# Patient Record
Sex: FEMALE | Race: BLACK OR AFRICAN AMERICAN | NOT HISPANIC OR LATINO
[De-identification: names, ages, dates, MRNs, and addresses within clinical notes are randomized per-mention and may not be internally consistent; named-entity substitution may affect disease eponyms.]

---

## 2017-01-03 ENCOUNTER — APPOINTMENT (OUTPATIENT)
Dept: NEUROLOGY | Facility: CLINIC | Age: 57
End: 2017-01-03

## 2017-01-03 VITALS
DIASTOLIC BLOOD PRESSURE: 88 MMHG | BODY MASS INDEX: 46.01 KG/M2 | HEIGHT: 62 IN | SYSTOLIC BLOOD PRESSURE: 151 MMHG | HEART RATE: 72 BPM | WEIGHT: 250 LBS

## 2017-01-04 ENCOUNTER — RESULT REVIEW (OUTPATIENT)
Age: 57
End: 2017-01-04

## 2017-01-04 ENCOUNTER — FORM ENCOUNTER (OUTPATIENT)
Age: 57
End: 2017-01-04

## 2017-01-05 ENCOUNTER — APPOINTMENT (OUTPATIENT)
Dept: HEMATOLOGY ONCOLOGY | Facility: CLINIC | Age: 57
End: 2017-01-05

## 2017-01-05 ENCOUNTER — LABORATORY RESULT (OUTPATIENT)
Age: 57
End: 2017-01-05

## 2017-01-05 ENCOUNTER — APPOINTMENT (OUTPATIENT)
Dept: ULTRASOUND IMAGING | Facility: IMAGING CENTER | Age: 57
End: 2017-01-05

## 2017-01-05 ENCOUNTER — OUTPATIENT (OUTPATIENT)
Dept: OUTPATIENT SERVICES | Facility: HOSPITAL | Age: 57
LOS: 1 days | End: 2017-01-05
Payer: MEDICARE

## 2017-01-05 DIAGNOSIS — E04.1 NONTOXIC SINGLE THYROID NODULE: Chronic | ICD-10-CM

## 2017-01-05 DIAGNOSIS — C80.1 MALIGNANT (PRIMARY) NEOPLASM, UNSPECIFIED: Chronic | ICD-10-CM

## 2017-01-05 DIAGNOSIS — E04.1 NONTOXIC SINGLE THYROID NODULE: ICD-10-CM

## 2017-01-05 DIAGNOSIS — Z33.2 ENCOUNTER FOR ELECTIVE TERMINATION OF PREGNANCY: Chronic | ICD-10-CM

## 2017-01-05 PROBLEM — Z00.00 ENCOUNTER FOR PREVENTIVE HEALTH EXAMINATION: Noted: 2017-01-05

## 2017-01-05 PROCEDURE — 76536 US EXAM OF HEAD AND NECK: CPT | Mod: 26

## 2017-01-05 PROCEDURE — 76536 US EXAM OF HEAD AND NECK: CPT

## 2017-01-13 ENCOUNTER — RESULT REVIEW (OUTPATIENT)
Age: 57
End: 2017-01-13

## 2017-02-16 ENCOUNTER — APPOINTMENT (OUTPATIENT)
Dept: GASTROENTEROLOGY | Facility: CLINIC | Age: 57
End: 2017-02-16

## 2017-02-16 VITALS
DIASTOLIC BLOOD PRESSURE: 70 MMHG | TEMPERATURE: 98.2 F | SYSTOLIC BLOOD PRESSURE: 150 MMHG | HEART RATE: 86 BPM | HEIGHT: 61 IN | OXYGEN SATURATION: 96 % | WEIGHT: 250 LBS | BODY MASS INDEX: 47.2 KG/M2 | RESPIRATION RATE: 16 BRPM

## 2017-02-16 DIAGNOSIS — R13.10 DYSPHAGIA, UNSPECIFIED: ICD-10-CM

## 2017-02-16 DIAGNOSIS — Z87.09 PERSONAL HISTORY OF OTHER DISEASES OF THE RESPIRATORY SYSTEM: ICD-10-CM

## 2017-02-16 DIAGNOSIS — F15.90 OTHER STIMULANT USE, UNSPECIFIED, UNCOMPLICATED: ICD-10-CM

## 2017-02-16 DIAGNOSIS — R10.11 RIGHT UPPER QUADRANT PAIN: ICD-10-CM

## 2017-02-16 DIAGNOSIS — K20.9 ESOPHAGITIS, UNSPECIFIED: ICD-10-CM

## 2017-02-16 DIAGNOSIS — K92.1 MELENA: ICD-10-CM

## 2017-02-16 DIAGNOSIS — M19.90 UNSPECIFIED OSTEOARTHRITIS, UNSPECIFIED SITE: ICD-10-CM

## 2017-02-16 DIAGNOSIS — I89.0 LYMPHEDEMA, NOT ELSEWHERE CLASSIFIED: ICD-10-CM

## 2017-02-16 DIAGNOSIS — Z87.891 PERSONAL HISTORY OF NICOTINE DEPENDENCE: ICD-10-CM

## 2017-03-03 ENCOUNTER — RX RENEWAL (OUTPATIENT)
Age: 57
End: 2017-03-03

## 2017-03-06 ENCOUNTER — MEDICATION RENEWAL (OUTPATIENT)
Age: 57
End: 2017-03-06

## 2017-03-07 ENCOUNTER — RESULT REVIEW (OUTPATIENT)
Age: 57
End: 2017-03-07

## 2017-03-08 ENCOUNTER — APPOINTMENT (OUTPATIENT)
Dept: GASTROENTEROLOGY | Facility: HOSPITAL | Age: 57
End: 2017-03-08

## 2017-03-08 ENCOUNTER — OUTPATIENT (OUTPATIENT)
Dept: OUTPATIENT SERVICES | Facility: HOSPITAL | Age: 57
LOS: 1 days | Discharge: HOME | End: 2017-03-08
Payer: MEDICARE

## 2017-03-08 DIAGNOSIS — Z33.2 ENCOUNTER FOR ELECTIVE TERMINATION OF PREGNANCY: Chronic | ICD-10-CM

## 2017-03-08 DIAGNOSIS — R63.4 ABNORMAL WEIGHT LOSS: ICD-10-CM

## 2017-03-08 DIAGNOSIS — C80.1 MALIGNANT (PRIMARY) NEOPLASM, UNSPECIFIED: Chronic | ICD-10-CM

## 2017-03-08 DIAGNOSIS — E04.1 NONTOXIC SINGLE THYROID NODULE: Chronic | ICD-10-CM

## 2017-03-08 PROCEDURE — 88312 SPECIAL STAINS GROUP 1: CPT | Mod: 26

## 2017-03-08 PROCEDURE — 45378 DIAGNOSTIC COLONOSCOPY: CPT | Mod: GC

## 2017-03-08 PROCEDURE — 88305 TISSUE EXAM BY PATHOLOGIST: CPT | Mod: 26

## 2017-03-08 PROCEDURE — 43239 EGD BIOPSY SINGLE/MULTIPLE: CPT | Mod: GC

## 2017-03-10 LAB — SURGICAL PATHOLOGY STUDY: SIGNIFICANT CHANGE UP

## 2017-03-13 ENCOUNTER — APPOINTMENT (OUTPATIENT)
Dept: INTERNAL MEDICINE | Facility: CLINIC | Age: 57
End: 2017-03-13

## 2017-03-13 VITALS
DIASTOLIC BLOOD PRESSURE: 80 MMHG | HEIGHT: 62 IN | TEMPERATURE: 98.6 F | BODY MASS INDEX: 45.27 KG/M2 | HEART RATE: 64 BPM | SYSTOLIC BLOOD PRESSURE: 130 MMHG | WEIGHT: 246 LBS

## 2017-03-15 LAB
ALBUMIN SERPL ELPH-MCNC: 4.1 G/DL
ALP BLD-CCNC: 94 U/L
ALT SERPL-CCNC: 14 U/L
ANION GAP SERPL CALC-SCNC: 13 MMOL/L
AST SERPL-CCNC: 17 U/L
BILIRUB SERPL-MCNC: 0.4 MG/DL
BUN SERPL-MCNC: 11 MG/DL
CALCIUM SERPL-MCNC: 9.6 MG/DL
CHLORIDE SERPL-SCNC: 95 MMOL/L
CO2 SERPL-SCNC: 28 MMOL/L
CREAT SERPL-MCNC: 1 MG/DL
CREAT SPEC-SCNC: 87 MG/DL
GLUCOSE SERPL-MCNC: 114 MG/DL
HBA1C MFR BLD HPLC: 6.7 %
HCV AB SER QL: NONREACTIVE
HCV S/CO RATIO: 0.12 S/CO
MICROALBUMIN 24H UR DL<=1MG/L-MCNC: 3.7 MG/DL
MICROALBUMIN/CREAT 24H UR-RTO: 43 UG/MG
POTASSIUM SERPL-SCNC: 3.9 MMOL/L
PROT SERPL-MCNC: 8 G/DL
SODIUM SERPL-SCNC: 136 MMOL/L

## 2017-03-20 PROBLEM — K20.9 ESOPHAGITIS: Status: ACTIVE | Noted: 2017-02-16

## 2017-03-20 PROBLEM — K92.1 HEMATOCHEZIA: Status: ACTIVE | Noted: 2017-02-16

## 2017-03-20 PROBLEM — F15.90 CAFFEINE USE: Status: ACTIVE | Noted: 2017-02-16

## 2017-03-20 PROBLEM — I89.0 LYMPHEDEMA OF RIGHT ARM: Status: RESOLVED | Noted: 2017-02-16 | Resolved: 2017-03-20

## 2017-03-20 PROBLEM — Z87.09 HISTORY OF POSTNASAL DRIP: Status: ACTIVE | Noted: 2017-02-16

## 2017-03-20 PROBLEM — Z87.891 FORMER SMOKER: Status: ACTIVE | Noted: 2017-02-16

## 2017-03-20 PROBLEM — R13.10 DYSPHAGIA: Status: ACTIVE | Noted: 2017-02-16

## 2017-04-17 ENCOUNTER — APPOINTMENT (OUTPATIENT)
Dept: INTERNAL MEDICINE | Facility: CLINIC | Age: 57
End: 2017-04-17

## 2017-05-01 ENCOUNTER — APPOINTMENT (OUTPATIENT)
Dept: INTERNAL MEDICINE | Facility: CLINIC | Age: 57
End: 2017-05-01

## 2017-05-01 ENCOUNTER — NON-APPOINTMENT (OUTPATIENT)
Age: 57
End: 2017-05-01

## 2017-05-01 VITALS
BODY MASS INDEX: 45.64 KG/M2 | TEMPERATURE: 98.5 F | DIASTOLIC BLOOD PRESSURE: 78 MMHG | HEIGHT: 62 IN | HEART RATE: 66 BPM | OXYGEN SATURATION: 95 % | WEIGHT: 248 LBS | SYSTOLIC BLOOD PRESSURE: 136 MMHG

## 2017-05-01 RX ORDER — HYDROCHLOROTHIAZIDE 25 MG/1
25 TABLET ORAL
Refills: 0 | Status: DISCONTINUED | COMMUNITY
End: 2017-05-01

## 2017-05-03 LAB
ALBUMIN SERPL ELPH-MCNC: 4.1 G/DL
ALP BLD-CCNC: 106 U/L
ALT SERPL-CCNC: 13 U/L
ANION GAP SERPL CALC-SCNC: 16 MMOL/L
APTT BLD: 36.1 SEC
AST SERPL-CCNC: 12 U/L
BASOPHILS # BLD AUTO: 0.01 K/UL
BASOPHILS NFR BLD AUTO: 0.2 %
BILIRUB SERPL-MCNC: 0.3 MG/DL
BUN SERPL-MCNC: 11 MG/DL
CALCIUM SERPL-MCNC: 9.4 MG/DL
CHLORIDE SERPL-SCNC: 100 MMOL/L
CO2 SERPL-SCNC: 28 MMOL/L
CREAT SERPL-MCNC: 0.96 MG/DL
EOSINOPHIL # BLD AUTO: 0.08 K/UL
EOSINOPHIL NFR BLD AUTO: 1.9 %
GLUCOSE SERPL-MCNC: 113 MG/DL
HCT VFR BLD CALC: 35.1 %
HGB BLD-MCNC: 11.2 G/DL
IMM GRANULOCYTES NFR BLD AUTO: 0.2 %
INR PPP: 0.98 RATIO
LYMPHOCYTES # BLD AUTO: 1.35 K/UL
LYMPHOCYTES NFR BLD AUTO: 32.5 %
MAN DIFF?: NORMAL
MCHC RBC-ENTMCNC: 27.1 PG
MCHC RBC-ENTMCNC: 31.9 GM/DL
MCV RBC AUTO: 84.8 FL
MONOCYTES # BLD AUTO: 0.25 K/UL
MONOCYTES NFR BLD AUTO: 6 %
NEUTROPHILS # BLD AUTO: 2.46 K/UL
NEUTROPHILS NFR BLD AUTO: 59.2 %
PLATELET # BLD AUTO: 251 K/UL
POTASSIUM SERPL-SCNC: 3.9 MMOL/L
PROT SERPL-MCNC: 8.2 G/DL
PT BLD: 11.1 SEC
RBC # BLD: 4.14 M/UL
RBC # FLD: 17.3 %
SODIUM SERPL-SCNC: 144 MMOL/L
WBC # FLD AUTO: 4.16 K/UL

## 2017-05-09 ENCOUNTER — FORM ENCOUNTER (OUTPATIENT)
Age: 57
End: 2017-05-09

## 2017-05-10 ENCOUNTER — OUTPATIENT (OUTPATIENT)
Dept: OUTPATIENT SERVICES | Facility: HOSPITAL | Age: 57
LOS: 1 days | End: 2017-05-10
Payer: MEDICARE

## 2017-05-10 ENCOUNTER — APPOINTMENT (OUTPATIENT)
Dept: RADIOLOGY | Facility: IMAGING CENTER | Age: 57
End: 2017-05-10

## 2017-05-10 DIAGNOSIS — Z00.8 ENCOUNTER FOR OTHER GENERAL EXAMINATION: ICD-10-CM

## 2017-05-10 DIAGNOSIS — Z33.2 ENCOUNTER FOR ELECTIVE TERMINATION OF PREGNANCY: Chronic | ICD-10-CM

## 2017-05-10 DIAGNOSIS — E04.1 NONTOXIC SINGLE THYROID NODULE: Chronic | ICD-10-CM

## 2017-05-10 DIAGNOSIS — C80.1 MALIGNANT (PRIMARY) NEOPLASM, UNSPECIFIED: Chronic | ICD-10-CM

## 2017-05-10 PROCEDURE — 71046 X-RAY EXAM CHEST 2 VIEWS: CPT

## 2017-05-12 ENCOUNTER — APPOINTMENT (OUTPATIENT)
Dept: INTERNAL MEDICINE | Facility: CLINIC | Age: 57
End: 2017-05-12

## 2017-05-12 VITALS
HEIGHT: 62 IN | BODY MASS INDEX: 45.27 KG/M2 | TEMPERATURE: 98.2 F | DIASTOLIC BLOOD PRESSURE: 66 MMHG | SYSTOLIC BLOOD PRESSURE: 134 MMHG | OXYGEN SATURATION: 98 % | HEART RATE: 76 BPM | WEIGHT: 246 LBS

## 2017-05-24 ENCOUNTER — FORM ENCOUNTER (OUTPATIENT)
Age: 57
End: 2017-05-24

## 2017-05-25 ENCOUNTER — APPOINTMENT (OUTPATIENT)
Dept: ULTRASOUND IMAGING | Facility: IMAGING CENTER | Age: 57
End: 2017-05-25

## 2017-05-25 ENCOUNTER — OUTPATIENT (OUTPATIENT)
Dept: OUTPATIENT SERVICES | Facility: HOSPITAL | Age: 57
LOS: 1 days | End: 2017-05-25
Payer: MEDICARE

## 2017-05-25 ENCOUNTER — APPOINTMENT (OUTPATIENT)
Dept: MAMMOGRAPHY | Facility: IMAGING CENTER | Age: 57
End: 2017-05-25

## 2017-05-25 DIAGNOSIS — C80.1 MALIGNANT (PRIMARY) NEOPLASM, UNSPECIFIED: Chronic | ICD-10-CM

## 2017-05-25 DIAGNOSIS — Z01.818 ENCOUNTER FOR OTHER PREPROCEDURAL EXAMINATION: ICD-10-CM

## 2017-05-25 DIAGNOSIS — Z33.2 ENCOUNTER FOR ELECTIVE TERMINATION OF PREGNANCY: Chronic | ICD-10-CM

## 2017-05-25 DIAGNOSIS — E04.1 NONTOXIC SINGLE THYROID NODULE: Chronic | ICD-10-CM

## 2017-05-25 PROCEDURE — 76641 ULTRASOUND BREAST COMPLETE: CPT

## 2017-05-25 PROCEDURE — 77067 SCR MAMMO BI INCL CAD: CPT

## 2017-05-25 PROCEDURE — 77063 BREAST TOMOSYNTHESIS BI: CPT

## 2017-06-07 ENCOUNTER — APPOINTMENT (OUTPATIENT)
Dept: PHYSICAL MEDICINE AND REHAB | Facility: CLINIC | Age: 57
End: 2017-06-07

## 2017-06-09 ENCOUNTER — APPOINTMENT (OUTPATIENT)
Dept: CARDIOLOGY | Facility: CLINIC | Age: 57
End: 2017-06-09

## 2017-06-09 ENCOUNTER — NON-APPOINTMENT (OUTPATIENT)
Age: 57
End: 2017-06-09

## 2017-06-09 VITALS
SYSTOLIC BLOOD PRESSURE: 139 MMHG | BODY MASS INDEX: 45.27 KG/M2 | DIASTOLIC BLOOD PRESSURE: 77 MMHG | WEIGHT: 246 LBS | OXYGEN SATURATION: 97 % | HEIGHT: 62 IN | HEART RATE: 75 BPM

## 2017-06-09 RX ORDER — LOSARTAN POTASSIUM 25 MG/1
25 TABLET, FILM COATED ORAL
Refills: 0 | Status: DISCONTINUED | COMMUNITY
End: 2017-06-09

## 2017-06-19 ENCOUNTER — APPOINTMENT (OUTPATIENT)
Dept: INTERNAL MEDICINE | Facility: CLINIC | Age: 57
End: 2017-06-19

## 2017-06-19 VITALS
BODY MASS INDEX: 45.45 KG/M2 | TEMPERATURE: 98.3 F | HEIGHT: 62 IN | HEART RATE: 68 BPM | DIASTOLIC BLOOD PRESSURE: 80 MMHG | SYSTOLIC BLOOD PRESSURE: 130 MMHG | OXYGEN SATURATION: 97 % | WEIGHT: 247 LBS

## 2017-06-19 DIAGNOSIS — M25.421 PAIN IN RIGHT ELBOW: ICD-10-CM

## 2017-06-19 DIAGNOSIS — M25.521 PAIN IN RIGHT ELBOW: ICD-10-CM

## 2017-06-19 DIAGNOSIS — I10 ESSENTIAL (PRIMARY) HYPERTENSION: ICD-10-CM

## 2017-06-19 DIAGNOSIS — M79.7 FIBROMYALGIA: ICD-10-CM

## 2017-06-21 ENCOUNTER — APPOINTMENT (OUTPATIENT)
Dept: CV DIAGNOSITCS | Facility: HOSPITAL | Age: 57
End: 2017-06-21

## 2017-06-27 PROBLEM — I10 HYPERTENSION: Noted: 2017-02-16

## 2017-06-27 PROBLEM — M79.7 FIBROMYALGIA: Noted: 2017-02-16

## 2017-06-28 ENCOUNTER — FORM ENCOUNTER (OUTPATIENT)
Age: 57
End: 2017-06-28

## 2017-06-29 ENCOUNTER — OUTPATIENT (OUTPATIENT)
Dept: OUTPATIENT SERVICES | Facility: HOSPITAL | Age: 57
LOS: 1 days | End: 2017-06-29
Payer: MEDICARE

## 2017-06-29 ENCOUNTER — APPOINTMENT (OUTPATIENT)
Dept: RADIOLOGY | Facility: IMAGING CENTER | Age: 57
End: 2017-06-29

## 2017-06-29 ENCOUNTER — APPOINTMENT (OUTPATIENT)
Dept: ULTRASOUND IMAGING | Facility: IMAGING CENTER | Age: 57
End: 2017-06-29

## 2017-06-29 DIAGNOSIS — E04.1 NONTOXIC SINGLE THYROID NODULE: Chronic | ICD-10-CM

## 2017-06-29 DIAGNOSIS — C80.1 MALIGNANT (PRIMARY) NEOPLASM, UNSPECIFIED: Chronic | ICD-10-CM

## 2017-06-29 DIAGNOSIS — M25.421 EFFUSION, RIGHT ELBOW: ICD-10-CM

## 2017-06-29 DIAGNOSIS — Z33.2 ENCOUNTER FOR ELECTIVE TERMINATION OF PREGNANCY: Chronic | ICD-10-CM

## 2017-06-29 DIAGNOSIS — R11.2 NAUSEA WITH VOMITING, UNSPECIFIED: ICD-10-CM

## 2017-06-29 DIAGNOSIS — Z00.8 ENCOUNTER FOR OTHER GENERAL EXAMINATION: ICD-10-CM

## 2017-06-29 PROCEDURE — 76700 US EXAM ABDOM COMPLETE: CPT

## 2017-06-29 PROCEDURE — 73080 X-RAY EXAM OF ELBOW: CPT

## 2017-06-30 ENCOUNTER — APPOINTMENT (OUTPATIENT)
Dept: CV DIAGNOSITCS | Facility: HOSPITAL | Age: 57
End: 2017-06-30

## 2017-06-30 ENCOUNTER — OUTPATIENT (OUTPATIENT)
Dept: OUTPATIENT SERVICES | Facility: HOSPITAL | Age: 57
LOS: 1 days | End: 2017-06-30

## 2017-06-30 DIAGNOSIS — E04.1 NONTOXIC SINGLE THYROID NODULE: Chronic | ICD-10-CM

## 2017-06-30 DIAGNOSIS — I89.0 LYMPHEDEMA, NOT ELSEWHERE CLASSIFIED: ICD-10-CM

## 2017-06-30 DIAGNOSIS — C80.1 MALIGNANT (PRIMARY) NEOPLASM, UNSPECIFIED: Chronic | ICD-10-CM

## 2017-06-30 DIAGNOSIS — Z33.2 ENCOUNTER FOR ELECTIVE TERMINATION OF PREGNANCY: Chronic | ICD-10-CM

## 2017-07-09 ENCOUNTER — RX RENEWAL (OUTPATIENT)
Age: 57
End: 2017-07-09

## 2017-07-09 ENCOUNTER — RESULT REVIEW (OUTPATIENT)
Age: 57
End: 2017-07-09

## 2017-07-28 ENCOUNTER — RX RENEWAL (OUTPATIENT)
Age: 57
End: 2017-07-28

## 2017-08-23 ENCOUNTER — APPOINTMENT (OUTPATIENT)
Dept: PHYSICAL MEDICINE AND REHAB | Facility: CLINIC | Age: 57
End: 2017-08-23
Payer: MEDICARE

## 2017-08-23 DIAGNOSIS — M77.11 LATERAL EPICONDYLITIS, RIGHT ELBOW: ICD-10-CM

## 2017-08-23 PROCEDURE — 99213 OFFICE O/P EST LOW 20 MIN: CPT

## 2017-08-30 ENCOUNTER — RX RENEWAL (OUTPATIENT)
Age: 57
End: 2017-08-30

## 2017-09-06 ENCOUNTER — APPOINTMENT (OUTPATIENT)
Dept: INTERNAL MEDICINE | Facility: CLINIC | Age: 57
End: 2017-09-06
Payer: MEDICARE

## 2017-09-06 VITALS
OXYGEN SATURATION: 97 % | RESPIRATION RATE: 16 BRPM | HEIGHT: 62 IN | WEIGHT: 248 LBS | TEMPERATURE: 98.3 F | BODY MASS INDEX: 45.64 KG/M2 | SYSTOLIC BLOOD PRESSURE: 132 MMHG | HEART RATE: 64 BPM | DIASTOLIC BLOOD PRESSURE: 80 MMHG

## 2017-09-06 PROCEDURE — 36415 COLL VENOUS BLD VENIPUNCTURE: CPT

## 2017-09-06 PROCEDURE — 99214 OFFICE O/P EST MOD 30 MIN: CPT | Mod: 25

## 2017-09-06 RX ORDER — AMOXICILLIN AND CLAVULANATE POTASSIUM 875; 125 MG/1; MG/1
875-125 TABLET, COATED ORAL TWICE DAILY
Qty: 14 | Refills: 0 | Status: DISCONTINUED | COMMUNITY
Start: 2017-05-01 | End: 2017-09-06

## 2017-09-12 LAB
ANION GAP SERPL CALC-SCNC: 20 MMOL/L
BUN SERPL-MCNC: 10 MG/DL
CALCIUM SERPL-MCNC: 9.8 MG/DL
CHLORIDE SERPL-SCNC: 97 MMOL/L
CO2 SERPL-SCNC: 23 MMOL/L
CREAT SERPL-MCNC: 0.95 MG/DL
CREAT SPEC-SCNC: 76 MG/DL
GLUCOSE SERPL-MCNC: 128 MG/DL
HBA1C MFR BLD HPLC: 6.7 %
LDH SERPL-CCNC: 192 U/L
LDH1 CFR SERPL ELPH: 15 %
LDH1/LDH2 SERPL-CRTO: 0.56
LDH2 CFR SERPL ELPH: 27 %
LDH3 CFR SERPL ELPH: 24 %
LDH4 CFR SERPL ELPH: 15 %
LDH5 CFR SERPL ELPH: 19 %
MICROALBUMIN 24H UR DL<=1MG/L-MCNC: 10.6 MG/DL
MICROALBUMIN/CREAT 24H UR-RTO: 139 MG/G
POTASSIUM SERPL-SCNC: 4.1 MMOL/L
SODIUM SERPL-SCNC: 140 MMOL/L
TSH SERPL-ACNC: 2.45 UIU/ML

## 2017-09-28 ENCOUNTER — APPOINTMENT (OUTPATIENT)
Dept: DERMATOLOGY | Facility: CLINIC | Age: 57
End: 2017-09-28
Payer: MEDICARE

## 2017-09-28 VITALS
BODY MASS INDEX: 45.31 KG/M2 | DIASTOLIC BLOOD PRESSURE: 65 MMHG | WEIGHT: 240 LBS | HEIGHT: 61 IN | SYSTOLIC BLOOD PRESSURE: 130 MMHG

## 2017-09-28 DIAGNOSIS — Z86.010 PERSONAL HISTORY OF COLONIC POLYPS: ICD-10-CM

## 2017-09-28 DIAGNOSIS — Z86.79 PERSONAL HISTORY OF OTHER DISEASES OF THE CIRCULATORY SYSTEM: ICD-10-CM

## 2017-09-28 DIAGNOSIS — Z87.09 PERSONAL HISTORY OF OTHER DISEASES OF THE RESPIRATORY SYSTEM: ICD-10-CM

## 2017-09-28 DIAGNOSIS — Z91.89 OTHER SPECIFIED PERSONAL RISK FACTORS, NOT ELSEWHERE CLASSIFIED: ICD-10-CM

## 2017-09-28 DIAGNOSIS — F41.8 OTHER SPECIFIED ANXIETY DISORDERS: ICD-10-CM

## 2017-09-28 DIAGNOSIS — Z87.19 PERSONAL HISTORY OF OTHER DISEASES OF THE DIGESTIVE SYSTEM: ICD-10-CM

## 2017-09-28 PROCEDURE — 99203 OFFICE O/P NEW LOW 30 MIN: CPT

## 2017-10-03 ENCOUNTER — APPOINTMENT (OUTPATIENT)
Dept: SURGERY | Facility: CLINIC | Age: 57
End: 2017-10-03
Payer: MEDICARE

## 2017-10-03 VITALS
HEIGHT: 61 IN | WEIGHT: 248 LBS | DIASTOLIC BLOOD PRESSURE: 81 MMHG | HEART RATE: 76 BPM | TEMPERATURE: 98.5 F | SYSTOLIC BLOOD PRESSURE: 137 MMHG | BODY MASS INDEX: 46.82 KG/M2

## 2017-10-03 PROCEDURE — 99202 OFFICE O/P NEW SF 15 MIN: CPT

## 2017-10-05 ENCOUNTER — OUTPATIENT (OUTPATIENT)
Dept: OUTPATIENT SERVICES | Facility: HOSPITAL | Age: 57
LOS: 1 days | End: 2017-10-05
Payer: MEDICARE

## 2017-10-05 ENCOUNTER — APPOINTMENT (OUTPATIENT)
Dept: CT IMAGING | Facility: IMAGING CENTER | Age: 57
End: 2017-10-05
Payer: MEDICARE

## 2017-10-05 DIAGNOSIS — Z33.2 ENCOUNTER FOR ELECTIVE TERMINATION OF PREGNANCY: Chronic | ICD-10-CM

## 2017-10-05 DIAGNOSIS — E04.1 NONTOXIC SINGLE THYROID NODULE: Chronic | ICD-10-CM

## 2017-10-05 DIAGNOSIS — Z00.8 ENCOUNTER FOR OTHER GENERAL EXAMINATION: ICD-10-CM

## 2017-10-05 DIAGNOSIS — C80.1 MALIGNANT (PRIMARY) NEOPLASM, UNSPECIFIED: Chronic | ICD-10-CM

## 2017-10-05 PROCEDURE — 74177 CT ABD & PELVIS W/CONTRAST: CPT | Mod: 26

## 2017-10-05 PROCEDURE — 74177 CT ABD & PELVIS W/CONTRAST: CPT

## 2017-10-17 ENCOUNTER — APPOINTMENT (OUTPATIENT)
Dept: SURGERY | Facility: CLINIC | Age: 57
End: 2017-10-17
Payer: MEDICARE

## 2017-10-17 VITALS
HEIGHT: 61 IN | SYSTOLIC BLOOD PRESSURE: 121 MMHG | HEART RATE: 81 BPM | BODY MASS INDEX: 46.82 KG/M2 | DIASTOLIC BLOOD PRESSURE: 78 MMHG | WEIGHT: 248 LBS | TEMPERATURE: 98.3 F

## 2017-10-17 PROCEDURE — 99214 OFFICE O/P EST MOD 30 MIN: CPT

## 2017-10-25 ENCOUNTER — CLINICAL ADVICE (OUTPATIENT)
Age: 57
End: 2017-10-25

## 2017-10-26 ENCOUNTER — RX RENEWAL (OUTPATIENT)
Age: 57
End: 2017-10-26

## 2017-11-03 ENCOUNTER — OUTPATIENT (OUTPATIENT)
Dept: OUTPATIENT SERVICES | Facility: HOSPITAL | Age: 57
LOS: 1 days | Discharge: HOME | End: 2017-11-03
Payer: MEDICARE

## 2017-11-03 ENCOUNTER — APPOINTMENT (OUTPATIENT)
Dept: GASTROENTEROLOGY | Facility: HOSPITAL | Age: 57
End: 2017-11-03

## 2017-11-03 DIAGNOSIS — E04.1 NONTOXIC SINGLE THYROID NODULE: Chronic | ICD-10-CM

## 2017-11-03 DIAGNOSIS — C80.1 MALIGNANT (PRIMARY) NEOPLASM, UNSPECIFIED: Chronic | ICD-10-CM

## 2017-11-03 DIAGNOSIS — Z33.2 ENCOUNTER FOR ELECTIVE TERMINATION OF PREGNANCY: Chronic | ICD-10-CM

## 2017-11-03 DIAGNOSIS — K21.9 GASTRO-ESOPHAGEAL REFLUX DISEASE WITHOUT ESOPHAGITIS: ICD-10-CM

## 2017-11-03 PROCEDURE — 91037 ESOPH IMPED FUNCTION TEST: CPT | Mod: 26,GC

## 2017-11-03 PROCEDURE — 91010 ESOPHAGUS MOTILITY STUDY: CPT | Mod: 26,GC

## 2017-11-15 ENCOUNTER — RESULT REVIEW (OUTPATIENT)
Age: 57
End: 2017-11-15

## 2017-11-15 ENCOUNTER — APPOINTMENT (OUTPATIENT)
Dept: GASTROENTEROLOGY | Facility: HOSPITAL | Age: 57
End: 2017-11-15

## 2017-11-15 ENCOUNTER — OUTPATIENT (OUTPATIENT)
Dept: OUTPATIENT SERVICES | Facility: HOSPITAL | Age: 57
LOS: 1 days | Discharge: HOME | End: 2017-11-15
Payer: MEDICARE

## 2017-11-15 DIAGNOSIS — E04.1 NONTOXIC SINGLE THYROID NODULE: Chronic | ICD-10-CM

## 2017-11-15 DIAGNOSIS — C80.1 MALIGNANT (PRIMARY) NEOPLASM, UNSPECIFIED: Chronic | ICD-10-CM

## 2017-11-15 DIAGNOSIS — Z33.2 ENCOUNTER FOR ELECTIVE TERMINATION OF PREGNANCY: Chronic | ICD-10-CM

## 2017-11-15 DIAGNOSIS — K21.9 GASTRO-ESOPHAGEAL REFLUX DISEASE WITHOUT ESOPHAGITIS: ICD-10-CM

## 2017-11-15 LAB — GLUCOSE BLDC GLUCOMTR-MCNC: 104 MG/DL — HIGH (ref 70–99)

## 2017-11-15 PROCEDURE — 88305 TISSUE EXAM BY PATHOLOGIST: CPT | Mod: 26

## 2017-11-15 PROCEDURE — 43239 EGD BIOPSY SINGLE/MULTIPLE: CPT

## 2017-11-15 PROCEDURE — 88312 SPECIAL STAINS GROUP 1: CPT | Mod: 26

## 2017-11-17 LAB — SURGICAL PATHOLOGY STUDY: SIGNIFICANT CHANGE UP

## 2017-12-13 ENCOUNTER — APPOINTMENT (OUTPATIENT)
Dept: PHYSICAL MEDICINE AND REHAB | Facility: CLINIC | Age: 57
End: 2017-12-13
Payer: MEDICARE

## 2017-12-13 PROCEDURE — 99212 OFFICE O/P EST SF 10 MIN: CPT

## 2017-12-15 ENCOUNTER — APPOINTMENT (OUTPATIENT)
Dept: CARDIOLOGY | Facility: CLINIC | Age: 57
End: 2017-12-15
Payer: MEDICARE

## 2017-12-15 ENCOUNTER — NON-APPOINTMENT (OUTPATIENT)
Age: 57
End: 2017-12-15

## 2017-12-15 VITALS
WEIGHT: 248 LBS | HEART RATE: 79 BPM | RESPIRATION RATE: 16 BRPM | DIASTOLIC BLOOD PRESSURE: 77 MMHG | HEIGHT: 61 IN | BODY MASS INDEX: 46.82 KG/M2 | OXYGEN SATURATION: 98 % | SYSTOLIC BLOOD PRESSURE: 140 MMHG

## 2017-12-15 PROCEDURE — 93000 ELECTROCARDIOGRAM COMPLETE: CPT

## 2017-12-15 PROCEDURE — 99213 OFFICE O/P EST LOW 20 MIN: CPT

## 2017-12-19 ENCOUNTER — APPOINTMENT (OUTPATIENT)
Dept: INTERNAL MEDICINE | Facility: CLINIC | Age: 57
End: 2017-12-19

## 2018-01-10 ENCOUNTER — APPOINTMENT (OUTPATIENT)
Dept: INTERNAL MEDICINE | Facility: CLINIC | Age: 58
End: 2018-01-10
Payer: MEDICARE

## 2018-01-10 VITALS
BODY MASS INDEX: 45.12 KG/M2 | OXYGEN SATURATION: 96 % | HEART RATE: 87 BPM | SYSTOLIC BLOOD PRESSURE: 140 MMHG | WEIGHT: 239 LBS | HEIGHT: 61 IN | TEMPERATURE: 98.5 F | DIASTOLIC BLOOD PRESSURE: 86 MMHG

## 2018-01-10 DIAGNOSIS — M26.609 UNSPECIFIED TEMPOROMANDIBULAR JOINT DISORDER: ICD-10-CM

## 2018-01-10 DIAGNOSIS — Z86.79 PERSONAL HISTORY OF OTHER DISEASES OF THE CIRCULATORY SYSTEM: ICD-10-CM

## 2018-01-10 DIAGNOSIS — Z87.898 PERSONAL HISTORY OF OTHER SPECIFIED CONDITIONS: ICD-10-CM

## 2018-01-10 DIAGNOSIS — R09.81 NASAL CONGESTION: ICD-10-CM

## 2018-01-10 DIAGNOSIS — E55.9 VITAMIN D DEFICIENCY, UNSPECIFIED: ICD-10-CM

## 2018-01-10 PROCEDURE — 99215 OFFICE O/P EST HI 40 MIN: CPT | Mod: 25

## 2018-01-10 PROCEDURE — 36415 COLL VENOUS BLD VENIPUNCTURE: CPT

## 2018-01-10 PROCEDURE — 87804 INFLUENZA ASSAY W/OPTIC: CPT | Mod: QW

## 2018-01-11 LAB
25(OH)D3 SERPL-MCNC: 40.7 NG/ML
ANION GAP SERPL CALC-SCNC: 15 MMOL/L
BASOPHILS # BLD AUTO: 0.01 K/UL
BASOPHILS NFR BLD AUTO: 0.2 %
BUN SERPL-MCNC: 11 MG/DL
CALCIUM SERPL-MCNC: 9.4 MG/DL
CHLORIDE SERPL-SCNC: 96 MMOL/L
CO2 SERPL-SCNC: 28 MMOL/L
CREAT SERPL-MCNC: 0.92 MG/DL
CREAT SPEC-SCNC: 88 MG/DL
EOSINOPHIL # BLD AUTO: 0.09 K/UL
EOSINOPHIL NFR BLD AUTO: 1.7 %
GLUCOSE SERPL-MCNC: 90 MG/DL
HBA1C MFR BLD HPLC: 6.6 %
HCT VFR BLD CALC: 34.5 %
HGB BLD-MCNC: 11 G/DL
IMM GRANULOCYTES NFR BLD AUTO: 0 %
LYMPHOCYTES # BLD AUTO: 1.54 K/UL
LYMPHOCYTES NFR BLD AUTO: 29.7 %
MAN DIFF?: NORMAL
MCHC RBC-ENTMCNC: 27.6 PG
MCHC RBC-ENTMCNC: 31.9 GM/DL
MCV RBC AUTO: 86.5 FL
MICROALBUMIN 24H UR DL<=1MG/L-MCNC: 10.2 MG/DL
MICROALBUMIN/CREAT 24H UR-RTO: 116 MG/G
MONOCYTES # BLD AUTO: 0.31 K/UL
MONOCYTES NFR BLD AUTO: 6 %
NEUTROPHILS # BLD AUTO: 3.23 K/UL
NEUTROPHILS NFR BLD AUTO: 62.4 %
PLATELET # BLD AUTO: 291 K/UL
POTASSIUM SERPL-SCNC: 4.2 MMOL/L
RBC # BLD: 3.99 M/UL
RBC # FLD: 16.6 %
SODIUM SERPL-SCNC: 139 MMOL/L
WBC # FLD AUTO: 5.18 K/UL

## 2018-01-12 LAB
FLUAV SPEC QL CULT: NEGATIVE
FLUBV AG SPEC QL IA: NEGATIVE

## 2018-01-25 ENCOUNTER — APPOINTMENT (OUTPATIENT)
Dept: INTERNAL MEDICINE | Facility: CLINIC | Age: 58
End: 2018-01-25
Payer: MEDICARE

## 2018-01-25 VITALS
SYSTOLIC BLOOD PRESSURE: 134 MMHG | HEIGHT: 61 IN | HEART RATE: 85 BPM | DIASTOLIC BLOOD PRESSURE: 68 MMHG | OXYGEN SATURATION: 97 % | BODY MASS INDEX: 45.12 KG/M2 | WEIGHT: 239 LBS

## 2018-01-25 PROCEDURE — G0008: CPT

## 2018-01-25 PROCEDURE — 99213 OFFICE O/P EST LOW 20 MIN: CPT | Mod: 25

## 2018-01-25 PROCEDURE — 90688 IIV4 VACCINE SPLT 0.5 ML IM: CPT

## 2018-02-05 ENCOUNTER — OUTPATIENT (OUTPATIENT)
Dept: OUTPATIENT SERVICES | Facility: HOSPITAL | Age: 58
LOS: 1 days | Discharge: HOME | End: 2018-02-05

## 2018-02-05 DIAGNOSIS — Z33.2 ENCOUNTER FOR ELECTIVE TERMINATION OF PREGNANCY: Chronic | ICD-10-CM

## 2018-02-05 DIAGNOSIS — C80.1 MALIGNANT (PRIMARY) NEOPLASM, UNSPECIFIED: Chronic | ICD-10-CM

## 2018-02-05 DIAGNOSIS — C50.919 MALIGNANT NEOPLASM OF UNSPECIFIED SITE OF UNSPECIFIED FEMALE BREAST: ICD-10-CM

## 2018-02-05 DIAGNOSIS — E04.1 NONTOXIC SINGLE THYROID NODULE: Chronic | ICD-10-CM

## 2018-02-06 ENCOUNTER — APPOINTMENT (OUTPATIENT)
Dept: SURGERY | Facility: CLINIC | Age: 58
End: 2018-02-06
Payer: MEDICARE

## 2018-02-06 VITALS
WEIGHT: 239 LBS | TEMPERATURE: 98.3 F | HEIGHT: 61 IN | BODY MASS INDEX: 45.12 KG/M2 | SYSTOLIC BLOOD PRESSURE: 158 MMHG | DIASTOLIC BLOOD PRESSURE: 94 MMHG | HEART RATE: 80 BPM

## 2018-02-06 PROCEDURE — 99214 OFFICE O/P EST MOD 30 MIN: CPT

## 2018-02-08 ENCOUNTER — MESSAGE (OUTPATIENT)
Age: 58
End: 2018-02-08

## 2018-02-09 ENCOUNTER — APPOINTMENT (OUTPATIENT)
Dept: HEMATOLOGY ONCOLOGY | Facility: CLINIC | Age: 58
End: 2018-02-09
Payer: MEDICARE

## 2018-02-09 ENCOUNTER — RX RENEWAL (OUTPATIENT)
Age: 58
End: 2018-02-09

## 2018-02-09 ENCOUNTER — RESULT REVIEW (OUTPATIENT)
Age: 58
End: 2018-02-09

## 2018-02-09 VITALS
BODY MASS INDEX: 45.16 KG/M2 | DIASTOLIC BLOOD PRESSURE: 81 MMHG | WEIGHT: 239 LBS | HEART RATE: 73 BPM | OXYGEN SATURATION: 96 % | TEMPERATURE: 98.3 F | RESPIRATION RATE: 16 BRPM | SYSTOLIC BLOOD PRESSURE: 131 MMHG

## 2018-02-09 LAB
APTT BLD: 38 SEC
HCT VFR BLD CALC: 33.8 % — LOW (ref 34.5–45)
HGB BLD-MCNC: 11.9 G/DL — SIGNIFICANT CHANGE UP (ref 11.5–15.5)
INR PPP: 1.09 RATIO
MCHC RBC-ENTMCNC: 29.5 PG — SIGNIFICANT CHANGE UP (ref 27–34)
MCHC RBC-ENTMCNC: 35.2 G/DL — SIGNIFICANT CHANGE UP (ref 32–36)
MCV RBC AUTO: 83.7 FL — SIGNIFICANT CHANGE UP (ref 80–100)
PLATELET # BLD AUTO: 229 K/UL — SIGNIFICANT CHANGE UP (ref 150–400)
PT BLD: 12.3 SEC
RBC # BLD: 4.03 M/UL — SIGNIFICANT CHANGE UP (ref 3.8–5.2)
RBC # FLD: 15.4 % — HIGH (ref 10.3–14.5)
WBC # BLD: 5.3 K/UL — SIGNIFICANT CHANGE UP (ref 3.8–10.5)
WBC # FLD AUTO: 5.3 K/UL — SIGNIFICANT CHANGE UP (ref 3.8–10.5)

## 2018-02-09 PROCEDURE — 99215 OFFICE O/P EST HI 40 MIN: CPT

## 2018-02-11 LAB
25(OH)D3 SERPL-MCNC: 35.8 NG/ML
ALBUMIN SERPL ELPH-MCNC: 4 G/DL
ALP BLD-CCNC: 111 U/L
ALT SERPL-CCNC: 15 U/L
ANION GAP SERPL CALC-SCNC: 15 MMOL/L
AST SERPL-CCNC: 20 U/L
BILIRUB SERPL-MCNC: 0.3 MG/DL
BUN SERPL-MCNC: 12 MG/DL
CALCIUM SERPL-MCNC: 9.6 MG/DL
CANCER AG27-29 SERPL-ACNC: 16.3 U/ML
CEA SERPL-MCNC: 2.3 NG/ML
CHLORIDE SERPL-SCNC: 97 MMOL/L
CO2 SERPL-SCNC: 29 MMOL/L
CREAT SERPL-MCNC: 0.92 MG/DL
GLUCOSE SERPL-MCNC: 108 MG/DL
POTASSIUM SERPL-SCNC: 4.4 MMOL/L
PROT SERPL-MCNC: 8.7 G/DL
SODIUM SERPL-SCNC: 141 MMOL/L

## 2018-02-12 ENCOUNTER — APPOINTMENT (OUTPATIENT)
Dept: PLASTIC SURGERY | Facility: CLINIC | Age: 58
End: 2018-02-12
Payer: MEDICARE

## 2018-02-12 PROCEDURE — 99024 POSTOP FOLLOW-UP VISIT: CPT

## 2018-02-15 ENCOUNTER — APPOINTMENT (OUTPATIENT)
Dept: GASTROENTEROLOGY | Facility: CLINIC | Age: 58
End: 2018-02-15
Payer: MEDICARE

## 2018-02-15 VITALS
WEIGHT: 244 LBS | RESPIRATION RATE: 14 BRPM | HEART RATE: 68 BPM | TEMPERATURE: 98.2 F | DIASTOLIC BLOOD PRESSURE: 70 MMHG | SYSTOLIC BLOOD PRESSURE: 138 MMHG | OXYGEN SATURATION: 96 % | BODY MASS INDEX: 46.1 KG/M2

## 2018-02-15 DIAGNOSIS — R13.10 DYSPHAGIA, UNSPECIFIED: ICD-10-CM

## 2018-02-15 DIAGNOSIS — K57.30 DIVERTICULOSIS OF LARGE INTESTINE W/OUT PERFORATION OR ABSCESS W/OUT BLEEDING: ICD-10-CM

## 2018-02-15 PROCEDURE — 99214 OFFICE O/P EST MOD 30 MIN: CPT

## 2018-02-19 ENCOUNTER — FORM ENCOUNTER (OUTPATIENT)
Age: 58
End: 2018-02-19

## 2018-02-20 ENCOUNTER — APPOINTMENT (OUTPATIENT)
Dept: NUCLEAR MEDICINE | Facility: IMAGING CENTER | Age: 58
End: 2018-02-20
Payer: MEDICARE

## 2018-02-20 ENCOUNTER — OUTPATIENT (OUTPATIENT)
Dept: OUTPATIENT SERVICES | Facility: HOSPITAL | Age: 58
LOS: 1 days | End: 2018-02-20
Payer: MEDICARE

## 2018-02-20 ENCOUNTER — APPOINTMENT (OUTPATIENT)
Dept: CT IMAGING | Facility: IMAGING CENTER | Age: 58
End: 2018-02-20
Payer: MEDICARE

## 2018-02-20 DIAGNOSIS — E04.1 NONTOXIC SINGLE THYROID NODULE: Chronic | ICD-10-CM

## 2018-02-20 DIAGNOSIS — C50.919 MALIGNANT NEOPLASM OF UNSPECIFIED SITE OF UNSPECIFIED FEMALE BREAST: ICD-10-CM

## 2018-02-20 DIAGNOSIS — C80.1 MALIGNANT (PRIMARY) NEOPLASM, UNSPECIFIED: Chronic | ICD-10-CM

## 2018-02-20 DIAGNOSIS — Z33.2 ENCOUNTER FOR ELECTIVE TERMINATION OF PREGNANCY: Chronic | ICD-10-CM

## 2018-02-20 PROBLEM — K57.30 DIVERTICULOSIS OF LARGE INTESTINE WITHOUT HEMORRHAGE: Status: ACTIVE | Noted: 2017-03-20

## 2018-02-20 PROCEDURE — 74177 CT ABD & PELVIS W/CONTRAST: CPT | Mod: 26

## 2018-02-20 PROCEDURE — 71260 CT THORAX DX C+: CPT

## 2018-02-20 PROCEDURE — 71260 CT THORAX DX C+: CPT | Mod: 26

## 2018-02-20 PROCEDURE — 78306 BONE IMAGING WHOLE BODY: CPT | Mod: 26

## 2018-02-20 PROCEDURE — 74177 CT ABD & PELVIS W/CONTRAST: CPT

## 2018-02-20 PROCEDURE — 78306 BONE IMAGING WHOLE BODY: CPT

## 2018-02-20 PROCEDURE — A9561: CPT

## 2018-03-05 ENCOUNTER — RX RENEWAL (OUTPATIENT)
Age: 58
End: 2018-03-05

## 2018-03-23 ENCOUNTER — RX RENEWAL (OUTPATIENT)
Age: 58
End: 2018-03-23

## 2018-03-26 ENCOUNTER — APPOINTMENT (OUTPATIENT)
Dept: PLASTIC SURGERY | Facility: CLINIC | Age: 58
End: 2018-03-26

## 2018-04-27 ENCOUNTER — APPOINTMENT (OUTPATIENT)
Dept: INTERNAL MEDICINE | Facility: CLINIC | Age: 58
End: 2018-04-27
Payer: MEDICARE

## 2018-04-27 VITALS
HEART RATE: 70 BPM | RESPIRATION RATE: 12 BRPM | SYSTOLIC BLOOD PRESSURE: 138 MMHG | WEIGHT: 238 LBS | BODY MASS INDEX: 44.97 KG/M2 | DIASTOLIC BLOOD PRESSURE: 80 MMHG

## 2018-04-27 DIAGNOSIS — E55.9 VITAMIN D DEFICIENCY, UNSPECIFIED: ICD-10-CM

## 2018-04-27 DIAGNOSIS — R63.0 ANOREXIA: ICD-10-CM

## 2018-04-27 DIAGNOSIS — K04.7 PERIAPICAL ABSCESS W/OUT SINUS: ICD-10-CM

## 2018-04-27 DIAGNOSIS — B35.3 TINEA PEDIS: ICD-10-CM

## 2018-04-27 PROCEDURE — G0439: CPT

## 2018-05-09 ENCOUNTER — LABORATORY RESULT (OUTPATIENT)
Age: 58
End: 2018-05-09

## 2018-05-15 ENCOUNTER — FORM ENCOUNTER (OUTPATIENT)
Age: 58
End: 2018-05-15

## 2018-05-15 ENCOUNTER — APPOINTMENT (OUTPATIENT)
Dept: SURGERY | Facility: CLINIC | Age: 58
End: 2018-05-15
Payer: MEDICARE

## 2018-05-15 VITALS — BODY MASS INDEX: 44.93 KG/M2 | WEIGHT: 238 LBS | TEMPERATURE: 98.2 F | HEIGHT: 61 IN

## 2018-05-15 PROCEDURE — 99214 OFFICE O/P EST MOD 30 MIN: CPT

## 2018-05-16 ENCOUNTER — OUTPATIENT (OUTPATIENT)
Dept: OUTPATIENT SERVICES | Facility: HOSPITAL | Age: 58
LOS: 1 days | End: 2018-05-16
Payer: MEDICARE

## 2018-05-16 ENCOUNTER — APPOINTMENT (OUTPATIENT)
Dept: MAMMOGRAPHY | Facility: IMAGING CENTER | Age: 58
End: 2018-05-16
Payer: MEDICARE

## 2018-05-16 ENCOUNTER — APPOINTMENT (OUTPATIENT)
Dept: ULTRASOUND IMAGING | Facility: IMAGING CENTER | Age: 58
End: 2018-05-16
Payer: MEDICARE

## 2018-05-16 DIAGNOSIS — C50.919 MALIGNANT NEOPLASM OF UNSPECIFIED SITE OF UNSPECIFIED FEMALE BREAST: ICD-10-CM

## 2018-05-16 DIAGNOSIS — E04.1 NONTOXIC SINGLE THYROID NODULE: Chronic | ICD-10-CM

## 2018-05-16 DIAGNOSIS — C80.1 MALIGNANT (PRIMARY) NEOPLASM, UNSPECIFIED: Chronic | ICD-10-CM

## 2018-05-16 DIAGNOSIS — Z33.2 ENCOUNTER FOR ELECTIVE TERMINATION OF PREGNANCY: Chronic | ICD-10-CM

## 2018-05-16 PROCEDURE — 76641 ULTRASOUND BREAST COMPLETE: CPT

## 2018-05-16 PROCEDURE — G0279: CPT

## 2018-05-16 PROCEDURE — G0279: CPT | Mod: 26

## 2018-05-16 PROCEDURE — 77065 DX MAMMO INCL CAD UNI: CPT

## 2018-05-16 PROCEDURE — 77065 DX MAMMO INCL CAD UNI: CPT | Mod: 26,LT

## 2018-05-16 PROCEDURE — 76641 ULTRASOUND BREAST COMPLETE: CPT | Mod: 26,LT

## 2018-05-18 ENCOUNTER — RX RENEWAL (OUTPATIENT)
Age: 58
End: 2018-05-18

## 2018-06-05 ENCOUNTER — OTHER (OUTPATIENT)
Age: 58
End: 2018-06-05

## 2018-06-05 ENCOUNTER — APPOINTMENT (OUTPATIENT)
Dept: NEUROLOGY | Facility: CLINIC | Age: 58
End: 2018-06-05
Payer: MEDICARE

## 2018-06-05 VITALS
DIASTOLIC BLOOD PRESSURE: 69 MMHG | WEIGHT: 238 LBS | HEIGHT: 61 IN | SYSTOLIC BLOOD PRESSURE: 132 MMHG | HEART RATE: 68 BPM | BODY MASS INDEX: 44.93 KG/M2

## 2018-06-05 DIAGNOSIS — H57.10 OCULAR PAIN, UNSPECIFIED EYE: ICD-10-CM

## 2018-06-05 DIAGNOSIS — R51 HEADACHE: ICD-10-CM

## 2018-06-05 PROCEDURE — 99214 OFFICE O/P EST MOD 30 MIN: CPT

## 2018-06-05 RX ORDER — AMOXICILLIN AND CLAVULANATE POTASSIUM 875; 125 MG/1; MG/1
875-125 TABLET, COATED ORAL TWICE DAILY
Qty: 14 | Refills: 0 | Status: DISCONTINUED | COMMUNITY
Start: 2018-04-27 | End: 2018-06-05

## 2018-06-06 ENCOUNTER — APPOINTMENT (OUTPATIENT)
Dept: INTERNAL MEDICINE | Facility: CLINIC | Age: 58
End: 2018-06-06
Payer: MEDICARE

## 2018-06-06 ENCOUNTER — NON-APPOINTMENT (OUTPATIENT)
Age: 58
End: 2018-06-06

## 2018-06-06 VITALS
BODY MASS INDEX: 44.93 KG/M2 | HEART RATE: 68 BPM | WEIGHT: 238 LBS | SYSTOLIC BLOOD PRESSURE: 120 MMHG | DIASTOLIC BLOOD PRESSURE: 70 MMHG | TEMPERATURE: 98.6 F | HEIGHT: 61 IN | OXYGEN SATURATION: 98 %

## 2018-06-06 DIAGNOSIS — K46.9 UNSPECIFIED ABDOMINAL HERNIA W/OUT OBSTRUCTION OR GANGRENE: ICD-10-CM

## 2018-06-06 PROCEDURE — 93000 ELECTROCARDIOGRAM COMPLETE: CPT

## 2018-06-06 PROCEDURE — 36415 COLL VENOUS BLD VENIPUNCTURE: CPT

## 2018-06-06 PROCEDURE — 99214 OFFICE O/P EST MOD 30 MIN: CPT | Mod: 25

## 2018-06-07 PROBLEM — K46.9 HERNIA, ABDOMINAL: Status: ACTIVE | Noted: 2017-09-06

## 2018-06-07 LAB
ABO + RH PNL BLD: NORMAL
ALBUMIN SERPL ELPH-MCNC: 4.2 G/DL
ALP BLD-CCNC: 108 U/L
ALT SERPL-CCNC: 12 U/L
ANION GAP SERPL CALC-SCNC: 14 MMOL/L
AST SERPL-CCNC: 18 U/L
BASOPHILS # BLD AUTO: 0.01 K/UL
BASOPHILS NFR BLD AUTO: 0.2 %
BILIRUB SERPL-MCNC: 0.3 MG/DL
BUN SERPL-MCNC: 13 MG/DL
CALCIUM SERPL-MCNC: 9.7 MG/DL
CHLORIDE SERPL-SCNC: 100 MMOL/L
CO2 SERPL-SCNC: 26 MMOL/L
CREAT SERPL-MCNC: 0.97 MG/DL
EOSINOPHIL # BLD AUTO: 0.05 K/UL
EOSINOPHIL NFR BLD AUTO: 0.9 %
GLUCOSE SERPL-MCNC: 125 MG/DL
HCT VFR BLD CALC: 34.4 %
HGB BLD-MCNC: 11 G/DL
IMM GRANULOCYTES NFR BLD AUTO: 0.2 %
LYMPHOCYTES # BLD AUTO: 1.46 K/UL
LYMPHOCYTES NFR BLD AUTO: 27.5 %
MAN DIFF?: NORMAL
MCHC RBC-ENTMCNC: 27.6 PG
MCHC RBC-ENTMCNC: 32 GM/DL
MCV RBC AUTO: 86.2 FL
MONOCYTES # BLD AUTO: 0.28 K/UL
MONOCYTES NFR BLD AUTO: 5.3 %
NEUTROPHILS # BLD AUTO: 3.5 K/UL
NEUTROPHILS NFR BLD AUTO: 65.9 %
PLATELET # BLD AUTO: 295 K/UL
POTASSIUM SERPL-SCNC: 4.4 MMOL/L
PROT SERPL-MCNC: 8.2 G/DL
RBC # BLD: 3.99 M/UL
RBC # FLD: 16.4 %
SODIUM SERPL-SCNC: 140 MMOL/L
WBC # FLD AUTO: 5.31 K/UL

## 2018-06-07 NOTE — PHYSICAL EXAM
[No Acute Distress] : no acute distress [Well Nourished] : well nourished [No Respiratory Distress] : no respiratory distress  [Clear to Auscultation] : lungs were clear to auscultation bilaterally [No Accessory Muscle Use] : no accessory muscle use [Normal Rate] : normal rate  [Regular Rhythm] : with a regular rhythm [Normal S1, S2] : normal S1 and S2 [No Murmur] : no murmur heard [No Edema] : there was no peripheral edema

## 2018-06-07 NOTE — ASSESSMENT
[High Risk Surgery - Intraperitoneal, Intrathoracic or Supringuinal Vascular Procedures] : High Risk Surgery - Intraperitoneal, Intrathoracic or Supringuinal Vascular Procedures - Yes (1) [Ischemic Heart Disease] : Ischemic Heart Disease - No (0) [Congestive Heart Failure] : Congestive Heart Failure - No (0) [Prior Cerebrovascular Accident or TIA] : Prior Cerebrovascular Accident or TIA - No (0) [Creatinine >= 2mg/dL (1 Point)] : Creatinine >= 2mg/dL - No (0) [Insulin-dependent Diabetic (1 Point)] : Insulin-dependent Diabetic - No (0) [1] : 1 , RCRI Class: II, Risk of Post-Op Cardiac Complications: 0.9%, Procedure Risk: Low-Risk [Patient Optimized for Surgery] : Patient optimized for surgery [No Further Testing Recommended] : no further testing recommended [As per surgery] : as per surgery

## 2018-06-07 NOTE — HISTORY OF PRESENT ILLNESS
[Diabetes] : diabetes  [Sleep Apnea] : sleep apnea [( Patient denies any chest pain, claudication, dyspnea on exertion, orthopnea, palpitations or syncope )] : Patient denies any chest pain, claudication, dyspnea on exertion, orthopnea, palpitations or syncope. [Coronary Artery Disease] : no coronary artery disease [COPD] : no COPD [Previous Adverse Anesthesia Reaction] : no previous adverse anesthesia reaction [FreeTextEntry1] : Hernia repair/reconstructive surgery. [FreeTextEntry2] : 06/29/2018 [FreeTextEntry3] : Dr. Miranda/Dr. Tipton [FreeTextEntry4] : Here for preop clearance.\par \par Will be having hernia repair by surgery (Dr. Miranda) and possible reconstructive surgery by Plastics to resect residual skin/tissue in R underarm (Dr. Tipton).\par \par Will be having dental surgery in the office tomorrow - s/p recent treatment with abx - plan for extraction of 3 teeth (6/18).\par \par BP at goal today.\par Saw Neuro yesterday and prescribed wellbutryn but hasn't started it yet.

## 2018-06-12 ENCOUNTER — RX RENEWAL (OUTPATIENT)
Age: 58
End: 2018-06-12

## 2018-06-13 ENCOUNTER — APPOINTMENT (OUTPATIENT)
Dept: PHYSICAL MEDICINE AND REHAB | Facility: CLINIC | Age: 58
End: 2018-06-13
Payer: MEDICARE

## 2018-06-13 PROCEDURE — 99213 OFFICE O/P EST LOW 20 MIN: CPT

## 2018-06-15 LAB — ERYTHROCYTE [SEDIMENTATION RATE] IN BLOOD BY WESTERGREN METHOD: 71 MM/HR

## 2018-06-18 ENCOUNTER — OUTPATIENT (OUTPATIENT)
Dept: OUTPATIENT SERVICES | Facility: HOSPITAL | Age: 58
LOS: 1 days | End: 2018-06-18
Payer: MEDICARE

## 2018-06-18 VITALS
WEIGHT: 237 LBS | HEART RATE: 70 BPM | SYSTOLIC BLOOD PRESSURE: 138 MMHG | RESPIRATION RATE: 16 BRPM | HEIGHT: 61 IN | TEMPERATURE: 98 F | DIASTOLIC BLOOD PRESSURE: 90 MMHG | OXYGEN SATURATION: 99 %

## 2018-06-18 DIAGNOSIS — Z01.818 ENCOUNTER FOR OTHER PREPROCEDURAL EXAMINATION: ICD-10-CM

## 2018-06-18 DIAGNOSIS — E11.9 TYPE 2 DIABETES MELLITUS WITHOUT COMPLICATIONS: ICD-10-CM

## 2018-06-18 DIAGNOSIS — M31.6 OTHER GIANT CELL ARTERITIS: ICD-10-CM

## 2018-06-18 DIAGNOSIS — T78.40XA ALLERGY, UNSPECIFIED, INITIAL ENCOUNTER: ICD-10-CM

## 2018-06-18 DIAGNOSIS — R52 PAIN, UNSPECIFIED: ICD-10-CM

## 2018-06-18 DIAGNOSIS — F41.9 ANXIETY DISORDER, UNSPECIFIED: ICD-10-CM

## 2018-06-18 DIAGNOSIS — K43.0 INCISIONAL HERNIA WITH OBSTRUCTION, WITHOUT GANGRENE: ICD-10-CM

## 2018-06-18 DIAGNOSIS — I10 ESSENTIAL (PRIMARY) HYPERTENSION: ICD-10-CM

## 2018-06-18 DIAGNOSIS — C80.1 MALIGNANT (PRIMARY) NEOPLASM, UNSPECIFIED: Chronic | ICD-10-CM

## 2018-06-18 DIAGNOSIS — Z33.2 ENCOUNTER FOR ELECTIVE TERMINATION OF PREGNANCY: Chronic | ICD-10-CM

## 2018-06-18 DIAGNOSIS — E04.1 NONTOXIC SINGLE THYROID NODULE: Chronic | ICD-10-CM

## 2018-06-18 DIAGNOSIS — G47.33 OBSTRUCTIVE SLEEP APNEA (ADULT) (PEDIATRIC): ICD-10-CM

## 2018-06-18 LAB
ALBUMIN SERPL ELPH-MCNC: 3.3 G/DL — SIGNIFICANT CHANGE UP (ref 3.3–5)
ALP SERPL-CCNC: 143 U/L — HIGH (ref 40–120)
ALT FLD-CCNC: 19 U/L — SIGNIFICANT CHANGE UP (ref 12–78)
ANION GAP SERPL CALC-SCNC: 7 MMOL/L — SIGNIFICANT CHANGE UP (ref 5–17)
AST SERPL-CCNC: 17 U/L — SIGNIFICANT CHANGE UP (ref 15–37)
BILIRUB SERPL-MCNC: 0.3 MG/DL — SIGNIFICANT CHANGE UP (ref 0.2–1.2)
BUN SERPL-MCNC: 11 MG/DL — SIGNIFICANT CHANGE UP (ref 7–23)
CALCIUM SERPL-MCNC: 8.9 MG/DL — SIGNIFICANT CHANGE UP (ref 8.5–10.1)
CHLORIDE SERPL-SCNC: 104 MMOL/L — SIGNIFICANT CHANGE UP (ref 96–108)
CO2 SERPL-SCNC: 29 MMOL/L — SIGNIFICANT CHANGE UP (ref 22–31)
CREAT SERPL-MCNC: 0.9 MG/DL — SIGNIFICANT CHANGE UP (ref 0.5–1.3)
GLUCOSE SERPL-MCNC: 131 MG/DL — HIGH (ref 70–99)
HBA1C BLD-MCNC: 6.8 % — HIGH (ref 4–5.6)
HCT VFR BLD CALC: 32.6 % — LOW (ref 34.5–45)
HGB BLD-MCNC: 10.7 G/DL — LOW (ref 11.5–15.5)
MCHC RBC-ENTMCNC: 28.2 PG — SIGNIFICANT CHANGE UP (ref 27–34)
MCHC RBC-ENTMCNC: 32.8 GM/DL — SIGNIFICANT CHANGE UP (ref 32–36)
MCV RBC AUTO: 86 FL — SIGNIFICANT CHANGE UP (ref 80–100)
NRBC # BLD: 0 /100 WBCS — SIGNIFICANT CHANGE UP (ref 0–0)
PLATELET # BLD AUTO: 243 K/UL — SIGNIFICANT CHANGE UP (ref 150–400)
POTASSIUM SERPL-MCNC: 3.8 MMOL/L — SIGNIFICANT CHANGE UP (ref 3.5–5.3)
POTASSIUM SERPL-SCNC: 3.8 MMOL/L — SIGNIFICANT CHANGE UP (ref 3.5–5.3)
PROT SERPL-MCNC: 8.5 G/DL — HIGH (ref 6–8.3)
RBC # BLD: 3.79 M/UL — LOW (ref 3.8–5.2)
RBC # FLD: 15.9 % — HIGH (ref 10.3–14.5)
SODIUM SERPL-SCNC: 140 MMOL/L — SIGNIFICANT CHANGE UP (ref 135–145)
WBC # BLD: 4.17 K/UL — SIGNIFICANT CHANGE UP (ref 3.8–10.5)
WBC # FLD AUTO: 4.17 K/UL — SIGNIFICANT CHANGE UP (ref 3.8–10.5)

## 2018-06-18 PROCEDURE — 93010 ELECTROCARDIOGRAM REPORT: CPT | Mod: NC

## 2018-06-18 PROCEDURE — 86900 BLOOD TYPING SEROLOGIC ABO: CPT

## 2018-06-18 PROCEDURE — G0463: CPT

## 2018-06-18 PROCEDURE — 86870 RBC ANTIBODY IDENTIFICATION: CPT

## 2018-06-18 PROCEDURE — 80053 COMPREHEN METABOLIC PANEL: CPT

## 2018-06-18 PROCEDURE — 83036 HEMOGLOBIN GLYCOSYLATED A1C: CPT

## 2018-06-18 PROCEDURE — 93005 ELECTROCARDIOGRAM TRACING: CPT

## 2018-06-18 PROCEDURE — 86905 BLOOD TYPING RBC ANTIGENS: CPT

## 2018-06-18 PROCEDURE — 86901 BLOOD TYPING SEROLOGIC RH(D): CPT

## 2018-06-18 PROCEDURE — 86077 PHYS BLOOD BANK SERV XMATCH: CPT

## 2018-06-18 PROCEDURE — 85027 COMPLETE CBC AUTOMATED: CPT

## 2018-06-18 PROCEDURE — 86880 COOMBS TEST DIRECT: CPT

## 2018-06-18 PROCEDURE — 86850 RBC ANTIBODY SCREEN: CPT

## 2018-06-18 RX ORDER — METFORMIN HYDROCHLORIDE 850 MG/1
1 TABLET ORAL
Qty: 0 | Refills: 0 | COMMUNITY

## 2018-06-18 NOTE — H&P PST ADULT - NEGATIVE ENMT SYMPTOMS
no vertigo/no sinus symptoms/no nasal discharge/no recurrent cold sores/no abnormal taste sensation/no gum bleeding/no throat pain/no nasal congestion/no dysphagia/no nasal obstruction/no nose bleeds/no dry mouth/no hearing difficulty/no ear pain

## 2018-06-18 NOTE — H&P PST ADULT - NEGATIVE GASTROINTESTINAL SYMPTOMS
no diarrhea/no nausea/no change in bowel habits/no flatulence/no melena/no jaundice/no constipation/no vomiting/no hematochezia/no steatorrhea/no hiccoughs

## 2018-06-18 NOTE — H&P PST ADULT - NEGATIVE BREAST SYMPTOMS
no nipple discharge L/no nipple discharge R/no breast tenderness L/no breast lump L/no breast tenderness R/no breast lump R

## 2018-06-18 NOTE — H&P PST ADULT - NEGATIVE GENERAL SYMPTOMS
no chills/no malaise/no anorexia/no polyphagia/no polyuria/no polydipsia/no fatigue/no fever/no sweating

## 2018-06-18 NOTE — H&P PST ADULT - PMH
Abdominal hernia in 2012    Acid Reflux    Alcohol abuse--quit 8 years ago--goes to AA  ADDENDUM:  at PAST appointment  patient states she quit alcohol approximately 2003  Anxiety    Arthritis    Asthma  denies recent exacerbation, denies intubation or hospitalizations.  b/l  Carpal tunnel syndrome    Breast cancer  2012  right breast -- had mastectomy and then post op chemo and RT, followed with Herceptin for 1 year  2012 last chemo   2013 may last Herceptin  2013 last RT  Depression    Diabetes mellitus diagnosed in 2007    Diverticulosis    Drug abuse--quit 8 years ago--goes to AA  ADDENDUM: at PAST appointment, patient states she quit alcohol in approximately 2003  ETOH abuse  at PAST appointment 9-11-14, patient states she quit alcohol in approximately 2003  Fibromyalgia    h/o   Fatty liver    h/o b/l fungal foot infection    herniated lumbar disc    hiatal hernia    History  Uterine Fibroid    Hypercholesterolemia    Hypertension    Insomnia    Lymphedema, limb  right side s/p right mastectomy  Miscarriage  x 2  Morbid obesity    Neuropathy  b/l feet  personal h/o CHF (congestive heart failure)--last hospitalized in 2005    sickle cell anemia trait    Sleep apnea diagnosed 2007---supposed to use device but doesn't    Substance abuse  quit in 2003 -- cocaine and marijuana  Thyroid nodule  had negative biopsy

## 2018-06-18 NOTE — H&P PST ADULT - RS GEN PE MLT RESP DETAILS PC
airway patent/no subcutaneous emphysema/no chest wall tenderness/no wheezes/no rales/breath sounds equal/respirations non-labored/no rhonchi/good air movement/clear to auscultation bilaterally

## 2018-06-18 NOTE — H&P PST ADULT - NEGATIVE OPHTHALMOLOGIC SYMPTOMS
no diplopia/no photophobia/no blurred vision L/wears glasses for reading/no lacrimation R/no discharge L/no irritation L/no blurred vision R/no discharge R/no pain L/no loss of vision R/no scleral injection R/no lacrimation L/no irritation R/no pain R/no loss of vision L/no scleral injection L

## 2018-06-18 NOTE — H&P PST ADULT - GASTROINTESTINAL COMMENTS
DX: preop dx of incisional hernia with obstruction, without gangrene DX:  incisional hernia with obstruction, without gangrene

## 2018-06-18 NOTE — H&P PST ADULT - LYMPHATIC
anterior cervical L/posterior cervical R/anterior cervical R/supraclavicular R/posterior cervical L/supraclavicular L

## 2018-06-18 NOTE — H&P PST ADULT - NEGATIVE CARDIOVASCULAR SYMPTOMS
no palpitations/no chest pain/no peripheral edema/no claudication/no orthopnea/no paroxysmal nocturnal dyspnea

## 2018-06-18 NOTE — H&P PST ADULT - NOTES
quit smoking 20 years ago; quit drinking alcohol in approximately 2003; quit using cocaine and marijuana; caffeine --  3 cups coffee/day

## 2018-06-18 NOTE — H&P PST ADULT - PROBLEM SELECTOR PLAN 6
Continue Methadone as ordered (states was reduced from 20 mg to 10 mg for sx, has appt on 6/27/18 for f/u, form provided for recommendations for sx).

## 2018-06-18 NOTE — H&P PST ADULT - ASSESSMENT
this is a 57 y/o female who presents with  incisional hernia with obstruction, without gangrene and to be evaluated for a scheduled laparoscopic incisional hernia repair w/mesh- poss open on 6/29/18.

## 2018-06-18 NOTE — H&P PST ADULT - PSH
2002   h/o hysterectomy due to Fibroid--post op vaginal bleeding requiring a transfusion    2008  repair of ventral hernia with mesh    Cancer  2012  insertion of Mediport -- to be excised on 9-22-14  Radical mastectomy of right breast  3/30/12  Termination of pregnancy (fetus)  x 3  Thyroid nodule  negative biopsy

## 2018-06-18 NOTE — H&P PST ADULT - HISTORY OF PRESENT ILLNESS
59 y/o female with PMH of DMT2, HTN, GERD, fibromyalgia, Breast ca, anxiety and chronic pain. Presents to PST with a preop dx of incisional hernia with obstruction, without gangrene and to be evaluated for a scheduled laparoscopic incisional hernia repair w/mesh- poss open on 6/29/18.   Pt  has been presenting abdominal pain for > 6 months, last 8/2017 went to see Dr. Miranda who evaluated her with a CT scan and a incisional hernia noted. States is a different hernia (she has a hx of a hernia repair also w/ mesh). Pt recommended sx at this time for which is now scheduled.

## 2018-06-18 NOTE — H&P PST ADULT - NEGATIVE NEUROLOGICAL SYMPTOMS
no syncope/no loss of consciousness/no hemiparesis/no vertigo/no loss of sensation/no headache/no weakness/no generalized seizures/no cva/no tremors/no confusion/no focal seizures

## 2018-06-18 NOTE — H&P PST ADULT - NEGATIVE GENERAL GENITOURINARY SYMPTOMS
no nocturia/no bladder infections/no dysuria/no flank pain L/no urine discoloration/no hematuria/no renal colic/no flank pain R/no incontinence/normal urinary frequency/no urinary hesitancy

## 2018-06-18 NOTE — H&P PST ADULT - NEGATIVE PSYCHIATRIC SYMPTOMS
no visual hallucinations/no memory loss/no mood swings/no agitation/no hyperactivity/no auditory hallucinations/no depression/no insomnia/no paranoia/no suicidal ideation

## 2018-06-18 NOTE — H&P PST ADULT - OTHER CARE PROVIDERS
cardiologist, Dr. Torres [phone], Neuro Dr Casillas [phone], Vascular Dr Mejia [phone], Pain mgt [phone]

## 2018-06-18 NOTE — H&P PST ADULT - NEGATIVE SKIN SYMPTOMS
no brittle nails/no dryness/fungal nail beds/no itching/no change in size/color of mole/no tumor/no pitted nails/no hair loss/no rash

## 2018-06-18 NOTE — H&P PST ADULT - PROBLEM SELECTOR PLAN 4
continue HCTZ as ordered (will hold on 6/29), continue losartan and take in am dos with a sip of water.  Pt has cardiologist clearance on 6/19/18, form provided.

## 2018-06-18 NOTE — H&P PST ADULT - PROBLEM SELECTOR PLAN 1
Pt evaluated for a scheduled laparoscopic incisional hernia repair w/mesh- poss open on 6/29/18.   Preop instructions provided, NPO status, Hibiclens provided.  MC done and in chart from [employer].

## 2018-06-18 NOTE — H&P PST ADULT - FAMILY HISTORY
Family history of leukemia, father  age 56 from leukemia     Family history of rheumatoid arthritis, sister age 54 from RA

## 2018-06-18 NOTE — H&P PST ADULT - PRO PAIN LIFE ADAPT
inability to enjoy life/decreased activity level/inability or reluctance to perform ADLs/inability to sleep/inability to work

## 2018-06-18 NOTE — H&P PST ADULT - PROBLEM SELECTOR PLAN 5
Continue Metformin as ordered and aware of last dose to be taken on 6/28/18 am only. hold pm dose on 6/28 and do not take any on 6/29/18.

## 2018-06-19 ENCOUNTER — APPOINTMENT (OUTPATIENT)
Dept: VASCULAR SURGERY | Facility: CLINIC | Age: 58
End: 2018-06-19
Payer: MEDICARE

## 2018-06-19 ENCOUNTER — APPOINTMENT (OUTPATIENT)
Dept: CARDIOLOGY | Facility: CLINIC | Age: 58
End: 2018-06-19
Payer: MEDICARE

## 2018-06-19 VITALS
TEMPERATURE: 98 F | DIASTOLIC BLOOD PRESSURE: 82 MMHG | WEIGHT: 237 LBS | SYSTOLIC BLOOD PRESSURE: 127 MMHG | HEIGHT: 61 IN | BODY MASS INDEX: 44.75 KG/M2 | HEART RATE: 62 BPM

## 2018-06-19 VITALS
DIASTOLIC BLOOD PRESSURE: 81 MMHG | SYSTOLIC BLOOD PRESSURE: 129 MMHG | OXYGEN SATURATION: 99 % | WEIGHT: 237 LBS | HEART RATE: 64 BPM | HEIGHT: 61 IN | BODY MASS INDEX: 44.75 KG/M2

## 2018-06-19 PROCEDURE — 99202 OFFICE O/P NEW SF 15 MIN: CPT

## 2018-06-19 PROCEDURE — 93882 EXTRACRANIAL UNI/LTD STUDY: CPT

## 2018-06-19 PROCEDURE — 99214 OFFICE O/P EST MOD 30 MIN: CPT

## 2018-06-19 PROCEDURE — 93000 ELECTROCARDIOGRAM COMPLETE: CPT

## 2018-06-20 ENCOUNTER — FORM ENCOUNTER (OUTPATIENT)
Age: 58
End: 2018-06-20

## 2018-06-21 ENCOUNTER — APPOINTMENT (OUTPATIENT)
Dept: MRI IMAGING | Facility: IMAGING CENTER | Age: 58
End: 2018-06-21
Payer: MEDICARE

## 2018-06-21 ENCOUNTER — OUTPATIENT (OUTPATIENT)
Dept: OUTPATIENT SERVICES | Facility: HOSPITAL | Age: 58
LOS: 1 days | End: 2018-06-21
Payer: MEDICARE

## 2018-06-21 DIAGNOSIS — C80.1 MALIGNANT (PRIMARY) NEOPLASM, UNSPECIFIED: Chronic | ICD-10-CM

## 2018-06-21 DIAGNOSIS — H57.10 OCULAR PAIN, UNSPECIFIED EYE: ICD-10-CM

## 2018-06-21 DIAGNOSIS — R41.89 OTHER SYMPTOMS AND SIGNS INVOLVING COGNITIVE FUNCTIONS AND AWARENESS: ICD-10-CM

## 2018-06-21 DIAGNOSIS — E04.1 NONTOXIC SINGLE THYROID NODULE: Chronic | ICD-10-CM

## 2018-06-21 DIAGNOSIS — R51 HEADACHE: ICD-10-CM

## 2018-06-21 DIAGNOSIS — Z33.2 ENCOUNTER FOR ELECTIVE TERMINATION OF PREGNANCY: Chronic | ICD-10-CM

## 2018-06-21 PROCEDURE — 70551 MRI BRAIN STEM W/O DYE: CPT | Mod: 26

## 2018-06-21 PROCEDURE — 70549 MR ANGIOGRAPH NECK W/O&W/DYE: CPT | Mod: 26

## 2018-06-21 PROCEDURE — 70553 MRI BRAIN STEM W/O & W/DYE: CPT

## 2018-06-21 PROCEDURE — 70545 MR ANGIOGRAPHY HEAD W/DYE: CPT | Mod: 26,59

## 2018-06-21 PROCEDURE — 70546 MR ANGIOGRAPH HEAD W/O&W/DYE: CPT

## 2018-06-21 PROCEDURE — 70551 MRI BRAIN STEM W/O DYE: CPT | Mod: XU

## 2018-06-21 PROCEDURE — 70549 MR ANGIOGRAPH NECK W/O&W/DYE: CPT

## 2018-06-21 PROCEDURE — A9585: CPT

## 2018-06-26 ENCOUNTER — OUTPATIENT (OUTPATIENT)
Dept: OUTPATIENT SERVICES | Facility: HOSPITAL | Age: 58
LOS: 1 days | End: 2018-06-26

## 2018-06-26 ENCOUNTER — APPOINTMENT (OUTPATIENT)
Dept: CV DIAGNOSTICS | Facility: HOSPITAL | Age: 58
End: 2018-06-26
Payer: MEDICARE

## 2018-06-26 DIAGNOSIS — Z33.2 ENCOUNTER FOR ELECTIVE TERMINATION OF PREGNANCY: Chronic | ICD-10-CM

## 2018-06-26 DIAGNOSIS — Z01.818 ENCOUNTER FOR OTHER PREPROCEDURAL EXAMINATION: ICD-10-CM

## 2018-06-26 DIAGNOSIS — C80.1 MALIGNANT (PRIMARY) NEOPLASM, UNSPECIFIED: Chronic | ICD-10-CM

## 2018-06-26 DIAGNOSIS — E04.1 NONTOXIC SINGLE THYROID NODULE: Chronic | ICD-10-CM

## 2018-06-26 PROCEDURE — 78452 HT MUSCLE IMAGE SPECT MULT: CPT | Mod: 26

## 2018-06-26 PROCEDURE — 93018 CV STRESS TEST I&R ONLY: CPT | Mod: GC

## 2018-06-26 PROCEDURE — 93016 CV STRESS TEST SUPVJ ONLY: CPT | Mod: GC

## 2018-06-28 ENCOUNTER — TRANSCRIPTION ENCOUNTER (OUTPATIENT)
Age: 58
End: 2018-06-28

## 2018-06-28 RX ORDER — CEFAZOLIN SODIUM 1 G
2000 VIAL (EA) INJECTION ONCE
Qty: 0 | Refills: 0 | Status: COMPLETED | OUTPATIENT
Start: 2018-06-29 | End: 2018-06-29

## 2018-06-28 RX ORDER — SODIUM CHLORIDE 9 MG/ML
3 INJECTION INTRAMUSCULAR; INTRAVENOUS; SUBCUTANEOUS ONCE
Qty: 0 | Refills: 0 | Status: COMPLETED | OUTPATIENT
Start: 2018-06-29 | End: 2018-06-29

## 2018-06-28 RX ORDER — SODIUM CHLORIDE 9 MG/ML
1000 INJECTION, SOLUTION INTRAVENOUS
Qty: 0 | Refills: 0 | Status: DISCONTINUED | OUTPATIENT
Start: 2018-06-29 | End: 2018-06-29

## 2018-06-29 ENCOUNTER — OUTPATIENT (OUTPATIENT)
Dept: OUTPATIENT SERVICES | Facility: HOSPITAL | Age: 58
LOS: 1 days | End: 2018-06-29
Payer: MEDICARE

## 2018-06-29 ENCOUNTER — APPOINTMENT (OUTPATIENT)
Dept: SURGERY | Facility: HOSPITAL | Age: 58
End: 2018-06-29

## 2018-06-29 VITALS
OXYGEN SATURATION: 97 % | SYSTOLIC BLOOD PRESSURE: 143 MMHG | TEMPERATURE: 99 F | RESPIRATION RATE: 13 BRPM | HEART RATE: 82 BPM | DIASTOLIC BLOOD PRESSURE: 60 MMHG

## 2018-06-29 VITALS
OXYGEN SATURATION: 95 % | TEMPERATURE: 99 F | SYSTOLIC BLOOD PRESSURE: 133 MMHG | HEART RATE: 67 BPM | HEIGHT: 61 IN | DIASTOLIC BLOOD PRESSURE: 63 MMHG | WEIGHT: 237 LBS | RESPIRATION RATE: 14 BRPM

## 2018-06-29 DIAGNOSIS — Z01.818 ENCOUNTER FOR OTHER PREPROCEDURAL EXAMINATION: ICD-10-CM

## 2018-06-29 DIAGNOSIS — Z33.2 ENCOUNTER FOR ELECTIVE TERMINATION OF PREGNANCY: Chronic | ICD-10-CM

## 2018-06-29 DIAGNOSIS — C80.1 MALIGNANT (PRIMARY) NEOPLASM, UNSPECIFIED: Chronic | ICD-10-CM

## 2018-06-29 DIAGNOSIS — E04.1 NONTOXIC SINGLE THYROID NODULE: Chronic | ICD-10-CM

## 2018-06-29 DIAGNOSIS — K43.0 INCISIONAL HERNIA WITH OBSTRUCTION, WITHOUT GANGRENE: ICD-10-CM

## 2018-06-29 PROCEDURE — 49656: CPT

## 2018-06-29 PROCEDURE — 86901 BLOOD TYPING SEROLOGIC RH(D): CPT

## 2018-06-29 PROCEDURE — 49657: CPT

## 2018-06-29 PROCEDURE — 86850 RBC ANTIBODY SCREEN: CPT

## 2018-06-29 PROCEDURE — 86900 BLOOD TYPING SEROLOGIC ABO: CPT

## 2018-06-29 PROCEDURE — 49657: CPT | Mod: AS

## 2018-06-29 PROCEDURE — C1781: CPT

## 2018-06-29 PROCEDURE — 82962 GLUCOSE BLOOD TEST: CPT

## 2018-06-29 RX ORDER — HYDROMORPHONE HYDROCHLORIDE 2 MG/ML
0.5 INJECTION INTRAMUSCULAR; INTRAVENOUS; SUBCUTANEOUS
Qty: 0 | Refills: 0 | Status: DISCONTINUED | OUTPATIENT
Start: 2018-06-29 | End: 2018-06-29

## 2018-06-29 RX ORDER — ONDANSETRON 8 MG/1
4 TABLET, FILM COATED ORAL ONCE
Qty: 0 | Refills: 0 | Status: DISCONTINUED | OUTPATIENT
Start: 2018-06-29 | End: 2018-06-29

## 2018-06-29 RX ORDER — METHADONE HYDROCHLORIDE 40 MG/1
10 TABLET ORAL ONCE
Qty: 0 | Refills: 0 | Status: DISCONTINUED | OUTPATIENT
Start: 2018-06-29 | End: 2018-06-29

## 2018-06-29 RX ORDER — TRAMADOL HYDROCHLORIDE 50 MG/1
1 TABLET ORAL
Qty: 25 | Refills: 0
Start: 2018-06-29

## 2018-06-29 RX ORDER — OXYCODONE HYDROCHLORIDE 5 MG/1
5 TABLET ORAL EVERY 4 HOURS
Qty: 0 | Refills: 0 | Status: DISCONTINUED | OUTPATIENT
Start: 2018-06-29 | End: 2018-06-29

## 2018-06-29 RX ORDER — ACETAMINOPHEN 500 MG
2 TABLET ORAL
Qty: 0 | Refills: 0 | COMMUNITY

## 2018-06-29 RX ORDER — SODIUM CHLORIDE 9 MG/ML
1000 INJECTION, SOLUTION INTRAVENOUS
Qty: 0 | Refills: 0 | Status: DISCONTINUED | OUTPATIENT
Start: 2018-06-29 | End: 2018-06-29

## 2018-06-29 RX ADMIN — SODIUM CHLORIDE 100 MILLILITER(S): 9 INJECTION, SOLUTION INTRAVENOUS at 11:03

## 2018-06-29 RX ADMIN — SODIUM CHLORIDE 3 MILLILITER(S): 9 INJECTION INTRAMUSCULAR; INTRAVENOUS; SUBCUTANEOUS at 07:30

## 2018-06-29 RX ADMIN — SODIUM CHLORIDE 30 MILLILITER(S): 9 INJECTION, SOLUTION INTRAVENOUS at 07:32

## 2018-06-29 RX ADMIN — METHADONE HYDROCHLORIDE 10 MILLIGRAM(S): 40 TABLET ORAL at 14:25

## 2018-06-29 NOTE — ASU DISCHARGE PLAN (ADULT/PEDIATRIC). - MEDICATION SUMMARY - MEDICATIONS TO TAKE
I will START or STAY ON the medications listed below when I get home from the hospital:    Ultram 50 mg oral tablet  -- 1 tab(s) by mouth every 4 hours, As Needed -for severe pain MDD:6   -- Caution federal law prohibits the transfer of this drug to any person other  than the person for whom it was prescribed.  May cause drowsiness.  Alcohol may intensify this effect.  Use care when operating dangerous machinery.  Obtain medical advice before taking any non-prescription drugs as some may affect the action of this medication.    -- Indication: For pain medication    Aleve 220 mg oral capsule  -- 1 cap(s) by mouth every 12 hours   -- Check with your doctor before becoming pregnant.  Medication should be taken with plenty of water.  Take with food or milk.    -- Indication: For pain medication-take round the clock for first 3 days then as needed    Tylenol 500 mg oral tablet  -- 2 tab(s) by mouth every 6 hours  -- Indication: For pain medication-take round the clock for the first 3 days then as needed    methadone 10 mg oral tablet  -- 1 tab(s) by mouth once a day  -- Indication: For home medication    losartan 50 mg oral tablet  -- 1 tab(s) by mouth once a day  -- Indication: For home medication    gabapentin 300 mg oral capsule  -- 1 cap(s) by mouth 2 times a day  -- Indication: For home medication    metFORMIN 500 mg oral tablet  -- 2 tab(s) by mouth 2 times a day  -- Indication: For home medication    hydroCHLOROthiazide 25 mg oral tablet  -- 1 tab(s) by mouth once a day  -- Indication: For home medication    Fish Oil 1000 mg oral capsule  -- 1 cap(s) by mouth once a day  -- Indication: For home medication    omeprazole 40 mg oral delayed release capsule  -- 1 cap(s) by mouth once a day  -- Indication: For home medication    Wellbutrin 75 mg oral tablet  -- 1 tab(s) by mouth every other day   -- Indication: For home medication    Multiple Vitamins oral capsule  -- 1 cap(s) by mouth once a day  -- Indication: For home medication

## 2018-06-29 NOTE — PROGRESS NOTE ADULT - SUBJECTIVE AND OBJECTIVE BOX
Transversus Abdominal Plane Block Note:  (done earlier today in OR at end of surgery, prior to extubation):    Consent obtained preop, site marked.      Risks, benefits and alternatives discussed. Risks included but were not limited to infection, local anesthetic toxicity, permanent or temporary nerve damage or injury, failed or partial block,  bleeding, vascular injury, hematoma.    Patient verbally stated they understood and agreed to proceed with the block.      Procedure:    Time out performed, sterile abdominal prep with chlorhexidine and drape,  ultrasound-guided, 20G 4" echogenic needle advanced towards  right midaxillary line, 1-2 cm superior to ileac crest; good visualization of needle and muscles at all times;   20 cc of 0.375% Ropivacaine plus epi 1:200,000 injected easily, no heme after aspirating every 5cc, no intraneural injection,  good visualization of local anesthetic spread.  Procedure repeated on left side. Procedure well tolerated without complications.

## 2018-06-29 NOTE — ASU DISCHARGE PLAN (ADULT/PEDIATRIC). - SPECIAL INSTRUCTIONS
Abdominal binder-continue to wear until seen in office  No reaching, pulling, pushing, lifting .10 lbs  regular walking  deep breathing Abdominal binder-continue to wear until seen in office  No reaching, pulling, pushing, lifting .10 lbs  regular walking  deep breathing  Take tylenol and aleve around the clock for first 3 days, then take as needed  For severe and breakthrough pain take tramadol/ultram.

## 2018-06-29 NOTE — BRIEF OPERATIVE NOTE - PROCEDURE
<<-----Click on this checkbox to enter Procedure Laparoscopic incisional hernia repair with graft or prosthesis  06/29/2018  with mesh  Active  GSIEGEL

## 2018-06-29 NOTE — ASU DISCHARGE PLAN (ADULT/PEDIATRIC). - NOTIFY
Unable to Urinate/Pain not relieved by Medications/Bleeding that does not stop/Swelling that continues/Numbness, color, or temperature change to extremity/Fever greater than 101/Inability to Tolerate Liquids or Foods/Persistent Nausea and Vomiting

## 2018-06-29 NOTE — ASU DISCHARGE PLAN (ADULT/PEDIATRIC). - MEDICATION SUMMARY - MEDICATIONS TO STOP TAKING
I will STOP taking the medications listed below when I get home from the hospital:    Tylenol Arthritis Extended Release 650 mg oral tablet, extended release  -- 2 tab(s) by mouth every 8 hours, As Needed

## 2018-06-29 NOTE — ASU PATIENT PROFILE, ADULT - VISION (WITH CORRECTIVE LENSES IF THE PATIENT USUALLY WEARS THEM):
Progress Note - General Surgery   Raymundo Aries Lines 42 y o  male MRN: [id number]  Unit/Bed#: [facility] 805-01 Encounter: [id number]    Assessment:  42 y o  male w/acute cholecystitis s/p laparoscopic converted to open subtotal fenestrating cholecystectomy 11/8, Keyonna Butts with biliary drainage    Plan:  - advance diet  - prn pain control  - cipro/flagyl 5d total  - ALESSIA to suction, monitor output  - PT/OT  - SQH/SCDs    Subjective/Objective   Subjective: doing well, pain improved and tolerating clears with no nausea no vomiting  Denies flatus or bm    Objective:    Blood pressure 142/80, pulse 84, temperature 99 1 °F (37 3 °C), temperature source Oral, resp  rate 18, height 6' 7" (2 007 m), weight 116 kg (255 lb 11 7 oz), SpO2 94 %  ,Body mass index is 28 81 kg/m²  I/O last 24 hours: In: 3918 8 [P O :1320;  I V :2198 8; IV Piggyback:400]  Out: 7730 [Urine:7125; Drains:605]    Invasive Devices     Peripheral Intravenous Line            Peripheral IV 11/07/17 Right Forearm 2 days          Drain            Closed/Suction Drain RUQ Bulb 19 Fr  1 day                Physical Exam:   NAD  RRR  Norm resp effort  Abd soft, appropriately tender, ND, dressing cdi with bulb suction biliary output    Lab, Imaging and other studies:  Lab Results   Component Value Date    WBC 10 23 (H) 11/10/2017    HGB 11 3 (L) 11/10/2017    HCT 33 2 (L) 11/10/2017    MCV 87 11/10/2017     11/10/2017      Lab Results   Component Value Date    GLUCOSE 134 11/09/2017    CALCIUM 8 6 11/09/2017     11/09/2017    K 3 8 11/09/2017    CO2 26 11/09/2017     11/09/2017    BUN 11 11/09/2017    CREATININE 0 83 11/09/2017       VTE Pharmacologic Prophylaxis: Heparin  VTE Mechanical Prophylaxis: sequential compression device    Current Facility-Administered Medications:     acetaminophen (TYLENOL) tablet 650 mg, 650 mg, Oral, Q6H PRN, Alexis Monreal MD    ciprofloxacin (CIPRO) IVPB (premix) 400 mg, 400 mg, Intravenous, Q12H, Ryley Worthy, Last Rate: 200 mL/hr at 11/09/17 2005, 400 mg at 11/09/17 2005    HYDROmorphone (DILAUDID) 1 mg/mL injection 0 5 mg, 0 5 mg, Intravenous, Q3H PRN, Billy Bourne MD, 0 5 mg at 11/08/17 0735    lactated ringers infusion, 75 mL/hr, Intravenous, Continuous, Graylon Epp, Last Rate: 75 mL/hr at 11/09/17 2011, 75 mL/hr at 11/09/17 2011    lidocaine (LIDODERM) 5 % patch 1 patch, 1 patch, Transdermal, Daily, Graylon Epp, 1 patch at 11/09/17 0905    methocarbamol (ROBAXIN) tablet 500 mg, 500 mg, Oral, Q6H [facility], Graylon Epp, 500 mg at 11/10/17 0112    metroNIDAZOLE (FLAGYL) IVPB (premix) 500 mg, 500 mg, Intravenous, Q8H, Graylon Epp, Last Rate: 200 mL/hr at 11/10/17 0113, 500 mg at 11/10/17 0113    ondansetron (ZOFRAN) injection 4 mg, 4 mg, Intravenous, Q6H PRN, Billy Bourne MD    oxyCODONE (ROXICODONE) immediate release tablet 10 mg, 10 mg, Oral, Q4H PRN, Billy Bourne MD    oxyCODONE (ROXICODONE) IR tablet 5 mg, 5 mg, Oral, Q4H PRN, Billy Bourne MD, 5 mg at 11/09/17 2005 Normal vision: sees adequately in most situations; can see medication labels, newsprint

## 2018-06-29 NOTE — ASU DISCHARGE PLAN (ADULT/PEDIATRIC). - INSTRUCTIONS
7-10 days, call for appointment Increase fluid intake while taking pain medication Next week, call for appointment

## 2018-07-10 ENCOUNTER — APPOINTMENT (OUTPATIENT)
Dept: SURGERY | Facility: CLINIC | Age: 58
End: 2018-07-10
Payer: MEDICARE

## 2018-07-10 VITALS
HEIGHT: 61 IN | BODY MASS INDEX: 44.75 KG/M2 | SYSTOLIC BLOOD PRESSURE: 124 MMHG | OXYGEN SATURATION: 98 % | DIASTOLIC BLOOD PRESSURE: 60 MMHG | WEIGHT: 237 LBS | HEART RATE: 66 BPM

## 2018-07-10 PROCEDURE — 99024 POSTOP FOLLOW-UP VISIT: CPT

## 2018-07-18 ENCOUNTER — INPATIENT (INPATIENT)
Facility: HOSPITAL | Age: 58
LOS: 2 days | Discharge: HOME | End: 2018-07-21
Attending: HOSPITALIST | Admitting: HOSPITALIST
Payer: MEDICARE

## 2018-07-18 VITALS
DIASTOLIC BLOOD PRESSURE: 63 MMHG | SYSTOLIC BLOOD PRESSURE: 116 MMHG | OXYGEN SATURATION: 99 % | TEMPERATURE: 98 F | HEART RATE: 93 BPM | RESPIRATION RATE: 18 BRPM

## 2018-07-18 DIAGNOSIS — E04.1 NONTOXIC SINGLE THYROID NODULE: Chronic | ICD-10-CM

## 2018-07-18 DIAGNOSIS — Z33.2 ENCOUNTER FOR ELECTIVE TERMINATION OF PREGNANCY: Chronic | ICD-10-CM

## 2018-07-18 DIAGNOSIS — R07.9 CHEST PAIN, UNSPECIFIED: ICD-10-CM

## 2018-07-18 DIAGNOSIS — C80.1 MALIGNANT (PRIMARY) NEOPLASM, UNSPECIFIED: Chronic | ICD-10-CM

## 2018-07-18 LAB
ALBUMIN SERPL ELPH-MCNC: 3.9 G/DL — SIGNIFICANT CHANGE UP (ref 3.3–5)
ALP SERPL-CCNC: 106 U/L — SIGNIFICANT CHANGE UP (ref 40–120)
ALT FLD-CCNC: 11 U/L — SIGNIFICANT CHANGE UP (ref 4–33)
APTT BLD: 29.2 SEC — SIGNIFICANT CHANGE UP (ref 27.5–37.4)
AST SERPL-CCNC: 21 U/L — SIGNIFICANT CHANGE UP (ref 4–32)
BASE EXCESS BLDV CALC-SCNC: 2.9 MMOL/L — SIGNIFICANT CHANGE UP
BASE EXCESS BLDV CALC-SCNC: 7.1 MMOL/L — SIGNIFICANT CHANGE UP
BASOPHILS # BLD AUTO: 0.02 K/UL — SIGNIFICANT CHANGE UP (ref 0–0.2)
BASOPHILS NFR BLD AUTO: 0.2 % — SIGNIFICANT CHANGE UP (ref 0–2)
BILIRUB SERPL-MCNC: 0.7 MG/DL — SIGNIFICANT CHANGE UP (ref 0.2–1.2)
BLD GP AB SCN SERPL QL: POSITIVE — SIGNIFICANT CHANGE UP
BLOOD GAS VENOUS - CREATININE: 0.99 MG/DL — SIGNIFICANT CHANGE UP (ref 0.5–1.3)
BLOOD GAS VENOUS - CREATININE: SIGNIFICANT CHANGE UP MG/DL (ref 0.5–1.3)
BUN SERPL-MCNC: 12 MG/DL — SIGNIFICANT CHANGE UP (ref 7–23)
CALCIUM SERPL-MCNC: 9.2 MG/DL — SIGNIFICANT CHANGE UP (ref 8.4–10.5)
CHLORIDE BLDV-SCNC: 103 MMOL/L — SIGNIFICANT CHANGE UP (ref 96–108)
CHLORIDE BLDV-SCNC: 99 MMOL/L — SIGNIFICANT CHANGE UP (ref 96–108)
CHLORIDE SERPL-SCNC: 93 MMOL/L — LOW (ref 98–107)
CK MB BLD-MCNC: 2.53 NG/ML — SIGNIFICANT CHANGE UP (ref 1–4.7)
CK SERPL-CCNC: 146 U/L — SIGNIFICANT CHANGE UP (ref 25–170)
CO2 SERPL-SCNC: 27 MMOL/L — SIGNIFICANT CHANGE UP (ref 22–31)
CREAT SERPL-MCNC: 1.01 MG/DL — SIGNIFICANT CHANGE UP (ref 0.5–1.3)
DAT POLY-SP REAG RBC QL: NEGATIVE — SIGNIFICANT CHANGE UP
EOSINOPHIL # BLD AUTO: 0.01 K/UL — SIGNIFICANT CHANGE UP (ref 0–0.5)
EOSINOPHIL NFR BLD AUTO: 0.1 % — SIGNIFICANT CHANGE UP (ref 0–6)
GAS PNL BLDV: 133 MMOL/L — LOW (ref 136–146)
GAS PNL BLDV: 134 MMOL/L — LOW (ref 136–146)
GLUCOSE BLDV-MCNC: 169 — HIGH (ref 70–99)
GLUCOSE BLDV-MCNC: 173 — HIGH (ref 70–99)
GLUCOSE SERPL-MCNC: 179 MG/DL — HIGH (ref 70–99)
HCO3 BLDV-SCNC: 27 MMOL/L — SIGNIFICANT CHANGE UP (ref 20–27)
HCO3 BLDV-SCNC: 30 MMOL/L — HIGH (ref 20–27)
HCT VFR BLD CALC: 34.5 % — SIGNIFICANT CHANGE UP (ref 34.5–45)
HCT VFR BLDV CALC: 33.6 % — LOW (ref 34.5–45)
HCT VFR BLDV CALC: 35.5 % — SIGNIFICANT CHANGE UP (ref 34.5–45)
HGB BLD-MCNC: 11.4 G/DL — LOW (ref 11.5–15.5)
HGB BLDV-MCNC: 10.9 G/DL — LOW (ref 11.5–15.5)
HGB BLDV-MCNC: 11.5 G/DL — SIGNIFICANT CHANGE UP (ref 11.5–15.5)
IMM GRANULOCYTES # BLD AUTO: 0.05 # — SIGNIFICANT CHANGE UP
IMM GRANULOCYTES NFR BLD AUTO: 0.5 % — SIGNIFICANT CHANGE UP (ref 0–1.5)
INR BLD: 1.36 — HIGH (ref 0.88–1.17)
LACTATE BLDV-MCNC: 1.6 MMOL/L — SIGNIFICANT CHANGE UP (ref 0.5–2)
LACTATE BLDV-MCNC: 1.9 MMOL/L — SIGNIFICANT CHANGE UP (ref 0.5–2)
LYMPHOCYTES # BLD AUTO: 1.36 K/UL — SIGNIFICANT CHANGE UP (ref 1–3.3)
LYMPHOCYTES # BLD AUTO: 13.5 % — SIGNIFICANT CHANGE UP (ref 13–44)
MCHC RBC-ENTMCNC: 27.3 PG — SIGNIFICANT CHANGE UP (ref 27–34)
MCHC RBC-ENTMCNC: 33 % — SIGNIFICANT CHANGE UP (ref 32–36)
MCV RBC AUTO: 82.7 FL — SIGNIFICANT CHANGE UP (ref 80–100)
MONOCYTES # BLD AUTO: 0.59 K/UL — SIGNIFICANT CHANGE UP (ref 0–0.9)
MONOCYTES NFR BLD AUTO: 5.9 % — SIGNIFICANT CHANGE UP (ref 2–14)
NEUTROPHILS # BLD AUTO: 8.03 K/UL — HIGH (ref 1.8–7.4)
NEUTROPHILS NFR BLD AUTO: 79.8 % — HIGH (ref 43–77)
NRBC # FLD: 0 — SIGNIFICANT CHANGE UP
NT-PROBNP SERPL-SCNC: SIGNIFICANT CHANGE UP PG/ML
PCO2 BLDV: 33 MMHG — LOW (ref 41–51)
PCO2 BLDV: 49 MMHG — SIGNIFICANT CHANGE UP (ref 41–51)
PH BLDV: 7.43 PH — SIGNIFICANT CHANGE UP (ref 7.32–7.43)
PH BLDV: 7.51 PH — HIGH (ref 7.32–7.43)
PLATELET # BLD AUTO: 309 K/UL — SIGNIFICANT CHANGE UP (ref 150–400)
PMV BLD: 10.1 FL — SIGNIFICANT CHANGE UP (ref 7–13)
PO2 BLDV: 33 MMHG — LOW (ref 35–40)
PO2 BLDV: 44 MMHG — HIGH (ref 35–40)
POTASSIUM BLDV-SCNC: 3.1 MMOL/L — LOW (ref 3.4–4.5)
POTASSIUM BLDV-SCNC: 4.5 MMOL/L — SIGNIFICANT CHANGE UP (ref 3.4–4.5)
POTASSIUM SERPL-MCNC: 3.7 MMOL/L — SIGNIFICANT CHANGE UP (ref 3.5–5.3)
POTASSIUM SERPL-SCNC: 3.7 MMOL/L — SIGNIFICANT CHANGE UP (ref 3.5–5.3)
PROT SERPL-MCNC: 8.8 G/DL — HIGH (ref 6–8.3)
PROTHROM AB SERPL-ACNC: 15.7 SEC — HIGH (ref 9.8–13.1)
RBC # BLD: 4.17 M/UL — SIGNIFICANT CHANGE UP (ref 3.8–5.2)
RBC # FLD: 15.3 % — HIGH (ref 10.3–14.5)
RH IG SCN BLD-IMP: NEGATIVE — SIGNIFICANT CHANGE UP
SAO2 % BLDV: 54.5 % — LOW (ref 60–85)
SAO2 % BLDV: 82.2 % — SIGNIFICANT CHANGE UP (ref 60–85)
SODIUM SERPL-SCNC: 136 MMOL/L — SIGNIFICANT CHANGE UP (ref 135–145)
TROPONIN T, HIGH SENSITIVITY: 84 NG/L — CRITICAL HIGH (ref ?–14)
TROPONIN T, HIGH SENSITIVITY: 98 NG/L — CRITICAL HIGH (ref ?–14)
WBC # BLD: 10.06 K/UL — SIGNIFICANT CHANGE UP (ref 3.8–10.5)
WBC # FLD AUTO: 10.06 K/UL — SIGNIFICANT CHANGE UP (ref 3.8–10.5)

## 2018-07-18 PROCEDURE — 70450 CT HEAD/BRAIN W/O DYE: CPT | Mod: 26

## 2018-07-18 PROCEDURE — 99223 1ST HOSP IP/OBS HIGH 75: CPT

## 2018-07-18 PROCEDURE — 74174 CTA ABD&PLVS W/CONTRAST: CPT | Mod: 26

## 2018-07-18 PROCEDURE — 71275 CT ANGIOGRAPHY CHEST: CPT | Mod: 26

## 2018-07-18 RX ORDER — INSULIN LISPRO 100/ML
VIAL (ML) SUBCUTANEOUS AT BEDTIME
Qty: 0 | Refills: 0 | Status: DISCONTINUED | OUTPATIENT
Start: 2018-07-18 | End: 2018-07-21

## 2018-07-18 RX ORDER — HYDROCHLOROTHIAZIDE 25 MG
25 TABLET ORAL DAILY
Qty: 0 | Refills: 0 | Status: DISCONTINUED | OUTPATIENT
Start: 2018-07-18 | End: 2018-07-20

## 2018-07-18 RX ORDER — LOSARTAN POTASSIUM 100 MG/1
50 TABLET, FILM COATED ORAL DAILY
Qty: 0 | Refills: 0 | Status: DISCONTINUED | OUTPATIENT
Start: 2018-07-18 | End: 2018-07-20

## 2018-07-18 RX ORDER — METHADONE HYDROCHLORIDE 40 MG/1
10 TABLET ORAL DAILY
Qty: 0 | Refills: 0 | Status: DISCONTINUED | OUTPATIENT
Start: 2018-07-18 | End: 2018-07-21

## 2018-07-18 RX ORDER — INSULIN LISPRO 100/ML
VIAL (ML) SUBCUTANEOUS
Qty: 0 | Refills: 0 | Status: DISCONTINUED | OUTPATIENT
Start: 2018-07-18 | End: 2018-07-21

## 2018-07-18 RX ORDER — ASPIRIN/CALCIUM CARB/MAGNESIUM 324 MG
324 TABLET ORAL ONCE
Qty: 0 | Refills: 0 | Status: COMPLETED | OUTPATIENT
Start: 2018-07-18 | End: 2018-07-18

## 2018-07-18 RX ORDER — GLUCAGON INJECTION, SOLUTION 0.5 MG/.1ML
1 INJECTION, SOLUTION SUBCUTANEOUS ONCE
Qty: 0 | Refills: 0 | Status: DISCONTINUED | OUTPATIENT
Start: 2018-07-18 | End: 2018-07-21

## 2018-07-18 RX ORDER — SODIUM CHLORIDE 9 MG/ML
3 INJECTION INTRAMUSCULAR; INTRAVENOUS; SUBCUTANEOUS EVERY 8 HOURS
Qty: 0 | Refills: 0 | Status: DISCONTINUED | OUTPATIENT
Start: 2018-07-18 | End: 2018-07-21

## 2018-07-18 RX ORDER — DEXTROSE 50 % IN WATER 50 %
25 SYRINGE (ML) INTRAVENOUS ONCE
Qty: 0 | Refills: 0 | Status: DISCONTINUED | OUTPATIENT
Start: 2018-07-18 | End: 2018-07-21

## 2018-07-18 RX ORDER — ACETAMINOPHEN 500 MG
650 TABLET ORAL EVERY 6 HOURS
Qty: 0 | Refills: 0 | Status: DISCONTINUED | OUTPATIENT
Start: 2018-07-18 | End: 2018-07-19

## 2018-07-18 RX ORDER — HEPARIN SODIUM 5000 [USP'U]/ML
5000 INJECTION INTRAVENOUS; SUBCUTANEOUS EVERY 8 HOURS
Qty: 0 | Refills: 0 | Status: DISCONTINUED | OUTPATIENT
Start: 2018-07-18 | End: 2018-07-21

## 2018-07-18 RX ORDER — PANTOPRAZOLE SODIUM 20 MG/1
40 TABLET, DELAYED RELEASE ORAL
Qty: 0 | Refills: 0 | Status: DISCONTINUED | OUTPATIENT
Start: 2018-07-18 | End: 2018-07-21

## 2018-07-18 RX ORDER — DEXTROSE 50 % IN WATER 50 %
15 SYRINGE (ML) INTRAVENOUS ONCE
Qty: 0 | Refills: 0 | Status: DISCONTINUED | OUTPATIENT
Start: 2018-07-18 | End: 2018-07-21

## 2018-07-18 RX ORDER — DEXTROSE 50 % IN WATER 50 %
12.5 SYRINGE (ML) INTRAVENOUS ONCE
Qty: 0 | Refills: 0 | Status: DISCONTINUED | OUTPATIENT
Start: 2018-07-18 | End: 2018-07-21

## 2018-07-18 RX ORDER — BUPROPION HYDROCHLORIDE 150 MG/1
150 TABLET, EXTENDED RELEASE ORAL DAILY
Qty: 0 | Refills: 0 | Status: DISCONTINUED | OUTPATIENT
Start: 2018-07-18 | End: 2018-07-21

## 2018-07-18 RX ORDER — GABAPENTIN 400 MG/1
300 CAPSULE ORAL
Qty: 0 | Refills: 0 | Status: DISCONTINUED | OUTPATIENT
Start: 2018-07-18 | End: 2018-07-21

## 2018-07-18 RX ORDER — SODIUM CHLORIDE 9 MG/ML
500 INJECTION INTRAMUSCULAR; INTRAVENOUS; SUBCUTANEOUS ONCE
Qty: 0 | Refills: 0 | Status: COMPLETED | OUTPATIENT
Start: 2018-07-18 | End: 2018-07-18

## 2018-07-18 RX ORDER — OMEGA-3 ACID ETHYL ESTERS 1 G
1 CAPSULE ORAL DAILY
Qty: 0 | Refills: 0 | Status: DISCONTINUED | OUTPATIENT
Start: 2018-07-18 | End: 2018-07-21

## 2018-07-18 RX ORDER — SODIUM CHLORIDE 9 MG/ML
1000 INJECTION, SOLUTION INTRAVENOUS
Qty: 0 | Refills: 0 | Status: DISCONTINUED | OUTPATIENT
Start: 2018-07-18 | End: 2018-07-21

## 2018-07-18 RX ADMIN — Medication 324 MILLIGRAM(S): at 21:56

## 2018-07-18 RX ADMIN — SODIUM CHLORIDE 500 MILLILITER(S): 9 INJECTION INTRAMUSCULAR; INTRAVENOUS; SUBCUTANEOUS at 21:56

## 2018-07-18 NOTE — ED ADULT NURSE NOTE - PMH
Abdominal hernia in 2012    Acid Reflux    Alcohol abuse--quit 8 years ago--goes to AA  ADDENDUM:  at PAST appointment  patient states she quit alcohol approximately 2003  Anxiety    Arthritis    Asthma  deneis recent exacerbation, deneis intubation or hospitalizations.  b/l  Carpal tunnel syndrome    Breast cancer  2012  right breast -- had mastectomy and then post op chemo and RT, followed with Herceptin for 1 year  2012 last chemo   2013 may last Herceptin  2013 last RT  Depression    Diabetes mellitus diagnosed in 2007    Diverticulosis    Drug abuse--quit 8 years ago--goes to AA  ADDENDUM: at PAST appointment, patient states she quit alcohol in approximately 2003  ETOH abuse  at PAST appointment 9-11-14, patient states she quit alcohol in approximately 2003  Fibromyalgia    h/o   Fatty liver    h/o b/l fungal foot infection    herniated lumbar disc    hiatal hernia    History  Uterine Fibroid    Hypercholesterolemia    Hypertension    Insomnia    Lymphedema, limb  right side s/p right mastectomy  Miscarriage  x 2  Morbid obesity    Neuropathy  b/l feet  personal h/o CHF (congestive heart failure)--last hospitalized in 2005    sickel cell anemia trait    Sleep apnea diagnosed 2007---supposed to use device but doesn't    Substance abuse  quit in 2003 -- cocaine and marijuana  Thyroid nodule  had negative biopsy

## 2018-07-18 NOTE — ED ADULT NURSE NOTE - OBJECTIVE STATEMENT
Covering RN: pt AO x3, ambulatory with cane, c/o chest pain and back pain with SOB. Also c/o uncontrolled urination and losing appetite for few days (loose 5 lbs). As per pt, she did hernia repair surgery 2 weeks ago in [facility]. Surgery area checked by her MD and no infection signs noted at that time. Surgery site was wrapped by now. VSS at [facility] Covering RN: pt AO x3, ambulatory with cane, c/o chest pain and back pain with SOB. Also c/o uncontrolled urination and losing appetite for few days (loose 5 lbs). As per pt, she did hernia repair surgery 2 weeks ago in [facility]. Surgery area checked by her MD a week ago and no infection signs noted at that time. Surgery site was wrapped by now. VSS on arrival. PMH of mastectomy s/p breast CA on her right side. Right arm restricted. Pending evaluation by provider. Awaiting further study. Covering RN: pt AO x3, ambulatory with cane, c/o chest pain and back pain with SOB. Also c/o uncontrolled urination and losing appetite for few days (loose 5 lbs). On arrival, pt is sweating and having unusual weakness on her right arm and b/l lower extremity. As per pt, she did hernia repair surgery 2 weeks ago in [facility]. Surgery area checked by her MD a week ago and no infection signs noted at that time. Surgery site was wrapped by now. VSS on arrival. PMH of mastectomy s/p breast CA on her right side. Right arm restricted. Pending evaluation by provider. Awaiting further study.

## 2018-07-18 NOTE — ED PROVIDER NOTE - PHYSICAL EXAMINATION
pt appears diaphoretic and uncomfortable  RUE weakness 4.5/5, b/l lower extremity weakness 4/5, sensation intact and equal b/l

## 2018-07-18 NOTE — ED PROVIDER NOTE - ATTENDING CONTRIBUTION TO CARE
Patrizia: 59 yo female with a h/o abdominal hernia repair 6/29 DM, HTN, GERD, breast cancer, fibromyalgia, arthritis c/o 3 days pf progressively worsening chest pain, radiating to her neck and back with associated SOB and diaphoresis. Pt also c/o b/l LE weakness and urinary incontinence. No lower back pain. No paresthesias. No Le edema or pain. Exam: GENERAL: diaphoretic, ill appearing, HEENT: MMM, PERRLA, no scleral icterus, CARDIO: +S1/S2, no murmurs, rubs or gallops, LUNGS: CTA B/L, no wheezing, rales or rhonchi, ABD: soft, nontender, BSx4 quadrants, no guarding or rigidity. + laparoscopic incisions, clean, dry and intact, healing well, old midline surgical incision healed. EXT: No LE edema or calf TTP, 2+ distal pulses x 4 extremities. 4/5 strength in b/l LE, 4+/5 strength in RUE NEURO: AxOx3, CNII-XII intact, SKIN: no rashes or lesions, well perfused A/P- 59 yo female with chest pain radiating to her back and diaphoresis. will obtain cbc, cmp, troponin, CTA chest and abdomen, CT head and if negative neuro consult for weakness.

## 2018-07-18 NOTE — CONSULT NOTE ADULT - SUBJECTIVE AND OBJECTIVE BOX
Patient seen and evaluated @ 6:45 PM  Chief Complaint: Chest pain    HPI:  58F with HTN, HLD, DM, breast cancer now SHER, obesity, s/p laparoscopic incisional hernia repair  without complications presents with chest pain x 3 days. Patient reports initially experienced headache and loss of appetite followed by 3 days of progressive chest pain and weakness. Patient reports pleuritic nature to chest pain, unable to appreciate any exertional component. Associated with diaphoresis. No palpitations, nausea, vomiting. Patient feeling very weak. Unable to get out of bed. Loss of appetite. Also with urinary incontinence.    Pre-surgical testing with TTE  minimal MR, normal LV function. Stress test  normal.    EKG unchanged from prior. Trop 98, , CK-MB 2.5.    PMH:   Insomnia  Thyroid nodule  Breast cancer  Miscarriage  Substance abuse  Fibromyalgia  Neuropathy  Lymphedema, limb  Hypertension  Morbid obesity  ETOH abuse  sickel cell anemia trait  Kyphosis  Anxiety  Depression  herniated lumbar disc  b/l knee Arthropathy--pt states she will need knee replacements in the future  h/o b/l shoulder Arthropathy---has received injections  Morbid obesity  2012,  the second right breast mass malignant  2011,  benign right breast mass  mass  Hypercholesterolemia  Hypertension  Arthritis  Diverticulosis  h/o   Fatty liver  Asthma  personal h/o CHF (congestive heart failure)--last hospitalized in   Drug abuse--quit 8 years ago--goes to   Alcohol abuse--quit 8 years ago--goes to   h/o b/l fungal foot infection  hiatal hernia  Acid Reflux  Sleep apnea diagnosed ---supposed to use device but doesn't  History  Uterine Fibroid  Diabetes mellitus diagnosed in   Abdominal hernia in   b/l  Carpal tunnel syndrome  Fibromyalgia    PSH:   Thyroid nodule  Termination of pregnancy (fetus)  Cancer  Radical mastectomy of right breast    repair of ventral hernia with mesh     h/o hysterectomy due to Fibroid--post op vaginal bleeding requiring a transfusion  Fibroid    Medications:   HCTZ  Losartan  Gabapentin  Metformin    Allergies:  environment--ragweed, dust, cats--sneezing and watery eyes, nasal congestion (Other)  ramipril (Angioedema)    FAMILY HISTORY:  Family history of rheumatoid arthritis (Sibling): sister age 54 from RA  Family history of leukemia (Father): father  age 56 from leukemia    Social History:  [place]    Review of Systems:  Constitutional: [ ] Fever [ ] Chills [ ] Fatigue [ ] Weight change   HEENT: [ ] Blurred vision [ ] Eye Pain [ ] Headache [ ] Runny nose [ ] Sore Throat   Respiratory: [ ] Cough [ ] Wheezing [ ] Shortness of breath  Cardiovascular: [ ] Chest Pain [ ] Palpitations [ ] JOINER [ ] PND [ ] Orthopnea  Gastrointestinal: [ ] Abdominal Pain [ ] Diarrhea [ ] Constipation [ ] Hemorrhoids [ ] Nausea [ ] Vomiting  Genitourinary: [ ] Nocturia [ ] Dysuria [ ] Incontinence  Extremities: [ ] Swelling [ ] Joint Pain  Neurologic: [ ] Focal deficit [ ] Paresthesias [ ] Syncope  Lymphatic: [ ] Swelling [ ] Lymphadenopathy   Skin: [ ] Rash [ ] Ecchymoses [ ] Wounds [ ] Lesions  Psychiatry: [ ] Depression [ ] Suicidal/Homicidal Ideation [ ] Anxiety [ ] Sleep Disturbances  [ ] 10 point review of systems is otherwise negative except as mentioned above            [ ]Unable to obtain    Physical Exam:  T(C): 36.7 (18 @ 16:02), Max: 36.7 (18 @ 16:02)  HR: 89 (18 @ 18:53) (89 - 93)  BP: 105/55 (18 @ 18:53) (105/55 - 116/63)  RR: 18 (18 @ 18:53) (18 - 18)  SpO2: 99% (18 @ 18:53) (99% - 100%)  Wt(kg): --    Daily     Daily     Appearance: NAD  Eyes: PERRL, EOMI  HENT: Normal oral mus[place]a, NC/AT  Cardiovascular: normal S1 and S2, RRR, no m/r/g, no edema, normal JVP  Respiratory: Clear to auscultation bilaterally  Gastrointestinal: Soft, non-tender, non-distended, BS+  Musculoskeletal: No clubbing, no joint deformity   Neurologic: Non-focal  Lymphatic: No lymphadenopathy  Psychiatry: AAOx3, mood & affect appropriate  Skin: No rashes, no ecchymoses, no cyanosis    Cardiovascular Diagnostic Testing:  ECG: NSR, no acute ischemic changes, TWI in III stable from prior    Echo:  TTE 2017  CONCLUSIONS:  1. Mitral annular calcification, otherwise normal mitral  valve. Minimal mitral regurgitation.  2. Normal left ventricular internal dimensions and wall  thicknesses.  3. Normal left ventricular systolic function. No segmental  wall motion abnormalities.  4. Normal right ventricular size and function.  5. Estimated right ventricular systolic pressure equals 40  mm Hg, assuming right atrial pressure equals 10 mm Hg,  consistent with mild pulmonary hypertension.    Stress Testin/26  IMPRESSIONS:Normal Study  * The left ventricle was normal in size.  * Tracer uptake was homogeneous throughout the left  ventricle.  * Normal study; no evidence for myocardial infarction or  ischemia.  * Gated wallmotion analysis was performed, and shows  normal wall motion.    Cath: none    Interpretation of Telemetry: sinus    Imaging:   CT C/A/P  IMPRESSION:   No aortic dissection.    Postoperative changes of the anterior abdominal wall with a 3.7 cm fluid   collection at the level of the umbilicus, likely a postoperative seroma.    Labs:                        11.4   10.06 )-----------( 309      ( 2018 16:35 )             34.5         136  |  93<L>  |  12  ----------------------------<  179<H>  3.7   |  27  |  1.01    Ca    9.2      2018 17:06    TPro  8.8<H>  /  Alb  3.9  /  TBili  0.7  /  DBili  x   /  AST  21  /  ALT  11  /  AlkPhos  106  -18    PT/INR - ( 2018 17:06 )   PT: 15.7 SEC;   INR: 1.36          PTT - ( 2018 17:06 )  PTT:29.2 SEC  CARDIAC MARKERS ( 2018 17:06 )  x     / x     / 146 u/L / 2.53 ng/mL / x          Serum Pro-Brain Natriuretic Peptide: [id number] pg/mL ( @ 17:06)

## 2018-07-18 NOTE — ED ADULT TRIAGE NOTE - CHIEF COMPLAINT QUOTE
Since Saturday night, patient has had c/o sweating, decreased appetite, pressing in her chest and back when she breathes, urinating on herself (unable to get to bathroom in time) and feels cold. Pt states that when it first started it was a headache and all other symptoms came on after.

## 2018-07-18 NOTE — ED PROVIDER NOTE - MEDICAL DECISION MAKING DETAILS
58yF s/p abdominal hernia repair on 6/28, DM, HTN, HLD, fibromyalgia, breast ca presenting with cp w/ radiation through to neck, diaphoresis, weakness x 3 days. EKG within normal. Concern for AAA, CTa chest/abd/pelvis, cbc/cmp/vbg, high sensitivity trop, coags, type and screen. CT head give pt with new RUE weakness and b/l lower extremity weakness.

## 2018-07-18 NOTE — CONSULT NOTE ADULT - ASSESSMENT
58F with HTN, HLD, DM, breast cancer now SHER, obesity, s/p laparoscopic incisional hernia repair 6/29 without complications presents with pleuritic chest pain in setting of chills, diaphoresis, loss of appetite and weakness.  Atypical chest pain for ACS, additionally given 3 days of chest pain would anticipate CK and CK MB elevation. hsTrop may be elevated in setting of other pathologies.  Concern for aortic dissection, CT ruled out. Concern for pulmonary embolus however none on CT read. Possible infection given recent surgery and seroma on CT scan?    #Pleuritic chest pain  -- CT reassuring for no PE  -- f/u D dimer  -- repeat troponin, if uptrending would consider empiric treatment with asa/statin/heparin gtt/atorvastatin however would defer at this time  -- TTE  -- repeat CK and CK-MB    #HTN  -- continue home antihypertensives    #HLD 58F with HTN, HLD, DM, breast cancer now SHER, obesity, s/p laparoscopic incisional hernia repair 6/29 without complications presents with pleuritic chest pain in setting of chills, diaphoresis, loss of appetite and weakness.  Atypical chest pain for ACS, additionally given 3 days of chest pain would anticipate CK and CK MB elevation. hsTrop may be elevated in setting of other pathologies.  Concern for aortic dissection, CT ruled out. Concern for pulmonary embolus however none on CT read. Possible infection given recent surgery and seroma on CT scan?    #Pleuritic chest pain  -- CT reassuring for no PE  -- f/u D dimer  -- repeat troponin, if uptrending would consider empiric treatment with asa/statin/heparin gtt/atorvastatin however would defer at this time  -- TTE  -- repeat CK and CK-MB    #HTN  -- continue home antihypertensives    #HLD  -- not currently on statin    Bernard Kirk MD  [id number]

## 2018-07-18 NOTE — ED PROVIDER NOTE - PROGRESS NOTE DETAILS
TESSA Acosta: Pt consented to CTa prior to lab results, labs from 6/29 show normal kidney function TESSA Acosta: Called cardiology for consult as pts troponin 98, will see pt in the ED Patrizia: Pt troponin elevated. cards consulted. will send second set. will not anticoagulate until CTA resulted. Patrizia: Discussed with tele doc prior to admission, recommending just ASA for anticoagulation.

## 2018-07-18 NOTE — ED PROVIDER NOTE - OBJECTIVE STATEMENT
58yF w/ pmhx s/p abdominal hernia repair 6/18 DM, HTN, GERD, breast cancer, fibromyalgia, arthritis presenting with chest pain x 3 days. Chest pain is substernal with radiation to the neck, associated with shortness of breath. Pt has been diaphoretic for 3 days despite "blasting AC" at home. Associated pt has b/l lower extremity weakness and is having urinary incontinence (feels she needs to go but can't get to the bathroom in time). Pt states these symptoms all began with a headache and decreased appetite the day prior to chest pain onset. Denies fever/chills, nausea, vomiting, diarrhea, blurry vision, difficulty speaking or swallowing, saddle anesthesia or any other concerns.  Pts cardiologist is Jeanne Torres, had a normal stress for pre-op clearance. 58yF w/ pmhx s/p abdominal hernia repair 6/29 DM, HTN, GERD, breast cancer, fibromyalgia, arthritis presenting with chest pain x 3 days. Chest pain is substernal with radiation to the neck, associated with shortness of breath. Pt has been diaphoretic for 3 days despite "blasting AC" at home. Associated pt has b/l lower extremity weakness and is having urinary incontinence (feels she needs to go but can't get to the bathroom in time). Pt states these symptoms all began with a headache and decreased appetite the day prior to chest pain onset. Denies fever/chills, nausea, vomiting, diarrhea, blurry vision, difficulty speaking or swallowing, saddle anesthesia or any other concerns.  Pts cardiologist is Jeanne Torres, had a normal stress for pre-op clearance.

## 2018-07-19 ENCOUNTER — MESSAGE (OUTPATIENT)
Age: 58
End: 2018-07-19

## 2018-07-19 ENCOUNTER — APPOINTMENT (OUTPATIENT)
Dept: INTERNAL MEDICINE | Facility: CLINIC | Age: 58
End: 2018-07-19

## 2018-07-19 DIAGNOSIS — Z29.9 ENCOUNTER FOR PROPHYLACTIC MEASURES, UNSPECIFIED: ICD-10-CM

## 2018-07-19 DIAGNOSIS — I10 ESSENTIAL (PRIMARY) HYPERTENSION: ICD-10-CM

## 2018-07-19 DIAGNOSIS — F32.9 MAJOR DEPRESSIVE DISORDER, SINGLE EPISODE, UNSPECIFIED: ICD-10-CM

## 2018-07-19 DIAGNOSIS — E11.9 TYPE 2 DIABETES MELLITUS WITHOUT COMPLICATIONS: ICD-10-CM

## 2018-07-19 DIAGNOSIS — J45.909 UNSPECIFIED ASTHMA, UNCOMPLICATED: ICD-10-CM

## 2018-07-19 DIAGNOSIS — G62.9 POLYNEUROPATHY, UNSPECIFIED: ICD-10-CM

## 2018-07-19 DIAGNOSIS — E78.00 PURE HYPERCHOLESTEROLEMIA, UNSPECIFIED: ICD-10-CM

## 2018-07-19 DIAGNOSIS — M51.26 OTHER INTERVERTEBRAL DISC DISPLACEMENT, LUMBAR REGION: ICD-10-CM

## 2018-07-19 DIAGNOSIS — R07.9 CHEST PAIN, UNSPECIFIED: ICD-10-CM

## 2018-07-19 LAB
BUN SERPL-MCNC: 15 MG/DL — SIGNIFICANT CHANGE UP (ref 7–23)
CALCIUM SERPL-MCNC: 8.7 MG/DL — SIGNIFICANT CHANGE UP (ref 8.4–10.5)
CHLORIDE SERPL-SCNC: 96 MMOL/L — LOW (ref 98–107)
CHOLEST SERPL-MCNC: 147 MG/DL — SIGNIFICANT CHANGE UP (ref 120–199)
CO2 SERPL-SCNC: 24 MMOL/L — SIGNIFICANT CHANGE UP (ref 22–31)
CREAT SERPL-MCNC: 1.14 MG/DL — SIGNIFICANT CHANGE UP (ref 0.5–1.3)
GLUCOSE BLDC GLUCOMTR-MCNC: 121 MG/DL — HIGH (ref 70–99)
GLUCOSE BLDC GLUCOMTR-MCNC: 129 MG/DL — HIGH (ref 70–99)
GLUCOSE BLDC GLUCOMTR-MCNC: 142 MG/DL — HIGH (ref 70–99)
GLUCOSE BLDC GLUCOMTR-MCNC: 167 MG/DL — HIGH (ref 70–99)
GLUCOSE SERPL-MCNC: 127 MG/DL — HIGH (ref 70–99)
HDLC SERPL-MCNC: 40 MG/DL — LOW (ref 45–65)
LIPID PNL WITH DIRECT LDL SERPL: 87 MG/DL — SIGNIFICANT CHANGE UP
MAGNESIUM SERPL-MCNC: 1.8 MG/DL — SIGNIFICANT CHANGE UP (ref 1.6–2.6)
PHOSPHATE SERPL-MCNC: 2.8 MG/DL — SIGNIFICANT CHANGE UP (ref 2.5–4.5)
POTASSIUM SERPL-MCNC: 3.7 MMOL/L — SIGNIFICANT CHANGE UP (ref 3.5–5.3)
POTASSIUM SERPL-SCNC: 3.7 MMOL/L — SIGNIFICANT CHANGE UP (ref 3.5–5.3)
SODIUM SERPL-SCNC: 137 MMOL/L — SIGNIFICANT CHANGE UP (ref 135–145)
TRIGL SERPL-MCNC: 121 MG/DL — SIGNIFICANT CHANGE UP (ref 10–149)
TSH SERPL-MCNC: 3.6 UIU/ML — SIGNIFICANT CHANGE UP (ref 0.27–4.2)

## 2018-07-19 RX ORDER — TIOTROPIUM BROMIDE 18 UG/1
1 CAPSULE ORAL; RESPIRATORY (INHALATION) DAILY
Qty: 0 | Refills: 0 | Status: DISCONTINUED | OUTPATIENT
Start: 2018-07-19 | End: 2018-07-21

## 2018-07-19 RX ORDER — ACETAMINOPHEN 500 MG
650 TABLET ORAL EVERY 6 HOURS
Qty: 0 | Refills: 0 | Status: DISCONTINUED | OUTPATIENT
Start: 2018-07-19 | End: 2018-07-21

## 2018-07-19 RX ORDER — ALBUTEROL 90 UG/1
1 AEROSOL, METERED ORAL EVERY 4 HOURS
Qty: 0 | Refills: 0 | Status: DISCONTINUED | OUTPATIENT
Start: 2018-07-19 | End: 2018-07-21

## 2018-07-19 RX ORDER — ATORVASTATIN CALCIUM 80 MG/1
10 TABLET, FILM COATED ORAL AT BEDTIME
Qty: 0 | Refills: 0 | Status: DISCONTINUED | OUTPATIENT
Start: 2018-07-19 | End: 2018-07-21

## 2018-07-19 RX ORDER — IPRATROPIUM/ALBUTEROL SULFATE 18-103MCG
3 AEROSOL WITH ADAPTER (GRAM) INHALATION EVERY 6 HOURS
Qty: 0 | Refills: 0 | Status: DISCONTINUED | OUTPATIENT
Start: 2018-07-19 | End: 2018-07-21

## 2018-07-19 RX ADMIN — Medication 650 MILLIGRAM(S): at 17:02

## 2018-07-19 RX ADMIN — Medication 1 TABLET(S): at 13:08

## 2018-07-19 RX ADMIN — Medication 1 GRAM(S): at 13:08

## 2018-07-19 RX ADMIN — Medication 3 MILLILITER(S): at 21:55

## 2018-07-19 RX ADMIN — METHADONE HYDROCHLORIDE 10 MILLIGRAM(S): 40 TABLET ORAL at 13:08

## 2018-07-19 RX ADMIN — HEPARIN SODIUM 5000 UNIT(S): 5000 INJECTION INTRAVENOUS; SUBCUTANEOUS at 06:19

## 2018-07-19 RX ADMIN — Medication 650 MILLIGRAM(S): at 02:29

## 2018-07-19 RX ADMIN — LOSARTAN POTASSIUM 50 MILLIGRAM(S): 100 TABLET, FILM COATED ORAL at 06:19

## 2018-07-19 RX ADMIN — GABAPENTIN 300 MILLIGRAM(S): 400 CAPSULE ORAL at 06:19

## 2018-07-19 RX ADMIN — Medication 25 MILLIGRAM(S): at 06:19

## 2018-07-19 RX ADMIN — Medication 650 MILLIGRAM(S): at 18:00

## 2018-07-19 RX ADMIN — SODIUM CHLORIDE 3 MILLILITER(S): 9 INJECTION INTRAMUSCULAR; INTRAVENOUS; SUBCUTANEOUS at 21:21

## 2018-07-19 RX ADMIN — HEPARIN SODIUM 5000 UNIT(S): 5000 INJECTION INTRAVENOUS; SUBCUTANEOUS at 21:18

## 2018-07-19 RX ADMIN — BUPROPION HYDROCHLORIDE 150 MILLIGRAM(S): 150 TABLET, EXTENDED RELEASE ORAL at 13:08

## 2018-07-19 RX ADMIN — GABAPENTIN 300 MILLIGRAM(S): 400 CAPSULE ORAL at 17:02

## 2018-07-19 RX ADMIN — HEPARIN SODIUM 5000 UNIT(S): 5000 INJECTION INTRAVENOUS; SUBCUTANEOUS at 13:08

## 2018-07-19 RX ADMIN — Medication 3 MILLILITER(S): at 15:17

## 2018-07-19 RX ADMIN — SODIUM CHLORIDE 3 MILLILITER(S): 9 INJECTION INTRAMUSCULAR; INTRAVENOUS; SUBCUTANEOUS at 06:00

## 2018-07-19 RX ADMIN — Medication 3 MILLILITER(S): at 09:01

## 2018-07-19 RX ADMIN — SODIUM CHLORIDE 3 MILLILITER(S): 9 INJECTION INTRAMUSCULAR; INTRAVENOUS; SUBCUTANEOUS at 13:09

## 2018-07-19 RX ADMIN — PANTOPRAZOLE SODIUM 40 MILLIGRAM(S): 20 TABLET, DELAYED RELEASE ORAL at 06:19

## 2018-07-19 NOTE — H&P ADULT - GASTROINTESTINAL DETAILS
soft/no guarding/nontender/bowel sounds normal/no bruit/no rebound tenderness/no rigidity/no masses palpable

## 2018-07-19 NOTE — H&P ADULT - HISTORY OF PRESENT ILLNESS
58F ambulates with a cane, has a history of HTN, Dyslipidemia, DM2, breast cancer s/p R mastectomy with no evidence of disease, obesity, s/p laparoscopic incisional hernia repair 6/29 without complications presents with chest pain x 3 days. Patient reports initially experienced headache and loss of appetite followed by 3 days of progressive chest pain and weakness. Patient reports pleuritic nature to chest pain, unable to appreciate any exertional component. Associated with diaphoresis. No palpitations, nausea, vomiting. Patient feeling very weak. Unable to get out of bed. Loss of appetite. Also with urinary incontinence. in the Ed, the pt was seen by the cardio fellow for elevated Trop = 98 and ProBNP= 12,583.  Their consult and recommendations are in the chart. Currently the pt is chest pain free at this time. 58 W ambulates with a cane, has a history of HTN, Dyslipidemia, DM2, breast cancer s/p R mastectomy with no evidence of disease, obesity, s/p laparoscopic incisional hernia repair 6/29 without complications presents with chest pain x 3 days. Patient reports initially experienced headache and loss of appetite followed by 3 days of progressive chest pain and weakness. Patient reports pleuritic nature to chest pain, unable to appreciate any exertional component. Associated with diaphoresis. No palpitations, nausea, vomiting. Patient feeling very weak. Unable to get out of bed. She states she is normally very active. Loss of appetite. Also with urinary incontinence. In the ED, the pt was seen by the cardio fellow for elevated Trop = 98 and ProBNP= 12,583.  Their consult and recommendations are in the chart. Currently the pt is chest pain free at this time.

## 2018-07-19 NOTE — H&P ADULT - NSHPLABSRESULTS_GEN_ALL_CORE
CTA Chest, Abd & Pelvis= No aortic dissection. Postoperative changes of the anterior abdominal wall with a 3.7 cm fluid collection at the level of the umbilicus, likely a postoperative seroma.  CT HEAD-Ventricles and sulci remain unchanged in size, re demonstration of nonspecific white matter decreased attenuation, better seen on MRI of 6/21/2018 likely microvascular disease. There is no new hemorrhage or midline shift. If symptoms persist consider follow-up CT or MRI if no contraindications.  EKG NSR @ 90b/ mi , QT/QTC= 360/ 440  H &H = 11.4/ 34.5  PT/ INR= 15.7/ 1.36  Trop= 98--> 84  CK= 146. MB= 2.53.  ProBNP= 12,583 CTA Chest, Abd & Pelvis= No aortic dissection. Postoperative changes of the anterior abdominal wall with a 3.7 cm fluid collection at the level of the umbilicus, likely a postoperative seroma.  CT HEAD-Ventricles and sulci remain unchanged in size, re demonstration of nonspecific white matter decreased attenuation, better seen on MRI of 6/21/2018 likely microvascular disease. There is no new hemorrhage or midline shift. If symptoms persist consider follow-up CT or MRI if no contraindications.    EKG NSR @ 90b/ mi , QT/QTC= 360/ 440    H &H = 11.4/ 34.5    PT/ INR= 15.7/ 1.36    Trop= 98--> 84    CK= 146. MB= 2.53.    ProBNP= 12,583

## 2018-07-19 NOTE — H&P ADULT - NEGATIVE NEUROLOGICAL SYMPTOMS
no syncope/no tremors/no loss of consciousness/no hemiparesis/no paresthesias/no generalized seizures/no focal seizures

## 2018-07-19 NOTE — H&P ADULT - RS GEN PE MLT RESP DETAILS PC
airway patent/clear to auscultation bilaterally/no chest wall tenderness/good air movement/no intercostal retractions/breath sounds equal/respirations non-labored

## 2018-07-19 NOTE — H&P ADULT - ASSESSMENT
58F HTN, Dyslipidemia, DM2, breast cancer s/p R mastectomy admitted for chest pain. 58F HTN, Dyslipidemia, DM2, breast cancer s/p R mastectomy admitted for chest pain. VSS. On exam, she is comfortable, lying flat in bed. hsTPN 98->84. ECG NSR with no acute ischemic changes; TWI in III stable from prior ECG.

## 2018-07-19 NOTE — H&P ADULT - PROBLEM SELECTOR PLAN 4
F/U FLP.  Consider Statin if needed. Patient would benefit from moderate intensity statin given age >40 and h/o T2DM. Will start atorvastatin 10mg hs.

## 2018-07-19 NOTE — H&P ADULT - PROBLEM SELECTOR PLAN 1
CM  F/U EKG, TTE,  Give O2, ASA, ARB, Patient reports several days of substernal chest pain and has multiple cardiovascular risk factors. Also presented with elevated hsT which has trended downward. Cardiology following, recommendations reviewed. Normal NST noted from last month.  -TTE  -c/w O2, ASA, ARB  -f/u further cardiology recommendations

## 2018-07-19 NOTE — H&P ADULT - NEUROLOGICAL DETAILS
responds to verbal commands/alert and oriented x 3/normal strength/no spontaneous movement/sensation intact

## 2018-07-19 NOTE — H&P ADULT - FAMILY HISTORY
Family history of leukemia, father  age 56 from leukemia     Sibling  Still living? Unknown  Family history of rheumatoid arthritis, Age at diagnosis: Age Unknown

## 2018-07-20 LAB
APPEARANCE UR: CLEAR — SIGNIFICANT CHANGE UP
BACTERIA # UR AUTO: SIGNIFICANT CHANGE UP
BASOPHILS # BLD AUTO: 0.02 K/UL — SIGNIFICANT CHANGE UP (ref 0–0.2)
BASOPHILS NFR BLD AUTO: 0.5 % — SIGNIFICANT CHANGE UP (ref 0–2)
BILIRUB UR-MCNC: NEGATIVE — SIGNIFICANT CHANGE UP
BLOOD UR QL VISUAL: NEGATIVE — SIGNIFICANT CHANGE UP
BUN SERPL-MCNC: 22 MG/DL — SIGNIFICANT CHANGE UP (ref 7–23)
CALCIUM SERPL-MCNC: 8.9 MG/DL — SIGNIFICANT CHANGE UP (ref 8.4–10.5)
CHLORIDE SERPL-SCNC: 93 MMOL/L — LOW (ref 98–107)
CO2 SERPL-SCNC: 26 MMOL/L — SIGNIFICANT CHANGE UP (ref 22–31)
COLOR SPEC: YELLOW — SIGNIFICANT CHANGE UP
CREAT SERPL-MCNC: 1.18 MG/DL — SIGNIFICANT CHANGE UP (ref 0.5–1.3)
EOSINOPHIL # BLD AUTO: 0.2 K/UL — SIGNIFICANT CHANGE UP (ref 0–0.5)
EOSINOPHIL NFR BLD AUTO: 4.9 % — SIGNIFICANT CHANGE UP (ref 0–6)
GLUCOSE BLDC GLUCOMTR-MCNC: 131 MG/DL — HIGH (ref 70–99)
GLUCOSE BLDC GLUCOMTR-MCNC: 132 MG/DL — HIGH (ref 70–99)
GLUCOSE BLDC GLUCOMTR-MCNC: 133 MG/DL — HIGH (ref 70–99)
GLUCOSE BLDC GLUCOMTR-MCNC: 155 MG/DL — HIGH (ref 70–99)
GLUCOSE SERPL-MCNC: 126 MG/DL — HIGH (ref 70–99)
GLUCOSE UR-MCNC: NEGATIVE — SIGNIFICANT CHANGE UP
HCT VFR BLD CALC: 33.1 % — LOW (ref 34.5–45)
HGB BLD-MCNC: 11 G/DL — LOW (ref 11.5–15.5)
IMM GRANULOCYTES # BLD AUTO: 0.01 # — SIGNIFICANT CHANGE UP
IMM GRANULOCYTES NFR BLD AUTO: 0.2 % — SIGNIFICANT CHANGE UP (ref 0–1.5)
KETONES UR-MCNC: NEGATIVE — SIGNIFICANT CHANGE UP
LEUKOCYTE ESTERASE UR-ACNC: NEGATIVE — SIGNIFICANT CHANGE UP
LYMPHOCYTES # BLD AUTO: 1.35 K/UL — SIGNIFICANT CHANGE UP (ref 1–3.3)
LYMPHOCYTES # BLD AUTO: 32.9 % — SIGNIFICANT CHANGE UP (ref 13–44)
MAGNESIUM SERPL-MCNC: 2.1 MG/DL — SIGNIFICANT CHANGE UP (ref 1.6–2.6)
MCHC RBC-ENTMCNC: 28.4 PG — SIGNIFICANT CHANGE UP (ref 27–34)
MCHC RBC-ENTMCNC: 33.2 % — SIGNIFICANT CHANGE UP (ref 32–36)
MCV RBC AUTO: 85.3 FL — SIGNIFICANT CHANGE UP (ref 80–100)
MONOCYTES # BLD AUTO: 0.31 K/UL — SIGNIFICANT CHANGE UP (ref 0–0.9)
MONOCYTES NFR BLD AUTO: 7.6 % — SIGNIFICANT CHANGE UP (ref 2–14)
MUCOUS THREADS # UR AUTO: SIGNIFICANT CHANGE UP
NEUTROPHILS # BLD AUTO: 2.21 K/UL — SIGNIFICANT CHANGE UP (ref 1.8–7.4)
NEUTROPHILS NFR BLD AUTO: 53.9 % — SIGNIFICANT CHANGE UP (ref 43–77)
NITRITE UR-MCNC: NEGATIVE — SIGNIFICANT CHANGE UP
NON-SQ EPI CELLS # UR AUTO: <1 — SIGNIFICANT CHANGE UP
NRBC # FLD: 0 — SIGNIFICANT CHANGE UP
PH UR: 5.5 — SIGNIFICANT CHANGE UP (ref 4.6–8)
PLATELET # BLD AUTO: 287 K/UL — SIGNIFICANT CHANGE UP (ref 150–400)
PMV BLD: 10.2 FL — SIGNIFICANT CHANGE UP (ref 7–13)
POTASSIUM SERPL-MCNC: 3.2 MMOL/L — LOW (ref 3.5–5.3)
POTASSIUM SERPL-SCNC: 3.2 MMOL/L — LOW (ref 3.5–5.3)
PROT UR-MCNC: 20 MG/DL — SIGNIFICANT CHANGE UP
RBC # BLD: 3.88 M/UL — SIGNIFICANT CHANGE UP (ref 3.8–5.2)
RBC # FLD: 15.7 % — HIGH (ref 10.3–14.5)
RBC CASTS # UR COMP ASSIST: SIGNIFICANT CHANGE UP (ref 0–?)
SODIUM SERPL-SCNC: 134 MMOL/L — LOW (ref 135–145)
SP GR SPEC: 1.02 — SIGNIFICANT CHANGE UP (ref 1–1.04)
SQUAMOUS # UR AUTO: SIGNIFICANT CHANGE UP
UROBILINOGEN FLD QL: 1 MG/DL — SIGNIFICANT CHANGE UP
WBC # BLD: 4.1 K/UL — SIGNIFICANT CHANGE UP (ref 3.8–10.5)
WBC # FLD AUTO: 4.1 K/UL — SIGNIFICANT CHANGE UP (ref 3.8–10.5)
WBC UR QL: SIGNIFICANT CHANGE UP (ref 0–?)

## 2018-07-20 PROCEDURE — 99233 SBSQ HOSP IP/OBS HIGH 50: CPT

## 2018-07-20 PROCEDURE — 93306 TTE W/DOPPLER COMPLETE: CPT | Mod: 26

## 2018-07-20 PROCEDURE — 99232 SBSQ HOSP IP/OBS MODERATE 35: CPT | Mod: GC

## 2018-07-20 RX ORDER — POTASSIUM CHLORIDE 20 MEQ
40 PACKET (EA) ORAL EVERY 4 HOURS
Qty: 0 | Refills: 0 | Status: COMPLETED | OUTPATIENT
Start: 2018-07-20 | End: 2018-07-20

## 2018-07-20 RX ADMIN — Medication 1 GRAM(S): at 12:57

## 2018-07-20 RX ADMIN — GABAPENTIN 300 MILLIGRAM(S): 400 CAPSULE ORAL at 17:00

## 2018-07-20 RX ADMIN — SODIUM CHLORIDE 3 MILLILITER(S): 9 INJECTION INTRAMUSCULAR; INTRAVENOUS; SUBCUTANEOUS at 21:49

## 2018-07-20 RX ADMIN — BUPROPION HYDROCHLORIDE 150 MILLIGRAM(S): 150 TABLET, EXTENDED RELEASE ORAL at 12:57

## 2018-07-20 RX ADMIN — PANTOPRAZOLE SODIUM 40 MILLIGRAM(S): 20 TABLET, DELAYED RELEASE ORAL at 05:14

## 2018-07-20 RX ADMIN — Medication 3 MILLILITER(S): at 03:52

## 2018-07-20 RX ADMIN — GABAPENTIN 300 MILLIGRAM(S): 400 CAPSULE ORAL at 05:14

## 2018-07-20 RX ADMIN — LOSARTAN POTASSIUM 50 MILLIGRAM(S): 100 TABLET, FILM COATED ORAL at 05:29

## 2018-07-20 RX ADMIN — METHADONE HYDROCHLORIDE 10 MILLIGRAM(S): 40 TABLET ORAL at 12:57

## 2018-07-20 RX ADMIN — HEPARIN SODIUM 5000 UNIT(S): 5000 INJECTION INTRAVENOUS; SUBCUTANEOUS at 05:14

## 2018-07-20 RX ADMIN — Medication 3 MILLILITER(S): at 22:22

## 2018-07-20 RX ADMIN — Medication 1 TABLET(S): at 12:57

## 2018-07-20 RX ADMIN — SODIUM CHLORIDE 3 MILLILITER(S): 9 INJECTION INTRAMUSCULAR; INTRAVENOUS; SUBCUTANEOUS at 05:14

## 2018-07-20 RX ADMIN — Medication 40 MILLIEQUIVALENT(S): at 12:58

## 2018-07-20 RX ADMIN — Medication 3 MILLILITER(S): at 09:22

## 2018-07-20 RX ADMIN — Medication 3 MILLILITER(S): at 15:04

## 2018-07-20 RX ADMIN — SODIUM CHLORIDE 3 MILLILITER(S): 9 INJECTION INTRAMUSCULAR; INTRAVENOUS; SUBCUTANEOUS at 12:58

## 2018-07-20 RX ADMIN — Medication 40 MILLIEQUIVALENT(S): at 10:12

## 2018-07-20 RX ADMIN — HEPARIN SODIUM 5000 UNIT(S): 5000 INJECTION INTRAVENOUS; SUBCUTANEOUS at 22:09

## 2018-07-20 NOTE — PHYSICAL THERAPY INITIAL EVALUATION ADULT - PERTINENT HX OF CURRENT PROBLEM, REHAB EVAL
58 y.o. Female HTN, Dyslipidemia, DM2, breast cancer s/p R mastectomy admitted for chest pain. Found to have elevated trops. ECG NSR with no acute ischemic changes

## 2018-07-20 NOTE — PROGRESS NOTE ADULT - ASSESSMENT
58F HTN, Dyslipidemia, DM2, breast cancer s/p R mastectomy admitted for chest pain. Found to have elevated trops. ECG NSR with no acute ischemic changes; TWI in III stable from prior ECG.

## 2018-07-20 NOTE — PROGRESS NOTE ADULT - ASSESSMENT
58F with HTN, HLD, DM, breast cancer now SHER, obesity, s/p laparoscopic incisional hernia repair 6/29 without complications presents with pleuritic chest pain in setting of chills, diaphoresis, loss of appetite and weakness.    -- doubt this is cardiac, seems musculoskeletal,. Patient with recently normal stress test  -- CT angio negative for dissection, no PE seen however not timed for PE. Would consider D-dimer to definitely rule out PE  -- f/u TTE  -- ?related to abdominal fluid collection seen on CT, ?have surgery see patient 58F with HTN, HLD, DM, breast cancer now SHER, obesity, s/p laparoscopic incisional hernia repair 6/29 without complications presents with pleuritic chest pain in setting of chills, diaphoresis, loss of appetite and weakness.    -- doubt this is cardiac, seems musculoskeletal,. Patient with recently normal stress test. ECG not concerning for pericarditis  -- CT angio negative for dissection, no PE seen however not timed for PE. Would consider D-dimer to definitely rule out PE  -- f/u TTE  -- ?related to abdominal fluid collection seen on CT, ?have surgery see patient 58F with HTN, HLD, DM, breast cancer now SHER, obesity, s/p laparoscopic incisional hernia repair 6/29 without complications presents with pleuritic chest pain in setting of chills, diaphoresis, loss of appetite and weakness.    -- doubt this is cardiac, seems musculoskeletal,. Patient with recently normal stress test. ECG not concerning for pericarditis  -- CT angio negative for dissection, no PE seen however not timed for PE. Would consider D-dimer to definitely rule out PE  -- f/u TTE  -- ?related to abdominal fluid collection seen on CT, ?have surgery see patient  -- hold HCTZ for now, continue losartan if BP improves

## 2018-07-20 NOTE — PHYSICAL THERAPY INITIAL EVALUATION ADULT - RANGE OF MOTION EXAMINATION, REHAB EVAL
bilateral upper extremity ROM was WFL (within functional limits)/bilateral lower extremity ROM was WFL (within functional limits)/bilateral sh flexion AROM to at least 90 deg, pt reports h/o arthritis multiple joints, + lymphedema Right UE

## 2018-07-20 NOTE — PHYSICAL THERAPY INITIAL EVALUATION ADULT - GENERAL OBSERVATIONS, REHAB EVAL
Checked with CLINTON Adam prior to visit, pt okay to be seen. Pt sitting on side of bed ,+ tele monitor, + h/o lymphedema Right UE

## 2018-07-21 ENCOUNTER — TRANSCRIPTION ENCOUNTER (OUTPATIENT)
Age: 58
End: 2018-07-21

## 2018-07-21 VITALS — WEIGHT: 232.81 LBS

## 2018-07-21 DIAGNOSIS — T88.8XXA OTHER SPECIFIED COMPLICATIONS OF SURGICAL AND MEDICAL CARE, NOT ELSEWHERE CLASSIFIED, INITIAL ENCOUNTER: ICD-10-CM

## 2018-07-21 LAB
BACTERIA UR CULT: SIGNIFICANT CHANGE UP
BASOPHILS # BLD AUTO: 0.01 K/UL — SIGNIFICANT CHANGE UP (ref 0–0.2)
BASOPHILS NFR BLD AUTO: 0.3 % — SIGNIFICANT CHANGE UP (ref 0–2)
BUN SERPL-MCNC: 19 MG/DL — SIGNIFICANT CHANGE UP (ref 7–23)
CALCIUM SERPL-MCNC: 9.1 MG/DL — SIGNIFICANT CHANGE UP (ref 8.4–10.5)
CHLORIDE SERPL-SCNC: 95 MMOL/L — LOW (ref 98–107)
CO2 SERPL-SCNC: 25 MMOL/L — SIGNIFICANT CHANGE UP (ref 22–31)
CREAT SERPL-MCNC: 1.06 MG/DL — SIGNIFICANT CHANGE UP (ref 0.5–1.3)
EOSINOPHIL # BLD AUTO: 0.3 K/UL — SIGNIFICANT CHANGE UP (ref 0–0.5)
EOSINOPHIL NFR BLD AUTO: 9.3 % — HIGH (ref 0–6)
GLUCOSE BLDC GLUCOMTR-MCNC: 112 MG/DL — HIGH (ref 70–99)
GLUCOSE BLDC GLUCOMTR-MCNC: 139 MG/DL — HIGH (ref 70–99)
GLUCOSE SERPL-MCNC: 157 MG/DL — HIGH (ref 70–99)
HCT VFR BLD CALC: 30.6 % — LOW (ref 34.5–45)
HGB BLD-MCNC: 10.1 G/DL — LOW (ref 11.5–15.5)
IMM GRANULOCYTES # BLD AUTO: 0.01 # — SIGNIFICANT CHANGE UP
IMM GRANULOCYTES NFR BLD AUTO: 0.3 % — SIGNIFICANT CHANGE UP (ref 0–1.5)
LYMPHOCYTES # BLD AUTO: 0.96 K/UL — LOW (ref 1–3.3)
LYMPHOCYTES # BLD AUTO: 29.7 % — SIGNIFICANT CHANGE UP (ref 13–44)
MAGNESIUM SERPL-MCNC: 2 MG/DL — SIGNIFICANT CHANGE UP (ref 1.6–2.6)
MCHC RBC-ENTMCNC: 28.2 PG — SIGNIFICANT CHANGE UP (ref 27–34)
MCHC RBC-ENTMCNC: 33 % — SIGNIFICANT CHANGE UP (ref 32–36)
MCV RBC AUTO: 85.5 FL — SIGNIFICANT CHANGE UP (ref 80–100)
MONOCYTES # BLD AUTO: 0.22 K/UL — SIGNIFICANT CHANGE UP (ref 0–0.9)
MONOCYTES NFR BLD AUTO: 6.8 % — SIGNIFICANT CHANGE UP (ref 2–14)
NEUTROPHILS # BLD AUTO: 1.73 K/UL — LOW (ref 1.8–7.4)
NEUTROPHILS NFR BLD AUTO: 53.6 % — SIGNIFICANT CHANGE UP (ref 43–77)
NRBC # FLD: 0 — SIGNIFICANT CHANGE UP
PLATELET # BLD AUTO: 318 K/UL — SIGNIFICANT CHANGE UP (ref 150–400)
PMV BLD: 10.5 FL — SIGNIFICANT CHANGE UP (ref 7–13)
POTASSIUM SERPL-MCNC: 3.8 MMOL/L — SIGNIFICANT CHANGE UP (ref 3.5–5.3)
POTASSIUM SERPL-SCNC: 3.8 MMOL/L — SIGNIFICANT CHANGE UP (ref 3.5–5.3)
RBC # BLD: 3.58 M/UL — LOW (ref 3.8–5.2)
RBC # FLD: 15.7 % — HIGH (ref 10.3–14.5)
SODIUM SERPL-SCNC: 134 MMOL/L — LOW (ref 135–145)
SPECIMEN SOURCE: SIGNIFICANT CHANGE UP
WBC # BLD: 3.23 K/UL — LOW (ref 3.8–10.5)
WBC # FLD AUTO: 3.23 K/UL — LOW (ref 3.8–10.5)

## 2018-07-21 PROCEDURE — 99239 HOSP IP/OBS DSCHRG MGMT >30: CPT

## 2018-07-21 RX ORDER — TIOTROPIUM BROMIDE 18 UG/1
1 CAPSULE ORAL; RESPIRATORY (INHALATION)
Qty: 30 | Refills: 0
Start: 2018-07-21 | End: 2018-08-19

## 2018-07-21 RX ORDER — ATORVASTATIN CALCIUM 80 MG/1
1 TABLET, FILM COATED ORAL
Qty: 30 | Refills: 0
Start: 2018-07-21 | End: 2018-08-19

## 2018-07-21 RX ORDER — ACETAMINOPHEN 500 MG
2 TABLET ORAL
Qty: 0 | Refills: 0 | COMMUNITY

## 2018-07-21 RX ORDER — ALBUTEROL 90 UG/1
1 AEROSOL, METERED ORAL
Qty: 1 | Refills: 0
Start: 2018-07-21 | End: 2018-08-19

## 2018-07-21 RX ORDER — LOSARTAN POTASSIUM 100 MG/1
1 TABLET, FILM COATED ORAL
Qty: 0 | Refills: 0 | COMMUNITY

## 2018-07-21 RX ADMIN — METHADONE HYDROCHLORIDE 10 MILLIGRAM(S): 40 TABLET ORAL at 13:37

## 2018-07-21 RX ADMIN — Medication 650 MILLIGRAM(S): at 05:20

## 2018-07-21 RX ADMIN — Medication 1 TABLET(S): at 13:37

## 2018-07-21 RX ADMIN — SODIUM CHLORIDE 3 MILLILITER(S): 9 INJECTION INTRAMUSCULAR; INTRAVENOUS; SUBCUTANEOUS at 13:25

## 2018-07-21 RX ADMIN — BUPROPION HYDROCHLORIDE 150 MILLIGRAM(S): 150 TABLET, EXTENDED RELEASE ORAL at 13:37

## 2018-07-21 RX ADMIN — Medication 1 GRAM(S): at 13:37

## 2018-07-21 RX ADMIN — Medication 650 MILLIGRAM(S): at 04:23

## 2018-07-21 RX ADMIN — Medication 3 MILLILITER(S): at 03:47

## 2018-07-21 RX ADMIN — PANTOPRAZOLE SODIUM 40 MILLIGRAM(S): 20 TABLET, DELAYED RELEASE ORAL at 05:30

## 2018-07-21 RX ADMIN — SODIUM CHLORIDE 3 MILLILITER(S): 9 INJECTION INTRAMUSCULAR; INTRAVENOUS; SUBCUTANEOUS at 05:29

## 2018-07-21 RX ADMIN — GABAPENTIN 300 MILLIGRAM(S): 400 CAPSULE ORAL at 05:29

## 2018-07-21 NOTE — DISCHARGE NOTE ADULT - CARE PLAN
Principal Discharge DX:	Chest pain  Goal:	Prevent future episodes  Assessment and plan of treatment:	Chest pain likely secondary to musculoskeletal vs. viral syndrome. CTA negative for aortic dissection or PE, but did reveal postoperative changes of the anterior abdominal wall with a 3.7cm fluid collection at the level of the umbilicus, likely secondary to recent hernia repair. Follow up with your surgeon within 1 week.  Secondary Diagnosis:	Asthma  Assessment and plan of treatment:	Currently stable. Continue Spiriva once daily and Proventil inhaler as needed. Follow up with your primary care physician and pulmonologist routinely.  Secondary Diagnosis:	Diabetes mellitus, type 2  Assessment and plan of treatment:	Continue Metformin. Low salt, fat and carbohydrate diet, minimize glucose intake.  Exercise daily for at least 30 minutes and weight loss.  Follow up with primary care physician and endocrinologist for routine Hemoglobin A1C checks and management.  Follow up with your ophthalmologist for routine yearly vision exams.  Secondary Diagnosis:	Neuropathy  Assessment and plan of treatment:	Continue Neurontin. Follow up with your primary care physician routinely.  Secondary Diagnosis:	Orthostatic hypotension  Assessment and plan of treatment:	BP medications on hold. Please follow up with your cardiologist, Dr. Torres, within 1 week. Monitor BPs.  Secondary Diagnosis:	Hypercholesterolemia  Assessment and plan of treatment:	You were started on Atorvastatin 10mg daily at bedtime. Follow up with your primary care physician routinely to monitor your lipid panel. Principal Discharge DX:	Chest pain  Goal:	Prevent future episodes  Assessment and plan of treatment:	Chest pain likely secondary to musculoskeletal vs. viral syndrome. CTA negative for aortic dissection or PE, but did reveal postoperative changes of the anterior abdominal wall with a 3.7cm fluid collection at the level of the umbilicus, likely secondary to recent hernia repair. Follow up with your surgeon within 1 week. Also followup with your cardiologist: Dr. Torres  Secondary Diagnosis:	Asthma  Assessment and plan of treatment:	Currently stable. Continue Spiriva once daily and Proventil inhaler as needed. Follow up with your primary care physician and pulmonologist routinely.  Secondary Diagnosis:	Diabetes mellitus, type 2  Assessment and plan of treatment:	Continue Metformin. Low salt, fat and carbohydrate diet, minimize glucose intake.  Exercise daily for at least 30 minutes and weight loss.  Follow up with primary care physician and endocrinologist for routine Hemoglobin A1C checks and management.  Follow up with your ophthalmologist for routine yearly vision exams.  Secondary Diagnosis:	Neuropathy  Assessment and plan of treatment:	Continue Neurontin. Follow up with your primary care physician routinely.  Secondary Diagnosis:	Orthostatic hypotension  Assessment and plan of treatment:	BP medications on hold. Please follow up with your cardiologist, Dr. Torres, within 1 week. Monitor BPs.  Secondary Diagnosis:	Hypercholesterolemia  Assessment and plan of treatment:	You were started on Atorvastatin 10mg daily at bedtime. Follow up with your primary care physician routinely to monitor your lipid panel.

## 2018-07-21 NOTE — PROGRESS NOTE ADULT - PROBLEM SELECTOR PLAN 4
Appears euthymic.  Continue Wellbutrin.
Patient would benefit from moderate intensity statin given age >38 and h/o T2DM. Will start atorvastatin 10mg hs.

## 2018-07-21 NOTE — PROGRESS NOTE ADULT - PROBLEM SELECTOR PLAN 9
Continue pain control.
VTE with Heparin Sub cut.  Fall, Aspiration, safety and seizure precautions.  OF note: TSH WNL

## 2018-07-21 NOTE — PROGRESS NOTE ADULT - ATTENDING COMMENTS
Echo reviewed by myself, no significant abnormalities, no further cardiac workup  Agree with holding HCTZ for now, advised patient to monitor BPs at home and to follow up with her Cardiologist, Dr. Torres
Dispo: pending TTE, PT eval, orthostatics
Dispo: PT recs: outpt PT. Stable for DC: 35 min.

## 2018-07-21 NOTE — DISCHARGE NOTE ADULT - CARE PROVIDER_API CALL
Dr. Leal,   Your Primary care physician  Phone: (   )    -  Fax: (   )    -    Dr. Christianson,   Pain managment  Phone: (   )    -  Fax: (   )    -    Your General Surgeon,   Phone: (   )    -  Fax: (   )    - Dr. Leal,   Your Primary care physician  Phone: (   )    -  Fax: (   )    -    Dr. Christianson,   Pain managment  Phone: (   )    -  Fax: (   )    -    Your General Surgeon,   Phone: (   )    -  Fax: (   )    -    Danna Torres), Cardiovascular Disease  [facility]  [address] [address] [place] [address]  Phone: [phone]  Fax: [phone]

## 2018-07-21 NOTE — DISCHARGE NOTE ADULT - MEDICATION SUMMARY - MEDICATIONS TO STOP TAKING
I will STOP taking the medications listed below when I get home from the hospital:    hydroCHLOROthiazide 25 mg oral tablet  -- 1 tab(s) by mouth once a day    losartan 50 mg oral tablet  -- 1 tab(s) by mouth once a day    Ultram 50 mg oral tablet  -- 1 tab(s) by mouth every 4 hours, As Needed -for severe pain MDD:6   -- Caution federal law prohibits the transfer of this drug to any person other  than the person for whom it was prescribed.  May cause drowsiness.  Alcohol may intensify this effect.  Use care when operating dangerous machinery.  Obtain medical advice before taking any non-prescription drugs as some may affect the action of this medication.

## 2018-07-21 NOTE — DISCHARGE NOTE ADULT - HOSPITAL COURSE
57y/o F with PMHx of HTN, Dyslipidemia, DM2, breast cancer s/p R mastectomy presented with chest pain x 3 days, admitted to telemetry for rule out ACS. EKG: NSR @ 90bpm. hsTrop: 98--> 84. UJ=491. CKMB= 2.53. ProBNP= 12,583. CTA chest/abd/pelvis was performed which did not reveal evidence of aortic dissection or PE but did reveal postoperative changes of the anterior abdominal wall with a 3.7cm fluid collection at the level of the umbilicus, likely a postoperative seroma. Fluid collection seen on CT likely secondary to recent hernia repair and patient is to follow up with her surgeon as outpatient. Patient noted to have orthostatic hypotension and BP meds were held. Echocardiogram revealed EF 70%, normal LV systolic function, no segmental wall motion abnormalities, moderate diastolic dysfunction (Stage II), and mild pulmonary HTN. Cardiology was following patient and chest pain atypical and not likely due to ACS. Chest pain likely secondary to musculoskeletal vs. viral syndrome. Physical therapy recommended outpatient PT.  Case discussed with Dr. Magaña and patient is stable and medically cleared for discharge home.

## 2018-07-21 NOTE — DISCHARGE NOTE ADULT - CARE PROVIDERS DIRECT ADDRESSES
,DirectAddress_Unknown,DirectAddress_Unknown,DirectAddress_Unknown ,DirectAddress_Unknown,DirectAddress_Unknown,DirectAddress_Unknown,[email or web address]

## 2018-07-21 NOTE — PROGRESS NOTE ADULT - PROBLEM SELECTOR PLAN 7
A1C: 6.8  FS QID, ISS.  DM Diet.
Low salt diet.  Continue BP meds.  Check orthostatics given report of lightheadedness, PT hildaal

## 2018-07-21 NOTE — DISCHARGE NOTE ADULT - ADDITIONAL INSTRUCTIONS
Follow up with your surgeon within 1 week as fluid collection in abdomen is likely secondary to recent hernia repair.  Monitor BP at home and follow up with your Cardiologist, Dr. Torres, within 1 week.  Follow up with your primary care physician routinely.  Outpatient physical therapy.

## 2018-07-21 NOTE — PROGRESS NOTE ADULT - PROBLEM SELECTOR PLAN 2
recent hernia surgery, no abdominal pain, CT A/P findings consistent with postsurgical changes  No evidence for infection  Outpatient followup with surgeon recommended
No wheezing on exam. Currently stable.  Duo NEBS PRN

## 2018-07-21 NOTE — DISCHARGE NOTE ADULT - OTHER SIGNIFICANT FINDINGS
CT HEAD-Ventricles and sulci remain unchanged in size, redemonstration of nonspecific white matter decreased attenuation, better seen on MRI of 6/21/2018 likely microvascular disease. There is no new hemorrhage or midline shift. If symptoms persist consider follow-up CT or MRI if no contraindications.

## 2018-07-21 NOTE — DISCHARGE NOTE ADULT - PATIENT PORTAL LINK FT
You can access the [facility] Patient Portal, offered by [facility], by registering with the following website: [email or web address]

## 2018-07-21 NOTE — PROGRESS NOTE ADULT - PROBLEM SELECTOR PLAN 1
improved  suspect MSK etiology vs related to prior radiation treatment for breast? vs viral URI  Recent NST 6/2018 normal  Had elevated hsT which has trended downward.   -TTE with stage 2 diastolic dysfunction  - c/w ASA, ARB  Outpt cardio followup
suspect MSK etiology vs related to prior radiation treatment for breast?  CP reproducible. Recent NST 6/2018 normal  Had elevated hsT which has trended downward. Cardiology following and recommendations reviewed.  -TTE pending  - c/w ASA, ARB

## 2018-07-21 NOTE — DISCHARGE NOTE ADULT - PROVIDER TOKENS
FREE:[LAST:[Dr. Leal],PHONE:[(   )    -],FAX:[(   )    -],ADDRESS:[Your Primary care physician]],FREE:[LAST:[Dr. Christianson],PHONE:[(   )    -],FAX:[(   )    -],ADDRESS:[Pain managment]],FREE:[LAST:[Your General Surgeon],PHONE:[(   )    -],FAX:[(   )    -]] FREE:[LAST:[Dr. Leal],PHONE:[(   )    -],FAX:[(   )    -],ADDRESS:[Your Primary care physician]],FREE:[LAST:[Dr. Christianson],PHONE:[(   )    -],FAX:[(   )    -],ADDRESS:[Pain managment]],FREE:[LAST:[Your General Surgeon],PHONE:[(   )    -],FAX:[(   )    -]],TOKEN:'[id number]'

## 2018-07-21 NOTE — DISCHARGE NOTE ADULT - PLAN OF CARE
Prevent future episodes Chest pain likely secondary to musculoskeletal vs. viral syndrome. CTA negative for aortic dissection or PE, but did reveal postoperative changes of the anterior abdominal wall with a 3.7cm fluid collection at the level of the umbilicus, likely secondary to recent hernia repair. Follow up with your surgeon within 1 week. Currently stable. Continue Spiriva once daily and Proventil inhaler as needed. Follow up with your primary care physician and pulmonologist routinely. Continue Metformin. Low salt, fat and carbohydrate diet, minimize glucose intake.  Exercise daily for at least 30 minutes and weight loss.  Follow up with primary care physician and endocrinologist for routine Hemoglobin A1C checks and management.  Follow up with your ophthalmologist for routine yearly vision exams. Continue Neurontin. Follow up with your primary care physician routinely. BP medications on hold. Please follow up with your cardiologist, Dr. Torres, within 1 week. Monitor BPs. You were started on Atorvastatin 10mg daily at bedtime. Follow up with your primary care physician routinely to monitor your lipid panel. Chest pain likely secondary to musculoskeletal vs. viral syndrome. CTA negative for aortic dissection or PE, but did reveal postoperative changes of the anterior abdominal wall with a 3.7cm fluid collection at the level of the umbilicus, likely secondary to recent hernia repair. Follow up with your surgeon within 1 week. Also followup with your cardiologist: Dr. Torres

## 2018-07-21 NOTE — PROGRESS NOTE ADULT - SUBJECTIVE AND OBJECTIVE BOX
Overnight Events:   Complaining of R sided pleuritic chest pain, worse when twisting torso.     Medications:  acetaminophen   Tablet. 650 milliGRAM(s) Oral every 6 hours PRN  ALBUTerol    90 MICROgram(s) HFA Inhaler 1 Puff(s) Inhalation every 4 hours  ALBUTerol/ipratropium for Nebulization 3 milliLiter(s) Nebulizer every 6 hours  atorvastatin 10 milliGRAM(s) Oral at bedtime  buPROPion XL . 150 milliGRAM(s) Oral daily  dextrose 40% Gel 15 Gram(s) Oral once PRN  dextrose 5%. 1000 milliLiter(s) IV Continuous <Continuous>  dextrose 50% Injectable 12.5 Gram(s) IV Push once  dextrose 50% Injectable 25 Gram(s) IV Push once  dextrose 50% Injectable 25 Gram(s) IV Push once  gabapentin 300 milliGRAM(s) Oral two times a day  glucagon  Injectable 1 milliGRAM(s) IntraMuscular once PRN  heparin  Injectable 5000 Unit(s) SubCutaneous every 8 hours  hydrochlorothiazide 25 milliGRAM(s) Oral daily  insulin lispro (HumaLOG) corrective regimen sliding scale   SubCutaneous three times a day before meals  insulin lispro (HumaLOG) corrective regimen sliding scale   SubCutaneous at bedtime  losartan 50 milliGRAM(s) Oral daily  methadone    Tablet 10 milliGRAM(s) Oral daily  multivitamin 1 Tablet(s) Oral daily  naproxen 250 milliGRAM(s) Oral two times a day PRN  omega-3-Acid Ethyl Esters 1 Gram(s) Oral daily  pantoprazole    Tablet 40 milliGRAM(s) Oral before breakfast  potassium chloride    Tablet ER 40 milliEquivalent(s) Oral every 4 hours  sodium chloride 0.9% lock flush 3 milliLiter(s) IV Push every 8 hours  tiotropium 18 MICROgram(s) Capsule 1 Capsule(s) Inhalation daily      PAST MEDICAL & SURGICAL HISTORY:  Insomnia  Thyroid nodule: had negative biopsy  Breast cancer: 2012  right breast -- had mastectomy and then post op chemo and RT, followed with Herceptin for 1 year  2012 last chemo   2013 may last Herceptin  2013 last RT  Miscarriage: x 2  Substance abuse: quit in 2003 -- cocaine and marijuana  Neuropathy: b/l feet  Lymphedema, limb: right side s/p right mastectomy  ETOH abuse: at PAST appointment 9-11-14, patient states she quit alcohol in approximately 2003  sickel cell anemia trait  Anxiety  Depression  herniated lumbar disc  Morbid obesity  Hypercholesterolemia  Hypertension  Arthritis  Diverticulosis  h/o   Fatty liver  Asthma: deneis recent exacerbation, deneis intubation or hospitalizations.  personal h/o CHF (congestive heart failure)--last hospitalized in 2005  Drug abuse--quit 8 years ago--goes to AA: ADDENDUM: at PAST appointment, patient states she quit alcohol in approximately 2003  Alcohol abuse--quit 8 years ago--goes to AA: ADDENDUM:  at PAST appointment  patient states she quit alcohol approximately 2003  h/o b/l fungal foot infection  hiatal hernia  Acid Reflux  Sleep apnea diagnosed 2007---supposed to use device but doesn't  History  Uterine Fibroid  Diabetes mellitus diagnosed in 2007  Abdominal hernia in 2012  b/l  Carpal tunnel syndrome  Fibromyalgia  Thyroid nodule: negative biopsy  Termination of pregnancy (fetus): x 3  Cancer: 2012  insertion of mediport -- to be excised on 9-22-14  Radical mastectomy of right breast: 3/30/12  2008  repair of ventral hernia with mesh  2002   h/o hysterectomy due to Fibroid--post op vaginal bleeding requiring a transfusion      Vitals:  T(F): 98.4 (07-20), Max: 98.4 (07-20)  HR: 90 (07-20) (79 - 90)  BP: 91/51 (07-20) (88/51 - 106/62)  RR: 18 (07-20)  SpO2: 100% (07-20)  I&O's Summary    19 Jul 2018 07:01  -  20 Jul 2018 07:00  --------------------------------------------------------  IN: 100 mL / OUT: 0 mL / NET: 100 mL        Physical Exam:  Appearance: No acute distress;   Eyes: PERRL, EOMI, pink conjunctiva  HENT: Normal oral muscosa  Cardiovascular: RRR, S1, S2, no JVD. R chest wall tender to palpation  Respiratory: Clear to auscultation bilaterally  Gastrointestinal: soft, non-tender  Musculoskeletal: No clubbing; no joint deformity   Neurologic: Non-focal  Psychiatry: AAOx3, mood & affect appropriate  Skin: No rashes, ecchymoses, or cyanosis                          11.0   4.10  )-----------( 287      ( 20 Jul 2018 03:25 )             33.1     07-20    134<L>  |  93<L>  |  22  ----------------------------<  126<H>  3.2<L>   |  26  |  1.18    Ca    8.9      20 Jul 2018 03:25  Phos  2.8     07-19  Mg     2.1     07-20    TPro  8.8<H>  /  Alb  3.9  /  TBili  0.7  /  DBili  x   /  AST  21  /  ALT  11  /  AlkPhos  106  07-18    PT/INR - ( 18 Jul 2018 17:06 )   PT: 15.7 SEC;   INR: 1.36          PTT - ( 18 Jul 2018 17:06 )  PTT:29.2 SEC  CARDIAC MARKERS ( 18 Jul 2018 17:06 )  x     / x     / 146 u/L / 2.53 ng/mL / x          Serum Pro-Brain Natriuretic Peptide: 31681 pg/mL (07-18 @ 17:06)
Patient is a 58y old  Female who presents with a chief complaint of Chest pain x 3 days (2018 10:40)      SUBJECTIVE / OVERNIGHT EVENTS: No acute events overnight. Dizziness improved. Has a congested cough but feels better overall. No chest pain, SOB, N/V/D.    MEDICATIONS  (STANDING):  ALBUTerol    90 MICROgram(s) HFA Inhaler 1 Puff(s) Inhalation every 4 hours  ALBUTerol/ipratropium for Nebulization 3 milliLiter(s) Nebulizer every 6 hours  atorvastatin 10 milliGRAM(s) Oral at bedtime  buPROPion XL . 150 milliGRAM(s) Oral daily  dextrose 5%. 1000 milliLiter(s) (50 mL/Hr) IV Continuous <Continuous>  dextrose 50% Injectable 12.5 Gram(s) IV Push once  dextrose 50% Injectable 25 Gram(s) IV Push once  dextrose 50% Injectable 25 Gram(s) IV Push once  gabapentin 300 milliGRAM(s) Oral two times a day  heparin  Injectable 5000 Unit(s) SubCutaneous every 8 hours  insulin lispro (HumaLOG) corrective regimen sliding scale   SubCutaneous three times a day before meals  insulin lispro (HumaLOG) corrective regimen sliding scale   SubCutaneous at bedtime  methadone    Tablet 10 milliGRAM(s) Oral daily  multivitamin 1 Tablet(s) Oral daily  omega-3-Acid Ethyl Esters 1 Gram(s) Oral daily  pantoprazole    Tablet 40 milliGRAM(s) Oral before breakfast  sodium chloride 0.9% lock flush 3 milliLiter(s) IV Push every 8 hours  tiotropium 18 MICROgram(s) Capsule 1 Capsule(s) Inhalation daily    MEDICATIONS  (PRN):  acetaminophen   Tablet. 650 milliGRAM(s) Oral every 6 hours PRN mild and moderate pain  dextrose 40% Gel 15 Gram(s) Oral once PRN Blood Glucose LESS THAN 70 milliGRAM(s)/deciliter  glucagon  Injectable 1 milliGRAM(s) IntraMuscular once PRN Glucose LESS THAN 70 milligrams/deciliter  naproxen 250 milliGRAM(s) Oral two times a day PRN moderate pain      T(C): 36.7 (18 @ 05:22), Max: 36.7 (18 @ 05:22)  HR: 84 (18 @ 05:22) (80 - 89)  BP: 105/55 (18 @ 05:22) (105/55 - 108/56)  RR: 18 (18 @ 05:22) (17 - 18)  SpO2: 100% (18 @ 05:22) (98% - 100%)  CAPILLARY BLOOD GLUCOSE      POCT Blood Glucose.: 112 mg/dL (2018 13:20)  POCT Blood Glucose.: 139 mg/dL (2018 08:53)  POCT Blood Glucose.: 155 mg/dL (2018 21:42)  POCT Blood Glucose.: 132 mg/dL (2018 17:49)    I&O's Summary    2018 07:01  -  2018 07:00  --------------------------------------------------------  IN: 450 mL / OUT: 0 mL / NET: 450 mL      PHYSICAL EXAM:  GENERAL: no apparent distress, on room air, seated up eating breakfast  EYES: sclera clear b/l  CHEST/LUNG: Clear to auscultation bilaterally; No wheezing or crackles, reproducible substernal chest pain  BREAST: s/p right mastectomy with hyperpigmentation over right chest wall and axillary region,  HEART: Regular rate and rhythm; No murmurs, rubs, or gallops  ABDOMEN: Soft, Nontender, Nondistended; Bowel sounds present  EXTREMITIES:  right arm lymphedema  NEUROLOGY: awake, alert, responds to Qs appropriately, no gross deficits    LABS:                        10.1   3.23  )-----------( 318      ( 2018 03:50 )             30.6         134<L>  |  95<L>  |  19  ----------------------------<  157<H>  3.8   |  25  |  1.06    Ca    9.1      2018 03:50  Mg     2.0                 Urinalysis Basic - ( 2018 01:32 )    Color: YELLOW / Appearance: CLEAR / S.018 / pH: 5.5  Gluc: NEGATIVE / Ketone: NEGATIVE  / Bili: NEGATIVE / Urobili: 1 mg/dL   Blood: NEGATIVE / Protein: 20 mg/dL / Nitrite: NEGATIVE   Leuk Esterase: NEGATIVE / RBC: 0-2 / WBC 2-5   Sq Epi: OCC / Non Sq Epi: x / Bacteria: FEW        RADIOLOGY & ADDITIONAL TESTS:
Patient is a 58y old  Female who presents with a chief complaint of Chest pain x 3 days (2018 00:16)      SUBJECTIVE / OVERNIGHT EVENTS: No acute events overnight. Reports substernal, chronic, chest pain improved and rated 3-4/10 worse with lying down, improved with sitting up described as soreness that you feel when you have a cold. Last chemo/radiation for breast was in , currently in remission. Also reports lightheadedness this AM. No abdominal pain, N/V/D. Requesting to have Thyroid function tested as patient's mother with Grave's disease.    MEDICATIONS  (STANDING):  ALBUTerol    90 MICROgram(s) HFA Inhaler 1 Puff(s) Inhalation every 4 hours  ALBUTerol/ipratropium for Nebulization 3 milliLiter(s) Nebulizer every 6 hours  atorvastatin 10 milliGRAM(s) Oral at bedtime  buPROPion XL . 150 milliGRAM(s) Oral daily  dextrose 5%. 1000 milliLiter(s) (50 mL/Hr) IV Continuous <Continuous>  dextrose 50% Injectable 12.5 Gram(s) IV Push once  dextrose 50% Injectable 25 Gram(s) IV Push once  dextrose 50% Injectable 25 Gram(s) IV Push once  gabapentin 300 milliGRAM(s) Oral two times a day  heparin  Injectable 5000 Unit(s) SubCutaneous every 8 hours  hydrochlorothiazide 25 milliGRAM(s) Oral daily  insulin lispro (HumaLOG) corrective regimen sliding scale   SubCutaneous three times a day before meals  insulin lispro (HumaLOG) corrective regimen sliding scale   SubCutaneous at bedtime  losartan 50 milliGRAM(s) Oral daily  methadone    Tablet 10 milliGRAM(s) Oral daily  multivitamin 1 Tablet(s) Oral daily  omega-3-Acid Ethyl Esters 1 Gram(s) Oral daily  pantoprazole    Tablet 40 milliGRAM(s) Oral before breakfast  potassium chloride    Tablet ER 40 milliEquivalent(s) Oral every 4 hours  sodium chloride 0.9% lock flush 3 milliLiter(s) IV Push every 8 hours  tiotropium 18 MICROgram(s) Capsule 1 Capsule(s) Inhalation daily    MEDICATIONS  (PRN):  acetaminophen   Tablet. 650 milliGRAM(s) Oral every 6 hours PRN mild and moderate pain  dextrose 40% Gel 15 Gram(s) Oral once PRN Blood Glucose LESS THAN 70 milliGRAM(s)/deciliter  glucagon  Injectable 1 milliGRAM(s) IntraMuscular once PRN Glucose LESS THAN 70 milligrams/deciliter  naproxen 250 milliGRAM(s) Oral two times a day PRN moderate pain      T(C): 36.9 (18 @ 05:06), Max: 36.9 (18 @ 05:06)  HR: 90 (18 @ 10:11) (79 - 90)  BP: 91/51 (18 @ 10:11) (88/51 - 106/62)  RR: 18 (18 @ 05:06) (18 - 18)  SpO2: 100% (18 @ 05:06) (100% - 100%)  CAPILLARY BLOOD GLUCOSE      POCT Blood Glucose.: 133 mg/dL (2018 08:38)  POCT Blood Glucose.: 167 mg/dL (2018 21:30)  POCT Blood Glucose.: 129 mg/dL (2018 17:15)  POCT Blood Glucose.: 121 mg/dL (2018 13:07)    I&O's Summary    2018 07:01  -  2018 07:00  --------------------------------------------------------  IN: 100 mL / OUT: 0 mL / NET: 100 mL        PHYSICAL EXAM:  GENERAL: no apparent distress, on room air, seated up eating breakfast  HEAD:  Atraumatic, Normocephalic  EYES: sclera clear b/l  NECK: thyroid nonenlarged, no palpable nodules  CHEST/LUNG: Clear to auscultation bilaterally; No wheezing or crackles, reproducible substernal chest pain  BREAST: s/p right mastectomy with hyperpigmentation over right chest wall and axillary region,  HEART: Regular rate and rhythm; No murmurs, rubs, or gallops  ABDOMEN: Soft, Nontender, Nondistended; Bowel sounds present  EXTREMITIES:  right arm lymphedema  NEUROLOGY: awake, alert, responds to Qs appropriately, no gross deficits  LABS:                        11.0   4.10  )-----------( 287      ( 2018 03:25 )             33.1     07-20    134<L>  |  93<L>  |  22  ----------------------------<  126<H>  3.2<L>   |  26  |  1.18    Ca    8.9      2018 03:25  Phos  2.8     -  Mg     2.1     -20    TPro  8.8<H>  /  Alb  3.9  /  TBili  0.7  /  DBili  x   /  AST  21  /  ALT  11  /  AlkPhos  106  07-18    PT/INR - ( 2018 17:06 )   PT: 15.7 SEC;   INR: 1.36          PTT - ( 2018 17:06 )  PTT:29.2 SEC  CARDIAC MARKERS ( 2018 17:06 )  x     / x     / 146 u/L / 2.53 ng/mL / x          Urinalysis Basic - ( 2018 01:32 )    Color: YELLOW / Appearance: CLEAR / S.018 / pH: 5.5  Gluc: NEGATIVE / Ketone: NEGATIVE  / Bili: NEGATIVE / Urobili: 1 mg/dL   Blood: NEGATIVE / Protein: 20 mg/dL / Nitrite: NEGATIVE   Leuk Esterase: NEGATIVE / RBC: 0-2 / WBC 2-5   Sq Epi: OCC / Non Sq Epi: x / Bacteria: FEW        RADIOLOGY & ADDITIONAL TESTS:

## 2018-07-21 NOTE — DISCHARGE NOTE ADULT - MEDICATION SUMMARY - MEDICATIONS TO TAKE
I will START or STAY ON the medications listed below when I get home from the hospital:    Aleve 220 mg oral capsule  -- 1 cap(s) by mouth every 12 hours   -- Check with your doctor before becoming pregnant.  Medication should be taken with plenty of water.  Take with food or milk.    -- Indication: For Pain    Tylenol 500 mg oral tablet  -- 2 tab(s) by mouth every 6 hours  -- Indication: For Pain    methadone 10 mg oral tablet  -- 1 tab(s) by mouth once a day  -- Indication: For Pain    gabapentin 300 mg oral capsule  -- 1 cap(s) by mouth 2 times a day  -- Indication: For Neuropathy    metFORMIN 500 mg oral tablet  -- 2 tab(s) by mouth 2 times a day  -- Indication: For Diabetes mellitus, type 2    atorvastatin 10 mg oral tablet  -- 1 tab(s) by mouth once a day (at bedtime)  -- Indication: For Hypercholesterolemia    tiotropium 18 mcg inhalation capsule  -- 1 cap(s) inhaled once a day  -- Indication: For Asthma    albuterol 90 mcg/inh inhalation aerosol  -- 1 puff(s) inhaled every 4 hours as needed for shortness of breath/wheezing  -- Indication: For Asthma    Fish Oil 1000 mg oral capsule  -- 1 cap(s) by mouth once a day  -- Indication: For Supplement    omeprazole 40 mg oral delayed release capsule  -- 1 cap(s) by mouth once a day  -- Indication: For GERD    Wellbutrin 75 mg oral tablet  -- 1 tab(s) by mouth every other day   -- Indication: For Depression, unspecified depression type    Multiple Vitamins oral capsule  -- 1 cap(s) by mouth once a day  -- Indication: For Supplement I will START or STAY ON the medications listed below when I get home from the hospital:    Outpatient Physical Therapy  -- 2-3 times per week x 4 weeks  -- Indication: For physical therapy    Aleve 220 mg oral capsule  -- 1 cap(s) by mouth every 12 hours   -- Check with your doctor before becoming pregnant.  Medication should be taken with plenty of water.  Take with food or milk.    -- Indication: For Pain    Tylenol 500 mg oral tablet  -- 2 tab(s) by mouth every 6 hours  -- Indication: For Pain    methadone 10 mg oral tablet  -- 1 tab(s) by mouth once a day  -- Indication: For Pain    gabapentin 300 mg oral capsule  -- 1 cap(s) by mouth 2 times a day  -- Indication: For Neuropathy    metFORMIN 500 mg oral tablet  -- 2 tab(s) by mouth 2 times a day  -- Indication: For Diabetes mellitus, type 2    atorvastatin 10 mg oral tablet  -- 1 tab(s) by mouth once a day (at bedtime)  -- Indication: For Hypercholesterolemia    tiotropium 18 mcg inhalation capsule  -- 1 cap(s) inhaled once a day  -- Indication: For Asthma    albuterol 90 mcg/inh inhalation aerosol  -- 1 puff(s) inhaled every 4 hours as needed for shortness of breath/wheezing  -- Indication: For Asthma    Fish Oil 1000 mg oral capsule  -- 1 cap(s) by mouth once a day  -- Indication: For Supplement    omeprazole 40 mg oral delayed release capsule  -- 1 cap(s) by mouth once a day  -- Indication: For GERD    Wellbutrin 75 mg oral tablet  -- 1 tab(s) by mouth every other day   -- Indication: For Depression, unspecified depression type    Multiple Vitamins oral capsule  -- 1 cap(s) by mouth once a day  -- Indication: For Supplement

## 2018-07-23 PROBLEM — F41.8 ANXIETY WITH DEPRESSION: Status: RESOLVED | Noted: 2017-09-28 | Resolved: 2018-07-23

## 2018-07-24 ENCOUNTER — MESSAGE (OUTPATIENT)
Age: 58
End: 2018-07-24

## 2018-07-26 ENCOUNTER — APPOINTMENT (OUTPATIENT)
Dept: INTERNAL MEDICINE | Facility: CLINIC | Age: 58
End: 2018-07-26
Payer: MEDICARE

## 2018-07-26 VITALS
WEIGHT: 229 LBS | OXYGEN SATURATION: 98 % | BODY MASS INDEX: 43.23 KG/M2 | TEMPERATURE: 98.2 F | HEART RATE: 72 BPM | SYSTOLIC BLOOD PRESSURE: 134 MMHG | DIASTOLIC BLOOD PRESSURE: 60 MMHG | HEIGHT: 61 IN

## 2018-07-26 PROCEDURE — 99214 OFFICE O/P EST MOD 30 MIN: CPT

## 2018-07-26 NOTE — HISTORY OF PRESENT ILLNESS
[de-identified] : sp discharge from [facility] a few days ago admitted with chest pain  Dissection ruled out discharged with dx of strained chest wall muscles At present feels well no chest pain  given lipitor on discharge but pt states she is unable to tolerate    Has mild intermittent asthma.Med  med rec done no need for Aleve,tiotropium  continue hctz and losartan (told to dc on discharge)

## 2018-07-26 NOTE — PHYSICAL EXAM
[No Acute Distress] : no acute distress [No JVD] : no jugular venous distention [Thyroid Normal, No Nodules] : the thyroid was normal and there were no nodules present [No Respiratory Distress] : no respiratory distress  [Clear to Auscultation] : lungs were clear to auscultation bilaterally [Normal Rate] : normal rate  [Regular Rhythm] : with a regular rhythm [Normal S1, S2] : normal S1 and S2 [No Joint Swelling] : no joint swelling

## 2018-08-07 ENCOUNTER — APPOINTMENT (OUTPATIENT)
Dept: SURGERY | Facility: CLINIC | Age: 58
End: 2018-08-07
Payer: MEDICARE

## 2018-08-07 VITALS
WEIGHT: 229 LBS | TEMPERATURE: 98 F | SYSTOLIC BLOOD PRESSURE: 126 MMHG | HEART RATE: 76 BPM | BODY MASS INDEX: 43.23 KG/M2 | DIASTOLIC BLOOD PRESSURE: 74 MMHG | HEIGHT: 61 IN

## 2018-08-07 PROCEDURE — 99024 POSTOP FOLLOW-UP VISIT: CPT

## 2018-08-14 ENCOUNTER — RX RENEWAL (OUTPATIENT)
Age: 58
End: 2018-08-14

## 2018-08-20 NOTE — ASU PREOP CHECKLIST - LAST TOOK
solids Banner Transposition Flap Text: The defect edges were debeveled with a #15 scalpel blade.  Given the location of the defect and the proximity to free margins a Banner transposition flap was deemed most appropriate.  Using a sterile surgical marker, an appropriate flap drawn around the defect. The area thus outlined was incised deep to adipose tissue with a #15 scalpel blade.  The skin margins were undermined to an appropriate distance in all directions utilizing iris scissors.

## 2018-08-21 ENCOUNTER — APPOINTMENT (OUTPATIENT)
Dept: PLASTIC SURGERY | Facility: CLINIC | Age: 58
End: 2018-08-21

## 2018-08-26 ENCOUNTER — RX RENEWAL (OUTPATIENT)
Age: 58
End: 2018-08-26

## 2018-09-05 ENCOUNTER — APPOINTMENT (OUTPATIENT)
Dept: PHYSICAL MEDICINE AND REHAB | Facility: CLINIC | Age: 58
End: 2018-09-05
Payer: MEDICARE

## 2018-09-05 PROCEDURE — 99213 OFFICE O/P EST LOW 20 MIN: CPT

## 2018-09-18 ENCOUNTER — APPOINTMENT (OUTPATIENT)
Dept: PLASTIC SURGERY | Facility: CLINIC | Age: 58
End: 2018-09-18
Payer: MEDICARE

## 2018-09-18 VITALS
BODY MASS INDEX: 43.23 KG/M2 | SYSTOLIC BLOOD PRESSURE: 130 MMHG | DIASTOLIC BLOOD PRESSURE: 70 MMHG | HEIGHT: 61 IN | RESPIRATION RATE: 16 BRPM | WEIGHT: 229 LBS | HEART RATE: 70 BPM | OXYGEN SATURATION: 98 %

## 2018-09-18 PROCEDURE — 99213 OFFICE O/P EST LOW 20 MIN: CPT

## 2018-09-25 ENCOUNTER — APPOINTMENT (OUTPATIENT)
Dept: ORTHOPEDIC SURGERY | Facility: CLINIC | Age: 58
End: 2018-09-25
Payer: MEDICARE

## 2018-09-25 VITALS — SYSTOLIC BLOOD PRESSURE: 128 MMHG | DIASTOLIC BLOOD PRESSURE: 68 MMHG | HEART RATE: 69 BPM

## 2018-09-25 VITALS — WEIGHT: 230 LBS | HEIGHT: 61 IN | BODY MASS INDEX: 43.43 KG/M2

## 2018-09-25 DIAGNOSIS — M25.9 JOINT DISORDER, UNSPECIFIED: ICD-10-CM

## 2018-09-25 DIAGNOSIS — Z91.5 PERSONAL HISTORY OF SELF-HARM: ICD-10-CM

## 2018-09-25 DIAGNOSIS — Z85.9 PERSONAL HISTORY OF MALIGNANT NEOPLASM, UNSPECIFIED: ICD-10-CM

## 2018-09-25 DIAGNOSIS — Z86.59 PERSONAL HISTORY OF OTHER MENTAL AND BEHAVIORAL DISORDERS: ICD-10-CM

## 2018-09-25 DIAGNOSIS — Z87.19 PERSONAL HISTORY OF OTHER DISEASES OF THE DIGESTIVE SYSTEM: ICD-10-CM

## 2018-09-25 DIAGNOSIS — Z87.01 PERSONAL HISTORY OF PNEUMONIA (RECURRENT): ICD-10-CM

## 2018-09-25 PROCEDURE — 20600 DRAIN/INJ JOINT/BURSA W/O US: CPT | Mod: F5

## 2018-09-25 PROCEDURE — 99203 OFFICE O/P NEW LOW 30 MIN: CPT | Mod: 25

## 2018-09-25 PROCEDURE — 73110 X-RAY EXAM OF WRIST: CPT | Mod: 50

## 2018-10-09 ENCOUNTER — APPOINTMENT (OUTPATIENT)
Dept: NEUROLOGY | Facility: CLINIC | Age: 58
End: 2018-10-09
Payer: MEDICARE

## 2018-10-09 VITALS — HEART RATE: 59 BPM | DIASTOLIC BLOOD PRESSURE: 63 MMHG | SYSTOLIC BLOOD PRESSURE: 135 MMHG

## 2018-10-09 PROCEDURE — 99214 OFFICE O/P EST MOD 30 MIN: CPT

## 2018-10-12 ENCOUNTER — APPOINTMENT (OUTPATIENT)
Dept: INTERNAL MEDICINE | Facility: CLINIC | Age: 58
End: 2018-10-12
Payer: MEDICARE

## 2018-10-12 VITALS
DIASTOLIC BLOOD PRESSURE: 70 MMHG | SYSTOLIC BLOOD PRESSURE: 124 MMHG | WEIGHT: 230 LBS | HEART RATE: 78 BPM | HEIGHT: 61 IN | BODY MASS INDEX: 43.43 KG/M2 | OXYGEN SATURATION: 98 %

## 2018-10-12 DIAGNOSIS — R61 GENERALIZED HYPERHIDROSIS: ICD-10-CM

## 2018-10-12 PROCEDURE — 99214 OFFICE O/P EST MOD 30 MIN: CPT | Mod: 25

## 2018-10-12 PROCEDURE — 90686 IIV4 VACC NO PRSV 0.5 ML IM: CPT

## 2018-10-12 PROCEDURE — G0008: CPT

## 2018-10-12 RX ORDER — BUPROPION HYDROCHLORIDE 100 MG/1
100 TABLET, FILM COATED ORAL
Refills: 0 | Status: DISCONTINUED | COMMUNITY
End: 2018-10-12
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
## 2018-10-17 NOTE — ASSESSMENT
[FreeTextEntry1] : 59 y/o F with h/o methadone use, breast cancer s/p mastectomy, chronic lymphedema of R arm, obesity, HTN, depression here for follow up.\par \par Multiple stressors\par - Continue bupropion\par - Referral to behavioral health for therapy\par \par HTN - BP at goal\par - Continue losartan, hctz\par \par DM\par - Continue metformin\par \par Diabetic neuropathy\par - gabapentin\par \par H/o drug abuse\par - Continue methadone program\par \par Facial pain - appears improved with buproopion\par - Neuro f/u\par - Monitor\par \par Multiple musculoskeletal complaints\par - F/U with Ortho Hand and PM&R\par \par HCM:\par - Flu vaccine today\par \par RTC in 3 months or prn.

## 2018-10-17 NOTE — PHYSICAL EXAM
[No Acute Distress] : no acute distress [No Respiratory Distress] : no respiratory distress  [Clear to Auscultation] : lungs were clear to auscultation bilaterally [No Accessory Muscle Use] : no accessory muscle use [Normal Rate] : normal rate  [Regular Rhythm] : with a regular rhythm [Normal S1, S2] : normal S1 and S2 [No Murmur] : no murmur heard [Soft] : abdomen soft [Non Tender] : non-tender [No HSM] : no HSM [de-identified] : 1+ edema bilaterally

## 2018-10-29 ENCOUNTER — APPOINTMENT (OUTPATIENT)
Dept: PULMONOLOGY | Facility: CLINIC | Age: 58
End: 2018-10-29
Payer: MEDICARE

## 2018-10-29 VITALS
TEMPERATURE: 97.2 F | DIASTOLIC BLOOD PRESSURE: 80 MMHG | HEART RATE: 64 BPM | SYSTOLIC BLOOD PRESSURE: 133 MMHG | RESPIRATION RATE: 16 BRPM | HEIGHT: 61 IN | WEIGHT: 231 LBS | BODY MASS INDEX: 43.61 KG/M2 | OXYGEN SATURATION: 98 %

## 2018-10-29 PROCEDURE — 99205 OFFICE O/P NEW HI 60 MIN: CPT | Mod: GC

## 2018-11-02 ENCOUNTER — MEDICATION RENEWAL (OUTPATIENT)
Age: 58
End: 2018-11-02

## 2018-11-21 ENCOUNTER — RX RENEWAL (OUTPATIENT)
Age: 58
End: 2018-11-21

## 2018-12-05 ENCOUNTER — APPOINTMENT (OUTPATIENT)
Dept: PHYSICAL MEDICINE AND REHAB | Facility: CLINIC | Age: 58
End: 2018-12-05
Payer: MEDICARE

## 2018-12-05 PROCEDURE — 99213 OFFICE O/P EST LOW 20 MIN: CPT

## 2018-12-10 ENCOUNTER — FORM ENCOUNTER (OUTPATIENT)
Age: 58
End: 2018-12-10

## 2018-12-11 ENCOUNTER — APPOINTMENT (OUTPATIENT)
Dept: MRI IMAGING | Facility: CLINIC | Age: 58
End: 2018-12-11
Payer: MEDICARE

## 2018-12-11 ENCOUNTER — OUTPATIENT (OUTPATIENT)
Dept: OUTPATIENT SERVICES | Facility: HOSPITAL | Age: 58
LOS: 1 days | End: 2018-12-11
Payer: MEDICARE

## 2018-12-11 DIAGNOSIS — C80.1 MALIGNANT (PRIMARY) NEOPLASM, UNSPECIFIED: Chronic | ICD-10-CM

## 2018-12-11 DIAGNOSIS — E04.1 NONTOXIC SINGLE THYROID NODULE: Chronic | ICD-10-CM

## 2018-12-11 DIAGNOSIS — Z33.2 ENCOUNTER FOR ELECTIVE TERMINATION OF PREGNANCY: Chronic | ICD-10-CM

## 2018-12-11 DIAGNOSIS — M25.561 PAIN IN RIGHT KNEE: ICD-10-CM

## 2018-12-11 PROCEDURE — 73721 MRI JNT OF LWR EXTRE W/O DYE: CPT

## 2018-12-11 PROCEDURE — 73721 MRI JNT OF LWR EXTRE W/O DYE: CPT | Mod: 26,RT

## 2018-12-17 ENCOUNTER — RX RENEWAL (OUTPATIENT)
Age: 58
End: 2018-12-17

## 2018-12-17 ENCOUNTER — MEDICATION RENEWAL (OUTPATIENT)
Age: 58
End: 2018-12-17

## 2018-12-18 ENCOUNTER — RX CHANGE (OUTPATIENT)
Age: 58
End: 2018-12-18

## 2018-12-18 ENCOUNTER — RX RENEWAL (OUTPATIENT)
Age: 58
End: 2018-12-18

## 2018-12-28 ENCOUNTER — RX RENEWAL (OUTPATIENT)
Age: 58
End: 2018-12-28

## 2019-01-07 ENCOUNTER — APPOINTMENT (OUTPATIENT)
Dept: ORTHOPEDIC SURGERY | Facility: CLINIC | Age: 59
End: 2019-01-07
Payer: MEDICARE

## 2019-01-09 ENCOUNTER — APPOINTMENT (OUTPATIENT)
Dept: ORTHOPEDIC SURGERY | Facility: CLINIC | Age: 59
End: 2019-01-09
Payer: MEDICARE

## 2019-01-09 DIAGNOSIS — M65.352 TRIGGER FINGER, LEFT LITTLE FINGER: ICD-10-CM

## 2019-01-09 DIAGNOSIS — M65.322 TRIGGER FINGER, LEFT INDEX FINGER: ICD-10-CM

## 2019-01-09 PROCEDURE — 20550 NJX 1 TENDON SHEATH/LIGAMENT: CPT | Mod: F3,59

## 2019-01-09 PROCEDURE — 99214 OFFICE O/P EST MOD 30 MIN: CPT | Mod: 25

## 2019-01-09 PROCEDURE — 20550 NJX 1 TENDON SHEATH/LIGAMENT: CPT | Mod: F1

## 2019-01-09 NOTE — PROCEDURE
[] : The injection was done in 2 phases with the first phase being subcutaneous injection of lidocaine 1.5 cc subcutaneously as anesthetic. When adequate level of anesthesia was achieved, the Celestone injection was undertaken. [2nd] : 2nd finger [4th] : 4th finger [5th] : 5th finger

## 2019-01-10 ENCOUNTER — APPOINTMENT (OUTPATIENT)
Dept: INTERNAL MEDICINE | Facility: CLINIC | Age: 59
End: 2019-01-10
Payer: MEDICARE

## 2019-01-10 VITALS
WEIGHT: 228 LBS | DIASTOLIC BLOOD PRESSURE: 80 MMHG | SYSTOLIC BLOOD PRESSURE: 150 MMHG | HEIGHT: 61 IN | OXYGEN SATURATION: 98 % | TEMPERATURE: 98.6 F | HEART RATE: 79 BPM | BODY MASS INDEX: 43.05 KG/M2

## 2019-01-10 DIAGNOSIS — G47.37 CENTRAL SLEEP APNEA IN CONDITIONS CLASSIFIED ELSEWHERE: ICD-10-CM

## 2019-01-10 PROCEDURE — 36415 COLL VENOUS BLD VENIPUNCTURE: CPT

## 2019-01-10 PROCEDURE — 99214 OFFICE O/P EST MOD 30 MIN: CPT | Mod: 25

## 2019-01-11 LAB
ALBUMIN SERPL ELPH-MCNC: 4.2 G/DL
ALP BLD-CCNC: 99 U/L
ALT SERPL-CCNC: 11 U/L
ANION GAP SERPL CALC-SCNC: 18 MMOL/L
AST SERPL-CCNC: 15 U/L
BASOPHILS # BLD AUTO: 0 K/UL
BASOPHILS NFR BLD AUTO: 0 %
BILIRUB SERPL-MCNC: 0.3 MG/DL
BUN SERPL-MCNC: 18 MG/DL
CALCIUM SERPL-MCNC: 10.1 MG/DL
CHLORIDE SERPL-SCNC: 99 MMOL/L
CHOLEST SERPL-MCNC: 173 MG/DL
CHOLEST/HDLC SERPL: 3.1 RATIO
CO2 SERPL-SCNC: 23 MMOL/L
CREAT SERPL-MCNC: 0.87 MG/DL
CREAT SPEC-SCNC: 105 MG/DL
EOSINOPHIL # BLD AUTO: 0 K/UL
EOSINOPHIL NFR BLD AUTO: 0 %
GLUCOSE SERPL-MCNC: 172 MG/DL
HBA1C MFR BLD HPLC: 6.5 %
HCT VFR BLD CALC: 36.5 %
HDLC SERPL-MCNC: 56 MG/DL
HGB BLD-MCNC: 11.7 G/DL
IMM GRANULOCYTES NFR BLD AUTO: 0.3 %
LDLC SERPL CALC-MCNC: 94 MG/DL
LYMPHOCYTES # BLD AUTO: 0.97 K/UL
LYMPHOCYTES NFR BLD AUTO: 13.1 %
MAN DIFF?: NORMAL
MCHC RBC-ENTMCNC: 28.1 PG
MCHC RBC-ENTMCNC: 32.1 GM/DL
MCV RBC AUTO: 87.7 FL
MICROALBUMIN 24H UR DL<=1MG/L-MCNC: 12.6 MG/DL
MICROALBUMIN/CREAT 24H UR-RTO: 120 MG/G
MONOCYTES # BLD AUTO: 0.29 K/UL
MONOCYTES NFR BLD AUTO: 3.9 %
NEUTROPHILS # BLD AUTO: 6.13 K/UL
NEUTROPHILS NFR BLD AUTO: 82.7 %
PLATELET # BLD AUTO: 295 K/UL
POTASSIUM SERPL-SCNC: 4.8 MMOL/L
PROT SERPL-MCNC: 8.7 G/DL
RBC # BLD: 4.16 M/UL
RBC # FLD: 16.7 %
SODIUM SERPL-SCNC: 140 MMOL/L
TRIGL SERPL-MCNC: 116 MG/DL
WBC # FLD AUTO: 7.41 K/UL

## 2019-01-12 NOTE — PHYSICAL EXAM
[de-identified] : Right wrist:\par Basal joint: \par slight swelling.\par Dorsal volar laxity 2+. Crepitus 2+. No pain\par Right-hand\par No A1 pulley tenderness and no triggering in any finger.\par No pertinent MP, PIP, or DIP joint contributory findings, except some Heberden's nodes; none are clinically painful.\par Left thumb basal joint\par No crepitus\par 2+ joint line tenderness\par No pain with basal joint manipulation.\par Index, little finger A1 pulley tenderness with triggering.\par Mild tenderness with subtle triggering ring finger.\par No triggering thumb, long fingers.\par Surgical scar overlying long finger A1 pulley.\par No pertinent MP, PIP, or DIP joint contributory findings, except some Heberden's nodes; none are clinically painful.\par \par Neurologic: Median, ulnar, and radial motor and sensory are intact. Phalen's test with median nerve compression: negative. \par Skin: No cyanosis, clubbing, edema or rashes.\par Vascular: Radial pulses intact.\par Lymphatic: No streaking or epitrochlear adenopathy.\par The patient is awake, alert, and oriented. Affect appropriate. Cooperative.

## 2019-01-12 NOTE — HISTORY OF PRESENT ILLNESS
[FreeTextEntry1] : 57y/o RHD female presents with triggering of Left 2nd, Left 4th, and Left 5th fingers. Patient reports she did therapy for these fingers, but the therapy did not help. Patient previously had one trigger finger cortisone injection in the Left 5th finger last year by pain management Dr. Lord, which helped for several weeks, only. Little has been triggering since recurrence approx 1 yr ago.\par She denies any prior cortisone injections in index or ring fingers. Patient reports taking tylenol for the pain in the fingers, but it does not help much. Patient states she is interested in surgery for the trigger fingers. \par Patient also reports numbness and tingling in B/L hands; Left >Right for about 20 years.\par \par \par Hx: Diabetes type 2\par B/L basal joint OA. \par Patient received Right basal joint cortisone injection on 09/25/18. Pt still doing well in regards to basal jt arthritis pain.\par Lymphedema in Right hand - Patient states she will follow up with Dr. Azul.\par Breast CA, treated with chemo 6yrs ago. \par Neuropathy after chemo. \par Fibromyalgia\par Follows up with Pain Management. \par Disabled since 2002, b/o CTS, fibromyalgia, "herniated discs in my back"\par S/P Left 3rd finger trigger finger release by Dr. Fair. \par Referred by physiatrist Tisha Obando. \par Allergy: ramipril

## 2019-01-12 NOTE — DISCUSSION/SUMMARY
[FreeTextEntry1] : The patient has trigger finger left index, ring, little fingers, treated with two-phase cortisone injection. Patient has never had cortisone injection for index or ring. Because of duration of symptoms, prognosis, limited.\par Patient has had prior left long trigger finger release in the left long finger is currently doing well.\par There are no trigger fingers of the right hand.\par Right basal joint is not painful today.\par The patient has lymphedema on right, and is wearing her compression sleeve.\par Prognosis on the left is limited as noted above.\par The following post-injection instructions were given to the patient: The patient should be cautious in activities for two weeks and then increase to full activities.  Thereafter, the patient should return if symptoms continue, or if they tal and recur subsequently. If symptoms do not recur, the patient need not return and can be seen on an as needed basis. The patient has expressed understanding and acceptance of analysis, treatment, and recommendations.  All questions answered.\par

## 2019-01-15 ENCOUNTER — RX RENEWAL (OUTPATIENT)
Age: 59
End: 2019-01-15

## 2019-01-17 ENCOUNTER — APPOINTMENT (OUTPATIENT)
Dept: ORTHOPEDIC SURGERY | Facility: CLINIC | Age: 59
End: 2019-01-17
Payer: MEDICARE

## 2019-01-17 VITALS
HEIGHT: 61 IN | DIASTOLIC BLOOD PRESSURE: 74 MMHG | WEIGHT: 228 LBS | SYSTOLIC BLOOD PRESSURE: 127 MMHG | HEART RATE: 68 BPM | BODY MASS INDEX: 43.05 KG/M2

## 2019-01-17 PROCEDURE — 73562 X-RAY EXAM OF KNEE 3: CPT | Mod: 50

## 2019-01-17 PROCEDURE — 72170 X-RAY EXAM OF PELVIS: CPT

## 2019-01-17 PROCEDURE — 99214 OFFICE O/P EST MOD 30 MIN: CPT

## 2019-01-21 PROBLEM — G47.37 CENTRAL SLEEP APNEA DUE TO MEDICAL CONDITION: Status: ACTIVE | Noted: 2018-10-29

## 2019-01-21 NOTE — ASSESSMENT
[FreeTextEntry1] : 59 y/o F with h/o chronic pain, breast cancer, HTN, depression, DM, MADHURI here for follow up.\par \par Joint pain/Knee pain\par - Ortho f/u\par \par DM - well controlled\par - Continue metformin 1g BID\par \par Depression\par - Continue bupropion.\par \par HTN - BP at goal\par - Continue losartan 100, hctz 25\par \par HCM:\par - Bone denstity referral\par - Flu vaccine 10/2018\par - Mammo 5/2018\par - Td - approx 2009 --> due at next visit\par \par RTC in 3-6 months or prn.

## 2019-01-21 NOTE — PHYSICAL EXAM
[No Acute Distress] : no acute distress [No Respiratory Distress] : no respiratory distress  [Clear to Auscultation] : lungs were clear to auscultation bilaterally [No Accessory Muscle Use] : no accessory muscle use [Normal Rate] : normal rate  [Regular Rhythm] : with a regular rhythm [Normal S1, S2] : normal S1 and S2 [No Murmur] : no murmur heard [No Edema] : there was no peripheral edema

## 2019-01-21 NOTE — HISTORY OF PRESENT ILLNESS
[FreeTextEntry1] : Follow up [de-identified] : Last seen 10/2018.\par \par Went on a cruise in nov 2018.\par \par Did PT for shoulders.\par Went on vacation and injured her R knee; couldn't put pressure on her knee. Pending ortho appt.\par Pain management recommended MRI; saw PM&R and advised to get knee replacement.\par Will be seeing Ortho Knee (Dr. Armstrong) next week. Doing PT for knee currently.\par \par Saw Hand Surgery yesterday -- had cortisone injections for trigger finger on L hand. Saw hand PT.\par \par Lost 3 lbs since last visit. Has been trying to eat healthier. Wants to lose another 10-15 pounds.\par \par Will be going on retreat again next week.\par \par Saw Pulm/Sleep medicine on 10/29/18 and was recommended to have sleep study in the hospital\par \par DM - saw Ophtho recently; needs to see Ophtho for f/u. A1c contorlled.

## 2019-01-24 ENCOUNTER — RESULT REVIEW (OUTPATIENT)
Age: 59
End: 2019-01-24

## 2019-02-02 NOTE — HISTORY OF PRESENT ILLNESS
[Worsening] : worsening [___ yrs] : [unfilled] year(s) ago [1] : a current pain level of 1/10 [6] : an average pain level of 6/10 [0] : a minimum pain level of 0/10 [9] : a maximum pain level of 9/10 [Walking] : walking [Sitting] : sitting [Standing] : standing [Daily] : ~He/She~ states the symptoms seem to be occuring daily [de-identified] : 58 year old female referred to this office by Dr. Tisha Estes for evaluation of knee pain. Patient had a MRI of her right knee done in December and comes in follow up.\par Patient states that 30 years ago she began to develop bilateral knee pain, right >left. She denies any trauma to her knees. They have been worsening progressively for about the last 8 years. However during that time the patient suffered from Breast cancer followed by right mastectomy and resultant lymphedema. \par She currently ambulates with a cane due to increased pain. She states that she experiencing intermittent locking of her right knee. She has increased pain when climbing stairs, walking for more than 1/2 block and changing positions. She has to sleep with her right knee in extension to decrease the knee pain. \par She is currently actively participating in physical therapy. She takes methadone daily for pain, gabapentin for Diabetic neuropathy and meloxicam for inflammation. \par Patient is diabetic with a HgB A1C of 6.5.\par She has been treated conservatively for her bilateral knee pain over the last couple years, with cortisone and viscosupplementation injections, the last injection in October 2018, with only mild relief. \par She presents for evaluation of bilateral knees, and discussion for further treatment.

## 2019-02-02 NOTE — PHYSICAL EXAM
[Antalgic] : antalgic [Cane] : ambulates with cane [LE] : Sensory: Intact in bilateral lower extremities [Knee] : patellar 2+ and symmetric bilaterally [Ankle] : ankle 2+ and symmetric bilaterally [DP] : dorsalis pedis 2+ and symmetric bilaterally [PT] : posterior tibial 2+ and symmetric bilaterally [de-identified] : On general examination the patient is adequately groomed and nourished. The patient is obese. The vital parameters are as recorded. \par There is no lymphedema or diffuse swelling, no varicose veins, no skin warmth/erythema/scars/swelling, no ulcers and no palpable lymph nodes or masses in both lower extremities. Bilateral pedal pulses are well palpable.\par Upper Extremity:\par Both right and left upper extremities are unremarkable in terms of skin rash, lesions, pigmentation, redness, tenderness, swelling, joint instability, or crepitus. There is lymphedema of the RUE. The overall range of motion, sensation, motor tone and strength testing are normal.\par \par Bilateral Knee Exam: \par The gait is bilateral stiff knee antalgic.\par Knee alignment:            Right 8 degrees varus with no flexion deformity. \par Left 8 degrees varus with no flexion deformity.\par Both knees demonstrate no scars and the skin has no warmth, erythema, swelling or tenderness. \par Both knees have a range of motion of\par Extension:                    Right 0 degrees     Left 0 degrees\par Flexion:                                   Right 110 degrees      Left 110 degrees\par There is bilateral knee medial joint line tenderness. There is bilateral knee mild effusion. \par Shree's test is positive. Joanna test is positive.\par Lachman's test, Anterior/Posterior Drawer test and Pivot Shift Tests are negative. \par There is bilateral knee grade 1 MCL mediolateral laxity and no anteroposterior instability. \par Patella compression test is positive and patellofemoral tracking is normal with no lateral subluxation, apprehension or instability. \par Right knee quadriceps and hamstrings power is 4+.\par Left knee quadriceps and hamstrings power is 4+.\par \par Hip Exam:\par The gait and station is normal\par The patient has equal leg lengths and no pelvic tilt. Magnus/Laurie test is 7 inches on the right and 7 inches on the left. Active SLR is 60 degrees on the right and 60 degrees on the left. Both hips demonstrate no scars and the skin has no signs of inflammation or tenderness. \par Both Hips have a normal range of motion of flexion to 100 degrees, abduction 40 degrees, adduction 20 degrees, external rotation 40 degrees, internal rotation 20 degrees with symmetrical motion in flexion and extension. There is no flexion contracture, deformity or instability. Labral impingement tests are negative.\par Both hips flexor, abductor and extensor power is normal.\par \par Neurology:\par The patient is alert and oriented in person, place and time. The mood is calm and affect is normal.\par Testing for coordination including Rhomberg's Test and Finger-Nose Test, sensation, motor tone and power and deep tendon reflexes in both lower extremities is normal.\par  [de-identified] : The following radiographs were ordered and read by me during this patients visit. I reviewed each radiograph in detail with the patient and discussed the findings as highlighted below. \par AP, lateral and skyline views of the bilateral knees confirm advanced degenerative joint disease with varus deformity, with medial joint space narrowing and osteophyte formation medial and lateral joint lines. \par AP view of the pelvis are within normal limits\par

## 2019-02-02 NOTE — CONSULT LETTER
[Consult Letter:] : I had the pleasure of evaluating your patient, [unfilled]. [( Thank you for referring [unfilled] for consultation for _____ )] : Thank you for referring [unfilled] for consultation for [unfilled] [Please see my note below.] : Please see my note below. [Consult Closing:] : Thank you very much for allowing me to participate in the care of this patient.  If you have any questions, please do not hesitate to contact me. [Sincerely,] : Sincerely, [DrRoyer  ___] : Dr. MOORE [Dear  ___] : Dear  [unfilled], [FreeTextEntry2] : VETO RAMIREZ MD\par JOSEFINA YUN\par \par \par  [FreeTextEntry3] : Estevan Boyle MD\par \par ______________________________________________\par [facility]: Hip/Knee Arthroplasty\par [address], [address], [place] [address]\par (t) [phone]\par (f) [phone]\par

## 2019-02-02 NOTE — REASON FOR VISIT
[Initial Visit] : an initial visit for [Knee Pain] : knee pain [FreeTextEntry2] : bilateral knee pain

## 2019-02-02 NOTE — DISCUSSION/SUMMARY
[de-identified] : bilateral knee advanced degenerative joint disease history of fibromyalgia, breast ca with lymphedema right UE, and obesity. \par The natural history and treatment of degenerative arthritis was discussed with the patient at length today. The spectrum of treatment including nonoperative modalities to surgical intervention was elucidated. Noninvasive and nonoperative treatment modalities include weight reduction, activity modification with low impact exercise,  as needed use of acetaminophen or anti-inflammatory medications if tolerated, glucosamine/chondroitin supplements, and physical therapy. Further treatments can include corticosteroid injection and the use of viscosupplementation with hyaluronic acid injections. Definitive surgical treatment can certainly include total joint arthroplasty also. The risks and benefits of each treatment options was discussed and all questions were answered.\par At this time we have discussed that she is not a safe surgical candidate due to her increased BMI. I have recommended a weight loss program, with a goal of BMI less than 35. There is a weight goal of 185. The patient was recommended continued conservative management in the form of a home exercise program, activity modifications, stationary bicycling, swimming and weight loss program. A trial of Glucosamine and Chondroiten Sulphate was recommended.\par She was recommended to continue with physical therapy. She has been advised to hold from any other injections to the knee due to consideration of surgery in the near future pending weight loss. \par She has been recommended three to six month follow up. \par \par

## 2019-02-06 ENCOUNTER — RX RENEWAL (OUTPATIENT)
Age: 59
End: 2019-02-06

## 2019-02-06 RX ORDER — BUPROPION HYDROCHLORIDE 150 MG/1
150 TABLET, FILM COATED, EXTENDED RELEASE ORAL DAILY
Qty: 90 | Refills: 3 | Status: DISCONTINUED | COMMUNITY
Start: 2018-06-05 | End: 2019-02-06

## 2019-02-13 ENCOUNTER — OUTPATIENT (OUTPATIENT)
Dept: OUTPATIENT SERVICES | Facility: HOSPITAL | Age: 59
LOS: 1 days | Discharge: HOME | End: 2019-02-13

## 2019-02-13 ENCOUNTER — RESULT REVIEW (OUTPATIENT)
Age: 59
End: 2019-02-13

## 2019-02-13 ENCOUNTER — APPOINTMENT (OUTPATIENT)
Dept: HEMATOLOGY ONCOLOGY | Facility: CLINIC | Age: 59
End: 2019-02-13
Payer: MEDICARE

## 2019-02-13 VITALS
HEART RATE: 78 BPM | RESPIRATION RATE: 16 BRPM | SYSTOLIC BLOOD PRESSURE: 146 MMHG | DIASTOLIC BLOOD PRESSURE: 65 MMHG | BODY MASS INDEX: 44.36 KG/M2 | WEIGHT: 234.79 LBS | OXYGEN SATURATION: 97 % | TEMPERATURE: 99.1 F

## 2019-02-13 DIAGNOSIS — Z33.2 ENCOUNTER FOR ELECTIVE TERMINATION OF PREGNANCY: Chronic | ICD-10-CM

## 2019-02-13 DIAGNOSIS — C50.919 MALIGNANT NEOPLASM OF UNSPECIFIED SITE OF UNSPECIFIED FEMALE BREAST: ICD-10-CM

## 2019-02-13 DIAGNOSIS — C80.1 MALIGNANT (PRIMARY) NEOPLASM, UNSPECIFIED: Chronic | ICD-10-CM

## 2019-02-13 DIAGNOSIS — Z90.11 ACQUIRED ABSENCE OF RIGHT BREAST AND NIPPLE: ICD-10-CM

## 2019-02-13 DIAGNOSIS — E04.1 NONTOXIC SINGLE THYROID NODULE: Chronic | ICD-10-CM

## 2019-02-13 LAB
HCT VFR BLD CALC: 34.4 % — LOW (ref 34.5–45)
HGB BLD-MCNC: 11.5 G/DL — SIGNIFICANT CHANGE UP (ref 11.5–15.5)
MCHC RBC-ENTMCNC: 28.2 PG — SIGNIFICANT CHANGE UP (ref 27–34)
MCHC RBC-ENTMCNC: 33.3 G/DL — SIGNIFICANT CHANGE UP (ref 32–36)
MCV RBC AUTO: 84.7 FL — SIGNIFICANT CHANGE UP (ref 80–100)
PLATELET # BLD AUTO: 232 K/UL — SIGNIFICANT CHANGE UP (ref 150–400)
RBC # BLD: 4.06 M/UL — SIGNIFICANT CHANGE UP (ref 3.8–5.2)
RBC # FLD: 14.9 % — HIGH (ref 10.3–14.5)
WBC # BLD: 4.1 K/UL — SIGNIFICANT CHANGE UP (ref 3.8–10.5)
WBC # FLD AUTO: 4.1 K/UL — SIGNIFICANT CHANGE UP (ref 3.8–10.5)

## 2019-02-13 PROCEDURE — 99214 OFFICE O/P EST MOD 30 MIN: CPT

## 2019-02-19 LAB
ALBUMIN SERPL ELPH-MCNC: 4.1 G/DL
ALP BLD-CCNC: 96 U/L
ALT SERPL-CCNC: 11 U/L
ANION GAP SERPL CALC-SCNC: 13 MMOL/L
AST SERPL-CCNC: 15 U/L
BILIRUB SERPL-MCNC: 0.4 MG/DL
BUN SERPL-MCNC: 15 MG/DL
CALCIUM SERPL-MCNC: 9.2 MG/DL
CANCER AG27-29 SERPL-ACNC: 12.9 U/ML
CEA SERPL-MCNC: 2.8 NG/ML
CHLORIDE SERPL-SCNC: 98 MMOL/L
CO2 SERPL-SCNC: 29 MMOL/L
CREAT SERPL-MCNC: 0.99 MG/DL
GLUCOSE SERPL-MCNC: 138 MG/DL
POTASSIUM SERPL-SCNC: 4.1 MMOL/L
PROT SERPL-MCNC: 7.7 G/DL
SODIUM SERPL-SCNC: 140 MMOL/L

## 2019-02-25 ENCOUNTER — TRANSCRIPTION ENCOUNTER (OUTPATIENT)
Age: 59
End: 2019-02-25

## 2019-02-26 ENCOUNTER — EMERGENCY (EMERGENCY)
Facility: HOSPITAL | Age: 59
LOS: 0 days | Discharge: HOME | End: 2019-02-26
Payer: MEDICARE

## 2019-02-26 ENCOUNTER — CLINICAL ADVICE (OUTPATIENT)
Age: 59
End: 2019-02-26

## 2019-02-26 VITALS
RESPIRATION RATE: 17 BRPM | HEART RATE: 70 BPM | OXYGEN SATURATION: 99 % | TEMPERATURE: 99 F | SYSTOLIC BLOOD PRESSURE: 121 MMHG | DIASTOLIC BLOOD PRESSURE: 70 MMHG

## 2019-02-26 VITALS
OXYGEN SATURATION: 99 % | TEMPERATURE: 99 F | RESPIRATION RATE: 18 BRPM | HEIGHT: 61 IN | DIASTOLIC BLOOD PRESSURE: 75 MMHG | HEART RATE: 75 BPM | SYSTOLIC BLOOD PRESSURE: 124 MMHG | WEIGHT: 229.94 LBS

## 2019-02-26 DIAGNOSIS — C80.1 MALIGNANT (PRIMARY) NEOPLASM, UNSPECIFIED: Chronic | ICD-10-CM

## 2019-02-26 DIAGNOSIS — Z33.2 ENCOUNTER FOR ELECTIVE TERMINATION OF PREGNANCY: Chronic | ICD-10-CM

## 2019-02-26 DIAGNOSIS — E04.1 NONTOXIC SINGLE THYROID NODULE: Chronic | ICD-10-CM

## 2019-02-26 PROCEDURE — 70450 CT HEAD/BRAIN W/O DYE: CPT | Mod: 26

## 2019-02-26 PROCEDURE — 99284 EMERGENCY DEPT VISIT MOD MDM: CPT

## 2019-02-26 RX ORDER — ACETAMINOPHEN 500 MG
650 TABLET ORAL ONCE
Qty: 0 | Refills: 0 | Status: COMPLETED | OUTPATIENT
Start: 2019-02-26 | End: 2019-02-26

## 2019-02-26 RX ADMIN — Medication 650 MILLIGRAM(S): at 16:48

## 2019-02-26 NOTE — ED PROVIDER NOTE - OBJECTIVE STATEMENT
58F here following a fall. She had a fall 6 days ago, evaluated at urgent care, had xrays done for left 4th finger pain. Returns today with headache, slight nausea and noted dizziness Friday and Saturday - now resolved. No neck pain> no CP or SOB. + head injury, head went through the wall, no LOC. NO vision changes, vomiting, numbness, weakness.

## 2019-02-26 NOTE — ED ADULT TRIAGE NOTE - CHIEF COMPLAINT QUOTE
c/o b/l knee pain b/l hand pain and pain to top of head s/p fall last week hit head on wall denies loc intermittent dizziness nausea last night denies vomiting denies nausea at present denies anticoagulation use

## 2019-02-26 NOTE — ED ADULT NURSE NOTE - OBJECTIVE STATEMENT
Pt is a 58YOF who is here for pain and dizziness following a fall. Pt states she was seen by her MD following the fall, x-rays were taken and told they were negative. Pt states she has some dizziness with nausea, last night, but not currrently, pt not in any distress.

## 2019-02-26 NOTE — ED PROVIDER NOTE - CLINICAL SUMMARY MEDICAL DECISION MAKING FREE TEXT BOX
patient presents with headache and nausea after head injury, symptoms are mild but persistent, likely post concussive, will check head CT given patient concern, recommend outpt f/u, tylenol PRN pain

## 2019-02-26 NOTE — ED ADULT NURSE NOTE - NSIMPLEMENTINTERV_GEN_ALL_ED
Implemented All Universal Safety Interventions:  [place] to call system. Call bell, personal items and telephone within reach. Instruct patient to call for assistance. Room bathroom lighting operational. Non-slip footwear when patient is off stretcher. Physically safe environment: no spills, clutter or unnecessary equipment. Stretcher in lowest position, wheels locked, appropriate side rails in place.

## 2019-02-28 DIAGNOSIS — S06.0X0A CONCUSSION WITHOUT LOSS OF CONSCIOUSNESS, INITIAL ENCOUNTER: ICD-10-CM

## 2019-02-28 DIAGNOSIS — R51 HEADACHE: ICD-10-CM

## 2019-02-28 DIAGNOSIS — Z79.899 OTHER LONG TERM (CURRENT) DRUG THERAPY: ICD-10-CM
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
## 2019-02-28 DIAGNOSIS — R42 DIZZINESS AND GIDDINESS: ICD-10-CM

## 2019-03-11 ENCOUNTER — RX RENEWAL (OUTPATIENT)
Age: 59
End: 2019-03-11

## 2019-03-12 ENCOUNTER — LABORATORY RESULT (OUTPATIENT)
Age: 59
End: 2019-03-12

## 2019-03-13 ENCOUNTER — APPOINTMENT (OUTPATIENT)
Dept: PHYSICAL MEDICINE AND REHAB | Facility: CLINIC | Age: 59
End: 2019-03-13
Payer: MEDICARE

## 2019-03-13 ENCOUNTER — APPOINTMENT (OUTPATIENT)
Dept: DERMATOLOGY | Facility: CLINIC | Age: 59
End: 2019-03-13
Payer: MEDICARE

## 2019-03-13 VITALS
WEIGHT: 234 LBS | HEIGHT: 61 IN | SYSTOLIC BLOOD PRESSURE: 132 MMHG | BODY MASS INDEX: 44.18 KG/M2 | DIASTOLIC BLOOD PRESSURE: 64 MMHG

## 2019-03-13 DIAGNOSIS — M75.42 IMPINGEMENT SYNDROME OF LEFT SHOULDER: ICD-10-CM

## 2019-03-13 DIAGNOSIS — M75.41 IMPINGEMENT SYNDROME OF RIGHT SHOULDER: ICD-10-CM

## 2019-03-13 DIAGNOSIS — D48.5 NEOPLASM OF UNCERTAIN BEHAVIOR OF SKIN: ICD-10-CM

## 2019-03-13 PROCEDURE — 11104 PUNCH BX SKIN SINGLE LESION: CPT

## 2019-03-13 PROCEDURE — 99212 OFFICE O/P EST SF 10 MIN: CPT

## 2019-03-13 PROCEDURE — 99213 OFFICE O/P EST LOW 20 MIN: CPT | Mod: 25

## 2019-03-13 NOTE — HISTORY OF PRESENT ILLNESS
[place] [FreeTextEntry1] : Rash on Breast [de-identified] : 58F with a hx of R breast CA s/p chemoRT (2012) here for rash on the breast x 5-6 months. Not growing, bleeding, or changing. Has neuropathy in the area so has not felt itch or pain. No palpable lumps in the area.

## 2019-03-13 NOTE — PHYSICAL EXAM
[Alert] : alert [Oriented x 3] : ~L oriented x 3 [Well Nourished] : well nourished [Conjunctiva Non-injected] : conjunctiva non-injected [No Visual Lymphadenopathy] : no visual  lymphadenopathy [No Clubbing] : no clubbing [No Edema] : no edema [No Bromhidrosis] : no bromhidrosis [No Chromhidrosis] : no chromhidrosis [FreeTextEntry3] : R chest with diffuse hyperpigmented mottled patch with focal area of violaceous thin scaly plaque

## 2019-03-13 NOTE — CONSULT LETTER
[Dear  ___] : Dear  [unfilled], [Consult Letter:] : I had the pleasure of evaluating your patient, [unfilled]. [Please see my note below.] : Please see my note below. [Consult Closing:] : Thank you very much for allowing me to participate in the care of this patient.  If you have any questions, please do not hesitate to contact me. [Sincerely,] : Sincerely, [FreeTextEntry3] : Haile Sánchez MD\par [facility]

## 2019-03-14 PROBLEM — M75.41 ROTATOR CUFF IMPINGEMENT SYNDROME OF RIGHT SHOULDER: Status: ACTIVE | Noted: 2018-09-05

## 2019-03-14 PROBLEM — M75.42 ROTATOR CUFF IMPINGEMENT SYNDROME OF LEFT SHOULDER: Status: ACTIVE | Noted: 2018-11-21

## 2019-03-14 NOTE — ASSESSMENT
[FreeTextEntry1] : Compression sleeve 30-40 mm HG for daytime/night garment \par bra prosthesis prescribed \par continue PT\par Meloxicam PRN pain \par Follow up in 6 months

## 2019-03-14 NOTE — HISTORY OF PRESENT ILLNESS
[FreeTextEntry1] : 58 y.o female presents for follow up for lymphedema. She has a history of advanced breast cancer status post right mastectomy and axillary lymph node dissection on 3/30/2012 followed by chemotherapy and radiation. PET/ CT performed on 8/6/14 show postsurgical changes in the right chest wall.\par Finished LD therapy and currently wears a night garment and gauntlet.\par  \par \par Interval Has been going to PT for bilateral shoulder pain with significant improvement.Also  improvement in knee pain with PT. \par Needs bra prosthesis.

## 2019-03-14 NOTE — PHYSICAL EXAM
[Normal] : Oriented to person, place, and time, insight and judgement were intact and the affect was normal [de-identified] : 2 cm difference between the left and right upper extremities [de-identified] : + knee pain with ROM.

## 2019-03-20 ENCOUNTER — RESULT REVIEW (OUTPATIENT)
Age: 59
End: 2019-03-20

## 2019-03-21 ENCOUNTER — RX RENEWAL (OUTPATIENT)
Age: 59
End: 2019-03-21

## 2019-03-26 ENCOUNTER — APPOINTMENT (OUTPATIENT)
Dept: DERMATOLOGY | Facility: CLINIC | Age: 59
End: 2019-03-26
Payer: MEDICARE

## 2019-03-26 DIAGNOSIS — Z48.02 ENCOUNTER FOR REMOVAL OF SUTURES: ICD-10-CM

## 2019-03-26 DIAGNOSIS — L30.9 DERMATITIS, UNSPECIFIED: ICD-10-CM

## 2019-03-26 PROCEDURE — 99213 OFFICE O/P EST LOW 20 MIN: CPT

## 2019-04-03 ENCOUNTER — TRANSCRIPTION ENCOUNTER (OUTPATIENT)
Age: 59
End: 2019-04-03

## 2019-04-05 ENCOUNTER — TRANSCRIPTION ENCOUNTER (OUTPATIENT)
Age: 59
End: 2019-04-05

## 2019-04-09 ENCOUNTER — APPOINTMENT (OUTPATIENT)
Dept: NEUROLOGY | Facility: CLINIC | Age: 59
End: 2019-04-09
Payer: MEDICARE

## 2019-04-09 VITALS
HEART RATE: 74 BPM | HEIGHT: 61 IN | WEIGHT: 230 LBS | DIASTOLIC BLOOD PRESSURE: 67 MMHG | BODY MASS INDEX: 43.43 KG/M2 | SYSTOLIC BLOOD PRESSURE: 162 MMHG

## 2019-04-09 PROCEDURE — 99214 OFFICE O/P EST MOD 30 MIN: CPT

## 2019-04-09 NOTE — HISTORY OF PRESENT ILLNESS
[FreeTextEntry1] : HPI-Interval Hx [id number]:\par Wellbutrin reduced to 100mg for tremor in her hand, remitted. \par Had a fall with head trauma in Feb 2019, CT head negative ([facility]).\par She is very busy, and has a hard time managing her busy schedule. \par She has a very positive outlook overall. \par \par HPI-Interval Hx [id number]:\par MRI, MRA were negative, ESR 71, but vascular sx evaluation with Echo was negative for TA. \par Still a lot of stress in her life.\par Feels better with Wellbutrin, her face pain and HA have stopped.\par She sleeps very well, and appetite is good.\par She keeps very busy, now preparing for her daughter's wedding.\par \par HPI-Interval Hx [id number]:\par Pt here for follow-up.\par Over the last year, she has been pretty much stable.\par recent Bone Scan and CT have shown no secondary lesions from breast Ca.\par \par She gets seldom sharp pain behind L eye for a few seconds, with slight HA, lasting 5-10 min.\par She also noticed her face sweats a lot, at times her arms too. This apparently is dating 10-20years.\par In all, she feels very irritable and she still c/o STM and attention issues.\par \par PMH:\par 55yo RH AAW, with hx of fibromyalgia, bipolar depression, breast CA s/p resection and chemo in 2012, currently followed up and disease free, colon polyps, arthritis.\par She reports that she sometimes looses attentions and would forget things, e.g. that she is cooking something and would leave things on the stove. She has issues remembering names of people, but always had.\par \par She does not report HA.\par \par Mood: she often gets sad, cries a lot, but has had bipolar depression for a long time, used to be on Lexapro, VPA and Neurontin, stopped a few years ago.\par She used to be in a program at [facility], terminated when she had started using a plant product ([place]?). She has a hx of SI/SA in 2004, now denies any SI. \par \par She has a hx of EtOH abuse, sober and in AA for many years.\par \par She has a complex living situation, having to take care of her mother, daughter, grandchildren.\par \par She does not drive, she never did, has fear of it.\par \par Sleep: falls asleep readily, but has to urinate very frequently. Sleeps 10h daily.\par \par Disabled, used to do clerical work in several different settings.\par \par ADl and IADL intact.\par \par Of note, she has a small nodule in L thyroid, under surveillance.

## 2019-04-09 NOTE — REVIEW OF SYSTEMS
[As Noted in HPI] : as noted in HPI [Anxiety] : anxiety [Depression] : depression [Negative] : Heme/Lymph [FreeTextEntry3] : recent gradual blurring of vision, possible cataract.

## 2019-04-09 NOTE — ASSESSMENT
[FreeTextEntry1] : 57yo RH AAW, with subjective memory issues. Mental and neuro exam again unremarkable, save for mild signs of peripheral neuropathy in LE and antalgic limitation of RLE>LLE. \par \par Impression:\par Her deficits seem related to the intensity of her life and health problems.\par Better on Wellbutrin, but had to reduce to 100mg due to tremor, now remitted.\par \par Plan: \par We can consider to rechallenge again with higher dose of Wellbutrin or change to Effexor, but pt prefers to try psychotx first. She will inquire with Juliana and other doctors in the area.  \par \par RTC in the fall. \par

## 2019-04-09 NOTE — PHYSICAL EXAM
[General Appearance - Alert] : alert [General Appearance - Well Nourished] : well nourished [General Appearance - Well Developed] : well developed [General Appearance - In No Acute Distress] : in no acute distress [Oriented To Time, Place, And Person] : oriented to person, place, and time [Impaired Insight] : insight and judgment were intact [Affect] : the affect was normal [Over the Past 2 Weeks, Have You Felt Little Interest or Pleasure Doing Things?] : 2.) Over the past 2 weeks, have you felt little interest or pleasure doing things? Yes [Person] : oriented to person [Place] : oriented to place [Time] : oriented to time [Short Term Intact] : short term memory intact [Remote Intact] : remote memory intact [Registration Intact] : recent registration memory intact [Span Intact] : the attention span was normal [Concentration Intact] : normal concentrating ability [Visual Intact] : visual attention was ~T not ~L decreased [Naming Objects] : no difficulty naming common objects [Repeating Phrases] : no difficulty repeating a phrase [Writing A Sentence] : no difficulty writing a sentence [Fluency] : fluency intact [Comprehension] : comprehension intact [Reading] : reading intact [Current Events] : adequate knowledge of current events [Past History] : adequate knowledge of personal past history [Total Score ___ / 30] : the patient achieved a score of [unfilled] /30 [Vocabulary] : adequate range of vocabulary [Date / Time ___ / 5] : date / time [unfilled] / 5 [Place ___ / 5] : place [unfilled] / 5 [Registration ___ / 3] : registration [unfilled] / 3 [Serial Sevens ___/5] : serial sevens [unfilled] / 5 [Naming 2 Objects ___ / 2] : naming two objects [unfilled] / 2 [Repeating a Sentence ___ / 1] : repeating a sentence [unfilled] / 1 [Writing a Sentence ___ / 1] : write sentence [unfilled] / 1 [3-stage Verbal Command ___ / 3] : three-stage verbal command [unfilled] / 3 [Written Command ___ / 1] : written command [unfilled] / 1 [Copy a Design ___ / 1] : copy a design [unfilled] / 1 [Cranial Nerves Optic (II)] : visual acuity intact bilaterally,  visual fields full to confrontation, pupils equal round and reactive to light [Recall ___ / 3] : recall [unfilled] / 3 [Cranial Nerves Oculomotor (III)] : extraocular motion intact [Cranial Nerves Facial (VII)] : face symmetrical [Cranial Nerves Trigeminal (V)] : facial sensation intact symmetrically [Cranial Nerves Hypoglossal (XII)] : there was no tongue deviation with protrusion [Cranial Nerves Glossopharyngeal (IX)] : tongue and palate midline [Cranial Nerves Accessory (XI - Cranial And Spinal)] : head turning and shoulder shrug symmetric [Cranial Nerves Vestibulocochlear (VIII)] : hearing was intact bilaterally [Motor Strength] : muscle strength was normal in all four extremities [Involuntary Movements] : no involuntary movements were seen [No Muscle Atrophy] : normal bulk in all four extremities [Motor Handedness Right-Handed] : the patient is right hand dominant [Proprioception] : proprioception was intact [Sensation Tactile Decrease] : light touch was intact [Sensation Pain / Temperature Decrease] : pain and temperature was intact [Balance] : balance was intact [2+] : Brachioradialis left 2+ [1+] : Patella left 1+ [0] : Ankle jerk left 0 [Sclera] : the sclera and conjunctiva were normal [PERRL With Normal Accommodation] : pupils were equal in size, round, reactive to light, with normal accommodation [No APD] : no afferent pupillary defect [Extraocular Movements] : extraocular movements were intact [No ANDERSON] : no internuclear ophthalmoplegia [Full Visual Field] : full visual field [Outer Ear] : the ears and nose were normal in appearance [Oropharynx] : the oropharynx was normal [Neck Appearance] : the appearance of the neck was normal [Neck Cervical Mass (___cm)] : no neck mass was observed [Jugular Venous Distention Increased] : there was no jugular-venous distention [Thyroid Nodule] : there were no palpable thyroid nodules [Thyroid Diffuse Enlargement] : the thyroid was not enlarged [Auscultation Breath Sounds / Voice Sounds] : lungs were clear to auscultation bilaterally [Heart Sounds] : normal S1 and S2 [Heart Rate And Rhythm] : heart rate was normal and rhythm regular [Heart Sounds Gallop] : no gallops [Heart Sounds Pericardial Friction Rub] : no pericardial rub [Murmurs] : no murmurs [Arterial Pulses Carotid] : carotid pulses were normal with no bruits [Full Pulse] : the pedal pulses are present [Edema] : there was no peripheral edema [Bowel Sounds] : normal bowel sounds [Abdomen Soft] : soft [Abdomen Tenderness] : non-tender [Abdomen Mass (___ Cm)] : no abdominal mass palpated [No CVA Tenderness] : no ~M costovertebral angle tenderness [No Spinal Tenderness] : no spinal tenderness [Abnormal Walk] : normal gait [Nail Clubbing] : no clubbing  or cyanosis of the fingernails [Musculoskeletal - Swelling] : no joint swelling seen [Motor Tone] : muscle strength and tone were normal [Skin Color & Pigmentation] : normal skin color and pigmentation [Skin Turgor] : normal skin turgor [] : no rash [Over the Past 2 Weeks, Have You Felt Down, Depressed, or Hopeless?] : 1.) Over the past 2 weeks, have you felt down, depressed, or hopeless? No [Motor Strength Upper Extremities Bilaterally] : strength was normal in both upper extremities [Motor Strength Lower Extremities Bilaterally] : strength was normal in both lower extremities [Romberg's Sign] : Romberg's sign was negtive [Allodynia] : no ~T allodynia present [Dysesthesia] : no dysesthesia [Hyperesthesia] : no hyperesthesia [Limited Balance] : balance was intact [Past-pointing] : there was no past-pointing [Tremor] : no tremor present [Dysdiadochokinesia Bilaterally] : not present [Coordination - Dysmetria Impaired Finger-to-Nose Bilateral] : not present [Coordination - Dysmetria Impaired Heel-to-Shin Bilateral] : not present [Plantar Reflex Right Only] : normal on the right [Plantar Reflex Left Only] : normal on the left [FreeTextEntry4] : Alt pattern: intact\par Clock: 3/3\par Luria: Intact.\par Trail A: 45"; B: 100"\par Fluency: intact. [FreeTextEntry5] : small pupils, surgical, reactive; no pain on OO compression.  [FreeTextEntry6] : strength  limited by bilateral knee pain [FreeTextEntry7] : decreased vibration sens distal LE. [FreeTextEntry8] : balance limited by bilateral knee pain [FreeTextEntry1] : L chest scar from old port.

## 2019-04-11 ENCOUNTER — APPOINTMENT (OUTPATIENT)
Dept: ENDOCRINOLOGY | Facility: CLINIC | Age: 59
End: 2019-04-11
Payer: MEDICARE

## 2019-04-11 VITALS
DIASTOLIC BLOOD PRESSURE: 78 MMHG | BODY MASS INDEX: 44.18 KG/M2 | WEIGHT: 234 LBS | HEART RATE: 75 BPM | RESPIRATION RATE: 16 BRPM | TEMPERATURE: 98.5 F | SYSTOLIC BLOOD PRESSURE: 127 MMHG | HEIGHT: 61 IN | OXYGEN SATURATION: 95 %

## 2019-04-11 LAB — GLUCOSE BLDC GLUCOMTR-MCNC: 141

## 2019-04-11 PROCEDURE — 83036 HEMOGLOBIN GLYCOSYLATED A1C: CPT | Mod: QW

## 2019-04-11 PROCEDURE — 99204 OFFICE O/P NEW MOD 45 MIN: CPT | Mod: 25

## 2019-04-11 PROCEDURE — 82962 GLUCOSE BLOOD TEST: CPT

## 2019-04-11 NOTE — PHYSICAL EXAM
[Alert] : alert [Normal Sclera/Conjunctiva] : normal sclera/conjunctiva [No Acute Distress] : no acute distress [Normal Hearing] : hearing was normal [PERRL] : pupils equal, round and reactive to light [Normal Outer Ear/Nose] : the ears and nose were normal in appearance [No Neck Mass] : no neck mass was observed [Thyroid Not Enlarged] : the thyroid was not enlarged [No Respiratory Distress] : no respiratory distress [Normal PMI] : the apical impulse was normal [Normal Rate and Effort] : normal respiratory rhythm and effort [Normal Rate] : heart rate was normal  [Carotids Normal] : carotid pulses were normal with no bruits [No Stigmata of Cushings Syndrome] : no stigmata of cushings syndrome [Femoral Arteries Normal] : femoral pulses were normal without bruits [Normal Gait] : normal gait [No Rash] : no rash [No Clubbing, Cyanosis] : no clubbing  or cyanosis of the fingernails [No Skin Lesions] : no skin lesions [No Sensory Deficits] : the sensory exam was normal to light touch and pinprick [No Motor Deficits] : the motor exam was normal [Normal Sensation on Monofilament Testing] : normal sensation on monofilament testing of lower extremities

## 2019-04-12 NOTE — ASSESSMENT
[FreeTextEntry1] : Patient's diabetes is well controlled\par Taking the medications regularly\par Advised to follow a low CHO, low fat diet\par Advised to exercise regularly\par Patient was reminded of the importance of keeping the HbA1c <7% to prevent or delay the diabetic complications\par The hypertension is well controlled\par Will continue the same medications\par Advise to take the medications regularly\par \par \par \par

## 2019-04-12 NOTE — HISTORY OF PRESENT ILLNESS
[FreeTextEntry1] : The patient comes to the office with a history of type 2 diabetes for the past 15 years. Recently her blood sugars at home are well controlled they are usually around 120 mg/dl. They are good all day long. The HbA1c on 1/11/19 was 6.5%. She has been following the diet properly and exercising. She has lost weight about 20 lbs. No weight change.  Denies low blood glucose during the night. The blood glucose improves when she follows the diet and exercises. She is taking the medications regularly. Patient denies polydipsia, polyuria, chest pain, SOB or leg edema. She denies history of retinopathy, kidney problems. She has history of numbness, tingling sensation on her extremities. She has hyperlipidemia, has history of hypertension, denies history of CHD, denies history of CVA.  She has seen the Ophthalmologist recently, she has seen the Podiatrist recently. She has seen the Cardiologist recently. She has history of a thyroid nodule.\par

## 2019-04-14 LAB — HBA1C MFR BLD HPLC: 6.8

## 2019-04-17 ENCOUNTER — APPOINTMENT (OUTPATIENT)
Dept: ENDOCRINOLOGY | Facility: CLINIC | Age: 59
End: 2019-04-17

## 2019-04-18 ENCOUNTER — APPOINTMENT (OUTPATIENT)
Dept: ORTHOPEDIC SURGERY | Facility: CLINIC | Age: 59
End: 2019-04-18

## 2019-04-23 PROBLEM — Z90.11 ABSENCE OF BREAST, RIGHT: Status: ACTIVE | Noted: 2018-02-12

## 2019-04-23 NOTE — REVIEW OF SYSTEMS
[Joint Pain] : joint pain [Joint Stiffness] : joint stiffness [Negative] : Allergic/Immunologic [Abdominal Pain] : no abdominal pain [Vomiting] : no vomiting [Constipation] : no constipation [Diarrhea] : no diarrhea [Muscle Pain] : no muscle pain [Skin Rash] : no skin rash [Muscle Weakness] : no muscle weakness [Skin Wound] : no skin wound [de-identified] : chest wall  3-4 mm encircled  dry white skin at  mastectomy site

## 2019-04-23 NOTE — HISTORY OF PRESENT ILLNESS
[Disease: _____________________] : Disease: [unfilled] [T: ___] : T[unfilled] [N: ___] : N[unfilled] [AJCC Stage: ____] : AJCC Stage: [unfilled] [de-identified] : She has a history of advanced breast cancer status post right mastectomy and axillary lymph node dissection on 3/30/2012 followed by chemotherapy and radiation.  She had been maintaining her port every 6 weeks until March 2014.  When she tried to have her port flush at [facility]; they were not able to flush her port.  This port has been in for approximately 2 years and she may be able to discontinue the port.  She had axillary lymph node noted in prior imaging which was felt to be benign.  She had a biopsy that was done in May of 2014 that was benign. Pathology showed reactive lymph node which was benign.  She continues to have tenderness at the biopsy site.  PET/ CT performed on 8/6/14 show postsurgical changes in the right chest wall.  Resolution of previously seen mild hypermetabolism in left axillary lymph node.  Given her port not functioning and her PET scan negative, we ordered for port removal. [de-identified] : ChemoRT completed 2012 [de-identified] : invasive poorly differentiated ductal carcinoma ER 0, [place] 0 [id number] 3+ [de-identified] : 2/13/19\par Pt reports dry patch to right chest wall which she has noted has increased in size over the past week \par She reports a hx of post mastectomy/RT breast pain which is intermittent and has not worsened. \par She denies any other chest wall/breast/axillary changes. \par She denies any  exudate to this area\par Denies fevers, chills, \par Denies SOB, JOINER \par She remains on Methadone 10 MG BID/Gabapentin 600 mg for fibromyalgias\par No worsening back pain\par She has worsening left knee osteoarthritis.  She has sought multiple opinions for management. \par She denies any falls or trauma.

## 2019-04-23 NOTE — PHYSICAL EXAM
[Restricted in physically strenuous activity but ambulatory and able to carry out work of a light or sedentary nature] : Status 1- Restricted in physically strenuous activity but ambulatory and able to carry out work of a light or sedentary nature, e.g., light house work, office work [Normal] : affect appropriate [Mucositis] : no mucositis [Ulcers] : no ulcers [Thrush] : no thrush [de-identified] : left lower molar with large hole in tooth with swelling of gums [Vesicles] : no vesicles [de-identified] : RUE lymphedema -no  sleeve  [de-identified] : right mastectomy with radiation change and right skin flap; no abnl masses on the left breast - small white 3-4 mm drt patch - no drainage or exudate- no axillary adenopathy  [de-identified] : ambulates with cane

## 2019-04-23 NOTE — ASSESSMENT
[FreeTextEntry1] : She is a 56 y/o F with history of Stage III right breast cancer s/p mastectomy followed by chemotherapy and RT for ER negative/ Zlo1qip positive disease 2012. She had CT in 2017 which showed sclerosis of the T9 and L4 bone and enlarged mesenteric lymph node.   She presents today for concern for dry skin to the right chest wall that may have increased in size.  She will be referred to dermatology for further evaluation/role for bx.  Encouraged to return to office for  breast/cw/axillary changes.  Breast Mammography due  May 2019.

## 2019-04-23 NOTE — REASON FOR VISIT
[Follow-Up Visit] : a follow-up [FreeTextEntry2] : follow up for breast cancer - Right chest wall dry skin

## 2019-04-24 ENCOUNTER — APPOINTMENT (OUTPATIENT)
Dept: PHYSICAL MEDICINE AND REHAB | Facility: CLINIC | Age: 59
End: 2019-04-24
Payer: MEDICARE

## 2019-04-24 ENCOUNTER — RX RENEWAL (OUTPATIENT)
Age: 59
End: 2019-04-24

## 2019-04-24 PROCEDURE — 99213 OFFICE O/P EST LOW 20 MIN: CPT

## 2019-04-25 NOTE — ASSESSMENT
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 continue PT and focus on gait and balance training \par Meloxicam PRN pain \par Follow up in 2 months

## 2019-04-25 NOTE — HISTORY OF PRESENT ILLNESS
[FreeTextEntry1] : 58 y.o female presents for follow up for lymphedema. She has a history of advanced breast cancer status post right mastectomy and axillary lymph node dissection on 3/30/2012 followed by chemotherapy and radiation. PET/ CT performed on 8/6/14 show postsurgical changes in the right chest wall.\par Finished LD therapy and currently wears a night garment and gauntlet.\par  \par \par Interval Has been going to PT for bilateral shoulder pain with significant improvement.Also  improvement in knee pain with PT.  has had multiple falls in the last few months. does not note any new numbness/weakness but has LE numbness since chemo

## 2019-04-25 NOTE — PHYSICAL EXAM
[Normal] : Normal bowel sounds, soft, non-tender, no hepato-splenomegaly and no abdominal mass palpated [de-identified] : MS 5/5 b/l LE, decreased sensation, vibration sense. + Romberg [de-identified] : + knee pain with ROM. [de-identified] : 2 cm difference between the left and right upper extremities

## 2019-05-01 ENCOUNTER — APPOINTMENT (OUTPATIENT)
Dept: ENDOCRINOLOGY | Facility: CLINIC | Age: 59
End: 2019-05-01
Payer: MEDICARE

## 2019-05-01 PROCEDURE — G0108 DIAB MANAGE TRN  PER INDIV: CPT

## 2019-05-02 LAB
ALBUMIN SERPL ELPH-MCNC: 4.1 G/DL
ALP BLD-CCNC: 102 U/L
ALT SERPL-CCNC: 15 U/L
ANION GAP SERPL CALC-SCNC: 14 MMOL/L
AST SERPL-CCNC: 15 U/L
BILIRUB DIRECT SERPL-MCNC: 0.1 MG/DL
BILIRUB INDIRECT SERPL-MCNC: 0.2 MG/DL
BILIRUB SERPL-MCNC: 0.2 MG/DL
BUN SERPL-MCNC: 20 MG/DL
C PEPTIDE SERPL-MCNC: 3.8 NG/ML
CALCIUM SERPL-MCNC: 10 MG/DL
CHLORIDE SERPL-SCNC: 101 MMOL/L
CHOLEST SERPL-MCNC: 158 MG/DL
CHOLEST/HDLC SERPL: 3.1 RATIO
CK SERPL-CCNC: 157 U/L
CO2 SERPL-SCNC: 28 MMOL/L
CREAT SERPL-MCNC: 1.1 MG/DL
CREAT SPEC-SCNC: 128 MG/DL
ESTIMATED AVERAGE GLUCOSE: 140 MG/DL
FRUCTOSAMINE SERPL-MCNC: 247 UMOL/L
GLUCOSE BS SERPL-MCNC: 100 MG/DL
GLUCOSE SERPL-MCNC: 109 MG/DL
HBA1C MFR BLD HPLC: 6.5 %
HDLC SERPL-MCNC: 51 MG/DL
LDLC SERPL CALC-MCNC: 91 MG/DL
MICROALBUMIN 24H UR DL<=1MG/L-MCNC: 1.5 MG/DL
MICROALBUMIN/CREAT 24H UR-RTO: 12 MG/G
POTASSIUM SERPL-SCNC: 4.2 MMOL/L
PROT SERPL-MCNC: 7.9 G/DL
SODIUM SERPL-SCNC: 143 MMOL/L
T4 FREE SERPL-MCNC: 1.1 NG/DL
TRIGL SERPL-MCNC: 82 MG/DL
TSH SERPL-ACNC: 3.16 UIU/ML

## 2019-05-16 ENCOUNTER — APPOINTMENT (OUTPATIENT)
Dept: INTERNAL MEDICINE | Facility: CLINIC | Age: 59
End: 2019-05-16
Payer: MEDICARE

## 2019-05-16 VITALS
HEART RATE: 78 BPM | DIASTOLIC BLOOD PRESSURE: 80 MMHG | SYSTOLIC BLOOD PRESSURE: 160 MMHG | BODY MASS INDEX: 44.18 KG/M2 | TEMPERATURE: 97.8 F | OXYGEN SATURATION: 96 % | HEIGHT: 61 IN | WEIGHT: 234 LBS

## 2019-05-16 DIAGNOSIS — Z87.09 PERSONAL HISTORY OF OTHER DISEASES OF THE RESPIRATORY SYSTEM: ICD-10-CM

## 2019-05-16 DIAGNOSIS — K76.0 FATTY (CHANGE OF) LIVER, NOT ELSEWHERE CLASSIFIED: ICD-10-CM

## 2019-05-16 PROCEDURE — G0438: CPT

## 2019-05-16 PROCEDURE — 99214 OFFICE O/P EST MOD 30 MIN: CPT | Mod: 25

## 2019-05-16 PROCEDURE — 90471 IMMUNIZATION ADMIN: CPT | Mod: GY

## 2019-05-16 PROCEDURE — 90715 TDAP VACCINE 7 YRS/> IM: CPT | Mod: GY

## 2019-05-16 NOTE — ASSESSMENT
[FreeTextEntry1] : 60 y/o F with multiple chronic medical problems including chronic pain on methadone, HTN, DM, breast cancer s/p mastectomy/xrt/chemo, arthritis here for CPE.\par \par HTN - not at goal\par - Would continue losartan 100, hctz 25\par - Will add diltiazem 60 BID (could not tolerate amlodipine previously)\par - If not controlled with 3 agents, will consider spironolactone.\par \par Breast cancer\par - Pending mammo\par \par DM - A1c at goal\par - Ophtho f/u\par - Podiatry f/u\par - Endo f/u\par - Continue metformin\par \par Depression - on wellbutryn\par - F/U with therapy\par \par Multiple falls - musculoskeletal injury; consistent with mechanical falls\par - Continue PT, pain management\par \par HCM:\par - Bone density ordered\par - Repeat mammo\par - HIV/Hep C with next blood draw\par - Colopnoscopy 2012\par - Pneumovax 2014\par - Tdap today\par - Awaiting shingrix\par \par RTC in 2-3 months for BP follow up or prn.

## 2019-05-16 NOTE — PHYSICAL EXAM
[No Acute Distress] : no acute distress [Normal Oropharynx] : the oropharynx was normal [Normal TMs] : both tympanic membranes were normal [Clear to Auscultation] : lungs were clear to auscultation bilaterally [No Respiratory Distress] : no respiratory distress  [Normal Rate] : normal rate  [Regular Rhythm] : with a regular rhythm [Normal S1, S2] : normal S1 and S2 [Soft] : abdomen soft [No Murmur] : no murmur heard [de-identified] : Limited exam due to patient's difficulty changing into gown [Non Tender] : non-tender [de-identified] : poor dentition [de-identified] : lymphedema of R arm [de-identified] : 1+ edema bilaterally; multiple varicose veins [de-identified] : pain in L shoulder [de-identified] : healing hematoma on L lower leg

## 2019-05-16 NOTE — HISTORY OF PRESENT ILLNESS
[FreeTextEntry1] : CPE [de-identified] : \sofia On 4/15 had a fall where she was going down the stairs (after leaving  - was very emotionally upset) and missed a step and fell, injuring her L shoulder, L knee/leg and L knee.\par Went to Dr. Lord (pain management) - sonogram of legs to see if should have surgery for varicose veins.\par Following with Dr. Estes. Doing PT.\par \sofia Also had a fall in 2019 with head trauma. Was trying to go upstairs ([employer] was waiting); went to grab something to eat and missed the step and went down onto knees and hand and hit her head into the wall.\par Saw Neuro on 2019. Pt thinks that memory has been worse since then.\par \par Before these 2 falls, was not feeling chest pain/SOB/palpitations/lightheadedness.\par Thinks that the first time she wasn't paying attention because she was upset. The 2nd time she was distracted and went to say bye to her granddaughter.\par \par Since these falls, has been out dancing. Plans to restart swimming. \par \sofia Feels depressed because can barely get out of her clothes and can't comb her hair. Denies SI.\par Had multiple stressors - was planning a mothers day gal and had to see MIL who is debilitated.\par Doing lots of catering. Feeling overwhelmed constantly. Worse with recent falls.\par Recently found a therapist (Earnestine Webster) in her neighborhood, recommended by friends. Has not yet scheduled an appointment. \par \par Saw Neuro in 2019 - on wellbutryn. \par \par DM - Saw Endo . Advised to continue current regimen \par \par HTN - reports that BPs are high at home. BPs are 130s-160s. Diastolic BPs ok (never >80). On losartan and HCTZ.\par \par A few weeks ago, the pharmacy ran out of the methadone and had some withdrawal symptoms. Pain management doctor sent clonidine to pharmacy. Was so nervous when read about clonidine side effects. Says that during that time the pain became much worse. Currently on 15mg. Hoping to wean to 10mg.

## 2019-05-19 ENCOUNTER — FORM ENCOUNTER (OUTPATIENT)
Age: 59
End: 2019-05-19

## 2019-05-20 ENCOUNTER — APPOINTMENT (OUTPATIENT)
Dept: MAMMOGRAPHY | Facility: IMAGING CENTER | Age: 59
End: 2019-05-20
Payer: MEDICARE

## 2019-05-20 ENCOUNTER — APPOINTMENT (OUTPATIENT)
Dept: ULTRASOUND IMAGING | Facility: IMAGING CENTER | Age: 59
End: 2019-05-20
Payer: MEDICARE

## 2019-05-20 ENCOUNTER — APPOINTMENT (OUTPATIENT)
Dept: RADIOLOGY | Facility: IMAGING CENTER | Age: 59
End: 2019-05-20
Payer: MEDICARE

## 2019-05-20 ENCOUNTER — OUTPATIENT (OUTPATIENT)
Dept: OUTPATIENT SERVICES | Facility: HOSPITAL | Age: 59
LOS: 1 days | End: 2019-05-20
Payer: MEDICARE

## 2019-05-20 DIAGNOSIS — E04.1 NONTOXIC SINGLE THYROID NODULE: Chronic | ICD-10-CM

## 2019-05-20 DIAGNOSIS — C50.919 MALIGNANT NEOPLASM OF UNSPECIFIED SITE OF UNSPECIFIED FEMALE BREAST: ICD-10-CM

## 2019-05-20 DIAGNOSIS — C80.1 MALIGNANT (PRIMARY) NEOPLASM, UNSPECIFIED: Chronic | ICD-10-CM

## 2019-05-20 DIAGNOSIS — Z33.2 ENCOUNTER FOR ELECTIVE TERMINATION OF PREGNANCY: Chronic | ICD-10-CM

## 2019-05-20 PROCEDURE — 77080 DXA BONE DENSITY AXIAL: CPT | Mod: 26

## 2019-05-20 PROCEDURE — 77063 BREAST TOMOSYNTHESIS BI: CPT | Mod: 26,52

## 2019-05-20 PROCEDURE — 77080 DXA BONE DENSITY AXIAL: CPT

## 2019-05-20 PROCEDURE — 76536 US EXAM OF HEAD AND NECK: CPT | Mod: 26

## 2019-05-20 PROCEDURE — 76641 ULTRASOUND BREAST COMPLETE: CPT | Mod: 26,LT

## 2019-05-20 PROCEDURE — 77067 SCR MAMMO BI INCL CAD: CPT | Mod: 26,LT,52

## 2019-05-20 PROCEDURE — 76641 ULTRASOUND BREAST COMPLETE: CPT

## 2019-05-20 PROCEDURE — 77067 SCR MAMMO BI INCL CAD: CPT

## 2019-05-20 PROCEDURE — 76536 US EXAM OF HEAD AND NECK: CPT

## 2019-05-20 PROCEDURE — 77063 BREAST TOMOSYNTHESIS BI: CPT

## 2019-05-22 ENCOUNTER — RESULT REVIEW (OUTPATIENT)
Age: 59
End: 2019-05-22

## 2019-06-07 ENCOUNTER — MEDICATION RENEWAL (OUTPATIENT)
Age: 59
End: 2019-06-07

## 2019-06-20 ENCOUNTER — RX RENEWAL (OUTPATIENT)
Age: 59
End: 2019-06-20

## 2019-06-21 ENCOUNTER — APPOINTMENT (OUTPATIENT)
Dept: ORTHOPEDIC SURGERY | Facility: CLINIC | Age: 59
End: 2019-06-21
Payer: MEDICARE

## 2019-06-21 VITALS
SYSTOLIC BLOOD PRESSURE: 152 MMHG | DIASTOLIC BLOOD PRESSURE: 83 MMHG | BODY MASS INDEX: 43.43 KG/M2 | WEIGHT: 230 LBS | HEART RATE: 80 BPM | HEIGHT: 61 IN

## 2019-06-21 DIAGNOSIS — S46.012A STRAIN OF MUSCLE(S) AND TENDON(S) OF THE ROTATOR CUFF OF LEFT SHOULDER, INITIAL ENCOUNTER: ICD-10-CM

## 2019-06-21 PROCEDURE — 99203 OFFICE O/P NEW LOW 30 MIN: CPT

## 2019-07-08 ENCOUNTER — RX RENEWAL (OUTPATIENT)
Age: 59
End: 2019-07-08

## 2019-07-11 ENCOUNTER — APPOINTMENT (OUTPATIENT)
Dept: PHYSICAL MEDICINE AND REHAB | Facility: CLINIC | Age: 59
End: 2019-07-11

## 2019-07-11 ENCOUNTER — RX RENEWAL (OUTPATIENT)
Age: 59
End: 2019-07-11

## 2019-07-16 ENCOUNTER — APPOINTMENT (OUTPATIENT)
Dept: ENDOCRINOLOGY | Facility: CLINIC | Age: 59
End: 2019-07-16
Payer: MEDICARE

## 2019-07-16 ENCOUNTER — RESULT CHARGE (OUTPATIENT)
Age: 59
End: 2019-07-16

## 2019-07-16 VITALS
DIASTOLIC BLOOD PRESSURE: 73 MMHG | RESPIRATION RATE: 16 BRPM | HEIGHT: 61 IN | SYSTOLIC BLOOD PRESSURE: 124 MMHG | HEART RATE: 72 BPM | TEMPERATURE: 98.6 F | OXYGEN SATURATION: 95 % | WEIGHT: 234 LBS | BODY MASS INDEX: 44.18 KG/M2

## 2019-07-16 DIAGNOSIS — E11.43 TYPE 2 DIABETES MELLITUS WITH DIABETIC AUTONOMIC (POLY)NEUROPATHY: ICD-10-CM

## 2019-07-16 DIAGNOSIS — Z86.39 PERSONAL HISTORY OF OTHER ENDOCRINE, NUTRITIONAL AND METABOLIC DISEASE: ICD-10-CM

## 2019-07-16 LAB — GLUCOSE BLDC GLUCOMTR-MCNC: 143

## 2019-07-16 PROCEDURE — G0108 DIAB MANAGE TRN  PER INDIV: CPT

## 2019-07-16 PROCEDURE — 99214 OFFICE O/P EST MOD 30 MIN: CPT

## 2019-07-16 PROCEDURE — 82962 GLUCOSE BLOOD TEST: CPT

## 2019-07-17 PROBLEM — E11.43 TYPE 2 DIABETES MELLITUS WITH DIABETIC AUTONOMIC (POLY)NEUROPATHY: Status: RESOLVED | Noted: 2017-02-16 | Resolved: 2019-07-17

## 2019-07-17 NOTE — HISTORY OF PRESENT ILLNESS
[FreeTextEntry1] : Patient is doing well, trying to follow the diet and walking. Her weight has increased. The HbA1c was 6.5%, the thyroid tests were normal. She is still having palpitations on and off

## 2019-07-17 NOTE — ASSESSMENT
[FreeTextEntry1] : The diabetes is well controlled\par The patient is clinically euthyroid\par Advised to see Nutritionist\par Advised to increase her daily activities\par Will continue same treatment

## 2019-07-17 NOTE — PHYSICAL EXAM
[Alert] : alert [No Acute Distress] : no acute distress [Normal Sclera/Conjunctiva] : normal sclera/conjunctiva [PERRL] : pupils equal, round and reactive to light [Normal Outer Ear/Nose] : the ears and nose were normal in appearance [Normal Hearing] : hearing was normal [No Neck Mass] : no neck mass was observed [Thyroid Not Enlarged] : the thyroid was not enlarged [No Respiratory Distress] : no respiratory distress [Normal Rate and Effort] : normal respiratory rhythm and effort [Normal PMI] : the apical impulse was normal [Normal Rate] : heart rate was normal  [Carotids Normal] : carotid pulses were normal with no bruits

## 2019-07-23 ENCOUNTER — OUTPATIENT (OUTPATIENT)
Dept: OUTPATIENT SERVICES | Facility: HOSPITAL | Age: 59
LOS: 1 days | End: 2019-07-23
Payer: MEDICARE

## 2019-07-23 VITALS
RESPIRATION RATE: 16 BRPM | HEIGHT: 60 IN | SYSTOLIC BLOOD PRESSURE: 110 MMHG | DIASTOLIC BLOOD PRESSURE: 70 MMHG | OXYGEN SATURATION: 96 % | TEMPERATURE: 98 F | WEIGHT: 235.01 LBS | HEART RATE: 62 BPM

## 2019-07-23 DIAGNOSIS — C50.919 MALIGNANT NEOPLASM OF UNSPECIFIED SITE OF UNSPECIFIED FEMALE BREAST: ICD-10-CM

## 2019-07-23 DIAGNOSIS — Z33.2 ENCOUNTER FOR ELECTIVE TERMINATION OF PREGNANCY: Chronic | ICD-10-CM

## 2019-07-23 DIAGNOSIS — H26.9 UNSPECIFIED CATARACT: Chronic | ICD-10-CM

## 2019-07-23 DIAGNOSIS — M25.512 PAIN IN LEFT SHOULDER: ICD-10-CM

## 2019-07-23 DIAGNOSIS — R52 PAIN, UNSPECIFIED: ICD-10-CM

## 2019-07-23 DIAGNOSIS — E04.1 NONTOXIC SINGLE THYROID NODULE: Chronic | ICD-10-CM

## 2019-07-23 DIAGNOSIS — M67.912 UNSPECIFIED DISORDER OF SYNOVIUM AND TENDON, LEFT SHOULDER: ICD-10-CM

## 2019-07-23 DIAGNOSIS — E11.9 TYPE 2 DIABETES MELLITUS WITHOUT COMPLICATIONS: ICD-10-CM

## 2019-07-23 DIAGNOSIS — C80.1 MALIGNANT (PRIMARY) NEOPLASM, UNSPECIFIED: Chronic | ICD-10-CM

## 2019-07-23 DIAGNOSIS — K21.9 GASTRO-ESOPHAGEAL REFLUX DISEASE WITHOUT ESOPHAGITIS: ICD-10-CM

## 2019-07-23 DIAGNOSIS — F32.9 MAJOR DEPRESSIVE DISORDER, SINGLE EPISODE, UNSPECIFIED: ICD-10-CM

## 2019-07-23 DIAGNOSIS — M67.929 UNSPECIFIED DISORDER OF SYNOVIUM AND TENDON, UNSPECIFIED UPPER ARM: ICD-10-CM

## 2019-07-23 DIAGNOSIS — I10 ESSENTIAL (PRIMARY) HYPERTENSION: ICD-10-CM

## 2019-07-23 LAB
ALBUMIN SERPL ELPH-MCNC: 4 G/DL — SIGNIFICANT CHANGE UP (ref 3.3–5)
ALP SERPL-CCNC: 105 U/L — SIGNIFICANT CHANGE UP (ref 40–120)
ALT FLD-CCNC: 11 U/L — SIGNIFICANT CHANGE UP (ref 4–33)
ANION GAP SERPL CALC-SCNC: 13 MMO/L — SIGNIFICANT CHANGE UP (ref 7–14)
AST SERPL-CCNC: 14 U/L — SIGNIFICANT CHANGE UP (ref 4–32)
BASOPHILS # BLD AUTO: 0.02 K/UL — SIGNIFICANT CHANGE UP (ref 0–0.2)
BASOPHILS NFR BLD AUTO: 0.4 % — SIGNIFICANT CHANGE UP (ref 0–2)
BILIRUB DIRECT SERPL-MCNC: < 0.2 MG/DL — SIGNIFICANT CHANGE UP (ref 0.1–0.2)
BILIRUB SERPL-MCNC: 0.2 MG/DL — SIGNIFICANT CHANGE UP (ref 0.2–1.2)
BUN SERPL-MCNC: 16 MG/DL — SIGNIFICANT CHANGE UP (ref 7–23)
CALCIUM SERPL-MCNC: 9.5 MG/DL — SIGNIFICANT CHANGE UP (ref 8.4–10.5)
CHLORIDE SERPL-SCNC: 100 MMOL/L — SIGNIFICANT CHANGE UP (ref 98–107)
CO2 SERPL-SCNC: 28 MMOL/L — SIGNIFICANT CHANGE UP (ref 22–31)
CREAT SERPL-MCNC: 1.04 MG/DL — SIGNIFICANT CHANGE UP (ref 0.5–1.3)
EOSINOPHIL # BLD AUTO: 0.14 K/UL — SIGNIFICANT CHANGE UP (ref 0–0.5)
EOSINOPHIL NFR BLD AUTO: 2.7 % — SIGNIFICANT CHANGE UP (ref 0–6)
GLUCOSE SERPL-MCNC: 134 MG/DL — HIGH (ref 70–99)
HBA1C BLD-MCNC: 6.5 % — HIGH (ref 4–5.6)
HCT VFR BLD CALC: 33 % — LOW (ref 34.5–45)
HCT VFR BLD CALC: 33 % — LOW (ref 34.5–45)
HGB BLD-MCNC: 10.3 G/DL — LOW (ref 11.5–15.5)
HGB BLD-MCNC: 10.3 G/DL — LOW (ref 11.5–15.5)
IMM GRANULOCYTES NFR BLD AUTO: 0.2 % — SIGNIFICANT CHANGE UP (ref 0–1.5)
LYMPHOCYTES # BLD AUTO: 1.66 K/UL — SIGNIFICANT CHANGE UP (ref 1–3.3)
LYMPHOCYTES # BLD AUTO: 32.2 % — SIGNIFICANT CHANGE UP (ref 13–44)
MCHC RBC-ENTMCNC: 28.1 PG — SIGNIFICANT CHANGE UP (ref 27–34)
MCHC RBC-ENTMCNC: 28.1 PG — SIGNIFICANT CHANGE UP (ref 27–34)
MCHC RBC-ENTMCNC: 31.2 % — LOW (ref 32–36)
MCHC RBC-ENTMCNC: 31.2 % — LOW (ref 32–36)
MCV RBC AUTO: 89.9 FL — SIGNIFICANT CHANGE UP (ref 80–100)
MCV RBC AUTO: 89.9 FL — SIGNIFICANT CHANGE UP (ref 80–100)
MONOCYTES # BLD AUTO: 0.41 K/UL — SIGNIFICANT CHANGE UP (ref 0–0.9)
MONOCYTES NFR BLD AUTO: 7.9 % — SIGNIFICANT CHANGE UP (ref 2–14)
NEUTROPHILS # BLD AUTO: 2.92 K/UL — SIGNIFICANT CHANGE UP (ref 1.8–7.4)
NEUTROPHILS NFR BLD AUTO: 56.6 % — SIGNIFICANT CHANGE UP (ref 43–77)
NRBC # FLD: 0 K/UL — SIGNIFICANT CHANGE UP (ref 0–0)
NRBC # FLD: 0 K/UL — SIGNIFICANT CHANGE UP (ref 0–0)
PLATELET # BLD AUTO: 271 K/UL — SIGNIFICANT CHANGE UP (ref 150–400)
PLATELET # BLD AUTO: 271 K/UL — SIGNIFICANT CHANGE UP (ref 150–400)
PMV BLD: 10.3 FL — SIGNIFICANT CHANGE UP (ref 7–13)
PMV BLD: 10.3 FL — SIGNIFICANT CHANGE UP (ref 7–13)
POTASSIUM SERPL-MCNC: 3.9 MMOL/L — SIGNIFICANT CHANGE UP (ref 3.5–5.3)
POTASSIUM SERPL-SCNC: 3.9 MMOL/L — SIGNIFICANT CHANGE UP (ref 3.5–5.3)
PROT SERPL-MCNC: 8.3 G/DL — SIGNIFICANT CHANGE UP (ref 6–8.3)
RBC # BLD: 3.67 M/UL — LOW (ref 3.8–5.2)
RBC # BLD: 3.67 M/UL — LOW (ref 3.8–5.2)
RBC # FLD: 16 % — HIGH (ref 10.3–14.5)
RBC # FLD: 16 % — HIGH (ref 10.3–14.5)
SODIUM SERPL-SCNC: 141 MMOL/L — SIGNIFICANT CHANGE UP (ref 135–145)
WBC # BLD: 5.16 K/UL — SIGNIFICANT CHANGE UP (ref 3.8–10.5)
WBC # BLD: 5.16 K/UL — SIGNIFICANT CHANGE UP (ref 3.8–10.5)
WBC # FLD AUTO: 5.16 K/UL — SIGNIFICANT CHANGE UP (ref 3.8–10.5)
WBC # FLD AUTO: 5.16 K/UL — SIGNIFICANT CHANGE UP (ref 3.8–10.5)

## 2019-07-23 PROCEDURE — 93010 ELECTROCARDIOGRAM REPORT: CPT

## 2019-07-23 RX ORDER — ACETAMINOPHEN 500 MG
2 TABLET ORAL
Qty: 0 | Refills: 0 | DISCHARGE

## 2019-07-23 RX ORDER — METFORMIN HYDROCHLORIDE 850 MG/1
2 TABLET ORAL
Qty: 0 | Refills: 0 | DISCHARGE

## 2019-07-23 RX ORDER — OMEGA-3 ACID ETHYL ESTERS 1 G
1 CAPSULE ORAL
Qty: 0 | Refills: 0 | DISCHARGE

## 2019-07-23 RX ORDER — METHADONE HYDROCHLORIDE 40 MG/1
1 TABLET ORAL
Qty: 0 | Refills: 0 | DISCHARGE

## 2019-07-23 RX ORDER — FLUTICASONE PROPIONATE 50 MCG
1 SPRAY, SUSPENSION NASAL
Qty: 0 | Refills: 0 | DISCHARGE

## 2019-07-23 RX ORDER — MELOXICAM 15 MG/1
0 TABLET ORAL
Qty: 0 | Refills: 0 | DISCHARGE

## 2019-07-23 RX ORDER — BUPROPION HYDROCHLORIDE 150 MG/1
1 TABLET, EXTENDED RELEASE ORAL
Qty: 0 | Refills: 0 | DISCHARGE

## 2019-07-23 RX ORDER — OMEPRAZOLE 10 MG/1
1 CAPSULE, DELAYED RELEASE ORAL
Qty: 0 | Refills: 0 | DISCHARGE

## 2019-07-23 RX ORDER — GABAPENTIN 400 MG/1
1 CAPSULE ORAL
Qty: 0 | Refills: 0 | DISCHARGE

## 2019-07-23 NOTE — H&P PST ADULT - NSICDXFAMILYHX_GEN_ALL_CORE_FT
FAMILY HISTORY:  Family history of leukemia, father  age 56 from leukemia    Sibling  Still living? Unknown  Family history of rheumatoid arthritis, Age at diagnosis: Age Unknown

## 2019-07-23 NOTE — H&P PST ADULT - BACK EXAM
"Initial BP (!) 89/52 (BP Location: Right arm, Patient Position: Chair, Cuff Size: Child)   Pulse 119   Temp 98.7  F (37.1  C) (Tympanic)   Ht 3' 5\" (1.041 m)   Wt 36 lb 3.2 oz (16.4 kg)   SpO2 100%   BMI 15.14 kg/m   Estimated body mass index is 15.14 kg/m  as calculated from the following:    Height as of this encounter: 3' 5\" (1.041 m).    Weight as of this encounter: 36 lb 3.2 oz (16.4 kg). .    Brielle Harper, RYLEE    " normal shape

## 2019-07-23 NOTE — H&P PST ADULT - NEGATIVE NEUROLOGICAL SYMPTOMS
no weakness/no generalized seizures/no focal seizures/no paresthesias/no syncope/no transient paralysis

## 2019-07-23 NOTE — H&P PST ADULT - NSICDXPROBLEM_GEN_ALL_CORE_FT
PROBLEM DIAGNOSES  Problem: Shoulder pain, left  Assessment and Plan: Left Shoulder Arthroscopy Rotator Cuff repair Possible Superior Capsule Reconstruction     Problem: Hypertension  Assessment and Plan:     Problem: DM (diabetes mellitus)  Assessment and Plan:     Problem: GERD (gastroesophageal reflux disease)  Assessment and Plan: PROBLEM DIAGNOSES  Problem: Shoulder pain, left  Assessment and Plan: Left Shoulder Arthroscopy Rotator Cuff repair Possible Superior Capsule Reconstruction  Pre op instructions including Hibiclens with teach back reviewed with pt ; pt verbalized good understanding of pre op instructions  Pt to Dr Alfaro for pre op medical evaluation    Problem: Hypertension  Assessment and Plan: Pt to take losartan and diltiazem dos    Problem: DM (diabetes mellitus)  Assessment and Plan: BMP, HGBA1C done 7/23/19  Pt  to hold metformin evening prior to surgery and dos  FS dos    Problem: GERD (gastroesophageal reflux disease)  Assessment and Plan: Pt to take Omeprazole dos    Problem: Depression  Assessment and Plan: Pt to take Bupropion dos    Problem: Pain management  Assessment and Plan: Pt to continue Methadone dos    Problem: Breast cancer  Assessment and Plan: No Blood, BP, IV Right Arm

## 2019-07-23 NOTE — H&P PST ADULT - HISTORY OF PRESENT ILLNESS
Pt is a 59 y.o. female ; h/o bilateral shoulder pain tx PT 11/18. pt sustained fall ; injury to left shoulder . pt to MD ; an mri was done " there is swelling ; rotator cuff injury " Pt to surgeon ; pt now presents for Left shoulder arthriscopy Rotator Cuff repair Possible superior capsule Reconstruction Pt is a 59 y.o. female ; h/o bilateral shoulder pain tx PT 11/18. pt sustained fall ; injury to left shoulder . pt to MD ; an mri was done " there is swelling ; rotator cuff injury " Pt to surgeon ; pt now presents for Left shoulder Arthroscopy Rotator Cuff repair Possible Superior Capsule Reconstruction

## 2019-07-23 NOTE — H&P PST ADULT - NSICDXPASTMEDICALHX_GEN_ALL_CORE_FT
PAST MEDICAL HISTORY:  Abdominal hernia in 2012 7/23/19 ; pt stateshernia repair 2008, 2018    Acid Reflux     Alcohol abuse--quit 8 years ago--goes to AA ADDENDUM:  at PAST appointment  patient states she quit alcohol approximately 2003    Anxiety     Arthritis     Asthma deneis recent exacerbation, deneis intubation or hospitalizations.    b/l  Carpal tunnel syndrome tx PT/OT    Breast cancer 2012  right breast -- had mastectomy and then post op chemo and RT, followed with Herceptin for 1 year  2012 last chemo   2013 may last Herceptin  2013 last RT    Depression     Diabetes mellitus diagnosed in 2007 fs 86 am 170 pm    Diverticulosis     Drug abuse--quit 8 years ago--goes to AA ADDENDUM: at PAST appointment, patient states she quit alcohol in approximately 2003    ETOH abuse at PAST appointment 9-11-14, patient states she quit alcohol in approximately 2003    Fibromyalgia     h/o   Fatty liver     h/o b/l fungal foot infection 7/23./19 pt denies    herniated lumbar disc     hiatal hernia last endoscopy 1.5 years ago    History  Uterine Fibroid s/p Hysterectomy 7/02    Hypercholesterolemia 7/23/19 ; pt reports improvement ; pt denies rx    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 PAST MEDICAL HISTORY:  Abdominal hernia in 2012 7/23/19 ; pt states hernia repair 2008, 2018    Acid Reflux     Alcohol abuse--quit 8 years ago--goes to AA ADDENDUM:  at PAST appointment  patient states she quit alcohol approximately 2003    Anxiety     Arthritis     Asthma deneis recent exacerbation, deneis intubation or hospitalizations.    b/l  Carpal tunnel syndrome tx PT/OT    Breast cancer 2012  right breast -- had mastectomy and then post op chemo and RT, followed with Herceptin for 1 year  2012 last chemo   2013 may last Herceptin  2013 last RT    Depression     Diabetes mellitus diagnosed in 2007 fs 86 am 170 pm    Diverticulosis     Drug abuse--quit 8 years ago--goes to AA ADDENDUM: at PAST appointment, patient states she quit alcohol in approximately 2003    ETOH abuse at PAST appointment 9-11-14, patient states she quit alcohol in approximately 2003    Fibromyalgia     h/o   Fatty liver     h/o b/l fungal foot infection 7/23./19 pt denies    herniated lumbar disc     hiatal hernia last endoscopy 1.5 years ago    History  Uterine Fibroid s/p Hysterectomy 7/02    Hypercholesterolemia 7/23/19 ; pt reports improvement ; pt denies rx    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

## 2019-07-23 NOTE — H&P PST ADULT - NSICDXPASTSURGICALHX_GEN_ALL_CORE_FT
PAST SURGICAL HISTORY:  2002   h/o hysterectomy due to Fibroid--post op vaginal bleeding requiring a transfusion     2008  repair of ventral hernia with mesh Revision 2018    Cancer 2012  insertion of mediport -- to be excised on 9-22-14    Cataract Bilateral 2017    Radical mastectomy of right breast 3/30/12    Termination of pregnancy (fetus) x 3    Thyroid nodule negative biopsy

## 2019-07-23 NOTE — H&P PST ADULT - PRO PAIN LIFE ADAPT
inability to work/inability or reluctance to perform ADLs/inability to sleep/inability to enjoy life/decreased activity level

## 2019-07-23 NOTE — H&P PST ADULT - MALLAMPATI CLASS
I   with phonation/Class II - visualization of the soft palate, fauces, and uvula Class II - visualization of the soft palate, fauces, and uvula

## 2019-07-24 ENCOUNTER — APPOINTMENT (OUTPATIENT)
Dept: ORTHOPEDIC SURGERY | Facility: CLINIC | Age: 59
End: 2019-07-24
Payer: MEDICARE

## 2019-07-24 DIAGNOSIS — M18.11 UNILATERAL PRIMARY OSTEOARTHRITIS OF FIRST CARPOMETACARPAL JOINT, RIGHT HAND: ICD-10-CM

## 2019-07-24 DIAGNOSIS — M65.342 TRIGGER FINGER, LEFT RING FINGER: ICD-10-CM

## 2019-07-24 DIAGNOSIS — M18.12 UNILATERAL PRIMARY OSTEOARTHRITIS OF FIRST CARPOMETACARPAL JOINT, LEFT HAND: ICD-10-CM

## 2019-07-24 PROCEDURE — 99214 OFFICE O/P EST MOD 30 MIN: CPT | Mod: 25

## 2019-07-24 PROCEDURE — 20550 NJX 1 TENDON SHEATH/LIGAMENT: CPT | Mod: F3

## 2019-07-29 ENCOUNTER — APPOINTMENT (OUTPATIENT)
Dept: INTERNAL MEDICINE | Facility: CLINIC | Age: 59
End: 2019-07-29
Payer: MEDICARE

## 2019-07-29 VITALS
HEART RATE: 74 BPM | WEIGHT: 229 LBS | TEMPERATURE: 98.4 F | SYSTOLIC BLOOD PRESSURE: 144 MMHG | BODY MASS INDEX: 43.23 KG/M2 | HEIGHT: 61 IN | DIASTOLIC BLOOD PRESSURE: 76 MMHG | OXYGEN SATURATION: 97 %

## 2019-07-29 PROCEDURE — 99214 OFFICE O/P EST MOD 30 MIN: CPT

## 2019-07-29 RX ORDER — MELOXICAM 7.5 MG/1
7.5 TABLET ORAL DAILY
Qty: 60 | Refills: 0 | Status: DISCONTINUED | COMMUNITY
Start: 2018-09-05 | End: 2019-07-29

## 2019-07-29 NOTE — HISTORY OF PRESENT ILLNESS
[No Adverse Anesthesia Reaction] : no adverse anesthesia reaction in self or family member [Diabetes] : diabetes [(Patient denies any chest pain, claudication, dyspnea on exertion, orthopnea, palpitations or syncope)] : Patient denies any chest pain, claudication, dyspnea on exertion, orthopnea, palpitations or syncope [Aortic Stenosis] : no aortic stenosis [Coronary Artery Disease] : no coronary artery disease [Atrial Fibrillation] : no atrial fibrillation [Recent Myocardial Infarction] : no recent myocardial infarction [Asthma] : no asthma [Implantable Device/Pacemaker] : no implantable device/pacemaker [Sleep Apnea] : sleep apnea [COPD] : no COPD [Smoker] : not a smoker [Chronic Anticoagulation] : no chronic anticoagulation [Chronic Kidney Disease] : no chronic kidney disease [FreeTextEntry1] : L rotator cuff tear [FreeTextEntry2] : 08/6/2019 [FreeTextEntry7] : Nuclear stress test 6/26/18: normal\par Echo 6/30/17: Normal [FreeTextEntry3] : Dr. Álvaro Bui [FreeTextEntry4] : \par Saw Endo (Dr. Figueroa); advised to do 24-hour urine testing for persistent sweating.\par \par Seeing nutritionist and says doing good.\par Had labs done through preop testing last week.\par EKG normal.\par \par No chest pain. No SOB.\par Using diclofenac topical on/off for L shoulder pain.\par \par Pt weaning down on methadone from 20mg to 15mg and started yesterday to 10mg; being managed by Pain medication.\par Feeling good overall. Has been doing PT.\par \par Continues to have stress.\par \par MADHURI - never completed CPAP titration; has not started it.

## 2019-07-29 NOTE — PHYSICAL EXAM
[Normal] : normal rate, regular rhythm, normal S1 and S2 and no murmur heard [No Edema] : there was no peripheral edema

## 2019-07-29 NOTE — ASSESSMENT
[Ischemic Heart Disease] : Ischemic Heart Disease - No (0) [High Risk Surgery - Intraperitoneal, Intrathoracic or Supringuinal Vascular Procedures] : High Risk Surgery - Intraperitoneal, Intrathoracic or Supringuinal Vascular Procedures - No (0) [Creatinine >= 2mg/dL (1 Point)] : Creatinine >= 2mg/dL - No (0) [Congestive Heart Failure] : Congestive Heart Failure - No (0) [Prior Cerebrovascular Accident or TIA] : Prior Cerebrovascular Accident or TIA - No (0) [Insulin-dependent Diabetic (1 Point)] : Insulin-dependent Diabetic - No (0) [0] : 0 , RCRI Class: I, Risk of Post-Op Cardiac Complications: 0.4%, Procedure Risk: Low-Risk [Patient Optimized for Surgery] : Patient optimized for surgery [No Further Testing Recommended] : no further testing recommended [As per surgery] : as per surgery [FreeTextEntry4] : 56 y/o F with h/o obesity, HTN, DM (A1c 6.5), breast cancer s/p mastectomy/xrt/chemo, MADHURI (not on CPAP), bipolar disorder here for preop evaluation prior to L rotator cuff surgery.\par Had echo 2017 and nuclear stress test 2018, which were both normal.\par Medically optimized for surgery.

## 2019-07-29 NOTE — REVIEW OF SYSTEMS
[Joint Pain] : joint pain [Muscle Pain] : muscle pain [Back Pain] : back pain [Negative] : Heme/Lymph [FreeTextEntry2] : sweating

## 2019-08-01 LAB
ALBUMIN SERPL ELPH-MCNC: 4.3 G/DL
ALP BLD-CCNC: 102 U/L
ALT SERPL-CCNC: 12 U/L
ANION GAP SERPL CALC-SCNC: 14 MMOL/L
AST SERPL-CCNC: 12 U/L
BILIRUB DIRECT SERPL-MCNC: 0.2 MG/DL
BILIRUB INDIRECT SERPL-MCNC: 0.3 MG/DL
BILIRUB SERPL-MCNC: 0.4 MG/DL
BUN SERPL-MCNC: 15 MG/DL
CALCIUM SERPL-MCNC: 10.2 MG/DL
CHLORIDE SERPL-SCNC: 98 MMOL/L
CHOLEST SERPL-MCNC: 159 MG/DL
CHOLEST/HDLC SERPL: 3.1 RATIO
CK SERPL-CCNC: 148 U/L
CO2 SERPL-SCNC: 27 MMOL/L
CREAT SERPL-MCNC: 1.04 MG/DL
CREAT SPEC-SCNC: 79 MG/DL
ESTIMATED AVERAGE GLUCOSE: 148 MG/DL
FRUCTOSAMINE SERPL-MCNC: 260 UMOL/L
GLUCOSE BS SERPL-MCNC: 128 MG/DL
GLUCOSE SERPL-MCNC: 132 MG/DL
HBA1C MFR BLD HPLC: 6.8 %
HDLC SERPL-MCNC: 51 MG/DL
LDLC SERPL CALC-MCNC: 91 MG/DL
MICROALBUMIN 24H UR DL<=1MG/L-MCNC: 3.5 MG/DL
MICROALBUMIN/CREAT 24H UR-RTO: 44 MG/G
POTASSIUM SERPL-SCNC: 4.1 MMOL/L
PROT SERPL-MCNC: 8.2 G/DL
SODIUM SERPL-SCNC: 139 MMOL/L
T4 FREE SERPL-MCNC: 1.2 NG/DL
TRIGL SERPL-MCNC: 83 MG/DL
TSH SERPL-ACNC: 3 UIU/ML

## 2019-08-19 ENCOUNTER — APPOINTMENT (OUTPATIENT)
Dept: INTERNAL MEDICINE | Facility: CLINIC | Age: 59
End: 2019-08-19
Payer: MEDICARE

## 2019-08-19 VITALS
BODY MASS INDEX: 44.18 KG/M2 | OXYGEN SATURATION: 97 % | HEART RATE: 65 BPM | DIASTOLIC BLOOD PRESSURE: 60 MMHG | SYSTOLIC BLOOD PRESSURE: 140 MMHG | TEMPERATURE: 98.2 F | HEIGHT: 61 IN | WEIGHT: 234 LBS

## 2019-08-19 PROCEDURE — 99214 OFFICE O/P EST MOD 30 MIN: CPT

## 2019-08-19 NOTE — HISTORY OF PRESENT ILLNESS
[Sleep Apnea] : sleep apnea [No Pertinent Cardiac History] : no history of aortic stenosis, atrial fibrillation, coronary artery disease, recent myocardial infarction, or implantable device/pacemaker [Asthma] : no asthma [COPD] : no COPD [Smoker] : not a smoker [No Adverse Anesthesia Reaction] : no adverse anesthesia reaction in self or family member [Chronic Anticoagulation] : no chronic anticoagulation [Diabetes] : diabetes [(Patient denies any chest pain, claudication, dyspnea on exertion, orthopnea, palpitations or syncope)] : Patient denies any chest pain, claudication, dyspnea on exertion, orthopnea, palpitations or syncope [Chronic Kidney Disease] : no chronic kidney disease [FreeTextEntry1] : L rotator cuff tear [FreeTextEntry2] : 09/5/2019 [FreeTextEntry4] : Was unable to have surgery in 8/2019 because anesthesiologist decided that it should be done in the hospital\par \par Still having sweating; still pending 24-hour urine testing. \par Currently in the process of weaning down methadone -- currently at 15mg daily. \par \par Will be going for presurgical testing 08/22.\par BPs at home have been ok -- 120s/60s [FreeTextEntry3] : Dr. Álvaro Bui [FreeTextEntry7] : Echo 6/2017\par Nuclear stress test 6/2018: normal

## 2019-08-22 ENCOUNTER — OUTPATIENT (OUTPATIENT)
Dept: OUTPATIENT SERVICES | Facility: HOSPITAL | Age: 59
LOS: 1 days | End: 2019-08-22

## 2019-08-22 VITALS
DIASTOLIC BLOOD PRESSURE: 72 MMHG | HEART RATE: 66 BPM | HEIGHT: 61 IN | RESPIRATION RATE: 16 BRPM | WEIGHT: 233.03 LBS | TEMPERATURE: 98 F | SYSTOLIC BLOOD PRESSURE: 136 MMHG

## 2019-08-22 DIAGNOSIS — M67.912 UNSPECIFIED DISORDER OF SYNOVIUM AND TENDON, LEFT SHOULDER: ICD-10-CM

## 2019-08-22 DIAGNOSIS — E11.9 TYPE 2 DIABETES MELLITUS WITHOUT COMPLICATIONS: ICD-10-CM

## 2019-08-22 DIAGNOSIS — H26.9 UNSPECIFIED CATARACT: Chronic | ICD-10-CM

## 2019-08-22 DIAGNOSIS — E04.1 NONTOXIC SINGLE THYROID NODULE: Chronic | ICD-10-CM

## 2019-08-22 DIAGNOSIS — M25.512 PAIN IN LEFT SHOULDER: ICD-10-CM

## 2019-08-22 DIAGNOSIS — Z33.2 ENCOUNTER FOR ELECTIVE TERMINATION OF PREGNANCY: Chronic | ICD-10-CM

## 2019-08-22 DIAGNOSIS — C80.1 MALIGNANT (PRIMARY) NEOPLASM, UNSPECIFIED: Chronic | ICD-10-CM

## 2019-08-22 RX ORDER — DICLOFENAC SODIUM 30 MG/G
0 GEL TOPICAL
Qty: 0 | Refills: 0 | DISCHARGE

## 2019-08-22 NOTE — H&P PST ADULT - NSICDXPASTMEDICALHX_GEN_ALL_CORE_FT
PAST MEDICAL HISTORY:  Abdominal hernia in 2012 7/23/19 ; pt states hernia repair 2008, 2018    Acid Reflux     Alcohol abuse--quit 8 years ago--goes to AA ADDENDUM:  at PAST appointment  patient states she quit alcohol approximately 2003    Anxiety     Arthritis     Asthma last rescue inhaler use "maybe 3 years ago when I had the flu or a cold"    b/l  Carpal tunnel syndrome tx PT/OT    Breast cancer 2012  right breast -- had mastectomy and then post op chemo and RT, followed with Herceptin for 1 year  2012 last chemo   2013 may last Herceptin  2013 last RT    Depression     Diabetes mellitus diagnosed in 2007 fs 86 am 170 pm    Diverticulosis     Drug abuse--quit 8 years ago--goes to AA ADDENDUM: at PAST appointment, patient states she quit alcohol in approximately 2003    ETOH abuse at PAST appointment 9-11-14, patient states she quit alcohol in approximately 2003    Fibromyalgia     h/o   Fatty liver     h/o b/l fungal foot infection 7/23./19 pt denies    herniated lumbar disc     hiatal hernia last endoscopy 1.5 years ago    History  Uterine Fibroid s/p Hysterectomy 7/02    Hypercholesterolemia 7/23/19 ; pt reports improvement ; pt denies rx    Hyperhidrosis 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

## 2019-08-22 NOTE — H&P PST ADULT - PRO PAIN LIFE ADAPT
inability or reluctance to perform ADLs/inability to sleep/inability to enjoy life/inability to work/decreased activity level

## 2019-08-22 NOTE — H&P PST ADULT - NSICDXPROBLEM_GEN_ALL_CORE_FT
PROBLEM DIAGNOSES  Problem: Left shoulder pain  Assessment and Plan: Pt. is scheduled for a left shoulder arthroscopy, rotator cuff repair...9/5/19.  Pt. verbalized understanding of instructions as discussed and outlined on the instruction sheets.  Pt. did teach back on Chlorhexidine wash.     Problem: Type II diabetes mellitus  Assessment and Plan: Instructed pt,. to take last dose of Metformin in the morning the day before surgery.

## 2019-08-22 NOTE — H&P PST ADULT - NOTES
quit smoking 20 years ago; quit drinking alcohol in approximately 2003; quit using cocaine and marijuana 2003

## 2019-08-22 NOTE — H&P PST ADULT - LAST CARDIAC ANGIOGRAM/IMAGING
2002, [facility] 164t /st., pt. denies stent placement, "everything was clear when they went in but my heart stopped on the table"

## 2019-08-27 ENCOUNTER — RX RENEWAL (OUTPATIENT)
Age: 59
End: 2019-08-27

## 2019-09-04 ENCOUNTER — TRANSCRIPTION ENCOUNTER (OUTPATIENT)
Age: 59
End: 2019-09-04

## 2019-09-05 ENCOUNTER — APPOINTMENT (OUTPATIENT)
Dept: ORTHOPEDIC SURGERY | Facility: HOSPITAL | Age: 59
End: 2019-09-05

## 2019-09-05 ENCOUNTER — OUTPATIENT (OUTPATIENT)
Dept: OUTPATIENT SERVICES | Facility: HOSPITAL | Age: 59
LOS: 1 days | Discharge: HOME | End: 2019-09-05
Payer: MEDICARE

## 2019-09-05 VITALS — HEART RATE: 80 BPM | OXYGEN SATURATION: 100 % | RESPIRATION RATE: 18 BRPM

## 2019-09-05 VITALS
HEART RATE: 60 BPM | SYSTOLIC BLOOD PRESSURE: 140 MMHG | RESPIRATION RATE: 16 BRPM | WEIGHT: 233.03 LBS | OXYGEN SATURATION: 98 % | TEMPERATURE: 98 F | HEIGHT: 61 IN | DIASTOLIC BLOOD PRESSURE: 72 MMHG

## 2019-09-05 DIAGNOSIS — E04.1 NONTOXIC SINGLE THYROID NODULE: Chronic | ICD-10-CM

## 2019-09-05 DIAGNOSIS — M67.912 UNSPECIFIED DISORDER OF SYNOVIUM AND TENDON, LEFT SHOULDER: ICD-10-CM

## 2019-09-05 DIAGNOSIS — H26.9 UNSPECIFIED CATARACT: Chronic | ICD-10-CM

## 2019-09-05 DIAGNOSIS — Z33.2 ENCOUNTER FOR ELECTIVE TERMINATION OF PREGNANCY: Chronic | ICD-10-CM

## 2019-09-05 DIAGNOSIS — C80.1 MALIGNANT (PRIMARY) NEOPLASM, UNSPECIFIED: Chronic | ICD-10-CM

## 2019-09-05 PROBLEM — D57.3 SICKLE-CELL TRAIT: Chronic | Status: ACTIVE | Noted: 2019-08-22

## 2019-09-05 LAB — GLUCOSE BLDC GLUCOMTR-MCNC: 125 MG/DL — HIGH (ref 70–99)

## 2019-09-05 PROCEDURE — 29827 SHO ARTHRS SRG RT8TR CUF RPR: CPT | Mod: LT

## 2019-09-05 PROCEDURE — 29823 SHO ARTHRS SRG XTNSV DBRDMT: CPT | Mod: LT

## 2019-09-05 RX ORDER — OXYCODONE AND ACETAMINOPHEN 5; 325 MG/1; MG/1
1 TABLET ORAL
Qty: 25 | Refills: 0
Start: 2019-09-05 | End: 2019-09-09

## 2019-09-05 RX ORDER — HYDRALAZINE HCL 50 MG
10 TABLET ORAL ONCE
Refills: 0 | Status: COMPLETED | OUTPATIENT
Start: 2019-09-05 | End: 2019-09-05

## 2019-09-05 RX ORDER — FENTANYL CITRATE 50 UG/ML
50 INJECTION INTRAVENOUS
Refills: 0 | Status: DISCONTINUED | OUTPATIENT
Start: 2019-09-05 | End: 2019-09-05

## 2019-09-05 RX ORDER — FENTANYL CITRATE 50 UG/ML
25 INJECTION INTRAVENOUS
Refills: 0 | Status: DISCONTINUED | OUTPATIENT
Start: 2019-09-05 | End: 2019-09-05

## 2019-09-05 RX ORDER — LABETALOL HCL 100 MG
10 TABLET ORAL ONCE
Refills: 0 | Status: COMPLETED | OUTPATIENT
Start: 2019-09-05 | End: 2019-09-05

## 2019-09-05 RX ORDER — LABETALOL HCL 100 MG
20 TABLET ORAL ONCE
Refills: 0 | Status: COMPLETED | OUTPATIENT
Start: 2019-09-05 | End: 2019-09-05

## 2019-09-05 RX ORDER — SODIUM CHLORIDE 9 MG/ML
1000 INJECTION, SOLUTION INTRAVENOUS
Refills: 0 | Status: DISCONTINUED | OUTPATIENT
Start: 2019-09-05 | End: 2019-09-21

## 2019-09-05 RX ORDER — ONDANSETRON 8 MG/1
8 TABLET, FILM COATED ORAL ONCE
Refills: 0 | Status: DISCONTINUED | OUTPATIENT
Start: 2019-09-05 | End: 2019-09-21

## 2019-09-05 RX ADMIN — Medication 20 MILLIGRAM(S): at 13:03

## 2019-09-05 RX ADMIN — Medication 10 MILLIGRAM(S): at 12:19

## 2019-09-05 RX ADMIN — Medication 10 MILLIGRAM(S): at 11:46

## 2019-09-05 RX ADMIN — FENTANYL CITRATE 25 MICROGRAM(S): 50 INJECTION INTRAVENOUS at 11:56

## 2019-09-05 RX ADMIN — FENTANYL CITRATE 25 MICROGRAM(S): 50 INJECTION INTRAVENOUS at 12:11

## 2019-09-05 NOTE — ASU DISCHARGE PLAN (ADULT/PEDIATRIC) - ASU DC SPECIAL INSTRUCTIONSFT
Follow up in Dr. Tom office in 1 week  Pain medications sent to [facility], take as prescribed  No weight bearing left upper extremity  Keep arm in sling at all times  No range of motion of left shoulder

## 2019-09-05 NOTE — ASU PATIENT PROFILE, ADULT - PMH
Abdominal hernia in 2012 7/23/19 ; pt states hernia repair 2008, 2018  Acid Reflux    Alcohol abuse--quit 8 years ago--goes to AA  ADDENDUM:  at PAST appointment  patient states she quit alcohol approximately 2003  Anxiety    Arthritis    Asthma  last rescue inhaler use "maybe 3 years ago when I had the flu or a cold"  b/l  Carpal tunnel syndrome  tx PT/OT  Breast cancer  2012  right breast -- had mastectomy and then post op chemo and RT, followed with Herceptin for 1 year  2012 last chemo   2013 may last Herceptin  2013 last RT  Depression    Diabetes mellitus diagnosed in 2007  fs 86 am 170 pm  Diverticulosis    Drug abuse--quit 8 years ago--goes to AA  ADDENDUM: at PAST appointment, patient states she quit alcohol in approximately 2003  ETOH abuse  at PAST appointment 9-11-14, patient states she quit alcohol in approximately 2003  Fibromyalgia    h/o   Fatty liver    h/o b/l fungal foot infection  7/23./19 pt denies  herniated lumbar disc    hiatal hernia  last endoscopy 1.5 years ago  History  Uterine Fibroid  s/p Hysterectomy 7/02  Hypercholesterolemia  7/23/19 ; pt reports improvement ; pt denies rx  Hyperhidrosis  w/u in progress  Hypertension    Insomnia    Lymphedema, limb  right side s/p right mastectomy  Miscarriage  x 2  Morbid obesity    Neuropathy  b/l feet  personal h/o CHF (congestive heart failure)--last hospitalized in 2005    sickel cell anemia trait    Sickle cell trait    Sleep apnea diagnosed 2007---supposed to use device but doesn't    Substance abuse  quit in 2003 -- cocaine and marijuana  Thyroid nodule  had negative biopsy

## 2019-09-05 NOTE — ASU PATIENT PROFILE, ADULT - PSH
2002   h/o hysterectomy due to Fibroid--post op vaginal bleeding requiring a transfusion    2008  repair of ventral hernia with mesh  Revision 2018  Cancer  2012  insertion of mediport -- to be excised on 9-22-14  Cataract  Bilateral 2017  Radical mastectomy of right breast  3/30/12  Termination of pregnancy (fetus)  x 3  Thyroid nodule  negative biopsy

## 2019-09-05 NOTE — ASU PREOP CHECKLIST - LAST TOOK
solids
Alert, oriented. RLQ tenderness, no palpable mass. Clear lungs bilaterally, normal cardiac exam.

## 2019-09-05 NOTE — ASU DISCHARGE PLAN (ADULT/PEDIATRIC) - CALL YOUR DOCTOR IF YOU HAVE ANY OF THE FOLLOWING:
Swelling that gets worse/Pain not relieved by Medications/Wound/Surgical Site with redness, or foul smelling discharge or pus/Bleeding that does not stop

## 2019-09-05 NOTE — ASU DISCHARGE PLAN (ADULT/PEDIATRIC) - NURSING INSTRUCTIONS
No heavy lifting or straining. Keep left arm in sling at all times. No weight bearing to left arm . Follow up with MD in 1 week. If pain not being relieved with medication notify MD.

## 2019-09-13 ENCOUNTER — APPOINTMENT (OUTPATIENT)
Dept: ORTHOPEDIC SURGERY | Facility: CLINIC | Age: 59
End: 2019-09-13
Payer: MEDICARE

## 2019-09-13 ENCOUNTER — RX RENEWAL (OUTPATIENT)
Age: 59
End: 2019-09-13

## 2019-09-13 PROCEDURE — 99024 POSTOP FOLLOW-UP VISIT: CPT

## 2019-09-18 ENCOUNTER — APPOINTMENT (OUTPATIENT)
Dept: PHYSICAL MEDICINE AND REHAB | Facility: CLINIC | Age: 59
End: 2019-09-18

## 2019-09-19 ENCOUNTER — RX RENEWAL (OUTPATIENT)
Age: 59
End: 2019-09-19

## 2019-09-19 ENCOUNTER — APPOINTMENT (OUTPATIENT)
Dept: PHYSICAL MEDICINE AND REHAB | Facility: CLINIC | Age: 59
End: 2019-09-19
Payer: MEDICARE

## 2019-09-19 ENCOUNTER — RX CHANGE (OUTPATIENT)
Age: 59
End: 2019-09-19

## 2019-09-19 PROCEDURE — 99213 OFFICE O/P EST LOW 20 MIN: CPT

## 2019-09-19 NOTE — PHYSICAL EXAM
[Normal] : Oriented to person, place, and time, insight and judgement were intact and the affect was normal [de-identified] : 2 cm difference between the left and right upper extremities [de-identified] : + knee pain with ROM. [de-identified] : MS 5/5 b/l LE, decreased sensation, vibration sense. + Romberg

## 2019-09-19 NOTE — ASSESSMENT
[FreeTextEntry1] : gait abnormality/falls-continue PT \par Meloxicam PRN pain for knees, needs b/l TKa \par Start shoulder PT  \par Follow up in 2 months

## 2019-09-19 NOTE — HISTORY OF PRESENT ILLNESS
[FreeTextEntry1] : 59 y.o female presents for follow up for lymphedema. She has a history of advanced breast cancer status post right mastectomy and axillary lymph node dissection on 3/30/2012 followed by chemotherapy and radiation. PET/ CT performed on 8/6/14 show postsurgical changes in the right chest wall.\par Finished LD therapy and currently wears a night garment and gauntlet.\par  \par \par Interval s/p left shoulder arthroscopy and debridement \par elft shoulder pain is better- has not started PT\par Still has b/l knee pain-Takes meloxicam

## 2019-09-27 NOTE — ASSESSMENT
[FreeTextEntry1] : S/P discharge with chest pain Dx chest wall syndrome   Told she has collection at site of hernia repair Told to return to surgeon for follow up   To see dr YUN for follow up concerning statin treatment   Diabetes well controlled
rolling walker

## 2019-10-07 ENCOUNTER — TRANSCRIPTION ENCOUNTER (OUTPATIENT)
Age: 59
End: 2019-10-07

## 2019-10-15 ENCOUNTER — APPOINTMENT (OUTPATIENT)
Dept: ORTHOPEDIC SURGERY | Facility: CLINIC | Age: 59
End: 2019-10-15
Payer: MEDICARE

## 2019-10-15 ENCOUNTER — APPOINTMENT (OUTPATIENT)
Dept: NEUROLOGY | Facility: CLINIC | Age: 59
End: 2019-10-15
Payer: MEDICARE

## 2019-10-15 VITALS
SYSTOLIC BLOOD PRESSURE: 136 MMHG | HEIGHT: 61 IN | WEIGHT: 235 LBS | HEART RATE: 67 BPM | DIASTOLIC BLOOD PRESSURE: 82 MMHG | BODY MASS INDEX: 44.37 KG/M2

## 2019-10-15 DIAGNOSIS — F33.9 MAJOR DEPRESSIVE DISORDER, RECURRENT, UNSPECIFIED: ICD-10-CM

## 2019-10-15 PROCEDURE — 99214 OFFICE O/P EST MOD 30 MIN: CPT

## 2019-10-15 PROCEDURE — 99024 POSTOP FOLLOW-UP VISIT: CPT

## 2019-10-15 NOTE — ASSESSMENT
[FreeTextEntry1] : Assesment: \par 60yo RH AAW, with subjective memory issues. \par Mental and neuro exam continue to be ok.\par She has peripheral neuropathy in LE and antalgic limitation of RLE>LLE due to knees pain. \par Planning knees sx soon.\par \par Diagnostic Impression:\par -anxiety/depression\par -subjective memory impairment\par -neuropathy.\par \par Plan: \par -continue with current meds, same dose of Wellbutrin, might consider a more anti-anxiety medication later on after knees sx\par -will do EGM to objectivate neuropathy\par -will see her back in 8mo or so.\par

## 2019-10-15 NOTE — REVIEW OF SYSTEMS
[Anxiety] : anxiety [As Noted in HPI] : as noted in HPI [Depression] : depression [Negative] : Heme/Lymph [FreeTextEntry3] : recent gradual blurring of vision, possible cataract.

## 2019-10-15 NOTE — PHYSICAL EXAM
[General Appearance - Alert] : alert [General Appearance - In No Acute Distress] : in no acute distress [General Appearance - Well Nourished] : well nourished [General Appearance - Well Developed] : well developed [Oriented To Time, Place, And Person] : oriented to person, place, and time [Impaired Insight] : insight and judgment were intact [Affect] : the affect was normal [Over the Past 2 Weeks, Have You Felt Little Interest or Pleasure Doing Things?] : 2.) Over the past 2 weeks, have you felt little interest or pleasure doing things? Yes [Place] : oriented to place [Person] : oriented to person [Time] : oriented to time [Short Term Intact] : short term memory intact [Registration Intact] : recent registration memory intact [Remote Intact] : remote memory intact [Concentration Intact] : normal concentrating ability [Span Intact] : the attention span was normal [Visual Intact] : visual attention was ~T not ~L decreased [Naming Objects] : no difficulty naming common objects [Writing A Sentence] : no difficulty writing a sentence [Repeating Phrases] : no difficulty repeating a phrase [Fluency] : fluency intact [Comprehension] : comprehension intact [Current Events] : adequate knowledge of current events [Reading] : reading intact [Past History] : adequate knowledge of personal past history [Vocabulary] : adequate range of vocabulary [Total Score ___ / 30] : the patient achieved a score of [unfilled] /30 [Date / Time ___ / 5] : date / time [unfilled] / 5 [Place ___ / 5] : place [unfilled] / 5 [Registration ___ / 3] : registration [unfilled] / 3 [Serial Sevens ___/5] : serial sevens [unfilled] / 5 [Naming 2 Objects ___ / 2] : naming two objects [unfilled] / 2 [Repeating a Sentence ___ / 1] : repeating a sentence [unfilled] / 1 [Writing a Sentence ___ / 1] : write sentence [unfilled] / 1 [3-stage Verbal Command ___ / 3] : three-stage verbal command [unfilled] / 3 [Written Command ___ / 1] : written command [unfilled] / 1 [Copy a Design ___ / 1] : copy a design [unfilled] / 1 [Recall ___ / 3] : recall [unfilled] / 3 [Cranial Nerves Optic (II)] : visual acuity intact bilaterally,  visual fields full to confrontation, pupils equal round and reactive to light [Cranial Nerves Oculomotor (III)] : extraocular motion intact [Cranial Nerves Trigeminal (V)] : facial sensation intact symmetrically [Cranial Nerves Facial (VII)] : face symmetrical [Cranial Nerves Vestibulocochlear (VIII)] : hearing was intact bilaterally [Cranial Nerves Glossopharyngeal (IX)] : tongue and palate midline [Cranial Nerves Accessory (XI - Cranial And Spinal)] : head turning and shoulder shrug symmetric [Cranial Nerves Hypoglossal (XII)] : there was no tongue deviation with protrusion [Involuntary Movements] : no involuntary movements were seen [No Muscle Atrophy] : normal bulk in all four extremities [Motor Strength] : muscle strength was normal in all four extremities [Motor Handedness Right-Handed] : the patient is right hand dominant [Sensation Tactile Decrease] : light touch was intact [Sensation Pain / Temperature Decrease] : pain and temperature was intact [Proprioception] : proprioception was intact [Balance] : balance was intact [2+] : Brachioradialis left 2+ [1+] : Patella left 1+ [0] : Ankle jerk left 0 [Sclera] : the sclera and conjunctiva were normal [No APD] : no afferent pupillary defect [Extraocular Movements] : extraocular movements were intact [PERRL With Normal Accommodation] : pupils were equal in size, round, reactive to light, with normal accommodation [No ANDERSON] : no internuclear ophthalmoplegia [Full Visual Field] : full visual field [Outer Ear] : the ears and nose were normal in appearance [Oropharynx] : the oropharynx was normal [Neck Appearance] : the appearance of the neck was normal [Jugular Venous Distention Increased] : there was no jugular-venous distention [Neck Cervical Mass (___cm)] : no neck mass was observed [Thyroid Diffuse Enlargement] : the thyroid was not enlarged [Thyroid Nodule] : there were no palpable thyroid nodules [Auscultation Breath Sounds / Voice Sounds] : lungs were clear to auscultation bilaterally [Heart Sounds] : normal S1 and S2 [Heart Rate And Rhythm] : heart rate was normal and rhythm regular [Murmurs] : no murmurs [Heart Sounds Gallop] : no gallops [Heart Sounds Pericardial Friction Rub] : no pericardial rub [Arterial Pulses Carotid] : carotid pulses were normal with no bruits [Edema] : there was no peripheral edema [Full Pulse] : the pedal pulses are present [Abdomen Soft] : soft [Bowel Sounds] : normal bowel sounds [Abdomen Tenderness] : non-tender [Abdomen Mass (___ Cm)] : no abdominal mass palpated [No CVA Tenderness] : no ~M costovertebral angle tenderness [No Spinal Tenderness] : no spinal tenderness [Abnormal Walk] : normal gait [Nail Clubbing] : no clubbing  or cyanosis of the fingernails [Musculoskeletal - Swelling] : no joint swelling seen [Motor Tone] : muscle strength and tone were normal [Skin Color & Pigmentation] : normal skin color and pigmentation [Skin Turgor] : normal skin turgor [] : no rash [Over the Past 2 Weeks, Have You Felt Down, Depressed, or Hopeless?] : 1.) Over the past 2 weeks, have you felt down, depressed, or hopeless? No [Motor Strength Upper Extremities Bilaterally] : strength was normal in both upper extremities [Motor Strength Lower Extremities Bilaterally] : strength was normal in both lower extremities [Romberg's Sign] : Romberg's sign was negtive [Allodynia] : no ~T allodynia present [Dysesthesia] : no dysesthesia [Hyperesthesia] : no hyperesthesia [Limited Balance] : balance was intact [Past-pointing] : there was no past-pointing [Tremor] : no tremor present [Coordination - Dysmetria Impaired Finger-to-Nose Bilateral] : not present [Dysdiadochokinesia Bilaterally] : not present [Coordination - Dysmetria Impaired Heel-to-Shin Bilateral] : not present [Plantar Reflex Right Only] : normal on the right [Plantar Reflex Left Only] : normal on the left [FreeTextEntry5] : small pupils, surgical, reactive; no pain on OO compression.  [FreeTextEntry4] : Alt pattern: intact\par Clock: 3/3\par Luria: Intact.\par Trail A: 45"; B: 100"\par Fluency: intact. [FreeTextEntry7] : decreased vibration sens distal LE. [FreeTextEntry6] : strength  limited by bilateral knee pain [FreeTextEntry8] : balance limited by bilateral knee pain [FreeTextEntry1] : L chest scar from old port.

## 2019-10-15 NOTE — HISTORY OF PRESENT ILLNESS
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 \par Had a fall with head trauma in Feb 2019, CT head negative ([facility]par She is very busy, and has a hard time managing her busy schedule. \par She has a very positive outlook overall. \par \par HPI-Interval Hx [id number]:\par MRI, MRA were negative, ESR 71, but vascular sx evaluation with Echo was negative for TA. \par Still a lot of stress in her life.\par Feels better with Wellbutrin, her face pain and HA have stopped.\par She sleeps very well, and appetite is good.\par She keeps very busy, now preparing for her daughter's wedding.\par \par HPI-Interval Hx [id number]:\par Pt here for follow-up.\par Over the last year, she has been pretty much stable.\par recent Bone Scan and CT have shown no secondary lesions from breast Ca.\par \par She gets seldom sharp pain behind L eye for a few seconds, with slight HA, lasting 5-10 min.\par She also noticed her face sweats a lot, at times her arms too. This apparently is dating 10-20years.\par In all, she feels very irritable and she still c/o STM and attention issues.\par \par PMH:\par 57yo RH AAW, with hx of fibromyalgia, bipolar depression, breast CA s/p resection and chemo in 2012, currently followed up and disease free, colon polyps, arthritis.\par She reports that she sometimes looses attentions and would forget things, e.g. that she is cooking something and would leave things on the stove. She has issues remembering names of people, but always had.\par \par She does not report HA.\par \par Mood: she often gets sad, cries a lot, but has had bipolar depression for a long time, used to be on Lexapro, VPA and Neurontin, stopped a few years ago.\par She used to be in a program at [facility], terminated when she had started using a plant product ([place]?). She has a hx of SI/SA in 2004, now denies any SI. \par \par She has a hx of EtOH abuse, sober and in AA for many years.\par \par She has a complex living situation, having to take care of her mother, daughter, grandchildren.\par \par She does not drive, she never did, has fear of it.\par \par Sleep: falls asleep readily, but has to urinate very frequently. Sleeps 10h daily.\par \par Disabled, used to do clerical work in several different settings.\par \par ADl and IADL intact.\par \par Of note, she has a small nodule in L thyroid, under surveillance.

## 2019-10-24 ENCOUNTER — APPOINTMENT (OUTPATIENT)
Dept: ORTHOPEDIC SURGERY | Facility: CLINIC | Age: 59
End: 2019-10-24
Payer: MEDICARE

## 2019-10-24 VITALS — HEART RATE: 74 BPM | DIASTOLIC BLOOD PRESSURE: 84 MMHG | SYSTOLIC BLOOD PRESSURE: 157 MMHG

## 2019-10-24 PROCEDURE — 99213 OFFICE O/P EST LOW 20 MIN: CPT | Mod: 24

## 2019-10-24 NOTE — HISTORY OF PRESENT ILLNESS
[Worsening] : worsening [Walking] : worsened by walking [Acetaminophen] : relieved by acetaminophen [Opioid Analgesics] : relieved by opioid analgesics [NSAIDs] : relieved by nonsteroidal anti-inflammatory drugs [Rest] : relieved by rest [de-identified] : 59 year old female presents with bilateral knee pain for many years. She has a history of OA and has planned to pursue joint replacement surgery in the past but has not been able to commit to surgery for various reasons. She is recently s/p Left shoulder rotator cuff repair 6+ weeks ago. She states the Right knee is a little more painful than the left knee. She experiences a locking/ catching sensation of the right knee and buckling of the left knee from pain. She takes methadone, Mobic and Tylenol arthritis for pain management.   \par \par She has HTN, NIDDM II, denies liver or kidney disease. She denies smoking

## 2019-10-24 NOTE — PHYSICAL EXAM
[Slightly Antalgic] : slightly antalgic [Knee Tenderness On Palpation Right] : tenderness [Cane] : ambulates with cane [Knee Swelling Right] : no swelling [Knee Motion Right Crepitus] : crepitus with ROM [Knee Motion Right] : limited ROM [Knee Posterior Drawer Sign Right] : negative posterior drawer sign [Knee Anterior Drawer Sign Right] : negative anterior drawer sign [Knee Medial Instability Right] : no laxity on valgus stress [Knee Swelling Left] : no swelling [Knee Lateral Instability Right] : no laxity on varus stress [Knee Motion Left Crepitus] : crepitus with ROM [Knee Tenderness On Palpation Left] : tenderness [Knee Motion Left] : limited ROM [Knee Posterior Drawer Sign Left] : negative posterior drawer sign [Knee Anterior Drawer Sign Left] : negative anterior drawer sign [Knee Lateral Instability Left] : no  laxity on varus stress [Knee Tender On Palp With Quadriceps Contracted (Shrug Sign)] : positive patellar grind [Knee Medial Instability Left] : no laxity on valgus stress [de-identified] : X-rays both knees January 2019 were reviewed. Tricompartmental degenerative arthritis changes with more advanced changes affecting the patellofemoral and medial compartments [de-identified] : Equal leg length her skin intact with knees. Mild varus deformities both knees. Tenderness medial joint lines. 5-100° flexion both knees. Crepitus noted. Both knees grossly stable.No calf tenderness.

## 2019-10-24 NOTE — DISCUSSION/SUMMARY
[de-identified] : Patient has advanced degenerative arthritis changes of her knees. Increased BMI. Recommend that she continue to work on weight management with her nutritionist. Recommend consultation with one of our knee replacement specialists for further definitive surgical management.

## 2019-10-25 ENCOUNTER — NON-APPOINTMENT (OUTPATIENT)
Age: 59
End: 2019-10-25

## 2019-10-25 ENCOUNTER — APPOINTMENT (OUTPATIENT)
Dept: CARDIOLOGY | Facility: CLINIC | Age: 59
End: 2019-10-25
Payer: MEDICARE

## 2019-10-25 VITALS
RESPIRATION RATE: 16 BRPM | HEIGHT: 61 IN | OXYGEN SATURATION: 99 % | DIASTOLIC BLOOD PRESSURE: 85 MMHG | WEIGHT: 235 LBS | HEART RATE: 77 BPM | BODY MASS INDEX: 44.37 KG/M2 | SYSTOLIC BLOOD PRESSURE: 149 MMHG

## 2019-10-25 PROCEDURE — 99214 OFFICE O/P EST MOD 30 MIN: CPT

## 2019-10-25 PROCEDURE — 93000 ELECTROCARDIOGRAM COMPLETE: CPT

## 2019-10-25 NOTE — HISTORY OF PRESENT ILLNESS
[FreeTextEntry1] : Here for followup\par No complaints\par No chest pain, dyspnea. Had normal Nuclear Stress Test that was normal last year\par Doing well

## 2019-10-25 NOTE — PHYSICAL EXAM
[General Appearance - Well Developed] : well developed [Well Groomed] : well groomed [Normal Appearance] : normal appearance [No Deformities] : no deformities [General Appearance - Well Nourished] : well nourished [Normal Oral Mucosa] : normal oral mucosa [General Appearance - In No Acute Distress] : no acute distress [No Oral Pallor] : no oral pallor [No Oral Cyanosis] : no oral cyanosis [Normal Jugular Venous A Waves Present] : normal jugular venous A waves present [Normal Jugular Venous V Waves Present] : normal jugular venous V waves present [No Jugular Venous Adame A Waves] : no jugular venous adame A waves [Respiration, Rhythm And Depth] : normal respiratory rhythm and effort [Exaggerated Use Of Accessory Muscles For Inspiration] : no accessory muscle use [Auscultation Breath Sounds / Voice Sounds] : lungs were clear to auscultation bilaterally [Heart Rate And Rhythm] : heart rate and rhythm were normal [Heart Sounds] : normal S1 and S2 [Abdomen Soft] : soft [Murmurs] : no murmurs present [Abdomen Tenderness] : non-tender [Skin Color & Pigmentation] : normal skin color and pigmentation [Abdomen Mass (___ Cm)] : no abdominal mass palpated [] : no rash [No Venous Stasis] : no venous stasis [Skin Lesions] : no skin lesions [No Skin Ulcers] : no skin ulcer [No Xanthoma] : no  xanthoma was observed [Oriented To Time, Place, And Person] : oriented to person, place, and time [Affect] : the affect was normal [Mood] : the mood was normal [No Anxiety] : not feeling anxious

## 2019-10-25 NOTE — DISCUSSION/SUMMARY
[FreeTextEntry1] : 59 year old woman with HTN here for followup\par Doing well. No complaints\par -cont HCTZ, will change losartan-->valsartan\par Continue other meds\par FU in 6 months

## 2019-11-11 ENCOUNTER — APPOINTMENT (OUTPATIENT)
Dept: INTERNAL MEDICINE | Facility: CLINIC | Age: 59
End: 2019-11-11
Payer: MEDICARE

## 2019-11-11 VITALS
HEART RATE: 78 BPM | HEIGHT: 61 IN | OXYGEN SATURATION: 98 % | DIASTOLIC BLOOD PRESSURE: 70 MMHG | BODY MASS INDEX: 44.56 KG/M2 | SYSTOLIC BLOOD PRESSURE: 125 MMHG | WEIGHT: 236 LBS | TEMPERATURE: 98 F

## 2019-11-11 PROCEDURE — G0008: CPT

## 2019-11-11 PROCEDURE — 99214 OFFICE O/P EST MOD 30 MIN: CPT | Mod: 25

## 2019-11-11 PROCEDURE — 90686 IIV4 VACC NO PRSV 0.5 ML IM: CPT

## 2019-11-11 NOTE — ASSESSMENT
[FreeTextEntry1] : 60 y/o F with h/o multiple chronic medical issues including chronic pain on methadone, HTN, breast cancer s/p mastectomy/xrt/chemo with chronic lymphedema, knee arthritis, recent L shoulder surgery here for follow up.\par \par HTN - BP controlled\par - Continue dilt, hctz, losartan\par - Monitor BPs while switching losartan to valsartan\par \par DM - A1c at goal in 7/2019\par - Ophtho utd\par - Continue endo f/u\par - Metformin\par \par L shoulder surgery\par - Continue PT\par - F/U with Ortho\par \par Knee arthritis/Back pain\par - F/U with Pain Management (Dr. Lord)\par \par HCM:\par - Flu vaccine today\par - Needs pnemovax at next visit - pt would like to defer\par \par RTC in 3 months for prn.

## 2019-11-11 NOTE — REVIEW OF SYSTEMS
[Joint Pain] : joint pain [Muscle Pain] : muscle pain [Back Pain] : back pain [Negative] : Gastrointestinal

## 2019-11-11 NOTE — HISTORY OF PRESENT ILLNESS
[FreeTextEntry1] : Follow up [de-identified] : Last seen 8/2019\par \par Knee pain - saw Dr. Bui on 10/24. Advised to see knee replacement specialist. Also advised to lose weight.\par \par Rotator cuff surgery - Had L shoulder surgery on 9/5/19. Currently doing physical therapy. No pain in the shoulder. Had a lot of fluid retention postoperatively and weight was up to 245 lbs but now better (236 lbs). \par \par Back pain - had RFA last week with Pain Management specialist Dr. Lord. Has gotten a lot of relief from this.\par \par Chronic lymphedema - saw PM&R on 9/19. advised to continue PT, start shoulder PT, meloxicam prn.\par \par HTN - saw Dr. Torres on 10/25. Changed losartan to valsartan but hasn't started yet because still had a full bottle. Continue hctz. Advised to f/u in 6 months. Also on diltiazem and hctz.\par \par Obesity - weighs herself daily. Has been watching what she eats.\par \par Has been baking cakes a lot. Doing family stuff. Getting ready for vacation in [place] for Thanksgiving. Has a nutritionist (Tatum) through endocrinologist.\par \par Had labs done in July 2019.\par Lots of stress related to  and to financial troubles.\par \par Sweating - never did 24-hour urine because a lot going on.\par \par Memory impairment - saw Dr. Diaz in 10/2019. Thought it was primarily anxiety/depression with neuropathy. Advised to continue wellbutrin.\par \par Needs to see Dental.

## 2019-11-27 NOTE — H&P PST ADULT - NSWEIGHTCALCTOOLDRUG_GEN_A_CORE
Detail Level: Zone Additional Notes: Secondary to psoriasis. Improving on acitretin 35 mg daily and urea cream. Some cheilitis (recommended vaseline prn and Dr. Dan's 1% hydrocortisone ointment once per day-samples given) but otherwise tolerating well. Will check CMP, CBC, lipids and plan to continue 35 mg daily. RTC 2 months and still continuing to improve, will try to drop down to 25 mg daily  used

## 2019-12-10 ENCOUNTER — MEDICATION RENEWAL (OUTPATIENT)
Age: 59
End: 2019-12-10

## 2019-12-11 ENCOUNTER — APPOINTMENT (OUTPATIENT)
Dept: ORTHOPEDIC SURGERY | Facility: CLINIC | Age: 59
End: 2019-12-11

## 2019-12-12 ENCOUNTER — RX RENEWAL (OUTPATIENT)
Age: 59
End: 2019-12-12

## 2019-12-17 ENCOUNTER — APPOINTMENT (OUTPATIENT)
Dept: NEUROLOGY | Facility: CLINIC | Age: 59
End: 2019-12-17
Payer: MEDICARE

## 2019-12-17 PROCEDURE — 95908 NRV CNDJ TST 3-4 STUDIES: CPT

## 2019-12-17 PROCEDURE — 95886 MUSC TEST DONE W/N TEST COMP: CPT

## 2019-12-20 ENCOUNTER — MEDICATION RENEWAL (OUTPATIENT)
Age: 59
End: 2019-12-20

## 2019-12-20 ENCOUNTER — RX RENEWAL (OUTPATIENT)
Age: 59
End: 2019-12-20

## 2019-12-23 ENCOUNTER — APPOINTMENT (OUTPATIENT)
Dept: ORTHOPEDIC SURGERY | Facility: CLINIC | Age: 59
End: 2019-12-23

## 2020-01-16 ENCOUNTER — APPOINTMENT (OUTPATIENT)
Dept: ORTHOPEDIC SURGERY | Facility: CLINIC | Age: 60
End: 2020-01-16
Payer: MEDICARE

## 2020-01-16 DIAGNOSIS — M67.912 UNSPECIFIED DISORDER OF SYNOVIUM AND TENDON, LEFT SHOULDER: ICD-10-CM

## 2020-01-16 DIAGNOSIS — Z98.890 OTHER SPECIFIED POSTPROCEDURAL STATES: ICD-10-CM

## 2020-01-16 PROCEDURE — 99213 OFFICE O/P EST LOW 20 MIN: CPT

## 2020-01-23 ENCOUNTER — APPOINTMENT (OUTPATIENT)
Dept: ENDOCRINOLOGY | Facility: CLINIC | Age: 60
End: 2020-01-23
Payer: MEDICARE

## 2020-01-23 VITALS — WEIGHT: 237 LBS | BODY MASS INDEX: 44.78 KG/M2

## 2020-01-23 PROCEDURE — G0108 DIAB MANAGE TRN  PER INDIV: CPT

## 2020-02-10 ENCOUNTER — APPOINTMENT (OUTPATIENT)
Dept: INTERNAL MEDICINE | Facility: CLINIC | Age: 60
End: 2020-02-10
Payer: MEDICARE

## 2020-02-10 ENCOUNTER — NON-APPOINTMENT (OUTPATIENT)
Age: 60
End: 2020-02-10

## 2020-02-10 VITALS
BODY MASS INDEX: 44.56 KG/M2 | DIASTOLIC BLOOD PRESSURE: 60 MMHG | TEMPERATURE: 97.8 F | SYSTOLIC BLOOD PRESSURE: 130 MMHG | WEIGHT: 236 LBS | HEART RATE: 80 BPM | HEIGHT: 61 IN | OXYGEN SATURATION: 98 %

## 2020-02-10 PROCEDURE — 93000 ELECTROCARDIOGRAM COMPLETE: CPT

## 2020-02-10 PROCEDURE — 36415 COLL VENOUS BLD VENIPUNCTURE: CPT

## 2020-02-10 PROCEDURE — 99214 OFFICE O/P EST MOD 30 MIN: CPT | Mod: 25

## 2020-02-10 NOTE — ASSESSMENT
[FreeTextEntry1] : 58 y/o F with h/o multiple chronic medical issues including chronic pain on methadone, HTN, breast cancer s/p mastectomy/xrt/chemo with chronic R arm lymphedema, bilateral knee arthritis, recent L rotator cuff surgery (9/2019), bipolar depression here for follow up.\par \par L shoulder pain - has continued despite surgery/PT. Most recent MRI with bilateral bicipital tendon tear as well.\par - Will refer back to Dr. Bui\par - New referral for PT\par - Can continue meloxicam prn\par \par HTN - BP at goal\par - Continue valsartan, diltiazem, hctz\par \par DM\par - Continue metformin\par - Check UA\par - Urine microalb/cr up to date\par \par Depression - has previously tried multiple antidepressants (>10 years ago); unable to titrate up wellbutryn (had side effects)\par - Would refer to psychiatry for med management and therapy\par - Continue wellbutryn for now\par \par Obesity\par - F/U with nutritionist\par - Encouraged to start going to [employer]\par - Check labs\par \par HCM:\par - Flu vaccine [id number] utd\par - Mammo 5/2019\par - Colonoscopy 2017\par \par RTC in 3 months for CPE.

## 2020-02-10 NOTE — HISTORY OF PRESENT ILLNESS
[de-identified] : \sofia Feels very disappointed because had rotator cuff surgery on L side in 2019 and was doing PT but still not able to comb her hair or regain mobility. Has ?completed physical therapy. Never got a call back but pt did say that she thought it was helping. Has a lot of difficulty with abduction because of pain over 25 degrees.\sofia Saw Ortho (Dr. Bui (20) and had an MRI on 20 and has L moderate-large glenohumeral joint effusion with synovitis, subacromial-subdeltoid and subcoracoid bursitis, full-thickness biceps tendon tear, rotator cuff tendinopathy.\par \sofia Thinks she's depressed because of all the pain but still having some "good days"\par Having a lot of financial stress as well. Lots of stress with her  - says that  took time off to "take care" of her and lots of lost income from that.\sofia Goes to AA meetings regularly and one of her friends from these meetings  last week and has been very difficult for her.\sofia Feels like she is "shutting down" and keeps thinking about how fragile life is.\par On wellbutryn since 2018 (started by Neuro) but couldn't increase dose because felt shaky.\sofia Tried lexapro (felt shaky with it, gained 100 lbs), depakote, effexor - last was 14-15 years ago.\par \sofia Started seeing nutritionist - given a calorie chart but hasn't started using it. Wants to start going to [employer] - has the membership \par \sofia Does report some chest pain and SOB with exertion, worse in the past few months.\sofia Had normal stress test 2018. [FreeTextEntry1] : Follow up

## 2020-02-10 NOTE — REVIEW OF SYSTEMS
[Chest Pain] : chest pain [Dyspnea on Exertion] : dyspnea on exertion [Depression] : depression [Negative] : Genitourinary [Suicidal] : not suicidal

## 2020-02-17 LAB
ALBUMIN SERPL ELPH-MCNC: 4.2 G/DL
ALP BLD-CCNC: 106 U/L
ALT SERPL-CCNC: 12 U/L
ANION GAP SERPL CALC-SCNC: 14 MMOL/L
AST SERPL-CCNC: 14 U/L
BASOPHILS # BLD AUTO: 0.04 K/UL
BASOPHILS NFR BLD AUTO: 0.8 %
BILIRUB SERPL-MCNC: 0.2 MG/DL
BUN SERPL-MCNC: 15 MG/DL
CALCIUM SERPL-MCNC: 10.2 MG/DL
CHLORIDE SERPL-SCNC: 100 MMOL/L
CHOLEST SERPL-MCNC: 161 MG/DL
CHOLEST/HDLC SERPL: 3.4 RATIO
CO2 SERPL-SCNC: 26 MMOL/L
CREAT SERPL-MCNC: 1.02 MG/DL
EOSINOPHIL # BLD AUTO: 0.08 K/UL
EOSINOPHIL NFR BLD AUTO: 1.5 %
ESTIMATED AVERAGE GLUCOSE: 143 MG/DL
GLUCOSE SERPL-MCNC: 124 MG/DL
HBA1C MFR BLD HPLC: 6.6 %
HCT VFR BLD CALC: 36.4 %
HDLC SERPL-MCNC: 47 MG/DL
HGB BLD-MCNC: 11.7 G/DL
IMM GRANULOCYTES NFR BLD AUTO: 0.2 %
LDLC SERPL CALC-MCNC: 95 MG/DL
LYMPHOCYTES # BLD AUTO: 1.41 K/UL
LYMPHOCYTES NFR BLD AUTO: 26.9 %
MAN DIFF?: NORMAL
MCHC RBC-ENTMCNC: 27.5 PG
MCHC RBC-ENTMCNC: 32.1 GM/DL
MCV RBC AUTO: 85.4 FL
MONOCYTES # BLD AUTO: 0.4 K/UL
MONOCYTES NFR BLD AUTO: 7.6 %
NEUTROPHILS # BLD AUTO: 3.3 K/UL
NEUTROPHILS NFR BLD AUTO: 63 %
PLATELET # BLD AUTO: 253 K/UL
POTASSIUM SERPL-SCNC: 4.1 MMOL/L
PROT SERPL-MCNC: 7.6 G/DL
RBC # BLD: 4.26 M/UL
RBC # FLD: 16.9 %
SODIUM SERPL-SCNC: 141 MMOL/L
TRIGL SERPL-MCNC: 96 MG/DL
TSH SERPL-ACNC: 1.59 UIU/ML
WBC # FLD AUTO: 5.24 K/UL

## 2020-02-20 ENCOUNTER — INPATIENT (INPATIENT)
Facility: HOSPITAL | Age: 60
LOS: 4 days | Discharge: HOME | End: 2020-02-25
Attending: HOSPITALIST | Admitting: HOSPITALIST
Payer: MEDICARE

## 2020-02-20 VITALS
RESPIRATION RATE: 17 BRPM | DIASTOLIC BLOOD PRESSURE: 85 MMHG | HEART RATE: 104 BPM | SYSTOLIC BLOOD PRESSURE: 185 MMHG | TEMPERATURE: 101 F | OXYGEN SATURATION: 100 %

## 2020-02-20 DIAGNOSIS — C80.1 MALIGNANT (PRIMARY) NEOPLASM, UNSPECIFIED: Chronic | ICD-10-CM

## 2020-02-20 DIAGNOSIS — E04.1 NONTOXIC SINGLE THYROID NODULE: Chronic | ICD-10-CM

## 2020-02-20 DIAGNOSIS — H26.9 UNSPECIFIED CATARACT: Chronic | ICD-10-CM

## 2020-02-20 DIAGNOSIS — Z33.2 ENCOUNTER FOR ELECTIVE TERMINATION OF PREGNANCY: Chronic | ICD-10-CM

## 2020-02-20 RX ORDER — ACETAMINOPHEN 500 MG
975 TABLET ORAL ONCE
Refills: 0 | Status: COMPLETED | OUTPATIENT
Start: 2020-02-20 | End: 2020-02-20

## 2020-02-20 RX ORDER — SODIUM CHLORIDE 9 MG/ML
2000 INJECTION INTRAMUSCULAR; INTRAVENOUS; SUBCUTANEOUS ONCE
Refills: 0 | Status: COMPLETED | OUTPATIENT
Start: 2020-02-20 | End: 2020-02-20

## 2020-02-20 NOTE — ED ADULT TRIAGE NOTE - CHIEF COMPLAINT QUOTE
Pt c/o right arm pain and swelling starting this evening. Pt right arm warm, red and swollen in triage. Pt febrile and tachycardic

## 2020-02-21 LAB
ALBUMIN SERPL ELPH-MCNC: 3.9 G/DL — SIGNIFICANT CHANGE UP (ref 3.3–5)
ALP SERPL-CCNC: 88 U/L — SIGNIFICANT CHANGE UP (ref 40–120)
ALT FLD-CCNC: 13 U/L — SIGNIFICANT CHANGE UP (ref 4–33)
ANION GAP SERPL CALC-SCNC: 15 MMO/L — HIGH (ref 7–14)
APPEARANCE UR: CLEAR — SIGNIFICANT CHANGE UP
APTT BLD: 31.5 SEC — SIGNIFICANT CHANGE UP (ref 27.5–36.3)
AST SERPL-CCNC: 26 U/L — SIGNIFICANT CHANGE UP (ref 4–32)
BASE EXCESS BLDV CALC-SCNC: 4.6 MMOL/L — SIGNIFICANT CHANGE UP
BASOPHILS # BLD AUTO: 0.01 K/UL — SIGNIFICANT CHANGE UP (ref 0–0.2)
BASOPHILS NFR BLD AUTO: 0.1 % — SIGNIFICANT CHANGE UP (ref 0–2)
BILIRUB SERPL-MCNC: 0.3 MG/DL — SIGNIFICANT CHANGE UP (ref 0.2–1.2)
BILIRUB UR-MCNC: NEGATIVE — SIGNIFICANT CHANGE UP
BLOOD GAS VENOUS - CREATININE: 1.11 MG/DL — SIGNIFICANT CHANGE UP (ref 0.5–1.3)
BLOOD GAS VENOUS - FIO2: 21 — SIGNIFICANT CHANGE UP
BLOOD UR QL VISUAL: NEGATIVE — SIGNIFICANT CHANGE UP
BUN SERPL-MCNC: 14 MG/DL — SIGNIFICANT CHANGE UP (ref 7–23)
CALCIUM SERPL-MCNC: 9.4 MG/DL — SIGNIFICANT CHANGE UP (ref 8.4–10.5)
CHLORIDE BLDV-SCNC: 99 MMOL/L — SIGNIFICANT CHANGE UP (ref 96–108)
CHLORIDE SERPL-SCNC: 94 MMOL/L — LOW (ref 98–107)
CO2 SERPL-SCNC: 26 MMOL/L — SIGNIFICANT CHANGE UP (ref 22–31)
COLOR SPEC: SIGNIFICANT CHANGE UP
CREAT SERPL-MCNC: 1.07 MG/DL — SIGNIFICANT CHANGE UP (ref 0.5–1.3)
EOSINOPHIL # BLD AUTO: 0.01 K/UL — SIGNIFICANT CHANGE UP (ref 0–0.5)
EOSINOPHIL NFR BLD AUTO: 0.1 % — SIGNIFICANT CHANGE UP (ref 0–6)
GAS PNL BLDV: 138 MMOL/L — SIGNIFICANT CHANGE UP (ref 136–146)
GLUCOSE BLDV-MCNC: 144 MG/DL — HIGH (ref 70–99)
GLUCOSE SERPL-MCNC: 146 MG/DL — HIGH (ref 70–99)
GLUCOSE UR-MCNC: NEGATIVE — SIGNIFICANT CHANGE UP
HCO3 BLDV-SCNC: 28 MMOL/L — HIGH (ref 20–27)
HCT VFR BLD CALC: 32.7 % — LOW (ref 34.5–45)
HCT VFR BLDV CALC: 28.3 % — LOW (ref 34.5–45)
HGB BLD-MCNC: 10.2 G/DL — LOW (ref 11.5–15.5)
HGB BLDV-MCNC: 9.1 G/DL — LOW (ref 11.5–15.5)
IMM GRANULOCYTES NFR BLD AUTO: 0.4 % — SIGNIFICANT CHANGE UP (ref 0–1.5)
INR BLD: 1.14 — SIGNIFICANT CHANGE UP (ref 0.88–1.17)
KETONES UR-MCNC: NEGATIVE — SIGNIFICANT CHANGE UP
LACTATE BLDV-MCNC: 2 MMOL/L — SIGNIFICANT CHANGE UP (ref 0.5–2)
LACTATE BLDV-MCNC: 2.5 MMOL/L — HIGH (ref 0.5–2)
LEUKOCYTE ESTERASE UR-ACNC: NEGATIVE — SIGNIFICANT CHANGE UP
LYMPHOCYTES # BLD AUTO: 0.6 K/UL — LOW (ref 1–3.3)
LYMPHOCYTES # BLD AUTO: 7.9 % — LOW (ref 13–44)
MCHC RBC-ENTMCNC: 26.8 PG — LOW (ref 27–34)
MCHC RBC-ENTMCNC: 31.2 % — LOW (ref 32–36)
MCV RBC AUTO: 85.8 FL — SIGNIFICANT CHANGE UP (ref 80–100)
METHOD TYPE: SIGNIFICANT CHANGE UP
MONOCYTES # BLD AUTO: 0.26 K/UL — SIGNIFICANT CHANGE UP (ref 0–0.9)
MONOCYTES NFR BLD AUTO: 3.4 % — SIGNIFICANT CHANGE UP (ref 2–14)
NEUTROPHILS # BLD AUTO: 6.7 K/UL — SIGNIFICANT CHANGE UP (ref 1.8–7.4)
NEUTROPHILS NFR BLD AUTO: 88.1 % — HIGH (ref 43–77)
NITRITE UR-MCNC: NEGATIVE — SIGNIFICANT CHANGE UP
NRBC # FLD: 0 K/UL — SIGNIFICANT CHANGE UP (ref 0–0)
ORGANISM # SPEC MICROSCOPIC CNT: SIGNIFICANT CHANGE UP
ORGANISM # SPEC MICROSCOPIC CNT: SIGNIFICANT CHANGE UP
PCO2 BLDV: 51 MMHG — SIGNIFICANT CHANGE UP (ref 41–51)
PH BLDV: 7.38 PH — SIGNIFICANT CHANGE UP (ref 7.32–7.43)
PH UR: 6 — SIGNIFICANT CHANGE UP (ref 5–8)
PLATELET # BLD AUTO: 262 K/UL — SIGNIFICANT CHANGE UP (ref 150–400)
PMV BLD: 9.9 FL — SIGNIFICANT CHANGE UP (ref 7–13)
PO2 BLDV: 25 MMHG — LOW (ref 35–40)
POTASSIUM BLDV-SCNC: 4.1 MMOL/L — SIGNIFICANT CHANGE UP (ref 3.4–4.5)
POTASSIUM SERPL-MCNC: 4.3 MMOL/L — SIGNIFICANT CHANGE UP (ref 3.5–5.3)
POTASSIUM SERPL-SCNC: 4.3 MMOL/L — SIGNIFICANT CHANGE UP (ref 3.5–5.3)
PROT SERPL-MCNC: 8.1 G/DL — SIGNIFICANT CHANGE UP (ref 6–8.3)
PROT UR-MCNC: NEGATIVE — SIGNIFICANT CHANGE UP
PROTHROM AB SERPL-ACNC: 13 SEC — SIGNIFICANT CHANGE UP (ref 9.8–13.1)
RBC # BLD: 3.81 M/UL — SIGNIFICANT CHANGE UP (ref 3.8–5.2)
RBC # FLD: 16.4 % — HIGH (ref 10.3–14.5)
RBC CASTS # UR COMP ASSIST: SIGNIFICANT CHANGE UP (ref 0–?)
SAO2 % BLDV: 37 % — LOW (ref 60–85)
SODIUM SERPL-SCNC: 135 MMOL/L — SIGNIFICANT CHANGE UP (ref 135–145)
SP GR SPEC: 1.02 — SIGNIFICANT CHANGE UP (ref 1–1.04)
SPECIMEN SOURCE: SIGNIFICANT CHANGE UP
UROBILINOGEN FLD QL: NORMAL — SIGNIFICANT CHANGE UP
WBC # BLD: 7.61 K/UL — SIGNIFICANT CHANGE UP (ref 3.8–10.5)
WBC # FLD AUTO: 7.61 K/UL — SIGNIFICANT CHANGE UP (ref 3.8–10.5)
WBC UR QL: SIGNIFICANT CHANGE UP (ref 0–?)

## 2020-02-21 PROCEDURE — 93971 EXTREMITY STUDY: CPT | Mod: 26

## 2020-02-21 PROCEDURE — 71045 X-RAY EXAM CHEST 1 VIEW: CPT | Mod: 26

## 2020-02-21 RX ORDER — DILTIAZEM HCL 120 MG
60 CAPSULE, EXT RELEASE 24 HR ORAL EVERY 12 HOURS
Refills: 0 | Status: DISCONTINUED | OUTPATIENT
Start: 2020-02-21 | End: 2020-02-24

## 2020-02-21 RX ORDER — IBUPROFEN 200 MG
400 TABLET ORAL ONCE
Refills: 0 | Status: COMPLETED | OUTPATIENT
Start: 2020-02-21 | End: 2020-02-21

## 2020-02-21 RX ORDER — VANCOMYCIN HCL 1 G
1000 VIAL (EA) INTRAVENOUS EVERY 12 HOURS
Refills: 0 | Status: DISCONTINUED | OUTPATIENT
Start: 2020-02-21 | End: 2020-02-22

## 2020-02-21 RX ORDER — VANCOMYCIN HCL 1 G
1000 VIAL (EA) INTRAVENOUS ONCE
Refills: 0 | Status: COMPLETED | OUTPATIENT
Start: 2020-02-21 | End: 2020-02-21

## 2020-02-21 RX ORDER — MELOXICAM 15 MG/1
0 TABLET ORAL
Qty: 0 | Refills: 0 | DISCHARGE

## 2020-02-21 RX ORDER — METHADONE HYDROCHLORIDE 40 MG/1
10 TABLET ORAL ONCE
Refills: 0 | Status: DISCONTINUED | OUTPATIENT
Start: 2020-02-21 | End: 2020-02-21

## 2020-02-21 RX ORDER — GABAPENTIN 400 MG/1
1 CAPSULE ORAL
Qty: 0 | Refills: 0 | DISCHARGE

## 2020-02-21 RX ORDER — BUPROPION HYDROCHLORIDE 150 MG/1
100 TABLET, EXTENDED RELEASE ORAL DAILY
Refills: 0 | Status: DISCONTINUED | OUTPATIENT
Start: 2020-02-21 | End: 2020-02-25

## 2020-02-21 RX ORDER — PANTOPRAZOLE SODIUM 20 MG/1
40 TABLET, DELAYED RELEASE ORAL
Refills: 0 | Status: DISCONTINUED | OUTPATIENT
Start: 2020-02-21 | End: 2020-02-25

## 2020-02-21 RX ORDER — METHADONE HYDROCHLORIDE 40 MG/1
5 TABLET ORAL DAILY
Refills: 0 | Status: DISCONTINUED | OUTPATIENT
Start: 2020-02-21 | End: 2020-02-22

## 2020-02-21 RX ORDER — GABAPENTIN 400 MG/1
400 CAPSULE ORAL
Refills: 0 | Status: DISCONTINUED | OUTPATIENT
Start: 2020-02-21 | End: 2020-02-25

## 2020-02-21 RX ORDER — HYDROCHLOROTHIAZIDE 25 MG
25 TABLET ORAL DAILY
Refills: 0 | Status: DISCONTINUED | OUTPATIENT
Start: 2020-02-21 | End: 2020-02-25

## 2020-02-21 RX ORDER — METHADONE HYDROCHLORIDE 40 MG/1
10 TABLET ORAL DAILY
Refills: 0 | Status: DISCONTINUED | OUTPATIENT
Start: 2020-02-21 | End: 2020-02-22

## 2020-02-21 RX ORDER — METFORMIN HYDROCHLORIDE 850 MG/1
1000 TABLET ORAL
Refills: 0 | Status: DISCONTINUED | OUTPATIENT
Start: 2020-02-21 | End: 2020-02-22

## 2020-02-21 RX ORDER — VALSARTAN 80 MG/1
320 TABLET ORAL DAILY
Refills: 0 | Status: DISCONTINUED | OUTPATIENT
Start: 2020-02-21 | End: 2020-02-25

## 2020-02-21 RX ADMIN — METFORMIN HYDROCHLORIDE 1000 MILLIGRAM(S): 850 TABLET ORAL at 09:16

## 2020-02-21 RX ADMIN — METHADONE HYDROCHLORIDE 10 MILLIGRAM(S): 40 TABLET ORAL at 07:39

## 2020-02-21 RX ADMIN — PANTOPRAZOLE SODIUM 40 MILLIGRAM(S): 20 TABLET, DELAYED RELEASE ORAL at 07:39

## 2020-02-21 RX ADMIN — SODIUM CHLORIDE 2000 MILLILITER(S): 9 INJECTION INTRAMUSCULAR; INTRAVENOUS; SUBCUTANEOUS at 00:52

## 2020-02-21 RX ADMIN — Medication 60 MILLIGRAM(S): at 17:58

## 2020-02-21 RX ADMIN — METFORMIN HYDROCHLORIDE 1000 MILLIGRAM(S): 850 TABLET ORAL at 18:50

## 2020-02-21 RX ADMIN — Medication 975 MILLIGRAM(S): at 03:23

## 2020-02-21 RX ADMIN — Medication 975 MILLIGRAM(S): at 00:52

## 2020-02-21 RX ADMIN — Medication 400 MILLIGRAM(S): at 03:32

## 2020-02-21 RX ADMIN — Medication 250 MILLIGRAM(S): at 17:59

## 2020-02-21 RX ADMIN — GABAPENTIN 400 MILLIGRAM(S): 400 CAPSULE ORAL at 21:19

## 2020-02-21 RX ADMIN — Medication 400 MILLIGRAM(S): at 05:03

## 2020-02-21 RX ADMIN — GABAPENTIN 400 MILLIGRAM(S): 400 CAPSULE ORAL at 15:57

## 2020-02-21 RX ADMIN — BUPROPION HYDROCHLORIDE 100 MILLIGRAM(S): 150 TABLET, EXTENDED RELEASE ORAL at 13:16

## 2020-02-21 RX ADMIN — METHADONE HYDROCHLORIDE 5 MILLIGRAM(S): 40 TABLET ORAL at 17:59

## 2020-02-21 RX ADMIN — Medication 100 MILLIGRAM(S): at 09:17

## 2020-02-21 RX ADMIN — Medication 100 MILLIGRAM(S): at 00:52

## 2020-02-21 NOTE — ED PROVIDER NOTE - OBJECTIVE STATEMENT
58 y/o F w/ PMH of breast Ca s/p mastectomy R (chemo, RT, node bx), DM, HTN, HLD, asthma, arthritis, anxiety presenting w/ a complaint of R arm pain. Blue 29. Presents w/ family. Reports earlier this afternoon R arm quickly started to become red, hot, and painful. Reports earlier in the morning she was not experiencing these symptoms. No known trauma to the area. Has lymphedema of that arm due to lymph node bx. Has been having subjective fevers/chills at home but no documented fevers. Did not take tylenol prior to coming to ED. Denies headache, dizziness, blurred vision, chest pain, cough, shortness of breath, abdominal pain, n/v/d/c, urinary symptoms.

## 2020-02-21 NOTE — ED CDU PROVIDER INITIAL DAY NOTE - OBJECTIVE STATEMENT
60 y/o F w/ PMH of breast Ca s/p mastectomy R (chemo, RT, node bx), DM, HTN, HLD, asthma, arthritis, anxiety presenting w/ a complaint of R arm pain. Blue 29. Presents w/ family. Reports earlier this afternoon R arm quickly started to become red, hot, and painful. Reports earlier in the morning she was not experiencing these symptoms. No known trauma to the area. Has lymphedema of that arm due to lymph node bx. Has been having subjective fevers/chills at home but no documented fevers. Did not take tylenol prior to coming to ED. Denies headache, dizziness, blurred vision, chest pain, cough, shortness of breath, abdominal pain, n/v/d/c, urinary symptoms.    CDU TESSA Hollins Note-----  60 yo morbidly obese female, PMH breast cancer (right mastectomy/lymphadenectomy, CTX, RXT in 2012 followed by Herceptin x 1 year (pt was HER2+)), lymphedema RUE since, DM, CHD, HTN, hyperlipidemia, neuropathy, sleep apnea, past hx/o substance (drug/ETOH) abuse (currently on methadone), asthma, anxiety, depression, arthritis, fibromyalgia; pt presented to the ED for a one day hx/o RUE erythema, subjective fevers; pt denies hx/o trauma or injury.  Pt. states current RUE swelling is "slightly" more than usual baseline swelling a/w lymphedema.  In the ED, pt was treated for cellulitis with clindamycin IV; pt was dispo'd to CDU for continued care plan:  IV antibiotics, supportive care, general observation care / monitoring.  On CDU evaluation, plan expanded to include US duplex RUE.  CDU care plan d/w pt who verbalizes she is feeling slightly improved vs initial arrival in the ED; pt verbalizes agreement with CDU care plan.

## 2020-02-21 NOTE — ED PROVIDER NOTE - CLINICAL SUMMARY MEDICAL DECISION MAKING FREE TEXT BOX
60 y/o F w/ PMH of breast Ca s/p mastectomy R (chemo, RT, node bx), DM, HTN, HLD, asthma, arthritis, anxiety w/ RUE cellulitis. Sepsis workup ordered given grossly cellulitic appearance of RUE. Plan for labs, CXR, IVF, Tylenol, abx. Dispo is admission vs. CDU. Will reassess the need for additional interventions as clinically warranted.

## 2020-02-21 NOTE — ED CDU PROVIDER INITIAL DAY NOTE - MEDICAL DECISION MAKING DETAILS
IV antibiotics, supportive care, general observation care / monitoring.  On CDU evaluation, plan expanded to include US duplex RUE.

## 2020-02-21 NOTE — ED PROVIDER NOTE - ATTENDING CONTRIBUTION TO CARE
58 y/o F w/ PMH of breast Ca s/p mastectomy R (chemo, RT, node bx), DM, HTN, HLD, asthma, arthritis, anxiety w/ RUE cellulitis. Sepsis workup ordered given grossly cellulitic appearance of RUE. Plan for labs, CXR, IVF, Tylenol, abx. Dispo is admission vs. CDU. Will reassess the need for additional interventions as clinically warranted.    VIRGINIA Rosales: I have personally performed a face to face bedside history and physical examination of this patient. I have discussed the history, examination, review of systems, assessment and plan of management with the resident. I have reviewed the electronic medical record and amended it to reflect my history, review of systems, physical exam, assessment and plan.

## 2020-02-21 NOTE — ED CDU PROVIDER INITIAL DAY NOTE - ATTENDING CONTRIBUTION TO CARE
Anuj VILLAFUERTE: 60 y/o F with h/o of breast ca s/p R mastectomy/chemo/RT, DM, HTN, hyperlipidemia, asthma, arthritis, anxiety here with swelling and redness to RUE x1 day.  No injury, fever, recent hospitlaization, cp, sob, palpitaitons.  Pt with redness, warmth and induration to RUE no palpable fluctuance, no gross deformity.  Ot is HD stable.  r/o upper ext dvt, abx started for cellulitis, obs in CDU for IV abx and reassessment.

## 2020-02-21 NOTE — ED ADULT NURSE NOTE - NSIMPLEMENTINTERV_GEN_ALL_ED
Implemented All Universal Safety Interventions:  [place] to call system. Call bell, personal items and telephone within reach. Instruct patient to call for assistance. Room bathroom lighting operational. Non-slip footwear when patient is off stretcher. Physically safe environment: no spills, clutter or unnecessary equipment. Stretcher in lowest position, wheels locked, appropriate side rails in place.

## 2020-02-21 NOTE — ED ADULT NURSE NOTE - OBJECTIVE STATEMENT
Pt received in spot 29, A&OX4 c/o arm pain. pt states she was eating dinner tonight, when R arm began to hurt. states she wears a compression sleeve on R arm d/t history of lymphedema. Pt's R arm is swollen, red, warm and painful to touch. Reports subjective fevers. Denies CP, SOB, urinary complaints, injury to affected area, N/V/D, weakness. Pt is able to move extremity w/o complications. Ultrasound IV placed by Dr. Ndiaye. Labs drawn & sent. Will continue to monitor.

## 2020-02-21 NOTE — ED CDU PROVIDER INITIAL DAY NOTE - PROGRESS NOTE DETAILS
Sign out report received from TESSA Hollins, I called Dr. Mathieu Lord [phone]) to verify patients prescription for Methadone, 10mgs twice per day. Pharmacy made aware. Patient returned from vascular lab for study, in NAD, states she is feeling well, R arm cellulitis is within margins already outlined by TESSA Hollins, compartment soft, non-tender, cellulitis clinically improving. Received call from [facility] that pt blood culture results grew gram+ cocci in chains in 1 of 4 bottles, aerobic. Patient returned from vascular lab for study, in NAD, states she is feeling well, R arm cellulitis is within margins already outlined by TESSA Hollins, compartment soft, non-tender, cellulitis clinically improving. Received call from [facility] that pt blood culture results grew gram+ cocci in chains in 1 of 4 bottles, aerobic. Consulted w/ ID, recommending to d/c clinda, repeat bc x 2 sets and give dose of Vanco. Pt in NAD, states she is feeling well, R arm cellulitis is within margins already outlined by TESSA Hollins, compartment soft, non-tender, cellulitis clinically improving. Received call from [facility] that pt blood culture results grew gram+ cocci in chains in 1 of 4 bottles, aerobic. Consulted w/ ID, recommending to d/c clinda, repeat bc x 2 sets and give dose of Vanco.

## 2020-02-21 NOTE — ED CDU PROVIDER INITIAL DAY NOTE - SKIN, MLM
Skin normal color for race, warm, dry and intact. Moderate soft tissue swelling RUE, with macular erythema and increased warmth noted to forearm and humeral area in general.  Cap refill is brisk; +NVI RUE with 2+ pulses.

## 2020-02-21 NOTE — ED PROVIDER NOTE - PHYSICAL EXAMINATION
Gen: NAD, AOx3, able to make needs known, non-toxic  Head: NCAT  HEENT: EOMI, oral mucosa moist, normal conjunctiva  Lung: CTAB, no respiratory distress, no wheezes/rhonchi/rales B/L, speaking in full sentences  CV: RRR, no murmurs  Abd: soft, NTND, no guarding  MSK: no visible deformities. Able to range extremities  Neuro: No focal sensory or motor deficits  Skin: Warm, well perfused, RUE w/ significant erythema and increased warmth.   Psych: normal affect Gen: NAD, AOx3, able to make needs known, non-toxic  Head: NCAT  HEENT: EOMI, oral mucosa moist, normal conjunctiva  Lung: CTAB, no respiratory distress, no wheezes/rhonchi/rales B/L, speaking in full sentences  CV: RRR, no murmurs  Abd: soft, NTND, no guarding  MSK: no visible deformities. Able to range extremities. RUE edematous compared to LUE  Neuro: No focal sensory or motor deficits  Skin: Warm, well perfused, RUE w/ significant erythema and increased warmth.   Psych: normal affect

## 2020-02-21 NOTE — ED PROVIDER NOTE - PROGRESS NOTE DETAILS
Dr. Villa Ndiaye, PGY-2: pt to be evaluated for CDU. Redness not extending beyond marker. Warmth decreased. Pt reports feeling better. Dr. Villa Ndiaye, PGY-2: accepted to CDU. Pt agreeable with plan

## 2020-02-21 NOTE — ED PROVIDER NOTE - SEVERE SEPSIS ALERT DETAILS
Dr. Villa Ndiaye, PGY-2:  I have seen and evaluated this patient and do not believe the patient is septic at this time.  I will continue to evaluate.

## 2020-02-22 DIAGNOSIS — D64.9 ANEMIA, UNSPECIFIED: ICD-10-CM

## 2020-02-22 DIAGNOSIS — F31.9 BIPOLAR DISORDER, UNSPECIFIED: ICD-10-CM

## 2020-02-22 DIAGNOSIS — L03.113 CELLULITIS OF RIGHT UPPER LIMB: ICD-10-CM

## 2020-02-22 DIAGNOSIS — Z02.9 ENCOUNTER FOR ADMINISTRATIVE EXAMINATIONS, UNSPECIFIED: ICD-10-CM

## 2020-02-22 DIAGNOSIS — R78.81 BACTEREMIA: ICD-10-CM

## 2020-02-22 DIAGNOSIS — E11.9 TYPE 2 DIABETES MELLITUS WITHOUT COMPLICATIONS: ICD-10-CM

## 2020-02-22 DIAGNOSIS — G89.29 OTHER CHRONIC PAIN: ICD-10-CM

## 2020-02-22 DIAGNOSIS — I10 ESSENTIAL (PRIMARY) HYPERTENSION: ICD-10-CM

## 2020-02-22 LAB
-  STREPTOCOCCUS SP. (NOT GRP A, B OR S PNEUMONIAE): SIGNIFICANT CHANGE UP
ALBUMIN SERPL ELPH-MCNC: 3.8 G/DL — SIGNIFICANT CHANGE UP (ref 3.3–5)
ALP SERPL-CCNC: 81 U/L — SIGNIFICANT CHANGE UP (ref 40–120)
ALT FLD-CCNC: 11 U/L — SIGNIFICANT CHANGE UP (ref 4–33)
ANION GAP SERPL CALC-SCNC: 13 MMO/L — SIGNIFICANT CHANGE UP (ref 7–14)
ANISOCYTOSIS BLD QL: SLIGHT — SIGNIFICANT CHANGE UP
AST SERPL-CCNC: 13 U/L — SIGNIFICANT CHANGE UP (ref 4–32)
BACTERIA BLD CULT: SIGNIFICANT CHANGE UP
BASE EXCESS BLDV CALC-SCNC: 3.7 MMOL/L — SIGNIFICANT CHANGE UP
BASOPHILS # BLD AUTO: 0.01 K/UL — SIGNIFICANT CHANGE UP (ref 0–0.2)
BASOPHILS NFR BLD AUTO: 0.2 % — SIGNIFICANT CHANGE UP (ref 0–2)
BASOPHILS NFR SPEC: 0 % — SIGNIFICANT CHANGE UP (ref 0–2)
BILIRUB SERPL-MCNC: 0.4 MG/DL — SIGNIFICANT CHANGE UP (ref 0.2–1.2)
BLASTS # FLD: 0 % — SIGNIFICANT CHANGE UP (ref 0–0)
BLOOD GAS VENOUS - CREATININE: 0.92 MG/DL — SIGNIFICANT CHANGE UP (ref 0.5–1.3)
BLOOD GAS VENOUS - FIO2: 21 — SIGNIFICANT CHANGE UP
BUN SERPL-MCNC: 11 MG/DL — SIGNIFICANT CHANGE UP (ref 7–23)
CALCIUM SERPL-MCNC: 9.3 MG/DL — SIGNIFICANT CHANGE UP (ref 8.4–10.5)
CHLORIDE BLDV-SCNC: 101 MMOL/L — SIGNIFICANT CHANGE UP (ref 96–108)
CHLORIDE SERPL-SCNC: 98 MMOL/L — SIGNIFICANT CHANGE UP (ref 98–107)
CO2 SERPL-SCNC: 25 MMOL/L — SIGNIFICANT CHANGE UP (ref 22–31)
CREAT SERPL-MCNC: 0.93 MG/DL — SIGNIFICANT CHANGE UP (ref 0.5–1.3)
EOSINOPHIL # BLD AUTO: 0.08 K/UL — SIGNIFICANT CHANGE UP (ref 0–0.5)
EOSINOPHIL NFR BLD AUTO: 1.3 % — SIGNIFICANT CHANGE UP (ref 0–6)
EOSINOPHIL NFR FLD: 2.6 % — SIGNIFICANT CHANGE UP (ref 0–6)
GAS PNL BLDV: 136 MMOL/L — SIGNIFICANT CHANGE UP (ref 136–146)
GIANT PLATELETS BLD QL SMEAR: PRESENT — SIGNIFICANT CHANGE UP
GLUCOSE BLDV-MCNC: 129 MG/DL — HIGH (ref 70–99)
GLUCOSE SERPL-MCNC: 128 MG/DL — HIGH (ref 70–99)
HCO3 BLDV-SCNC: 27 MMOL/L — SIGNIFICANT CHANGE UP (ref 20–27)
HCT VFR BLD CALC: 30.5 % — LOW (ref 34.5–45)
HCT VFR BLDV CALC: 31.5 % — LOW (ref 34.5–45)
HGB BLD-MCNC: 9.9 G/DL — LOW (ref 11.5–15.5)
HGB BLDV-MCNC: 10.2 G/DL — LOW (ref 11.5–15.5)
IMM GRANULOCYTES NFR BLD AUTO: 0.3 % — SIGNIFICANT CHANGE UP (ref 0–1.5)
LACTATE BLDV-MCNC: 1 MMOL/L — SIGNIFICANT CHANGE UP (ref 0.5–2)
LYMPHOCYTES # BLD AUTO: 1.19 K/UL — SIGNIFICANT CHANGE UP (ref 1–3.3)
LYMPHOCYTES # BLD AUTO: 18.7 % — SIGNIFICANT CHANGE UP (ref 13–44)
LYMPHOCYTES NFR SPEC AUTO: 9.5 % — LOW (ref 13–44)
MCHC RBC-ENTMCNC: 27.3 PG — SIGNIFICANT CHANGE UP (ref 27–34)
MCHC RBC-ENTMCNC: 32.5 % — SIGNIFICANT CHANGE UP (ref 32–36)
MCV RBC AUTO: 84 FL — SIGNIFICANT CHANGE UP (ref 80–100)
METAMYELOCYTES # FLD: 0 % — SIGNIFICANT CHANGE UP (ref 0–1)
MICROCYTES BLD QL: SLIGHT — SIGNIFICANT CHANGE UP
MONOCYTES # BLD AUTO: 0.28 K/UL — SIGNIFICANT CHANGE UP (ref 0–0.9)
MONOCYTES NFR BLD AUTO: 4.4 % — SIGNIFICANT CHANGE UP (ref 2–14)
MONOCYTES NFR BLD: 5.2 % — SIGNIFICANT CHANGE UP (ref 2–9)
MYELOCYTES NFR BLD: 0 % — SIGNIFICANT CHANGE UP (ref 0–0)
NEUTROPHIL AB SER-ACNC: 76.7 % — SIGNIFICANT CHANGE UP (ref 43–77)
NEUTROPHILS # BLD AUTO: 4.8 K/UL — SIGNIFICANT CHANGE UP (ref 1.8–7.4)
NEUTROPHILS NFR BLD AUTO: 75.1 % — SIGNIFICANT CHANGE UP (ref 43–77)
NEUTS BAND # BLD: 3.4 % — SIGNIFICANT CHANGE UP (ref 0–6)
NRBC # FLD: 0 K/UL — SIGNIFICANT CHANGE UP (ref 0–0)
ORGANISM # SPEC MICROSCOPIC CNT: SIGNIFICANT CHANGE UP
OTHER - HEMATOLOGY %: 0 — SIGNIFICANT CHANGE UP
PCO2 BLDV: 51 MMHG — SIGNIFICANT CHANGE UP (ref 41–51)
PH BLDV: 7.37 PH — SIGNIFICANT CHANGE UP (ref 7.32–7.43)
PLATELET # BLD AUTO: 247 K/UL — SIGNIFICANT CHANGE UP (ref 150–400)
PLATELET COUNT - ESTIMATE: NORMAL — SIGNIFICANT CHANGE UP
PMV BLD: 9.8 FL — SIGNIFICANT CHANGE UP (ref 7–13)
PO2 BLDV: 39 MMHG — SIGNIFICANT CHANGE UP (ref 35–40)
POLYCHROMASIA BLD QL SMEAR: SLIGHT — SIGNIFICANT CHANGE UP
POTASSIUM BLDV-SCNC: 3.6 MMOL/L — SIGNIFICANT CHANGE UP (ref 3.4–4.5)
POTASSIUM SERPL-MCNC: 3.7 MMOL/L — SIGNIFICANT CHANGE UP (ref 3.5–5.3)
POTASSIUM SERPL-SCNC: 3.7 MMOL/L — SIGNIFICANT CHANGE UP (ref 3.5–5.3)
PROMYELOCYTES # FLD: 0 % — SIGNIFICANT CHANGE UP (ref 0–0)
PROT SERPL-MCNC: 7.9 G/DL — SIGNIFICANT CHANGE UP (ref 6–8.3)
RBC # BLD: 3.63 M/UL — LOW (ref 3.8–5.2)
RBC # FLD: 16.8 % — HIGH (ref 10.3–14.5)
SAO2 % BLDV: 66.7 % — SIGNIFICANT CHANGE UP (ref 60–85)
SODIUM SERPL-SCNC: 136 MMOL/L — SIGNIFICANT CHANGE UP (ref 135–145)
SPECIMEN SOURCE: SIGNIFICANT CHANGE UP
VARIANT LYMPHS # BLD: 2.6 % — SIGNIFICANT CHANGE UP
WBC # BLD: 6.38 K/UL — SIGNIFICANT CHANGE UP (ref 3.8–10.5)
WBC # FLD AUTO: 6.38 K/UL — SIGNIFICANT CHANGE UP (ref 3.8–10.5)

## 2020-02-22 PROCEDURE — 99223 1ST HOSP IP/OBS HIGH 75: CPT | Mod: GC

## 2020-02-22 PROCEDURE — 99223 1ST HOSP IP/OBS HIGH 75: CPT | Mod: AI,GC

## 2020-02-22 RX ORDER — METHADONE HYDROCHLORIDE 40 MG/1
5 TABLET ORAL
Refills: 0 | Status: DISCONTINUED | OUTPATIENT
Start: 2020-02-22 | End: 2020-02-25

## 2020-02-22 RX ORDER — DEXTROSE 50 % IN WATER 50 %
25 SYRINGE (ML) INTRAVENOUS ONCE
Refills: 0 | Status: DISCONTINUED | OUTPATIENT
Start: 2020-02-22 | End: 2020-02-25

## 2020-02-22 RX ORDER — LIDOCAINE 4 G/100G
1 CREAM TOPICAL DAILY
Refills: 0 | Status: DISCONTINUED | OUTPATIENT
Start: 2020-02-22 | End: 2020-02-25

## 2020-02-22 RX ORDER — INSULIN LISPRO 100/ML
VIAL (ML) SUBCUTANEOUS
Refills: 0 | Status: DISCONTINUED | OUTPATIENT
Start: 2020-02-22 | End: 2020-02-25

## 2020-02-22 RX ORDER — SODIUM CHLORIDE 9 MG/ML
1000 INJECTION, SOLUTION INTRAVENOUS
Refills: 0 | Status: DISCONTINUED | OUTPATIENT
Start: 2020-02-22 | End: 2020-02-25

## 2020-02-22 RX ORDER — CEFTRIAXONE 500 MG/1
2000 INJECTION, POWDER, FOR SOLUTION INTRAMUSCULAR; INTRAVENOUS EVERY 24 HOURS
Refills: 0 | Status: DISCONTINUED | OUTPATIENT
Start: 2020-02-22 | End: 2020-02-25

## 2020-02-22 RX ORDER — DEXTROSE 50 % IN WATER 50 %
15 SYRINGE (ML) INTRAVENOUS ONCE
Refills: 0 | Status: DISCONTINUED | OUTPATIENT
Start: 2020-02-22 | End: 2020-02-25

## 2020-02-22 RX ORDER — DEXTROSE 50 % IN WATER 50 %
12.5 SYRINGE (ML) INTRAVENOUS ONCE
Refills: 0 | Status: DISCONTINUED | OUTPATIENT
Start: 2020-02-22 | End: 2020-02-25

## 2020-02-22 RX ORDER — BUPROPION HYDROCHLORIDE 150 MG/1
1 TABLET, EXTENDED RELEASE ORAL
Qty: 0 | Refills: 0 | DISCHARGE

## 2020-02-22 RX ORDER — GLUCAGON INJECTION, SOLUTION 0.5 MG/.1ML
1 INJECTION, SOLUTION SUBCUTANEOUS ONCE
Refills: 0 | Status: DISCONTINUED | OUTPATIENT
Start: 2020-02-22 | End: 2020-02-25

## 2020-02-22 RX ORDER — HEPARIN SODIUM 5000 [USP'U]/ML
5000 INJECTION INTRAVENOUS; SUBCUTANEOUS EVERY 8 HOURS
Refills: 0 | Status: DISCONTINUED | OUTPATIENT
Start: 2020-02-22 | End: 2020-02-25

## 2020-02-22 RX ORDER — METHADONE HYDROCHLORIDE 40 MG/1
10 TABLET ORAL
Refills: 0 | Status: DISCONTINUED | OUTPATIENT
Start: 2020-02-22 | End: 2020-02-25

## 2020-02-22 RX ADMIN — HEPARIN SODIUM 5000 UNIT(S): 5000 INJECTION INTRAVENOUS; SUBCUTANEOUS at 22:07

## 2020-02-22 RX ADMIN — VALSARTAN 320 MILLIGRAM(S): 80 TABLET ORAL at 06:24

## 2020-02-22 RX ADMIN — Medication 60 MILLIGRAM(S): at 06:24

## 2020-02-22 RX ADMIN — GABAPENTIN 400 MILLIGRAM(S): 400 CAPSULE ORAL at 06:25

## 2020-02-22 RX ADMIN — METHADONE HYDROCHLORIDE 5 MILLIGRAM(S): 40 TABLET ORAL at 18:18

## 2020-02-22 RX ADMIN — PANTOPRAZOLE SODIUM 40 MILLIGRAM(S): 20 TABLET, DELAYED RELEASE ORAL at 06:24

## 2020-02-22 RX ADMIN — Medication 250 MILLIGRAM(S): at 06:25

## 2020-02-22 RX ADMIN — CEFTRIAXONE 100 MILLIGRAM(S): 500 INJECTION, POWDER, FOR SOLUTION INTRAMUSCULAR; INTRAVENOUS at 12:20

## 2020-02-22 RX ADMIN — LIDOCAINE 1 PATCH: 4 CREAM TOPICAL at 18:18

## 2020-02-22 RX ADMIN — BUPROPION HYDROCHLORIDE 100 MILLIGRAM(S): 150 TABLET, EXTENDED RELEASE ORAL at 12:21

## 2020-02-22 RX ADMIN — LIDOCAINE 1 PATCH: 4 CREAM TOPICAL at 18:19

## 2020-02-22 RX ADMIN — GABAPENTIN 400 MILLIGRAM(S): 400 CAPSULE ORAL at 18:18

## 2020-02-22 RX ADMIN — METFORMIN HYDROCHLORIDE 1000 MILLIGRAM(S): 850 TABLET ORAL at 09:47

## 2020-02-22 RX ADMIN — Medication 25 MILLIGRAM(S): at 06:24

## 2020-02-22 RX ADMIN — METHADONE HYDROCHLORIDE 10 MILLIGRAM(S): 40 TABLET ORAL at 12:21

## 2020-02-22 NOTE — H&P ADULT - NSHPSOCIALHISTORY_GEN_ALL_CORE
Non-smoker, quit drinking ETOH 16 years ago, former drug use (cocaine and marijuana) but no current drug use.

## 2020-02-22 NOTE — ED CDU PROVIDER SUBSEQUENT DAY NOTE - SKIN, MLM
Skin normal color for race, warm, dry and intact. Moderate soft tissue swelling RUE, with macular erythema (improving vs this writers' exam 24 hours ago) and increased warmth (improving) noted to forearm and humeral area in general.  Cap refill is brisk; +NVI RUE with 2+ pulses.

## 2020-02-22 NOTE — ED CDU PROVIDER DISPOSITION NOTE - SECONDARY DIAGNOSIS.
Other specified diabetes mellitus with other skin complication, with long-term current use of insulin

## 2020-02-22 NOTE — H&P ADULT - ATTENDING COMMENTS
59F Hx breast cancer s/p mastectomy, chronic pain on methadone, DM2, HTN, morbid obesity, admitted with sepsis due to strep viridans bactermeia which was present on admission in setting of chronic lymphedema of RUE  --appreciate ID recs, IV CTX, repeat blood cx monitor for clearance  --TTE

## 2020-02-22 NOTE — H&P ADULT - ASSESSMENT
59 year old female w/ PMH of breast ca (s/p mastectomy, chemo, RT, node bx complicated by chronic right arm lymphedema, 2003), morbid obesity, type 2 DM, HTN, HLD, asthma, chronic pain on methadone, anxiety/depression presenting with right arm pain and swelling concerning for cellulitis complicated by Strep viridans bacteremia, on CTX 2/22-. 59 year old female w/ PMH of breast ca (s/p mastectomy, chemo, RT, node bx complicated by chronic right arm lymphedema, 2003), morbid obesity, type 2 DM, HTN, HLD, asthma, chronic pain on methadone, anxiety/depression presenting with right arm pain and swelling admitted for sepsis 2/2 to right arm cellulitis, course complicated by Strep viridans bacteremia, on CTX 2/22-.

## 2020-02-22 NOTE — H&P ADULT - PROBLEM SELECTOR PLAN 8
Transitions of Care Status: Dr. Rios   1.  Name of PCP:   2.  PCP Contacted on Admission: [ ] Y    [ ] N    3.  PCP contacted at Discharge: [ ] Y    [ ] N    [ ] N/A  4.  Post-Discharge Appointment Date and Location:  5.  Summary of Handoff given to PCP:

## 2020-02-22 NOTE — H&P ADULT - HISTORY OF PRESENT ILLNESS
Ms. Daley is a 59 year old female w/ PMH of breast ca s/p mastectomy, chemo, RT, node bx complicated by chronic right arm lymphedema, morbid obesity, type 2 DM, HTN, HLD, asthma, chronic pain on methadone, anxiety/depression presenting with right arm pain and warmth which began hours prior to admission. Ms. Daley is a 59 year old female w/ PMH of breast ca (s/p mastectomy, chemo, RT, node bx complicated by chronic right arm lymphedema, 2003), morbid obesity, type 2 DM, HTN, HLD, asthma, chronic pain on methadone, anxiety/depression presenting with right arm pain and warmth which began hours prior to admission. She states she has had swelling in her right arm ever since her breast cancer surgery in 2003, however this is the first time she has noticed redness and warmth. She endorses fever and chills since swelling began. Denies any cuts, scrapes, or wounds on the arm. Denies nausea, vomiting, cough, dysuria, diarrhea, or abdominal pain. Recently went to her podiatrist during which she had an ingrown toenail removed, but states her foot feels fine. States that three years ago she broke her left molar while eating and went to a dentist, but was told she would need an invasive surgery to repair it. States the tooth has not bothered her so she deferred surgery and has not followed up. Denies mouth or tooth pain.     In ED pt febrile to 100.5, HR of 100, /56, RR 20, satting 100% on RA. Blood cultures grew Strep viridans in one bottle. Pt given two doses of clindamycin, one dose of vancomycin, then transitioned to ceftriaxone 2 grams QD.

## 2020-02-22 NOTE — H&P ADULT - PROBLEM SELECTOR PLAN 4
Pt with osteoarthritis of the bilateral knees. S/p right rotator cuff surgery last year. Follows a pain management provider Dr. Mathieu Lord. On methadone 10 mg in the morning, 5 mg at night.  -cw methadone  -cw home gabapentin Pt with osteoarthritis of the bilateral knees. S/p right rotator cuff surgery last year. Follows a pain management provider Dr. Mathieu Lord. On methadone 10 mg in the morning, 5 mg at night. See ISTOP chart note 2/22.   -cw methadone  -cw home gabapentin

## 2020-02-22 NOTE — CONSULT NOTE ADULT - SUBJECTIVE AND OBJECTIVE BOX
Patient is a 59y old  Female who presents with a chief complaint of     HPI: 58 y/o F PMHx of morbid obesity, breast ca s/p Right masterctomy/lymphadectomy/chemo/rad  c/b RUE lymphedema, DMII, substance abuse now on methadone presented with RUE erythema and subjective fevers, chills. Pt states that her RUE is frequently swollen d/t lymphedema however it does not usually get red. On Thursday she notice it was erythematous and also felt subjective fevers and chills prompting her to visit ED. In the ED, pt was febrile to 101.5, WBC of 7.61 with 88% N. UA was unremarkable however 1 of 4 Bcx bottles (aerobic) returned positive for GPC in chains, PCR identification showing Streptococcus not Group A, B, or strep pneumoniae. CXR with left basilar haziness attributed to overlying soft tissue. She was treated with clindamycin then vancomycin.    Pt currently denies any fever, chills, cough, sore throat, rhinorrhea, abdominal pain, diarrhea, dysuria, tooth pain, recent sick contacts, recent history of infections or antibiotics use. She denies any recent dental work, though states she she has had broken tooth for several months, previously evaluated by dentistry and decided not to remove. Denies any prosthesis in her body including ports, catheters, artificial heart valves.    She feels erythema is improving.       prior hospital charts reviewed [x  ]  primary team notes reviewed [x  other consultant notes reviewed [x  ]  PAST MEDICAL & SURGICAL HISTORY:  Sickle cell trait  Hyperhidrosis: w/u in progress  Insomnia  Thyroid nodule: had negative biopsy  Breast cancer:   right breast -- had mastectomy and then post op chemo and RT, followed with Herceptin for 1 year   last chemo   2013 may last Herceptin   last RT  Miscarriage: x 2  Substance abuse: quit in  -- cocaine and marijuana  Neuropathy: b/l feet  Lymphedema, limb: right side s/p right mastectomy  ETOH abuse: at PAST appointment 14, patient states she quit alcohol in approximately   sickel cell anemia trait  Anxiety  Depression  herniated lumbar disc  Morbid obesity  Hypercholesterolemia: 19 ; pt reports improvement ; pt denies rx  Hypertension  Arthritis  Diverticulosis  h/o   Fatty liver  Asthma: last rescue inhaler use &quot;maybe 3 years ago when I had the flu or a cold&quot;  personal h/o CHF (congestive heart failure)--last hospitalized in   Drug abuse--quit 8 years ago--goes to AA: ADDENDUM: at PAST appointment, patient states she quit alcohol in approximately   Alcohol abuse--quit 8 years ago--goes to AA: ADDENDUM:  at PAST appointment  patient states she quit alcohol approximately   h/o b/l fungal foot infection:  pt denies  hiatal hernia: last endoscopy 1.5 years ago  Acid Reflux  Sleep apnea diagnosed ---supposed to use device but doesn't  History  Uterine Fibroid: s/p Hysterectomy   Diabetes mellitus diagnosed in : fs 86 am 170 pm  Abdominal hernia in 2012: 19 ; pt states hernia repair ,   b/l  Carpal tunnel syndrome: tx PT/OT  Fibromyalgia  Cataract: Bilateral 2017  Thyroid nodule: negative biopsy  Termination of pregnancy (fetus): x 3  Cancer:   insertion of mediport -- to be excised on 14  Radical mastectomy of right breast: 3/30/12  2008  repair of ventral hernia with mesh: Revision 2018   h/o hysterectomy due to Fibroid--post op vaginal bleeding requiring a transfusion    Allergies  environment--ragweed, dust, cats--sneezing and watery eyes, nasal congestion (Other)  ramipril (Angioedema)    ANTIMICROBIALS (past 90 days)  MEDICATIONS  (STANDING):  clindamycin IVPB   100 mL/Hr IV Intermittent (20 @ 09:17)   100 mL/Hr IV Intermittent (20 @ 00:52)    vancomycin  IVPB   250 mL/Hr IV Intermittent (20 @ 06:25)    vancomycin  IVPB   250 mL/Hr IV Intermittent (20 @ 17:59)      ANTIMICROBIALS:    vancomycin  IVPB 1000 every 12 hours    OTHER MEDS: MEDICATIONS  (STANDING):  buPROPion  daily  diltiazem    Tablet 60 every 12 hours  gabapentin 400 two times a day  hydrochlorothiazide 25 daily  metFORMIN 1000 two times a day with meals  methadone    Tablet 10 daily  methadone    Tablet 5 daily  pantoprazole    Tablet 40 before breakfast  valsartan 320 daily    SOCIAL HISTORY:  PMHx of recreational drug use, alcohol use    FAMILY HISTORY:  Family history of rheumatoid arthritis (Sibling): sister age 54 from RA  Family history of leukemia: father  age 56 from leukemia    REVIEW OF SYSTEMS  [  ] ROS unobtainable because:    [  x] All other systems negative except as noted below:	    Constitutional:  [ ] fever [ ] chills  [ ] weight loss  [ ] weakness  Skin:  [ ] rash [ ] phlebitis	  Eyes: [ ] icterus [ ] pain  [ ] discharge	  ENMT: [ ] sore throat  [ ] thrush [ ] ulcers [ ] exudates  Respiratory: [ ] dyspnea [ ] hemoptysis [ ] cough [ ] sputum	  Cardiovascular:  [ ] chest pain [ ] palpitations [ ] edema	  Gastrointestinal:  [ ] nausea [ ] vomiting [ ] diarrhea [ ] constipation [ ] pain	  Genitourinary:  [ ] dysuria [ ] frequency [ ] hematuria [ ] discharge [ ] flank pain  [ ] incontinence  Musculoskeletal:  [ ] myalgias [ ] arthralgias [ ] arthritis  [ ] back pain  Neurological:  [ ] headache [ ] seizures  [ ] confusion/altered mental status  Psychiatric:  [ ] anxiety [ ] depression	  Hematology/Lymphatics:  [ ] lymphadenopathy  Endocrine:  [ ] adrenal [ ] thyroid  Allergic/Immunologic:	 [ ] transplant [ ] seasonal    Vital Signs Last 24 Hrs  T(F): 98.5 (20 @ 05:10), Max: 101.5 (20 @ 23:04)  Vital Signs Last 24 Hrs  HR: 85 (20 @ 05:10) (65 - 88)  BP: 132/59 (20 @ 05:10) (109/46 - 159/72)  RR: 16 (20 @ 05:10)  SpO2: 100% (20 @ 05:10) (99% - 100%)  Wt(kg): --    PHYSICAL EXAM:  Constitutional: non-toxic, no distress, morbidly obese  HEAD/EYES: anicteric, no conjunctival injection  ENT:  supple, no thrush, poor denitition, several teeth missing, questionable necrotic appearing tooth  Cardiovascular:   normal S1, S2, no murmur, no edema  Respiratory:  clear BS bilaterally, no wheezes, no rales  GI:  soft, non-tender, normal bowel sounds  :  no pineda, no CVA tenderness  Musculoskeletal: RUE swelling, erythema, warmth  Neurologic: awake and alert, normal strength, no focal findings  Skin:  RUE swelling, erythema, warmth, receding from previously marked lines  Heme/Onc: no lymphadenopathy   Psychiatric:  awake, alert, appropriate mood                            9.9    6.38  )-----------( 247      ( 2020 06:40 )             30.5     02-22    136  |  98  |  11  ----------------------------<  128<H>  3.7   |  25  |  0.93    Ca    9.3      2020 06:40    TPro  7.9  /  Alb  3.8  /  TBili  0.4  /  DBili  x   /  AST  13  /  ALT  11  /  AlkPhos  81  0222    Urinalysis Basic - ( 2020 01:24 )    Color: LIGHT YELLOW / Appearance: CLEAR / S.016 / pH: 6.0  Gluc: NEGATIVE / Ketone: NEGATIVE  / Bili: NEGATIVE / Urobili: NORMAL   Blood: NEGATIVE / Protein: NEGATIVE / Nitrite: NEGATIVE   Leuk Esterase: NEGATIVE / RBC: 0-2 / WBC 0-2   Sq Epi: x / Non Sq Epi: x / Bacteria: x    MICROBIOLOGY:  Culture - Blood (collected 2020 01:13)  Source: Peripheral Site 2  Preliminary Report (2020 01:12):    NO ORGANISMS ISOLATED    NO ORGANISMS ISOLATED AT 24 HOURS    Culture - Blood (collected 2020 01:13)  Source: Peripheral Site 1  Preliminary Report (2020 17:44):    ***Blood Panel PCR results on this specimen are available    approximately 3 hours after the Gram stain result***    Gram stain, PCR, and/or culture results may not always    correspond due to difference in methodologies    ------------------------------------------------------------    This PCR assay was performed using [employer].  The    following targets are tested for:  Enterococcus, vancomycin    resistant enterococci, Listeria monocytogenes,  coagulase    negative staphylococci, S. aureus, methicillin resistant S.    aureus, Streptococcus agalactiae (Group B), S. pneumoniae,    S. pyogenes (Group A), Acinetobacter baumannii, Enterobacter    cloacae, E. coli, Klebsiella oxytoca, K. pneumoniae, Proteus    sp., Serratia marcescens, Haemophilus influenzae, Neisseria    meningitidis, Pseudomonas aeruginosa, Candida albicans, C.    glabrata, C. krusei, C. parapsilosis, C. tropicalis and the    KPC resistance gene.    **NOTE: Due to technical problems, Proteus sp. will NOT be    reported as part of the BCID paneluntil further notice.  Organism: BLOOD CULTURE PCR (2020 17:44)  Organism: BLOOD CULTURE PCR (2020 17:44)      -  Streptococcus sp. (Not Grp A, B or S pneumoniae): + DETECT BECKY      Method Type: PCR                  RADIOLOGY:  < from: Xray Chest 1 View AP/PA (20 @ 00:24) >  IMPRESSION:  Left basilar haziness due to attenuation by abundant overlying soft tissues. Otherwise lungs grossly clear. No pleural effusions or pneumothorax.    Heart size and mediastinal width inaccurately assessed on the projection butappear grossly stable.    Trachea midline.    Unremarkable osseous structures.      < end of copied text > Patient is a 59y old  Female who presents with a chief complaint of     HPI: 58 y/o F PMHx of morbid obesity, breast ca s/p Right masterctomy/lymphadectomy/chemo/rad  c/b RUE lymphedema, DMII, substance abuse now on methadone presented with RUE erythema and subjective fevers, chills. Pt states that her RUE is frequently swollen d/t lymphedema however it does not usually get red. On Thursday she notice it was erythematous and also felt subjective fevers and chills prompting her to visit ED. In the ED, pt was febrile to 101.5, WBC of 7.61 with 88% N. UA was unremarkable however 1 of 4 Bcx bottles (aerobic) returned positive for GPC in chains, PCR identification showing Streptococcus not Group A, B, or strep pneumoniae. CXR with left basilar haziness attributed to overlying soft tissue. She was treated with clindamycin then vancomycin.    Pt currently denies any fever, chills, cough, sore throat, rhinorrhea, abdominal pain, diarrhea, dysuria, tooth pain, recent sick contacts, recent history of infections or antibiotics use. She denies any recent dental work, though states she she has had broken tooth for several months, previously evaluated by dentistry and decided not to remove. Denies any prosthesis in her body including ports, catheters, artificial heart valves.    She feels erythema is improving.       prior hospital charts reviewed [x  ]  primary team notes reviewed [x  other consultant notes reviewed [x  ]    PAST MEDICAL & SURGICAL HISTORY:  Sickle cell trait  Hyperhidrosis: w/u in progress  Insomnia  Thyroid nodule: had negative biopsy  Breast cancer:   right breast -- had mastectomy and then post op chemo and RT, followed with Herceptin for 1 year   last chemo   2013 may last Herceptin   last RT  Miscarriage: x 2  Substance abuse: quit in  -- cocaine and marijuana  Neuropathy: b/l feet  Lymphedema, limb: right side s/p right mastectomy  ETOH abuse: at PAST appointment 14, patient states she quit alcohol in approximately   sickel cell anemia trait  Anxiety  Depression  herniated lumbar disc  Morbid obesity  Hypercholesterolemia: 19 ; pt reports improvement ; pt denies rx  Hypertension  Arthritis  Diverticulosis  h/o   Fatty liver  Asthma: last rescue inhaler use &quot;maybe 3 years ago when I had the flu or a cold&quot;  personal h/o CHF (congestive heart failure)--last hospitalized in   Drug abuse--quit 8 years ago--goes to AA: ADDENDUM: at PAST appointment, patient states she quit alcohol in approximately   Alcohol abuse--quit 8 years ago--goes to AA: ADDENDUM:  at PAST appointment  patient states she quit alcohol approximately   h/o b/l fungal foot infection:  pt denies  hiatal hernia: last endoscopy 1.5 years ago  Acid Reflux  Sleep apnea diagnosed ---supposed to use device but doesn't  History  Uterine Fibroid: s/p Hysterectomy   Diabetes mellitus diagnosed in : fs 86 am 170 pm  Abdominal hernia in 2012: 19 ; pt states hernia repair ,   b/l  Carpal tunnel syndrome: tx PT/OT  Fibromyalgia  Cataract: Bilateral 2017  Thyroid nodule: negative biopsy  Termination of pregnancy (fetus): x 3  Cancer:   insertion of mediport -- to be excised on 14  Radical mastectomy of right breast: 3/30/12  2008  repair of ventral hernia with mesh: Revision 2018   h/o hysterectomy due to Fibroid--post op vaginal bleeding requiring a transfusion    Allergies  environment--ragweed, dust, cats--sneezing and watery eyes, nasal congestion (Other)  ramipril (Angioedema)    ANTIMICROBIALS (past 90 days)  MEDICATIONS  (STANDING):  clindamycin IVPB   100 mL/Hr IV Intermittent (20 @ 09:17)   100 mL/Hr IV Intermittent (20 @ 00:52)    vancomycin  IVPB   250 mL/Hr IV Intermittent (20 @ 06:25)    vancomycin  IVPB   250 mL/Hr IV Intermittent (20 @ 17:59)      ANTIMICROBIALS:    vancomycin  IVPB 1000 every 12 hours    OTHER MEDS: MEDICATIONS  (STANDING):  buPROPion  daily  diltiazem    Tablet 60 every 12 hours  gabapentin 400 two times a day  hydrochlorothiazide 25 daily  metFORMIN 1000 two times a day with meals  methadone    Tablet 10 daily  methadone    Tablet 5 daily  pantoprazole    Tablet 40 before breakfast  valsartan 320 daily    SOCIAL HISTORY:  PMHx of recreational drug use, alcohol use    FAMILY HISTORY:  Family history of rheumatoid arthritis (Sibling): sister age 54 from RA  Family history of leukemia: father  age 56 from leukemia    REVIEW OF SYSTEMS  [  ] ROS unobtainable because:    [  x] All other systems negative except as noted below:	    Constitutional:  [ x] fever [x ] chills  [ ] weight loss  [ ] weakness  Skin:  [ ] rash [ ] phlebitis	  Eyes: [ ] icterus [ ] pain  [ ] discharge	  ENMT: [ ] sore throat  [ ] thrush [ ] ulcers [ ] exudates  Respiratory: [ ] dyspnea [ ] hemoptysis [ ] cough [ ] sputum	  Cardiovascular:  [ ] chest pain [ ] palpitations [ ] edema	  Gastrointestinal:  [ ] nausea [ ] vomiting [ ] diarrhea [ ] constipation [ ] pain	  Genitourinary:  [ ] dysuria [ ] frequency [ ] hematuria [ ] discharge [ ] flank pain  [ ] incontinence  Musculoskeletal:  [ ] myalgias [ ] arthralgias [ ] arthritis  [ ] back pain, rt arm pain   Neurological:  [ ] headache [ ] seizures  [ x] confusion/altered mental status  Psychiatric:  [ ] anxiety [ ] depression	  Endocrine:  [ ] adrenal [ ] thyroid  Allergic/Immunologic:	 [ ] transplant [ ] seasonal      Vital Signs Last 24 Hrs  T(F): 98.5 (20 @ 05:10), Max: 101.5 (20 @ 23:04)  Vital Signs Last 24 Hrs  HR: 85 (20 @ 05:10) (65 - 88)  BP: 132/59 (20 @ 05:10) (109/46 - 159/72)  RR: 16 (20 @ 05:10)  SpO2: 100% (20 @ 05:10) (99% - 100%)  Wt(kg): --      PHYSICAL EXAM:  Constitutional: non-toxic, no distress, morbidly obese  HEAD/EYES: anicteric, no conjunctival injection  ENT:  supple, no thrush, poor dentition, several teeth missing, questionable necrotic appearing tooth  Cardiovascular:   normal S1, S2, no murmur  Respiratory:  + air entry b/l   GI:  soft, non-tender, normal bowel sounds  : no CVA tenderness  Musculoskeletal: RUE swelling, erythema, warmth, induration   Neurologic: awake and alert, no focal findings  Skin:  RUE swelling, erythema, warmth, receding from previously marked lines  Vascular: palpable pulses.   Psychiatric:  awake, alert, appropriate mood                            9.9    6.38  )-----------( 247      ( 2020 06:40 )             30.5     02    136  |  98  |  11  ----------------------------<  128<H>  3.7   |  25  |  0.93    Ca    9.3      2020 06:40    TPro  7.9  /  Alb  3.8  /  TBili  0.4  /  DBili  x   /  AST  13  /  ALT  11  /  AlkPhos  81      Urinalysis Basic - ( 2020 01:24 )    Color: LIGHT YELLOW / Appearance: CLEAR / S.016 / pH: 6.0  Gluc: NEGATIVE / Ketone: NEGATIVE  / Bili: NEGATIVE / Urobili: NORMAL   Blood: NEGATIVE / Protein: NEGATIVE / Nitrite: NEGATIVE   Leuk Esterase: NEGATIVE / RBC: 0-2 / WBC 0-2   Sq Epi: x / Non Sq Epi: x / Bacteria: x    MICROBIOLOGY:  Culture - Blood (collected 2020 01:13)  Source: Peripheral Site 2  Preliminary Report (2020 01:12):    NO ORGANISMS ISOLATED    NO ORGANISMS ISOLATED AT 24 HOURS    Culture - Blood (collected 2020 01:13)  Source: Peripheral Site 1  Preliminary Report (2020 17:44):    ***Blood Panel PCR results on this specimen are available    approximately 3 hours after the Gram stain result***    Gram stain, PCR, and/or culture results may not always    correspond due to difference in methodologies    ------------------------------------------------------------    This PCR assay was performed using [employer].  The    following targets are tested for:  Enterococcus, vancomycin    resistant enterococci, Listeria monocytogenes,  coagulase    negative staphylococci, S. aureus, methicillin resistant S.    aureus, Streptococcus agalactiae (Group B), S. pneumoniae,    S. pyogenes (Group A), Acinetobacter baumannii, Enterobacter    cloacae, E. coli, Klebsiella oxytoca, K. pneumoniae, Proteus    sp., Serratia marcescens, Haemophilus influenzae, Neisseria    meningitidis, Pseudomonas aeruginosa, Candida albicans, C.    glabrata, C. krusei, C. parapsilosis, C. tropicalis and the    KPC resistance gene.    **NOTE: Due to technical problems, Proteus sp. will NOT be    reported as part of the BCID paneluntil further notice.  Organism: BLOOD CULTURE PCR (2020 17:44)  Organism: BLOOD CULTURE PCR (2020 17:44)      -  Streptococcus sp. (Not Grp A, B or S pneumoniae): + DETECT BECKY      Method Type: PCR          RADIOLOGY: Imaging reviewed personally and interpretation as mentioned below.   < from: Xray Chest 1 View AP/PA (20 @ 00:24) >  IMPRESSION:  Left basilar haziness due to attenuation by abundant overlying soft tissues. Otherwise lungs grossly clear. No pleural effusions or pneumothorax.    Heart size and mediastinal width inaccurately assessed on the projection butappear grossly stable.    Trachea midline.    Unremarkable osseous structures.

## 2020-02-22 NOTE — H&P ADULT - PROBLEM SELECTOR PLAN 1
On admission febrile and with right arm cellulitis. BCx 2/21 growing Strep viridans x1 bottle. S/p vanc x1, clindamycin x2 in ED. ID consulted and recommended CTX 2g daily.   -cw CTX 2/22  -follow up repeat BCx in AM  -TTE ordered   -dental consult for broken tooth    -ID recs appreciated On admission meeting sepsis criteria, with signs of right arm cellulitis. BCx 2/21 growing Strep viridans x1 bottle. S/p vanc x1, clindamycin x2 in ED. ID consulted and recommended CTX 2g daily.   -cw CTX 2/22  -follow up repeat BCx in AM  -TTE ordered   -dental consult for broken tooth    -ID recs appreciated

## 2020-02-22 NOTE — ED CDU PROVIDER DISPOSITION NOTE - ATTENDING CONTRIBUTION TO CARE
I performed a history and physical exam of the patient and discussed their management with the resident and /or advanced care provider. I reviewed the resident and /or ACP's note and agree with the documented findings and plan of care. My medical decison making and observations are found above.    I have supervised and agree with the above acp history, physical and plan with the noted differences.    pt brought to CDU for iv abx for arm cellulitis.  although improving, cellulitis is still severe and taking over most of her right arm. pt also with DM and had node dissection after breast ca (24 nodes) on the same arm.  pt merits admission for ongoing abx and care  pt pleasant with redness and warmth on most of right arm, treating with iv abx, pt awake, alert and appropriate

## 2020-02-22 NOTE — H&P ADULT - NSHPPHYSICALEXAM_GEN_ALL_CORE
GENERAL: morbidly obese middle aged woman, lying in bed comfortably  EYES: EOMI, PERRLA, conjunctiva and sclera clear  ENT: Moist mucous membranes  NECK: Supple, No JVD  CHEST/LUNG: Clear to auscultation bilaterally; No rales, rhonchi, wheezing, or rubs. Unlabored respirations  HEART: Regular rate and rhythm; No murmurs, rubs, or gallops  ABDOMEN: obese, Bowel sounds present; Soft, Nontender, Nondistended. No hepatomegaly  EXTREMITIES:  2+ Peripheral Pulses. Feet normal-appearing, no cuts or areas of erythema/induration, no lower extremity edema. Right upper extremity with erythema and warmth confined to demarcated area, no cuts or open lesions. Intact pulses and full strength.    NERVOUS SYSTEM:  Alert & Oriented X3, speech clear. No deficits   MSK: FROM all 4 extremities, full and equal strength in upper and lower extremities GENERAL: morbidly obese middle aged woman, lying in bed comfortably  EYES: EOMI, PERRLA, conjunctiva and sclera clear  ENT: Moist mucous membranes  MOUTH: broken right molar without erythema or induration, no drainage noted   NECK: Supple, No JVD  CHEST/LUNG: Clear to auscultation bilaterally; No rales, rhonchi, wheezing, or rubs. Unlabored respirations  HEART: Regular rate and rhythm; No murmurs, rubs, or gallops  ABDOMEN: obese, Bowel sounds present; Soft, Nontender, Nondistended. No hepatomegaly  EXTREMITIES:  2+ Peripheral Pulses. Feet normal-appearing, no cuts or areas of erythema/induration, no lower extremity edema. Right upper extremity with erythema and warmth confined to demarcated area, no cuts or open lesions. Intact pulses and full strength.    NERVOUS SYSTEM:  Alert & Oriented X3, speech clear. No deficits   MSK: FROM all 4 extremities, full and equal strength in upper and lower extremities Vital Signs Last 24 Hrs  T(C): 36.7 (22 Feb 2020 13:25), Max: 37.2 (21 Feb 2020 21:16)  T(F): 98 (22 Feb 2020 13:25), Max: 98.9 (21 Feb 2020 21:16)  HR: 65 (22 Feb 2020 13:25) (65 - 88)  BP: 130/79 (22 Feb 2020 13:25) (111/85 - 159/72)  BP(mean): --  RR: 16 (22 Feb 2020 13:25) (16 - 18)  SpO2: 100% (22 Feb 2020 13:25) (99% - 100%)    GENERAL: morbidly obese middle aged woman, lying in bed comfortably  EYES: EOMI, PERRLA, conjunctiva and sclera clear  ENT: Moist mucous membranes  MOUTH: broken right molar without erythema or induration, no drainage noted   NECK: Supple, No JVD  CHEST/LUNG: Clear to auscultation bilaterally; No rales, rhonchi, wheezing, or rubs. Unlabored respirations  HEART: Regular rate and rhythm; No murmurs, rubs, or gallops  ABDOMEN: obese, Bowel sounds present; Soft, Nontender, Nondistended. No hepatomegaly  EXTREMITIES:  2+ Peripheral Pulses. Feet normal-appearing, no cuts or areas of erythema/induration, no lower extremity edema. Right upper extremity with erythema and warmth confined to demarcated area, no cuts or open lesions. Intact pulses and full strength.    NERVOUS SYSTEM:  Alert & Oriented X3, speech clear. No deficits   MSK: FROM all 4 extremities, full and equal strength in upper and lower extremities

## 2020-02-22 NOTE — H&P ADULT - NSICDXPASTMEDICALHX_GEN_ALL_CORE_FT
PAST MEDICAL HISTORY:  Abdominal hernia in 2012 7/23/19 ; pt states hernia repair 2008, 2018    Acid Reflux     Alcohol abuse--quit 8 years ago--goes to AA ADDENDUM:  at PAST appointment  patient states she quit alcohol approximately 2003    Anxiety     Arthritis     Asthma last rescue inhaler use "maybe 3 years ago when I had the flu or a cold"    b/l  Carpal tunnel syndrome tx PT/OT    Breast cancer 2012  right breast -- had mastectomy and then post op chemo and RT, followed with Herceptin for 1 year  2012 last chemo   2013 may last Herceptin  2013 last RT    Depression     Diabetes mellitus diagnosed in 2007 fs 86 am 170 pm    Diverticulosis     Drug abuse--quit 8 years ago--goes to AA ADDENDUM: at PAST appointment, patient states she quit alcohol in approximately 2003    ETOH abuse states she quit alcohol in 2003    Fibromyalgia     h/o   Fatty liver     h/o b/l fungal foot infection 7/23./19 pt denies    herniated lumbar disc     hiatal hernia last endoscopy 1.5 years ago    History  Uterine Fibroid s/p Hysterectomy 7/02    Hypercholesterolemia 7/23/19 ; pt reports improvement ; pt denies rx    Hypertension     Insomnia     Lymphedema, limb right side s/p right mastectomy    Miscarriage x 2    Morbid obesity     Neuropathy b/l feet    personal h/o CHF (congestive heart failure)--last hospitalized in 2005     sickel cell anemia trait     Sickle cell trait     Sleep apnea diagnosed 2007---supposed to use device but doesn't     Substance abuse quit in 2003 -- cocaine and marijuana    Thyroid nodule had negative biopsy

## 2020-02-22 NOTE — ED CDU PROVIDER DISPOSITION NOTE - CLINICAL COURSE
pt brought to CDU for iv abx for arm cellulitis.  although improving, cellulitis is still severe and taking over most of her right arm. pt also with DM and had node dissection after breast ca (24 nodes) on the same arm.  pt merits admission for ongoing abx and care  pt pleasant with redness and warmth on most of right arm, treating with iv abx, pt awake, alert and appropriate

## 2020-02-22 NOTE — H&P ADULT - NSHPREVIEWOFSYSTEMS_GEN_ALL_CORE
CONSTITUTIONAL: +fevers/chills  EYES/ENT: No visual changes;  No vertigo or throat pain   NECK: No pain or stiffness  RESPIRATORY: No cough, wheezing, hemoptysis; No shortness of breath  CARDIOVASCULAR: No chest pain or palpitations  GASTROINTESTINAL: No abdominal or epigastric pain. No nausea, vomiting, or hematemesis; No diarrhea or constipation. No melena or hematochezia.  GENITOURINARY: No dysuria, frequency or hematuria  NEUROLOGICAL: No numbness or weakness  SKIN: No itching, rashes

## 2020-02-22 NOTE — ED CDU PROVIDER SUBSEQUENT DAY NOTE - HISTORY
60 yo morbidly obese female, PMH breast cancer (right mastectomy/lymphadenectomy, CTX, RXT in 2012 followed by Herceptin x 1 year (pt was HER2+)), lymphedema RUE since, DM, CHD, HTN, hyperlipidemia, neuropathy, sleep apnea, past hx/o substance (drug/ETOH) abuse (currently on methadone), asthma, anxiety, depression, arthritis, fibromyalgia; pt presented to the ED for a one day hx/o RUE erythema, subjective fevers; pt denies hx/o trauma or injury.  Pt. states current RUE swelling is "slightly" more than usual baseline swelling a/w lymphedema.  In the ED, pt was treated for cellulitis with clindamycin IV; pt was dispo'd to CDU for continued care plan:  IV antibiotics, supportive care, general observation care / monitoring.  On 2/21 CDU day shift, pt with one bottle (AEROBIC BOTTLE) from one of two sets of blood cultures sent, positive for Gram Pos Cocci in Chains after 14 HOURS INCUBATION.  CDU PA/MD repeated blood cultures, reportedly spoke with Infectious Disease, switched antibiotic to vancomycin; ID team to eval pt 2/22 daytime.  RUE VA duplex US was performed: "....RIGHT: Deep venous thrombosis: No.......Superficial venous thrombosis: No".  In the interim, pt verbalizes feeling improved.  Pt still has significant swelling to RUE in general, but states swelling feels improved vs yesterday.  No other c/o; pt denies new/worsening symptoms.

## 2020-02-22 NOTE — H&P ADULT - PROBLEM SELECTOR PLAN 2
Right arm with chronic lymphedema, admitted for redness and warmth with subjective fevers/chills. S/p vanc x1, clindamycin x2 in ED. ID consulted and recommended CTX 2g daily.   -cw CTX 2/22- Right arm with chronic lymphedema, admitted for redness and warmth with subjective fevers/chills. S/p vanc x1, clindamycin x2 in ED. ID consulted and recommended CTX 2g daily.   -cw CTX 2/22-  -Venous Duplex of RUE 2/21 negative for DVT.

## 2020-02-22 NOTE — ED CDU PROVIDER SUBSEQUENT DAY NOTE - PROGRESS NOTE DETAILS
Pt seen by Infectious disease attending- recommend admission. Seen and evaluated pt this morning, redness does not extend past demarcation- reports improvement , still with mild swelling compared to baseline, pulses intact, compartments soft. spoke with Dr. Cowan who accepted patient.

## 2020-02-22 NOTE — H&P ADULT - NSHPLABSRESULTS_GEN_ALL_CORE
LABS:                        9.9    6.38  )-----------( 247      ( 2020 06:40 )             30.5     02-22    136  |  98  |  11  ----------------------------<  128<H>  3.7   |  25  |  0.93    Ca    9.3      2020 06:40    TPro  7.9  /  Alb  3.8  /  TBili  0.4  /  DBili  x   /  AST  13  /  ALT  11  /  AlkPhos  81  02-22    PT/INR - ( 2020 00:20 )   PT: 13.0 SEC;   INR: 1.14          PTT - ( 2020 00:20 )  PTT:31.5 SEC      Urinalysis Basic - ( 2020 01:24 )    Color: LIGHT YELLOW / Appearance: CLEAR / S.016 / pH: 6.0  Gluc: NEGATIVE / Ketone: NEGATIVE  / Bili: NEGATIVE / Urobili: NORMAL   Blood: NEGATIVE / Protein: NEGATIVE / Nitrite: NEGATIVE   Leuk Esterase: NEGATIVE / RBC: 0-2 / WBC 0-2   Sq Epi: x / Non Sq Epi: x / Bacteria: x        Culture - Blood (collected 2020 01:13)  Source: Peripheral Site 2  Preliminary Report (2020 01:12):    NO ORGANISMS ISOLATED    NO ORGANISMS ISOLATED AT 24 HOURS    Culture - Blood (collected 2020 01:13)  Source: Peripheral Site 1  Preliminary Report (2020 17:44):    ***Blood Panel PCR results on this specimen are available    approximately 3 hours after the Gram stain result***    Gram stain, PCR, and/or culture results may not always    correspond due to difference in methodologies    ------------------------------------------------------------    This PCR assay was performed using [employer].  The    following targets are tested for:  Enterococcus, vancomycin    resistant enterococci, Listeria monocytogenes,  coagulase    negative staphylococci, S. aureus, methicillin resistant S.    aureus, Streptococcus agalactiae (Group B), S. pneumoniae,    S. pyogenes (Group A), Acinetobacter baumannii, Enterobacter    cloacae, E. coli, Klebsiella oxytoca, K. pneumoniae, Proteus    sp., Serratia marcescens, Haemophilus influenzae, Neisseria    meningitidis, Pseudomonas aeruginosa, Candida albicans, C.    glabrata, C. krusei, C. parapsilosis, C. tropicalis and the    KPC resistance gene.    **NOTE: Due to technical problems, Proteus sp. will NOT be    reported as part of the BCID paneluntil further notice.  Final Report (2020 11:59):    Single set isolate, possible contaminant.    Contact microbiology if susceptibility testing is clinically    indicated.  Organism: BLOOD CULTURE PCR  Streptococcus Viridans Group (2020 11:59)  Organism: Streptococcus Viridans Group (2020 11:59)  Organism: BLOOD CULTURE PCR  ***Blood Panel PCR results on this specimen are available  approximately 3 hours after the Gram stain result***  Gram stain, PCR, and/or culture results may not always  correspond due to difference in methodologies  ------------------------------------------------------------  This PCR assay was performed using [employer].  The  following targets are tested for:  Enterococcus, vancomycin  resistant enterococci, Listeria monocytogenes,  coagulase  negative staphylococci, S. aureus, methicillin resistant S.  aureus, Streptococcus agalactiae (Group B), S. pneumoniae,  S. pyogenes (Group A), Acinetobacter baumannii, Enterobacter  cloacae, E. coli, Klebsiella oxytoca, K. pneumoniae, Proteus  sp., Serratia marcescens, Haemophilus influenzae, Neisseria  meningitidis, Pseudomonas aeruginosa, Candida albicans, C.  glabrata, C. krusei, C. parapsilosis, C. tropicalis and the  KPC resistance gene.  **NOTE: Due to technical problems, Proteus sp. will NOT be  reported as part of the BCID panel until further notice. (2020 11:59)        RADIOLOGY & ADDITIONAL TESTS:  Results Reviewed:   Imaging Personally Reviewed:  Electrocardiogram Personally Reviewed:    COORDINATION OF CARE:  Care Discussed with Consultants/Other Providers [Y/N]:  Prior or Outpatient Records Reviewed [Y/N]: LABS:                        9.9    6.38  )-----------( 247      ( 2020 06:40 )             30.5     02-22    136  |  98  |  11  ----------------------------<  128<H>  3.7   |  25  |  0.93    Ca    9.3      2020 06:40    TPro  7.9  /  Alb  3.8  /  TBili  0.4  /  DBili  x   /  AST  13  /  ALT  11  /  AlkPhos  81  02-22    PT/INR - ( 2020 00:20 )   PT: 13.0 SEC;   INR: 1.14          PTT - ( 2020 00:20 )  PTT:31.5 SEC      Urinalysis Basic - ( 2020 01:24 )    Color: LIGHT YELLOW / Appearance: CLEAR / S.016 / pH: 6.0  Gluc: NEGATIVE / Ketone: NEGATIVE  / Bili: NEGATIVE / Urobili: NORMAL   Blood: NEGATIVE / Protein: NEGATIVE / Nitrite: NEGATIVE   Leuk Esterase: NEGATIVE / RBC: 0-2 / WBC 0-2   Sq Epi: x / Non Sq Epi: x / Bacteria: x        Culture - Blood (collected 2020 01:13)  Source: Peripheral Site 2  Preliminary Report (2020 01:12):    NO ORGANISMS ISOLATED    NO ORGANISMS ISOLATED AT 24 HOURS    Culture - Blood (collected 2020 01:13)  Source: Peripheral Site 1  Preliminary Report (2020 17:44):    ***Blood Panel PCR results on this specimen are available    approximately 3 hours after the Gram stain result***    Gram stain, PCR, and/or culture results may not always    correspond due to difference in methodologies    ------------------------------------------------------------    This PCR assay was performed using [employer].  The    following targets are tested for:  Enterococcus, vancomycin    resistant enterococci, Listeria monocytogenes,  coagulase    negative staphylococci, S. aureus, methicillin resistant S.    aureus, Streptococcus agalactiae (Group B), S. pneumoniae,    S. pyogenes (Group A), Acinetobacter baumannii, Enterobacter    cloacae, E. coli, Klebsiella oxytoca, K. pneumoniae, Proteus    sp., Serratia marcescens, Haemophilus influenzae, Neisseria    meningitidis, Pseudomonas aeruginosa, Candida albicans, C.    glabrata, C. krusei, C. parapsilosis, C. tropicalis and the    KPC resistance gene.    **NOTE: Due to technical problems, Proteus sp. will NOT be    reported as part of the BCID paneluntil further notice.  Final Report (2020 11:59):    Single set isolate, possible contaminant.    Contact microbiology if susceptibility testing is clinically    indicated.  Organism: BLOOD CULTURE PCR  Streptococcus Viridans Group (2020 11:59)  Organism: Streptococcus Viridans Group (2020 11:59)  Organism: BLOOD CULTURE PCR  ***Blood Panel PCR results on this specimen are available  approximately 3 hours after the Gram stain result***  Gram stain, PCR, and/or culture results may not always  correspond due to difference in methodologies  ------------------------------------------------------------  This PCR assay was performed using [employer].  The  following targets are tested for:  Enterococcus, vancomycin  resistant enterococci, Listeria monocytogenes,  coagulase  negative staphylococci, S. aureus, methicillin resistant S.  aureus, Streptococcus agalactiae (Group B), S. pneumoniae,  S. pyogenes (Group A), Acinetobacter baumannii, Enterobacter  cloacae, E. coli, Klebsiella oxytoca, K. pneumoniae, Proteus  sp., Serratia marcescens, Haemophilus influenzae, Neisseria  meningitidis, Pseudomonas aeruginosa, Candida albicans, C.  glabrata, C. krusei, C. parapsilosis, C. tropicalis and the  KPC resistance gene.  **NOTE: Due to technical problems, Proteus sp. will NOT be  reported as part of the BCID panel until further notice. (2020 11:59)      EKG: NSR    RADIOLOGY & ADDITIONAL TESTS:  Venous Duplex of RUE  negative for DVT.

## 2020-02-22 NOTE — ED CDU PROVIDER SUBSEQUENT DAY NOTE - MEDICAL DECISION MAKING DETAILS
IV antibiotics (currently on vancomycin), supportive care, general observation care / monitoring.  ID team to irasema pt 2/22 daytime due to 1/4 bottles with Gram positive cocci in chains growth (see HPI).

## 2020-02-22 NOTE — CONSULT NOTE ADULT - ASSESSMENT
58 y/o F PMHx of morbid obesity, breast ca s/p Right masterctomy/lymphadectomy/chemo/rad 2012 c/b RUE lymphedema, DMII, substance abuse now on methadone presented with RUE erythema and subjective fevers, chills, found to have low grade streptococcal bacteremia, PCR identifies as not Group A, B, or Strep Pneumoniae and likely superimposed cellulitis on chronic lymphadema.    Strep Bacteremia  -1 of 4 bottles (aerobic), PCR appreciated not Group A, B, or Strep Pneumoniae , awaiting final identification and sensitivities  -f/u repeat Bcx  -Potential portals of entry include skin from cellulitis, oral marilin from poor dentition  -further recommendations pending 60 y/o F PMHx of morbid obesity, breast ca s/p Right masterctomy/lymphadectomy/chemo/rad 2012 c/b RUE lymphedema, DMII, substance abuse now on methadone presented with RUE erythema and subjective fevers, chills, found to have low grade streptococcal bacteremia, PCR identifies as not Group A, B, or Strep Pneumoniae and likely superimposed cellulitis on chronic lymphadema.    Strep Bacteremia  -1 of 4 bottles (aerobic), PCR appreciated not Group A, B, or Strep Pneumoniae , awaiting final identification and sensitivities  -not contaminant as patient has fevers, cellulitis  -f/u repeat Bcx  -Potential portals of entry include skin from cellulitis, oral marilin from poor dentition  -CTX 1g q24  -Dental eval for poor dentition as possible source  -TTE to r/o endocarditis 58 y/o F PMHx of morbid obesity, breast ca s/p Right masterctomy/lymphadectomy/chemo/rad 2012 c/b RUE lymphedema, DMII, substance abuse now on methadone presented with RUE erythema and subjective fevers, chills, found to have low grade streptococcal bacteremia, PCR identifies as not Group A, B, or Strep Pneumoniae and likely superimposed cellulitis on chronic lymphadema.    Overall sepsis on presentation, fever, tachycardia, Rt UE cellulitis, and Strep Bacteremia    Plan:   -1 of 4 bottles (aerobic), PCR appreciated not Group A, B, or Strep Pneumoniae , awaiting final identification and sensitivities  -not contaminant as patient has fevers, cellulitis  -f/u repeat Bcx  -Potential portals of entry include skin from cellulitis, oral marilin from poor dentition  -CTX 2g q24  -Dental eval for poor dentition as possible source  -TTE to r/o endocarditis

## 2020-02-22 NOTE — ED CDU PROVIDER SUBSEQUENT DAY NOTE - ATTENDING CONTRIBUTION TO CARE
I performed a history and physical exam of the patient and discussed their management with the resident and /or advanced care provider. I reviewed the resident and /or ACP's note and agree with the documented findings and plan of care. My medical decison making and observations are found above.    pt with cellulitis, worrsening, increasing redness and swelling.  pt with dm and lymphedema on that arm, pt alert and pleasant

## 2020-02-22 NOTE — CHART NOTE - NSCHARTNOTEFT_GEN_A_CORE
Reference #: [id number]    Others' Prescriptions  Patient Name:	Denia Santos	YOB: 1960  Address:	[address] [place] [address]	Sex:	Female  Rx Written	Rx Dispensed	Drug	Quantity	Days Supply	Prescriber Name  02/10/2020	02/10/2020	methadone hcl 10 mg tablet	60	30	Abayeva, Tati  01/06/2020	01/07/2020	methadone hcl 10 mg tablet	60	30	Abayeva, Tati  12/03/2019	12/05/2019	methadone hcl 10 mg tablet	60	30	Mathieu Lord M D  10/31/2019	10/31/2019	methadone hcl 10 mg tablet	60	30	Mathieu Lord M D  09/23/2019	09/23/2019	methadone hcl 10 mg tablet	60	30	Abayeva, Tati  08/16/2019	08/23/2019	methadone hcl 10 mg tablet	60	30	Abayeva, Tati  07/09/2019	07/14/2019	methadone hcl 10 mg tablet	60	30	Mathieu Lord M D  05/22/2019	06/11/2019	methadone hcl 10 mg tablet	50	20	Mathieu Lord M D  05/07/2019	05/07/2019	methadone hcl 10 mg tablet	60	30	Mathieu Lord M D  Patient Name:	Denia Daley	YOB: 1960  Address:	[address] [place] [address]	Sex:	Female  Rx Written	Rx Dispensed	Drug	Quantity	Days Supply	Prescriber Name  09/05/2019	09/05/2019	oxycodone-acetaminophen 5-325 mg tablet	25	5	[facility]

## 2020-02-22 NOTE — PATIENT PROFILE ADULT - LIVING ENVIRONMENT
[facility] [place]  [phone]    09/25/19- Received a call from Madonna Vivas, critical lab called to their office, Hgb 6.2. Okay to transfuse 2 units PRBCs, per Dr. Walter Sarabia. Patient scheduled in [facility] tomorrow 9/26 at 5 am.  Patient's daughter notified, no further questions or concerns at this time. no

## 2020-02-23 ENCOUNTER — TRANSCRIPTION ENCOUNTER (OUTPATIENT)
Age: 60
End: 2020-02-23

## 2020-02-23 LAB
ANION GAP SERPL CALC-SCNC: 12 MMO/L — SIGNIFICANT CHANGE UP (ref 7–14)
BASOPHILS # BLD AUTO: 0.01 K/UL — SIGNIFICANT CHANGE UP (ref 0–0.2)
BASOPHILS NFR BLD AUTO: 0.2 % — SIGNIFICANT CHANGE UP (ref 0–2)
BUN SERPL-MCNC: 14 MG/DL — SIGNIFICANT CHANGE UP (ref 7–23)
CALCIUM SERPL-MCNC: 9.8 MG/DL — SIGNIFICANT CHANGE UP (ref 8.4–10.5)
CHLORIDE SERPL-SCNC: 99 MMOL/L — SIGNIFICANT CHANGE UP (ref 98–107)
CO2 SERPL-SCNC: 27 MMOL/L — SIGNIFICANT CHANGE UP (ref 22–31)
CREAT SERPL-MCNC: 0.98 MG/DL — SIGNIFICANT CHANGE UP (ref 0.5–1.3)
EOSINOPHIL # BLD AUTO: 0.14 K/UL — SIGNIFICANT CHANGE UP (ref 0–0.5)
EOSINOPHIL NFR BLD AUTO: 2.7 % — SIGNIFICANT CHANGE UP (ref 0–6)
FERRITIN SERPL-MCNC: 69.75 NG/ML — SIGNIFICANT CHANGE UP (ref 15–150)
GLUCOSE SERPL-MCNC: 125 MG/DL — HIGH (ref 70–99)
HCT VFR BLD CALC: 31.5 % — LOW (ref 34.5–45)
HCV AB S/CO SERPL IA: 0.13 S/CO — SIGNIFICANT CHANGE UP (ref 0–0.99)
HCV AB SERPL-IMP: SIGNIFICANT CHANGE UP
HGB BLD-MCNC: 10.2 G/DL — LOW (ref 11.5–15.5)
IMM GRANULOCYTES NFR BLD AUTO: 0.4 % — SIGNIFICANT CHANGE UP (ref 0–1.5)
IRON SATN MFR SERPL: 28 UG/DL — LOW (ref 30–160)
IRON SATN MFR SERPL: 291 UG/DL — SIGNIFICANT CHANGE UP (ref 140–530)
LYMPHOCYTES # BLD AUTO: 1.19 K/UL — SIGNIFICANT CHANGE UP (ref 1–3.3)
LYMPHOCYTES # BLD AUTO: 22.8 % — SIGNIFICANT CHANGE UP (ref 13–44)
MAGNESIUM SERPL-MCNC: 1.7 MG/DL — SIGNIFICANT CHANGE UP (ref 1.6–2.6)
MCHC RBC-ENTMCNC: 27 PG — SIGNIFICANT CHANGE UP (ref 27–34)
MCHC RBC-ENTMCNC: 32.4 % — SIGNIFICANT CHANGE UP (ref 32–36)
MCV RBC AUTO: 83.3 FL — SIGNIFICANT CHANGE UP (ref 80–100)
MONOCYTES # BLD AUTO: 0.34 K/UL — SIGNIFICANT CHANGE UP (ref 0–0.9)
MONOCYTES NFR BLD AUTO: 6.5 % — SIGNIFICANT CHANGE UP (ref 2–14)
NEUTROPHILS # BLD AUTO: 3.51 K/UL — SIGNIFICANT CHANGE UP (ref 1.8–7.4)
NEUTROPHILS NFR BLD AUTO: 67.4 % — SIGNIFICANT CHANGE UP (ref 43–77)
NRBC # FLD: 0 K/UL — SIGNIFICANT CHANGE UP (ref 0–0)
PHOSPHATE SERPL-MCNC: 3.8 MG/DL — SIGNIFICANT CHANGE UP (ref 2.5–4.5)
PLATELET # BLD AUTO: 259 K/UL — SIGNIFICANT CHANGE UP (ref 150–400)
PMV BLD: 9.7 FL — SIGNIFICANT CHANGE UP (ref 7–13)
POTASSIUM SERPL-MCNC: 4 MMOL/L — SIGNIFICANT CHANGE UP (ref 3.5–5.3)
POTASSIUM SERPL-SCNC: 4 MMOL/L — SIGNIFICANT CHANGE UP (ref 3.5–5.3)
RBC # BLD: 3.78 M/UL — LOW (ref 3.8–5.2)
RBC # FLD: 16.8 % — HIGH (ref 10.3–14.5)
SODIUM SERPL-SCNC: 138 MMOL/L — SIGNIFICANT CHANGE UP (ref 135–145)
UIBC SERPL-MCNC: 263.2 UG/DL — SIGNIFICANT CHANGE UP (ref 110–370)
WBC # BLD: 5.21 K/UL — SIGNIFICANT CHANGE UP (ref 3.8–10.5)
WBC # FLD AUTO: 5.21 K/UL — SIGNIFICANT CHANGE UP (ref 3.8–10.5)

## 2020-02-23 PROCEDURE — 99232 SBSQ HOSP IP/OBS MODERATE 35: CPT | Mod: GC

## 2020-02-23 PROCEDURE — 99233 SBSQ HOSP IP/OBS HIGH 50: CPT | Mod: GC

## 2020-02-23 PROCEDURE — 93306 TTE W/DOPPLER COMPLETE: CPT | Mod: 26

## 2020-02-23 RX ADMIN — Medication 25 MILLIGRAM(S): at 05:34

## 2020-02-23 RX ADMIN — HEPARIN SODIUM 5000 UNIT(S): 5000 INJECTION INTRAVENOUS; SUBCUTANEOUS at 05:33

## 2020-02-23 RX ADMIN — CEFTRIAXONE 100 MILLIGRAM(S): 500 INJECTION, POWDER, FOR SOLUTION INTRAMUSCULAR; INTRAVENOUS at 14:25

## 2020-02-23 RX ADMIN — VALSARTAN 320 MILLIGRAM(S): 80 TABLET ORAL at 05:34

## 2020-02-23 RX ADMIN — LIDOCAINE 1 PATCH: 4 CREAM TOPICAL at 13:02

## 2020-02-23 RX ADMIN — LIDOCAINE 1 PATCH: 4 CREAM TOPICAL at 06:12

## 2020-02-23 RX ADMIN — Medication 60 MILLIGRAM(S): at 05:34

## 2020-02-23 RX ADMIN — BUPROPION HYDROCHLORIDE 100 MILLIGRAM(S): 150 TABLET, EXTENDED RELEASE ORAL at 13:01

## 2020-02-23 RX ADMIN — HEPARIN SODIUM 5000 UNIT(S): 5000 INJECTION INTRAVENOUS; SUBCUTANEOUS at 13:03

## 2020-02-23 RX ADMIN — GABAPENTIN 400 MILLIGRAM(S): 400 CAPSULE ORAL at 18:00

## 2020-02-23 RX ADMIN — METHADONE HYDROCHLORIDE 10 MILLIGRAM(S): 40 TABLET ORAL at 09:42

## 2020-02-23 RX ADMIN — Medication 60 MILLIGRAM(S): at 18:00

## 2020-02-23 RX ADMIN — PANTOPRAZOLE SODIUM 40 MILLIGRAM(S): 20 TABLET, DELAYED RELEASE ORAL at 06:20

## 2020-02-23 RX ADMIN — HEPARIN SODIUM 5000 UNIT(S): 5000 INJECTION INTRAVENOUS; SUBCUTANEOUS at 23:00

## 2020-02-23 RX ADMIN — METHADONE HYDROCHLORIDE 5 MILLIGRAM(S): 40 TABLET ORAL at 18:00

## 2020-02-23 RX ADMIN — LIDOCAINE 1 PATCH: 4 CREAM TOPICAL at 18:46

## 2020-02-23 RX ADMIN — GABAPENTIN 400 MILLIGRAM(S): 400 CAPSULE ORAL at 05:34

## 2020-02-23 NOTE — PROGRESS NOTE ADULT - ASSESSMENT
58 y/o F PMHx of morbid obesity, breast ca s/p Right masterctomy/lymphadectomy/chemo/rad 2012 c/b RUE lymphedema, DMII, substance abuse now on methadone presented with RUE erythema and subjective fevers, chills, found to have low grade streptococcal bacteremia, PCR identifies as not Group A, B, or Strep Pneumoniae and likely superimposed cellulitis on chronic lymphadema.    Overall sepsis on presentation, fever, tachycardia, Rt UE cellulitis, and Strep Viridans Bacteremia    Plan:   -1 of 4 bottles (aerobic) grew Strep Viridans, repeat Bcx NTD  -not contaminant as patient has fevers, cellulitis  -Potential portals of entry include skin from cellulitis, oral marilin from poor dentition  -Cellulitis resolving  -Awaiting Dental eval, planned for monday as per primary team  - c/w CTX 2g q24  -TTE to r/o endocarditis ordered, pending 58 y/o F PMHx of morbid obesity, breast ca s/p Right masterctomy/lymphadectomy/chemo/rad 2012 c/b RUE lymphedema, DMII, substance abuse now on methadone presented with RUE erythema and subjective fevers, chills, found to have low grade streptococcal bacteremia, PCR identifies as not Group A, B, or Strep Pneumoniae and likely superimposed cellulitis on chronic lymphadema.    Overall sepsis on presentation, fever, tachycardia, Rt UE cellulitis, and Strep Viridans Bacteremia  clinically improving. resolved sepsis.       Plan:   -1 of 4 bottles (aerobic) grew Strep Viridans, repeat Bcx NTD  -not contaminant as patient had fevers, cellulitis  -Potential portals of entry include skin from cellulitis, oral marilin from poor dentition  -Cellulitis resolving  -Awaiting Dental eval, planned for monday as per primary team  - c/w CTX 2g q24  -TTE to r/o endocarditis ordered, pending

## 2020-02-23 NOTE — PHYSICAL THERAPY INITIAL EVALUATION ADULT - PERTINENT HX OF CURRENT PROBLEM, REHAB EVAL
Patient is a 59 year old female with PMH of breast ca (s/p mastectomy, chemo, RT, complicated by chronic right arm lymphedema, 2003), morbid obesity, type 2 DM, HTN, HLD, asthma, chronic pain on methadone, anxiety/depression presenting with right arm pain and warmth which began hours prior to admission

## 2020-02-23 NOTE — PROGRESS NOTE ADULT - SUBJECTIVE AND OBJECTIVE BOX
PROGRESS NOTE:   Authored by Nikki Kimura, MD, Pager [phone] [facility], [id number] [facility]     Patient is a 59y old  Female who presents with a chief complaint of cellulitis, Strep viridans bacteremia (22 Feb 2020 13:56)      SUBJECTIVE / OVERNIGHT EVENTS:    ADDITIONAL REVIEW OF SYSTEMS:    MEDICATIONS  (STANDING):  buPROPion  milliGRAM(s) Oral daily  cefTRIAXone   IVPB 2000 milliGRAM(s) IV Intermittent every 24 hours  dextrose 5%. 1000 milliLiter(s) (50 mL/Hr) IV Continuous <Continuous>  dextrose 50% Injectable 12.5 Gram(s) IV Push once  dextrose 50% Injectable 25 Gram(s) IV Push once  dextrose 50% Injectable 25 Gram(s) IV Push once  diltiazem    Tablet 60 milliGRAM(s) Oral every 12 hours  gabapentin 400 milliGRAM(s) Oral two times a day  heparin  Injectable 5000 Unit(s) SubCutaneous every 8 hours  hydrochlorothiazide 25 milliGRAM(s) Oral daily  insulin lispro (HumaLOG) corrective regimen sliding scale   SubCutaneous three times a day before meals  lidocaine   Patch 1 Patch Transdermal daily  lidocaine   Patch 1 Patch Transdermal daily  methadone    Tablet 10 milliGRAM(s) Oral <User Schedule>  methadone    Tablet 5 milliGRAM(s) Oral <User Schedule>  pantoprazole    Tablet 40 milliGRAM(s) Oral before breakfast  valsartan 320 milliGRAM(s) Oral daily    MEDICATIONS  (PRN):  dextrose 40% Gel 15 Gram(s) Oral once PRN Blood Glucose LESS THAN 70 milliGRAM(s)/deciliter  glucagon  Injectable 1 milliGRAM(s) IntraMuscular once PRN Glucose LESS THAN 70 milligrams/deciliter      CAPILLARY BLOOD GLUCOSE      POCT Blood Glucose.: 98 mg/dL (22 Feb 2020 22:03)  POCT Blood Glucose.: 122 mg/dL (22 Feb 2020 17:15)  POCT Blood Glucose.: 96 mg/dL (22 Feb 2020 12:35)  POCT Blood Glucose.: 143 mg/dL (22 Feb 2020 07:39)    I&O's Summary      PHYSICAL EXAM:  Vital Signs Last 24 Hrs  T(C): 36.9 (23 Feb 2020 05:28), Max: 36.9 (22 Feb 2020 22:02)  T(F): 98.5 (23 Feb 2020 05:28), Max: 98.5 (23 Feb 2020 05:28)  HR: 79 (23 Feb 2020 05:28) (65 - 79)  BP: 141/69 (23 Feb 2020 05:28) (109/55 - 141/69)  BP(mean): --  RR: 16 (23 Feb 2020 05:28) (16 - 18)  SpO2: 100% (23 Feb 2020 05:28) (97% - 100%)    GENERAL: morbidly obese middle aged woman, lying in bed comfortably  EYES: EOMI, PERRLA, conjunctiva and sclera clear  ENT: Moist mucous membranes  MOUTH: broken right molar without erythema or induration, no drainage noted   NECK: Supple, No JVD  CHEST/LUNG: Clear to auscultation bilaterally; No rales, rhonchi, wheezing, or rubs. Unlabored respirations  HEART: Regular rate and rhythm; No murmurs, rubs, or gallops  ABDOMEN: obese, Bowel sounds present; Soft, Nontender, Nondistended. No hepatomegaly  EXTREMITIES:  2+ Peripheral Pulses. Feet normal-appearing, no cuts or areas of erythema/induration, no lower extremity edema. Right upper extremity with erythema and warmth confined to demarcated area, no cuts or open lesions. Intact pulses and full strength.    NERVOUS SYSTEM:  Alert & Oriented X3, speech clear. No deficits   MSK: FROM all 4 extremities, full and equal strength in upper and lower extremities    LABS:                        9.9    6.38  )-----------( 247      ( 22 Feb 2020 06:40 )             30.5     02-22    136  |  98  |  11  ----------------------------<  128<H>  3.7   |  25  |  0.93    Ca    9.3      22 Feb 2020 06:40    TPro  7.9  /  Alb  3.8  /  TBili  0.4  /  DBili  x   /  AST  13  /  ALT  11  /  AlkPhos  81  02-22              Culture - Blood (collected 21 Feb 2020 19:37)  Source: BLOOD VENOUS  Preliminary Report (22 Feb 2020 19:36):    NO ORGANISMS ISOLATED    NO ORGANISMS ISOLATED AT 24 HOURS    Culture - Blood (collected 21 Feb 2020 19:37)  Source: BLOOD PERIPHERAL  Preliminary Report (22 Feb 2020 19:36):    NO ORGANISMS ISOLATED    NO ORGANISMS ISOLATED AT 24 HOURS    Culture - Blood (collected 21 Feb 2020 01:13)  Source: Peripheral Site 2  Preliminary Report (23 Feb 2020 01:12):    NO ORGANISMS ISOLATED    NO ORGANISMS ISOLATED AT 48 HRS.    Culture - Blood (collected 21 Feb 2020 01:13)  Source: Peripheral Site 1  Preliminary Report (21 Feb 2020 17:44):    ***Blood Panel PCR results on this specimen are available    approximately 3 hours after the Gram stain result***    Gram stain, PCR, and/or culture results may not always    correspond due to difference in methodologies    ------------------------------------------------------------    This PCR assay was performed using [employer].  The    following targets are tested for:  Enterococcus, vancomycin    resistant enterococci, Listeria monocytogenes,  coagulase    negative staphylococci, S. aureus, methicillin resistant S.    aureus, Streptococcus agalactiae (Group B), S. pneumoniae,    S. pyogenes (Group A), Acinetobacter baumannii, Enterobacter    cloacae, E. coli, Klebsiella oxytoca, K. pneumoniae, Proteus    sp., Serratia marcescens, Haemophilus influenzae, Neisseria    meningitidis, Pseudomonas aeruginosa, Candida albicans, C.    glabrata, C. krusei, C. parapsilosis, C. tropicalis and the    KPC resistance gene.    **NOTE: Due to technical problems, Proteus sp. will NOT be    reported as part of the BCID paneluntil further notice.  Final Report (22 Feb 2020 11:59):    Single set isolate, possible contaminant.    Contact microbiology if susceptibility testing is clinically    indicated.  Organism: BLOOD CULTURE PCR  Streptococcus Viridans Group (22 Feb 2020 11:59)  Organism: Streptococcus Viridans Group (22 Feb 2020 11:59)  Organism: BLOOD CULTURE PCR  ***Blood Panel PCR results on this specimen are available  approximately 3 hours after the Gram stain result***  Gram stain, PCR, and/or culture results may not always  correspond due to difference in methodologies  ------------------------------------------------------------  This PCR assay was performed using [employer].  The  following targets are tested for:  Enterococcus, vancomycin  resistant enterococci, Listeria monocytogenes,  coagulase  negative staphylococci, S. aureus, methicillin resistant S.  aureus, Streptococcus agalactiae (Group B), S. pneumoniae,  S. pyogenes (Group A), Acinetobacter baumannii, Enterobacter  cloacae, E. coli, Klebsiella oxytoca, K. pneumoniae, Proteus  sp., Serratia marcescens, Haemophilus influenzae, Neisseria  meningitidis, Pseudomonas aeruginosa, Candida albicans, C.  glabrata, C. krusei, C. parapsilosis, C. tropicalis and the  KPC resistance gene.  **NOTE: Due to technical problems, Proteus sp. will NOT be  reported as part of the BCID panel until further notice. (22 Feb 2020 11:59)        RADIOLOGY & ADDITIONAL TESTS:  Results Reviewed:   Imaging Personally Reviewed:  Electrocardiogram Personally Reviewed:    COORDINATION OF CARE:  Care Discussed with Consultants/Other Providers [Y/N]:  Prior or Outpatient Records Reviewed [Y/N]: PROGRESS NOTE:   Authored by Nikki Kimura, MD, Pager [phone] [facility], [id number] [facility]     Patient is a 59y old  Female who presents with a chief complaint of cellulitis, Strep viridans bacteremia (22 Feb 2020 13:56)      SUBJECTIVE / OVERNIGHT EVENTS: No acute events overnight. Pt seen and examined at bedside. States her arm feels better. No fever, chills, nausea, vomiting, or diarrhea.    ADDITIONAL REVIEW OF SYSTEMS:    MEDICATIONS  (STANDING):  buPROPion  milliGRAM(s) Oral daily  cefTRIAXone   IVPB 2000 milliGRAM(s) IV Intermittent every 24 hours  dextrose 5%. 1000 milliLiter(s) (50 mL/Hr) IV Continuous <Continuous>  dextrose 50% Injectable 12.5 Gram(s) IV Push once  dextrose 50% Injectable 25 Gram(s) IV Push once  dextrose 50% Injectable 25 Gram(s) IV Push once  diltiazem    Tablet 60 milliGRAM(s) Oral every 12 hours  gabapentin 400 milliGRAM(s) Oral two times a day  heparin  Injectable 5000 Unit(s) SubCutaneous every 8 hours  hydrochlorothiazide 25 milliGRAM(s) Oral daily  insulin lispro (HumaLOG) corrective regimen sliding scale   SubCutaneous three times a day before meals  lidocaine   Patch 1 Patch Transdermal daily  lidocaine   Patch 1 Patch Transdermal daily  methadone    Tablet 10 milliGRAM(s) Oral <User Schedule>  methadone    Tablet 5 milliGRAM(s) Oral <User Schedule>  pantoprazole    Tablet 40 milliGRAM(s) Oral before breakfast  valsartan 320 milliGRAM(s) Oral daily    MEDICATIONS  (PRN):  dextrose 40% Gel 15 Gram(s) Oral once PRN Blood Glucose LESS THAN 70 milliGRAM(s)/deciliter  glucagon  Injectable 1 milliGRAM(s) IntraMuscular once PRN Glucose LESS THAN 70 milligrams/deciliter      CAPILLARY BLOOD GLUCOSE      POCT Blood Glucose.: 98 mg/dL (22 Feb 2020 22:03)  POCT Blood Glucose.: 122 mg/dL (22 Feb 2020 17:15)  POCT Blood Glucose.: 96 mg/dL (22 Feb 2020 12:35)  POCT Blood Glucose.: 143 mg/dL (22 Feb 2020 07:39)    I&O's Summary      PHYSICAL EXAM:  Vital Signs Last 24 Hrs  T(C): 36.9 (23 Feb 2020 05:28), Max: 36.9 (22 Feb 2020 22:02)  T(F): 98.5 (23 Feb 2020 05:28), Max: 98.5 (23 Feb 2020 05:28)  HR: 79 (23 Feb 2020 05:28) (65 - 79)  BP: 141/69 (23 Feb 2020 05:28) (109/55 - 141/69)  BP(mean): --  RR: 16 (23 Feb 2020 05:28) (16 - 18)  SpO2: 100% (23 Feb 2020 05:28) (97% - 100%)    GENERAL: morbidly obese middle aged woman, lying in bed comfortably  EYES: EOMI, PERRLA, conjunctiva and sclera clear  ENT: Moist mucous membranes  MOUTH: broken right molar without erythema or induration, no drainage noted   NECK: Supple, No JVD  CHEST/LUNG: Clear to auscultation bilaterally; No rales, rhonchi, wheezing, or rubs. Unlabored respirations  HEART: Regular rate and rhythm; No murmurs, rubs, or gallops  ABDOMEN: obese, Bowel sounds present; Soft, Nontender, Nondistended. No hepatomegaly  EXTREMITIES:  2+ Peripheral Pulses. Feet normal-appearing, no cuts or areas of erythema/induration, no lower extremity edema. Right upper extremity with improved erythema and induration, appears to have regressed from demarcated area, no cuts or open lesions. Intact pulses and full strength.    NERVOUS SYSTEM:  Alert & Oriented X3, speech clear. No deficits   MSK: FROM all 4 extremities, full and equal strength in upper and lower extremities    LABS:                        9.9    6.38  )-----------( 247      ( 22 Feb 2020 06:40 )             30.5     02-22    136  |  98  |  11  ----------------------------<  128<H>  3.7   |  25  |  0.93    Ca    9.3      22 Feb 2020 06:40    TPro  7.9  /  Alb  3.8  /  TBili  0.4  /  DBili  x   /  AST  13  /  ALT  11  /  AlkPhos  81  02-22              Culture - Blood (collected 21 Feb 2020 19:37)  Source: BLOOD VENOUS  Preliminary Report (22 Feb 2020 19:36):    NO ORGANISMS ISOLATED    NO ORGANISMS ISOLATED AT 24 HOURS    Culture - Blood (collected 21 Feb 2020 19:37)  Source: BLOOD PERIPHERAL  Preliminary Report (22 Feb 2020 19:36):    NO ORGANISMS ISOLATED    NO ORGANISMS ISOLATED AT 24 HOURS    Culture - Blood (collected 21 Feb 2020 01:13)  Source: Peripheral Site 2  Preliminary Report (23 Feb 2020 01:12):    NO ORGANISMS ISOLATED    NO ORGANISMS ISOLATED AT 48 HRS.    Culture - Blood (collected 21 Feb 2020 01:13)  Source: Peripheral Site 1  Preliminary Report (21 Feb 2020 17:44):    ***Blood Panel PCR results on this specimen are available    approximately 3 hours after the Gram stain result***    Gram stain, PCR, and/or culture results may not always    correspond due to difference in methodologies    ------------------------------------------------------------    This PCR assay was performed using [employer].  The    following targets are tested for:  Enterococcus, vancomycin    resistant enterococci, Listeria monocytogenes,  coagulase    negative staphylococci, S. aureus, methicillin resistant S.    aureus, Streptococcus agalactiae (Group B), S. pneumoniae,    S. pyogenes (Group A), Acinetobacter baumannii, Enterobacter    cloacae, E. coli, Klebsiella oxytoca, K. pneumoniae, Proteus    sp., Serratia marcescens, Haemophilus influenzae, Neisseria    meningitidis, Pseudomonas aeruginosa, Candida albicans, C.    glabrata, C. krusei, C. parapsilosis, C. tropicalis and the    KPC resistance gene.    **NOTE: Due to technical problems, Proteus sp. will NOT be    reported as part of the BCID paneluntil further notice.  Final Report (22 Feb 2020 11:59):    Single set isolate, possible contaminant.    Contact microbiology if susceptibility testing is clinically    indicated.  Organism: BLOOD CULTURE PCR  Streptococcus Viridans Group (22 Feb 2020 11:59)  Organism: Streptococcus Viridans Group (22 Feb 2020 11:59)  Organism: BLOOD CULTURE PCR  ***Blood Panel PCR results on this specimen are available  approximately 3 hours after the Gram stain result***  Gram stain, PCR, and/or culture results may not always  correspond due to difference in methodologies  ------------------------------------------------------------  This PCR assay was performed using [employer].  The  following targets are tested for:  Enterococcus, vancomycin  resistant enterococci, Listeria monocytogenes,  coagulase  negative staphylococci, S. aureus, methicillin resistant S.  aureus, Streptococcus agalactiae (Group B), S. pneumoniae,  S. pyogenes (Group A), Acinetobacter baumannii, Enterobacter  cloacae, E. coli, Klebsiella oxytoca, K. pneumoniae, Proteus  sp., Serratia marcescens, Haemophilus influenzae, Neisseria  meningitidis, Pseudomonas aeruginosa, Candida albicans, C.  glabrata, C. krusei, C. parapsilosis, C. tropicalis and the  KPC resistance gene.  **NOTE: Due to technical problems, Proteus sp. will NOT be  reported as part of the BCID panel until further notice. (22 Feb 2020 11:59)        RADIOLOGY & ADDITIONAL TESTS:  Results Reviewed:   Imaging Personally Reviewed:  Electrocardiogram Personally Reviewed:    COORDINATION OF CARE:  Care Discussed with Consultants/Other Providers [Y/N]:  Prior or Outpatient Records Reviewed [Y/N]:

## 2020-02-23 NOTE — DISCHARGE NOTE PROVIDER - NSDCFUADDAPPT_GEN_ALL_CORE_FT
Please follow up with Infectious Disease clinic on 3/10 at 8:45 AM. Please follow up with Infectious Disease clinic on 3/10 at 8:45 AM.  Please follow up with Dental clinic within 2 weeks, to schedule call [phone]. The address for the clinic is [phone] [place] [address]

## 2020-02-23 NOTE — PROGRESS NOTE ADULT - ASSESSMENT
59 year old female w/ PMH of breast ca (s/p mastectomy, chemo, RT, node bx complicated by chronic right arm lymphedema, 2003), morbid obesity, type 2 DM, HTN, HLD, asthma, chronic pain on methadone, anxiety/depression presenting with right arm pain and swelling admitted for sepsis 2/2 to right arm cellulitis, course complicated by Strep viridans bacteremia, on CTX 2/22-.

## 2020-02-23 NOTE — PROGRESS NOTE ADULT - PROBLEM SELECTOR PLAN 4
Pt with osteoarthritis of the bilateral knees. S/p right rotator cuff surgery last year. Follows a pain management provider Dr. Mathieu Lord. On methadone 10 mg in the morning, 5 mg at night. See ISTOP chart note 2/22.   -cw methadone  -cw home gabapentin

## 2020-02-23 NOTE — DISCHARGE NOTE PROVIDER - NSDCFUSCHEDAPPT_GEN_ALL_CORE_FT
MARQUIS, CHARLES REDDY ; 03/19/2020 ; NPP PhysMed [address]  LEASKATHE, CHARLES REDDY ; 03/26/2020 ; NPP OrthoSurg [address]  MARQUIS, CHARLES PLAZAELL ; 04/20/2020 ; NPP Neuro [address]  MARQUIS, CHARLES REDDY ; 04/24/2020 ; NPP Med [address]  MARQUIS, CHARLES REDDY ; 04/24/2020 ; NPP Med [address]  MARQUIS, CHARLES PLAZAELL ; 04/27/2020 ; NPP Cardio [id number] CHARLES Cortés ; 05/18/2020 ; NPP Med Int [address] ; 05/18/2020 ; NPP Med Int [address] CHARLES CHO ; 03/10/2020 ; NPP Med  [address]  CHARLES CHO ; 03/19/2020 ; [facility]PP [facility] [address] [facility]  CHARLES CHO ; 03/26/2020 ; NPP [facility]urg [facility]  CHARLES CHO ; 04/20/2020 ; NPP Neuro [address] [facility]  CHARLES CHO ; 04/24/2020 ; NPP Med [address]  CHARLES CHO ; 04/24/2020 ; NPP Med [address]  CHARLES CHO ; 04/27/2020 ; [facility] [phone] ; 05/18/2020 ; NPP Med Int [address] ; 05/18/2020 ; NPP Med Int [address]

## 2020-02-23 NOTE — PROGRESS NOTE ADULT - PROBLEM SELECTOR PLAN 2
Right arm with chronic lymphedema, admitted for redness and warmth with subjective fevers/chills. S/p vanc x1, clindamycin x2 in ED. ID consulted and recommended CTX 2g daily.   -cw CTX 2/22-  -Venous Duplex of RUE 2/21 negative for DVT.

## 2020-02-23 NOTE — DISCHARGE NOTE PROVIDER - NSDCMRMEDTOKEN_GEN_ALL_CORE_FT
buPROPion 100 mg/12 hours (SR) oral tablet, extended release: 1 tab(s) orally once a day  dilTIAZem 60 mg/12 hours oral capsule, extended release: 1 cap(s) orally every 12 hours  gabapentin 400 mg oral capsule: 1 cap(s) orally 2 times a day  hydroCHLOROthiazide 25 mg oral tablet: 1 tab(s) orally once a day  meloxicam 7.5 mg oral tablet: 1 tablet orally 2 times a day   metFORMIN 500 mg oral tablet: 2 tab(s) orally 2 times a day  methadone 10 mg oral tablet: 1 tab orally in the morning, 0.5 tablet orally in the evening  omeprazole 40 mg oral delayed release capsule: 1 cap(s) orally once a day  valsartan 320 mg oral tablet: 1 tab(s) orally once a day buPROPion 100 mg/12 hours (SR) oral tablet, extended release: 1 tab(s) orally once a day  cefprozil 500 mg oral tablet: 1 tab(s) orally 2 times a day   dilTIAZem 60 mg/12 hours oral capsule, extended release: 1 cap(s) orally every 12 hours  gabapentin 400 mg oral capsule: 1 cap(s) orally 2 times a day  hydroCHLOROthiazide 25 mg oral tablet: 1 tab(s) orally once a day  meloxicam 7.5 mg oral tablet: 1 tablet orally 2 times a day   metFORMIN 500 mg oral tablet: 2 tab(s) orally 2 times a day  methadone 10 mg oral tablet: 1 tab orally in the morning, 0.5 tablet orally in the evening  omeprazole 40 mg oral delayed release capsule: 1 cap(s) orally once a day  Physical therapy: Physical therapy  valsartan 320 mg oral tablet: 1 tab(s) orally once a day

## 2020-02-23 NOTE — DISCHARGE NOTE PROVIDER - NSDCCPCAREPLAN_GEN_ALL_CORE_FT
PRINCIPAL DISCHARGE DIAGNOSIS  Diagnosis: Streptococcal bacteremia  Assessment and Plan of Treatment: You were found to have a bacteria in the bloodstream and were given IV antibiotics. You had a sonogram of the heart to evaluate for endocarditis which showed __. You were evaluated by the dental team and they recommended __.      SECONDARY DISCHARGE DIAGNOSES  Diagnosis: Cellulitis of right upper arm  Assessment and Plan of Treatment: You came in with warmth, redness, and pain in your right arm concerning for a skin infection. You were treated with IV antibiotics and the rash improved. PRINCIPAL DISCHARGE DIAGNOSIS  Diagnosis: Streptococcal bacteremia  Assessment and Plan of Treatment: You were found to have a bacteria in the bloodstream and were given IV antibiotics. You had a sonogram of the heart to evaluate for endocarditis which showed no obvious vegetations. You were evaluated by the dental team and they recommended following up in dental clinic. You will need to continue your oral antibiotic through 3/6. Return to the ED if you have fever, chills, nausea, or vomiting.      SECONDARY DISCHARGE DIAGNOSES  Diagnosis: Cellulitis of right upper arm  Assessment and Plan of Treatment: You came in with warmth, redness, and pain in your right arm concerning for a skin infection. You were treated with IV antibiotics and the rash improved. You will need to continue your oral antibioitics through 3/6. Return to the ED if you have fever, chills, nausea, or vomiting, or worsening rash.

## 2020-02-23 NOTE — DISCHARGE NOTE PROVIDER - CARE PROVIDER_API CALL
Flor Alvarez)  Infectious Disease; Internal Medicine  [address] [place] [address]  Phone: [phone]  Fax: [phone]  Follow Up Time:

## 2020-02-23 NOTE — PROGRESS NOTE ADULT - PROBLEM SELECTOR PLAN 1
On admission meeting sepsis criteria, with signs of right arm cellulitis. BCx 2/21 growing Strep viridans x1 bottle. S/p vanc x1, clindamycin x2 in ED. ID consulted and recommended CTX 2g daily.   -cw CTX 2/22  -follow up repeat BCx in AM  -TTE ordered   -dental consult for broken tooth    -ID recs appreciated On admission meeting sepsis criteria, with signs of right arm cellulitis. BCx 2/21 growing Strep viridans x1 bottle. S/p vanc x1, clindamycin x2 in ED. ID consulted and recommended CTX 2g daily.   -cw CTX 2/22  -follow up repeat BCx 2/23  -TTE ordered   -dental consult for broken tooth    -ID recs appreciated

## 2020-02-23 NOTE — PROGRESS NOTE ADULT - SUBJECTIVE AND OBJECTIVE BOX
Follow Up:  RUE SSTI, Strep viridans bacteremia    Inverval History/ROS:Patient is a 59y old  Female who presents with a chief complaint of cellulitis, Strep viridans bacteremia (23 Feb 2020 10:36)    Pt is improving, no fevers or leukocytosis, cellulitis of RUE resolving. Still awaiting Dental evaluation ( planned for monday as per primary team), and TTE (ordered).    Allergies    environment--ragweed, dust, cats--sneezing and watery eyes, nasal congestion (Other)  ramipril (Angioedema)    Intolerances        ANTIMICROBIALS:  cefTRIAXone   IVPB 2000 every 24 hours      OTHER MEDS:  buPROPion  milliGRAM(s) Oral daily  dextrose 40% Gel 15 Gram(s) Oral once PRN  dextrose 5%. 1000 milliLiter(s) IV Continuous <Continuous>  dextrose 50% Injectable 12.5 Gram(s) IV Push once  dextrose 50% Injectable 25 Gram(s) IV Push once  dextrose 50% Injectable 25 Gram(s) IV Push once  diltiazem    Tablet 60 milliGRAM(s) Oral every 12 hours  gabapentin 400 milliGRAM(s) Oral two times a day  glucagon  Injectable 1 milliGRAM(s) IntraMuscular once PRN  heparin  Injectable 5000 Unit(s) SubCutaneous every 8 hours  hydrochlorothiazide 25 milliGRAM(s) Oral daily  insulin lispro (HumaLOG) corrective regimen sliding scale   SubCutaneous three times a day before meals  lidocaine   Patch 1 Patch Transdermal daily  lidocaine   Patch 1 Patch Transdermal daily  methadone    Tablet 10 milliGRAM(s) Oral <User Schedule>  methadone    Tablet 5 milliGRAM(s) Oral <User Schedule>  pantoprazole    Tablet 40 milliGRAM(s) Oral before breakfast  valsartan 320 milliGRAM(s) Oral daily      Vital Signs Last 24 Hrs  T(C): 36.9 (23 Feb 2020 05:28), Max: 36.9 (22 Feb 2020 22:02)  T(F): 98.5 (23 Feb 2020 05:28), Max: 98.5 (23 Feb 2020 05:28)  HR: 79 (23 Feb 2020 05:28) (65 - 79)  BP: 141/69 (23 Feb 2020 05:28) (109/55 - 141/69)  BP(mean): --  RR: 16 (23 Feb 2020 05:28) (16 - 18)  SpO2: 100% (23 Feb 2020 05:28) (97% - 100%)    Physical Exam  Constitutional: non-toxic, no distress, morbidly obese  HEAD/EYES: anicteric, no conjunctival injection  ENT:  supple, no thrush, poor dentition, several teeth missing,  necrotic appearing broken tooth  Cardiovascular:   normal S1, S2, no murmur  Respiratory:  + air entry b/l   GI:  soft, non-tender, normal bowel sounds  : no CVA tenderness  Musculoskeletal: RUE swelling persists however erythema has resolved  Neurologic: awake and alert, no focal findings  Skin:  RUE erythema resolved  Vascular: palpable pulses.                           10.2   5.21  )-----------( 259      ( 23 Feb 2020 07:14 )             31.5       02-23    138  |  99  |  14  ----------------------------<  125<H>  4.0   |  27  |  0.98    Ca    9.8      23 Feb 2020 07:14  Phos  3.8     02-23  Mg     1.7     02-23    TPro  7.9  /  Alb  3.8  /  TBili  0.4  /  DBili  x   /  AST  13  /  ALT  11  /  AlkPhos  81  02-22          MICROBIOLOGY:  Culture - Blood (02.21.20 @ 19:37)    Culture - Blood:   NO ORGANISMS ISOLATED  NO ORGANISMS ISOLATED AT 24 HOURS    Specimen Source: BLOOD VENOUS    Culture - Blood (02.21.20 @ 01:13)    -  Streptococcus sp. (Not Grp A, B or S pneumoniae): + DETECT BECKY  Organism Identification: BLOOD CULTURE PCR  Streptococcus Viridans Group    Method Type: PCR        RADIOLOGY: Imaging reviewed personally and interpretation as mentioned below.   < from: Xray Chest 1 View AP/PA (02.21.20 @ 00:24) >  IMPRESSION:  Left basilar haziness due to attenuation by abundant overlying soft tissues. Otherwise lungs grossly clear. No pleural effusions or pneumothorax.    Heart size and mediastinal width inaccurately assessed on the projection butappear grossly stable.    Trachea midline.    Unremarkable osseous structures. Follow Up:  RUE SSTI, Strep viridans bacteremia    Inverval History/ROS:Patient is a 59y old  Female who presents with a chief complaint of cellulitis, Strep viridans bacteremia (23 Feb 2020 10:36)    Pt is improving, no fevers or leukocytosis, cellulitis of RUE resolving. Still awaiting Dental evaluation ( planned for monday as per primary team), and TTE (ordered).  no cough, no SOB, no N/V/D, no abdominal pain, denies dysuria.       Allergies    environment--ragweed, dust, cats--sneezing and watery eyes, nasal congestion (Other)  ramipril (Angioedema)            ANTIMICROBIALS:  cefTRIAXone   IVPB 2000 every 24 hours      OTHER MEDS:  buPROPion  milliGRAM(s) Oral daily  dextrose 40% Gel 15 Gram(s) Oral once PRN  dextrose 5%. 1000 milliLiter(s) IV Continuous <Continuous>  dextrose 50% Injectable 12.5 Gram(s) IV Push once  dextrose 50% Injectable 25 Gram(s) IV Push once  dextrose 50% Injectable 25 Gram(s) IV Push once  diltiazem    Tablet 60 milliGRAM(s) Oral every 12 hours  gabapentin 400 milliGRAM(s) Oral two times a day  glucagon  Injectable 1 milliGRAM(s) IntraMuscular once PRN  heparin  Injectable 5000 Unit(s) SubCutaneous every 8 hours  hydrochlorothiazide 25 milliGRAM(s) Oral daily  insulin lispro (HumaLOG) corrective regimen sliding scale   SubCutaneous three times a day before meals  lidocaine   Patch 1 Patch Transdermal daily  lidocaine   Patch 1 Patch Transdermal daily  methadone    Tablet 10 milliGRAM(s) Oral <User Schedule>  methadone    Tablet 5 milliGRAM(s) Oral <User Schedule>  pantoprazole    Tablet 40 milliGRAM(s) Oral before breakfast  valsartan 320 milliGRAM(s) Oral daily      Vital Signs Last 24 Hrs  T(C): 36.9 (23 Feb 2020 05:28), Max: 36.9 (22 Feb 2020 22:02)  T(F): 98.5 (23 Feb 2020 05:28), Max: 98.5 (23 Feb 2020 05:28)  HR: 79 (23 Feb 2020 05:28) (65 - 79)  BP: 141/69 (23 Feb 2020 05:28) (109/55 - 141/69)  BP(mean): --  RR: 16 (23 Feb 2020 05:28) (16 - 18)  SpO2: 100% (23 Feb 2020 05:28) (97% - 100%)      Physical Exam  Constitutional: non-toxic, no distress, morbidly obese  Cardiovascular:   normal S1, S2, no murmur  Respiratory:  + air entry b/l   GI:  soft, non-tender, normal bowel sounds  Musculoskeletal: RUE swelling persists however erythema has resolved  no phlebitis                         10.2   5.21  )-----------( 259      ( 23 Feb 2020 07:14 )             31.5       02-23    138  |  99  |  14  ----------------------------<  125<H>  4.0   |  27  |  0.98    Ca    9.8      23 Feb 2020 07:14  Phos  3.8     02-23  Mg     1.7     02-23    TPro  7.9  /  Alb  3.8  /  TBili  0.4  /  DBili  x   /  AST  13  /  ALT  11  /  AlkPhos  81  02-22          MICROBIOLOGY:  Culture - Blood (02.21.20 @ 19:37)    Culture - Blood:   NO ORGANISMS ISOLATED  NO ORGANISMS ISOLATED AT 24 HOURS    Specimen Source: BLOOD VENOUS    Culture - Blood (02.21.20 @ 01:13)    -  Streptococcus sp. (Not Grp A, B or S pneumoniae): + DETECT BECKY  Organism Identification: BLOOD CULTURE PCR  Streptococcus Viridans Group    Method Type: PCR

## 2020-02-23 NOTE — PROGRESS NOTE ADULT - ATTENDING COMMENTS
59 W breast cA s/p chemo/rad/surg in 2013 w/ RUE lymphedema, admitted with R arm cellulitis and positive blood cultures for gram-positive organism. ID following. On CTX. Awaiting clearance of cultures, TTE. R arm improving. 59 W breast cA s/p chemo/rad/surg in 2013 w/ RUE lymphedema, admitted with R arm cellulitis and positive blood cultures for gram-positive organism. ID following. On CTX. Awaiting clearance of cultures, TTE, dental eval. R arm improving.

## 2020-02-23 NOTE — DISCHARGE NOTE PROVIDER - HOSPITAL COURSE
59 year old female w/ PMH of breast ca (s/p mastectomy, chemo, RT, node bx complicated by chronic right arm lymphedema, 2003), morbid obesity, type 2 DM, HTN, HLD, asthma, chronic pain on methadone, anxiety/depression presenting with right arm pain and warmth which began hours prior to admission. She states she has had swelling in her right arm ever since her breast cancer surgery in 2003, however this is the first time she has noticed redness and warmth. She endorses fever and chills since swelling began. Denies any cuts, scrapes, or wounds on the arm. Denies nausea, vomiting, cough, dysuria, diarrhea, or abdominal pain. States that three years ago she broke her left molar while eating and went to a dentist, but was told she would need an invasive surgery to repair it. States the tooth has not bothered her so she deferred surgery and has not followed up. Denies mouth or tooth pain.         In ED pt febrile to 100.5, HR of 100, /56, RR 20, satting 100% on RA. Blood cultures grew Strep viridans in one bottle. Pt given two doses of clindamycin, one dose of vancomycin, then transitioned to ceftriaxone 2 grams QD. ID was consulted and recommended TTE and Dental eval. 59 year old female w/ PMH of breast ca (s/p mastectomy, chemo, RT, node bx complicated by chronic right arm lymphedema, 2003), morbid obesity, type 2 DM, HTN, HLD, asthma, chronic pain on methadone, anxiety/depression presenting with right arm pain and warmth which began hours prior to admission. She states she has had swelling in her right arm ever since her breast cancer surgery in 2003, however this is the first time she has noticed redness and warmth. She endorses fever and chills since swelling began. Denies any cuts, scrapes, or wounds on the arm. Denies nausea, vomiting, cough, dysuria, diarrhea, or abdominal pain. States that three years ago she broke her left molar while eating and went to a dentist, but was told she would need an invasive surgery to repair it. States the tooth has not bothered her so she deferred surgery and has not followed up. Denies mouth or tooth pain.         In ED pt febrile to 100.5, HR of 100, /56, RR 20, satting 100% on RA. Blood cultures grew Strep viridans in one of four bottles. Pt given two doses of clindamycin, one dose of vancomycin, then transitioned to ceftriaxone 2 grams QD. Pt admitted for sepsis 2/2 cellulitis with Strep Viridans bacteremia. ID was consulted and recommended TTE and Dental eval. TTE 2/24 showed no obvious vegetations. Dental evaluated pt and recommended outpatient followup, no extractions or interventions made. Repeat BCx were no growth x2. ID recommended transitioning patient to PO cefprozil 500 mg BID for total 14 day course.

## 2020-02-24 DIAGNOSIS — Z29.9 ENCOUNTER FOR PROPHYLACTIC MEASURES, UNSPECIFIED: ICD-10-CM

## 2020-02-24 LAB
GLUCOSE BLDC GLUCOMTR-MCNC: 163 MG/DL — HIGH (ref 70–99)
GLUCOSE BLDC GLUCOMTR-MCNC: 79 MG/DL — SIGNIFICANT CHANGE UP (ref 70–99)
HCT VFR BLD CALC: 30.5 % — LOW (ref 34.5–45)
HGB BLD-MCNC: 10 G/DL — LOW (ref 11.5–15.5)
MCHC RBC-ENTMCNC: 27.6 PG — SIGNIFICANT CHANGE UP (ref 27–34)
MCHC RBC-ENTMCNC: 32.8 % — SIGNIFICANT CHANGE UP (ref 32–36)
MCV RBC AUTO: 84.3 FL — SIGNIFICANT CHANGE UP (ref 80–100)
NRBC # FLD: 0 K/UL — SIGNIFICANT CHANGE UP (ref 0–0)
PLATELET # BLD AUTO: 270 K/UL — SIGNIFICANT CHANGE UP (ref 150–400)
PMV BLD: 9.9 FL — SIGNIFICANT CHANGE UP (ref 7–13)
RBC # BLD: 3.62 M/UL — LOW (ref 3.8–5.2)
RBC # FLD: 16.3 % — HIGH (ref 10.3–14.5)
SPECIMEN SOURCE: SIGNIFICANT CHANGE UP
SPECIMEN SOURCE: SIGNIFICANT CHANGE UP
WBC # BLD: 4.19 K/UL — SIGNIFICANT CHANGE UP (ref 3.8–10.5)
WBC # FLD AUTO: 4.19 K/UL — SIGNIFICANT CHANGE UP (ref 3.8–10.5)

## 2020-02-24 PROCEDURE — 99232 SBSQ HOSP IP/OBS MODERATE 35: CPT

## 2020-02-24 PROCEDURE — 99233 SBSQ HOSP IP/OBS HIGH 50: CPT | Mod: GC

## 2020-02-24 RX ORDER — ACETAMINOPHEN 500 MG
1000 TABLET ORAL EVERY 8 HOURS
Refills: 0 | Status: DISCONTINUED | OUTPATIENT
Start: 2020-02-24 | End: 2020-02-25

## 2020-02-24 RX ORDER — CELECOXIB 200 MG/1
200 CAPSULE ORAL DAILY
Refills: 0 | Status: DISCONTINUED | OUTPATIENT
Start: 2020-02-24 | End: 2020-02-25

## 2020-02-24 RX ORDER — CELECOXIB 200 MG/1
200 CAPSULE ORAL DAILY
Refills: 0 | Status: DISCONTINUED | OUTPATIENT
Start: 2020-02-24 | End: 2020-02-24

## 2020-02-24 RX ORDER — DILTIAZEM HCL 120 MG
60 CAPSULE, EXT RELEASE 24 HR ORAL EVERY 12 HOURS
Refills: 0 | Status: DISCONTINUED | OUTPATIENT
Start: 2020-02-24 | End: 2020-02-25

## 2020-02-24 RX ADMIN — CELECOXIB 200 MILLIGRAM(S): 200 CAPSULE ORAL at 14:04

## 2020-02-24 RX ADMIN — LIDOCAINE 1 PATCH: 4 CREAM TOPICAL at 12:24

## 2020-02-24 RX ADMIN — HEPARIN SODIUM 5000 UNIT(S): 5000 INJECTION INTRAVENOUS; SUBCUTANEOUS at 05:35

## 2020-02-24 RX ADMIN — METHADONE HYDROCHLORIDE 5 MILLIGRAM(S): 40 TABLET ORAL at 17:12

## 2020-02-24 RX ADMIN — LIDOCAINE 1 PATCH: 4 CREAM TOPICAL at 01:00

## 2020-02-24 RX ADMIN — PANTOPRAZOLE SODIUM 40 MILLIGRAM(S): 20 TABLET, DELAYED RELEASE ORAL at 05:37

## 2020-02-24 RX ADMIN — HEPARIN SODIUM 5000 UNIT(S): 5000 INJECTION INTRAVENOUS; SUBCUTANEOUS at 14:04

## 2020-02-24 RX ADMIN — Medication 60 MILLIGRAM(S): at 17:12

## 2020-02-24 RX ADMIN — METHADONE HYDROCHLORIDE 10 MILLIGRAM(S): 40 TABLET ORAL at 07:59

## 2020-02-24 RX ADMIN — VALSARTAN 320 MILLIGRAM(S): 80 TABLET ORAL at 05:34

## 2020-02-24 RX ADMIN — GABAPENTIN 400 MILLIGRAM(S): 400 CAPSULE ORAL at 17:12

## 2020-02-24 RX ADMIN — BUPROPION HYDROCHLORIDE 100 MILLIGRAM(S): 150 TABLET, EXTENDED RELEASE ORAL at 12:22

## 2020-02-24 RX ADMIN — Medication 25 MILLIGRAM(S): at 05:35

## 2020-02-24 RX ADMIN — GABAPENTIN 400 MILLIGRAM(S): 400 CAPSULE ORAL at 05:35

## 2020-02-24 RX ADMIN — CEFTRIAXONE 100 MILLIGRAM(S): 500 INJECTION, POWDER, FOR SOLUTION INTRAMUSCULAR; INTRAVENOUS at 12:23

## 2020-02-24 RX ADMIN — LIDOCAINE 1 PATCH: 4 CREAM TOPICAL at 18:14

## 2020-02-24 NOTE — PROGRESS NOTE ADULT - PROBLEM SELECTOR PLAN 9
Transitions of Care Status: Dr. Rios   1.  Name of PCP:   2.  PCP Contacted on Admission: [ ] Y    [ ] N    3.  PCP contacted at Discharge: [ ] Y    [ ] N    [ ] N/A  4.  Post-Discharge Appointment Date and Location:  5.  Summary of Handoff given to PCP: Transitions of Care Status:   1.  Name of PCP: Dr. Rios   2.  PCP Contacted on Admission: [X ] Y    [ ] N    3.  PCP contacted at Discharge: [ ] Y    [ ] N    [ ] N/A  4.  Post-Discharge Appointment Date and Location:  5.  Summary of Handoff given to PCP:

## 2020-02-24 NOTE — PROGRESS NOTE ADULT - PROBLEM SELECTOR PLAN 4
Pt with osteoarthritis of the bilateral knees. S/p right rotator cuff surgery last year. Follows a pain management provider Dr. Mathieu Lord. On methadone 10 mg in the morning, 5 mg at night. See ISTOP chart note 2/22.   -cw methadone  -cw home gabapentin Pt with osteoarthritis of the bilateral knees. S/p right rotator cuff surgery last year. Follows a pain management provider Dr. Mathieu Lord. On methadone 10 mg in the morning, 5 mg at night. See ISTOP chart note 2/22.   -cw methadone  -cw home gabapentin  -meloxicam prn Pt with osteoarthritis of the bilateral knees. S/p right rotator cuff surgery last year. Follows a pain management provider Dr. Mathieu Lord. On methadone 10 mg in the morning, 5 mg at night. See ISTOP chart note 2/22.   -cw home methadone regimen   -cw home gabapentin  -celecoxib daily, pt states she takes meloxicam 7.5 mg BID at home.

## 2020-02-24 NOTE — PROGRESS NOTE ADULT - PROBLEM SELECTOR PLAN 3
Based on outpatient records, baseline Hgb in 11 range. No reported bleeding.   -iron studies within normal limits. Likely in setting of acute illness. will trend.

## 2020-02-24 NOTE — PROGRESS NOTE ADULT - SUBJECTIVE AND OBJECTIVE BOX
PROGRESS NOTE:   Authored by Nikki Kimura, MD, Pager [phone] [facility], formerly [facility], [id number] [facility]     Patient is a 59y old  Female who presents with a chief complaint of cellulitis, Strep viridans bacteremia (23 Feb 2020 11:38)      SUBJECTIVE / OVERNIGHT EVENTS:    ADDITIONAL REVIEW OF SYSTEMS:    MEDICATIONS  (STANDING):  buPROPion  milliGRAM(s) Oral daily  cefTRIAXone   IVPB 2000 milliGRAM(s) IV Intermittent every 24 hours  dextrose 5%. 1000 milliLiter(s) (50 mL/Hr) IV Continuous <Continuous>  dextrose 50% Injectable 12.5 Gram(s) IV Push once  dextrose 50% Injectable 25 Gram(s) IV Push once  dextrose 50% Injectable 25 Gram(s) IV Push once  diltiazem    Tablet 60 milliGRAM(s) Oral every 12 hours  gabapentin 400 milliGRAM(s) Oral two times a day  heparin  Injectable 5000 Unit(s) SubCutaneous every 8 hours  hydrochlorothiazide 25 milliGRAM(s) Oral daily  insulin lispro (HumaLOG) corrective regimen sliding scale   SubCutaneous three times a day before meals  lidocaine   Patch 1 Patch Transdermal daily  lidocaine   Patch 1 Patch Transdermal daily  methadone    Tablet 10 milliGRAM(s) Oral <User Schedule>  methadone    Tablet 5 milliGRAM(s) Oral <User Schedule>  pantoprazole    Tablet 40 milliGRAM(s) Oral before breakfast  valsartan 320 milliGRAM(s) Oral daily    MEDICATIONS  (PRN):  dextrose 40% Gel 15 Gram(s) Oral once PRN Blood Glucose LESS THAN 70 milliGRAM(s)/deciliter  glucagon  Injectable 1 milliGRAM(s) IntraMuscular once PRN Glucose LESS THAN 70 milligrams/deciliter      CAPILLARY BLOOD GLUCOSE      POCT Blood Glucose.: 127 mg/dL (23 Feb 2020 22:12)  POCT Blood Glucose.: 118 mg/dL (23 Feb 2020 17:04)  POCT Blood Glucose.: 89 mg/dL (23 Feb 2020 12:58)  POCT Blood Glucose.: 111 mg/dL (23 Feb 2020 08:39)    I&O's Summary      PHYSICAL EXAM:  Vital Signs Last 24 Hrs  T(C): 36.8 (24 Feb 2020 05:32), Max: 36.9 (23 Feb 2020 21:57)  T(F): 98.3 (24 Feb 2020 05:32), Max: 98.4 (23 Feb 2020 21:57)  HR: 63 (24 Feb 2020 05:32) (63 - 73)  BP: 142/76 (24 Feb 2020 05:32) (121/50 - 142/76)  BP(mean): --  RR: 18 (24 Feb 2020 05:32) (17 - 18)  SpO2: 96% (24 Feb 2020 05:32) (96% - 99%)    GENERAL: morbidly obese middle aged woman, lying in bed comfortably  EYES: EOMI, PERRLA, conjunctiva and sclera clear  ENT: Moist mucous membranes  MOUTH: broken right molar without erythema or induration, no drainage noted   NECK: Supple, No JVD  CHEST/LUNG: Clear to auscultation bilaterally; No rales, rhonchi, wheezing, or rubs. Unlabored respirations  HEART: Regular rate and rhythm; No murmurs, rubs, or gallops  ABDOMEN: obese, Bowel sounds present; Soft, Nontender, Nondistended. No hepatomegaly  EXTREMITIES:  2+ Peripheral Pulses. Feet normal-appearing, no cuts or areas of erythema/induration, no lower extremity edema. Right upper extremity with improved erythema and induration, appears to have regressed from demarcated area, no cuts or open lesions. Intact pulses and full strength.    NERVOUS SYSTEM:  Alert & Oriented X3, speech clear. No deficits   MSK: FROM all 4 extremities, full and equal strength in upper and lower extremities    LABS:                        10.2   5.21  )-----------( 259      ( 23 Feb 2020 07:14 )             31.5     02-23    138  |  99  |  14  ----------------------------<  125<H>  4.0   |  27  |  0.98    Ca    9.8      23 Feb 2020 07:14  Phos  3.8     02-23  Mg     1.7     02-23                Culture - Blood (collected 21 Feb 2020 19:37)  Source: BLOOD VENOUS  Preliminary Report (23 Feb 2020 19:36):    NO ORGANISMS ISOLATED    NO ORGANISMS ISOLATED AT 48 HRS.    Culture - Blood (collected 21 Feb 2020 19:37)  Source: BLOOD PERIPHERAL  Preliminary Report (23 Feb 2020 19:36):    NO ORGANISMS ISOLATED    NO ORGANISMS ISOLATED AT 48 HRS.        RADIOLOGY & ADDITIONAL TESTS:  Results Reviewed:   Imaging Personally Reviewed:  Electrocardiogram Personally Reviewed:    COORDINATION OF CARE:  Care Discussed with Consultants/Other Providers [Y/N]:  Prior or Outpatient Records Reviewed [Y/N]: PROGRESS NOTE:   Authored by Nikki Kimura, MD, Pager [phone] [facility], [id number] [facility]     Patient is a 59y old  Female who presents with a chief complaint of cellulitis, Strep viridans bacteremia (23 Feb 2020 11:38)      SUBJECTIVE / OVERNIGHT EVENTS: No acute events overnight. Pt seen and examined at bedside. She denies fever, chills, nausea, vomiting, chest pain, or shortness of breath. Has chronic knee pain and would like meloxicam.     ADDITIONAL REVIEW OF SYSTEMS:    MEDICATIONS  (STANDING):  buPROPion  milliGRAM(s) Oral daily  cefTRIAXone   IVPB 2000 milliGRAM(s) IV Intermittent every 24 hours  dextrose 5%. 1000 milliLiter(s) (50 mL/Hr) IV Continuous <Continuous>  dextrose 50% Injectable 12.5 Gram(s) IV Push once  dextrose 50% Injectable 25 Gram(s) IV Push once  dextrose 50% Injectable 25 Gram(s) IV Push once  diltiazem    Tablet 60 milliGRAM(s) Oral every 12 hours  gabapentin 400 milliGRAM(s) Oral two times a day  heparin  Injectable 5000 Unit(s) SubCutaneous every 8 hours  hydrochlorothiazide 25 milliGRAM(s) Oral daily  insulin lispro (HumaLOG) corrective regimen sliding scale   SubCutaneous three times a day before meals  lidocaine   Patch 1 Patch Transdermal daily  lidocaine   Patch 1 Patch Transdermal daily  methadone    Tablet 10 milliGRAM(s) Oral <User Schedule>  methadone    Tablet 5 milliGRAM(s) Oral <User Schedule>  pantoprazole    Tablet 40 milliGRAM(s) Oral before breakfast  valsartan 320 milliGRAM(s) Oral daily    MEDICATIONS  (PRN):  dextrose 40% Gel 15 Gram(s) Oral once PRN Blood Glucose LESS THAN 70 milliGRAM(s)/deciliter  glucagon  Injectable 1 milliGRAM(s) IntraMuscular once PRN Glucose LESS THAN 70 milligrams/deciliter      CAPILLARY BLOOD GLUCOSE      POCT Blood Glucose.: 127 mg/dL (23 Feb 2020 22:12)  POCT Blood Glucose.: 118 mg/dL (23 Feb 2020 17:04)  POCT Blood Glucose.: 89 mg/dL (23 Feb 2020 12:58)  POCT Blood Glucose.: 111 mg/dL (23 Feb 2020 08:39)    I&O's Summary      PHYSICAL EXAM:  Vital Signs Last 24 Hrs  T(C): 36.8 (24 Feb 2020 05:32), Max: 36.9 (23 Feb 2020 21:57)  T(F): 98.3 (24 Feb 2020 05:32), Max: 98.4 (23 Feb 2020 21:57)  HR: 63 (24 Feb 2020 05:32) (63 - 73)  BP: 142/76 (24 Feb 2020 05:32) (121/50 - 142/76)  BP(mean): --  RR: 18 (24 Feb 2020 05:32) (17 - 18)  SpO2: 96% (24 Feb 2020 05:32) (96% - 99%)    GENERAL: morbidly obese middle aged woman, lying in bed comfortably  EYES: EOMI, PERRLA, conjunctiva and sclera clear  ENT: Moist mucous membranes  MOUTH: broken right molar without erythema or induration, no drainage noted   NECK: Supple, No JVD  CHEST/LUNG: Clear to auscultation bilaterally; No rales, rhonchi, wheezing, or rubs. Unlabored respirations  HEART: Regular rate and rhythm; No murmurs, rubs, or gallops  ABDOMEN: obese, Bowel sounds present; Soft, Nontender, Nondistended. No hepatomegaly  EXTREMITIES:  2+ Peripheral Pulses. Feet normal-appearing, no cuts or areas of erythema/induration, no lower extremity edema. Right upper extremity with improved erythema and induration, appears to have regressed from demarcated area, no cuts or open lesions. Intact pulses and full strength.    NERVOUS SYSTEM:  Alert & Oriented X3, speech clear. No deficits   MSK: FROM all 4 extremities, full and equal strength in upper and lower extremities    LABS:                        10.2   5.21  )-----------( 259      ( 23 Feb 2020 07:14 )             31.5     02-23    138  |  99  |  14  ----------------------------<  125<H>  4.0   |  27  |  0.98    Ca    9.8      23 Feb 2020 07:14  Phos  3.8     02-23  Mg     1.7     02-23                Culture - Blood (collected 21 Feb 2020 19:37)  Source: BLOOD VENOUS  Preliminary Report (23 Feb 2020 19:36):    NO ORGANISMS ISOLATED    NO ORGANISMS ISOLATED AT 48 HRS.    Culture - Blood (collected 21 Feb 2020 19:37)  Source: BLOOD PERIPHERAL  Preliminary Report (23 Feb 2020 19:36):    NO ORGANISMS ISOLATED    NO ORGANISMS ISOLATED AT 48 HRS.        RADIOLOGY & ADDITIONAL TESTS:  Results Reviewed:   Imaging Personally Reviewed:  Electrocardiogram Personally Reviewed:    COORDINATION OF CARE:  Care Discussed with Consultants/Other Providers [Y/N]:  Prior or Outpatient Records Reviewed [Y/N]: PROGRESS NOTE:   Authored by Nikki Kimura, MD, Pager [phone] [facility], [id number] [facility]     Patient is a 59y old  Female who presents with a chief complaint of cellulitis, Strep viridans bacteremia (23 Feb 2020 11:38)      SUBJECTIVE / OVERNIGHT EVENTS: No acute events overnight. Pt seen and examined at bedside. She denies fever, chills, nausea, vomiting, chest pain, or shortness of breath.     ADDITIONAL REVIEW OF SYSTEMS:    MEDICATIONS  (STANDING):  buPROPion  milliGRAM(s) Oral daily  cefTRIAXone   IVPB 2000 milliGRAM(s) IV Intermittent every 24 hours  dextrose 5%. 1000 milliLiter(s) (50 mL/Hr) IV Continuous <Continuous>  dextrose 50% Injectable 12.5 Gram(s) IV Push once  dextrose 50% Injectable 25 Gram(s) IV Push once  dextrose 50% Injectable 25 Gram(s) IV Push once  diltiazem    Tablet 60 milliGRAM(s) Oral every 12 hours  gabapentin 400 milliGRAM(s) Oral two times a day  heparin  Injectable 5000 Unit(s) SubCutaneous every 8 hours  hydrochlorothiazide 25 milliGRAM(s) Oral daily  insulin lispro (HumaLOG) corrective regimen sliding scale   SubCutaneous three times a day before meals  lidocaine   Patch 1 Patch Transdermal daily  lidocaine   Patch 1 Patch Transdermal daily  methadone    Tablet 10 milliGRAM(s) Oral <User Schedule>  methadone    Tablet 5 milliGRAM(s) Oral <User Schedule>  pantoprazole    Tablet 40 milliGRAM(s) Oral before breakfast  valsartan 320 milliGRAM(s) Oral daily    MEDICATIONS  (PRN):  dextrose 40% Gel 15 Gram(s) Oral once PRN Blood Glucose LESS THAN 70 milliGRAM(s)/deciliter  glucagon  Injectable 1 milliGRAM(s) IntraMuscular once PRN Glucose LESS THAN 70 milligrams/deciliter      CAPILLARY BLOOD GLUCOSE      POCT Blood Glucose.: 127 mg/dL (23 Feb 2020 22:12)  POCT Blood Glucose.: 118 mg/dL (23 Feb 2020 17:04)  POCT Blood Glucose.: 89 mg/dL (23 Feb 2020 12:58)  POCT Blood Glucose.: 111 mg/dL (23 Feb 2020 08:39)    I&O's Summary      PHYSICAL EXAM:  Vital Signs Last 24 Hrs  T(C): 36.8 (24 Feb 2020 05:32), Max: 36.9 (23 Feb 2020 21:57)  T(F): 98.3 (24 Feb 2020 05:32), Max: 98.4 (23 Feb 2020 21:57)  HR: 63 (24 Feb 2020 05:32) (63 - 73)  BP: 142/76 (24 Feb 2020 05:32) (121/50 - 142/76)  BP(mean): --  RR: 18 (24 Feb 2020 05:32) (17 - 18)  SpO2: 96% (24 Feb 2020 05:32) (96% - 99%)    GENERAL: morbidly obese middle aged woman, lying in bed comfortably  EYES: EOMI, PERRLA, conjunctiva and sclera clear  ENT: Moist mucous membranes  MOUTH: broken right molar without erythema or induration, no drainage noted   NECK: Supple, No JVD  CHEST/LUNG: Clear to auscultation bilaterally; No rales, rhonchi, wheezing, or rubs. Unlabored respirations  HEART: Regular rate and rhythm; No murmurs, rubs, or gallops  ABDOMEN: obese, Bowel sounds present; Soft, Nontender, Nondistended. No hepatomegaly  EXTREMITIES:  2+ Peripheral Pulses. no lower extremity edema. Right upper extremity with much improved erythema which appears to have regressed from demarcated area, no cuts or open lesions. Intact pulses and full strength.    NERVOUS SYSTEM:  Alert & Oriented X3, speech clear. No deficits   MSK: FROM all 4 extremities, full and equal strength in upper and lower extremities    LABS:                        10.2   5.21  )-----------( 259      ( 23 Feb 2020 07:14 )             31.5     02-23    138  |  99  |  14  ----------------------------<  125<H>  4.0   |  27  |  0.98    Ca    9.8      23 Feb 2020 07:14  Phos  3.8     02-23  Mg     1.7     02-23                Culture - Blood (collected 21 Feb 2020 19:37)  Source: BLOOD VENOUS  Preliminary Report (23 Feb 2020 19:36):    NO ORGANISMS ISOLATED    NO ORGANISMS ISOLATED AT 48 HRS.    Culture - Blood (collected 21 Feb 2020 19:37)  Source: BLOOD PERIPHERAL  Preliminary Report (23 Feb 2020 19:36):    NO ORGANISMS ISOLATED    NO ORGANISMS ISOLATED AT 48 HRS.        RADIOLOGY & ADDITIONAL TESTS:  < from: Transthoracic Echocardiogram (02.23.20 @ 12:02) >  CONCLUSIONS:  1. Mitral annular calcification, otherwise normal mitral  valve. Minimal mitral regurgitation.  2. Mildly dilated left atrium.  LA volume index = 38 cc/m2.  3. Normal left ventricular internal dimensions and wall  thicknesses.  4. Normal left ventricular systolic function. No segmental  wall motion abnormalities.  5. Normal right ventricular size and function.  6. Estimated right ventricular systolic pressure equals 40  mm Hg, assuming right atrial pressure equals 10 mm Hg,  consistent with mild pulmonary hypertension.  No obvious valvular vegetations were identified on this  transthoracic study.  If clinical suspicion is high,  consider GERA for further evaluation.    < end of copied text >

## 2020-02-24 NOTE — PROGRESS NOTE ADULT - PROBLEM SELECTOR PLAN 2
Right arm with chronic lymphedema, admitted for redness and warmth with subjective fevers/chills. S/p vanc x1, clindamycin x2 in ED. ID consulted and recommended CTX 2g daily.   -cw CTX 2/22-  -Venous Duplex of RUE 2/21 negative for DVT. Right arm with chronic lymphedema, admitted for redness and warmth with subjective fevers/chills, meeting sepsis criteria. S/p vanc x1, clindamycin x2 in ED. ID consulted and recommended CTX 2g daily.   -Venous Duplex of RUE 2/21 negative for DVT.  -cw CTX 2/22-

## 2020-02-24 NOTE — CHART NOTE - NSCHARTNOTEFT_GEN_A_CORE
Pre-Dental Procedure Note     1. Patient is on prophylactic heparin subQ, 5000 units TID.  2. Patient is medically optimized for dental procedure. Revised Cardiac Risk Index Score of 0.   3. May proceed with dental procedure with local anesthesia.  4. Transport with stretcher.   5. Patient is on day 3 of ceftriaxone 2g QD.     Nikki Kimura, PGY-1 Pre-Dental Procedure Note     1. Patient is on prophylactic heparin subQ, 5000 units TID.  2. Patient is medically optimized for dental procedure including extractions. Revised Cardiac Risk Index Score of 0.   3. May proceed with dental procedure with local anesthesia with epinephrine.  4. Transport with stretcher.   5. Patient is on day 3 of ceftriaxone 2g QD.     Nikki Kimura, PGY-1

## 2020-02-24 NOTE — PROGRESS NOTE ADULT - PROBLEM SELECTOR PLAN 8
Transitions of Care Status: Dr. Rios   1.  Name of PCP:   2.  PCP Contacted on Admission: [ ] Y    [ ] N    3.  PCP contacted at Discharge: [ ] Y    [ ] N    [ ] N/A  4.  Post-Discharge Appointment Date and Location:  5.  Summary of Handoff given to PCP: DVT: HSQ  Diet: cc with snack  Dispo: home with outpatient PT

## 2020-02-24 NOTE — PROGRESS NOTE ADULT - PROBLEM SELECTOR PLAN 1
On admission meeting sepsis criteria, with signs of right arm cellulitis. BCx 2/21 growing Strep viridans x1 bottle. S/p vanc x1, clindamycin x2 in ED. ID consulted and recommended CTX 2g daily.   -cw CTX 2/22  -repeat BCx 2/23  -TTE 2/23 without obvious vegetations.   -dental consulted for broken tooth    -ID recs appreciated On admission meeting sepsis criteria, with signs of right arm cellulitis. BCx 2/21 growing Strep viridans x1 bottle. S/p vanc x1, clindamycin x2 in ED. ID consulted and recommended CTX 2g daily.   -cw CTX 2/22  -repeat BCx 2/23 NGTD  -TTE 2/23 without obvious vegetations.   -dental consulted for broken tooth, follow up recs     -ID recs appreciated

## 2020-02-24 NOTE — PROGRESS NOTE ADULT - SUBJECTIVE AND OBJECTIVE BOX
59y old  Female who presents with a chief complaint of cellulitis, Strep viridans bacteremia (24 Feb 2020 06:46)      Interval history:  Afebrile, redness and pain of RUE almost resolved.        Allergies:   environment--ragweed, dust, cats--sneezing and watery eyes, nasal congestion (Other)  ramipril (Angioedema)      Antimicrobials:  cefTRIAXone   IVPB 2000 milliGRAM(s) IV Intermittent every 24 hours      REVIEW OF SYSTEMS:  No chest pain   No cough, no SOB  No N/V  No dysuria   No rash.       Vital Signs Last 24 Hrs  T(C): 36.7 (02-24-20 @ 13:15), Max: 36.9 (02-23-20 @ 21:57)  T(F): 98.1 (02-24-20 @ 13:15), Max: 98.4 (02-23-20 @ 21:57)  HR: 81 (02-24-20 @ 13:15) (63 - 81)  BP: 116/51 (02-24-20 @ 13:15) (116/51 - 142/76)  BP(mean): --  RR: 19 (02-24-20 @ 13:15) (17 - 19)  SpO2: 96% (02-24-20 @ 13:15) (96% - 99%)      PHYSICAL EXAM:  Patient in no acute distress. AAOX3.  No icterus, no oral ulcers.  Cardiovascular: S1S2 normal.  Lungs: + air entry B/L lung fields.  Gastrointestinal: soft, nontender, nondistended.  Extremities: RUE lymphedema,   IV sites not inflamed.                           10.0   4.19  )-----------( 270      ( 24 Feb 2020 06:05 )             30.5   02-23    138  |  99  |  14  ----------------------------<  125<H>  4.0   |  27  |  0.98    Ca    9.8      23 Feb 2020 07:14  Phos  3.8     02-23  Mg     1.7     02-23        Culture - Blood (collected 23 Feb 2020 07:37)  Source: BLOOD VENOUS  Preliminary Report (24 Feb 2020 07:36):    NO ORGANISMS ISOLATED    NO ORGANISMS ISOLATED AT 24 HOURS    Culture - Blood (collected 23 Feb 2020 07:37)  Source: BLOOD PERIPHERAL  Preliminary Report (24 Feb 2020 07:36):    NO ORGANISMS ISOLATED    NO ORGANISMS ISOLATED AT 24 HOURS    Culture - Blood (collected 21 Feb 2020 19:37)  Source: BLOOD VENOUS  Preliminary Report (23 Feb 2020 19:36):    NO ORGANISMS ISOLATED    NO ORGANISMS ISOLATED AT 48 HRS.    Culture - Blood (collected 21 Feb 2020 19:37)  Source: BLOOD PERIPHERAL  Preliminary Report (23 Feb 2020 19:36):    NO ORGANISMS ISOLATED    NO ORGANISMS ISOLATED AT 48 HRS.

## 2020-02-24 NOTE — CONSULT NOTE ADULT - SUBJECTIVE AND OBJECTIVE BOX
Patient is a 59y old  Female who presents with a chief complaint of cellulitis, Strep viridans bacteremia (2020 14:14). Dental consult requested to rule out dental source of bacteremia.    HPI:  Ms. Daley is a 59 year old female w/ PMH of breast ca (s/p mastectomy, chemo, RT, node bx complicated by chronic right arm lymphedema, ), morbid obesity, type 2 DM, HTN, HLD, asthma, chronic pain on methadone, anxiety/depression presenting with right arm pain and warmth which began hours prior to admission. She states she has had swelling in her right arm ever since her breast cancer surgery in , however this is the first time she has noticed redness and warmth. She endorses fever and chills since swelling began. Denies any cuts, scrapes, or wounds on the arm. Denies nausea, vomiting, cough, dysuria, diarrhea, or abdominal pain. Recently went to her podiatrist during which she had an ingrown toenail removed, but states her foot feels fine. States that three years ago she broke her left molar while eating and went to a dentist, but was told she would need an invasive surgery to repair it. States the tooth has not bothered her so she deferred surgery and has not followed up. Denies mouth or tooth pain.     In ED pt febrile to 100.5, HR of 100, /56, RR 20, satting 100% on RA. Blood cultures grew Strep viridans in one bottle. Pt given two doses of clindamycin, one dose of vancomycin, then transitioned to ceftriaxone 2 grams QD. (2020 13:56)      PAST MEDICAL & SURGICAL HISTORY:  Sickle cell trait  Insomnia  Thyroid nodule: had negative biopsy  Breast cancer:   right breast -- had mastectomy and then post op chemo and RT, followed with Herceptin for 1 year   last chemo   2013 may last Herceptin   last RT  Miscarriage: x 2  Substance abuse: quit in  -- cocaine and marijuana  Neuropathy: b/l feet  Lymphedema, limb: right side s/p right mastectomy  ETOH abuse: states she quit alcohol in   sickel cell anemia trait  Anxiety  Depression  herniated lumbar disc  Morbid obesity  Hypercholesterolemia: 19 ; pt reports improvement ; pt denies rx  Hypertension  Arthritis  Diverticulosis  h/o   Fatty liver  Asthma: last rescue inhaler use &quot;maybe 3 years ago when I had the flu or a cold&quot;  personal h/o CHF (congestive heart failure)--last hospitalized in   Drug abuse--quit 8 years ago--goes to AA: ADDENDUM: at PAST appointment, patient states she quit alcohol in approximately   Alcohol abuse--quit 8 years ago--goes to AA: ADDENDUM:  at PAST appointment  patient states she quit alcohol approximately   h/o b/l fungal foot infection:  pt denies  hiatal hernia: last endoscopy 1.5 years ago  Acid Reflux  Sleep apnea diagnosed ---supposed to use device but doesn't  History  Uterine Fibroid: s/p Hysterectomy   Diabetes mellitus diagnosed in : fs 86 am 170 pm  Abdominal hernia in 2012: 19 ; pt states hernia repair ,   b/l  Carpal tunnel syndrome: tx PT/OT  Fibromyalgia  Cataract: Bilateral   Thyroid nodule: negative biopsy  Termination of pregnancy (fetus): x 3  Cancer:   insertion of mediport -- to be excised on 14  Radical mastectomy of right breast: 3/30/12  2008  repair of ventral hernia with mesh: Revision 2018   h/o hysterectomy due to Fibroid--post op vaginal bleeding requiring a transfusion    MEDICATIONS  (STANDING):  buPROPion  milliGRAM(s) Oral daily  cefTRIAXone   IVPB 2000 milliGRAM(s) IV Intermittent every 24 hours  celecoxib 200 milliGRAM(s) Oral daily  dextrose 5%. 1000 milliLiter(s) (50 mL/Hr) IV Continuous <Continuous>  dextrose 50% Injectable 12.5 Gram(s) IV Push once  dextrose 50% Injectable 25 Gram(s) IV Push once  dextrose 50% Injectable 25 Gram(s) IV Push once  diltiazem    Tablet 60 milliGRAM(s) Oral every 12 hours  gabapentin 400 milliGRAM(s) Oral two times a day  heparin  Injectable 5000 Unit(s) SubCutaneous every 8 hours  hydrochlorothiazide 25 milliGRAM(s) Oral daily  insulin lispro (HumaLOG) corrective regimen sliding scale   SubCutaneous three times a day before meals  lidocaine   Patch 1 Patch Transdermal daily  lidocaine   Patch 1 Patch Transdermal daily  methadone    Tablet 10 milliGRAM(s) Oral <User Schedule>  methadone    Tablet 5 milliGRAM(s) Oral <User Schedule>  pantoprazole    Tablet 40 milliGRAM(s) Oral before breakfast  valsartan 320 milliGRAM(s) Oral daily    MEDICATIONS  (PRN):  acetaminophen   Tablet .. 1000 milliGRAM(s) Oral every 8 hours PRN Mild Pain (1 - 3), Moderate Pain (4 - 6)  dextrose 40% Gel 15 Gram(s) Oral once PRN Blood Glucose LESS THAN 70 milliGRAM(s)/deciliter  glucagon  Injectable 1 milliGRAM(s) IntraMuscular once PRN Glucose LESS THAN 70 milligrams/deciliter    Allergies  environment--ragweed, dust, cats--sneezing and watery eyes, nasal congestion (Other)  ramipril (Angioedema)    FAMILY HISTORY:  Family history of rheumatoid arthritis (Sibling): sister age 54 from RA  Family history of leukemia: father  age 56 from leukemia    Vital Signs Last 24 Hrs  T(C): 36.7 (2020 13:15), Max: 36.9 (2020 21:57)  T(F): 98.1 (2020 13:15), Max: 98.4 (2020 21:57)  HR: 81 (2020 13:15) (63 - 81)  BP: 116/51 (2020 13:15) (116/51 - 142/76)  BP(mean): --  RR: 19 (2020 13:15) (17 - 19)  SpO2: 96% (2020 13:15) (96% - 99%)    EOE:  TMJ ( -  ) clicks                    ( -   ) pops                    ( -   ) crepitus             Mandible FROM             Facial bones and MOM grossly intact             ( -  ) trismus             ( -  ) LAD             ( -  ) swelling             ( -  ) asymmetry             ( -  ) palpation             ( -  ) SOB    IOE:  permanent dentition: grossly intact with several missing teeth; #16 root tips present that patient stated had fractured a few months prior; no clinical caries observed. All existing restorations intact. No clinical mobility observed. No intra-oral swelling observed. (-) to percussion, palpation, swelling throughout dentition.           hard/soft palate:  ( -  ) palatal torus           tongue/FOM WNL           labial/buccal mucosa WNL           ( -  ) percussion           ( -  ) palpation           ( -  ) swelling     LABS:                        10.0   4.19  )-----------( 270      ( 2020 06:05 )             30.5     02-23    138  |  99  |  14  ----------------------------<  125<H>  4.0   |  27  |  0.98    Ca    9.8      2020 07:14  Phos  3.8       Mg     1.7           WBC Count: 4.19 K/uL [3.8 - 10.5] ( @ 06:05)  Platelet Count - Automated: 270 K/uL [150 - 400] ( @ 06:05)  WBC Count: 5.21 K/uL [3.8 - 10.5] ( @ 07:14)  Platelet Count - Automated: 259 K/uL [150 - 400] ( @ 07:14)  Platelet Count - Automated: 247 K/uL [150 - 400] ( @ 06:40)  WBC Count: 6.38 K/uL [3.8 - 10.5] ( @ 06:40)    DENTAL RADIOGRAPHS: panoramic imaging and PAx1 of #16 taken and reviewed revealing root tips #16; no associated PARL observed with #16. No pathology observed.    RADIOLOGY & ADDITIONAL STUDIES: none indicated at this time    ASSESSMENT: no acute signs of odontogenic infection present at this time    PROCEDURE: clinical and radiographic exam performed. No acute signs of odontogenic infection noted at this time. Patient is currently afebrile; normal range WBC. As per ID note 2020 @ 13:57: "GERA with no obvious vegetations", "1 of 4 bottles (aerobic) grew Strep Viridans", and "cellulitis resolving". Recent blood cultures from 2020 7:37 report no organisms isolated.  Verbal consent given.     RECOMMENDATIONS:  1) No acute signs of odontogenic infection observed at this time.  2) Dental F/U with outpatient dentist for comprehensive dental care.   3) If any difficulty swallowing/breathing, fever occur, page dental.     Purvi Mota DDS and Hollie Regalado DDS, pager #[phone] Patient is a 59y old  Female who presents with a chief complaint of cellulitis, Strep viridans bacteremia (2020 14:14). Dental consult requested to rule out dental source of bacteremia.    HPI:  Ms. Daley is a 59 year old female w/ PMH of breast ca (s/p mastectomy, chemo, RT, node bx complicated by chronic right arm lymphedema, ), morbid obesity, type 2 DM, HTN, HLD, asthma, chronic pain on methadone, anxiety/depression presenting with right arm pain and warmth which began hours prior to admission. She states she has had swelling in her right arm ever since her breast cancer surgery in , however this is the first time she has noticed redness and warmth. She endorses fever and chills since swelling began. Denies any cuts, scrapes, or wounds on the arm. Denies nausea, vomiting, cough, dysuria, diarrhea, or abdominal pain. Recently went to her podiatrist during which she had an ingrown toenail removed, but states her foot feels fine. States that three years ago she broke her left molar while eating and went to a dentist, but was told she would need an invasive surgery to repair it. States the tooth has not bothered her so she deferred surgery and has not followed up. Denies mouth or tooth pain.     In ED pt febrile to 100.5, HR of 100, /56, RR 20, satting 100% on RA. Blood cultures grew Strep viridans in one bottle. Pt given two doses of clindamycin, one dose of vancomycin, then transitioned to ceftriaxone 2 grams QD. (2020 13:56)      PAST MEDICAL & SURGICAL HISTORY:  Sickle cell trait  Insomnia  Thyroid nodule: had negative biopsy  Breast cancer:   right breast -- had mastectomy and then post op chemo and RT, followed with Herceptin for 1 year   last chemo   2013 may last Herceptin   last RT  Miscarriage: x 2  Substance abuse: quit in  -- cocaine and marijuana  Neuropathy: b/l feet  Lymphedema, limb: right side s/p right mastectomy  ETOH abuse: states she quit alcohol in   sickel cell anemia trait  Anxiety  Depression  herniated lumbar disc  Morbid obesity  Hypercholesterolemia: 19 ; pt reports improvement ; pt denies rx  Hypertension  Arthritis  Diverticulosis  h/o   Fatty liver  Asthma: last rescue inhaler use &quot;maybe 3 years ago when I had the flu or a cold&quot;  personal h/o CHF (congestive heart failure)--last hospitalized in   Drug abuse--quit 8 years ago--goes to AA: ADDENDUM: at PAST appointment, patient states she quit alcohol in approximately   Alcohol abuse--quit 8 years ago--goes to AA: ADDENDUM:  at PAST appointment  patient states she quit alcohol approximately   h/o b/l fungal foot infection:  pt denies  hiatal hernia: last endoscopy 1.5 years ago  Acid Reflux  Sleep apnea diagnosed ---supposed to use device but doesn't  History  Uterine Fibroid: s/p Hysterectomy   Diabetes mellitus diagnosed in : fs 86 am 170 pm  Abdominal hernia in 2012: 19 ; pt states hernia repair ,   b/l  Carpal tunnel syndrome: tx PT/OT  Fibromyalgia  Cataract: Bilateral   Thyroid nodule: negative biopsy  Termination of pregnancy (fetus): x 3  Cancer:   insertion of mediport -- to be excised on 14  Radical mastectomy of right breast: 3/30/12  2008  repair of ventral hernia with mesh: Revision 2018   h/o hysterectomy due to Fibroid--post op vaginal bleeding requiring a transfusion    MEDICATIONS  (STANDING):  buPROPion  milliGRAM(s) Oral daily  cefTRIAXone   IVPB 2000 milliGRAM(s) IV Intermittent every 24 hours  celecoxib 200 milliGRAM(s) Oral daily  dextrose 5%. 1000 milliLiter(s) (50 mL/Hr) IV Continuous <Continuous>  dextrose 50% Injectable 12.5 Gram(s) IV Push once  dextrose 50% Injectable 25 Gram(s) IV Push once  dextrose 50% Injectable 25 Gram(s) IV Push once  diltiazem    Tablet 60 milliGRAM(s) Oral every 12 hours  gabapentin 400 milliGRAM(s) Oral two times a day  heparin  Injectable 5000 Unit(s) SubCutaneous every 8 hours  hydrochlorothiazide 25 milliGRAM(s) Oral daily  insulin lispro (HumaLOG) corrective regimen sliding scale   SubCutaneous three times a day before meals  lidocaine   Patch 1 Patch Transdermal daily  lidocaine   Patch 1 Patch Transdermal daily  methadone    Tablet 10 milliGRAM(s) Oral <User Schedule>  methadone    Tablet 5 milliGRAM(s) Oral <User Schedule>  pantoprazole    Tablet 40 milliGRAM(s) Oral before breakfast  valsartan 320 milliGRAM(s) Oral daily    MEDICATIONS  (PRN):  acetaminophen   Tablet .. 1000 milliGRAM(s) Oral every 8 hours PRN Mild Pain (1 - 3), Moderate Pain (4 - 6)  dextrose 40% Gel 15 Gram(s) Oral once PRN Blood Glucose LESS THAN 70 milliGRAM(s)/deciliter  glucagon  Injectable 1 milliGRAM(s) IntraMuscular once PRN Glucose LESS THAN 70 milligrams/deciliter    Allergies  environment--ragweed, dust, cats--sneezing and watery eyes, nasal congestion (Other)  ramipril (Angioedema)    FAMILY HISTORY:  Family history of rheumatoid arthritis (Sibling): sister age 54 from RA  Family history of leukemia: father  age 56 from leukemia    Vital Signs Last 24 Hrs  T(C): 36.7 (2020 13:15), Max: 36.9 (2020 21:57)  T(F): 98.1 (2020 13:15), Max: 98.4 (2020 21:57)  HR: 81 (2020 13:15) (63 - 81)  BP: 116/51 (2020 13:15) (116/51 - 142/76)  BP(mean): --  RR: 19 (2020 13:15) (17 - 19)  SpO2: 96% (2020 13:15) (96% - 99%)    EOE:  TMJ ( -  ) clicks                    ( -   ) pops                    ( -   ) crepitus             Mandible FROM             Facial bones and MOM grossly intact             ( -  ) trismus             ( -  ) LAD             ( -  ) swelling             ( -  ) asymmetry             ( -  ) palpation             ( -  ) SOB    IOE:  permanent dentition: grossly intact with several missing teeth; #16 root tips present that patient stated had fractured a few months prior; no clinical caries observed. All existing restorations intact. No clinical mobility observed. No intra-oral swelling observed. (-) to percussion, palpation, swelling throughout dentition.           hard/soft palate:  ( -  ) palatal torus           tongue/FOM WNL           labial/buccal mucosa WNL           ( -  ) percussion           ( -  ) palpation           ( -  ) swelling     LABS:                        10.0   4.19  )-----------( 270      ( 2020 06:05 )             30.5     02-23    138  |  99  |  14  ----------------------------<  125<H>  4.0   |  27  |  0.98    Ca    9.8      2020 07:14  Phos  3.8       Mg     1.7           WBC Count: 4.19 K/uL [3.8 - 10.5] ( @ 06:05)  Platelet Count - Automated: 270 K/uL [150 - 400] ( @ 06:05)  WBC Count: 5.21 K/uL [3.8 - 10.5] ( @ 07:14)  Platelet Count - Automated: 259 K/uL [150 - 400] ( @ 07:14)  Platelet Count - Automated: 247 K/uL [150 - 400] ( @ 06:40)  WBC Count: 6.38 K/uL [3.8 - 10.5] ( @ 06:40)    DENTAL RADIOGRAPHS: panoramic imaging and PAx1 of #16 taken and reviewed revealing root tips #16; no associated PARL observed with #16. No pathology observed.    RADIOLOGY & ADDITIONAL STUDIES: none indicated at this time    ASSESSMENT: no acute signs of odontogenic infection present at this time    PROCEDURE: clinical and radiographic exam performed. No acute signs of odontogenic infection noted at this time. Patient is currently afebrile; normal range WBC. As per ID note 2020 @ 13:57: "TTE with no obvious vegetations", "1 of 4 bottles (aerobic) grew Strep Viridans", and "cellulitis resolving". Recent blood cultures from 2020 7:37 report no organisms isolated.  Verbal consent given.     RECOMMENDATIONS:  1) No acute signs of odontogenic infection observed at this time.  2) Dental F/U with outpatient dentist for comprehensive dental care.   3) If any difficulty swallowing/breathing, fever occur, page dental.     Purvi Mota DDS and Hollie Regalado DDS, pager #[phone]

## 2020-02-24 NOTE — PROGRESS NOTE ADULT - ATTENDING COMMENTS
strep viridans bacteremia with sepsis, source likely dental, with superimposed arm cellulitis, now planned for tooth extraction, already cleared cx on IV abx, TTE wo vegetation, resolving cellulitis - will f/u ID on course of abx.  dispo - home with outpt PT

## 2020-02-24 NOTE — PROGRESS NOTE ADULT - ASSESSMENT
60 y/o F PMHx of morbid obesity, breast ca s/p Right masterctomy/lymphadectomy/chemo/rad 2012 c/b RUE lymphedema, DMII, substance abuse now on methadone presented with RUE erythema and subjective fevers, chills, found to have low grade streptococcal bacteremia, PCR identifies as not Group A, B, or Strep Pneumoniae and likely superimposed cellulitis on chronic lymphadema.    Overall sepsis on presentation, fever, tachycardia, Rt UE cellulitis, and Strep Viridans Bacteremia  clinically improving. resolved sepsis.       Plan:   -1 of 4 bottles (aerobic) grew Strep Viridans, repeat Bcx NTD  -Potential portals of entry include skin from cellulitis, oral marilin from poor dentition  -Cellulitis resolving  -Awaiting Dental eval,   - c/w CTX 2g q24  -TTE with no obvious vegetations.   -day 3/14 of therapy

## 2020-02-25 ENCOUNTER — TRANSCRIPTION ENCOUNTER (OUTPATIENT)
Age: 60
End: 2020-02-25

## 2020-02-25 VITALS — HEART RATE: 69 BPM | DIASTOLIC BLOOD PRESSURE: 53 MMHG | SYSTOLIC BLOOD PRESSURE: 109 MMHG

## 2020-02-25 LAB
ANION GAP SERPL CALC-SCNC: 13 MMO/L — SIGNIFICANT CHANGE UP (ref 7–14)
BUN SERPL-MCNC: 12 MG/DL — SIGNIFICANT CHANGE UP (ref 7–23)
CALCIUM SERPL-MCNC: 9.9 MG/DL — SIGNIFICANT CHANGE UP (ref 8.4–10.5)
CHLORIDE SERPL-SCNC: 96 MMOL/L — LOW (ref 98–107)
CO2 SERPL-SCNC: 28 MMOL/L — SIGNIFICANT CHANGE UP (ref 22–31)
CREAT SERPL-MCNC: 0.92 MG/DL — SIGNIFICANT CHANGE UP (ref 0.5–1.3)
GLUCOSE BLDC GLUCOMTR-MCNC: 110 MG/DL — HIGH (ref 70–99)
GLUCOSE BLDC GLUCOMTR-MCNC: 134 MG/DL — HIGH (ref 70–99)
GLUCOSE SERPL-MCNC: 158 MG/DL — HIGH (ref 70–99)
HCT VFR BLD CALC: 33.1 % — LOW (ref 34.5–45)
HGB BLD-MCNC: 10.3 G/DL — LOW (ref 11.5–15.5)
MAGNESIUM SERPL-MCNC: 1.7 MG/DL — SIGNIFICANT CHANGE UP (ref 1.6–2.6)
MCHC RBC-ENTMCNC: 26.7 PG — LOW (ref 27–34)
MCHC RBC-ENTMCNC: 31.1 % — LOW (ref 32–36)
MCV RBC AUTO: 85.8 FL — SIGNIFICANT CHANGE UP (ref 80–100)
NRBC # FLD: 0 K/UL — SIGNIFICANT CHANGE UP (ref 0–0)
PHOSPHATE SERPL-MCNC: 3.7 MG/DL — SIGNIFICANT CHANGE UP (ref 2.5–4.5)
PLATELET # BLD AUTO: 312 K/UL — SIGNIFICANT CHANGE UP (ref 150–400)
PMV BLD: 10 FL — SIGNIFICANT CHANGE UP (ref 7–13)
POTASSIUM SERPL-MCNC: 4 MMOL/L — SIGNIFICANT CHANGE UP (ref 3.5–5.3)
POTASSIUM SERPL-SCNC: 4 MMOL/L — SIGNIFICANT CHANGE UP (ref 3.5–5.3)
RBC # BLD: 3.86 M/UL — SIGNIFICANT CHANGE UP (ref 3.8–5.2)
RBC # FLD: 16.3 % — HIGH (ref 10.3–14.5)
SODIUM SERPL-SCNC: 137 MMOL/L — SIGNIFICANT CHANGE UP (ref 135–145)
WBC # BLD: 3.92 K/UL — SIGNIFICANT CHANGE UP (ref 3.8–10.5)
WBC # FLD AUTO: 3.92 K/UL — SIGNIFICANT CHANGE UP (ref 3.8–10.5)

## 2020-02-25 PROCEDURE — 99232 SBSQ HOSP IP/OBS MODERATE 35: CPT

## 2020-02-25 PROCEDURE — 99239 HOSP IP/OBS DSCHRG MGMT >30: CPT

## 2020-02-25 RX ORDER — CEFPROZIL 500 MG/1
1 TABLET, FILM COATED ORAL
Qty: 22 | Refills: 0
Start: 2020-02-25 | End: 2020-03-06

## 2020-02-25 RX ORDER — HYDROCHLOROTHIAZIDE 25 MG
25 TABLET ORAL DAILY
Refills: 0 | Status: DISCONTINUED | OUTPATIENT
Start: 2020-02-25 | End: 2020-02-25

## 2020-02-25 RX ORDER — LOSARTAN POTASSIUM 100 MG/1
100 TABLET, FILM COATED ORAL DAILY
Refills: 0 | Status: DISCONTINUED | OUTPATIENT
Start: 2020-02-25 | End: 2020-02-25

## 2020-02-25 RX ADMIN — GABAPENTIN 400 MILLIGRAM(S): 400 CAPSULE ORAL at 05:31

## 2020-02-25 RX ADMIN — LIDOCAINE 1 PATCH: 4 CREAM TOPICAL at 00:24

## 2020-02-25 RX ADMIN — Medication 60 MILLIGRAM(S): at 05:31

## 2020-02-25 RX ADMIN — PANTOPRAZOLE SODIUM 40 MILLIGRAM(S): 20 TABLET, DELAYED RELEASE ORAL at 05:32

## 2020-02-25 RX ADMIN — METHADONE HYDROCHLORIDE 10 MILLIGRAM(S): 40 TABLET ORAL at 09:01

## 2020-02-25 RX ADMIN — BUPROPION HYDROCHLORIDE 100 MILLIGRAM(S): 150 TABLET, EXTENDED RELEASE ORAL at 12:55

## 2020-02-25 RX ADMIN — LIDOCAINE 1 PATCH: 4 CREAM TOPICAL at 12:55

## 2020-02-25 RX ADMIN — LIDOCAINE 1 PATCH: 4 CREAM TOPICAL at 00:20

## 2020-02-25 RX ADMIN — Medication 25 MILLIGRAM(S): at 09:14

## 2020-02-25 NOTE — DISCHARGE NOTE NURSING/CASE MANAGEMENT/SOCIAL WORK - NSDCFUADDAPPT_GEN_ALL_CORE_FT
Please follow up with Infectious Disease clinic on 3/10 at 8:45 AM.  Please follow up with Dental clinic within 2 weeks, to schedule call [phone]. The address for the clinic is [phone] [place] [address]

## 2020-02-25 NOTE — PROGRESS NOTE ADULT - ATTENDING COMMENTS
strep viridans bacteremia with sepsis, source possibly dental, with superimposed arm cellulitis, wo obvious dental infx - already cleared bcx on IV abx, TTE wo vegetation, resolving cellulitis   DW ID, switch to po and Dc on 2 week course  out-pt brandyn follow up   dispo - home with outpt PT - time spent 35 min

## 2020-02-25 NOTE — PROGRESS NOTE ADULT - PROBLEM SELECTOR PLAN 2
Right arm with chronic lymphedema, admitted for redness and warmth with subjective fevers/chills, meeting sepsis criteria. S/p vanc x1, clindamycin x2 in ED. ID consulted and recommended CTX 2g daily.   -Venous Duplex of RUE 2/21 negative for DVT.  -cw CTX 2/22-

## 2020-02-25 NOTE — PROGRESS NOTE ADULT - SUBJECTIVE AND OBJECTIVE BOX
59y old  Female who presents with a chief complaint of cellulitis, Strep viridans bacteremia (25 Feb 2020 06:45)      Interval history:  Afebrile, feels well, wants to go home.       Allergies:   environment--ragweed, dust, cats--sneezing and watery eyes, nasal congestion (Other)  ramipril (Angioedema)      Antimicrobials:  cefTRIAXone   IVPB 2000 milliGRAM(s) IV Intermittent every 24 hours      REVIEW OF SYSTEMS:  No chest pain   No cough, no SOB  No N/V/D, no abdominal pain  No dysuria   No rash.       Vital Signs Last 24 Hrs  T(C): 36.3 (02-25-20 @ 05:25), Max: 36.7 (02-24-20 @ 21:24)  T(F): 97.4 (02-25-20 @ 05:25), Max: 98.1 (02-24-20 @ 21:24)  HR: 69 (02-25-20 @ 09:12) (62 - 76)  BP: 109/53 (02-25-20 @ 09:12) (109/53 - 122/55)  BP(mean): --  RR: 18 (02-25-20 @ 05:25) (18 - 18)  SpO2: 99% (02-25-20 @ 05:25) (95% - 99%)      PHYSICAL EXAM:  Patient in no acute distress. AAOX3.  No icterus, no oral ulcers.  Cardiovascular: S1S2 normal.  Lungs: + air entry B/L lung fields.  Gastrointestinal: soft, nontender, nondistended.  Extremities: RUE lymphedema, resolved erythema, no warmth.   IV sites not inflamed.                             10.3   3.92  )-----------( 312      ( 25 Feb 2020 06:40 )             33.1   02-25    137  |  96<L>  |  12  ----------------------------<  158<H>  4.0   |  28  |  0.92    Ca    9.9      25 Feb 2020 11:00  Phos  3.7     02-25  Mg     1.7     02-25        Culture - Blood (collected 23 Feb 2020 07:37)  Source: BLOOD VENOUS  Preliminary Report (25 Feb 2020 07:36):    NO ORGANISMS ISOLATED    NO ORGANISMS ISOLATED AT 48 HRS.    Culture - Blood (collected 23 Feb 2020 07:37)  Source: BLOOD PERIPHERAL  Preliminary Report (25 Feb 2020 07:36):    NO ORGANISMS ISOLATED    NO ORGANISMS ISOLATED AT 48 HRS.

## 2020-02-25 NOTE — PROGRESS NOTE ADULT - PROBLEM SELECTOR PLAN 1
On admission meeting sepsis criteria, with signs of right arm cellulitis. BCx 2/21 growing Strep viridans x1 bottle. S/p vanc x1, clindamycin x2 in ED. ID consulted and recommended CTX 2g daily.   -cw CTX 2/22  -repeat BCx 2/23 NGTD  -TTE 2/23 without obvious vegetations.   -dental consulted for broken tooth, recs appreciated. Recommending outpatient followup, no acute interventions needed at this time.    -ID recs appreciated

## 2020-02-25 NOTE — PROGRESS NOTE ADULT - SUBJECTIVE AND OBJECTIVE BOX
PROGRESS NOTE:   Authored by Nikki Kimura, MD, Pager [phone] [facility], [id number] [facility]     Patient is a 59y old  Female who presents with a chief complaint of cellulitis, Strep viridans bacteremia (24 Feb 2020 16:07)      SUBJECTIVE / OVERNIGHT EVENTS:    ADDITIONAL REVIEW OF SYSTEMS:    MEDICATIONS  (STANDING):  buPROPion  milliGRAM(s) Oral daily  cefTRIAXone   IVPB 2000 milliGRAM(s) IV Intermittent every 24 hours  celecoxib 200 milliGRAM(s) Oral daily  dextrose 5%. 1000 milliLiter(s) (50 mL/Hr) IV Continuous <Continuous>  dextrose 50% Injectable 12.5 Gram(s) IV Push once  dextrose 50% Injectable 25 Gram(s) IV Push once  dextrose 50% Injectable 25 Gram(s) IV Push once  diltiazem    Tablet 60 milliGRAM(s) Oral every 12 hours  gabapentin 400 milliGRAM(s) Oral two times a day  heparin  Injectable 5000 Unit(s) SubCutaneous every 8 hours  hydrochlorothiazide 25 milliGRAM(s) Oral daily  insulin lispro (HumaLOG) corrective regimen sliding scale   SubCutaneous three times a day before meals  lidocaine   Patch 1 Patch Transdermal daily  lidocaine   Patch 1 Patch Transdermal daily  methadone    Tablet 10 milliGRAM(s) Oral <User Schedule>  methadone    Tablet 5 milliGRAM(s) Oral <User Schedule>  pantoprazole    Tablet 40 milliGRAM(s) Oral before breakfast    MEDICATIONS  (PRN):  acetaminophen   Tablet .. 1000 milliGRAM(s) Oral every 8 hours PRN Mild Pain (1 - 3), Moderate Pain (4 - 6)  dextrose 40% Gel 15 Gram(s) Oral once PRN Blood Glucose LESS THAN 70 milliGRAM(s)/deciliter  glucagon  Injectable 1 milliGRAM(s) IntraMuscular once PRN Glucose LESS THAN 70 milligrams/deciliter      CAPILLARY BLOOD GLUCOSE      POCT Blood Glucose.: 163 mg/dL (24 Feb 2020 21:35)  POCT Blood Glucose.: 79 mg/dL (24 Feb 2020 17:01)  POCT Blood Glucose.: 113 mg/dL (24 Feb 2020 12:05)  POCT Blood Glucose.: 131 mg/dL (24 Feb 2020 08:30)    I&O's Summary      PHYSICAL EXAM:  Vital Signs Last 24 Hrs  T(C): 36.3 (25 Feb 2020 05:25), Max: 36.7 (24 Feb 2020 13:15)  T(F): 97.4 (25 Feb 2020 05:25), Max: 98.1 (24 Feb 2020 13:15)  HR: 62 (25 Feb 2020 05:25) (62 - 81)  BP: 122/55 (25 Feb 2020 05:25) (116/51 - 150/83)  BP(mean): --  RR: 18 (25 Feb 2020 05:25) (18 - 19)  SpO2: 99% (25 Feb 2020 05:25) (95% - 99%)    GENERAL: morbidly obese middle aged woman, lying in bed comfortably  EYES: EOMI, PERRLA, conjunctiva and sclera clear  ENT: Moist mucous membranes  MOUTH: broken right molar without erythema or induration, no drainage noted   NECK: Supple, No JVD  CHEST/LUNG: Clear to auscultation bilaterally; No rales, rhonchi, wheezing, or rubs. Unlabored respirations  HEART: Regular rate and rhythm; No murmurs, rubs, or gallops  ABDOMEN: obese, Bowel sounds present; Soft, Nontender, Nondistended. No hepatomegaly  EXTREMITIES:  2+ Peripheral Pulses. no lower extremity edema. Right upper extremity with much improved erythema which appears to have regressed from demarcated area, no cuts or open lesions. Intact pulses and full strength.    NERVOUS SYSTEM:  Alert & Oriented X3, speech clear. No deficits   MSK: FROM all 4 extremities, full and equal strength in upper and lower extremities    LABS:                        10.0   4.19  )-----------( 270      ( 24 Feb 2020 06:05 )             30.5     02-23    138  |  99  |  14  ----------------------------<  125<H>  4.0   |  27  |  0.98    Ca    9.8      23 Feb 2020 07:14  Phos  3.8     02-23  Mg     1.7     02-23                Culture - Blood (collected 23 Feb 2020 07:37)  Source: BLOOD VENOUS  Preliminary Report (24 Feb 2020 07:36):    NO ORGANISMS ISOLATED    NO ORGANISMS ISOLATED AT 24 HOURS    Culture - Blood (collected 23 Feb 2020 07:37)  Source: BLOOD PERIPHERAL  Preliminary Report (24 Feb 2020 07:36):    NO ORGANISMS ISOLATED    NO ORGANISMS ISOLATED AT 24 HOURS        RADIOLOGY & ADDITIONAL TESTS:  Results Reviewed:   Imaging Personally Reviewed:  Electrocardiogram Personally Reviewed:    COORDINATION OF CARE:  Care Discussed with Consultants/Other Providers [Y/N]:  Prior or Outpatient Records Reviewed [Y/N]: PROGRESS NOTE:   Authored by Nikki Kimura, MD, Pager [phone] [facility], [id number] [facility]     Patient is a 59y old  Female who presents with a chief complaint of cellulitis, Strep viridans bacteremia (24 Feb 2020 16:07)      SUBJECTIVE / OVERNIGHT EVENTS: No acute events overnight. Patient seen and examined at bedtime. Denies fevers, chills, nausea, vomiting, chest pain, or shortness of breath.    ADDITIONAL REVIEW OF SYSTEMS:    MEDICATIONS  (STANDING):  buPROPion  milliGRAM(s) Oral daily  cefTRIAXone   IVPB 2000 milliGRAM(s) IV Intermittent every 24 hours  celecoxib 200 milliGRAM(s) Oral daily  dextrose 5%. 1000 milliLiter(s) (50 mL/Hr) IV Continuous <Continuous>  dextrose 50% Injectable 12.5 Gram(s) IV Push once  dextrose 50% Injectable 25 Gram(s) IV Push once  dextrose 50% Injectable 25 Gram(s) IV Push once  diltiazem    Tablet 60 milliGRAM(s) Oral every 12 hours  gabapentin 400 milliGRAM(s) Oral two times a day  heparin  Injectable 5000 Unit(s) SubCutaneous every 8 hours  hydrochlorothiazide 25 milliGRAM(s) Oral daily  insulin lispro (HumaLOG) corrective regimen sliding scale   SubCutaneous three times a day before meals  lidocaine   Patch 1 Patch Transdermal daily  lidocaine   Patch 1 Patch Transdermal daily  methadone    Tablet 10 milliGRAM(s) Oral <User Schedule>  methadone    Tablet 5 milliGRAM(s) Oral <User Schedule>  pantoprazole    Tablet 40 milliGRAM(s) Oral before breakfast    MEDICATIONS  (PRN):  acetaminophen   Tablet .. 1000 milliGRAM(s) Oral every 8 hours PRN Mild Pain (1 - 3), Moderate Pain (4 - 6)  dextrose 40% Gel 15 Gram(s) Oral once PRN Blood Glucose LESS THAN 70 milliGRAM(s)/deciliter  glucagon  Injectable 1 milliGRAM(s) IntraMuscular once PRN Glucose LESS THAN 70 milligrams/deciliter      CAPILLARY BLOOD GLUCOSE      POCT Blood Glucose.: 163 mg/dL (24 Feb 2020 21:35)  POCT Blood Glucose.: 79 mg/dL (24 Feb 2020 17:01)  POCT Blood Glucose.: 113 mg/dL (24 Feb 2020 12:05)  POCT Blood Glucose.: 131 mg/dL (24 Feb 2020 08:30)    I&O's Summary      PHYSICAL EXAM:  Vital Signs Last 24 Hrs  T(C): 36.3 (25 Feb 2020 05:25), Max: 36.7 (24 Feb 2020 13:15)  T(F): 97.4 (25 Feb 2020 05:25), Max: 98.1 (24 Feb 2020 13:15)  HR: 62 (25 Feb 2020 05:25) (62 - 81)  BP: 122/55 (25 Feb 2020 05:25) (116/51 - 150/83)  BP(mean): --  RR: 18 (25 Feb 2020 05:25) (18 - 19)  SpO2: 99% (25 Feb 2020 05:25) (95% - 99%)    GENERAL: morbidly obese middle aged woman, lying in bed comfortably  EYES: EOMI, PERRLA, conjunctiva and sclera clear  ENT: Moist mucous membranes  MOUTH: broken left molar without erythema or induration, no drainage noted   NECK: Supple, No JVD  CHEST/LUNG: Clear to auscultation bilaterally; No rales, rhonchi, wheezing, or rubs. Unlabored respirations  HEART: Regular rate and rhythm; No murmurs, rubs, or gallops  ABDOMEN: obese, Bowel sounds present; Soft, Nontender, Nondistended. No hepatomegaly  EXTREMITIES:  2+ Peripheral Pulses. no lower extremity edema. Right upper extremity with much improved erythema which appears to have regressed from demarcated area, no cuts or open lesions. Intact pulses and full strength.    NERVOUS SYSTEM:  Alert & Oriented X3, speech clear. No deficits   MSK: FROM all 4 extremities, full and equal strength in upper and lower extremities    LABS:                        10.0   4.19  )-----------( 270      ( 24 Feb 2020 06:05 )             30.5     02-23    138  |  99  |  14  ----------------------------<  125<H>  4.0   |  27  |  0.98    Ca    9.8      23 Feb 2020 07:14  Phos  3.8     02-23  Mg     1.7     02-23                Culture - Blood (collected 23 Feb 2020 07:37)  Source: BLOOD VENOUS  Preliminary Report (24 Feb 2020 07:36):    NO ORGANISMS ISOLATED    NO ORGANISMS ISOLATED AT 24 HOURS    Culture - Blood (collected 23 Feb 2020 07:37)  Source: BLOOD PERIPHERAL  Preliminary Report (24 Feb 2020 07:36):    NO ORGANISMS ISOLATED    NO ORGANISMS ISOLATED AT 24 HOURS        RADIOLOGY & ADDITIONAL TESTS:  Results Reviewed:   Imaging Personally Reviewed:  Electrocardiogram Personally Reviewed:    COORDINATION OF CARE:  Care Discussed with Consultants/Other Providers [Y/N]:  Prior or Outpatient Records Reviewed [Y/N]: PROGRESS NOTE:   Authored by Nikki Kimura, MD, Pager [phone] [facility], [id number] [facility]     Patient is a 59y old  Female who presents with a chief complaint of cellulitis, Strep viridans bacteremia (24 Feb 2020 16:07)      SUBJECTIVE / OVERNIGHT EVENTS: No acute events overnight. Patient seen and examined at bedside. Denies fevers, chills, nausea, vomiting, chest pain, or shortness of breath.    ADDITIONAL REVIEW OF SYSTEMS:    MEDICATIONS  (STANDING):  buPROPion  milliGRAM(s) Oral daily  cefTRIAXone   IVPB 2000 milliGRAM(s) IV Intermittent every 24 hours  celecoxib 200 milliGRAM(s) Oral daily  dextrose 5%. 1000 milliLiter(s) (50 mL/Hr) IV Continuous <Continuous>  dextrose 50% Injectable 12.5 Gram(s) IV Push once  dextrose 50% Injectable 25 Gram(s) IV Push once  dextrose 50% Injectable 25 Gram(s) IV Push once  diltiazem    Tablet 60 milliGRAM(s) Oral every 12 hours  gabapentin 400 milliGRAM(s) Oral two times a day  heparin  Injectable 5000 Unit(s) SubCutaneous every 8 hours  hydrochlorothiazide 25 milliGRAM(s) Oral daily  insulin lispro (HumaLOG) corrective regimen sliding scale   SubCutaneous three times a day before meals  lidocaine   Patch 1 Patch Transdermal daily  lidocaine   Patch 1 Patch Transdermal daily  methadone    Tablet 10 milliGRAM(s) Oral <User Schedule>  methadone    Tablet 5 milliGRAM(s) Oral <User Schedule>  pantoprazole    Tablet 40 milliGRAM(s) Oral before breakfast    MEDICATIONS  (PRN):  acetaminophen   Tablet .. 1000 milliGRAM(s) Oral every 8 hours PRN Mild Pain (1 - 3), Moderate Pain (4 - 6)  dextrose 40% Gel 15 Gram(s) Oral once PRN Blood Glucose LESS THAN 70 milliGRAM(s)/deciliter  glucagon  Injectable 1 milliGRAM(s) IntraMuscular once PRN Glucose LESS THAN 70 milligrams/deciliter      CAPILLARY BLOOD GLUCOSE      POCT Blood Glucose.: 163 mg/dL (24 Feb 2020 21:35)  POCT Blood Glucose.: 79 mg/dL (24 Feb 2020 17:01)  POCT Blood Glucose.: 113 mg/dL (24 Feb 2020 12:05)  POCT Blood Glucose.: 131 mg/dL (24 Feb 2020 08:30)    I&O's Summary      PHYSICAL EXAM:  Vital Signs Last 24 Hrs  T(C): 36.3 (25 Feb 2020 05:25), Max: 36.7 (24 Feb 2020 13:15)  T(F): 97.4 (25 Feb 2020 05:25), Max: 98.1 (24 Feb 2020 13:15)  HR: 62 (25 Feb 2020 05:25) (62 - 81)  BP: 122/55 (25 Feb 2020 05:25) (116/51 - 150/83)  BP(mean): --  RR: 18 (25 Feb 2020 05:25) (18 - 19)  SpO2: 99% (25 Feb 2020 05:25) (95% - 99%)    GENERAL: morbidly obese middle aged woman, lying in bed comfortably  EYES: EOMI, PERRLA, conjunctiva and sclera clear  ENT: Moist mucous membranes  MOUTH: broken left molar without erythema or induration, no drainage noted   NECK: Supple, No JVD  CHEST/LUNG: Clear to auscultation bilaterally; No rales, rhonchi, wheezing, or rubs. Unlabored respirations  HEART: Regular rate and rhythm; No murmurs, rubs, or gallops  ABDOMEN: obese, Bowel sounds present; Soft, Nontender, Nondistended. No hepatomegaly  EXTREMITIES:  2+ Peripheral Pulses. no lower extremity edema. Right upper extremity with much improved erythema which appears to have regressed from demarcated area, no cuts or open lesions. Intact pulses and full strength.    NERVOUS SYSTEM:  Alert & Oriented X3, speech clear. No deficits   MSK: FROM all 4 extremities, full and equal strength in upper and lower extremities    LABS:                        10.0   4.19  )-----------( 270      ( 24 Feb 2020 06:05 )             30.5     02-23    138  |  99  |  14  ----------------------------<  125<H>  4.0   |  27  |  0.98    Ca    9.8      23 Feb 2020 07:14  Phos  3.8     02-23  Mg     1.7     02-23                Culture - Blood (collected 23 Feb 2020 07:37)  Source: BLOOD VENOUS  Preliminary Report (24 Feb 2020 07:36):    NO ORGANISMS ISOLATED    NO ORGANISMS ISOLATED AT 24 HOURS    Culture - Blood (collected 23 Feb 2020 07:37)  Source: BLOOD PERIPHERAL  Preliminary Report (24 Feb 2020 07:36):    NO ORGANISMS ISOLATED    NO ORGANISMS ISOLATED AT 24 HOURS        RADIOLOGY & ADDITIONAL TESTS:  Results Reviewed:   Imaging Personally Reviewed:  Electrocardiogram Personally Reviewed:    COORDINATION OF CARE:  Care Discussed with Consultants/Other Providers [Y/N]:  Prior or Outpatient Records Reviewed [Y/N]:

## 2020-02-25 NOTE — PROGRESS NOTE ADULT - PROBLEM SELECTOR PLAN 4
Pt with osteoarthritis of the bilateral knees. S/p right rotator cuff surgery last year. Follows a pain management provider Dr. Mathieu Lord. On methadone 10 mg in the morning, 5 mg at night. See [facility] chart note 2/22.   -cw home methadone regimen   -cw home gabapentin  -celecoxib daily, pt states she takes meloxicam 7.5 mg BID at home.

## 2020-02-25 NOTE — PROVIDER CONTACT NOTE (MEDICATION) - ASSESSMENT
Patients blood pressure 122/55 heart rate 62. Patient due for Hydrochlorthiazide and Valsartan but ordered to hold if systolic <120. Patient is 122/55 Patients blood pressure 122/55 heart rate 62. Patient due for Hydrochlorthiazide and Valsartan but ordered to hold if systolic <120. Patient is 122/55. Patient asymptomatic

## 2020-02-25 NOTE — PROGRESS NOTE ADULT - ASSESSMENT
58 y/o F PMHx of morbid obesity, breast ca s/p Right masterctomy/lymphadectomy/chemo/rad 2012 c/b RUE lymphedema, DMII, substance abuse now on methadone presented with RUE erythema and subjective fevers, chills, found to have low grade streptococcal bacteremia, PCR identifies as not Group A, B, or Strep Pneumoniae and likely superimposed cellulitis on chronic lymphadema.    Overall sepsis on presentation, fever, tachycardia, Rt UE cellulitis, and Strep Viridans Bacteremia  clinically improved. resolved sepsis.       Plan:   -1 of 4 bottles (aerobic) grew Strep Viridans, repeat Bcx NTD  -Potential portals of entry include skin from cellulitis, oral marilin from poor dentition  -Cellulitis resolving  -s/p Dental eval, no obvious infection  - c/w CTX 2g q24  -TTE with no obvious vegetations.   -day 4/14 of therapy today, can switch to po cefprozil 500 mg po bid to complete therapy.

## 2020-02-25 NOTE — PROVIDER CONTACT NOTE (MEDICATION) - ACTION/TREATMENT ORDERED:
MD Medina made aware. Ok to reschedule Hydrochlorthiazide and Valsartan to 8am.  Nursing care to continue

## 2020-02-25 NOTE — DISCHARGE NOTE NURSING/CASE MANAGEMENT/SOCIAL WORK - PATIENT PORTAL LINK FT
You can access the FollowMyHealth Patient Portal offered by [facility] by registering at the following website: [email or web address]. By joining [facility]’s FollowMyHealth portal, you will also be able to view your health information using other applications (apps) compatible with our system.

## 2020-02-25 NOTE — PROGRESS NOTE ADULT - REASON FOR ADMISSION
cellulitis, Strep viridans bacteremia

## 2020-02-25 NOTE — PROGRESS NOTE ADULT - PROBLEM SELECTOR PLAN 9
Transitions of Care Status:   1.  Name of PCP: Dr. Rios   2.  PCP Contacted on Admission: [X ] Y    [ ] N    3.  PCP contacted at Discharge: [ ] Y    [ ] N    [ ] N/A  4.  Post-Discharge Appointment Date and Location:  5.  Summary of Handoff given to PCP:

## 2020-02-25 NOTE — PROVIDER CONTACT NOTE (MEDICATION) - BACKGROUND
Patient diagnosed with cellulitis of the right upper extremity with a history of asthma, smoker, sickle cell trait, ETOH abuse, Diabetes type 2, hypercholesterolemia, breast cancer, hiatal hernia, insomnia, anxiety, depression, arthritis, diverticulosis, fibromyalgia and sleep apnea

## 2020-02-26 LAB
BACTERIA BLD CULT: SIGNIFICANT CHANGE UP

## 2020-02-28 LAB
BACTERIA BLD CULT: SIGNIFICANT CHANGE UP
BACTERIA BLD CULT: SIGNIFICANT CHANGE UP

## 2020-03-02 ENCOUNTER — APPOINTMENT (OUTPATIENT)
Dept: INTERNAL MEDICINE | Facility: CLINIC | Age: 60
End: 2020-03-02
Payer: MEDICARE

## 2020-03-02 VITALS
OXYGEN SATURATION: 98 % | WEIGHT: 235 LBS | HEIGHT: 61 IN | TEMPERATURE: 98.5 F | BODY MASS INDEX: 44.37 KG/M2 | DIASTOLIC BLOOD PRESSURE: 74 MMHG | HEART RATE: 80 BPM | SYSTOLIC BLOOD PRESSURE: 126 MMHG

## 2020-03-02 DIAGNOSIS — K08.89 OTHER SPECIFIED DISORDERS OF TEETH AND SUPPORTING STRUCTURES: ICD-10-CM

## 2020-03-02 DIAGNOSIS — A49.1 STREPTOCOCCAL INFECTION, UNSPECIFIED SITE: ICD-10-CM

## 2020-03-02 DIAGNOSIS — L03.113 CELLULITIS OF RIGHT UPPER LIMB: ICD-10-CM

## 2020-03-02 PROCEDURE — 99496 TRANSJ CARE MGMT HIGH F2F 7D: CPT

## 2020-03-02 NOTE — PHYSICAL EXAM
[Normal] : normal rate, regular rhythm, normal S1 and S2 and no murmur heard [de-identified] : R arm with chronic lymphedema but no redness/tenderness/warmth.

## 2020-03-02 NOTE — ASSESSMENT
[FreeTextEntry1] : 58 y/o F with h/o multiple chronic medical problems including breast cancer s/p mastectomy/xrt/chemo with chronic R arm lymphedema, HTN here for post-discharge follow up for R arm cellulitis/Strep viridans bacteremia.\par \par S. viridans bacteremia - unclear source; evaluated by dental without any clear inciting cause and TTE normal.\par - Complete cephalosporin course\par - F/U with ID as scheduled on 3/10.\par - F/U with Dental as outpatient\par \par RTC if sx recur.

## 2020-03-02 NOTE — HISTORY OF PRESENT ILLNESS
[Admitted on: ___] : The patient was admitted on [unfilled] [Discharged on ___] : discharged on [unfilled] [Discharge Summary] : discharge summary [Pertinent Labs] : pertinent labs [Patient Contacted By: ____] : and contacted by [unfilled] [FreeTextEntry2] : \par Had been feeling well on day of admission but then noticed that when going up the stairs felt a little "out of it" then having chills. Then noticed that her R arm (which has chronic lymphedema since 2003 after mastectomy) was warm/red all the way to the wrist and up the medial upper arm.\par \par Admitted to the hospital and found to be febrile (100.5F). Blood cultures grew Strep viridans (1 of 4 bottles). Treated with clinda, vanco then ceftriaxone. \par TTE 2/24 showed no obvious vegetations\par Seen by Dental and recommended outpatient follow up.\par Transition to PO cefprozil for total 14-day course.\par By the time was discharged arm was mostly back to baseline and was afebrile after first day\par \par Since discharge, has been feeling ok and pretty much back to baseline. Celebrated her 16-year anniversary of being sober.\par \par Waiting for her dental insurance to kick in next month to f/u with Dental as an outpatient. Had previously been recommended to have oral surgery a few years ago but never did because of cost/insurance concerns.\par \par No chest pain. No SOB. BMs normal.

## 2020-03-10 ENCOUNTER — APPOINTMENT (OUTPATIENT)
Dept: INFECTIOUS DISEASE | Facility: CLINIC | Age: 60
End: 2020-03-10

## 2020-03-16 ENCOUNTER — RX RENEWAL (OUTPATIENT)
Age: 60
End: 2020-03-16

## 2020-03-26 ENCOUNTER — APPOINTMENT (OUTPATIENT)
Dept: ORTHOPEDIC SURGERY | Facility: CLINIC | Age: 60
End: 2020-03-26

## 2020-03-30 ENCOUNTER — RX RENEWAL (OUTPATIENT)
Age: 60
End: 2020-03-30

## 2020-04-20 ENCOUNTER — APPOINTMENT (OUTPATIENT)
Dept: NEUROLOGY | Facility: CLINIC | Age: 60
End: 2020-04-20

## 2020-05-18 ENCOUNTER — APPOINTMENT (OUTPATIENT)
Dept: INTERNAL MEDICINE | Facility: CLINIC | Age: 60
End: 2020-05-18

## 2020-05-23 ENCOUNTER — RX RENEWAL (OUTPATIENT)
Age: 60
End: 2020-05-23

## 2020-05-27 ENCOUNTER — APPOINTMENT (OUTPATIENT)
Dept: ENDOCRINOLOGY | Facility: CLINIC | Age: 60
End: 2020-05-27

## 2020-06-15 ENCOUNTER — NON-APPOINTMENT (OUTPATIENT)
Age: 60
End: 2020-06-15

## 2020-06-15 ENCOUNTER — APPOINTMENT (OUTPATIENT)
Dept: CARDIOLOGY | Facility: CLINIC | Age: 60
End: 2020-06-15
Payer: MEDICARE

## 2020-06-15 VITALS
OXYGEN SATURATION: 98 % | RESPIRATION RATE: 16 BRPM | HEART RATE: 67 BPM | TEMPERATURE: 98.3 F | DIASTOLIC BLOOD PRESSURE: 90 MMHG | WEIGHT: 240 LBS | HEIGHT: 61 IN | SYSTOLIC BLOOD PRESSURE: 145 MMHG | BODY MASS INDEX: 45.31 KG/M2

## 2020-06-15 PROCEDURE — 93000 ELECTROCARDIOGRAM COMPLETE: CPT

## 2020-06-15 PROCEDURE — 99214 OFFICE O/P EST MOD 30 MIN: CPT

## 2020-06-15 NOTE — PHYSICAL EXAM
[General Appearance - Well Developed] : well developed [Normal Appearance] : normal appearance [Well Groomed] : well groomed [General Appearance - Well Nourished] : well nourished [No Deformities] : no deformities [General Appearance - In No Acute Distress] : no acute distress [No Oral Pallor] : no oral pallor [Normal Oral Mucosa] : normal oral mucosa [No Oral Cyanosis] : no oral cyanosis [Normal Jugular Venous A Waves Present] : normal jugular venous A waves present [Normal Jugular Venous V Waves Present] : normal jugular venous V waves present [No Jugular Venous Adame A Waves] : no jugular venous adame A waves [Auscultation Breath Sounds / Voice Sounds] : lungs were clear to auscultation bilaterally [Exaggerated Use Of Accessory Muscles For Inspiration] : no accessory muscle use [Respiration, Rhythm And Depth] : normal respiratory rhythm and effort [Heart Sounds] : normal S1 and S2 [Heart Rate And Rhythm] : heart rate and rhythm were normal [Abdomen Soft] : soft [Murmurs] : no murmurs present [Abdomen Tenderness] : non-tender [Abdomen Mass (___ Cm)] : no abdominal mass palpated [] : no rash [Skin Color & Pigmentation] : normal skin color and pigmentation [No Venous Stasis] : no venous stasis [Skin Lesions] : no skin lesions [No Skin Ulcers] : no skin ulcer [Oriented To Time, Place, And Person] : oriented to person, place, and time [No Xanthoma] : no  xanthoma was observed [No Anxiety] : not feeling anxious [Affect] : the affect was normal [Mood] : the mood was normal

## 2020-06-15 NOTE — DISCUSSION/SUMMARY
[FreeTextEntry1] : 60 year old woman with HTN here for followup\par Chest pain resolved, will let me know if resolves. EKG non ischemic\par -cont HCTZ and Valsartan\par FU in 6 months

## 2020-06-15 NOTE — HISTORY OF PRESENT ILLNESS
[FreeTextEntry1] : Here for followup.\par No new complaints\par DId admit to small weight gain over the course of the pandemic.\par Does have some chest pressure that last seconds and goes away. This happened about a month ago and then went away. Has not happened in a month

## 2020-06-18 PROBLEM — R61 HYPERHIDROSIS OF FACE: Status: ACTIVE | Noted: 2017-09-28

## 2020-06-29 ENCOUNTER — APPOINTMENT (OUTPATIENT)
Dept: PHYSICAL MEDICINE AND REHAB | Facility: CLINIC | Age: 60
End: 2020-06-29

## 2020-07-06 ENCOUNTER — RX RENEWAL (OUTPATIENT)
Age: 60
End: 2020-07-06

## 2020-07-28 ENCOUNTER — APPOINTMENT (OUTPATIENT)
Dept: NEUROLOGY | Facility: CLINIC | Age: 60
End: 2020-07-28
Payer: MEDICARE

## 2020-07-28 VITALS
WEIGHT: 234 LBS | SYSTOLIC BLOOD PRESSURE: 142 MMHG | HEIGHT: 61 IN | BODY MASS INDEX: 44.18 KG/M2 | DIASTOLIC BLOOD PRESSURE: 72 MMHG | HEART RATE: 75 BPM

## 2020-07-28 VITALS — TEMPERATURE: 97.4 F

## 2020-07-28 PROCEDURE — 99214 OFFICE O/P EST MOD 30 MIN: CPT

## 2020-07-28 NOTE — ASSESSMENT
[FreeTextEntry1] : Assesment: \par 59yo RH AAW, with subjective memory issues. \par \par She has peripheral neuropathy in LE and antalgic limitation of RLE>LLE due to knees pain. Planning knees sx soon.\par Anxiety, depression and lot of stress are present.\par Last MRI brain in 2018, benign. \par \par Mental and neuro exam continue to be ok.\par \par Diagnostic Impression:\par -anxiety/depression\par -subjective memory impairment\par -neuropathy.\par \par Plan: \par -continue with current meds, same dose of Wellbutrin, might consider a more anti-anxiety medication instead\par -will do NPT and consider repeating MRI after.\par

## 2020-07-28 NOTE — HISTORY OF PRESENT ILLNESS
[FreeTextEntry1] : HPI-Interval Hx 20200728:\par Pt here for follow-up.\par She feels her STM is a bit worse now, she tends to forget dates, names and recent events.\par She has to write a lot of notes and set alarms to remind herself to follow through with things. \par she is still taking care of multiple things though, and trying to run her home.\par Rest is stable. \par Ca in remission. 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 \par Had a fall with head trauma in Feb 2019, CT head negative ([facility]par She is very busy, and has a hard time managing her busy schedule. \par She has a very positive outlook overall. \par \par HPI-Interval Hx [id number]:\par MRI, MRA were negative, ESR 71, but vascular sx evaluation with Echo was negative for TA. \par Still a lot of stress in her life.\par Feels better with Wellbutrin, her face pain and HA have stopped.\par She sleeps very well, and appetite is good.\par She keeps very busy, now preparing for her daughter's wedding.\par \par HPI-Interval Hx [id number]:\par Pt here for follow-up.\par Over the last year, she has been pretty much stable.\par recent Bone Scan and CT have shown no secondary lesions from breast Ca.\par \par She gets seldom sharp pain behind L eye for a few seconds, with slight HA, lasting 5-10 min.\par She also noticed her face sweats a lot, at times her arms too. This apparently is dating 10-20years.\par In all, she feels very irritable and she still c/o STM and attention issues.\par \par PMH:\par 57yo RH AAW, with hx of fibromyalgia, bipolar depression, breast CA s/p resection and chemo in 2012, currently followed up and disease free, colon polyps, arthritis.\par She reports that she sometimes looses attentions and would forget things, e.g. that she is cooking something and would leave things on the stove. She has issues remembering names of people, but always had.\par \par She does not report HA.\par \par Mood: she often gets sad, cries a lot, but has had bipolar depression for a long time, used to be on Lexapro, VPA and Neurontin, stopped a few years ago.\par She used to be in a program at [facility], terminated when she had started using a plant product (Tunguska?). She has a hx of SI/SA in 2004, now denies any SI. \par \par She has a hx of EtOH abuse, sober and in AA for many years.\par \par She has a complex living situation, having to take care of her mother, daughter, grandchildren.\par \par She does not drive, she never did, has fear of it.\par \par Sleep: falls asleep readily, but has to urinate very frequently. Sleeps 10h daily.\par \par Disabled, used to do clerical work in several different settings.\par \par ADl and IADL intact.\par \par Of note, she has a small nodule in L thyroid, under surveillance.

## 2020-07-28 NOTE — PHYSICAL EXAM
[General Appearance - In No Acute Distress] : in no acute distress [General Appearance - Alert] : alert [General Appearance - Well Developed] : well developed [General Appearance - Well Nourished] : well nourished [Oriented To Time, Place, And Person] : oriented to person, place, and time [Impaired Insight] : insight and judgment were intact [Affect] : the affect was normal [Over the Past 2 Weeks, Have You Felt Little Interest or Pleasure Doing Things?] : 2.) Over the past 2 weeks, have you felt little interest or pleasure doing things? Yes [Person] : oriented to person [Place] : oriented to place [Time] : oriented to time [Short Term Intact] : short term memory intact [Remote Intact] : remote memory intact [Registration Intact] : recent registration memory intact [Span Intact] : the attention span was normal [Concentration Intact] : normal concentrating ability [Visual Intact] : visual attention was ~T not ~L decreased [Naming Objects] : no difficulty naming common objects [Repeating Phrases] : no difficulty repeating a phrase [Fluency] : fluency intact [Writing A Sentence] : no difficulty writing a sentence [Comprehension] : comprehension intact [Reading] : reading intact [Current Events] : adequate knowledge of current events [Past History] : adequate knowledge of personal past history [Vocabulary] : adequate range of vocabulary [Total Score ___ / 30] : the patient achieved a score of [unfilled] /30 [Date / Time ___ / 5] : date / time [unfilled] / 5 [Place ___ / 5] : place [unfilled] / 5 [Registration ___ / 3] : registration [unfilled] / 3 [Serial Sevens ___/5] : serial sevens [unfilled] / 5 [Naming 2 Objects ___ / 2] : naming two objects [unfilled] / 2 [Repeating a Sentence ___ / 1] : repeating a sentence [unfilled] / 1 [Writing a Sentence ___ / 1] : write sentence [unfilled] / 1 [3-stage Verbal Command ___ / 3] : three-stage verbal command [unfilled] / 3 [Written Command ___ / 1] : written command [unfilled] / 1 [Copy a Design ___ / 1] : copy a design [unfilled] / 1 [Recall ___ / 3] : recall [unfilled] / 3 [Cranial Nerves Optic (II)] : visual acuity intact bilaterally,  visual fields full to confrontation, pupils equal round and reactive to light [Cranial Nerves Oculomotor (III)] : extraocular motion intact [Cranial Nerves Trigeminal (V)] : facial sensation intact symmetrically [Cranial Nerves Facial (VII)] : face symmetrical [Cranial Nerves Glossopharyngeal (IX)] : tongue and palate midline [Cranial Nerves Vestibulocochlear (VIII)] : hearing was intact bilaterally [Cranial Nerves Hypoglossal (XII)] : there was no tongue deviation with protrusion [Cranial Nerves Accessory (XI - Cranial And Spinal)] : head turning and shoulder shrug symmetric [Motor Strength] : muscle strength was normal in all four extremities [Involuntary Movements] : no involuntary movements were seen [No Muscle Atrophy] : normal bulk in all four extremities [Motor Handedness Right-Handed] : the patient is right hand dominant [Sensation Tactile Decrease] : light touch was intact [Sensation Pain / Temperature Decrease] : pain and temperature was intact [Proprioception] : proprioception was intact [Balance] : balance was intact [2+] : Brachioradialis left 2+ [1+] : Patella left 1+ [0] : Ankle jerk left 0 [Sclera] : the sclera and conjunctiva were normal [PERRL With Normal Accommodation] : pupils were equal in size, round, reactive to light, with normal accommodation [Extraocular Movements] : extraocular movements were intact [No APD] : no afferent pupillary defect [No ANDERSON] : no internuclear ophthalmoplegia [Full Visual Field] : full visual field [Outer Ear] : the ears and nose were normal in appearance [Oropharynx] : the oropharynx was normal [Neck Appearance] : the appearance of the neck was normal [Neck Cervical Mass (___cm)] : no neck mass was observed [Jugular Venous Distention Increased] : there was no jugular-venous distention [Thyroid Nodule] : there were no palpable thyroid nodules [Thyroid Diffuse Enlargement] : the thyroid was not enlarged [Auscultation Breath Sounds / Voice Sounds] : lungs were clear to auscultation bilaterally [Heart Rate And Rhythm] : heart rate was normal and rhythm regular [Heart Sounds] : normal S1 and S2 [Heart Sounds Gallop] : no gallops [Murmurs] : no murmurs [Heart Sounds Pericardial Friction Rub] : no pericardial rub [Arterial Pulses Carotid] : carotid pulses were normal with no bruits [Full Pulse] : the pedal pulses are present [Edema] : there was no peripheral edema [Bowel Sounds] : normal bowel sounds [Abdomen Soft] : soft [Abdomen Tenderness] : non-tender [Abdomen Mass (___ Cm)] : no abdominal mass palpated [No CVA Tenderness] : no ~M costovertebral angle tenderness [No Spinal Tenderness] : no spinal tenderness [Abnormal Walk] : normal gait [Nail Clubbing] : no clubbing  or cyanosis of the fingernails [Musculoskeletal - Swelling] : no joint swelling seen [Motor Tone] : muscle strength and tone were normal [Skin Color & Pigmentation] : normal skin color and pigmentation [] : no rash [Skin Turgor] : normal skin turgor [Over the Past 2 Weeks, Have You Felt Down, Depressed, or Hopeless?] : 1.) Over the past 2 weeks, have you felt down, depressed, or hopeless? No [Motor Strength Lower Extremities Bilaterally] : strength was normal in both lower extremities [Motor Strength Upper Extremities Bilaterally] : strength was normal in both upper extremities [Romberg's Sign] : Romberg's sign was negtive [Allodynia] : no ~T allodynia present [Dysesthesia] : no dysesthesia [Hyperesthesia] : no hyperesthesia [Limited Balance] : balance was intact [Past-pointing] : there was no past-pointing [Dysdiadochokinesia Bilaterally] : not present [Tremor] : no tremor present [Coordination - Dysmetria Impaired Finger-to-Nose Bilateral] : not present [Coordination - Dysmetria Impaired Heel-to-Shin Bilateral] : not present [Plantar Reflex Right Only] : normal on the right [Plantar Reflex Left Only] : normal on the left [FreeTextEntry4] : Alt pattern: intact\par Clock: 3/3\par Luria: Intact.\par Trail A: 20"; B: 50"\par Fluency: intact. [FreeTextEntry5] : small pupils, surgical, reactive; no pain on OO compression.  [FreeTextEntry8] : balance limited by bilateral knee pain [FreeTextEntry6] : strength  limited by bilateral knee pain [FreeTextEntry7] : decreased vibration sens distal LE. [FreeTextEntry1] : L chest scar from old port.

## 2020-08-10 ENCOUNTER — TRANSCRIPTION ENCOUNTER (OUTPATIENT)
Age: 60
End: 2020-08-10

## 2020-08-28 ENCOUNTER — APPOINTMENT (OUTPATIENT)
Dept: ENDOCRINOLOGY | Facility: CLINIC | Age: 60
End: 2020-08-28
Payer: MEDICARE

## 2020-08-28 DIAGNOSIS — E04.1 NONTOXIC SINGLE THYROID NODULE: ICD-10-CM

## 2020-08-28 PROCEDURE — 99443: CPT | Mod: 95

## 2020-08-28 PROCEDURE — 97803 MED NUTRITION INDIV SUBSEQ: CPT

## 2020-08-28 RX ORDER — BLOOD SUGAR DIAGNOSTIC
STRIP MISCELLANEOUS
Qty: 1 | Refills: 5 | Status: COMPLETED | COMMUNITY
Start: 2019-08-19 | End: 2020-08-28

## 2020-08-28 NOTE — HISTORY OF PRESENT ILLNESS
[Home] : at home, [unfilled] , at the time of the visit. [Medical Office: ([facility])___] : at the medical office located in  [Verbal consent obtained from patient] : the patient, [unfilled] [FreeTextEntry1] : Patient is doing well, her FBS are good, she is following the diet, losing weight. The last HbA1c on 6/20 was 6.6%. She has been having joint pains, she has an appointment with the Rheumatologist. She is taking the Metformin regularly.

## 2020-08-28 NOTE — ASSESSMENT
[FreeTextEntry1] : The patient's diabetes has improved\par She is losing weight\par She is following the diet\par She has seen the Nutritionist\par Will continue same medication\par Will repeat the US thyroid\par Will mail an order for the US\par Will repeat the blood tests in 4 months

## 2020-08-28 NOTE — DATA REVIEWED
[FreeTextEntry1] : The last HbA1c was 6.6%. The lipid panel was fine except for the HDL-C. The thyroid nodules were stable in the US thyroid done 5/19

## 2020-08-31 ENCOUNTER — APPOINTMENT (OUTPATIENT)
Dept: PHYSICAL MEDICINE AND REHAB | Facility: CLINIC | Age: 60
End: 2020-08-31
Payer: MEDICARE

## 2020-08-31 VITALS
TEMPERATURE: 97.4 F | DIASTOLIC BLOOD PRESSURE: 76 MMHG | HEART RATE: 73 BPM | OXYGEN SATURATION: 97 % | SYSTOLIC BLOOD PRESSURE: 115 MMHG

## 2020-08-31 PROCEDURE — 99212 OFFICE O/P EST SF 10 MIN: CPT

## 2020-09-02 NOTE — PHYSICAL EXAM
[Normal] : Oriented to person, place, and time, insight and judgement were intact and the affect was normal [de-identified] : + knee pain with ROM. [de-identified] : 2 cm difference between the left and right upper extremities [de-identified] : MS 5/5 b/l LE, decreased sensation, vibration sense. + Romberg

## 2020-09-02 NOTE — HISTORY OF PRESENT ILLNESS
[FreeTextEntry1] : 60 y.o female presents for follow up for lymphedema. She has a history of advanced breast cancer status post right mastectomy and axillary lymph node dissection on 3/30/2012 followed by chemotherapy and radiation. PET/ CT performed on 8/6/14 show postsurgical changes in the right chest wall.\par Finished LD therapy and currently wears a night garment and gauntlet.\par  \par \par Interval s/p left shoulder arthroscopy and debridement \par Still has b/l knee pain-Takes meloxicam\par had a fall bc her legs buckle at the knees

## 2020-09-02 NOTE — ASSESSMENT
[FreeTextEntry1] : gait abnormality/falls-continue PT \par Meloxicam PRN pain for knees, needs b/l TKA \par Follow up in 2 months

## 2020-09-03 ENCOUNTER — LABORATORY RESULT (OUTPATIENT)
Age: 60
End: 2020-09-03

## 2020-09-10 LAB
ALBUMIN SERPL ELPH-MCNC: 4.3 G/DL
ALP BLD-CCNC: 109 U/L
ALT SERPL-CCNC: 13 U/L
ANION GAP SERPL CALC-SCNC: 14 MMOL/L
AST SERPL-CCNC: 17 U/L
BILIRUB DIRECT SERPL-MCNC: 0.1 MG/DL
BILIRUB INDIRECT SERPL-MCNC: 0.2 MG/DL
BILIRUB SERPL-MCNC: 0.3 MG/DL
BUN SERPL-MCNC: 16 MG/DL
CALCIUM SERPL-MCNC: 10.3 MG/DL
CHLORIDE SERPL-SCNC: 97 MMOL/L
CHOLEST SERPL-MCNC: 170 MG/DL
CHOLEST/HDLC SERPL: 3.4 RATIO
CO2 SERPL-SCNC: 28 MMOL/L
CREAT SERPL-MCNC: 1.15 MG/DL
CREAT SPEC-SCNC: 66 MG/DL
ESTIMATED AVERAGE GLUCOSE: 148 MG/DL
FRUCTOSAMINE SERPL-MCNC: 259 UMOL/L
GLUCOSE BS SERPL-MCNC: 110 MG/DL
GLUCOSE SERPL-MCNC: 112 MG/DL
HBA1C MFR BLD HPLC: 6.8 %
HDLC SERPL-MCNC: 50 MG/DL
LDLC SERPL CALC-MCNC: 102 MG/DL
MICROALBUMIN 24H UR DL<=1MG/L-MCNC: <1.2 MG/DL
MICROALBUMIN/CREAT 24H UR-RTO: NORMAL MG/G
POTASSIUM SERPL-SCNC: 4.7 MMOL/L
PROT SERPL-MCNC: 8 G/DL
SODIUM SERPL-SCNC: 138 MMOL/L
TRIGL SERPL-MCNC: 91 MG/DL

## 2020-09-15 ENCOUNTER — OUTPATIENT (OUTPATIENT)
Dept: OUTPATIENT SERVICES | Facility: HOSPITAL | Age: 60
LOS: 1 days | Discharge: HOME | End: 2020-09-15

## 2020-09-15 DIAGNOSIS — C50.919 MALIGNANT NEOPLASM OF UNSPECIFIED SITE OF UNSPECIFIED FEMALE BREAST: ICD-10-CM

## 2020-09-15 DIAGNOSIS — C80.1 MALIGNANT (PRIMARY) NEOPLASM, UNSPECIFIED: Chronic | ICD-10-CM

## 2020-09-15 DIAGNOSIS — H26.9 UNSPECIFIED CATARACT: Chronic | ICD-10-CM

## 2020-09-15 DIAGNOSIS — E04.1 NONTOXIC SINGLE THYROID NODULE: Chronic | ICD-10-CM

## 2020-09-15 DIAGNOSIS — Z33.2 ENCOUNTER FOR ELECTIVE TERMINATION OF PREGNANCY: Chronic | ICD-10-CM

## 2020-09-18 ENCOUNTER — RESULT REVIEW (OUTPATIENT)
Age: 60
End: 2020-09-18

## 2020-09-18 ENCOUNTER — APPOINTMENT (OUTPATIENT)
Dept: HEMATOLOGY ONCOLOGY | Facility: CLINIC | Age: 60
End: 2020-09-18
Payer: MEDICARE

## 2020-09-18 VITALS
WEIGHT: 233.69 LBS | DIASTOLIC BLOOD PRESSURE: 75 MMHG | OXYGEN SATURATION: 98 % | SYSTOLIC BLOOD PRESSURE: 156 MMHG | RESPIRATION RATE: 16 BRPM | HEIGHT: 60.63 IN | HEART RATE: 75 BPM | BODY MASS INDEX: 44.7 KG/M2 | TEMPERATURE: 98.7 F

## 2020-09-18 LAB
BASOPHILS # BLD AUTO: 0.02 K/UL — SIGNIFICANT CHANGE UP (ref 0–0.2)
BASOPHILS NFR BLD AUTO: 0.4 % — SIGNIFICANT CHANGE UP (ref 0–2)
EOSINOPHIL # BLD AUTO: 0.08 K/UL — SIGNIFICANT CHANGE UP (ref 0–0.5)
EOSINOPHIL NFR BLD AUTO: 1.7 % — SIGNIFICANT CHANGE UP (ref 0–6)
HCT VFR BLD CALC: 35.3 % — SIGNIFICANT CHANGE UP (ref 34.5–45)
HGB BLD-MCNC: 11.2 G/DL — LOW (ref 11.5–15.5)
IMM GRANULOCYTES NFR BLD AUTO: 0.4 % — SIGNIFICANT CHANGE UP (ref 0–1.5)
LYMPHOCYTES # BLD AUTO: 1.16 K/UL — SIGNIFICANT CHANGE UP (ref 1–3.3)
LYMPHOCYTES # BLD AUTO: 24 % — SIGNIFICANT CHANGE UP (ref 13–44)
MCHC RBC-ENTMCNC: 27.1 PG — SIGNIFICANT CHANGE UP (ref 27–34)
MCHC RBC-ENTMCNC: 31.7 G/DL — LOW (ref 32–36)
MCV RBC AUTO: 85.3 FL — SIGNIFICANT CHANGE UP (ref 80–100)
MONOCYTES # BLD AUTO: 0.32 K/UL — SIGNIFICANT CHANGE UP (ref 0–0.9)
MONOCYTES NFR BLD AUTO: 6.6 % — SIGNIFICANT CHANGE UP (ref 2–14)
NEUTROPHILS # BLD AUTO: 3.23 K/UL — SIGNIFICANT CHANGE UP (ref 1.8–7.4)
NEUTROPHILS NFR BLD AUTO: 66.9 % — SIGNIFICANT CHANGE UP (ref 43–77)
NRBC # BLD: 0 /100 WBCS — SIGNIFICANT CHANGE UP (ref 0–0)
PLATELET # BLD AUTO: 271 K/UL — SIGNIFICANT CHANGE UP (ref 150–400)
RBC # BLD: 4.14 M/UL — SIGNIFICANT CHANGE UP (ref 3.8–5.2)
RBC # FLD: 17.2 % — HIGH (ref 10.3–14.5)
WBC # BLD: 4.83 K/UL — SIGNIFICANT CHANGE UP (ref 3.8–10.5)
WBC # FLD AUTO: 4.83 K/UL — SIGNIFICANT CHANGE UP (ref 3.8–10.5)

## 2020-09-18 PROCEDURE — 99214 OFFICE O/P EST MOD 30 MIN: CPT

## 2020-09-19 NOTE — ASSESSMENT
[FreeTextEntry1] : She is a 59 y/o F with history of Stage III right breast cancer s/p mastectomy followed by chemotherapy and RT for ER negative/ [id number] positive disease 2012. She will have mammogram/ sonogram in October but not new clinical changes in the left breast. Will obtain tumor markers today. We encouraged exercise as tolerated. Next follow up if breast imaging without any suspicious findings will be in 1 year.

## 2020-09-19 NOTE — END OF VISIT
[Time Spent: ___ minutes] : I have spent [unfilled] minutes of time on the encounter. [>50% of the face to face encounter time was spent on counseling and/or coordination of care for ___] : Greater than 50% of the face to face encounter time was spent on counseling and/or coordination of care for [unfilled] Alert and oriented, no focal deficits, no motor or sensory deficits.

## 2020-09-19 NOTE — PHYSICAL EXAM
[Fully active, able to carry on all pre-disease performance without restriction] : Status 0 - Fully active, able to carry on all pre-disease performance without restriction [Obese] : obese [Ulcers] : no ulcers [Mucositis] : no mucositis [Thrush] : no thrush [Vesicles] : no vesicles [Normal] : affect appropriate [de-identified] : right mastectomy with radiation change and right skin flap; no abnl masses on the left breast  [de-identified] : RUE lymphedema in sleeve  [de-identified] : ambulates with cane

## 2020-09-19 NOTE — REVIEW OF SYSTEMS
[Joint Pain] : joint pain [Joint Stiffness] : no joint stiffness [Muscle Pain] : no muscle pain [Muscle Weakness] : no muscle weakness [Confused] : no confusion [Dizziness] : no dizziness [Fainting] : no fainting [Difficulty Walking] : no difficulty walking [Negative] : Allergic/Immunologic [de-identified] : more forgetful

## 2020-09-19 NOTE — HISTORY OF PRESENT ILLNESS
[Disease: _____________________] : Disease: [unfilled] [T: ___] : T[unfilled] [N: ___] : N[unfilled] [AJCC Stage: ____] : AJCC Stage: [unfilled] [de-identified] : Age 52: advanced breast cancer status post right mastectomy and axillary lymph node dissection on 3/30/2012 followed by chemotherapy and radiation.  \par She had been maintaining her port every 6 weeks until March 2014.  She had axillary lymph node noted in prior imaging which was felt to be benign.  She had a biopsy that was done in May of 2014 that was benign. Pathology showed reactive lymph node which was benign.  She continues to have tenderness at the biopsy site.  PET/ CT performed on 8/6/14 show postsurgical changes in the right chest wall.  Resolution of previously seen mild hypermetabolism in left axillary lymph node.  Given her port not functioning and her PET scan negative, port removed 2014. [de-identified] : invasive poorly differentiated ductal carcinoma ER 0, OK 0 [id number] 3+ [de-identified] : ChemoRT completed 2012 [de-identified] : She had left shoulder surgery and PT. She has been noticing body odor from the left axilla which she has never had. She is worried about new breast cancer. She has not had her annual mammogram/ sonogram but will get in October. She had pain over L shoulder but attributes to surgery. Denies any breast pain or nipple discharge. She also feels more forgetful. She has been seeing neurologist who did testing. She denies any headaches or focal weakness or nausea. She has been forgetting meetings which she would have remembered. Not forgetting how to do tasks or basic things.

## 2020-09-22 LAB
ALBUMIN SERPL ELPH-MCNC: 4.4 G/DL
ALP BLD-CCNC: 102 U/L
ALT SERPL-CCNC: 14 U/L
ANION GAP SERPL CALC-SCNC: 14 MMOL/L
AST SERPL-CCNC: 19 U/L
BILIRUB SERPL-MCNC: 0.3 MG/DL
BUN SERPL-MCNC: 17 MG/DL
CALCIUM SERPL-MCNC: 10.1 MG/DL
CANCER AG27-29 SERPL-ACNC: 14.6 U/ML
CEA SERPL-MCNC: 2.7 NG/ML
CHLORIDE SERPL-SCNC: 99 MMOL/L
CO2 SERPL-SCNC: 28 MMOL/L
CREAT SERPL-MCNC: 1.06 MG/DL
GLUCOSE SERPL-MCNC: 124 MG/DL
POTASSIUM SERPL-SCNC: 4.5 MMOL/L
PROT SERPL-MCNC: 8.3 G/DL
SODIUM SERPL-SCNC: 141 MMOL/L

## 2020-09-24 ENCOUNTER — APPOINTMENT (OUTPATIENT)
Dept: INTERNAL MEDICINE | Facility: CLINIC | Age: 60
End: 2020-09-24
Payer: MEDICARE

## 2020-09-24 VITALS
BODY MASS INDEX: 44.94 KG/M2 | SYSTOLIC BLOOD PRESSURE: 140 MMHG | HEART RATE: 82 BPM | WEIGHT: 235 LBS | HEIGHT: 60.63 IN | DIASTOLIC BLOOD PRESSURE: 70 MMHG | TEMPERATURE: 97.9 F | OXYGEN SATURATION: 97 %

## 2020-09-24 DIAGNOSIS — G47.33 OBSTRUCTIVE SLEEP APNEA (ADULT) (PEDIATRIC): ICD-10-CM

## 2020-09-24 DIAGNOSIS — Z23 ENCOUNTER FOR IMMUNIZATION: ICD-10-CM

## 2020-09-24 PROCEDURE — G0439: CPT

## 2020-09-24 PROCEDURE — G0008: CPT

## 2020-09-24 PROCEDURE — 90686 IIV4 VACC NO PRSV 0.5 ML IM: CPT

## 2020-09-24 RX ORDER — MELOXICAM 7.5 MG/1
7.5 TABLET ORAL DAILY
Qty: 30 | Refills: 0 | Status: DISCONTINUED | COMMUNITY
Start: 2019-08-27 | End: 2020-09-24

## 2020-09-24 NOTE — REVIEW OF SYSTEMS
[Joint Pain] : joint pain [Joint Stiffness] : joint stiffness [Back Pain] : back pain [Insomnia] : insomnia [Anxiety] : anxiety [Depression] : depression [Negative] : Heme/Lymph

## 2020-09-24 NOTE — HEALTH RISK ASSESSMENT
[Patient reported colonoscopy was abnormal] : Patient reported colonoscopy was abnormal [Patient reported mammogram was normal] : Patient reported mammogram was normal [Patient reported bone density results were normal] : Patient reported bone density results were normal [MammogramDate] : 05/19 [BoneDensityDate] : 05/19 [ColonoscopyDate] : 2017

## 2020-09-24 NOTE — ASSESSMENT
[FreeTextEntry1] : 61 y/o F with h/o chronic back pain on methadone, breast cancer s/p mastectomy/xrt/chemo with chronic R arm lymphedema, bilateral knee arthritis, L rotator cuff surgery (9/2019), bipolar depression here for CPE\par \par Mechanical fall with knee pain\par - Continue PT\par - F/U with PM&R\par - Will need bilateral TKA but patient nervous\par \par HTN - BP at goal\par - Continue valsartan, diltiazem, hctz\par \par DM - A1c 6.8\par - Continue metformin\par - Endo f/u\par \par Depression\par - Continue wellbutryn\par - Encouraged to schedule appoitnment with psychiatry\par \par MADHURI - not being treated\par - Refer for sleep study/CPAP titration and Sleep disorders specialist\par \par HCM:\par - Flu vaccine today\par - Discussed Shingrix - will think about it\par - Mammo 5/2019 - scheduled later this month\par - Colonoscopy 2017 - incomplete prep\par - DEXA 5/2019\par \par RTC in 6 months or sooner prn.

## 2020-09-24 NOTE — PHYSICAL EXAM
[No Acute Distress] : no acute distress [Well Nourished] : well nourished [Well Developed] : well developed [Well-Appearing] : well-appearing [Normal Sclera/Conjunctiva] : normal sclera/conjunctiva [PERRL] : pupils equal round and reactive to light [EOMI] : extraocular movements intact [Normal Outer Ear/Nose] : the outer ears and nose were normal in appearance [Normal Oropharynx] : the oropharynx was normal [No JVD] : no jugular venous distention [No Lymphadenopathy] : no lymphadenopathy [Supple] : supple [Thyroid Normal, No Nodules] : the thyroid was normal and there were no nodules present [No Respiratory Distress] : no respiratory distress  [No Accessory Muscle Use] : no accessory muscle use [Clear to Auscultation] : lungs were clear to auscultation bilaterally [Normal Rate] : normal rate  [Regular Rhythm] : with a regular rhythm [Normal S1, S2] : normal S1 and S2 [No Murmur] : no murmur heard [No Carotid Bruits] : no carotid bruits [No Abdominal Bruit] : a ~M bruit was not heard ~T in the abdomen [No Varicosities] : no varicosities [Pedal Pulses Present] : the pedal pulses are present [No Edema] : there was no peripheral edema [No Palpable Aorta] : no palpable aorta [No Extremity Clubbing/Cyanosis] : no extremity clubbing/cyanosis [Soft] : abdomen soft [Non Tender] : non-tender [Non-distended] : non-distended [No Masses] : no abdominal mass palpated [No HSM] : no HSM [Normal Bowel Sounds] : normal bowel sounds [Normal Posterior Cervical Nodes] : no posterior cervical lymphadenopathy [Normal Anterior Cervical Nodes] : no anterior cervical lymphadenopathy [No CVA Tenderness] : no CVA  tenderness [No Spinal Tenderness] : no spinal tenderness [No Joint Swelling] : no joint swelling [Grossly Normal Strength/Tone] : grossly normal strength/tone [No Rash] : no rash [Coordination Grossly Intact] : coordination grossly intact [No Focal Deficits] : no focal deficits [Normal Gait] : normal gait [Normal Affect] : the affect was normal [Normal Insight/Judgement] : insight and judgment were intact [Comprehensive Foot Exam Normal] : Right and left foot were examined and both feet are normal. No ulcers in either foot. Toes are normal and with full ROM.  Normal tactile sensation with monofilament testing throughout both feet [de-identified] : Obese

## 2020-09-24 NOTE — HISTORY OF PRESENT ILLNESS
[FreeTextEntry1] : CPE [de-identified] : \par Has been eating poorly but had recent A1c 6.8.\par Lipid profile normal.\par \par Breast cancer - saw Dr. Sosa; has scheduled mammo/u/s. on 9/18.\par \par DM - saw Endo via telemed on 8/28 and had labwork done. ON metformin.\par \par Has been having some phlegm/scratchy throat; feels like needs to drink lots of water. Saw ENT and prescribed nasal atrovent which helps.\par \par Has been having hard time caring for elderly mother. Mom is mean sometimes. \par Daughter lives in [place] and just got engaged but has been having conflict with her fiance which is stressing her out.\par Going on day retreat this weekend.\par Still doing her cake baking business.\par \par Has been tired; yawning throughout visit.\par Sleeping late due to \par \par Fell down the stairs on 8/10 and twisted her R knee. Currently doing PT\par Was a mechanical fall; no preceding dizziness.\par Saw PM&R on 8/31. Needs bilateral TKA. Advised to f/u in 2 months. Currently taking meloxicam 7.5mg BID. No GI bleeding. No heartburn beyond her baseline.\par \par GERD - has had some dietary indiscretions recently (popeyes chicken, empanadas) which triggered and she took Tums which helped. On PPI.\par \par HTN - on valsartan, hctz, diltiazem\par \par Recently had noticed some increased body odor in her underarms. Changed deodorants.\par \par Back pain - Wants to wean down on her methadone, especially before her knee replacement. Follows with Dr. Lord; had RFA for shoulder. DR. Lord manages her methadone.\par \par MADHURI - not using bipap/cpap for years. Sweat builds up onto nose and feels like drowning when wears the masks.

## 2020-10-02 ENCOUNTER — APPOINTMENT (OUTPATIENT)
Dept: ULTRASOUND IMAGING | Facility: IMAGING CENTER | Age: 60
End: 2020-10-02

## 2020-10-02 ENCOUNTER — OUTPATIENT (OUTPATIENT)
Dept: OUTPATIENT SERVICES | Facility: HOSPITAL | Age: 60
LOS: 1 days | End: 2020-10-02

## 2020-10-02 DIAGNOSIS — H26.9 UNSPECIFIED CATARACT: Chronic | ICD-10-CM

## 2020-10-02 DIAGNOSIS — E04.1 NONTOXIC SINGLE THYROID NODULE: Chronic | ICD-10-CM

## 2020-10-02 DIAGNOSIS — E04.1 NONTOXIC SINGLE THYROID NODULE: ICD-10-CM

## 2020-10-02 DIAGNOSIS — Z33.2 ENCOUNTER FOR ELECTIVE TERMINATION OF PREGNANCY: Chronic | ICD-10-CM

## 2020-10-02 DIAGNOSIS — C80.1 MALIGNANT (PRIMARY) NEOPLASM, UNSPECIFIED: Chronic | ICD-10-CM

## 2020-10-15 ENCOUNTER — OUTPATIENT (OUTPATIENT)
Dept: OUTPATIENT SERVICES | Facility: HOSPITAL | Age: 60
LOS: 1 days | End: 2020-10-15
Payer: MEDICARE

## 2020-10-15 ENCOUNTER — APPOINTMENT (OUTPATIENT)
Dept: MAMMOGRAPHY | Facility: IMAGING CENTER | Age: 60
End: 2020-10-15
Payer: MEDICARE

## 2020-10-15 ENCOUNTER — APPOINTMENT (OUTPATIENT)
Dept: ULTRASOUND IMAGING | Facility: IMAGING CENTER | Age: 60
End: 2020-10-15
Payer: MEDICARE

## 2020-10-15 ENCOUNTER — RESULT REVIEW (OUTPATIENT)
Age: 60
End: 2020-10-15

## 2020-10-15 DIAGNOSIS — E04.1 NONTOXIC SINGLE THYROID NODULE: Chronic | ICD-10-CM

## 2020-10-15 DIAGNOSIS — C80.1 MALIGNANT (PRIMARY) NEOPLASM, UNSPECIFIED: Chronic | ICD-10-CM

## 2020-10-15 DIAGNOSIS — H26.9 UNSPECIFIED CATARACT: Chronic | ICD-10-CM

## 2020-10-15 DIAGNOSIS — Z00.8 ENCOUNTER FOR OTHER GENERAL EXAMINATION: ICD-10-CM

## 2020-10-15 DIAGNOSIS — Z33.2 ENCOUNTER FOR ELECTIVE TERMINATION OF PREGNANCY: Chronic | ICD-10-CM

## 2020-10-15 PROCEDURE — 76641 ULTRASOUND BREAST COMPLETE: CPT | Mod: 26,LT

## 2020-10-15 PROCEDURE — 77067 SCR MAMMO BI INCL CAD: CPT | Mod: 26,LT,52

## 2020-10-15 PROCEDURE — 77063 BREAST TOMOSYNTHESIS BI: CPT | Mod: 26,52

## 2020-10-15 PROCEDURE — 77063 BREAST TOMOSYNTHESIS BI: CPT

## 2020-10-15 PROCEDURE — 77067 SCR MAMMO BI INCL CAD: CPT

## 2020-10-15 PROCEDURE — 76641 ULTRASOUND BREAST COMPLETE: CPT

## 2020-10-22 ENCOUNTER — RX RENEWAL (OUTPATIENT)
Age: 60
End: 2020-10-22

## 2020-10-28 ENCOUNTER — RX RENEWAL (OUTPATIENT)
Age: 60
End: 2020-10-28

## 2020-12-07 ENCOUNTER — APPOINTMENT (OUTPATIENT)
Dept: PHYSICAL MEDICINE AND REHAB | Facility: CLINIC | Age: 60
End: 2020-12-07
Payer: MEDICARE

## 2020-12-07 VITALS
SYSTOLIC BLOOD PRESSURE: 117 MMHG | HEART RATE: 73 BPM | HEIGHT: 60.63 IN | WEIGHT: 233 LBS | BODY MASS INDEX: 44.56 KG/M2 | TEMPERATURE: 97.7 F | OXYGEN SATURATION: 97 % | DIASTOLIC BLOOD PRESSURE: 77 MMHG | RESPIRATION RATE: 16 BRPM

## 2020-12-07 PROCEDURE — 99212 OFFICE O/P EST SF 10 MIN: CPT

## 2020-12-07 NOTE — ASSESSMENT
[FreeTextEntry1] : continue PT for knee pain- strongly encouraged TKR \par Meloxicam PRN pain for knees\par F/u with Dr. Suarez for thumb arthritis \par Follow up in 2 months

## 2020-12-07 NOTE — PHYSICAL EXAM
[Normal] : Oriented to person, place, and time, insight and judgement were intact and the affect was normal [de-identified] : 2 cm difference between the left and right upper extremities [de-identified] : + knee pain with ROM. [de-identified] : MS 5/5 b/l LE, decreased sensation, vibration sense. + Romberg

## 2020-12-07 NOTE — HISTORY OF PRESENT ILLNESS
[FreeTextEntry1] : 60 y.o female presents for follow up for lymphedema. She has a history of advanced breast cancer status post right mastectomy and axillary lymph node dissection on 3/30/2012 followed by chemotherapy and radiation. PET/ CT performed on 8/6/14 show postsurgical changes in the right chest wall.\par Finished LD therapy and currently wears a night garment and gauntlet.\par  \par \par \par Still has b/l knee pain-Takes meloxicam, more immobile due to covid\par has recurrence of thumb pain

## 2020-12-14 ENCOUNTER — NON-APPOINTMENT (OUTPATIENT)
Age: 60
End: 2020-12-14

## 2020-12-14 ENCOUNTER — APPOINTMENT (OUTPATIENT)
Dept: CARDIOLOGY | Facility: CLINIC | Age: 60
End: 2020-12-14
Payer: MEDICARE

## 2020-12-14 VITALS — DIASTOLIC BLOOD PRESSURE: 75 MMHG | SYSTOLIC BLOOD PRESSURE: 116 MMHG

## 2020-12-14 VITALS
WEIGHT: 233 LBS | DIASTOLIC BLOOD PRESSURE: 80 MMHG | TEMPERATURE: 98.2 F | HEART RATE: 63 BPM | BODY MASS INDEX: 45.75 KG/M2 | OXYGEN SATURATION: 99 % | SYSTOLIC BLOOD PRESSURE: 138 MMHG | HEIGHT: 60 IN

## 2020-12-14 DIAGNOSIS — R07.9 CHEST PAIN, UNSPECIFIED: ICD-10-CM

## 2020-12-14 PROCEDURE — 93000 ELECTROCARDIOGRAM COMPLETE: CPT

## 2020-12-14 PROCEDURE — 99214 OFFICE O/P EST MOD 30 MIN: CPT

## 2020-12-14 NOTE — PHYSICAL EXAM
[General Appearance - Well Developed] : well developed [Normal Appearance] : normal appearance [Well Groomed] : well groomed [General Appearance - Well Nourished] : well nourished [No Deformities] : no deformities [General Appearance - In No Acute Distress] : no acute distress [Normal Oral Mucosa] : normal oral mucosa [No Oral Pallor] : no oral pallor [No Oral Cyanosis] : no oral cyanosis [Normal Jugular Venous A Waves Present] : normal jugular venous A waves present [Normal Jugular Venous V Waves Present] : normal jugular venous V waves present [No Jugular Venous Adame A Waves] : no jugular venous adame A waves [Respiration, Rhythm And Depth] : normal respiratory rhythm and effort [Exaggerated Use Of Accessory Muscles For Inspiration] : no accessory muscle use [Auscultation Breath Sounds / Voice Sounds] : lungs were clear to auscultation bilaterally [Heart Rate And Rhythm] : heart rate and rhythm were normal [Heart Sounds] : normal S1 and S2 [Murmurs] : no murmurs present [Abdomen Soft] : soft [Abdomen Tenderness] : non-tender [Abdomen Mass (___ Cm)] : no abdominal mass palpated [Skin Color & Pigmentation] : normal skin color and pigmentation [] : no rash [No Venous Stasis] : no venous stasis [Skin Lesions] : no skin lesions [No Skin Ulcers] : no skin ulcer [No Xanthoma] : no  xanthoma was observed [Oriented To Time, Place, And Person] : oriented to person, place, and time [Affect] : the affect was normal [Mood] : the mood was normal [No Anxiety] : not feeling anxious

## 2020-12-14 NOTE — HISTORY OF PRESENT ILLNESS
[FreeTextEntry1] : Here for followup.\par No new complaints\par No further episodes of chest pain\par EKG reviewed and unchanged\par #HTN- On Valsartan/HCTZ and Diltiazem, continue present meds\par

## 2020-12-14 NOTE — DISCUSSION/SUMMARY
[FreeTextEntry1] : 60 year old woman with HTN here for followup\par Chest pain resolved.\par -cont HCTZ and Valsartan\par FU in 6 months

## 2021-01-15 ENCOUNTER — APPOINTMENT (OUTPATIENT)
Dept: ENDOCRINOLOGY | Facility: CLINIC | Age: 61
End: 2021-01-15

## 2021-02-22 ENCOUNTER — NON-APPOINTMENT (OUTPATIENT)
Age: 61
End: 2021-02-22

## 2021-03-03 ENCOUNTER — NON-APPOINTMENT (OUTPATIENT)
Age: 61
End: 2021-03-03

## 2021-03-25 ENCOUNTER — APPOINTMENT (OUTPATIENT)
Dept: INTERNAL MEDICINE | Facility: CLINIC | Age: 61
End: 2021-03-25
Payer: MEDICARE

## 2021-03-25 VITALS
SYSTOLIC BLOOD PRESSURE: 130 MMHG | OXYGEN SATURATION: 98 % | TEMPERATURE: 98.5 F | BODY MASS INDEX: 46.33 KG/M2 | HEIGHT: 60 IN | WEIGHT: 236 LBS | HEART RATE: 71 BPM | DIASTOLIC BLOOD PRESSURE: 70 MMHG

## 2021-03-25 DIAGNOSIS — S89.92XA UNSPECIFIED INJURY OF LEFT LOWER LEG, INITIAL ENCOUNTER: ICD-10-CM

## 2021-03-25 PROCEDURE — 99213 OFFICE O/P EST LOW 20 MIN: CPT

## 2021-03-25 RX ORDER — DILTIAZEM HYDROCHLORIDE 90 MG/1
90 CAPSULE, EXTENDED RELEASE ORAL TWICE DAILY
Qty: 180 | Refills: 3 | Status: DISCONTINUED | COMMUNITY
Start: 2019-05-16 | End: 2021-03-25

## 2021-03-25 NOTE — ASSESSMENT
[FreeTextEntry1] : 59 y/o F with h/o DM, HTN, breast cancer s/p mastectomy/chemo/xrt with resulting chronic lymphedema, obesity here for follow up\par \par HTN - reduced her diltiazem because too expensive. Had swelling with amlodipine, angioedema with ACEI\par - Would continue hctz, valsartan\par - D/C diltiazem\par - Start labetalol 100 BID\par - Check electrolytes\par - Monitor BPs at home\par \par Breast cancer s/p mastectomy/chemo/xrt\par - Mammogram negative 10/2020\par \par Chronic joint pain\par - Continue f/u with Pain management\par \par L leg injury from August 2020\par - Check LLE u/s to r/o DVT and calcified hematoma\par \par HCM:\par - Completed COVID vaccine\par \par RTC in 4-6 weeks for BP check

## 2021-03-25 NOTE — PHYSICAL EXAM
[Normal] : normal rate, regular rhythm, normal S1 and S2 and no murmur heard [de-identified] : 2+ edema bilaterally; hyperpigmented skin with firmness on L lateral aspect of mid leg

## 2021-03-25 NOTE — HISTORY OF PRESENT ILLNESS
[FreeTextEntry1] : Follow up [de-identified] : Got 2 doses of Pfizer vaccine (last dose 3/13/21). No side effects.\par Excited that she didn't get COVID and is now fully vaccinated.\par Mother had COVID in January 2021 and pt was under quarantine but never had symptoms and was negative. Also, daughter and all her kids got it too. Everyone is fine.\par \par Fell in August 2020 down the stairs and had L leg injury. Concerned because both her grandparents needed to have some sort of amputation after an injury. Says that area is still pushed in.\par Since then continues to have R hip pain as well.\par 2 weeks ago had xrays done by Pain Management doctor ([phone])\par Will be having injection to bilateral knees on 3/29/21\par Had RFA on back which helped.\par Also will be having R shoulder therapy.\par NO longer doing PT.\par Taking meloxicam 1-2 tablets daily and gabapentin. \par \par HTN - saw Dr. Torres 12/2020. EKG unchanged. Advised to continue hctz, diltiazem and valsartan and f/u in 6 months. Wants to switch diltiazem because it's $75/month and has only been taking diltiazem ER 90mg once a day instead of BID.\par BPs at home have been in the 140s/70s\par \par Chronic lymphedema - saw Dr. Estes 12/2020

## 2021-03-31 ENCOUNTER — APPOINTMENT (OUTPATIENT)
Dept: ULTRASOUND IMAGING | Facility: CLINIC | Age: 61
End: 2021-03-31
Payer: MEDICARE

## 2021-03-31 ENCOUNTER — OUTPATIENT (OUTPATIENT)
Dept: OUTPATIENT SERVICES | Facility: HOSPITAL | Age: 61
LOS: 1 days | End: 2021-03-31
Payer: MEDICARE

## 2021-03-31 DIAGNOSIS — E04.1 NONTOXIC SINGLE THYROID NODULE: Chronic | ICD-10-CM

## 2021-03-31 DIAGNOSIS — S89.92XA UNSPECIFIED INJURY OF LEFT LOWER LEG, INITIAL ENCOUNTER: ICD-10-CM

## 2021-03-31 DIAGNOSIS — C80.1 MALIGNANT (PRIMARY) NEOPLASM, UNSPECIFIED: Chronic | ICD-10-CM

## 2021-03-31 DIAGNOSIS — H26.9 UNSPECIFIED CATARACT: Chronic | ICD-10-CM

## 2021-03-31 DIAGNOSIS — Z33.2 ENCOUNTER FOR ELECTIVE TERMINATION OF PREGNANCY: Chronic | ICD-10-CM

## 2021-03-31 PROCEDURE — 93971 EXTREMITY STUDY: CPT

## 2021-03-31 PROCEDURE — 93971 EXTREMITY STUDY: CPT | Mod: 26

## 2021-04-03 LAB
ANION GAP SERPL CALC-SCNC: 11 MMOL/L
BASOPHILS # BLD AUTO: 0.03 K/UL
BASOPHILS NFR BLD AUTO: 0.6 %
BUN SERPL-MCNC: 15 MG/DL
CALCIUM SERPL-MCNC: 10 MG/DL
CHLORIDE SERPL-SCNC: 99 MMOL/L
CHOLEST SERPL-MCNC: 151 MG/DL
CO2 SERPL-SCNC: 29 MMOL/L
CREAT SERPL-MCNC: 1.18 MG/DL
EOSINOPHIL # BLD AUTO: 0.12 K/UL
EOSINOPHIL NFR BLD AUTO: 2.5 %
ESTIMATED AVERAGE GLUCOSE: 143 MG/DL
GLUCOSE SERPL-MCNC: 119 MG/DL
HBA1C MFR BLD HPLC: 6.6 %
HCT VFR BLD CALC: 33.6 %
HDLC SERPL-MCNC: 44 MG/DL
HGB BLD-MCNC: 10.9 G/DL
IMM GRANULOCYTES NFR BLD AUTO: 0.2 %
LDLC SERPL CALC-MCNC: 85 MG/DL
LYMPHOCYTES # BLD AUTO: 1.35 K/UL
LYMPHOCYTES NFR BLD AUTO: 28.7 %
MAN DIFF?: NORMAL
MCHC RBC-ENTMCNC: 27.8 PG
MCHC RBC-ENTMCNC: 32.4 GM/DL
MCV RBC AUTO: 85.7 FL
MONOCYTES # BLD AUTO: 0.33 K/UL
MONOCYTES NFR BLD AUTO: 7 %
NEUTROPHILS # BLD AUTO: 2.87 K/UL
NEUTROPHILS NFR BLD AUTO: 61 %
NONHDLC SERPL-MCNC: 107 MG/DL
PLATELET # BLD AUTO: 294 K/UL
POTASSIUM SERPL-SCNC: 4.3 MMOL/L
RBC # BLD: 3.92 M/UL
RBC # FLD: 17.1 %
SODIUM SERPL-SCNC: 139 MMOL/L
TRIGL SERPL-MCNC: 108 MG/DL
WBC # FLD AUTO: 4.71 K/UL

## 2021-04-16 NOTE — ASU PATIENT PROFILE, ADULT - PROVIDER NOTIFICATION
Vital Signs Last 24 Hrs  T(C): --  T(F): --  HR: 78 (16 Apr 2021 22:12) (78 - 78)  BP: 136/79 (16 Apr 2021 22:12) (136/79 - 136/79)  BP(mean): --  RR: --  SpO2: --    Abdomen: soft, non tender. no guarding or rebound tenderness  SVE: 3-4/70/-3  TAS: vertex presentation  EFW 3317gm by Leopold's     NST in progress
Declines

## 2021-04-20 ENCOUNTER — APPOINTMENT (OUTPATIENT)
Dept: NEUROLOGY | Facility: CLINIC | Age: 61
End: 2021-04-20
Payer: MEDICARE

## 2021-04-20 VITALS — TEMPERATURE: 97.6 F

## 2021-04-20 VITALS — DIASTOLIC BLOOD PRESSURE: 71 MMHG | SYSTOLIC BLOOD PRESSURE: 162 MMHG

## 2021-04-20 PROCEDURE — 99214 OFFICE O/P EST MOD 30 MIN: CPT

## 2021-04-20 NOTE — ASSESSMENT
[FreeTextEntry1] : Assesment: \par 59yo RH AAW, with subjective memory issues. \par She has peripheral neuropathy in LE and antalgic limitation of RLE>LLE due to knees pain.\par Anxiety, depression and lot of stress are present.\par Last MRI brain in 2018, benign. \par Mental and neuro exam continue to be ok.\par Overall stable.\par \par Diagnostic Impression:\par -anxiety/depression\par -subjective memory impairment\par -neuropathy.\par \par Plan: \par -continue with current meds, same dose of Wellbutrin, might consider a more anti-anxiety medication instead if needed; cannot increase dose due to AE\par -will do NPT-->due in 2020, but for some reason not yet done, will inquire with service. \par \par A thorough discussion was entertained with the patient/caregiver regarding the use of psychoactive medications, their possible benefits and AE profile, including the risk of cardiovascular complications, including but not limited to applicable black box warning and teratogenicity, where appropriate.\par We discussed the benefits of being active, physically and mentally, and the need to to establish a routine in this respect.\par Driving abilities and firearms possession and use were discussed, in relation to progression of the cognitive decline, and the need to assess them periodically.\par Patient/caregiver advised to bring previous records to this office.\par All questions were answered at the time of the visit. We are certainly available for further discussion as needed.\par Patient/caregiver fully understands and agree with the plan.\par

## 2021-04-20 NOTE — HISTORY OF PRESENT ILLNESS
[FreeTextEntry1] : COVID VACCINATION 2x. \par \par HPI-Interval hx [id number]: \par recent SOB, possible CHF (like she had before in the past), will see PCP soon, she felt she gained weight and then lost it over a few weeks, as in retention. This also happened after COVID vaccination, knee shots and changing med to BB from [facility].\par \par Fall 8/2020, tripped on steps carrying things, reports a lesion in the R knee.\par \par Overall she feels ok, managing everything well. \par \par Sleep and appetite are stable. \par Mood is ok. Of note, she did not tolerate higher dose of Wellbutrin (150mg Daily) due to shakes/twitches).\par \par \par \par HPI-Interval Hx [id number]:\par Pt here for follow-up.\par She feels her STM is a bit worse now, she tends to forget dates, names and recent events.\par She has to write a lot of notes and set alarms to remind herself to follow through with things. \par she is still taking care of multiple things though, and trying to run her home.\par Rest is stable. \par Ca in remission. 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 \par Had a fall with head trauma in Feb 2019, CT head negative ([facility]).\par She is very busy, and has a hard time managing her busy schedule. \par She has a very positive outlook overall. \par \par HPI-Interval Hx [id number]:\par MRI, MRA were negative, ESR 71, but vascular sx evaluation with Echo was negative for TA. \par Still a lot of stress in her life.\par Feels better with Wellbutrin, her face pain and HA have stopped.\par She sleeps very well, and appetite is good.\par She keeps very busy, now preparing for her daughter's wedding.\par \par HPI-Interval Hx [id number]:\par Pt here for follow-up.\par Over the last year, she has been pretty much stable.\par recent Bone Scan and CT have shown no secondary lesions from breast Ca.\par \par She gets seldom sharp pain behind L eye for a few seconds, with slight HA, lasting 5-10 min.\par She also noticed her face sweats a lot, at times her arms too. This apparently is dating 10-20years.\par In all, she feels very irritable and she still c/o STM and attention issues.\par \par PMH:\par 55yo RH AAW, with hx of fibromyalgia, bipolar depression, breast CA s/p resection and chemo in 2012, currently followed up and disease free, colon polyps, arthritis.\par She reports that she sometimes looses attentions and would forget things, e.g. that she is cooking something and would leave things on the stove. She has issues remembering names of people, but always had.\par \par She does not report HA.\par \par Mood: she often gets sad, cries a lot, but has had bipolar depression for a long time, used to be on Lexapro, VPA and Neurontin, stopped a few years ago.\par She used to be in a program at [facility], terminated when she had started using a plant product ([place]?). She has a hx of SI/SA in 2004, now denies any SI. \par \par She has a hx of EtOH abuse, sober and in AA for many years.\par \par She has a complex living situation, having to take care of her mother, daughter, grandchildren.\par \par She does not drive, she never did, has fear of it.\par \par Sleep: falls asleep readily, but has to urinate very frequently. Sleeps 10h daily.\par \par Disabled, used to do clerical work in several different settings.\par \par ADl and IADL intact.\par \par Of note, she has a small nodule in L thyroid, under surveillance.

## 2021-04-20 NOTE — PHYSICAL EXAM
[General Appearance - Alert] : alert [General Appearance - In No Acute Distress] : in no acute distress [General Appearance - Well Nourished] : well nourished [General Appearance - Well Developed] : well developed [Oriented To Time, Place, And Person] : oriented to person, place, and time [Impaired Insight] : insight and judgment were intact [Affect] : the affect was normal [Over the Past 2 Weeks, Have You Felt Little Interest or Pleasure Doing Things?] : 2.) Over the past 2 weeks, have you felt little interest or pleasure doing things? Yes [Person] : oriented to person [Place] : oriented to place [Time] : oriented to time [Short Term Intact] : short term memory intact [Remote Intact] : remote memory intact [Registration Intact] : recent registration memory intact [Span Intact] : the attention span was normal [Concentration Intact] : normal concentrating ability [Visual Intact] : visual attention was ~T not ~L decreased [Naming Objects] : no difficulty naming common objects [Repeating Phrases] : no difficulty repeating a phrase [Writing A Sentence] : no difficulty writing a sentence [Fluency] : fluency intact [Comprehension] : comprehension intact [Reading] : reading intact [Current Events] : adequate knowledge of current events [Past History] : adequate knowledge of personal past history [Vocabulary] : adequate range of vocabulary [Total Score ___ / 30] : the patient achieved a score of [unfilled] /30 [Date / Time ___ / 5] : date / time [unfilled] / 5 [Place ___ / 5] : place [unfilled] / 5 [Registration ___ / 3] : registration [unfilled] / 3 [Serial Sevens ___/5] : serial sevens [unfilled] / 5 [Naming 2 Objects ___ / 2] : naming two objects [unfilled] / 2 [Repeating a Sentence ___ / 1] : repeating a sentence [unfilled] / 1 [Writing a Sentence ___ / 1] : write sentence [unfilled] / 1 [3-stage Verbal Command ___ / 3] : three-stage verbal command [unfilled] / 3 [Written Command ___ / 1] : written command [unfilled] / 1 [Copy a Design ___ / 1] : copy a design [unfilled] / 1 [Recall ___ / 3] : recall [unfilled] / 3 [Cranial Nerves Optic (II)] : visual acuity intact bilaterally,  visual fields full to confrontation, pupils equal round and reactive to light [Cranial Nerves Oculomotor (III)] : extraocular motion intact [Cranial Nerves Trigeminal (V)] : facial sensation intact symmetrically [Cranial Nerves Facial (VII)] : face symmetrical [Cranial Nerves Vestibulocochlear (VIII)] : hearing was intact bilaterally [Cranial Nerves Glossopharyngeal (IX)] : tongue and palate midline [Cranial Nerves Accessory (XI - Cranial And Spinal)] : head turning and shoulder shrug symmetric [Cranial Nerves Hypoglossal (XII)] : there was no tongue deviation with protrusion [Motor Strength] : muscle strength was normal in all four extremities [Involuntary Movements] : no involuntary movements were seen [No Muscle Atrophy] : normal bulk in all four extremities [Motor Handedness Right-Handed] : the patient is right hand dominant [Sensation Tactile Decrease] : light touch was intact [Sensation Pain / Temperature Decrease] : pain and temperature was intact [Proprioception] : proprioception was intact [Balance] : balance was intact [2+] : Brachioradialis left 2+ [1+] : Patella left 1+ [0] : Ankle jerk left 0 [Sclera] : the sclera and conjunctiva were normal [PERRL With Normal Accommodation] : pupils were equal in size, round, reactive to light, with normal accommodation [Extraocular Movements] : extraocular movements were intact [No APD] : no afferent pupillary defect [No ANDERSON] : no internuclear ophthalmoplegia [Full Visual Field] : full visual field [Outer Ear] : the ears and nose were normal in appearance [Oropharynx] : the oropharynx was normal [Neck Appearance] : the appearance of the neck was normal [Neck Cervical Mass (___cm)] : no neck mass was observed [Jugular Venous Distention Increased] : there was no jugular-venous distention [Thyroid Diffuse Enlargement] : the thyroid was not enlarged [Thyroid Nodule] : there were no palpable thyroid nodules [Auscultation Breath Sounds / Voice Sounds] : lungs were clear to auscultation bilaterally [Heart Rate And Rhythm] : heart rate was normal and rhythm regular [Heart Sounds] : normal S1 and S2 [Heart Sounds Gallop] : no gallops [Murmurs] : no murmurs [Heart Sounds Pericardial Friction Rub] : no pericardial rub [Arterial Pulses Carotid] : carotid pulses were normal with no bruits [Full Pulse] : the pedal pulses are present [Edema] : there was no peripheral edema [Bowel Sounds] : normal bowel sounds [Abdomen Soft] : soft [Abdomen Tenderness] : non-tender [Abdomen Mass (___ Cm)] : no abdominal mass palpated [No CVA Tenderness] : no ~M costovertebral angle tenderness [No Spinal Tenderness] : no spinal tenderness [Abnormal Walk] : normal gait [Nail Clubbing] : no clubbing  or cyanosis of the fingernails [Musculoskeletal - Swelling] : no joint swelling seen [Motor Tone] : muscle strength and tone were normal [Skin Color & Pigmentation] : normal skin color and pigmentation [Skin Turgor] : normal skin turgor [] : no rash [Over the Past 2 Weeks, Have You Felt Down, Depressed, or Hopeless?] : 1.) Over the past 2 weeks, have you felt down, depressed, or hopeless? No [Motor Strength Upper Extremities Bilaterally] : strength was normal in both upper extremities [Motor Strength Lower Extremities Bilaterally] : strength was normal in both lower extremities [Romberg's Sign] : Romberg's sign was negtive [Allodynia] : no ~T allodynia present [Dysesthesia] : no dysesthesia [Hyperesthesia] : no hyperesthesia [Limited Balance] : balance was intact [Past-pointing] : there was no past-pointing [Tremor] : no tremor present [Dysdiadochokinesia Bilaterally] : not present [Coordination - Dysmetria Impaired Finger-to-Nose Bilateral] : not present [Coordination - Dysmetria Impaired Heel-to-Shin Bilateral] : not present [Plantar Reflex Right Only] : normal on the right [Plantar Reflex Left Only] : normal on the left [FreeTextEntry4] : Alt pattern: intact\par Clock: 3/3\par Luria: Intact.\par Trail A: 20"; B: 50"\par Fluency: intact. [FreeTextEntry5] : small pupils, surgical, reactive; no pain on OO compression.  [FreeTextEntry6] : strength  limited by bilateral knee pain [FreeTextEntry7] : decreased vibration sens distal LE. [FreeTextEntry8] : balance limited by bilateral knee pain [FreeTextEntry1] : L chest scar from old port.

## 2021-04-21 ENCOUNTER — NON-APPOINTMENT (OUTPATIENT)
Age: 61
End: 2021-04-21

## 2021-04-22 ENCOUNTER — LABORATORY RESULT (OUTPATIENT)
Age: 61
End: 2021-04-22

## 2021-04-22 ENCOUNTER — APPOINTMENT (OUTPATIENT)
Dept: INTERNAL MEDICINE | Facility: CLINIC | Age: 61
End: 2021-04-22
Payer: MEDICARE

## 2021-04-22 ENCOUNTER — NON-APPOINTMENT (OUTPATIENT)
Age: 61
End: 2021-04-22

## 2021-04-22 VITALS
HEIGHT: 60 IN | OXYGEN SATURATION: 96 % | TEMPERATURE: 99.1 F | BODY MASS INDEX: 45.35 KG/M2 | SYSTOLIC BLOOD PRESSURE: 150 MMHG | WEIGHT: 231 LBS | HEART RATE: 83 BPM | DIASTOLIC BLOOD PRESSURE: 76 MMHG

## 2021-04-22 DIAGNOSIS — R06.02 SHORTNESS OF BREATH: ICD-10-CM

## 2021-04-22 PROCEDURE — 36415 COLL VENOUS BLD VENIPUNCTURE: CPT

## 2021-04-22 PROCEDURE — 99212 OFFICE O/P EST SF 10 MIN: CPT | Mod: 25

## 2021-04-22 PROCEDURE — 93000 ELECTROCARDIOGRAM COMPLETE: CPT

## 2021-04-22 RX ORDER — LABETALOL HYDROCHLORIDE 100 MG/1
100 TABLET, FILM COATED ORAL
Qty: 60 | Refills: 5 | Status: DISCONTINUED | COMMUNITY
Start: 2021-03-25 | End: 2021-04-22

## 2021-04-23 ENCOUNTER — APPOINTMENT (OUTPATIENT)
Dept: CV DIAGNOSITCS | Facility: HOSPITAL | Age: 61
End: 2021-04-23
Payer: MEDICARE

## 2021-04-23 ENCOUNTER — OUTPATIENT (OUTPATIENT)
Dept: OUTPATIENT SERVICES | Facility: HOSPITAL | Age: 61
LOS: 1 days | End: 2021-04-23

## 2021-04-23 DIAGNOSIS — C80.1 MALIGNANT (PRIMARY) NEOPLASM, UNSPECIFIED: Chronic | ICD-10-CM

## 2021-04-23 DIAGNOSIS — Z33.2 ENCOUNTER FOR ELECTIVE TERMINATION OF PREGNANCY: Chronic | ICD-10-CM

## 2021-04-23 DIAGNOSIS — R06.02 SHORTNESS OF BREATH: ICD-10-CM

## 2021-04-23 DIAGNOSIS — E04.1 NONTOXIC SINGLE THYROID NODULE: Chronic | ICD-10-CM

## 2021-04-23 DIAGNOSIS — H26.9 UNSPECIFIED CATARACT: Chronic | ICD-10-CM

## 2021-04-23 PROCEDURE — 93306 TTE W/DOPPLER COMPLETE: CPT | Mod: 26

## 2021-04-23 RX ORDER — FELODIPINE 5 MG/1
5 TABLET, EXTENDED RELEASE ORAL DAILY
Qty: 30 | Refills: 3 | Status: DISCONTINUED | COMMUNITY
Start: 2021-04-22 | End: 2021-04-23

## 2021-04-23 NOTE — ASSESSMENT
[FreeTextEntry1] : 61 y/o F with h/o HTN, DM, breast cancer s/p mastectomy/chemo/xrt with chronic lymphedema, obesity here for acute visit for SOB with exertion.\par \par SOB with exertion - possibly related to beta-blocker (had prior dx of asthma but has not been active x years) vs. heart failure (last echo with normal function 2/2020) vs. primary lung disease\par - D/C labetalol\par - Will restart amlodipine 5 (was previously on it until 2017 when had LE swelling but also had stopped diuretic at that time).\par - Echo scheduled for 4/23 @ 7am\par - Check labs as ordered\par \par RTC in 2-4 weeks for BP check\par \par

## 2021-04-23 NOTE — HISTORY OF PRESENT ILLNESS
[FreeTextEntry8] : \par About 2 weeks ago started to experience some SOB (started around 4/9) - noticed that when was shopping or walking somewhere would feel out of breath. Continues to feel it today but has improved.\par Noticed last Friday 4/16 some swelling of the face\par Also gained 7 lbs in a few days then lost it and was feeling better.\par \par On 3/25/21 had been having difficulty with cost of diltiazem and so was switched from diltiazem to labetalol. \par Received 2nd dose of Pfizer on 3/13/21\par Had gel shots (Hymovisc) to knees on 4/5/21.\par \par Recently bought a new car and payments are very high so has some stress about that. Planning vacation. \par Will be travelling to [place] and [place] 4/24-4/29. \par \par Currently taking valsartan 320mg, hctz, 25, labetalol 100 BID\par BPs at home have been 160s/70s.

## 2021-04-23 NOTE — PHYSICAL EXAM
[Normal] : no respiratory distress, lungs were clear to auscultation bilaterally and no accessory muscle use [Normal Rate] : normal rate  [Regular Rhythm] : with a regular rhythm [No Edema] : there was no peripheral edema [de-identified] : Soft II/VI systolic murmur at LUSB [de-identified] : 1

## 2021-04-27 LAB
ANION GAP SERPL CALC-SCNC: 13 MMOL/L
APPEARANCE: CLEAR
BASOPHILS # BLD AUTO: 0.02 K/UL
BASOPHILS NFR BLD AUTO: 0.4 %
BILIRUBIN URINE: NEGATIVE
BLOOD URINE: NEGATIVE
BUN SERPL-MCNC: 13 MG/DL
CALCIUM SERPL-MCNC: 9.5 MG/DL
CHLORIDE SERPL-SCNC: 98 MMOL/L
CO2 SERPL-SCNC: 28 MMOL/L
COLOR: YELLOW
CREAT SERPL-MCNC: 1.13 MG/DL
CREAT SPEC-SCNC: 116 MG/DL
CREAT/PROT UR: 0.1 RATIO
EOSINOPHIL # BLD AUTO: 0.14 K/UL
EOSINOPHIL NFR BLD AUTO: 3 %
GLUCOSE QUALITATIVE U: NEGATIVE
GLUCOSE SERPL-MCNC: 80 MG/DL
HCT VFR BLD CALC: 31.6 %
HGB BLD-MCNC: 10.1 G/DL
IMM GRANULOCYTES NFR BLD AUTO: 0.2 %
KETONES URINE: NEGATIVE
LEUKOCYTE ESTERASE URINE: NEGATIVE
LYMPHOCYTES # BLD AUTO: 1.13 K/UL
LYMPHOCYTES NFR BLD AUTO: 24.5 %
MAN DIFF?: NORMAL
MCHC RBC-ENTMCNC: 27.6 PG
MCHC RBC-ENTMCNC: 32 GM/DL
MCV RBC AUTO: 86.3 FL
MONOCYTES # BLD AUTO: 0.41 K/UL
MONOCYTES NFR BLD AUTO: 8.9 %
NEUTROPHILS # BLD AUTO: 2.91 K/UL
NEUTROPHILS NFR BLD AUTO: 63 %
NITRITE URINE: NEGATIVE
NT-PROBNP SERPL-MCNC: 446 PG/ML
PH URINE: 5.5
PLATELET # BLD AUTO: 280 K/UL
POTASSIUM SERPL-SCNC: 4 MMOL/L
PROT UR-MCNC: 15 MG/DL
PROTEIN URINE: NORMAL
RBC # BLD: 3.66 M/UL
RBC # FLD: 17.1 %
SODIUM SERPL-SCNC: 138 MMOL/L
SPECIFIC GRAVITY URINE: 1.02
UROBILINOGEN URINE: NORMAL
WBC # FLD AUTO: 4.62 K/UL

## 2021-04-28 NOTE — ED ADULT TRIAGE NOTE - CCCP TRG CHIEF CMPLNT
walked in with c/o chest pain yesterday and  left hand numbness started this afternoon multiple complaints

## 2021-04-30 ENCOUNTER — NON-APPOINTMENT (OUTPATIENT)
Age: 61
End: 2021-04-30

## 2021-05-13 ENCOUNTER — APPOINTMENT (OUTPATIENT)
Dept: INTERNAL MEDICINE | Facility: CLINIC | Age: 61
End: 2021-05-13
Payer: MEDICARE

## 2021-05-13 VITALS
DIASTOLIC BLOOD PRESSURE: 70 MMHG | TEMPERATURE: 98.4 F | BODY MASS INDEX: 45.16 KG/M2 | OXYGEN SATURATION: 95 % | SYSTOLIC BLOOD PRESSURE: 120 MMHG | HEART RATE: 73 BPM | HEIGHT: 60 IN | WEIGHT: 230 LBS

## 2021-05-13 PROCEDURE — 99214 OFFICE O/P EST MOD 30 MIN: CPT

## 2021-05-13 NOTE — ASSESSMENT
[FreeTextEntry1] : 62 y/o F with h/o HTN, DM, breast cancer s/p mastectomy/chemo/xrt with chronic lymphedema here for follow up for HTN/SOB.\par \par SOB - has resolved with d/c'ing labetalol. Echo unchanged.\par - Would avoid beta blockers in the future\par \par HTN - BPs improved\par - Continue amlodipine 5 - monitor for LE swelling\par - Continue valsartan, hctz\par \par Anemia - discussed with Heme-Onc; likely the cause of her murmur. Workup notable for polyclonal gammopathy\par - Will f/u with heme-onc for futher workup\par \par RTC in 6 months or sooner prn.

## 2021-05-13 NOTE — HISTORY OF PRESENT ILLNESS
[FreeTextEntry1] : Follow up [de-identified] : Last seen 4/22/21 for SOB.\par Had echo that was unchanged a few weeks ago.\par Doing much better since stopping the labetalol. Says doing great.\par BPs have been 120s/80s. \par Breathing has been good - only has some SOB when over-exerts herself. \par \par Went on a nice trip to spend time with her daughter.\par

## 2021-05-13 NOTE — PHYSICAL EXAM
[Normal] : normal rate, regular rhythm, normal S1 and S2 and no murmur heard [de-identified] : 1+ edema on LLE. No edema on RLE

## 2021-05-14 ENCOUNTER — NON-APPOINTMENT (OUTPATIENT)
Age: 61
End: 2021-05-14

## 2021-05-18 ENCOUNTER — OUTPATIENT (OUTPATIENT)
Dept: OUTPATIENT SERVICES | Facility: HOSPITAL | Age: 61
LOS: 1 days | Discharge: HOME | End: 2021-05-18

## 2021-05-18 DIAGNOSIS — C50.919 MALIGNANT NEOPLASM OF UNSPECIFIED SITE OF UNSPECIFIED FEMALE BREAST: ICD-10-CM

## 2021-05-18 DIAGNOSIS — C80.1 MALIGNANT (PRIMARY) NEOPLASM, UNSPECIFIED: Chronic | ICD-10-CM

## 2021-05-18 DIAGNOSIS — Z33.2 ENCOUNTER FOR ELECTIVE TERMINATION OF PREGNANCY: Chronic | ICD-10-CM

## 2021-05-18 DIAGNOSIS — E04.1 NONTOXIC SINGLE THYROID NODULE: Chronic | ICD-10-CM

## 2021-05-18 DIAGNOSIS — H26.9 UNSPECIFIED CATARACT: Chronic | ICD-10-CM

## 2021-05-19 ENCOUNTER — NON-APPOINTMENT (OUTPATIENT)
Age: 61
End: 2021-05-19

## 2021-05-20 ENCOUNTER — RESULT REVIEW (OUTPATIENT)
Age: 61
End: 2021-05-20

## 2021-05-20 ENCOUNTER — NON-APPOINTMENT (OUTPATIENT)
Age: 61
End: 2021-05-20

## 2021-05-20 ENCOUNTER — APPOINTMENT (OUTPATIENT)
Dept: HEMATOLOGY ONCOLOGY | Facility: CLINIC | Age: 61
End: 2021-05-20
Payer: MEDICARE

## 2021-05-20 ENCOUNTER — LABORATORY RESULT (OUTPATIENT)
Age: 61
End: 2021-05-20

## 2021-05-20 VITALS
HEIGHT: 60 IN | HEART RATE: 79 BPM | SYSTOLIC BLOOD PRESSURE: 141 MMHG | OXYGEN SATURATION: 97 % | RESPIRATION RATE: 18 BRPM | TEMPERATURE: 97.8 F | DIASTOLIC BLOOD PRESSURE: 74 MMHG | BODY MASS INDEX: 45.62 KG/M2 | WEIGHT: 232.35 LBS

## 2021-05-20 LAB
BASOPHILS # BLD AUTO: 0.03 K/UL — SIGNIFICANT CHANGE UP (ref 0–0.2)
BASOPHILS NFR BLD AUTO: 0.7 % — SIGNIFICANT CHANGE UP (ref 0–2)
EOSINOPHIL # BLD AUTO: 0.1 K/UL — SIGNIFICANT CHANGE UP (ref 0–0.5)
EOSINOPHIL NFR BLD AUTO: 2.3 % — SIGNIFICANT CHANGE UP (ref 0–6)
HCT VFR BLD CALC: 34 % — LOW (ref 34.5–45)
HGB BLD-MCNC: 11 G/DL — LOW (ref 11.5–15.5)
IMM GRANULOCYTES NFR BLD AUTO: 0.2 % — SIGNIFICANT CHANGE UP (ref 0–1.5)
LYMPHOCYTES # BLD AUTO: 1.11 K/UL — SIGNIFICANT CHANGE UP (ref 1–3.3)
LYMPHOCYTES # BLD AUTO: 26.1 % — SIGNIFICANT CHANGE UP (ref 13–44)
MCHC RBC-ENTMCNC: 27.6 PG — SIGNIFICANT CHANGE UP (ref 27–34)
MCHC RBC-ENTMCNC: 32.4 G/DL — SIGNIFICANT CHANGE UP (ref 32–36)
MCV RBC AUTO: 85.4 FL — SIGNIFICANT CHANGE UP (ref 80–100)
MONOCYTES # BLD AUTO: 0.28 K/UL — SIGNIFICANT CHANGE UP (ref 0–0.9)
MONOCYTES NFR BLD AUTO: 6.6 % — SIGNIFICANT CHANGE UP (ref 2–14)
NEUTROPHILS # BLD AUTO: 2.73 K/UL — SIGNIFICANT CHANGE UP (ref 1.8–7.4)
NEUTROPHILS NFR BLD AUTO: 64.1 % — SIGNIFICANT CHANGE UP (ref 43–77)
NRBC # BLD: 0 /100 WBCS — SIGNIFICANT CHANGE UP (ref 0–0)
PLATELET # BLD AUTO: 252 K/UL — SIGNIFICANT CHANGE UP (ref 150–400)
RBC # BLD: 3.98 M/UL — SIGNIFICANT CHANGE UP (ref 3.8–5.2)
RBC # FLD: 15.9 % — HIGH (ref 10.3–14.5)
WBC # BLD: 4.26 K/UL — SIGNIFICANT CHANGE UP (ref 3.8–10.5)
WBC # FLD AUTO: 4.26 K/UL — SIGNIFICANT CHANGE UP (ref 3.8–10.5)

## 2021-05-20 PROCEDURE — 99215 OFFICE O/P EST HI 40 MIN: CPT

## 2021-05-20 NOTE — REASON FOR VISIT
[Follow-Up Visit] : a follow-up [FreeTextEntry2] : follow up for breast cancer and anemia: new onset

## 2021-05-20 NOTE — PHYSICAL EXAM
[Fully active, able to carry on all pre-disease performance without restriction] : Status 0 - Fully active, able to carry on all pre-disease performance without restriction [Obese] : obese [Ulcers] : no ulcers [Mucositis] : no mucositis [Thrush] : no thrush [Vesicles] : no vesicles [Normal] : affect appropriate [de-identified] : right mastectomy with radiation change and right skin flap; no abnl masses on the left breast  [de-identified] : RUE lymphedema in sleeve  [de-identified] : ambulates with cane

## 2021-05-20 NOTE — ASSESSMENT
[FreeTextEntry1] : She is a 59 y/o F with history of Stage III right breast cancer s/p mastectomy followed by chemotherapy and RT for ER negative/ [id number] positive disease 2012. She will have mammogram/ sonogram again this October and we reviewed her October imaging and exam which is normal today. We wrote Rx for mastectomy bra and prosthesis. She will continue with exercise as tolerated and low fat diet. She will continue with breast surveillance. \par \par She is willing to continue with [facility] study. Patient is here to re-consent to [place] [id number] for assessments 6 and 7, as they participated in the chemotherapy arm in this study previously. Patient does not have dementia or any severe neurodegenerative disease and patient is not colorblind.\par \par Anemia: prior baseline was 11 g/dL and last CBC done by PCP in April; we reviewed hemolysis labs which were WNL. We reviewed B12, folate WNL. Mildly low ferritin and increase in iron sat: encouraged iron rich foods. Last colonoscopy done 2017 with EGD. Reticulocyte count not as high as we would expect. Will obtain peripheral smear and repeat free light chains along with bence ríos in urine. We reviewed polyclonal gammopathy may have been from the vaccine and no monoclonal band found. Will review results next week with her and follow up in 3 months. If any increasing anemia; reviewed bone marrow biopsy.

## 2021-05-20 NOTE — HISTORY OF PRESENT ILLNESS
[Disease: _____________________] : Disease: [unfilled] [T: ___] : T[unfilled] [N: ___] : N[unfilled] [AJCC Stage: ____] : AJCC Stage: [unfilled] [de-identified] : Age 52: advanced breast cancer status post right mastectomy and axillary lymph node dissection on 3/30/2012 followed by chemotherapy and radiation.  \par She had been maintaining her port every 6 weeks until March 2014.  She had axillary lymph node noted in prior imaging which was felt to be benign.  She had a biopsy that was done in May of 2014 that was benign. Pathology showed reactive lymph node which was benign.  She continues to have tenderness at the biopsy site.  PET/ CT performed on 8/6/14 show postsurgical changes in the right chest wall.  Resolution of previously seen mild hypermetabolism in left axillary lymph node.  Given her port not functioning and her PET scan negative, port removed 2014. [de-identified] : invasive poorly differentiated ductal carcinoma ER 0, AK 0 [id number] 3+ [de-identified] : She had her covid vaccine in March and tolerated well. Still having trouble with her knees: will be seeing specialist again but had injections done last year. Went away on vacation and was able to walk with cane. She denies any new lower back pain. She denies any left breast changes: had her mammogram/ sonogram done last October. She will need Rx for mastectomy bra and prosthesis. Continues to wear compression sleeve over the RUE and denies any new chest wall pain or itchiness. Feels CHADD from the L axilla was from hair and nail supplement: once she stopped, the CHADD improved. Also off the labetalol: had been causing SOB; had interval 2 D Echo which was WNL. She is able to get around and spring cleaning house with her family. She will be following up with neurologist; has been writing down things to help remember meetings and appointments. No change in vision or headaches. Remembers all basic things and names. \par \par Anemia work up: feels no change in energy levels or bowel habits. Has been cutting out meat but eats lots of greens and chicken. No blood loss.  [de-identified] : ChemoRT completed 2012

## 2021-05-25 ENCOUNTER — NON-APPOINTMENT (OUTPATIENT)
Age: 61
End: 2021-05-25

## 2021-05-25 LAB
ALBUMIN SERPL ELPH-MCNC: 4.2 G/DL
ALP BLD-CCNC: 106 U/L
ALT SERPL-CCNC: 11 U/L
ANION GAP SERPL CALC-SCNC: 12 MMOL/L
AST SERPL-CCNC: 18 U/L
BILIRUB SERPL-MCNC: 0.3 MG/DL
BUN SERPL-MCNC: 13 MG/DL
CALCIUM SERPL-MCNC: 9.5 MG/DL
CANCER AG27-29 SERPL-ACNC: 17.3 U/ML
CHLORIDE SERPL-SCNC: 98 MMOL/L
CO2 SERPL-SCNC: 27 MMOL/L
CREAT SERPL-MCNC: 1.1 MG/DL
DEPRECATED KAPPA LC FREE/LAMBDA SER: 2.02 RATIO
GLUCOSE SERPL-MCNC: 112 MG/DL
KAPPA LC 24H UR QL: NORMAL
KAPPA LC CSF-MCNC: 3.46 MG/DL
KAPPA LC SERPL-MCNC: 6.99 MG/DL
POTASSIUM SERPL-SCNC: 4.2 MMOL/L
PROT SERPL-MCNC: 8 G/DL
SODIUM SERPL-SCNC: 138 MMOL/L

## 2021-06-10 ENCOUNTER — APPOINTMENT (OUTPATIENT)
Dept: PHYSICAL MEDICINE AND REHAB | Facility: CLINIC | Age: 61
End: 2021-06-10
Payer: MEDICARE

## 2021-06-10 VITALS
HEART RATE: 76 BPM | DIASTOLIC BLOOD PRESSURE: 73 MMHG | SYSTOLIC BLOOD PRESSURE: 133 MMHG | BODY MASS INDEX: 45.55 KG/M2 | HEIGHT: 60 IN | WEIGHT: 232 LBS | OXYGEN SATURATION: 99 % | TEMPERATURE: 96.7 F

## 2021-06-10 DIAGNOSIS — I89.0 LYMPHEDEMA, NOT ELSEWHERE CLASSIFIED: ICD-10-CM

## 2021-06-10 PROCEDURE — 99213 OFFICE O/P EST LOW 20 MIN: CPT | Mod: GC

## 2021-06-10 NOTE — HISTORY OF PRESENT ILLNESS
[FreeTextEntry1] : 61 y.o female presents for follow up for lymphedema. She has a history of advanced breast cancer status post right mastectomy and axillary lymph node dissection on 3/30/2012 followed by chemotherapy and radiation. PET/ CT performed on 8/6/14 show postsurgical changes in the right chest wall.\sofia Finished LD therapy and currently wears a night sleeve and a compression sleeve in the morning total 24 hours a day except for showers. Was using pump machine before but is now longer using it for the past year. No resistance exercises. \sofia  Still has b/l knee pain. patient states that she is starting the process to get bilateral TKR but has not seen an orthopedist yet. She was told that she need te lose some weight prior to procedure in 2019. *Is walking but other activities / exercises cause pain. NOt seeing PT. Currently taking methadone 15 mg qd, tylenol and meloxicam 15mg daily. She is also on gabapentin. \sofia IS following with Dr. Mathieu Lord for pain management and is considering genicular nerve RFA

## 2021-06-10 NOTE — PHYSICAL EXAM
[Normal] : Oriented to person, place, and time, insight and judgement were intact and the affect was normal [de-identified] : 2 inch difference between the left and right upper extremities proximally, 1 nch distally in the forearm (right lymphedema) [de-identified] : + knee pain with ROM. + crepitus bilateral, tenderness to palpation of the medial and lateral jt line b/l [de-identified] : MS 5/5 b/l LE, decreased sensation, vibration sense. + Romberg

## 2021-06-10 NOTE — ASSESSMENT
[FreeTextEntry1] : CHARLES is a pleasant 61 year-old female who presents to PM&R for follow up of bilateral knee pain and lymphedema\par \par PLAN:\par 1) patient planning to undergo b/l TKR. Trying to lose weight, in interim seeking RFA to knees through her pain physician \par 2) Continue compression sleeve, overall improving as per patient\par 3 Follow up in 6 months or prior to TKR surgery

## 2021-06-14 ENCOUNTER — APPOINTMENT (OUTPATIENT)
Dept: CARDIOLOGY | Facility: CLINIC | Age: 61
End: 2021-06-14
Payer: MEDICARE

## 2021-06-14 ENCOUNTER — NON-APPOINTMENT (OUTPATIENT)
Age: 61
End: 2021-06-14

## 2021-06-14 VITALS
HEIGHT: 61 IN | HEART RATE: 73 BPM | DIASTOLIC BLOOD PRESSURE: 76 MMHG | TEMPERATURE: 96.8 F | BODY MASS INDEX: 42.44 KG/M2 | OXYGEN SATURATION: 100 % | SYSTOLIC BLOOD PRESSURE: 145 MMHG | WEIGHT: 224.8 LBS

## 2021-06-14 PROCEDURE — 99214 OFFICE O/P EST MOD 30 MIN: CPT

## 2021-06-14 PROCEDURE — 93000 ELECTROCARDIOGRAM COMPLETE: CPT

## 2021-06-14 NOTE — PHYSICAL EXAM
[Well Developed] : well developed [Well Nourished] : well nourished [No Acute Distress] : no acute distress [Normal Conjunctiva] : normal conjunctiva [Normal Venous Pressure] : normal venous pressure [No Carotid Bruit] : no carotid bruit [Normal S1, S2] : normal S1, S2 [No Murmur] : no murmur [No Rub] : no rub [Clear Lung Fields] : clear lung fields [No Gallop] : no gallop [Good Air Entry] : good air entry [No Respiratory Distress] : no respiratory distress  [Soft] : abdomen soft [Non Tender] : non-tender [No Masses/organomegaly] : no masses/organomegaly [Normal Gait] : normal gait [Normal Bowel Sounds] : normal bowel sounds [No Edema] : no edema [No Cyanosis] : no cyanosis [No Clubbing] : no clubbing [No Varicosities] : no varicosities [No Rash] : no rash [No Skin Lesions] : no skin lesions [Moves all extremities] : moves all extremities [No Focal Deficits] : no focal deficits [Normal Speech] : normal speech [Alert and Oriented] : alert and oriented [Normal memory] : normal memory

## 2021-06-14 NOTE — HISTORY OF PRESENT ILLNESS
[FreeTextEntry1] : Here for followup.\par No new complaints\par She was transiently switched to Labetalol but developed significant dyspnea that resolved when stopped the meds\par EKG reviewed and unchanged\par #HTN- On Valsartan/HCTZ and Diltiazem, continue present meds\par

## 2021-07-19 ENCOUNTER — RX RENEWAL (OUTPATIENT)
Age: 61
End: 2021-07-19

## 2021-08-03 ENCOUNTER — OUTPATIENT (OUTPATIENT)
Dept: OUTPATIENT SERVICES | Facility: HOSPITAL | Age: 61
LOS: 1 days | Discharge: HOME | End: 2021-08-03

## 2021-08-03 DIAGNOSIS — C80.1 MALIGNANT (PRIMARY) NEOPLASM, UNSPECIFIED: Chronic | ICD-10-CM

## 2021-08-03 DIAGNOSIS — Z33.2 ENCOUNTER FOR ELECTIVE TERMINATION OF PREGNANCY: Chronic | ICD-10-CM

## 2021-08-03 DIAGNOSIS — E04.1 NONTOXIC SINGLE THYROID NODULE: Chronic | ICD-10-CM

## 2021-08-03 DIAGNOSIS — H26.9 UNSPECIFIED CATARACT: Chronic | ICD-10-CM

## 2021-08-03 DIAGNOSIS — C50.919 MALIGNANT NEOPLASM OF UNSPECIFIED SITE OF UNSPECIFIED FEMALE BREAST: ICD-10-CM

## 2021-08-04 ENCOUNTER — RESULT REVIEW (OUTPATIENT)
Age: 61
End: 2021-08-04

## 2021-08-04 ENCOUNTER — APPOINTMENT (OUTPATIENT)
Dept: HEMATOLOGY ONCOLOGY | Facility: CLINIC | Age: 61
End: 2021-08-04
Payer: MEDICARE

## 2021-08-04 VITALS
BODY MASS INDEX: 40.79 KG/M2 | RESPIRATION RATE: 16 BRPM | OXYGEN SATURATION: 98 % | DIASTOLIC BLOOD PRESSURE: 83 MMHG | TEMPERATURE: 97.3 F | WEIGHT: 216.05 LBS | HEART RATE: 63 BPM | HEIGHT: 61.02 IN | SYSTOLIC BLOOD PRESSURE: 155 MMHG

## 2021-08-04 LAB
BASOPHILS # BLD AUTO: 0 K/UL — SIGNIFICANT CHANGE UP (ref 0–0.2)
BASOPHILS NFR BLD AUTO: 0 % — SIGNIFICANT CHANGE UP (ref 0–2)
EOSINOPHIL # BLD AUTO: 0.24 K/UL — SIGNIFICANT CHANGE UP (ref 0–0.5)
EOSINOPHIL NFR BLD AUTO: 5 % — SIGNIFICANT CHANGE UP (ref 0–6)
HCT VFR BLD CALC: 34.6 % — SIGNIFICANT CHANGE UP (ref 34.5–45)
HGB BLD-MCNC: 11.3 G/DL — LOW (ref 11.5–15.5)
LYMPHOCYTES # BLD AUTO: 1.65 K/UL — SIGNIFICANT CHANGE UP (ref 1–3.3)
LYMPHOCYTES # BLD AUTO: 35 % — SIGNIFICANT CHANGE UP (ref 13–44)
MCHC RBC-ENTMCNC: 28.1 PG — SIGNIFICANT CHANGE UP (ref 27–34)
MCHC RBC-ENTMCNC: 32.7 G/DL — SIGNIFICANT CHANGE UP (ref 32–36)
MCV RBC AUTO: 86.1 FL — SIGNIFICANT CHANGE UP (ref 80–100)
MONOCYTES # BLD AUTO: 0.33 K/UL — SIGNIFICANT CHANGE UP (ref 0–0.9)
MONOCYTES NFR BLD AUTO: 7 % — SIGNIFICANT CHANGE UP (ref 2–14)
NEUTROPHILS # BLD AUTO: 2.5 K/UL — SIGNIFICANT CHANGE UP (ref 1.8–7.4)
NEUTROPHILS NFR BLD AUTO: 53 % — SIGNIFICANT CHANGE UP (ref 43–77)
NRBC # BLD: 0 /100 — SIGNIFICANT CHANGE UP (ref 0–0)
NRBC # BLD: SIGNIFICANT CHANGE UP /100 WBCS (ref 0–0)
PLAT MORPH BLD: NORMAL — SIGNIFICANT CHANGE UP
PLATELET # BLD AUTO: 261 K/UL — SIGNIFICANT CHANGE UP (ref 150–400)
RBC # BLD: 4.02 M/UL — SIGNIFICANT CHANGE UP (ref 3.8–5.2)
RBC # FLD: 17.5 % — HIGH (ref 10.3–14.5)
RBC BLD AUTO: SIGNIFICANT CHANGE UP
WBC # BLD: 4.72 K/UL — SIGNIFICANT CHANGE UP (ref 3.8–10.5)
WBC # FLD AUTO: 4.72 K/UL — SIGNIFICANT CHANGE UP (ref 3.8–10.5)

## 2021-08-04 PROCEDURE — 99213 OFFICE O/P EST LOW 20 MIN: CPT

## 2021-08-07 NOTE — PHYSICAL EXAM
[Fully active, able to carry on all pre-disease performance without restriction] : Status 0 - Fully active, able to carry on all pre-disease performance without restriction [Normal] : affect appropriate [de-identified] : normal respiratory effort

## 2021-08-07 NOTE — ASSESSMENT
[FreeTextEntry1] : She is a 59 y/o F with history of Stage III right breast cancer s/p mastectomy followed by chemotherapy and RT for ER negative/ [id number] positive disease 2012. She has follow up for anemia which has improved. Will repeat free light chains and IPEP which was abnl with last evaluation. She will continue with low fat diet and exercise. 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 Next follow up in 6 months but earlier if any new symptoms.\par

## 2021-08-07 NOTE — HISTORY OF PRESENT ILLNESS
[Disease: _____________________] : Disease: [unfilled] [T: ___] : T[unfilled] [N: ___] : N[unfilled] [AJCC Stage: ____] : AJCC Stage: [unfilled] [de-identified] : Age 52: advanced breast cancer status post right mastectomy and axillary lymph node dissection on 3/30/2012 followed by chemotherapy and radiation.  \par She had been maintaining her port every 6 weeks until March 2014.  She had axillary lymph node noted in prior imaging which was felt to be benign.  She had a biopsy that was done in May of 2014 that was benign. Pathology showed reactive lymph node which was benign.  She continues to have tenderness at the biopsy site.  PET/ CT performed on 8/6/14 show postsurgical changes in the right chest wall.  Resolution of previously seen mild hypermetabolism in left axillary lymph node.  Given her port not functioning and her PET scan negative, port removed 2014. [de-identified] : invasive poorly differentiated ductal carcinoma ER 0, MD 0 [id number] 3+ [de-identified] : ChemoRT completed 2012 [de-identified] : She is present for follow up for anemia. She denies any dark stools or new fatigue. She denies any other new medications.

## 2021-08-10 ENCOUNTER — NON-APPOINTMENT (OUTPATIENT)
Age: 61
End: 2021-08-10

## 2021-08-10 LAB
ALBUMIN MFR SERPL ELPH: 46.8 %
ALBUMIN SERPL ELPH-MCNC: 4.4 G/DL
ALBUMIN SERPL-MCNC: 3.8 G/DL
ALBUMIN/GLOB SERPL: 0.9 RATIO
ALP BLD-CCNC: 94 U/L
ALPHA1 GLOB MFR SERPL ELPH: 3.2 %
ALPHA1 GLOB SERPL ELPH-MCNC: 0.3 G/DL
ALPHA2 GLOB MFR SERPL ELPH: 13 %
ALPHA2 GLOB SERPL ELPH-MCNC: 1.1 G/DL
ALT SERPL-CCNC: 11 U/L
ANION GAP SERPL CALC-SCNC: 13 MMOL/L
AST SERPL-CCNC: 17 U/L
B-GLOBULIN MFR SERPL ELPH: 13.4 %
B-GLOBULIN SERPL ELPH-MCNC: 1.1 G/DL
BILIRUB SERPL-MCNC: 0.4 MG/DL
BUN SERPL-MCNC: 16 MG/DL
CALCIUM SERPL-MCNC: 10.2 MG/DL
CHLORIDE SERPL-SCNC: 98 MMOL/L
CO2 SERPL-SCNC: 27 MMOL/L
CREAT SERPL-MCNC: 1.11 MG/DL
DEPRECATED KAPPA LC FREE/LAMBDA SER: 1.54 RATIO
GAMMA GLOB FLD ELPH-MCNC: 1.9 G/DL
GAMMA GLOB MFR SERPL ELPH: 23.6 %
GLUCOSE SERPL-MCNC: 91 MG/DL
IGA SER QL IEP: 435 MG/DL
IGG SER QL IEP: 2044 MG/DL
IGM SER QL IEP: 69 MG/DL
INTERPRETATION SERPL IEP-IMP: NORMAL
KAPPA LC CSF-MCNC: 2.97 MG/DL
KAPPA LC SERPL-MCNC: 4.57 MG/DL
M PROTEIN SPEC IFE-MCNC: NORMAL
POTASSIUM SERPL-SCNC: 4.1 MMOL/L
PROT SERPL-MCNC: 8.1 G/DL
SODIUM SERPL-SCNC: 139 MMOL/L

## 2021-08-17 ENCOUNTER — APPOINTMENT (OUTPATIENT)
Dept: NEUROLOGY | Facility: CLINIC | Age: 61
End: 2021-08-17
Payer: MEDICARE

## 2021-08-17 PROCEDURE — 96116 NUBHVL XM PHYS/QHP 1ST HR: CPT | Mod: 95

## 2021-08-17 PROCEDURE — 96121 NUBHVL XM PHY/QHP EA ADDL HR: CPT | Mod: 95

## 2021-08-17 PROCEDURE — 96133 NRPSYC TST EVAL PHYS/QHP EA: CPT | Mod: 95

## 2021-08-17 PROCEDURE — 96132 NRPSYC TST EVAL PHYS/QHP 1ST: CPT | Mod: 95

## 2021-08-24 ENCOUNTER — APPOINTMENT (OUTPATIENT)
Dept: NEUROLOGY | Facility: CLINIC | Age: 61
End: 2021-08-24
Payer: MEDICARE

## 2021-08-24 PROCEDURE — 96133 NRPSYC TST EVAL PHYS/QHP EA: CPT

## 2021-08-24 PROCEDURE — 96139 PSYCL/NRPSYC TST TECH EA: CPT | Mod: NC

## 2021-08-24 PROCEDURE — 96138 PSYCL/NRPSYC TECH 1ST: CPT | Mod: NC

## 2021-09-14 ENCOUNTER — APPOINTMENT (OUTPATIENT)
Dept: GASTROENTEROLOGY | Facility: CLINIC | Age: 61
End: 2021-09-14

## 2021-10-12 ENCOUNTER — APPOINTMENT (OUTPATIENT)
Dept: NEUROLOGY | Facility: CLINIC | Age: 61
End: 2021-10-12
Payer: MEDICARE

## 2021-10-12 VITALS
BODY MASS INDEX: 39.27 KG/M2 | WEIGHT: 208 LBS | DIASTOLIC BLOOD PRESSURE: 76 MMHG | HEIGHT: 61.02 IN | HEART RATE: 57 BPM | SYSTOLIC BLOOD PRESSURE: 126 MMHG

## 2021-10-12 DIAGNOSIS — R41.89 OTHER SYMPTOMS AND SIGNS INVOLVING COGNITIVE FUNCTIONS AND AWARENESS: ICD-10-CM

## 2021-10-12 PROCEDURE — 99214 OFFICE O/P EST MOD 30 MIN: CPT

## 2021-10-12 NOTE — ASSESSMENT
[FreeTextEntry1] : Assesment: \par 60yo RH AAW, with subjective memory issues. \par She has peripheral neuropathy in LE and antalgic limitation of RLE>LLE due to knees pain.\par Anxiety, depression and lot of stress are present.\par Last MRI brain in 2018, benign. \par Mental and neuro exam continue to be ok.\par New NPT are wnl.\par Overall stable.\par \par Diagnostic Impression:\par -anxiety/depression\par -subjective memory impairment\par -neuropathy.\par \par Plan: \par -continue with current meds, same dose of Wellbutrin.\par Return PRN.\par \par A thorough discussion was entertained with the patient/caregiver regarding the use of psychoactive medications, their possible benefits and AE profile, including the risk of cardiovascular complications, including but not limited to applicable black box warning and teratogenicity, where appropriate.\par We discussed the benefits of being active, physically and mentally, and the need to to establish a routine in this respect.\par Driving abilities and firearms possession and use were discussed, in relation to progression of the cognitive decline, and the need to assess them periodically.\par Patient/caregiver advised to bring previous records to this office.\par All questions were answered at the time of the visit. We are certainly available for further discussion as needed.\par Patient/caregiver fully understands and agree with the plan.\par

## 2021-10-12 NOTE — HISTORY OF PRESENT ILLNESS
[FreeTextEntry1] : COVID VACCINATION 2x. \par \par \par HPI-Interval hx 20211012:\par Pt here for follow-up..\par NPT done in 8/2021 are absolutely benign. 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 She feels much better, lighter and has more endurance. \par \par \par \par HPI-Interval hx [id number]: \par recent SOB, possible CHF (like she had before in the past), will see PCP soon, she felt she gained weight and then lost it over a few weeks, as in retention. This also happened after COVID vaccination, knee shots and changing med to BB from [facility].\par \par Fall 8/2020, tripped on steps carrying things, reports a lesion in the R knee.\par \par Overall she feels ok, managing everything well. \par \par Sleep and appetite are stable. \par Mood is ok. Of note, she did not tolerate higher dose of Wellbutrin (150mg Daily) due to shakes/twitches.\par \par \par \par HPI-Interval Hx [id number]:\par Pt here for follow-up.\par She feels her STM is a bit worse now, she tends to forget dates, names and recent events.\par She has to write a lot of notes and set alarms to remind herself to follow through with things. \par she is still taking care of multiple things though, and trying to run her home.\par Rest is stable. \par Ca in remission. 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 \par Had a fall with head trauma in Feb 2019, CT head negative ([facility]).\par She is very busy, and has a hard time managing her busy schedule. \par She has a very positive outlook overall. \par \par HPI-Interval Hx [id number]:\par MRI, MRA were negative, ESR 71, but vascular sx evaluation with Echo was negative for TA. \par Still a lot of stress in her life.\par Feels better with Wellbutrin, her face pain and HA have stopped.\par She sleeps very well, and appetite is good.\par She keeps very busy, now preparing for her daughter's wedding.\par \par HPI-Interval Hx [id number]:\par Pt here for follow-up.\par Over the last year, she has been pretty much stable.\par recent Bone Scan and CT have shown no secondary lesions from breast Ca.\par \par She gets seldom sharp pain behind L eye for a few seconds, with slight HA, lasting 5-10 min.\par She also noticed her face sweats a lot, at times her arms too. This apparently is dating 10-20years.\par In all, she feels very irritable and she still c/o STM and attention issues.\par \par PMH:\par 57yo RH AAW, with hx of fibromyalgia, bipolar depression, breast CA s/p resection and chemo in 2012, currently followed up and disease free, colon polyps, arthritis.\par She reports that she sometimes looses attentions and would forget things, e.g. that she is cooking something and would leave things on the stove. She has issues remembering names of people, but always had.\par \par She does not report HA.\par \par Mood: she often gets sad, cries a lot, but has had bipolar depression for a long time, used to be on Lexapro, VPA and Neurontin, stopped a few years ago.\par She used to be in a program at [facility], terminated when she had started using a plant product (Tunguska?). She has a hx of SI/SA in 2004, now denies any SI. \par \par She has a hx of EtOH abuse, sober and in AA for many years.\par \par She has a complex living situation, having to take care of her mother, daughter, grandchildren.\par \par She does not drive, she never did, has fear of it.\par \par Sleep: falls asleep readily, but has to urinate very frequently. Sleeps 10h daily.\par \par Disabled, used to do clerical work in several different settings.\par \par ADl and IADL intact.\par \par Of note, she has a small nodule in L thyroid, under surveillance.

## 2021-11-01 ENCOUNTER — APPOINTMENT (OUTPATIENT)
Dept: INTERNAL MEDICINE | Facility: CLINIC | Age: 61
End: 2021-11-01
Payer: MEDICARE

## 2021-11-01 VITALS
HEIGHT: 61.02 IN | SYSTOLIC BLOOD PRESSURE: 124 MMHG | TEMPERATURE: 98.5 F | HEART RATE: 68 BPM | BODY MASS INDEX: 40.02 KG/M2 | DIASTOLIC BLOOD PRESSURE: 58 MMHG | OXYGEN SATURATION: 98 % | WEIGHT: 212 LBS

## 2021-11-01 PROCEDURE — G0439: CPT

## 2021-11-01 PROCEDURE — 36415 COLL VENOUS BLD VENIPUNCTURE: CPT

## 2021-11-08 LAB
ALBUMIN SERPL ELPH-MCNC: 4.2 G/DL
ALP BLD-CCNC: 110 U/L
ALT SERPL-CCNC: 9 U/L
ANION GAP SERPL CALC-SCNC: 17 MMOL/L
AST SERPL-CCNC: 17 U/L
BASOPHILS # BLD AUTO: 0.03 K/UL
BASOPHILS NFR BLD AUTO: 0.7 %
BILIRUB SERPL-MCNC: 0.3 MG/DL
BUN SERPL-MCNC: 16 MG/DL
CALCIUM SERPL-MCNC: 9.7 MG/DL
CHLORIDE SERPL-SCNC: 99 MMOL/L
CHOLEST SERPL-MCNC: 159 MG/DL
CO2 SERPL-SCNC: 24 MMOL/L
CREAT SERPL-MCNC: 1.2 MG/DL
CREAT SPEC-SCNC: 138 MG/DL
EOSINOPHIL # BLD AUTO: 0.07 K/UL
EOSINOPHIL NFR BLD AUTO: 1.6 %
ESTIMATED AVERAGE GLUCOSE: 131 MG/DL
GLUCOSE SERPL-MCNC: 128 MG/DL
HBA1C MFR BLD HPLC: 6.2 %
HCT VFR BLD CALC: 35.7 %
HDLC SERPL-MCNC: 54 MG/DL
HGB BLD-MCNC: 11.2 G/DL
IMM GRANULOCYTES NFR BLD AUTO: 0.2 %
LDLC SERPL CALC-MCNC: 91 MG/DL
LYMPHOCYTES # BLD AUTO: 1.01 K/UL
LYMPHOCYTES NFR BLD AUTO: 22.6 %
MAN DIFF?: NORMAL
MCHC RBC-ENTMCNC: 29.3 PG
MCHC RBC-ENTMCNC: 31.4 GM/DL
MCV RBC AUTO: 93.5 FL
MICROALBUMIN 24H UR DL<=1MG/L-MCNC: <1.2 MG/DL
MICROALBUMIN/CREAT 24H UR-RTO: NORMAL MG/G
MONOCYTES # BLD AUTO: 0.36 K/UL
MONOCYTES NFR BLD AUTO: 8.1 %
NEUTROPHILS # BLD AUTO: 2.99 K/UL
NEUTROPHILS NFR BLD AUTO: 66.8 %
NONHDLC SERPL-MCNC: 106 MG/DL
PLATELET # BLD AUTO: 240 K/UL
POTASSIUM SERPL-SCNC: 4 MMOL/L
PROT SERPL-MCNC: 8 G/DL
RBC # BLD: 3.82 M/UL
RBC # FLD: 16.8 %
SODIUM SERPL-SCNC: 140 MMOL/L
TRIGL SERPL-MCNC: 72 MG/DL
WBC # FLD AUTO: 4.47 K/UL

## 2021-11-11 NOTE — HEALTH RISK ASSESSMENT
[Patient reported mammogram was normal] : Patient reported mammogram was normal [Patient reported colonoscopy was abnormal] : Patient reported colonoscopy was abnormal [MammogramDate] : 10/20 [ColonoscopyDate] : 03/17 [ColonoscopyComments] : Poor prep

## 2021-11-11 NOTE — HISTORY OF PRESENT ILLNESS
[FreeTextEntry1] : CPE [de-identified] : \par Saw Neuro - last 10/12/2021\par \par Obesity - lost 18 lbs since 5/2021. Tried to do weight watchers allie. LIstens to some coaching on that. Whole family is on a new diet -  lost 100 lbs over the past year or two.\par Trying to cut sweets; trying to cut sugar.\par Eating mostly vegetables lunch/dinner. Last night had squash, string beans, etc.\par Has cut down on eating out.\par \par Doing so much better.\par Has been sleeping better. When has 10 hours of sleep, fibromyalgia is so much better.\par \par Bilateral knee pain - Knees don't hurt anymore after weight loss. No longer needing cane as much - only if goes to doctor or has to do a lot of walking.\par Now will walk the whole day with no problems.\par Takes meloxicam 1-2x/day.\par Has seen Ortho previously - had been recommended to get knee replacement but recommended to lose weight first.\par \par HTN - BPs have been good 120s/60s. No dizziness. On amlodipine 5, hctz 25, valsartan \par Saw Cards 6/2021\par \par Will be taking [employer] to [place] this Friday to visit her daughter.\par \par DM - last A1c 3/2021. Had muffin for breakfast. On metformin. \par \par Breast cancer - has scheduled mammo/u/s 12/2021\par \par Chronic pain/Fibromyalgia - has been weaning methadone, now down to 10mg PO daily (from 20mg daily). Pain management is aware.\par Will plan to go down to 7.5mg PO daily.\par Will only have flares of fibromyalgia if is very active.\par \par Still having hot flashes.\par Having hair loss; wearing wig

## 2021-11-11 NOTE — ASSESSMENT
[FreeTextEntry1] : 62 y/o F with h/o obesity, bilateral knee arthritis, HTN, DM, breast cancer s/p mastectomy/chemox/xrt with chronic lymphedema, chronic pain/fibromyalgia here for CPE.\par \par Obesity - has lost weight through diet/exercise\par - Check labs as ordered\par - Continue gradual weight oss\par \par HTN - BP at goal\par - Contnue norvasc 5, hctz 25, valsartan 320\par - Check electrolytes\par \par DM\par - Continue metformin 1g BID\par - Check A1c\par - Ophtho UTD\par - Check urine microalb/cr\par \par Knee arthritis - pain improved with weight loss\par - Continue conservative management\par - F/U with Ortho if worsening.\par \par Breast cancer - saw Heme-Onc 5/2021\par - Pending repeat imaging\par - PM&R for lymphedema\par \par Opiate dependence\par - Weaning off of methadone\par \par Anemia\par - Seen by Heme-Onc on 8/2021\par - Check labs\par \par HCM:\par - Received covid vaccine\par - Colonoscopy 2017 - poor prep; needs repeat. \par - DEXA 5/2019\par - Mammogram 10/2020 - due for repeat\par - Hep C negative 2017\par \par RTC in 6 months or sooner prn.

## 2021-11-13 ENCOUNTER — RX RENEWAL (OUTPATIENT)
Age: 61
End: 2021-11-13

## 2021-12-09 ENCOUNTER — APPOINTMENT (OUTPATIENT)
Dept: PHYSICAL MEDICINE AND REHAB | Facility: CLINIC | Age: 61
End: 2021-12-09

## 2021-12-12 NOTE — H&P PST ADULT - ENDOCRINE COMMENTS
<-- Click to add NO significant Past Surgical History h/o type II diabetes -- does finger sticks only 4x/week -- am finger sticks range 100's-200's. A1c 6.5 last as per pt.

## 2021-12-14 ENCOUNTER — NON-APPOINTMENT (OUTPATIENT)
Age: 61
End: 2021-12-14

## 2021-12-14 ENCOUNTER — APPOINTMENT (OUTPATIENT)
Dept: CARDIOLOGY | Facility: CLINIC | Age: 61
End: 2021-12-14
Payer: MEDICARE

## 2021-12-14 VITALS
HEIGHT: 61 IN | HEART RATE: 65 BPM | BODY MASS INDEX: 40.02 KG/M2 | DIASTOLIC BLOOD PRESSURE: 58 MMHG | RESPIRATION RATE: 16 BRPM | WEIGHT: 212 LBS | SYSTOLIC BLOOD PRESSURE: 147 MMHG | TEMPERATURE: 98 F | OXYGEN SATURATION: 98 %

## 2021-12-14 PROCEDURE — 99214 OFFICE O/P EST MOD 30 MIN: CPT

## 2021-12-14 PROCEDURE — 93000 ELECTROCARDIOGRAM COMPLETE: CPT

## 2021-12-14 NOTE — DISCUSSION/SUMMARY
[FreeTextEntry1] : 60 year old woman with HTN here for followup\par Chest pain resolved.\par -cont HCTZ and Valsartan, on Norvasc\par FU in 6 months

## 2021-12-14 NOTE — HISTORY OF PRESENT ILLNESS
[FreeTextEntry1] : Here for followup.\par No new complaints\par EKG reviewed and unchanged\par #HTN- On  HCTZ and Valsartan, on Norvasc\par

## 2021-12-15 ENCOUNTER — APPOINTMENT (OUTPATIENT)
Dept: MAMMOGRAPHY | Facility: IMAGING CENTER | Age: 61
End: 2021-12-15
Payer: MEDICARE

## 2021-12-15 ENCOUNTER — RESULT REVIEW (OUTPATIENT)
Age: 61
End: 2021-12-15

## 2021-12-15 ENCOUNTER — APPOINTMENT (OUTPATIENT)
Dept: ULTRASOUND IMAGING | Facility: IMAGING CENTER | Age: 61
End: 2021-12-15
Payer: MEDICARE

## 2021-12-15 ENCOUNTER — OUTPATIENT (OUTPATIENT)
Dept: OUTPATIENT SERVICES | Facility: HOSPITAL | Age: 61
LOS: 1 days | End: 2021-12-15
Payer: MEDICARE

## 2021-12-15 DIAGNOSIS — C80.1 MALIGNANT (PRIMARY) NEOPLASM, UNSPECIFIED: Chronic | ICD-10-CM

## 2021-12-15 DIAGNOSIS — Z33.2 ENCOUNTER FOR ELECTIVE TERMINATION OF PREGNANCY: Chronic | ICD-10-CM

## 2021-12-15 DIAGNOSIS — E04.1 NONTOXIC SINGLE THYROID NODULE: Chronic | ICD-10-CM

## 2021-12-15 DIAGNOSIS — H26.9 UNSPECIFIED CATARACT: Chronic | ICD-10-CM

## 2021-12-15 DIAGNOSIS — Z00.8 ENCOUNTER FOR OTHER GENERAL EXAMINATION: ICD-10-CM

## 2021-12-15 PROCEDURE — 77067 SCR MAMMO BI INCL CAD: CPT

## 2021-12-15 PROCEDURE — 77067 SCR MAMMO BI INCL CAD: CPT | Mod: 26,LT,52

## 2021-12-15 PROCEDURE — 77063 BREAST TOMOSYNTHESIS BI: CPT

## 2021-12-15 PROCEDURE — 77063 BREAST TOMOSYNTHESIS BI: CPT | Mod: 26,52

## 2021-12-15 PROCEDURE — 76641 ULTRASOUND BREAST COMPLETE: CPT

## 2021-12-15 PROCEDURE — 76641 ULTRASOUND BREAST COMPLETE: CPT | Mod: 26,LT

## 2022-01-31 ENCOUNTER — RX RENEWAL (OUTPATIENT)
Age: 62
End: 2022-01-31

## 2022-02-03 ENCOUNTER — APPOINTMENT (OUTPATIENT)
Dept: GASTROENTEROLOGY | Facility: CLINIC | Age: 62
End: 2022-02-03
Payer: MEDICARE

## 2022-02-03 ENCOUNTER — NON-APPOINTMENT (OUTPATIENT)
Age: 62
End: 2022-02-03

## 2022-02-03 VITALS
HEIGHT: 61 IN | HEART RATE: 63 BPM | WEIGHT: 206 LBS | DIASTOLIC BLOOD PRESSURE: 78 MMHG | SYSTOLIC BLOOD PRESSURE: 112 MMHG | BODY MASS INDEX: 38.89 KG/M2

## 2022-02-03 DIAGNOSIS — K31.84 GASTROPARESIS: ICD-10-CM

## 2022-02-03 DIAGNOSIS — Z80.0 FAMILY HISTORY OF MALIGNANT NEOPLASM OF DIGESTIVE ORGANS: ICD-10-CM

## 2022-02-03 DIAGNOSIS — R11.2 NAUSEA WITH VOMITING, UNSPECIFIED: ICD-10-CM

## 2022-02-03 DIAGNOSIS — K21.9 GASTRO-ESOPHAGEAL REFLUX DISEASE W/OUT ESOPHAGITIS: ICD-10-CM

## 2022-02-03 DIAGNOSIS — Z12.11 ENCOUNTER FOR SCREENING FOR MALIGNANT NEOPLASM OF COLON: ICD-10-CM

## 2022-02-03 DIAGNOSIS — Z12.12 ENCOUNTER FOR SCREENING FOR MALIGNANT NEOPLASM OF COLON: ICD-10-CM

## 2022-02-03 DIAGNOSIS — K21.9 ACUTE LARYNGITIS: ICD-10-CM

## 2022-02-03 DIAGNOSIS — Z87.898 PERSONAL HISTORY OF OTHER SPECIFIED CONDITIONS: ICD-10-CM

## 2022-02-03 DIAGNOSIS — J04.0 ACUTE LARYNGITIS: ICD-10-CM

## 2022-02-03 PROCEDURE — 99205 OFFICE O/P NEW HI 60 MIN: CPT

## 2022-02-03 PROCEDURE — 82274 ASSAY TEST FOR BLOOD FECAL: CPT | Mod: QW

## 2022-02-03 NOTE — PHYSICAL EXAM
[General Appearance - Alert] : alert [General Appearance - In No Acute Distress] : in no acute distress [General Appearance - Well Nourished] : well nourished [General Appearance - Well Developed] : well developed [Sclera] : the sclera and conjunctiva were normal [Neck Appearance] : the appearance of the neck was normal [Neck Cervical Mass (___cm)] : no neck mass was observed [Jugular Venous Distention Increased] : there was no jugular-venous distention [Auscultation Breath Sounds / Voice Sounds] : lungs were clear to auscultation bilaterally [Heart Rate And Rhythm] : heart rate was normal and rhythm regular [Heart Sounds] : normal S1 and S2 [Heart Sounds Gallop] : no gallops [Murmurs] : no murmurs [Heart Sounds Pericardial Friction Rub] : no pericardial rub [Bowel Sounds] : normal bowel sounds [Abdomen Soft] : soft [Abdomen Tenderness] : non-tender [Abdomen Mass (___ Cm)] : no abdominal mass palpated [Normal Sphincter Tone] : normal sphincter tone [No Rectal Mass] : no rectal mass [Internal Hemorrhoid] : internal hemorrhoids [Cervical Lymph Nodes Enlarged Posterior Bilaterally] : posterior cervical [Cervical Lymph Nodes Enlarged Anterior Bilaterally] : anterior cervical [Supraclavicular Lymph Nodes Enlarged Bilaterally] : supraclavicular [Inguinal Lymph Nodes Enlarged Bilaterally] : inguinal [Nail Clubbing] : no clubbing  or cyanosis of the fingernails [Skin Color & Pigmentation] : normal skin color and pigmentation [Skin Turgor] : normal skin turgor [] : no rash [Oriented To Time, Place, And Person] : oriented to person, place, and time [Impaired Insight] : insight and judgment were intact [Affect] : the affect was normal [External Hemorrhoid] : no external hemorrhoids [Occult Blood Positive] : stool was negative for occult blood [FreeTextEntry1] : FIT negative

## 2022-02-03 NOTE — ASSESSMENT
[FreeTextEntry1] : 1. Chronic GERD, LPRD, with frequent nausea and documented gastroparesis, EGJ outflow obstruction; gastritis, gastric polyps at last EGD March 2017--rule out smoldering inflammation, neoplasm.\par 2. Chronic constipation; diverticulosis, hemorrhoids, suboptimal prep at last colonoscopy March 2017; family history of colon cancer (aunt)--rule out colorectal neoplasia.\par 3. Obesity.\par 4. Status post right mastectomy for breast cancer; right arm lymphedema.\par 5. Hypertension.\par 6. History of bipolar disorder.\par 7. Sleep apnea.\par 8. History of anxiety/depression.\par 9. Borderline type 2 diabetes mellitus.\par 10. Ex-smoker; recovering alcoholic.\par 11. Status post hysterectomy, hernia repair, cataract surgery.\par 12. Allergic to Altace.\par \par Plan:\par 1. Extensive medical records reviewed.\par 2. Increase omeprazole to 40 mg AC twice daily.\par 3. Dietary and lifestyle modifications reviewed.\par 4. Schedule repeat panendoscopy--Procedures, rationale, anesthesia plan, and extended PEG prep (clear liquids x 48 hrs, Ondansetron 8 mg AC TID x 48 hrs, Mg citrate, 4 bisacodyl) instructions were reviewed and brochure given.\par

## 2022-02-03 NOTE — CONSULT LETTER
[Dear  ___] : Dear  [unfilled], [Consult Letter:] : I had the pleasure of evaluating your patient, [unfilled]. [Please see my note below.] : Please see my note below. [Consult Closing:] : Thank you very much for allowing me to participate in the care of this patient.  If you have any questions, please do not hesitate to contact me. [Sincerely,] : Sincerely, [FreeTextEntry3] : Álvaro Hair M.D.\par

## 2022-02-03 NOTE — HISTORY OF PRESENT ILLNESS
[FreeTextEntry1] : Denia has had long-standing GERD despite taking Omeprazole 40 mg daily on awakening. She has had frequent raspy voice; recent ENT evaluation (Dr. Dillard) reportedly suggested LPRD. She has had chronic nausea, with hard stools at times. She had been followed by Dr. Goodson. Last EGD March 2017 revealed gastritis, gastric polyps. Same-day colonoscopy revealed suboptimal prep, diverticulosis and hemorrhoids. Motility testing 2017 revealed EGJ outflow obstruction. One aunt had colon cancer, another had pancreatic cancer; there is a strong family history of breast cancer, with Denia testing negative for BRCA gene.

## 2022-02-03 NOTE — REVIEW OF SYSTEMS
[Recent Weight Loss (___ Lbs)] : recent [unfilled] ~Ulb weight loss [Nasal Discharge] : nasal discharge [Hoarseness] : hoarseness [Abdominal Pain] : abdominal pain [Constipation] : constipation [Joint Stiffness] : joint stiffness [Negative] : Heme/Lymph [As Noted in HPI] : as noted in HPI [FreeTextEntry7] : nausea and dark stool [de-identified] : History of breast cancer.  Right breast removed.

## 2022-03-18 ENCOUNTER — NON-APPOINTMENT (OUTPATIENT)
Age: 62
End: 2022-03-18

## 2022-03-30 ENCOUNTER — TRANSCRIPTION ENCOUNTER (OUTPATIENT)
Age: 62
End: 2022-03-30

## 2022-04-05 ENCOUNTER — APPOINTMENT (OUTPATIENT)
Dept: NEUROLOGY | Facility: CLINIC | Age: 62
End: 2022-04-05
Payer: MEDICARE

## 2022-04-05 VITALS
HEIGHT: 61 IN | HEART RATE: 74 BPM | BODY MASS INDEX: 38.71 KG/M2 | SYSTOLIC BLOOD PRESSURE: 172 MMHG | DIASTOLIC BLOOD PRESSURE: 79 MMHG | WEIGHT: 205 LBS

## 2022-04-05 DIAGNOSIS — G43.909 MIGRAINE, UNSPECIFIED, NOT INTRACTABLE, W/OUT STATUS MIGRAINOSUS: ICD-10-CM

## 2022-04-05 PROCEDURE — 99214 OFFICE O/P EST MOD 30 MIN: CPT

## 2022-04-05 NOTE — HISTORY OF PRESENT ILLNESS
[FreeTextEntry1] : COVID VACCINATION 2x. \par \par HPI-Interval hx 20220405:\par 2 weeks pt started to have neck pain, tight, for a few days; she then developed holocephalic pain, radiating to the front; she then developed nausea and vomiting with significant shaking of her hands. The HA then became throbbing in nature, with PPP, on and off for a few days. She too Tylenol with some benefit.\par She was seen at an urgent care, she received probably Benadryl and Reglan, with partial benefit. \par She was also prescribed another medication ($2000) which she never got approved and never took.\par This was the first time she had a HA so severe.\par She still has pain in knees.\par She has developed a bit of ET type shaking in hands and head.\par She is trying to lose weight, c.a. 5-10lb in the last 6 months, but very gradually.\par Rest is stable.\par \par HPI-Interval hx 20211012:\par Pt here for follow-up..\par NPT done in 8/2021 are absolutely benign. 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 She feels much better, lighter and has more endurance. \par \par \par \par HPI-Interval hx 20210420: \par recent SOB, possible CHF (like she had before in the past), will see PCP soon, she felt she gained weight and then lost it over a few weeks, as in retention. This also happened after COVID vaccination, knee shots and changing med to BB from [facility].\par \par Fall 8/2020, tripped on steps carrying things, reports a lesion in the R knee.\par \par Overall she feels ok, managing everything well. \par \par Sleep and appetite are stable. \par Mood is ok. Of note, she did not tolerate higher dose of Wellbutrin (150mg Daily) due to shakes/twitches.\par \par \par \par HPI-Interval Hx [id number]:\par Pt here for follow-up.\par She feels her STM is a bit worse now, she tends to forget dates, names and recent events.\par She has to write a lot of notes and set alarms to remind herself to follow through with things. \par she is still taking care of multiple things though, and trying to run her home.\par Rest is stable. \par Ca in remission. 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 \par Had a fall with head trauma in Feb 2019, CT head negative ([facility]par She is very busy, and has a hard time managing her busy schedule. \par She has a very positive outlook overall. \par \par HPI-Interval Hx 20181009:\par MRI, MRA were negative, ESR 71, but vascular sx evaluation with Echo was negative for TA. \par Still a lot of stress in her life.\par Feels better with Wellbutrin, her face pain and HA have stopped.\par She sleeps very well, and appetite is good.\par She keeps very busy, now preparing for her daughter's wedding.\par \par HPI-Interval Hx [id number]:\par Pt here for follow-up.\par Over the last year, she has been pretty much stable.\par recent Bone Scan and CT have shown no secondary lesions from breast Ca.\par \par She gets seldom sharp pain behind L eye for a few seconds, with slight HA, lasting 5-10 min.\par She also noticed her face sweats a lot, at times her arms too. This apparently is dating 10-20years.\par In all, she feels very irritable and she still c/o STM and attention issues.\par \par PMH:\par 55yo RH AAW, with hx of fibromyalgia, bipolar depression, breast CA s/p resection and chemo in 2012, currently followed up and disease free, colon polyps, arthritis.\par She reports that she sometimes looses attentions and would forget things, e.g. that she is cooking something and would leave things on the stove. She has issues remembering names of people, but always had.\par \par She does not report HA.\par \par Mood: she often gets sad, cries a lot, but has had bipolar depression for a long time, used to be on Lexapro, VPA and Neurontin, stopped a few years ago.\par She used to be in a program at [facility], terminated when she had started using a plant product (Tunguska?). She has a hx of SI/SA in 2004, now denies any SI. \par \par She has a hx of EtOH abuse, sober and in AA for many years.\par \par She has a complex living situation, having to take care of her mother, daughter, grandchildren.\par \par She does not drive, she never did, has fear of it.\par \par Sleep: falls asleep readily, but has to urinate very frequently. Sleeps 10h daily.\par \par Disabled, used to do clerical work in several different settings.\par \par ADl and IADL intact.\par \par Of note, she has a small nodule in L thyroid, under surveillance.

## 2022-04-05 NOTE — PHYSICAL EXAM
[General Appearance - Alert] : alert [General Appearance - In No Acute Distress] : in no acute distress [General Appearance - Well Nourished] : well nourished [General Appearance - Well Developed] : well developed [Oriented To Time, Place, And Person] : oriented to person, place, and time [Impaired Insight] : insight and judgment were intact [Affect] : the affect was normal [Over the Past 2 Weeks, Have You Felt Little Interest or Pleasure Doing Things?] : 2.) Over the past 2 weeks, have you felt little interest or pleasure doing things? Yes [Person] : oriented to person [Place] : oriented to place [Time] : oriented to time [Short Term Intact] : short term memory intact [Remote Intact] : remote memory intact [Registration Intact] : recent registration memory intact [Span Intact] : the attention span was normal [Concentration Intact] : normal concentrating ability [Visual Intact] : visual attention was ~T not ~L decreased [Naming Objects] : no difficulty naming common objects [Repeating Phrases] : no difficulty repeating a phrase [Writing A Sentence] : no difficulty writing a sentence [Fluency] : fluency intact [Comprehension] : comprehension intact [Reading] : reading intact [Current Events] : adequate knowledge of current events [Past History] : adequate knowledge of personal past history [Vocabulary] : adequate range of vocabulary [Total Score ___ / 30] : the patient achieved a score of [unfilled] /30 [Date / Time ___ / 5] : date / time [unfilled] / 5 [Place ___ / 5] : place [unfilled] / 5 [Registration ___ / 3] : registration [unfilled] / 3 [Serial Sevens ___/5] : serial sevens [unfilled] / 5 [Naming 2 Objects ___ / 2] : naming two objects [unfilled] / 2 [Repeating a Sentence ___ / 1] : repeating a sentence [unfilled] / 1 [Writing a Sentence ___ / 1] : write sentence [unfilled] / 1 [3-stage Verbal Command ___ / 3] : three-stage verbal command [unfilled] / 3 [Written Command ___ / 1] : written command [unfilled] / 1 [Copy a Design ___ / 1] : copy a design [unfilled] / 1 [Recall ___ / 3] : recall [unfilled] / 3 [Cranial Nerves Optic (II)] : visual acuity intact bilaterally,  visual fields full to confrontation, pupils equal round and reactive to light [Cranial Nerves Oculomotor (III)] : extraocular motion intact [Cranial Nerves Trigeminal (V)] : facial sensation intact symmetrically [Cranial Nerves Facial (VII)] : face symmetrical [Cranial Nerves Vestibulocochlear (VIII)] : hearing was intact bilaterally [Cranial Nerves Glossopharyngeal (IX)] : tongue and palate midline [Cranial Nerves Accessory (XI - Cranial And Spinal)] : head turning and shoulder shrug symmetric [Cranial Nerves Hypoglossal (XII)] : there was no tongue deviation with protrusion [Motor Strength] : muscle strength was normal in all four extremities [No Muscle Atrophy] : normal bulk in all four extremities [Motor Handedness Right-Handed] : the patient is right hand dominant [Sensation Tactile Decrease] : light touch was intact [Sensation Pain / Temperature Decrease] : pain and temperature was intact [Proprioception] : proprioception was intact [Balance] : balance was intact [2+] : Brachioradialis left 2+ [1+] : Patella left 1+ [0] : Ankle jerk left 0 [Sclera] : the sclera and conjunctiva were normal [PERRL With Normal Accommodation] : pupils were equal in size, round, reactive to light, with normal accommodation [Extraocular Movements] : extraocular movements were intact [No APD] : no afferent pupillary defect [No ANDERSON] : no internuclear ophthalmoplegia [Full Visual Field] : full visual field [Oropharynx] : the oropharynx was normal [Outer Ear] : the ears and nose were normal in appearance [Neck Appearance] : the appearance of the neck was normal [Neck Cervical Mass (___cm)] : no neck mass was observed [Jugular Venous Distention Increased] : there was no jugular-venous distention [Thyroid Diffuse Enlargement] : the thyroid was not enlarged [Thyroid Nodule] : there were no palpable thyroid nodules [Auscultation Breath Sounds / Voice Sounds] : lungs were clear to auscultation bilaterally [Heart Rate And Rhythm] : heart rate was normal and rhythm regular [Heart Sounds] : normal S1 and S2 [Heart Sounds Gallop] : no gallops [Murmurs] : no murmurs [Heart Sounds Pericardial Friction Rub] : no pericardial rub [Arterial Pulses Carotid] : carotid pulses were normal with no bruits [Full Pulse] : the pedal pulses are present [Edema] : there was no peripheral edema [Bowel Sounds] : normal bowel sounds [Abdomen Soft] : soft [Abdomen Tenderness] : non-tender [Abdomen Mass (___ Cm)] : no abdominal mass palpated [No CVA Tenderness] : no ~M costovertebral angle tenderness [No Spinal Tenderness] : no spinal tenderness [Abnormal Walk] : normal gait [Nail Clubbing] : no clubbing  or cyanosis of the fingernails [Musculoskeletal - Swelling] : no joint swelling seen [Motor Tone] : muscle strength and tone were normal [Skin Color & Pigmentation] : normal skin color and pigmentation [Skin Turgor] : normal skin turgor [] : no rash [Over the Past 2 Weeks, Have You Felt Down, Depressed, or Hopeless?] : 1.) Over the past 2 weeks, have you felt down, depressed, or hopeless? No [Motor Strength Upper Extremities Bilaterally] : strength was normal in both upper extremities [Motor Strength Lower Extremities Bilaterally] : strength was normal in both lower extremities [Romberg's Sign] : Romberg's sign was negtive [Allodynia] : no ~T allodynia present [Dysesthesia] : no dysesthesia [Hyperesthesia] : no hyperesthesia [Limited Balance] : balance was intact [Past-pointing] : there was no past-pointing [Tremor] : no tremor present [Dysdiadochokinesia Bilaterally] : not present [Coordination - Dysmetria Impaired Finger-to-Nose Bilateral] : not present [Coordination - Dysmetria Impaired Heel-to-Shin Bilateral] : not present [Plantar Reflex Right Only] : normal on the right [Plantar Reflex Left Only] : normal on the left [FreeTextEntry4] : Alt pattern: intact\par Clock: 3/3\par Luria: Intact.\par Trail A: 20"; B: 50"\par Fluency: intact. [FreeTextEntry5] : small pupils, surgical, reactive; no pain on OO compression.  [FreeTextEntry6] : strength  limited by bilateral knee pain. Mild ET type tremor in head and hands. [FreeTextEntry7] : decreased vibration sens distal LE. [FreeTextEntry8] : balance limited by bilateral knee pain [FreeTextEntry1] : L chest scar from old port.

## 2022-04-05 NOTE — ASSESSMENT
[FreeTextEntry1] : Assesment: \par 62yo RH AAW, with subjective memory issues. \par She has peripheral neuropathy in LE and antalgic limitation of RLE>LLE due to knees pain.\par Anxiety, depression and lot of stress are present.\par Mental and neuro exam continue to be ok.\par Last NPT are wnl.\par \par Recent development of HA and migraine, 1st time atypical.\par Also, she seems to have a mild ET.\par Will get MRI brain wow moira to r/o any issue there, but would otherwise manage conservatively for now.\par She may need to change GBP to PGL in the future for neuropathic pain.\par \par Diagnostic Impression:\par -anxiety/depression\par -subjective memory impairment\par -Migraine\par -ET\par -neuropathy.\par \par Plan: \par -repeat MRI brain wow moira\par -basic labs, B vitamins.\par \par \par A thorough discussion was entertained with the patient/caregiver regarding the use of psychoactive medications, their possible benefits and AE profile, including the risk of cardiovascular complications, including but not limited to applicable black box warning and teratogenicity, where appropriate.\par We discussed the benefits of being active, physically and mentally, and the need to to establish a routine in this respect.\par Driving abilities and firearms possession and use were discussed, in relation to progression of the cognitive decline, and the need to assess them periodically.\par Patient/caregiver advised to bring previous records to this office.\par All questions were answered at the time of the visit. We are certainly available for further discussion as needed.\par Patient/caregiver fully understands and agree with the plan.\par

## 2022-04-11 PROBLEM — J04.0 REFLUX LARYNGITIS: Status: ACTIVE | Noted: 2022-02-03

## 2022-05-02 ENCOUNTER — APPOINTMENT (OUTPATIENT)
Dept: INTERNAL MEDICINE | Facility: CLINIC | Age: 62
End: 2022-05-02
Payer: MEDICARE

## 2022-05-02 PROCEDURE — 99213 OFFICE O/P EST LOW 20 MIN: CPT | Mod: 95

## 2022-05-02 NOTE — ASSESSMENT
[FreeTextEntry1] : 63 y/o F with obesity, arthritis, HTN, DM, breast cancer s/p mastectomy/chemo/xrt with chronic lymphedema, chronic pain/fibromyalgia seen via telemed visit for follow up\par \par Fibromyalgia/Chronic pain - seems to be worse in the past week or so\par - Would continue f/u with Pain Management, as scheduled this week\par - Consider Rheum referral; possible trial of lyrica\par - Would come in for in person evaluation\par \par Subjective memory issues/Headaches\par - Pending MRI brain\par - Continue neuro follow up\par \par \par Obesity - continues to lose weight\par - Will check labs at next visit\par \par RTC for in person visit in 1-2 months.

## 2022-05-02 NOTE — PHYSICAL EXAM
[Normal] : no acute distress, well nourished, well developed and well-appearing [No Respiratory Distress] : no respiratory distress  [No Focal Deficits] : no focal deficits

## 2022-05-02 NOTE — REVIEW OF SYSTEMS
[Joint Pain] : joint pain [Joint Stiffness] : joint stiffness [Joint Swelling] : joint swelling [Muscle Weakness] : muscle weakness [Muscle Pain] : muscle pain [Back Pain] : back pain [Headache] : headache [Negative] : Respiratory

## 2022-05-02 NOTE — HISTORY OF PRESENT ILLNESS
[Home] : at home, [unfilled] , at the time of the visit. [Medical Office: ([facility])___] : at the medical office located in  [Verbal consent obtained from patient] : the patient, [unfilled] [FreeTextEntry1] : Follow up [de-identified] : Says that has pain all over - lots of pain in the back of the legs; mostly in the muscles. \par Also having pains in the neck and all over. Wondering if this is her fibromyalgia.\par Has been feeling like this for about a week or so.\par No fevers. No URI sx.\par \par Will be having brain MRI on 5/6 with Dr. Cartwright. Reports that has been having memory loss and had a bad migraine for 3-4 days last week.\par \par Tomorrow will be having back procedure by Dr. Lord (from Pain Management) and had MRI that showed herniation.\par Has been having weakness in the legs.\par Also will be having shots in the shoulders, which seems to help her symptoms.\par \par Continues to lose weight.\par \par Prior to 1 week ago, felt well overall.

## 2022-05-03 ENCOUNTER — APPOINTMENT (OUTPATIENT)
Dept: INTERNAL MEDICINE | Facility: CLINIC | Age: 62
End: 2022-05-03
Payer: MEDICARE

## 2022-05-03 VITALS
HEART RATE: 67 BPM | DIASTOLIC BLOOD PRESSURE: 84 MMHG | WEIGHT: 205 LBS | SYSTOLIC BLOOD PRESSURE: 158 MMHG | TEMPERATURE: 98.3 F | HEIGHT: 61 IN | OXYGEN SATURATION: 96 % | BODY MASS INDEX: 38.71 KG/M2

## 2022-05-03 PROCEDURE — 99214 OFFICE O/P EST MOD 30 MIN: CPT

## 2022-05-03 NOTE — REVIEW OF SYSTEMS
[Fatigue] : fatigue [Joint Pain] : joint pain [Muscle Pain] : muscle pain [Back Pain] : back pain [Negative] : Integumentary

## 2022-05-03 NOTE — PHYSICAL EXAM
[Well Nourished] : well nourished [Well Developed] : well developed [Normal Sclera/Conjunctiva] : normal sclera/conjunctiva [No Respiratory Distress] : no respiratory distress  [No Accessory Muscle Use] : no accessory muscle use [Clear to Auscultation] : lungs were clear to auscultation bilaterally [Normal] : normal rate, regular rhythm, normal S1 and S2 and no murmur heard [de-identified] : lymphedema RUE

## 2022-05-03 NOTE — ASSESSMENT
[FreeTextEntry1] : 1)  DM \par - well controlled\par - ct meds \par - optho \par - check A1C \par \par 2)  HTN \par - ct HCTZ  25 , CT amlodipine valsartan \par - f/u in 3 monse\par \par 3) rheum f/u \par \par 4) has colonoscopy scheduled for next week \par \par 5) HA/ memory issues \par - seeing neuro \par -scheduled for MRI \par \par 6) Breast cancer \par - post RT / chemo / sx \par - mammo - UTD L \par

## 2022-05-03 NOTE — HISTORY OF PRESENT ILLNESS
[FreeTextEntry1] : pt here for f/u in person bc according to pt she could not log onto the appt for telehealth and only spoke w/ DR Rios on the phone \par she was told to come for in person appointment \par \par she reports that her back pain is worsening but she is following up with pain management for this \par she has the epidural shot today \par \par she also has ch neck pain \par uses cane bc back pain \par \par pt has been trying to lose wieght w/ weight watchers \par she is compliant w/ her other meds \par \par no chest pain or SOB \par \par she is seeing neuro for the memory issues  and the headaches \par \par pt has Ch RUE Lymphedema 2/2 sx for breast cancer \par

## 2022-05-04 LAB
ANION GAP SERPL CALC-SCNC: 16 MMOL/L
BUN SERPL-MCNC: 14 MG/DL
CALCIUM SERPL-MCNC: 10 MG/DL
CHLORIDE SERPL-SCNC: 99 MMOL/L
CO2 SERPL-SCNC: 25 MMOL/L
CREAT SERPL-MCNC: 1.17 MG/DL
EGFR: 52 ML/MIN/1.73M2
ESTIMATED AVERAGE GLUCOSE: 128 MG/DL
GLUCOSE SERPL-MCNC: 97 MG/DL
HBA1C MFR BLD HPLC: 6.1 %
POTASSIUM SERPL-SCNC: 4.7 MMOL/L
SODIUM SERPL-SCNC: 140 MMOL/L

## 2022-05-06 ENCOUNTER — APPOINTMENT (OUTPATIENT)
Dept: MRI IMAGING | Facility: CLINIC | Age: 62
End: 2022-05-06
Payer: MEDICARE

## 2022-05-06 ENCOUNTER — NON-APPOINTMENT (OUTPATIENT)
Age: 62
End: 2022-05-06

## 2022-05-06 ENCOUNTER — OUTPATIENT (OUTPATIENT)
Dept: OUTPATIENT SERVICES | Facility: HOSPITAL | Age: 62
LOS: 1 days | End: 2022-05-06
Payer: MEDICARE

## 2022-05-06 DIAGNOSIS — E04.1 NONTOXIC SINGLE THYROID NODULE: Chronic | ICD-10-CM

## 2022-05-06 DIAGNOSIS — G43.909 MIGRAINE, UNSPECIFIED, NOT INTRACTABLE, WITHOUT STATUS MIGRAINOSUS: ICD-10-CM

## 2022-05-06 DIAGNOSIS — Z33.2 ENCOUNTER FOR ELECTIVE TERMINATION OF PREGNANCY: Chronic | ICD-10-CM

## 2022-05-06 DIAGNOSIS — C80.1 MALIGNANT (PRIMARY) NEOPLASM, UNSPECIFIED: Chronic | ICD-10-CM

## 2022-05-06 DIAGNOSIS — H26.9 UNSPECIFIED CATARACT: Chronic | ICD-10-CM

## 2022-05-06 PROCEDURE — 70552 MRI BRAIN STEM W/DYE: CPT | Mod: ME

## 2022-05-06 PROCEDURE — G1004: CPT

## 2022-05-06 PROCEDURE — 70552 MRI BRAIN STEM W/DYE: CPT | Mod: 26,ME

## 2022-05-06 PROCEDURE — A9585: CPT

## 2022-05-08 LAB
CRP SERPL-MCNC: <3 MG/L
ERYTHROCYTE [SEDIMENTATION RATE] IN BLOOD BY WESTERGREN METHOD: 83 MM/HR
FERRITIN SERPL-MCNC: 18 NG/ML
FOLATE SERPL-MCNC: >20 NG/ML
IRON SATN MFR SERPL: 12 %
IRON SERPL-MCNC: 41 UG/DL
TIBC SERPL-MCNC: 346 UG/DL
TRANSFERRIN SERPL-MCNC: 267 MG/DL
UIBC SERPL-MCNC: 305 UG/DL
VIT B12 SERPL-MCNC: 535 PG/ML

## 2022-05-09 ENCOUNTER — APPOINTMENT (OUTPATIENT)
Dept: GASTROENTEROLOGY | Facility: CLINIC | Age: 62
End: 2022-05-09

## 2022-05-09 LAB — SARS-COV-2 N GENE NPH QL NAA+PROBE: NOT DETECTED

## 2022-05-16 LAB
PANTOTHENATE SERPLBLD-MCNC: 136.7 NG/ML
VIT B2 SERPL-MCNC: 210 UG/L
VIT B6 SERPL-MCNC: 14.4 UG/L

## 2022-05-19 NOTE — H&P PST ADULT - NS MD HP INPLANTS MED DEV
Sharon presents today for her OB visit with biophysical profile.    Patient would like communication of their results via: WeVideo    Patient's current my[facility] status: Active.     Chaperone needed:  yes    Baby Scripts - Patient is on Team [employer] Platform Scheduling.            hernia mesh Scribe Attestation (For Scribes USE Only)... Attending Attestation (For Attendings USE Only).../Scribe Attestation (For Scribes USE Only)...

## 2022-05-27 LAB — VIT B1 SERPL-MCNC: 133 NMOL/L

## 2022-06-14 ENCOUNTER — NON-APPOINTMENT (OUTPATIENT)
Age: 62
End: 2022-06-14

## 2022-06-14 ENCOUNTER — APPOINTMENT (OUTPATIENT)
Dept: CARDIOLOGY | Facility: CLINIC | Age: 62
End: 2022-06-14
Payer: MEDICARE

## 2022-06-14 VITALS
BODY MASS INDEX: 38.33 KG/M2 | WEIGHT: 203 LBS | SYSTOLIC BLOOD PRESSURE: 156 MMHG | DIASTOLIC BLOOD PRESSURE: 76 MMHG | HEART RATE: 68 BPM | HEIGHT: 61 IN | OXYGEN SATURATION: 99 %

## 2022-06-14 PROCEDURE — 99213 OFFICE O/P EST LOW 20 MIN: CPT

## 2022-06-14 PROCEDURE — 93000 ELECTROCARDIOGRAM COMPLETE: CPT

## 2022-06-14 NOTE — DISCUSSION/SUMMARY
[FreeTextEntry1] : 60 year old woman with HTN here for followup\par Chest pain resolved.\par -cont HCTZ and Valsartan, on Norvasc-->will add Coreg 3.125 mg BID\par

## 2022-06-14 NOTE — HISTORY OF PRESENT ILLNESS
[FreeTextEntry1] : Here for followup.\par No new complaints\par EKG reviewed and unchanged\par BP has been fluctuating\par \par #HTN- On  HCTZ and Valsartan, on Norvasc\par SHe increased her HCTZ to BID on her own\par Will add Coreg 3.125 mg BID\par

## 2022-06-27 NOTE — REVIEW OF SYSTEMS
lvm for patient to call back    EARL/CHRIS/Emeka [Joint Pain] : joint pain [Joint Stiffness] : joint stiffness [Joint Swelling] : no joint swelling [Muscle Weakness] : no muscle weakness [Muscle Pain] : muscle pain [Back Pain] : back pain [Negative] : Neurological [FreeTextEntry2] : fall

## 2022-07-15 LAB — SARS-COV-2 N GENE NPH QL NAA+PROBE: NOT DETECTED

## 2022-07-18 ENCOUNTER — APPOINTMENT (OUTPATIENT)
Dept: INTERNAL MEDICINE | Facility: CLINIC | Age: 62
End: 2022-07-18

## 2022-07-18 VITALS
HEIGHT: 61 IN | SYSTOLIC BLOOD PRESSURE: 128 MMHG | HEART RATE: 70 BPM | DIASTOLIC BLOOD PRESSURE: 74 MMHG | WEIGHT: 205 LBS | BODY MASS INDEX: 38.71 KG/M2 | TEMPERATURE: 98.2 F | OXYGEN SATURATION: 96 %

## 2022-07-18 DIAGNOSIS — R22.2 LOCALIZED SWELLING, MASS AND LUMP, TRUNK: ICD-10-CM

## 2022-07-18 PROCEDURE — 99214 OFFICE O/P EST MOD 30 MIN: CPT

## 2022-07-18 RX ORDER — GABAPENTIN 300 MG
300 TABLET ORAL
Refills: 0 | Status: ACTIVE | COMMUNITY

## 2022-07-18 NOTE — ASU PREOP CHECKLIST - ANTIBIOTIC
Is This A New Presentation, Or A Follow-Up?: Skin Lesions
What Type Of Note Output Would You Prefer (Optional)?: Standard Output
How Severe Is Your Skin Lesion?: mild
Has Your Skin Lesion Been Treated?: not been treated
n/a

## 2022-08-02 ENCOUNTER — APPOINTMENT (OUTPATIENT)
Dept: RHEUMATOLOGY | Facility: CLINIC | Age: 62
End: 2022-08-02

## 2022-08-02 VITALS
WEIGHT: 202 LBS | TEMPERATURE: 98.2 F | BODY MASS INDEX: 38.14 KG/M2 | SYSTOLIC BLOOD PRESSURE: 142 MMHG | HEART RATE: 66 BPM | DIASTOLIC BLOOD PRESSURE: 67 MMHG | HEIGHT: 61 IN | RESPIRATION RATE: 16 BRPM | OXYGEN SATURATION: 94 %

## 2022-08-02 DIAGNOSIS — M54.50 LOW BACK PAIN, UNSPECIFIED: ICD-10-CM

## 2022-08-02 DIAGNOSIS — G89.29 LOW BACK PAIN, UNSPECIFIED: ICD-10-CM

## 2022-08-02 PROCEDURE — 99205 OFFICE O/P NEW HI 60 MIN: CPT

## 2022-08-02 NOTE — PHYSICAL EXAM
[General Appearance - Alert] : alert [General Appearance - In No Acute Distress] : in no acute distress [Auscultation Breath Sounds / Voice Sounds] : lungs were clear to auscultation bilaterally [Heart Sounds] : normal S1 and S2 [Edema] : there was no peripheral edema [No Spinal Tenderness] : no spinal tenderness [Nail Clubbing] : no clubbing  or cyanosis of the fingernails [Musculoskeletal - Swelling] : no joint swelling seen [Motor Tone] : muscle strength and tone were normal [FreeTextEntry1] : No active synovitis; trigger points are active over the trapezius and lateral epicondyles, paraspinal muscles, trochanteric bursa and pes anserine bilaterally [] : no rash [Skin Lesions] : no skin lesions [Motor Exam] : the motor exam was normal [Oriented To Time, Place, And Person] : oriented to person, place, and time [Impaired Insight] : insight and judgment were intact

## 2022-08-02 NOTE — ASSESSMENT
[FreeTextEntry1] : Patient with a history of fibromyalgia; diffuse arthralgias:\par \par Would like to obtain additional inflammatory markers and serologies to assess for underlying etiologies that may lead to fibromyalgia.  Additional antiphospholipid antibodies ordered in the setting of chronic ischemic changes.\par Patient to have additional guidance from neurology for further MS work-up.  Additional hand and feet films requested in the setting of family history of RA.\par Patient will benefit from physical therapy to help with joint mobility and muscle strengthening; position given.  Quadriceps strengthening exercises demonstrated in the office.  Weight loss has been encouraged to reduce load over the medial joint line.  Viscosupplementation has been encouraged to provide additional lubrication and joint support. \par Patient understands the importance of weight loss reduction in the setting of HTN, DM and its association of cardiovascular risk.  Nutrition consult given.  Whole food plant-based diet encouraged.  The importance of dietary and lifestyle modifications was reiterated for the treatment of Fibromyalgia along with discussions on relaxation, stress-reducing techniques and importance of good sleep hygiene.  \par \par She is in agreement with the above plan and will return in three months' time.\par \par

## 2022-08-02 NOTE — HISTORY OF PRESENT ILLNESS
[FreeTextEntry1] : Patient presents and explains longstanding diagnosis of fibromyalgia.  She reports diffuse arthralgias most notably in the lower back and the lower extremities with muscle sensation of cramping and throbbing.  Patient is on multiple neuropathic pain therapies along with mood stabilizers.  Patient also reports notable pain in the hands and the feet with cramping sensations; she explains sister with a diagnosis of rheumatoid arthritis.  She denies accompanied swelling of the joints.  \par She explains long camarena of alcoholism and has been free of alcohol for the last 18 years as well as free of drug use from cocaine.  She explains she has had shakiness and her tremors that are currently under evaluation.  \par She explains a history of HER2 positive breast CA status post right mastectomy with chemotx and radiation.\par She explains performing dietary and lifestyle modifications and has fluctuating weights during the pandemic.  She feels like she is lost at least 30 to 40 pounds over the last few years.  Patient explains recent COVID-19 infection in July with mild symptoms with mucus production without dyspnea or chest pain or requiring antiviral therapy.  \par \par She otherwise denies visual disturbances, oral ulcers, dyspnea, chest pain, motor disturbances, Raynaud's, rash or systemic symptoms. [Weight Loss] : no weight loss [Malaise] : no malaise [Fever] : no fever [Fatigue] : no fatigue [Skin Lesions] : no lesions [Skin Nodules] : no skin nodules [Oral Ulcers] : no oral ulcers [Cough] : no cough [Dry Mouth] : no dry mouth [Shortness of Breath] : no shortness of breath [Chest Pain] : no chest pain [Arthralgias] : arthralgias [Joint Deformity] : joint deformity [Decreased ROM] : decreased range of motion [Morning Stiffness] : morning stiffness [Difficulty Standing] : difficulty standing [Difficulty Walking] : difficulty walking [Muscle Cramping] : muscle cramping [Eye Redness] : no eye redness

## 2022-08-02 NOTE — REVIEW OF SYSTEMS
[Fever] : no fever [Chills] : no chills [Sore Throat] : no sore throat [Hoarseness] : no hoarseness [Chest Pain] : no chest pain [Palpitations] : no palpitations [Cough] : no cough [SOB on Exertion] : no shortness of breath during exertion [Joint Swelling] : no joint swelling [Joint Stiffness] : joint stiffness [Difficulty Walking] : difficulty walking [Feelings Of Weakness] : no feelings of weakness [Easy Bleeding] : no tendency for easy bleeding [Easy Bruising] : no tendency for easy bruising [de-identified] : Low mood

## 2022-08-02 NOTE — CONSULT LETTER
[Dear  ___] : Dear  [unfilled], [Consult Letter:] : I had the pleasure of evaluating your patient, [unfilled]. [Please see my note below.] : Please see my note below. [Consult Closing:] : Thank you very much for allowing me to participate in the care of this patient.  If you have any questions, please do not hesitate to contact me. [Sincerely,] : Sincerely, [FreeTextEntry2] : Karol Rios MD [FreeTextEntry3] : Sharon Garza M.D., RhMSUS\par  of Medicine \par [facility] [employer] [facility]\par \par

## 2022-08-04 NOTE — HISTORY OF PRESENT ILLNESS
[FreeTextEntry1] : Follow up [de-identified] : Last seen 5/2022\par \par Tested positive for COVID on 7/6; felt exhausted. No cough. Small amount of mucus. Took mucinex and rested.\par \par Obesity - A1c 6.1 5/2022. Has lost weight about 30 lbs.\par \par Chronic pain/fibromyalgia\par Knees are still a constant problem. Also having some radiating pain in the back down to knees. Has been on gabapentin. Also using topical meds. Mostly at night. \par Also having some neuropathy in the feet. Hands also still get numb.\par Has been seeing Pain Management - had epidural and sx improved x 2 weeks.\par Last week had some injections in the arm. \par Currently on gabapentin 300 qAM and 600 qPM. \par \par HTN - on hctz, valsartan, norvasc; saw Cards 6/2022 and coreg added on. Patient said she started coreg but had bilateral leg swelling and gained 3-4 lbs. Tried only taking once a day\par \par Stage III cancer ER/CT neg and Her2 positive - s/p R mastectomy with AXLD (2012) followed by chemo + radiation. L mammo done 12/2021.\par \par Headaches - MRI brain 5/2022 with chronic ischemic changes in brainstem (R>L predominantly in paige). Multiple white matter lesions

## 2022-08-04 NOTE — ASSESSMENT
[FreeTextEntry1] : 63 y/o F with obesity, arthritis, HTN, DM, breast cancer s/p mastectomy/chemo/xrt with chronic lymphedema, chronic pain/fibromyalgia seen for follow up\par \par HTN - not at goal\par - increase amlodipine to 10mg daily\par - decrease hctz back down to 25mg daily (patient self-increased to 25mg BID).\par - Monitor BPs at home\par \par Peripheral neuropathy\par - increase gabapentin to 900mg QHS\par - Continue follow up with pain management\par \par Obesity - has lost weight through diet/exercise\par - Continue current regimen\par \par RTC in 3 months or sooner prn.

## 2022-08-09 ENCOUNTER — OUTPATIENT (OUTPATIENT)
Dept: OUTPATIENT SERVICES | Facility: HOSPITAL | Age: 62
LOS: 1 days | End: 2022-08-09
Payer: MEDICARE

## 2022-08-09 ENCOUNTER — LABORATORY RESULT (OUTPATIENT)
Age: 62
End: 2022-08-09

## 2022-08-09 ENCOUNTER — NON-APPOINTMENT (OUTPATIENT)
Age: 62
End: 2022-08-09

## 2022-08-09 ENCOUNTER — APPOINTMENT (OUTPATIENT)
Dept: RADIOLOGY | Facility: CLINIC | Age: 62
End: 2022-08-09

## 2022-08-09 ENCOUNTER — OUTPATIENT (OUTPATIENT)
Dept: OUTPATIENT SERVICES | Facility: HOSPITAL | Age: 62
LOS: 1 days | End: 2022-08-09

## 2022-08-09 ENCOUNTER — APPOINTMENT (OUTPATIENT)
Dept: ULTRASOUND IMAGING | Facility: CLINIC | Age: 62
End: 2022-08-09

## 2022-08-09 ENCOUNTER — RESULT REVIEW (OUTPATIENT)
Age: 62
End: 2022-08-09

## 2022-08-09 DIAGNOSIS — Z33.2 ENCOUNTER FOR ELECTIVE TERMINATION OF PREGNANCY: Chronic | ICD-10-CM

## 2022-08-09 DIAGNOSIS — C80.1 MALIGNANT (PRIMARY) NEOPLASM, UNSPECIFIED: Chronic | ICD-10-CM

## 2022-08-09 DIAGNOSIS — H26.9 UNSPECIFIED CATARACT: Chronic | ICD-10-CM

## 2022-08-09 DIAGNOSIS — E04.1 NONTOXIC SINGLE THYROID NODULE: Chronic | ICD-10-CM

## 2022-08-09 DIAGNOSIS — M25.541 PAIN IN JOINTS OF RIGHT HAND: ICD-10-CM

## 2022-08-09 DIAGNOSIS — R22.2 LOCALIZED SWELLING, MASS AND LUMP, TRUNK: ICD-10-CM

## 2022-08-09 PROCEDURE — 73130 X-RAY EXAM OF HAND: CPT | Mod: 26,50

## 2022-08-09 PROCEDURE — 73630 X-RAY EXAM OF FOOT: CPT | Mod: 26,50

## 2022-08-09 PROCEDURE — 76536 US EXAM OF HEAD AND NECK: CPT | Mod: 26

## 2022-08-09 PROCEDURE — 73130 X-RAY EXAM OF HAND: CPT

## 2022-08-09 PROCEDURE — 76536 US EXAM OF HEAD AND NECK: CPT

## 2022-08-09 PROCEDURE — 73630 X-RAY EXAM OF FOOT: CPT

## 2022-08-10 LAB
25(OH)D3 SERPL-MCNC: 47.2 NG/ML
APPEARANCE: CLEAR
APTT 2H P 1:4 NP PPP: 34.7 SEC
APTT 2H P INC PPP: 35.6 SEC
APTT BLD: 38.7 SEC
APTT IMM NP/PRE NP PPP: 35.9 SEC
APTT INV RATIO PPP: 38.7 SEC
BILIRUBIN URINE: NEGATIVE
BLOOD URINE: NEGATIVE
C3 SERPL-MCNC: 144 MG/DL
C4 SERPL-MCNC: 36 MG/DL
COLOR: YELLOW
CRP SERPL-MCNC: 5 MG/L
ERYTHROCYTE [SEDIMENTATION RATE] IN BLOOD BY WESTERGREN METHOD: 119 MM/HR
GLUCOSE QUALITATIVE U: NEGATIVE
KETONES URINE: NEGATIVE
LEUKOCYTE ESTERASE URINE: NEGATIVE
NITRITE URINE: NEGATIVE
NPP NORMAL POOLED PLASMA: 32.7 SECS
PH URINE: 6
PROTEIN URINE: NEGATIVE
RHEUMATOID FACT SER QL: 10 IU/ML
SPECIFIC GRAVITY URINE: 1.02
TSH SERPL-ACNC: 2 UIU/ML
UROBILINOGEN URINE: NORMAL

## 2022-08-15 LAB
ACE BLD-CCNC: 14 U/L
ANA PAT FLD IF-IMP: ABNORMAL
ANACR T: ABNORMAL
B2 GLYCOPROT1 IGA SERPL IA-ACNC: 6.6 SAU
B2 GLYCOPROT1 IGG SER-ACNC: <5 SGU
B2 GLYCOPROT1 IGM SER-ACNC: 5.9 SMU
CARDIOLIPIN IGM SER-MCNC: 6.1 MPL
CARDIOLIPIN IGM SER-MCNC: <5 GPL
CCP AB SER IA-ACNC: 9 UNITS
DEPRECATED CARDIOLIPIN IGA SER: <5 APL
RF+CCP IGG SER-IMP: NEGATIVE

## 2022-08-15 NOTE — H&P PST ADULT - TRANSFUSION PREMEDICATION REQUIRED
OB INTAKE INTERVIEW  Pt presents for OB intake  Plan:  - Prenatal labs ordered  - Referral given for MFM- NT not scheduled  Will call today to make an apt  Level 2 scheduled for 10/25  - Reviewed Genetic testing options   -SMA and CF done in prior pregnancy and negative  - Patient to call for concerns  - RTO 3-4 weeks for OB F/U visit and PAP/Cultures     [id number]  OB History    Para Term  AB Living   4 2 2   1 2   SAB IAB Ectopic Multiple Live Births   1       2      # Outcome Date GA Lbr Wm/2nd Weight Sex Delivery Anes PTL Lv   4 Current            3 SAB 2022 4w0d    SAB      2 Term 10/21/18 39w0d / 02:08 3245 g (7 lb 2 5 oz) M Vag-Spont EPI  HAMZAH   1 Term 14 40w1d  3600 g (7 lb 15 oz) M Vag-Spont EPI  HAMZAH      Obstetric Comments   Menarche 11     Hx of  delivery prior to 36 weeks 6 days: No  Last Menstrual Period:    Patient's last menstrual period was 2022 (exact date)  Ultrasound date: 22  8 weeks 4 days TWINS    Estimated Date of Delivery: 3/12/23    Current Issues:  Constipation :   No  Headaches :   No  Cramping:  No  Spotting :   No      Interview education   [employer] Pregnancy Essentials reviewed and discussed    Baby and Me [address] Handout   [facility] MFM Handout   Discussed genetic testing   Prenatal lab work: Scripts printed and given to pt   Influenza vaccine given today: No   Discussed Tdap vaccine  Immunizations: There is no immunization history on file for this patient        Prior Pregnancy Delivery Complications   History of  delivery or PPROM: No  History of Shoulder Dystocia: No   History of vacuum or forceps delivery: No   History of 3rd/4th degree laceration: No   History of  section: No    Diabetes              Pregestational DM:  No              hx of GDM: No              BMI >35: No              first degree relative with type 2 diabetes: No              hx of PCOS: No              current metformin use: No              prior hx of LGA/macrosomia: No               Hypertension              Hx of chronic HTN: No              hx of gestational HTN: No              hx of preeclampsia, eclampsia, or HELLP syndrome: No              Family h/o preeclampsia: No              Age 28 or older: No              Multifetal gestation: Yes  Type 1 or Type 2 DM: No  Renal Disease: No  Autoimmune disease (systemic lupus erythematosus, antiphospholipid antibody syndrome): No  Nulliparity: No  Obesity (BMI over 30): No  More than 10 year pregnancy interval: No  Previous IUGR, low birthweight or small for gestational age: No     Immunizations:              influenza vaccine: Discussed Recommendation               discussed Tdap vaccine administration at 27-28 weeks   Covid Vaccination: Vaccinated     Dental visit with last 6 months - Yes  PHQ-2/9 score: 0       MyChart activated (not 1318 years of age)?: Yes    The patient was oriented to our practice and all questions were answered    Interviewed by: Angel Diaz RN 08/15/22 none

## 2022-08-18 ENCOUNTER — APPOINTMENT (OUTPATIENT)
Dept: RHEUMATOLOGY | Facility: CLINIC | Age: 62
End: 2022-08-18

## 2022-08-18 PROCEDURE — 99442: CPT | Mod: 95

## 2022-09-13 ENCOUNTER — APPOINTMENT (OUTPATIENT)
Dept: CARDIOLOGY | Facility: CLINIC | Age: 62
End: 2022-09-13

## 2022-09-13 ENCOUNTER — NON-APPOINTMENT (OUTPATIENT)
Age: 62
End: 2022-09-13

## 2022-09-13 VITALS
SYSTOLIC BLOOD PRESSURE: 124 MMHG | BODY MASS INDEX: 38.55 KG/M2 | HEART RATE: 66 BPM | TEMPERATURE: 98.6 F | DIASTOLIC BLOOD PRESSURE: 75 MMHG | OXYGEN SATURATION: 99 % | HEIGHT: 61 IN

## 2022-09-13 VITALS — WEIGHT: 204 LBS | BODY MASS INDEX: 38.55 KG/M2

## 2022-09-13 PROCEDURE — 93000 ELECTROCARDIOGRAM COMPLETE: CPT

## 2022-09-13 PROCEDURE — 99213 OFFICE O/P EST LOW 20 MIN: CPT

## 2022-09-13 RX ORDER — HYDROXYCHLOROQUINE SULFATE 200 MG/1
200 TABLET, FILM COATED ORAL TWICE DAILY
Qty: 180 | Refills: 1 | Status: DISCONTINUED | COMMUNITY
Start: 2022-08-18 | End: 2022-09-13

## 2022-09-13 RX ORDER — POLYETHYLENE GLYCOL 3350, SODIUM CHLORIDE, SODIUM BICARBONATE AND POTASSIUM CHLORIDE WITH LEMON FLAVOR 420; 11.2; 5.72; 1.48 G/4L; G/4L; G/4L; G/4L
420 POWDER, FOR SOLUTION ORAL
Qty: 1 | Refills: 0 | Status: DISCONTINUED | COMMUNITY
Start: 2022-02-03 | End: 2022-09-13

## 2022-09-13 RX ORDER — CLOTRIMAZOLE 10 MG/G
1 CREAM TOPICAL TWICE DAILY
Qty: 1 | Refills: 1 | Status: DISCONTINUED | COMMUNITY
Start: 2018-04-27 | End: 2022-09-13

## 2022-09-13 RX ORDER — CARVEDILOL 3.12 MG/1
3.12 TABLET, FILM COATED ORAL TWICE DAILY
Qty: 60 | Refills: 2 | Status: DISCONTINUED | COMMUNITY
Start: 2022-06-14 | End: 2022-09-13

## 2022-09-13 RX ORDER — TRIAMCINOLONE ACETONIDE 1 MG/G
0.1 OINTMENT TOPICAL
Qty: 1 | Refills: 0 | Status: DISCONTINUED | COMMUNITY
Start: 2019-03-26 | End: 2022-09-13

## 2022-09-13 NOTE — DISCUSSION/SUMMARY
[FreeTextEntry1] : 62 year old woman with HTN here for followup\par Chest pain resolved.\par -cont HCTZ and Valsartan, on Norvasc-\par BP well controlled \par  [EKG obtained to assist in diagnosis and management of assessed problem(s)] : EKG obtained to assist in diagnosis and management of assessed problem(s)

## 2022-09-13 NOTE — HISTORY OF PRESENT ILLNESS
[FreeTextEntry1] : Here for followup. Feels well. BP well controlled. Off Coreg. \par No new complaints\par EKG reviewed and unchanged\par BP has been fluctuating\par \par #HTN- On  HCTZ and Valsartan, on Norvasc 10 mg \par She increased her HCTZ to BID on her own\par \par

## 2022-09-15 ENCOUNTER — APPOINTMENT (OUTPATIENT)
Dept: ENDOCRINOLOGY | Facility: CLINIC | Age: 62
End: 2022-09-15

## 2022-09-15 PROCEDURE — 97802 MEDICAL NUTRITION INDIV IN: CPT

## 2022-09-19 ENCOUNTER — APPOINTMENT (OUTPATIENT)
Dept: INTERNAL MEDICINE | Facility: CLINIC | Age: 62
End: 2022-09-19

## 2022-09-19 VITALS
SYSTOLIC BLOOD PRESSURE: 121 MMHG | HEIGHT: 61 IN | BODY MASS INDEX: 38.51 KG/M2 | WEIGHT: 204 LBS | OXYGEN SATURATION: 97 % | DIASTOLIC BLOOD PRESSURE: 68 MMHG | HEART RATE: 77 BPM | TEMPERATURE: 97.2 F

## 2022-09-19 PROCEDURE — 83036 HEMOGLOBIN GLYCOSYLATED A1C: CPT | Mod: QW

## 2022-09-19 PROCEDURE — 99213 OFFICE O/P EST LOW 20 MIN: CPT

## 2022-09-19 RX ORDER — ONDANSETRON 8 MG/1
8 TABLET ORAL 3 TIMES DAILY
Qty: 6 | Refills: 0 | Status: DISCONTINUED | COMMUNITY
Start: 2022-02-03 | End: 2022-09-19

## 2022-09-19 NOTE — HISTORY OF PRESENT ILLNESS
[de-identified] : 61 y/o F with obesity, arthritis, HTN, DM, breast cancer s/p mastectomy/chemo/xrt with chronic lymphedema, chronic pain/fibromyalgia.\par Needs [employer] handicap sticker renewal forms.   \par Had some shakiness.  Decreased metformin dose to 1,000mg and feels better.  Plans to start checking her sugars, needs monitor and strips renewed.  \par Doing PT for the chronic pain, fibromyalgia.  \par Neuro f/u soon, hopes to try to wean the methadone further.  Currently 10mg daily.

## 2022-09-19 NOTE — ASSESSMENT
[FreeTextEntry1] : 61 y/o F with obesity, arthritis, HTN, DM, breast cancer s/p mastectomy/chemo/xrt with chronic lymphedema, chronic pain/fibromyalgia.\par Needs [employer] handicap sticker renewal forms.   \par \par Handicap form completed for parking permit.  Chronic pain from fibromyalgia, OA in the knees as well as peripheral neuropathy considerable limits her mobility.  \par Uses access-a-ride when she can.  \par \par POCT A1C 6.1 today.  Will continue lower dose of metformin and monitor home BG.  Goes for eye exams years.  albumin/creatinine 11/2021.  PCP follow up scheduled in November.  \par

## 2022-09-20 ENCOUNTER — NON-APPOINTMENT (OUTPATIENT)
Age: 62
End: 2022-09-20

## 2022-09-20 LAB — HBA1C MFR BLD HPLC: 6.1

## 2022-10-18 ENCOUNTER — APPOINTMENT (OUTPATIENT)
Dept: NEUROLOGY | Facility: CLINIC | Age: 62
End: 2022-10-18

## 2022-10-24 ENCOUNTER — APPOINTMENT (OUTPATIENT)
Dept: INTERNAL MEDICINE | Facility: CLINIC | Age: 62
End: 2022-10-24

## 2022-10-26 ENCOUNTER — OUTPATIENT (OUTPATIENT)
Dept: OUTPATIENT SERVICES | Facility: HOSPITAL | Age: 62
LOS: 1 days | Discharge: HOME | End: 2022-10-26

## 2022-10-26 DIAGNOSIS — Z33.2 ENCOUNTER FOR ELECTIVE TERMINATION OF PREGNANCY: Chronic | ICD-10-CM

## 2022-10-26 DIAGNOSIS — H26.9 UNSPECIFIED CATARACT: Chronic | ICD-10-CM

## 2022-10-26 DIAGNOSIS — E04.1 NONTOXIC SINGLE THYROID NODULE: Chronic | ICD-10-CM

## 2022-10-26 DIAGNOSIS — C50.919 MALIGNANT NEOPLASM OF UNSPECIFIED SITE OF UNSPECIFIED FEMALE BREAST: ICD-10-CM

## 2022-10-26 DIAGNOSIS — C80.1 MALIGNANT (PRIMARY) NEOPLASM, UNSPECIFIED: Chronic | ICD-10-CM

## 2022-11-02 ENCOUNTER — RESULT REVIEW (OUTPATIENT)
Age: 62
End: 2022-11-02

## 2022-11-02 ENCOUNTER — APPOINTMENT (OUTPATIENT)
Dept: HEMATOLOGY ONCOLOGY | Facility: CLINIC | Age: 62
End: 2022-11-02

## 2022-11-02 VITALS
OXYGEN SATURATION: 96 % | HEIGHT: 60.98 IN | SYSTOLIC BLOOD PRESSURE: 120 MMHG | DIASTOLIC BLOOD PRESSURE: 66 MMHG | RESPIRATION RATE: 16 BRPM | BODY MASS INDEX: 39.92 KG/M2 | HEART RATE: 70 BPM | WEIGHT: 211.42 LBS | TEMPERATURE: 97.2 F

## 2022-11-02 LAB
BASOPHILS # BLD AUTO: 0.02 K/UL — SIGNIFICANT CHANGE UP (ref 0–0.2)
BASOPHILS NFR BLD AUTO: 0.5 % — SIGNIFICANT CHANGE UP (ref 0–2)
EOSINOPHIL # BLD AUTO: 0.07 K/UL — SIGNIFICANT CHANGE UP (ref 0–0.5)
EOSINOPHIL NFR BLD AUTO: 1.8 % — SIGNIFICANT CHANGE UP (ref 0–6)
HCT VFR BLD CALC: 34 % — LOW (ref 34.5–45)
HGB BLD-MCNC: 11.1 G/DL — LOW (ref 11.5–15.5)
IMM GRANULOCYTES NFR BLD AUTO: 0.3 % — SIGNIFICANT CHANGE UP (ref 0–0.9)
LYMPHOCYTES # BLD AUTO: 1.1 K/UL — SIGNIFICANT CHANGE UP (ref 1–3.3)
LYMPHOCYTES # BLD AUTO: 28.5 % — SIGNIFICANT CHANGE UP (ref 13–44)
MCHC RBC-ENTMCNC: 28.9 PG — SIGNIFICANT CHANGE UP (ref 27–34)
MCHC RBC-ENTMCNC: 32.6 G/DL — SIGNIFICANT CHANGE UP (ref 32–36)
MCV RBC AUTO: 88.5 FL — SIGNIFICANT CHANGE UP (ref 80–100)
MONOCYTES # BLD AUTO: 0.3 K/UL — SIGNIFICANT CHANGE UP (ref 0–0.9)
MONOCYTES NFR BLD AUTO: 7.8 % — SIGNIFICANT CHANGE UP (ref 2–14)
NEUTROPHILS # BLD AUTO: 2.36 K/UL — SIGNIFICANT CHANGE UP (ref 1.8–7.4)
NEUTROPHILS NFR BLD AUTO: 61.1 % — SIGNIFICANT CHANGE UP (ref 43–77)
NRBC # BLD: 0 /100 WBCS — SIGNIFICANT CHANGE UP (ref 0–0)
PLATELET # BLD AUTO: 251 K/UL — SIGNIFICANT CHANGE UP (ref 150–400)
RBC # BLD: 3.84 M/UL — SIGNIFICANT CHANGE UP (ref 3.8–5.2)
RBC # FLD: 15.3 % — HIGH (ref 10.3–14.5)
WBC # BLD: 3.86 K/UL — SIGNIFICANT CHANGE UP (ref 3.8–10.5)
WBC # FLD AUTO: 3.86 K/UL — SIGNIFICANT CHANGE UP (ref 3.8–10.5)

## 2022-11-02 PROCEDURE — 99214 OFFICE O/P EST MOD 30 MIN: CPT

## 2022-11-02 NOTE — ASSESSMENT
[FreeTextEntry1] : She is a 61 y/o F with history of Stage III right breast cancer s/p mastectomy followed by chemotherapy and RT for ER negative/ Hsr7xgy positive disease 2012. She has been on surveillance and we reviewed expectations from past treatment. We reviewed less risk of recurrence with her type of breast cancer after 5 to 10 years. We reviewed exercise as tolerated. Will obtain Xray of the B knees to evaluate knee pain along with interval breast imaging. We reviewed her blood count and will repeat SPEP/ free light chains to rule out any reasons for anemia. Questions answered to her satisfaction. She is agreeable with plan. Next follow up in 1 year but earlier if any new symptoms.\par \par RUE lymphedema: lymphedema sleeve along with prosthesis/ mastectomy bra Rx given.

## 2022-11-02 NOTE — HISTORY OF PRESENT ILLNESS
[Disease: _____________________] : Disease: [unfilled] [T: ___] : T[unfilled] [N: ___] : N[unfilled] [AJCC Stage: ____] : AJCC Stage: [unfilled] [de-identified] : Age 52: advanced breast cancer status post right mastectomy and axillary lymph node dissection on 3/30/2012 followed by chemotherapy and radiation.  \par She had been maintaining her port every 6 weeks until March 2014.  She had axillary lymph node noted in prior imaging which was felt to be benign.  She had a biopsy that was done in May of 2014 that was benign. Pathology showed reactive lymph node which was benign.  She continues to have tenderness at the biopsy site.  PET/ CT performed on 8/6/14 show postsurgical changes in the right chest wall.  Resolution of previously seen mild hypermetabolism in left axillary lymph node.  Given her port not functioning and her PET scan negative, port removed 2014. [de-identified] : invasive poorly differentiated ductal carcinoma ER 0, [place] 0 [id number] 3+ [de-identified] : ChemoRT completed 2012 [de-identified] : She had been having rheumatologic workup: possible Sjogrens. She is having RUE chronic lymphedema and wearing sleeve. She is willing to consider PT for the RUE. She is going on retreat in [place] tomorrow: spiritual meeting. Denies any new mastectomy site changes or breast change or back pain or cough. Has B knee pain: no new injury or trauma. Wondering if this could be something insidious. She will be having breast imaging done in December. She denies any new health changes or new medications. She is caring for her mother, sister and .

## 2022-11-02 NOTE — REVIEW OF SYSTEMS
[Easy Bleeding] : no tendency for easy bleeding [Easy Bruising] : no tendency for easy bruising [Swollen Glands] : no swollen glands [Negative] : Allergic/Immunologic [de-identified] : RUE lymphedema

## 2022-11-02 NOTE — PHYSICAL EXAM
[Fully active, able to carry on all pre-disease performance without restriction] : Status 0 - Fully active, able to carry on all pre-disease performance without restriction [Normal] : affect appropriate [de-identified] : R mastectomy site; no abnl skin nodules or changes; no abnl masses palpable over the L breast  [de-identified] : RUE lymphedema

## 2022-11-03 ENCOUNTER — APPOINTMENT (OUTPATIENT)
Dept: RHEUMATOLOGY | Facility: CLINIC | Age: 62
End: 2022-11-03
Payer: MEDICARE

## 2022-11-03 VITALS
HEIGHT: 60.98 IN | RESPIRATION RATE: 16 BRPM | TEMPERATURE: 98.4 F | WEIGHT: 210 LBS | SYSTOLIC BLOOD PRESSURE: 144 MMHG | BODY MASS INDEX: 39.65 KG/M2 | DIASTOLIC BLOOD PRESSURE: 78 MMHG | HEART RATE: 65 BPM | OXYGEN SATURATION: 95 %

## 2022-11-03 DIAGNOSIS — M25.561 PAIN IN RIGHT KNEE: ICD-10-CM

## 2022-11-03 PROCEDURE — 99214 OFFICE O/P EST MOD 30 MIN: CPT | Mod: 25

## 2022-11-03 PROCEDURE — 20611 DRAIN/INJ JOINT/BURSA W/US: CPT | Mod: RT

## 2022-11-03 RX ORDER — LIDOCAINE HYDROCHLORIDE 10 MG/ML
1 INJECTION, SOLUTION INFILTRATION; PERINEURAL
Qty: 0 | Refills: 0 | Status: COMPLETED | OUTPATIENT
Start: 2022-11-03

## 2022-11-03 RX ORDER — METHYLPRED ACET/NACL,ISO-OS/PF 80 MG/ML
80 VIAL (ML) INJECTION
Qty: 1 | Refills: 0 | Status: COMPLETED | OUTPATIENT
Start: 2022-11-03

## 2022-11-03 RX ADMIN — LIDOCAINE HYDROCHLORIDE 0 %: 10 INJECTION, SOLUTION EPIDURAL; INFILTRATION; INTRACAUDAL; PERINEURAL at 00:00

## 2022-11-03 RX ADMIN — METHYLPREDNISOLONE ACETATE 0 MG/ML: 80 INJECTION, SUSPENSION INTRA-ARTICULAR; INTRALESIONAL; INTRAMUSCULAR; SOFT TISSUE at 00:00

## 2022-11-03 NOTE — REVIEW OF SYSTEMS
[Joint Stiffness] : joint stiffness [Difficulty Walking] : difficulty walking [Fever] : no fever [Chills] : no chills [Sore Throat] : no sore throat [Hoarseness] : no hoarseness [Chest Pain] : no chest pain [Palpitations] : no palpitations [Cough] : no cough [SOB on Exertion] : no shortness of breath during exertion [Joint Swelling] : no joint swelling [Feelings Of Weakness] : no feelings of weakness [Easy Bleeding] : no tendency for easy bleeding [Easy Bruising] : no tendency for easy bruising [de-identified] : Low mood

## 2022-11-03 NOTE — PROCEDURE
[Today's Date:] : Date: [unfilled] [Patient] : the patient [Risks] : risks [Benefits] : benefits [Consent Obtained] : written consent was obtained prior to the procedure and is detailed in the patient's record [Therapeutic] : therapeutic [#1 Site: ______] : #1 site identified in the [unfilled] [Betadine] : betadine solution [___ml 1% Lidocaine] : [unfilled] ml of 1% lidocaine [Depomedrol ___ mg] : Depomedrol [unfilled] mg [Tolerated Well] : the patient tolerated the procedure well [No Complications] : there were no complications [de-identified] : 25gauge x 2 in inserted  [FreeTextEntry1] : Study Date:  11/3/2022\par Study Type: Guidance of needle placement \par Indication: Pain in RT knee joint ; Obesity\par Study Site: RT knee\par Equipment:  Logiq e 12MHz linear transducer  \par \par Findings: The use of a Logiq e 12 MHz linear transducer with live ultrasound guidance of the knee was necessary given the patient's BMI , local body habitus , knee contracture, all factors obscuring the accurate identification of normal body bony anatomy used to identify the injection site and the depth of soft tissue space;\par The location of the needle tip and intra-articular delivery of the medication while avoiding neurovascular structures confirmed. Orthogonal views of the suprapatellar synovial pouch was taken with US .  After prepping the lateral knee with betadine, a 22 x2.0guage needle was advanced to the synovial cavity under direct US visualization using in-plane technique.  80mg of methylprednisolone and 1% lidocaine was injected.\par \par Impressions: Successful injection of the RT knee using US guidance.\par  no

## 2022-11-03 NOTE — CONSULT LETTER
[Dear  ___] : Dear  [unfilled], [Consult Letter:] : I had the pleasure of evaluating your patient, [unfilled]. [Please see my note below.] : Please see my note below. [Consult Closing:] : Thank you very much for allowing me to participate in the care of this patient.  If you have any questions, please do not hesitate to contact me. [Sincerely,] : Sincerely, [FreeTextEntry2] : Karol Rios MD [FreeTextEntry3] : Sharon Garza M.D., [facility]\par  of Medicine \par [facility]\par \par

## 2022-11-03 NOTE — PHYSICAL EXAM
[General Appearance - Alert] : alert [General Appearance - In No Acute Distress] : in no acute distress [Auscultation Breath Sounds / Voice Sounds] : lungs were clear to auscultation bilaterally [Heart Sounds] : normal S1 and S2 [Edema] : there was no peripheral edema [No Spinal Tenderness] : no spinal tenderness [Nail Clubbing] : no clubbing  or cyanosis of the fingernails [Motor Tone] : muscle strength and tone were normal [Musculoskeletal - Swelling] : no joint swelling seen [] : no rash [Skin Lesions] : no skin lesions [Motor Exam] : the motor exam was normal [Oriented To Time, Place, And Person] : oriented to person, place, and time [Impaired Insight] : insight and judgment were intact [FreeTextEntry1] : No active synovitis; trigger points are active over the trapezius and lateral epicondyles, paraspinal muscles, trochanteric bursa and pes anserine bilaterally

## 2022-11-03 NOTE — ASSESSMENT
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  Patient would like to try low-dose disease modifying agent of hydroxychloroquine to help with arthralgias in the setting of DORCAS positivity. Patient understands the importance of regular ophthalmology follow-up in the setting of retinal toxicity risk in the setting of hydroxychloroquine use. As for silica positive antibody testing, reached out to hematology colleagues to assess utility of anticoagulation in the setting of recent MR imaging. Repeat blood testing in 12 weeks rec.\par Patient understands ischemic changes may be related to prior social history. White matter lesions are seen in connective tissue disease cases including lupus and therefore we will continue to monitor and assess the utility of additional immunosuppressive therapy. \par \par Patient will benefit from physical therapy to help with joint mobility and muscle strengthening.  Quadriceps strengthening exercises demonstrated in the office.  Ultrasound-guided right knee IAC given for better visualization and pain relief ; viscosupplementation has been encouraged to provide additional lubrication and joint support. \par \par Patient understands the importance of weight loss reduction in the setting of HTN, DM and its association of cardiovascular risk.  Whole food plant-based diet encouraged.  The importance of dietary and lifestyle modifications was reiterated for the treatment of Fibromyalgia along with discussions on relaxation, stress-reducing techniques and importance of good sleep hygiene.  \par \par She is in agreement with the above plan and will return in three months' time.\par \par

## 2022-11-03 NOTE — HISTORY OF PRESENT ILLNESS
[FreeTextEntry1] : Patient reports worsening knee pain, RT>LT. she explains prior viscosupplementation lending to worsening pain and finds it is related to concurrent vaccinations.  She reports instability symptoms especially when walking for prolonged periods or climbing stairs.  She explains diffuse arthralgias since COVID-19 infection over the summer.  Patient with recent blood testing reflecting DORCAS positivity 1: 320 in a speckled pattern with positive SS-A in the setting of elevated inflammatory markers.  Patient apprehensive to initiate hydroxychloroquine therapy.  \par Patient with longstanding history of fibromyalgia patient is on multiple neuropathic pain therapies along with mood stabilizers.  Patient also reports notable pain in the hands and the feet with cramping sensations; she explains sister with a diagnosis of rheumatoid arthritis.  She denies accompanied swelling of the joints.  \par She explains long camarena of alcoholism and has been free of alcohol for the last 18 years as well as free of drug use from cocaine.  She explains she has had shakiness and her tremors that are currently under evaluation.  \par She explains a history of HER2 positive breast CA status post right mastectomy with chemotx and radiation.\par She explains performing dietary and lifestyle modifications and has fluctuating weights during the pandemic.  She feels like she is lost at least 30 to 40 pounds over the last few years.\par She otherwise denies visual disturbances, oral ulcers, dyspnea, chest pain, motor disturbances, Raynaud's, rash or systemic symptoms.

## 2022-11-07 ENCOUNTER — RESULT REVIEW (OUTPATIENT)
Age: 62
End: 2022-11-07

## 2022-11-10 LAB
ALBUMIN MFR SERPL ELPH: 49.5 %
ALBUMIN SERPL ELPH-MCNC: 4.3 G/DL
ALBUMIN SERPL-MCNC: 3.9 G/DL
ALBUMIN/GLOB SERPL: 1 RATIO
ALP BLD-CCNC: 104 U/L
ALPHA1 GLOB MFR SERPL ELPH: 3.1 %
ALPHA1 GLOB SERPL ELPH-MCNC: 0.2 G/DL
ALPHA2 GLOB MFR SERPL ELPH: 12 %
ALPHA2 GLOB SERPL ELPH-MCNC: 0.9 G/DL
ALT SERPL-CCNC: 8 U/L
ANION GAP SERPL CALC-SCNC: 11 MMOL/L
AST SERPL-CCNC: 15 U/L
B-GLOBULIN MFR SERPL ELPH: 12.7 %
B-GLOBULIN SERPL ELPH-MCNC: 1 G/DL
BILIRUB SERPL-MCNC: 0.3 MG/DL
BUN SERPL-MCNC: 14 MG/DL
CALCIUM SERPL-MCNC: 10.1 MG/DL
CANCER AG27-29 SERPL-ACNC: 14.1 U/ML
CHLORIDE SERPL-SCNC: 99 MMOL/L
CO2 SERPL-SCNC: 28 MMOL/L
CREAT SERPL-MCNC: 1.16 MG/DL
DEPRECATED KAPPA LC FREE/LAMBDA SER: 1.74 RATIO
EGFR: 53 ML/MIN/1.73M2
GAMMA GLOB FLD ELPH-MCNC: 1.8 G/DL
GAMMA GLOB MFR SERPL ELPH: 22.7 %
GLUCOSE SERPL-MCNC: 103 MG/DL
INTERPRETATION SERPL IEP-IMP: NORMAL
KAPPA LC CSF-MCNC: 2.55 MG/DL
KAPPA LC SERPL-MCNC: 4.43 MG/DL
POTASSIUM SERPL-SCNC: 4.1 MMOL/L
PROT SERPL-MCNC: 7.9 G/DL
PROT SERPL-MCNC: 7.9 G/DL
PROT SERPL-MCNC: 8.2 G/DL
SODIUM SERPL-SCNC: 138 MMOL/L

## 2022-11-11 LAB
ALBUPE: 31.5 %
ALPHA1UPE: 29.3 %
ALPHA2UPE: 16.7 %
BETAUPE: 11.7 %
CREAT 24H UR-MCNC: NORMAL G/24 H
CREATININE UR (MAYO): 85 MG/DL
GAMMAUPE: 10.8 %
IGA 24H UR QL IFE: NORMAL
KAPPA LC 24H UR QL: NORMAL
PROT PATTERN 24H UR ELPH-IMP: NORMAL
PROT UR-MCNC: 7 MG/DL
PROT UR-MCNC: 7 MG/DL
SPECIMEN VOL 24H UR: NORMAL ML

## 2022-11-14 ENCOUNTER — APPOINTMENT (OUTPATIENT)
Dept: NEUROLOGY | Facility: CLINIC | Age: 62
End: 2022-11-14
Payer: MEDICARE

## 2022-11-14 VITALS — HEART RATE: 66 BPM | DIASTOLIC BLOOD PRESSURE: 69 MMHG | SYSTOLIC BLOOD PRESSURE: 122 MMHG

## 2022-11-14 DIAGNOSIS — R26.9 UNSPECIFIED ABNORMALITIES OF GAIT AND MOBILITY: ICD-10-CM

## 2022-11-14 PROCEDURE — 99214 OFFICE O/P EST MOD 30 MIN: CPT

## 2022-11-14 NOTE — HISTORY OF PRESENT ILLNESS
[FreeTextEntry1] : COVID VACCINATION 2x. \par \par HPI-Interval hx [id number]:\par COVID 7/2022.\par MRI brain 5/2022 stable to 2018, to my eyes no significant changes. \par States her gait is a bit more unsteady. \par A1C 6.1% and BP under control now.\par COVID in July, with inflammatory indexes higher up.\par ET getting a bit worse in the head and hands when eating.\par \par HPI-Interval hx [id number]:\par 2 weeks pt started to have neck pain, tight, for a few days; she then developed holocephalic pain, radiating to the front; she then developed nausea and vomiting with significant shaking of her hands. The HA then became throbbing in nature, with PPP, on and off for a few days. She too Tylenol with some benefit.\par She was seen at an urgent care, she received probably Benadryl and Reglan, with partial benefit. \par She was also prescribed another medication ($2000) which she never got approved and never took.\par This was the first time she had a HA so severe.\par She still has pain in knees.\par She has developed a bit of ET type shaking in hands and head.\par She is trying to lose weight, c.a. 5-10lb in the last 6 months, but very gradually.\par Rest is stable.\par \par HPI-Interval hx [id number]:\par Pt here for follow-up..\par NPT done in 8/2021 are absolutely benign. 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 She feels much better, lighter and has more endurance. \par \par \par \par HPI-Interval hx [id number]: \par recent SOB, possible CHF (like she had before in the past), will see PCP soon, she felt she gained weight and then lost it over a few weeks, as in retention. This also happened after COVID vaccination, knee shots and changing med to BB from [facility].\par \par Fall 8/2020, tripped on steps carrying things, reports a lesion in the R knee.\par \par Overall she feels ok, managing everything well. \par \par Sleep and appetite are stable. \par Mood is ok. Of note, she did not tolerate higher dose of Wellbutrin (150mg Daily) due to shakes/twitches.\par \par \par \par HPI-Interval Hx [id number]:\par Pt here for follow-up.\par She feels her STM is a bit worse now, she tends to forget dates, names and recent events.\par She has to write a lot of notes and set alarms to remind herself to follow through with things. \par she is still taking care of multiple things though, and trying to run her home.\par Rest is stable. \par Ca in remission. 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 \par Had a fall with head trauma in Feb 2019, CT head negative ([facility]).\par She is very busy, and has a hard time managing her busy schedule. \par She has a very positive outlook overall. \par \par HPI-Interval Hx [id number]:\par MRI, MRA were negative, ESR 71, but vascular sx evaluation with Echo was negative for TA. \par Still a lot of stress in her life.\par Feels better with Wellbutrin, her face pain and HA have stopped.\par She sleeps very well, and appetite is good.\par She keeps very busy, now preparing for her daughter's wedding.\par \par HPI-Interval Hx [id number]:\par Pt here for follow-up.\par Over the last year, she has been pretty much stable.\par recent Bone Scan and CT have shown no secondary lesions from breast Ca.\par \par She gets seldom sharp pain behind L eye for a few seconds, with slight HA, lasting 5-10 min.\par She also noticed her face sweats a lot, at times her arms too. This apparently is dating 10-20years.\par In all, she feels very irritable and she still c/o STM and attention issues.\par \par PMH:\par 57yo RH AAW, with hx of fibromyalgia, bipolar depression, breast CA s/p resection and chemo in 2012, currently followed up and disease free, colon polyps, arthritis.\par She reports that she sometimes looses attentions and would forget things, e.g. that she is cooking something and would leave things on the stove. She has issues remembering names of people, but always had.\par \par She does not report HA.\par \par Mood: she often gets sad, cries a lot, but has had bipolar depression for a long time, used to be on Lexapro, VPA and Neurontin, stopped a few years ago.\par She used to be in a program at [facility], terminated when she had started using a plant product ([place]ung[place]?). She has a hx of SI/SA in 2004, now denies any SI. \par \par She has a hx of EtOH abuse, sober and in AA for many years.\par \par She has a complex living situation, having to take care of her mother, daughter, grandchildren.\par \par She does not drive, she never did, has fear of it.\par \par Sleep: falls asleep readily, but has to urinate very frequently. Sleeps 10h daily.\par \par Disabled, used to do clerical work in several different settings.\par \par ADl and IADL intact.\par \par Of note, she has a small nodule in L thyroid, under surveillance.

## 2022-11-14 NOTE — ASSESSMENT
[FreeTextEntry1] : Assesment: \par 63yo RH AAW, with subjective memory issues. \par She has peripheral neuropathy in LE and antalgic limitation of RLE>LLE due to knees pain.\par Mental and neuro exam continue to be ok. Last NPT are wnl.\par She has a mild ET, which has been a bit worse in the last few months.\par \par Diagnostic Impression:\par -anxiety/depression\par -subjective memory impairment\par -ET\par -neuropathy-DM.\par \par Plan: \par -try low dose Propranolol for ET.\par \par A thorough discussion was entertained with the patient/caregiver regarding the use of psychoactive medications, their possible benefits and AE profile, including the risk of cardiovascular complications, including but not limited to applicable black box warning and teratogenicity, where appropriate.\par We discussed the benefits of being active, physically and mentally, and the need to to establish a routine in this respect.\par Driving abilities and firearms possession and use were discussed, in relation to progression of the cognitive decline, and the need to assess them periodically.\par Patient/caregiver advised to bring previous records to this office.\par All questions were answered at the time of the visit. We are certainly available for further discussion as needed.\par Patient/caregiver fully understands and agree with the plan.\par

## 2022-12-05 ENCOUNTER — LABORATORY RESULT (OUTPATIENT)
Age: 62
End: 2022-12-05

## 2022-12-08 ENCOUNTER — APPOINTMENT (OUTPATIENT)
Dept: INTERNAL MEDICINE | Facility: CLINIC | Age: 62
End: 2022-12-08
Payer: MEDICARE

## 2022-12-08 VITALS
WEIGHT: 208 LBS | HEART RATE: 67 BPM | OXYGEN SATURATION: 98 % | TEMPERATURE: 98.3 F | DIASTOLIC BLOOD PRESSURE: 89 MMHG | SYSTOLIC BLOOD PRESSURE: 135 MMHG | HEIGHT: 61 IN | BODY MASS INDEX: 39.27 KG/M2

## 2022-12-08 DIAGNOSIS — R82.90 UNSPECIFIED ABNORMAL FINDINGS IN URINE: ICD-10-CM

## 2022-12-08 DIAGNOSIS — E66.9 OBESITY, UNSPECIFIED: ICD-10-CM

## 2022-12-08 PROCEDURE — G0008: CPT

## 2022-12-08 PROCEDURE — 99214 OFFICE O/P EST MOD 30 MIN: CPT | Mod: 25

## 2022-12-08 PROCEDURE — 90686 IIV4 VACC NO PRSV 0.5 ML IM: CPT

## 2022-12-12 ENCOUNTER — APPOINTMENT (OUTPATIENT)
Dept: MAMMOGRAPHY | Facility: IMAGING CENTER | Age: 62
End: 2022-12-12

## 2022-12-12 ENCOUNTER — RESULT REVIEW (OUTPATIENT)
Age: 62
End: 2022-12-12

## 2022-12-12 ENCOUNTER — APPOINTMENT (OUTPATIENT)
Dept: ULTRASOUND IMAGING | Facility: IMAGING CENTER | Age: 62
End: 2022-12-12

## 2022-12-12 ENCOUNTER — OUTPATIENT (OUTPATIENT)
Dept: OUTPATIENT SERVICES | Facility: HOSPITAL | Age: 62
LOS: 1 days | End: 2022-12-12
Payer: MEDICARE

## 2022-12-12 ENCOUNTER — APPOINTMENT (OUTPATIENT)
Dept: RADIOLOGY | Facility: IMAGING CENTER | Age: 62
End: 2022-12-12

## 2022-12-12 DIAGNOSIS — C50.919 MALIGNANT NEOPLASM OF UNSPECIFIED SITE OF UNSPECIFIED FEMALE BREAST: ICD-10-CM

## 2022-12-12 DIAGNOSIS — E04.1 NONTOXIC SINGLE THYROID NODULE: Chronic | ICD-10-CM

## 2022-12-12 DIAGNOSIS — C80.1 MALIGNANT (PRIMARY) NEOPLASM, UNSPECIFIED: Chronic | ICD-10-CM

## 2022-12-12 DIAGNOSIS — Z33.2 ENCOUNTER FOR ELECTIVE TERMINATION OF PREGNANCY: Chronic | ICD-10-CM

## 2022-12-12 DIAGNOSIS — H26.9 UNSPECIFIED CATARACT: Chronic | ICD-10-CM

## 2022-12-12 PROCEDURE — 73562 X-RAY EXAM OF KNEE 3: CPT | Mod: 26,50

## 2022-12-12 PROCEDURE — 77063 BREAST TOMOSYNTHESIS BI: CPT | Mod: 26,52

## 2022-12-12 PROCEDURE — 77067 SCR MAMMO BI INCL CAD: CPT

## 2022-12-12 PROCEDURE — 77063 BREAST TOMOSYNTHESIS BI: CPT

## 2022-12-12 PROCEDURE — 76641 ULTRASOUND BREAST COMPLETE: CPT | Mod: 26,LT

## 2022-12-12 PROCEDURE — 77067 SCR MAMMO BI INCL CAD: CPT | Mod: 26,LT,52

## 2022-12-12 PROCEDURE — 76641 ULTRASOUND BREAST COMPLETE: CPT

## 2022-12-12 PROCEDURE — 73562 X-RAY EXAM OF KNEE 3: CPT

## 2022-12-15 LAB
ANION GAP SERPL CALC-SCNC: 12 MMOL/L
APPEARANCE: CLEAR
BASOPHILS # BLD AUTO: 0.03 K/UL
BASOPHILS NFR BLD AUTO: 0.7 %
BILIRUBIN URINE: NEGATIVE
BLOOD URINE: NEGATIVE
BUN SERPL-MCNC: 13 MG/DL
CALCIUM SERPL-MCNC: 10.2 MG/DL
CHLORIDE SERPL-SCNC: 98 MMOL/L
CHOLEST SERPL-MCNC: 172 MG/DL
CO2 SERPL-SCNC: 29 MMOL/L
COLOR: YELLOW
CREAT SERPL-MCNC: 0.96 MG/DL
CREAT SPEC-SCNC: 126 MG/DL
EGFR: 66 ML/MIN/1.73M2
EOSINOPHIL # BLD AUTO: 0.05 K/UL
EOSINOPHIL NFR BLD AUTO: 1.1 %
ESTIMATED AVERAGE GLUCOSE: 128 MG/DL
FERRITIN SERPL-MCNC: 20 NG/ML
GLUCOSE QUALITATIVE U: NEGATIVE
GLUCOSE SERPL-MCNC: 66 MG/DL
HBA1C MFR BLD HPLC: 6.1 %
HCT VFR BLD CALC: 34.9 %
HDLC SERPL-MCNC: 42 MG/DL
HGB BLD-MCNC: 11.1 G/DL
IMM GRANULOCYTES NFR BLD AUTO: 0.2 %
IRON SATN MFR SERPL: 23 %
IRON SERPL-MCNC: 78 UG/DL
KETONES URINE: NEGATIVE
LDLC SERPL CALC-MCNC: 97 MG/DL
LEUKOCYTE ESTERASE URINE: NEGATIVE
LYMPHOCYTES # BLD AUTO: 1.26 K/UL
LYMPHOCYTES NFR BLD AUTO: 27.5 %
MAN DIFF?: NORMAL
MCHC RBC-ENTMCNC: 28.7 PG
MCHC RBC-ENTMCNC: 31.8 GM/DL
MCV RBC AUTO: 90.2 FL
MICROALBUMIN 24H UR DL<=1MG/L-MCNC: 3.2 MG/DL
MICROALBUMIN/CREAT 24H UR-RTO: 25 MG/G
MONOCYTES # BLD AUTO: 0.41 K/UL
MONOCYTES NFR BLD AUTO: 8.9 %
NEUTROPHILS # BLD AUTO: 2.83 K/UL
NEUTROPHILS NFR BLD AUTO: 61.6 %
NITRITE URINE: NEGATIVE
NONHDLC SERPL-MCNC: 129 MG/DL
PH URINE: 5.5
PLATELET # BLD AUTO: 289 K/UL
POTASSIUM SERPL-SCNC: 4.6 MMOL/L
PROTEIN URINE: NEGATIVE
RBC # BLD: 3.87 M/UL
RBC # FLD: 15.3 %
SODIUM SERPL-SCNC: 139 MMOL/L
SPECIFIC GRAVITY URINE: 1.02
TIBC SERPL-MCNC: 344 UG/DL
TRIGL SERPL-MCNC: 160 MG/DL
TSH SERPL-ACNC: 1.67 UIU/ML
UIBC SERPL-MCNC: 266 UG/DL
UROBILINOGEN URINE: NORMAL
VIT B12 SERPL-MCNC: >2000 PG/ML
WBC # FLD AUTO: 4.59 K/UL

## 2022-12-22 PROBLEM — E66.9 OBESITY: Noted: 2017-02-16

## 2022-12-22 NOTE — HISTORY OF PRESENT ILLNESS
[FreeTextEntry1] : Follow up [de-identified] : \sofia Had COVID July 2022. Since then, "hasn't felt right". Has been having lots of LE pain; has sharp pains in her LE veins. Feels it behind the knee (R>L). Pending repeat xrays.\par Pain mostly behind R knee and on L thigh. \par On meloxicam x years.\par \par Had previously been on Byetta 17-20 years ago; Endocrinologist had started it. Patient stopped it because felt like she was becoming "addicted" to the injection.\par Has been seeing \par \par Saw Neuro 11/14/22 and placed on propranolol for tremor, which has helped a lot.\par Had previously seen Ortho Jan 2020. \par \par Had a strong urine smell; stopped wellbutryn and smell went away. Restarted wellbutryn a while back and feels like is coming back.\par \par Stage III breast ca s/p R mastectomy with axillary LN dissection (2012) s/p chemo/xrt.\par \par Very stressed. 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  Doesn't have anxiety. Says that her reba.

## 2022-12-22 NOTE — PHYSICAL EXAM
[No Varicosities] : no varicosities [No Edema] : there was no peripheral edema [No Extremity Clubbing/Cyanosis] : no extremity clubbing/cyanosis [Normal] : no CVA or spinal tenderness [de-identified] : +medial joint line tenderness bilateral knees

## 2022-12-22 NOTE — REVIEW OF SYSTEMS
[Joint Pain] : joint pain [Joint Stiffness] : joint stiffness [Joint Swelling] : joint swelling [Negative] : Gastrointestinal

## 2022-12-22 NOTE — ASSESSMENT
[FreeTextEntry1] : 61 y/o F with obesity, arthritis, HTN, DM, Stage 3 breast cancer s/p R mastectomy/chemo/xrt with chronic lymphedema, chronic pain/fibromyalgia seen for follow up\par \par HTN - BP at goal\par - Continue amlodipine to 10mg daily; hctz 25 daily\par - Monitor BPs at home\par \par Tremor\par - Continue propranolol\par \par Peripheral neuropathy\par - Continue f/u with pain management\par \par Arthritis\par - F/U with Ortho\par \par Obesity\par - Continue diet/exercise\par \par Depression - s/p period off of wellbutryn as patient was concerned causing bad urine smell\par - Would continue wellbutryn\par \par HCM:\par - flu vaccine today\par \par RTC in 3 months or sooner prn.

## 2022-12-26 ENCOUNTER — NON-APPOINTMENT (OUTPATIENT)
Age: 62
End: 2022-12-26

## 2022-12-30 ENCOUNTER — NON-APPOINTMENT (OUTPATIENT)
Age: 62
End: 2022-12-30

## 2023-01-03 ENCOUNTER — APPOINTMENT (OUTPATIENT)
Dept: RADIOLOGY | Facility: CLINIC | Age: 63
End: 2023-01-03
Payer: MEDICARE

## 2023-01-03 ENCOUNTER — NON-APPOINTMENT (OUTPATIENT)
Age: 63
End: 2023-01-03

## 2023-01-03 ENCOUNTER — OUTPATIENT (OUTPATIENT)
Dept: OUTPATIENT SERVICES | Facility: HOSPITAL | Age: 63
LOS: 1 days | End: 2023-01-03
Payer: MEDICARE

## 2023-01-03 ENCOUNTER — APPOINTMENT (OUTPATIENT)
Dept: ORTHOPEDIC SURGERY | Facility: CLINIC | Age: 63
End: 2023-01-03
Payer: MEDICARE

## 2023-01-03 VITALS — SYSTOLIC BLOOD PRESSURE: 148 MMHG | DIASTOLIC BLOOD PRESSURE: 79 MMHG

## 2023-01-03 VITALS — WEIGHT: 208 LBS | HEIGHT: 61 IN | BODY MASS INDEX: 39.27 KG/M2

## 2023-01-03 DIAGNOSIS — E04.1 NONTOXIC SINGLE THYROID NODULE: Chronic | ICD-10-CM

## 2023-01-03 DIAGNOSIS — C80.1 MALIGNANT (PRIMARY) NEOPLASM, UNSPECIFIED: Chronic | ICD-10-CM

## 2023-01-03 DIAGNOSIS — R05.1 ACUTE COUGH: ICD-10-CM

## 2023-01-03 PROCEDURE — 71046 X-RAY EXAM CHEST 2 VIEWS: CPT

## 2023-01-03 PROCEDURE — 71046 X-RAY EXAM CHEST 2 VIEWS: CPT | Mod: 26

## 2023-01-03 PROCEDURE — 99214 OFFICE O/P EST MOD 30 MIN: CPT

## 2023-01-03 PROCEDURE — 73030 X-RAY EXAM OF SHOULDER: CPT | Mod: LT

## 2023-01-06 ENCOUNTER — APPOINTMENT (OUTPATIENT)
Dept: ORTHOPEDIC SURGERY | Facility: CLINIC | Age: 63
End: 2023-01-06
Payer: MEDICARE

## 2023-01-06 VITALS — HEIGHT: 60 IN | WEIGHT: 206 LBS | BODY MASS INDEX: 40.44 KG/M2

## 2023-01-06 PROCEDURE — 99214 OFFICE O/P EST MOD 30 MIN: CPT

## 2023-01-07 ENCOUNTER — EMERGENCY (EMERGENCY)
Facility: HOSPITAL | Age: 63
LOS: 1 days | Discharge: HOME | End: 2023-01-07
Attending: STUDENT IN AN ORGANIZED HEALTH CARE EDUCATION/TRAINING PROGRAM | Admitting: EMERGENCY MEDICINE
Payer: MEDICARE

## 2023-01-07 VITALS
OXYGEN SATURATION: 98 % | RESPIRATION RATE: 16 BRPM | TEMPERATURE: 98 F | SYSTOLIC BLOOD PRESSURE: 143 MMHG | DIASTOLIC BLOOD PRESSURE: 61 MMHG | HEART RATE: 60 BPM

## 2023-01-07 DIAGNOSIS — C80.1 MALIGNANT (PRIMARY) NEOPLASM, UNSPECIFIED: Chronic | ICD-10-CM

## 2023-01-07 DIAGNOSIS — E04.1 NONTOXIC SINGLE THYROID NODULE: Chronic | ICD-10-CM

## 2023-01-07 DIAGNOSIS — Z33.2 ENCOUNTER FOR ELECTIVE TERMINATION OF PREGNANCY: Chronic | ICD-10-CM

## 2023-01-07 DIAGNOSIS — H26.9 UNSPECIFIED CATARACT: Chronic | ICD-10-CM

## 2023-01-07 LAB
ALBUMIN SERPL ELPH-MCNC: 3.9 G/DL — SIGNIFICANT CHANGE UP (ref 3.3–5)
ALP SERPL-CCNC: 95 U/L — SIGNIFICANT CHANGE UP (ref 40–120)
ALT FLD-CCNC: 12 U/L — SIGNIFICANT CHANGE UP (ref 4–33)
ANION GAP SERPL CALC-SCNC: 11 MMOL/L — SIGNIFICANT CHANGE UP (ref 7–14)
APTT BLD: 37.5 SEC — HIGH (ref 27–36.3)
AST SERPL-CCNC: 33 U/L — HIGH (ref 4–32)
BASOPHILS # BLD AUTO: 0.01 K/UL — SIGNIFICANT CHANGE UP (ref 0–0.2)
BASOPHILS NFR BLD AUTO: 0.2 % — SIGNIFICANT CHANGE UP (ref 0–2)
BILIRUB SERPL-MCNC: 0.4 MG/DL — SIGNIFICANT CHANGE UP (ref 0.2–1.2)
BUN SERPL-MCNC: 11 MG/DL — SIGNIFICANT CHANGE UP (ref 7–23)
CALCIUM SERPL-MCNC: 9.5 MG/DL — SIGNIFICANT CHANGE UP (ref 8.4–10.5)
CHLORIDE SERPL-SCNC: 100 MMOL/L — SIGNIFICANT CHANGE UP (ref 98–107)
CO2 SERPL-SCNC: 27 MMOL/L — SIGNIFICANT CHANGE UP (ref 22–31)
CREAT SERPL-MCNC: 0.81 MG/DL — SIGNIFICANT CHANGE UP (ref 0.5–1.3)
EGFR: 81 ML/MIN/1.73M2 — SIGNIFICANT CHANGE UP
EOSINOPHIL # BLD AUTO: 0.09 K/UL — SIGNIFICANT CHANGE UP (ref 0–0.5)
EOSINOPHIL NFR BLD AUTO: 1.7 % — SIGNIFICANT CHANGE UP (ref 0–6)
FLUAV AG NPH QL: SIGNIFICANT CHANGE UP
FLUBV AG NPH QL: SIGNIFICANT CHANGE UP
GLUCOSE SERPL-MCNC: 98 MG/DL — SIGNIFICANT CHANGE UP (ref 70–99)
HCT VFR BLD CALC: 32.2 % — LOW (ref 34.5–45)
HGB BLD-MCNC: 10.5 G/DL — LOW (ref 11.5–15.5)
IANC: 3.13 K/UL — SIGNIFICANT CHANGE UP (ref 1.8–7.4)
IMM GRANULOCYTES NFR BLD AUTO: 0.2 % — SIGNIFICANT CHANGE UP (ref 0–0.9)
INR BLD: 1.17 RATIO — HIGH (ref 0.88–1.16)
LYMPHOCYTES # BLD AUTO: 1.57 K/UL — SIGNIFICANT CHANGE UP (ref 1–3.3)
LYMPHOCYTES # BLD AUTO: 29.7 % — SIGNIFICANT CHANGE UP (ref 13–44)
MAGNESIUM SERPL-MCNC: 1.7 MG/DL — SIGNIFICANT CHANGE UP (ref 1.6–2.6)
MCHC RBC-ENTMCNC: 27.5 PG — SIGNIFICANT CHANGE UP (ref 27–34)
MCHC RBC-ENTMCNC: 32.6 GM/DL — SIGNIFICANT CHANGE UP (ref 32–36)
MCV RBC AUTO: 84.3 FL — SIGNIFICANT CHANGE UP (ref 80–100)
MONOCYTES # BLD AUTO: 0.48 K/UL — SIGNIFICANT CHANGE UP (ref 0–0.9)
MONOCYTES NFR BLD AUTO: 9.1 % — SIGNIFICANT CHANGE UP (ref 2–14)
NEUTROPHILS # BLD AUTO: 3.13 K/UL — SIGNIFICANT CHANGE UP (ref 1.8–7.4)
NEUTROPHILS NFR BLD AUTO: 59.1 % — SIGNIFICANT CHANGE UP (ref 43–77)
NRBC # BLD: 0 /100 WBCS — SIGNIFICANT CHANGE UP (ref 0–0)
NRBC # FLD: 0 K/UL — SIGNIFICANT CHANGE UP (ref 0–0)
NT-PROBNP SERPL-SCNC: 308 PG/ML — HIGH
PLATELET # BLD AUTO: 306 K/UL — SIGNIFICANT CHANGE UP (ref 150–400)
POTASSIUM SERPL-MCNC: 5 MMOL/L — SIGNIFICANT CHANGE UP (ref 3.5–5.3)
POTASSIUM SERPL-SCNC: 5 MMOL/L — SIGNIFICANT CHANGE UP (ref 3.5–5.3)
PROT SERPL-MCNC: 8.3 G/DL — SIGNIFICANT CHANGE UP (ref 6–8.3)
PROTHROM AB SERPL-ACNC: 13.6 SEC — HIGH (ref 10.5–13.4)
RBC # BLD: 3.82 M/UL — SIGNIFICANT CHANGE UP (ref 3.8–5.2)
RBC # FLD: 15.7 % — HIGH (ref 10.3–14.5)
RSV RNA NPH QL NAA+NON-PROBE: SIGNIFICANT CHANGE UP
SARS-COV-2 RNA SPEC QL NAA+PROBE: SIGNIFICANT CHANGE UP
SODIUM SERPL-SCNC: 138 MMOL/L — SIGNIFICANT CHANGE UP (ref 135–145)
TROPONIN T, HIGH SENSITIVITY RESULT: 7 NG/L — SIGNIFICANT CHANGE UP
WBC # BLD: 5.29 K/UL — SIGNIFICANT CHANGE UP (ref 3.8–10.5)
WBC # FLD AUTO: 5.29 K/UL — SIGNIFICANT CHANGE UP (ref 3.8–10.5)

## 2023-01-07 PROCEDURE — 99222 1ST HOSP IP/OBS MODERATE 55: CPT

## 2023-01-07 PROCEDURE — 99223 1ST HOSP IP/OBS HIGH 75: CPT | Mod: FS

## 2023-01-07 PROCEDURE — 71045 X-RAY EXAM CHEST 1 VIEW: CPT | Mod: 26

## 2023-01-07 NOTE — ED CDU PROVIDER INITIAL DAY NOTE - NS ED ATTENDING STATEMENT MOD
This was a shared visit with the JUANA. I reviewed and verified the documentation and independently performed the documented:

## 2023-01-07 NOTE — CONSULT NOTE ADULT - SUBJECTIVE AND OBJECTIVE BOX
HISTORY OF PRESENT ILLNESS:      Allergies    environment--ragweed, dust, cats--sneezing and watery eyes, nasal congestion (Other)  ramipril (Angioedema)    Intolerances    	    MEDICATIONS:                  PAST MEDICAL & SURGICAL HISTORY:  Fibromyalgia      b/l  Carpal tunnel syndrome  tx PT/OT      Abdominal hernia in 20 ; pt states hernia repair ,       Diabetes mellitus diagnosed in   fs 86 am 170 pm      History  Uterine Fibroid  s/p Hysterectomy       Sleep apnea diagnosed ---supposed to use device but doesn&#x27;t      Acid Reflux      hiatal hernia  last endoscopy 1.5 years ago      h/o b/l fungal foot infection   pt denies      Alcohol abuse--quit 8 years ago--goes to AA  ADDENDUM:  at PAST appointment  patient states she quit alcohol approximately       Drug abuse--quit 8 years ago--goes to   ADDENDUM: at PAST appointment, patient states she quit alcohol in approximately       personal h/o CHF (congestive heart failure)--last hospitalized in       Asthma  last rescue inhaler use &quot;maybe 3 years ago when I had the flu or a cold&quot;      h/o   Fatty liver      Diverticulosis      Arthritis      Hypertension      Hypercholesterolemia  19 ; pt reports improvement ; pt denies rx      Morbid obesity      herniated lumbar disc      Depression      Anxiety      sickel cell anemia trait      ETOH abuse  states she quit alcohol in       Lymphedema, limb  right side s/p right mastectomy      Neuropathy  b/l feet      Substance abuse  quit in  -- cocaine and marijuana      Miscarriage  x 2      Breast cancer    right breast -- had mastectomy and then post op chemo and RT, followed with Herceptin for 1 year   last chemo   2013 may last Herceptin   last RT      Thyroid nodule  had negative biopsy      Insomnia      Sickle cell trait         h/o hysterectomy due to Fibroid--post op vaginal bleeding requiring a transfusion        repair of ventral hernia with mesh  Revision 2018      Radical mastectomy of right breast  3/30/12      Cancer  2012  insertion of mediport -- to be excised on 14      Termination of pregnancy (fetus)  x 3      Thyroid nodule  negative biopsy      Cataract  Bilateral 2017          FAMILY HISTORY:  Family history of leukemia  father  age 56 from leukemia    Family history of rheumatoid arthritis (Sibling)  sister age 54 from RA        SOCIAL HISTORY:    [ ] Non-smoker  [ ] Smoker  [ ] Alcohol    REVIEW OF SYSTEMS:  See HPI, otherwise complete 10 point review of systems negative    PHYSICAL EXAM:  T(C): 36.8 (23 @ 20:43), Max: 36.8 (23 @ 15:12)  HR: 70 (23 @ 20:43) (60 - 70)  BP: 136/54 (23 @ 20:43) (136/54 - 143/61)  RR: 20 (23 @ 20:43) (16 - 20)  SpO2: 100% (23 @ 20:43) (98% - 100%)  Wt(kg): --  I&O's Summary      Appearance: No Acute Distress	  HEENT:  Normal oral mucosa, PERRL, EOMI	  Cardiovascular: Normal S1 S2, No JVD, No murmurs/rubs/gallops  Respiratory: Lungs clear to auscultation bilaterally  Gastrointestinal:  Soft, Non-tender, + BS	  Skin: No rashes, No ecchymoses, No cyanosis	  Neurologic: Non-focal  Extremities: No clubbing, cyanosis or edema  Vascular: Peripheral pulses palpable 2+ bilaterally  Psychiatry: A & O x 3, Mood & affect appropriate    LABS:	 	    CBC Full  -  ( 2023 15:25 )  WBC Count : 5.29 K/uL  Hemoglobin : 10.5 g/dL  Hematocrit : 32.2 %  Platelet Count - Automated : 306 K/uL  Mean Cell Volume : 84.3 fL  Mean Cell Hemoglobin : 27.5 pg  Mean Cell Hemoglobin Concentration : 32.6 gm/dL  Auto Neutrophil # : 3.13 K/uL  Auto Lymphocyte # : 1.57 K/uL  Auto Monocyte # : 0.48 K/uL  Auto Eosinophil # : 0.09 K/uL  Auto Basophil # : 0.01 K/uL  Auto Neutrophil % : 59.1 %  Auto Lymphocyte % : 29.7 %  Auto Monocyte % : 9.1 %  Auto Eosinophil % : 1.7 %  Auto Basophil % : 0.2 %        138  |  100  |  11  ----------------------------<  98  5.0   |  27  |  0.81    Ca    9.5      2023 15:53  Mg     1.70         TPro  8.3  /  Alb  3.9  /  TBili  0.4  /  DBili  x   /  AST  33<H>  /  ALT  12  /  AlkPhos  95        proBNP: Serum Pro-Brain Natriuretic Peptide: 308 pg/mL ( @ 15:53)    Lipid Profile:   HgA1c:   TSH:       CARDIAC MARKERS:            TELEMETRY: 	    ECG:  	  RADIOLOGY:  OTHER: 	    PREVIOUS DIAGNOSTIC TESTING:    [ ] Echocardiogram:  [ ] Catheterization:  [ ] Stress Test:  	  	       HISTORY OF PRESENT ILLNESS:  This is a 62 year old woman with past medical history of Hypertension, Hyperlipidemia, Type 2 Diabetes Mellitus, Stage 3 Breast Cancer with R side Mastectomy/Chemo/XRT () with residual right arm lymphedema, osteoarthritis to bilateral knees and fibromylagia directed to ED for pulmonary edema by Urgent care.  Patient reports she had cough, wheezing and SOB last week and went to Urgent Care on 22 receiving antibiotics and a script for chest x-ray.  patient had chest x-ray on 1/3/2022 and received call today reporting that they saw pulmonary edema and was directed to ED.  Cardiology called on consult for pulmonary edema and is under Dr. Torres's care.  Patient reports her symptoms of last week have resolved but still has residual SOB.  She reports a remote history of heart failure 20 years ago that required hospitalization but has resolved.  Last echocardiogram shows normal EF with no wall motion abnormalities. Has NST in 2018 that showed no inducible ischemia.  On exam patient is in no acute distress A+O X 3 SB 58 with /52, Lungs sounds clear in all fields, no pedal edema but does have mildly edematous bilateral knees likely from osteoarthritis    Allergies    environment--ragweed, dust, cats--sneezing and watery eyes, nasal congestion (Other)  ramipril (Angioedema)    Intolerances    HOME MEDICATIONS  amLODIPine Besylate 5 MG Oral Tablet; TAKE 2 TABLET Daily  buPROPion HCl ER (SR) 100 MG Oral Tablet Extended Release 12 Hour; Take 1 tablet  daily  Contour Blood Glucose System w/Device Kit; test every morning and record reading  Contour Next Gen Monitor w/Device Kit; USE AS DIRECTED  Contour Next Test In Vitro Strip; TEST ONCE DAILY  FreeStyle System KIT; Use as directed.  Test 3 times a day  Gabapentin 300 MG TABS; Take 1 tablet twice daily  hydroCHLOROthiazide 25 MG Oral Tablet; TAKE ONE TABLET BY MOUTH ONCE DAILY  Ipratropium Bromide 0.03 % Nasal Solution  Meloxicam 7.5 MG Oral Tablet; TAKE 1-2 TABLETS DAILY  metFORMIN HCl - 500 MG Oral Tablet; TAKE 2 TABLETS TWICE DAILY  Methadone HCl - 10 MG Oral Tablet; TAKE 1 TABLET Daily MDD:10mg  Miscellaneous Supply; Right breast prosthetic bra with right breast prosthesis (2)  ICD:  C50.919  Multivitamins CAPS; TAKE 1 CAPSULE DAILY  Omeprazole 40 MG Oral Capsule Delayed Release; TAKE 1 CAPSULE Twice daily  Before meals  Propranolol HCl - 10 MG Oral Tablet; TAKE 1 TABLET BY MOUTH TWICE A DAY  Tylenol 8 Hour 650 MG Oral Tablet Extended Release; TAKE 1 TABLET 3-4 TIMES DAILY  Valsartan 320 MG Oral Tablet; TAKE 1 TABLET EVERY DAY  	    MEDICATIONS:                  PAST MEDICAL & SURGICAL HISTORY:  Fibromyalgia      b/l  Carpal tunnel syndrome  tx PT/OT      Abdominal hernia in 20 ; pt states hernia repair , 2018      Diabetes mellitus diagnosed in   fs 86 am 170 pm      History  Uterine Fibroid  s/p Hysterectomy       Sleep apnea diagnosed ---supposed to use device but doesn&#x27;t      Acid Reflux      hiatal hernia  last endoscopy 1.5 years ago      h/o b/l fungal foot infection   pt denies      Alcohol abuse--quit 8 years ago--goes to AA  ADDENDUM:  at PAST appointment  patient states she quit alcohol approximately       Drug abuse--quit 8 years ago--goes to   ADDENDUM: at PAST appointment, patient states she quit alcohol in approximately       personal h/o CHF (congestive heart failure)--last hospitalized in       Asthma  last rescue inhaler use &quot;maybe 3 years ago when I had the flu or a cold&quot;      h/o   Fatty liver      Diverticulosis      Arthritis      Hypertension      Hypercholesterolemia  19 ; pt reports improvement ; pt denies rx      Morbid obesity      herniated lumbar disc      Depression      Anxiety      sickel cell anemia trait      ETOH abuse  states she quit alcohol in       Lymphedema, limb  right side s/p right mastectomy      Neuropathy  b/l feet      Substance abuse  quit in  -- cocaine and marijuana      Miscarriage  x 2      Breast cancer    right breast -- had mastectomy and then post op chemo and RT, followed with Herceptin for 1 year   last chemo   2013 may last Herceptin   last RT      Thyroid nodule  had negative biopsy      Insomnia      Sickle cell trait         h/o hysterectomy due to Fibroid--post op vaginal bleeding requiring a transfusion        repair of ventral hernia with mesh  Revision 2018      Radical mastectomy of right breast  3/30/12      Cancer  2012  insertion of mediport -- to be excised on 14      Termination of pregnancy (fetus)  x 3      Thyroid nodule  negative biopsy      Cataract  Bilateral 2017          FAMILY HISTORY:  Family history of leukemia  father  age 56 from leukemia    Family history of rheumatoid arthritis (Sibling)  sister age 54 from RA        SOCIAL HISTORY:    [ ] Non-smoker  [ ] Smoker  [ ] Alcohol    REVIEW OF SYSTEMS:  See HPI, otherwise complete 10 point review of systems negative    PHYSICAL EXAM:  T(C): 36.8 (23 @ 20:43), Max: 36.8 (23 @ 15:12)  HR: 70 (23 @ 20:43) (60 - 70)  BP: 136/54 (23 @ 20:43) (136/54 - 143/61)  RR: 20 (23 @ 20:43) (16 - 20)  SpO2: 100% (23 @ 20:43) (98% - 100%)  Wt(kg): --  I&O's Summary      Appearance: No Acute Distress	  HEENT:  Normal oral mucosa, PERRL, EOMI	  Cardiovascular: Normal S1 S2, No JVD, No murmurs/rubs/gallops  Respiratory: Lungs clear to auscultation bilaterally  Gastrointestinal:  Soft, Non-tender, + BS	  Skin: No rashes, No ecchymoses, No cyanosis	  Neurologic: Non-focal  Extremities: No clubbing, cyanosis or edema  Vascular: Peripheral pulses palpable 2+ bilaterally  Psychiatry: A & O x 3, Mood & affect appropriate    LABS:	 	    CBC Full  -  ( 2023 15:25 )  WBC Count : 5.29 K/uL  Hemoglobin : 10.5 g/dL  Hematocrit : 32.2 %  Platelet Count - Automated : 306 K/uL  Mean Cell Volume : 84.3 fL  Mean Cell Hemoglobin : 27.5 pg  Mean Cell Hemoglobin Concentration : 32.6 gm/dL  Auto Neutrophil # : 3.13 K/uL  Auto Lymphocyte # : 1.57 K/uL  Auto Monocyte # : 0.48 K/uL  Auto Eosinophil # : 0.09 K/uL  Auto Basophil # : 0.01 K/uL  Auto Neutrophil % : 59.1 %  Auto Lymphocyte % : 29.7 %  Auto Monocyte % : 9.1 %  Auto Eosinophil % : 1.7 %  Auto Basophil % : 0.2 %        138  |  100  |  11  ----------------------------<  98  5.0   |  27  |  0.81    Ca    9.5      2023 15:53  Mg     1.70         TPro  8.3  /  Alb  3.9  /  TBili  0.4  /  DBili  x   /  AST  33<H>  /  ALT  12  /  AlkPhos  95        proBNP: Serum Pro-Brain Natriuretic Peptide: 308 pg/mL ( @ 15:53)    Lipid Profile:   HgA1c:   TSH:       CARDIAC MARKERS:            TELEMETRY: 	    ECG:  	  RADIOLOGY:  OTHER: 	    PREVIOUS DIAGNOSTIC TESTING:    [ ] Echocardiogram:  < from: Transthoracic Echocardiogram (21 @ 07:12) >    Patient name: CHARLES CHO  YOB: 1960   Age: 60 (F)   MR#: [id number]  Study Date: 2021  Location: O/PSonographer: ROXANNE Valencia  Study quality: Technically good  Referring Physician: JOSEFINA YUN MD  BloodPressure: 189/90 mmHg  Height: 155 cm  Weight: 104 kg  BSA: 2 m2  ------------------------------------------------------------------------  PROCEDURE: Transthoracic echocardiogram with 2-D, M-Mode  and complete spectral and color flow Doppler.  INDICATION: Shortness of breath (R06.02)  ------------------------------------------------------------------------  DIMENSIONS:  Dimensions:     Normal Values:  LA:     4.3 cm    2.0 - 4.0 cm  Ao:     2.6 cm    2.0 - 3.8 cm  SEPTUM: 0.9 cm    0.6 - 1.2 cm  PWT:    1.0 cm    0.6 - 1.1 cm  LVIDd:  5.3 cm    3.0 - 5.6 cm  LVIDs:  3.4 cm    1.8 - 4.0 cm  Derived Variables:  LVMI: 94 g/m2  RWT: 0.37  Fractional short: 36 %  Ejection Fraction (Teicholtz): 65 %  ------------------------------------------------------------------------  OBSERVATIONS:  Mitral Valve: Mitral annular calcification, otherwise  normal mitral valve. Mild mitral regurgitation.  Aortic Root: Normal aortic root.  Aortic Valve: Calcified trileaflet aortic valve with normal  opening. Minimal aortic regurgitation.  Left Atrium: Mildly dilated left atrium.  LA volume index =  41 cc/m2.  Left Ventricle: Normal left ventricular systolic function.  No segmental wall motion abnormalities. Normal left  ventricular internal dimensions and wall thicknesses.  Normal left ventricular diastolic function.  Right Heart: Normal right atrium. Normal right ventricular  size and function. Normal tricuspid valve.  Mild-moderate  tricuspid regurgitation. Normal pulmonic valve.  Mild  pulmonic regurgitation.  Pericardium/PleuraNormal pericardium with trace pericardial  effusion.  Hemodynamic: Estimated right ventricular systolic pressure  equals 48 mm Hg, assuming right atrial pressure equals 10  mm Hg, consistent with mild pulmonary hypertension.  ------------------------------------------------------------------------  CONCLUSIONS:  1. Mitral annular calcification, otherwise normal mitral  valve. Mild mitral regurgitation.  2. Mildly dilated left atrium.  LA volume index = 41 cc/m2.  3. Normal left ventricular internal dimensions and wall  thicknesses.  4. Normal left ventricular systolic function. No segmental  wall motion abnormalities.  5. Normal left ventricular diastolic function.  6. Normal right ventricular size and function.  7. Estimated right ventricular systolic pressure equals 48  mm Hg, assuming right atrial pressure equals 10 mm Hg,  consistent with mild pulmonary hypertension.  *** Compared with echocardiogram of 2020, no  significant changes noted.  ------------------------------------------------------------------------  Confirmed on  2021 - 08:16:28 by Diogenes Riley M.D.,  [facility]  ------------------------------------------------------------------------    < end of copied text >    [ ] Catheterization:    [ ] Stress Test:  	  < from: Nuclear Stress Test-Pharmacologic (Nuclear Stress Test-Pharmacologic .) (18 @ 11:10) >    PATIENT: CHARLES CHO  : 1960   AGE: 58 (F)   MR#: [id number]  STUDY DATE: 2018  LOCATION: O/P  REF. PHYSICIAN(S): Danna Torres MD  FELLOW: TESSA Vides PA  ------------------------------------------------------------------------  TYPE OF TEST: Stress Pharmacologic  INDICATION: Abnormal electrocardiogram (ECG) (EKG)  (R94.31)  ------------------------------------------------------------------------  HISTORY:  CARDIAC HISTORY: 58 year old female sent for cardiac  evaluation prior to hernia repair, past medical history  hypertension, diabetes mellitus  OTHER HISTORY: OA, lymphedema  RISK FACTORS: Diabetes, Hypertension, Post Menopausal  MEDICATIONS: metformin, losartan, HCTZ, gabapentin,  methadone, wellbutrin  ------------------------------------------------------------------------  BASELINE ELECTROCARDIOGRAM:  Rhythm: Normal Sinus Rhythm - 69 BPM  ST: No significant ST abnormalities.  ------------------------------------------------------------------------  HEMODYNAMIC PARAMETERS:                                      HR      BP  Baseline  Pre-Injection             66  135/64  00:00     Inject Regadenoson        65  00:30     Post Injection            72  01:00     Post Injection            90  02:00     Post Injection            79  136/73  03:00     Post Injection            76  135/47  04:00     Post Injection            72  127/61  05:00     Recovery                  68  103/69  06:00     Recovery                  70  07:05     Recovery            62  124/76  ------------------------------------------------------------------------  Agent: Regadenoson 0.4 mg/5 ml NS. injected over 10 sec.  HR: Baseline HR: 66 bpm   Peak HR: 90 bpm (56% of MPHR)  MPHR: 160 bpm   85% of MPHR: 136 bpm  BP: Baseline BP: 135/64 mmHg   Peak BP: 136/73 mmHg   Peak  RPP: 10949 (Rate Pressure Product)  Last Caffeine intake: 12 hrs  Terminated: Completion of protocol  ------------------------------------------------------------------------  SYMPTOMS/FINDINGS:  Symptoms: Dyspnea  Chest pain: No chest pain with administration of  Regadenoson  Treatment: None  ------------------------------------------------------------------------  ECG ABNORMALITIES DURING/AFTER STRESS:   Abnormalities: None  ------------------------------------------------------------------------  NEW ARRHYTHMIAS DEVELOPED DURING/AFTER STRESS:  None  ------------------------------------------------------------------------  STRESS TEST IMPRESSIONS:  Chest Pain: No chest pain with administration of  Regadenoson.  Symptom: Dyspnea.  HR Response: Appropriate.  BP Response: Appropriate.  Heart Rhythm: Normal Sinus Rhythm - 69 BPM.  Baseline ECG: No significant ST abnormalities.  ECG Abnormalities: None.  Arrhythmia: None.  ------------------------------------------------------------------------  PROCEDURE:  24.6 mCi of Tc 99m Tetrofosmin were injected during stress  protocol. Approximately 45 minutes later, tomographic  images were obtained in a 180 degree arc from right  anterior oblique to left anterior oblique with 64 stops.  The tomographic slices were reconstructed in 3 orthogonal  planes (short axis, horizontal long axis and vertical long  axis).  Interpretation was performed both by visual and  quantitative analysis.  Stress images were acquired using CZT-based system with  pinhole collimation ([employer] 530 c, [place]),  and reconstructed using MLEM algorithm. Images were  re-acquired with the patient in a prone position.  ------------------------------------------------------------------------  NUCLEAR FINDINGS:  Review of raw data shows: The study is of good technical  quality.  The left ventricle was normal in size. Normal myocardial  perfusion scan,with no evidence of infarction or inducible  ischemia.  ------------------------------------------------------------------------  GATED ANALYSIS:  Post-stress gated wall motion analysis was performed (LVEF  = 65 %;LVEDV = 87 ml.), revealing normal LV function.  ------------------------------------------------------------------------  IMPRESSIONS:Normal Study  * The left ventricle was normal in size.  * Tracer uptake was homogeneous throughout the left  ventricle.  * Normal study; no evidence for myocardial infarction or  ischemia.  * Gated wallmotion analysis was performed, and shows  normal wall motion.  ------------------------------------------------------------------------  Confirmed on  2018 - 14:08:44 by Danna Torres MD  ------------------------------------------------------------------------    < end of copied text >         HISTORY OF PRESENT ILLNESS:  This is a 62 year old woman with past medical history of Hypertension, Hyperlipidemia, Type 2 Diabetes Mellitus, Stage 3 Breast Cancer with R side Mastectomy/Chemo/XRT () with residual right arm lymphedema, osteoarthritis to bilateral knees and fibromylagia directed to ED for pulmonary edema by Urgent care.  Patient reports she had cough, wheezing and SOB last week and went to Urgent Care on 22 receiving antibiotics and a script for chest x-ray.  patient had chest x-ray on 1/3/2022 and received call today reporting that they saw pulmonary edema and was directed to ED.  Cardiology called on consult for pulmonary edema and is under Dr. Torres's care.  Patient reports her symptoms of last week have resolved but still has residual SOB.  She reports a remote history of heart failure 20 years ago that required hospitalization but has resolved.  Last echocardiogram shows normal EF with no wall motion abnormalities. Has NST in 2018 that showed no inducible ischemia.  On exam patient is in no acute distress A+O X 3 SB 58 with /52, Temp 98.4F, RR 14, Lungs sounds clear in all fields, no pedal edema but does have mildly edematous bilateral knees likely from osteoarthritis, No JVD and able to lie down flat.  Chest x-ray shows no pulmonary edema, ECG with no ischemic changes, Labs show no leukocytosis, no anemia, Troponin 7 and pBNP 308.    Allergies    environment--ragweed, dust, cats--sneezing and watery eyes, nasal congestion (Other)  ramipril (Angioedema)    Intolerances    HOME MEDICATIONS  amLODIPine Besylate 5 MG Oral Tablet; TAKE 2 TABLET Daily  buPROPion HCl ER (SR) 100 MG Oral Tablet Extended Release 12 Hour; Take 1 tablet  daily  Contour Blood Glucose System w/Device Kit; test every morning and record reading  Contour Next Gen Monitor w/Device Kit; USE AS DIRECTED  Contour Next Test In Vitro Strip; TEST ONCE DAILY  FreeStyle System KIT; Use as directed.  Test 3 times a day  Gabapentin 300 MG TABS; Take 1 tablet twice daily  hydroCHLOROthiazide 25 MG Oral Tablet; TAKE ONE TABLET BY MOUTH ONCE DAILY  Ipratropium Bromide 0.03 % Nasal Solution  Meloxicam 7.5 MG Oral Tablet; TAKE 1-2 TABLETS DAILY  metFORMIN HCl - 500 MG Oral Tablet; TAKE 2 TABLETS TWICE DAILY  Methadone HCl - 10 MG Oral Tablet; TAKE 1 TABLET Daily MDD:10mg  Miscellaneous Supply; Right breast prosthetic bra with right breast prosthesis (2)  ICD:  C50.919  Multivitamins CAPS; TAKE 1 CAPSULE DAILY  Omeprazole 40 MG Oral Capsule Delayed Release; TAKE 1 CAPSULE Twice daily  Before meals  Propranolol HCl - 10 MG Oral Tablet; TAKE 1 TABLET BY MOUTH TWICE A DAY  Tylenol 8 Hour 650 MG Oral Tablet Extended Release; TAKE 1 TABLET 3-4 TIMES DAILY  Valsartan 320 MG Oral Tablet; TAKE 1 TABLET EVERY DAY  	    MEDICATIONS:                  PAST MEDICAL & SURGICAL HISTORY:  Fibromyalgia      b/l  Carpal tunnel syndrome  tx PT/OT      Abdominal hernia in 20 ; pt states hernia repair ,       Diabetes mellitus diagnosed in   fs 86 am 170 pm      History  Uterine Fibroid  s/p Hysterectomy       Sleep apnea diagnosed ---supposed to use device but doesn&#x27;t      Acid Reflux      hiatal hernia  last endoscopy 1.5 years ago      h/o b/l fungal foot infection   pt denies      Alcohol abuse--quit 8 years ago--goes to   ADDENDUM:  at PAST appointment  patient states she quit alcohol approximately       Drug abuse--quit 8 years ago--goes to   ADDENDUM: at PAST appointment, patient states she quit alcohol in approximately       personal h/o CHF (congestive heart failure)--last hospitalized in       Asthma  last rescue inhaler use &quot;maybe 3 years ago when I had the flu or a cold&quot;      h/o   Fatty liver      Diverticulosis      Arthritis      Hypertension      Hypercholesterolemia  19 ; pt reports improvement ; pt denies rx      Morbid obesity      herniated lumbar disc      Depression      Anxiety      sickel cell anemia trait      ETOH abuse  states she quit alcohol in       Lymphedema, limb  right side s/p right mastectomy      Neuropathy  b/l feet      Substance abuse  quit in  -- cocaine and marijuana      Miscarriage  x 2      Breast cancer  2012  right breast -- had mastectomy and then post op chemo and RT, followed with Herceptin for 1 year   last chemo   2013 may last Herceptin   last RT      Thyroid nodule  had negative biopsy      Insomnia      Sickle cell trait         h/o hysterectomy due to Fibroid--post op vaginal bleeding requiring a transfusion        repair of ventral hernia with mesh  Revision 2018      Radical mastectomy of right breast  3/30/12      Cancer  2012  insertion of mediport -- to be excised on 14      Termination of pregnancy (fetus)  x 3      Thyroid nodule  negative biopsy      Cataract  Bilateral 2017          FAMILY HISTORY:  Family history of leukemia  father  age 56 from leukemia    Family history of rheumatoid arthritis (Sibling)  sister age 54 from RA        SOCIAL HISTORY:    [ ] Non-smoker  [ ] Smoker  [ ] Alcohol    REVIEW OF SYSTEMS:  See HPI, otherwise complete 10 point review of systems negative    PHYSICAL EXAM:  T(C): 36.8 (23 @ 20:43), Max: 36.8 (23 @ 15:12)  HR: 70 (23 @ 20:43) (60 - 70)  BP: 136/54 (23 @ 20:43) (136/54 - 143/61)  RR: 20 (23 @ 20:43) (16 - 20)  SpO2: 100% (23 @ 20:43) (98% - 100%)  Wt(kg): --  I&O's Summary      Appearance: No Acute Distress	  HEENT:  Normal oral mucosa, PERRL, EOMI	  Cardiovascular: Normal S1 S2, No JVD, No murmurs/rubs/gallops  Respiratory: Lungs clear to auscultation bilaterally  Gastrointestinal:  Soft, Non-tender, + BS	  Skin: No rashes, No ecchymoses, No cyanosis	  Neurologic: Non-focal  Extremities: No clubbing, cyanosis or edema  Vascular: Peripheral pulses palpable 2+ bilaterally  Psychiatry: A & O x 3, Mood & affect appropriate    LABS:	 	    CBC Full  -  ( 2023 15:25 )  WBC Count : 5.29 K/uL  Hemoglobin : 10.5 g/dL  Hematocrit : 32.2 %  Platelet Count - Automated : 306 K/uL  Mean Cell Volume : 84.3 fL  Mean Cell Hemoglobin : 27.5 pg  Mean Cell Hemoglobin Concentration : 32.6 gm/dL  Auto Neutrophil # : 3.13 K/uL  Auto Lymphocyte # : 1.57 K/uL  Auto Monocyte # : 0.48 K/uL  Auto Eosinophil # : 0.09 K/uL  Auto Basophil # : 0.01 K/uL  Auto Neutrophil % : 59.1 %  Auto Lymphocyte % : 29.7 %  Auto Monocyte % : 9.1 %  Auto Eosinophil % : 1.7 %  Auto Basophil % : 0.2 %        138  |  100  |  11  ----------------------------<  98  5.0   |  27  |  0.81    Ca    9.5      2023 15:53  Mg     1.70         TPro  8.3  /  Alb  3.9  /  TBili  0.4  /  DBili  x   /  AST  33<H>  /  ALT  12  /  AlkPhos  95        proBNP: Serum Pro-Brain Natriuretic Peptide: 308 pg/mL ( @ 15:53)    Lipid Profile:   HgA1c:   TSH:       CARDIAC MARKERS:            TELEMETRY: 	    ECG:  	  RADIOLOGY:  OTHER: 	    PREVIOUS DIAGNOSTIC TESTING:    [ ] Echocardiogram:  < from: Transthoracic Echocardiogram (21 @ 07:12) >    Patient name: CHARLES CHO  YOB: 1960   Age: 60 (F)   MR#: [id number]  Study Date: 2021  Location: O/PSonographer: ROXANNE Valencia  Study quality: Technically good  Referring Physician: JOSEFINA YUN MD  BloodPressure: 189/90 mmHg  Height: 155 cm  Weight: 104 kg  BSA: 2 m2  ------------------------------------------------------------------------  PROCEDURE: Transthoracic echocardiogram with 2-D, M-Mode  and complete spectral and color flow Doppler.  INDICATION: Shortness of breath (R06.02)  ------------------------------------------------------------------------  DIMENSIONS:  Dimensions:     Normal Values:  LA:     4.3 cm    2.0 - 4.0 cm  Ao:     2.6 cm    2.0 - 3.8 cm  SEPTUM: 0.9 cm    0.6 - 1.2 cm  PWT:    1.0 cm    0.6 - 1.1 cm  LVIDd:  5.3 cm    3.0 - 5.6 cm  LVIDs:  3.4 cm    1.8 - 4.0 cm  Derived Variables:  LVMI: 94 g/m2  RWT: 0.37  Fractional short: 36 %  Ejection Fraction (Teicholtz): 65 %  ------------------------------------------------------------------------  OBSERVATIONS:  Mitral Valve: Mitral annular calcification, otherwise  normal mitral valve. Mild mitral regurgitation.  Aortic Root: Normal aortic root.  Aortic Valve: Calcified trileaflet aortic valve with normal  opening. Minimal aortic regurgitation.  Left Atrium: Mildly dilated left atrium.  LA volume index =  41 cc/m2.  Left Ventricle: Normal left ventricular systolic function.  No segmental wall motion abnormalities. Normal left  ventricular internal dimensions and wall thicknesses.  Normal left ventricular diastolic function.  Right Heart: Normal right atrium. Normal right ventricular  size and function. Normal tricuspid valve.  Mild-moderate  tricuspid regurgitation. Normal pulmonic valve.  Mild  pulmonic regurgitation.  Pericardium/PleuraNormal pericardium with trace pericardial  effusion.  Hemodynamic: Estimated right ventricular systolic pressure  equals 48 mm Hg, assuming right atrial pressure equals 10  mm Hg, consistent with mild pulmonary hypertension.  ------------------------------------------------------------------------  CONCLUSIONS:  1. Mitral annular calcification, otherwise normal mitral  valve. Mild mitral regurgitation.  2. Mildly dilated left atrium.  LA volume index = 41 cc/m2.  3. Normal left ventricular internal dimensions and wall  thicknesses.  4. Normal left ventricular systolic function. No segmental  wall motion abnormalities.  5. Normal left ventricular diastolic function.  6. Normal right ventricular size and function.  7. Estimated right ventricular systolic pressure equals 48  mm Hg, assuming right atrial pressure equals 10 mm Hg,  consistent with mild pulmonary hypertension.  *** Compared with echocardiogram of 2020, no  significant changes noted.  ------------------------------------------------------------------------  Confirmed on  2021 - 08:16:28 by Diogenes Riley M.D.,  Formerly [facility], [facility]  ------------------------------------------------------------------------    < end of copied text >    [ ] Catheterization:    [ ] Stress Test:  	  < from: Nuclear Stress Test-Pharmacologic (Nuclear Stress Test-Pharmacologic .) (18 @ 11:10) >    PATIENT: CHARLES CHO  : 1960   AGE: 58 (F)   MR#: [id number]  STUDY DATE: 2018  LOCATION: O/P  REF. PHYSICIAN(S): Danna Torres MD  FELLOW: TESSA Vides PA  ------------------------------------------------------------------------  TYPE OF TEST: Stress Pharmacologic  INDICATION: Abnormal electrocardiogram (ECG) (EKG)  (R94.31)  ------------------------------------------------------------------------  HISTORY:  CARDIAC HISTORY: 58 year old female sent for cardiac  evaluation prior to hernia repair, past medical history  hypertension, diabetes mellitus  OTHER HISTORY: OA, lymphedema  RISK FACTORS: Diabetes, Hypertension, Post Menopausal  MEDICATIONS: metformin, losartan, HCTZ, gabapentin,  methadone, wellbutrin  ------------------------------------------------------------------------  BASELINE ELECTROCARDIOGRAM:  Rhythm: Normal Sinus Rhythm - 69 BPM  ST: No significant ST abnormalities.  ------------------------------------------------------------------------  HEMODYNAMIC PARAMETERS:                                      HR      BP  Baseline  Pre-Injection             66  135/64  00:00     Inject Regadenoson        65  00:30     Post Injection            72  01:00     Post Injection            90  02:00     Post Injection            79  136/73  03:00     Post Injection            76  135/47  04:00     Post Injection            72  127/61  05:00     Recovery                  68  103/69  06:00     Recovery                  70  07:05     Recovery            62  124/76  ------------------------------------------------------------------------  Agent: Regadenoson 0.4 mg/5 ml NS. injected over 10 sec.  HR: Baseline HR: 66 bpm   Peak HR: 90 bpm (56% of MPHR)  MPHR: 160 bpm   85% of MPHR: 136 bpm  BP: Baseline BP: 135/64 mmHg   Peak BP: 136/73 mmHg   Peak  RPP: [id number] (Rate Pressure Product)  Last Caffeine intake: 12 hrs  Terminated: Completion of protocol  ------------------------------------------------------------------------  SYMPTOMS/FINDINGS:  Symptoms: Dyspnea  Chest pain: No chest pain with administration of  Regadenoson  Treatment: None  ------------------------------------------------------------------------  ECG ABNORMALITIES DURING/AFTER STRESS:   Abnormalities: None  ------------------------------------------------------------------------  NEW ARRHYTHMIAS DEVELOPED DURING/AFTER STRESS:  None  ------------------------------------------------------------------------  STRESS TEST IMPRESSIONS:  Chest Pain: No chest pain with administration of  Regadenoson.  Symptom: Dyspnea.  HR Response: Appropriate.  BP Response: Appropriate.  Heart Rhythm: Normal Sinus Rhythm - 69 BPM.  Baseline ECG: No significant ST abnormalities.  ECG Abnormalities: None.  Arrhythmia: None.  ------------------------------------------------------------------------  PROCEDURE:  24.6 mCi of Tc 99m Tetrofosmin were injected during stress  protocol. Approximately 45 minutes later, tomographic  images were obtained in a 180 degree arc from right  anterior oblique to left anterior oblique with 64 stops.  The tomographic slices were reconstructed in 3 orthogonal  planes (short axis, horizontal long axis and vertical long  axis).  Interpretation was performed both by visual and  quantitative analysis.  Stress images were acquired using CZT-based system with  pinhole collimation ([employer]),  and reconstructed using MLEM algorithm. Images were  re-acquired with the patient in a prone position.  ------------------------------------------------------------------------  NUCLEAR FINDINGS:  Review of raw data shows: The study is of good technical  quality.  The left ventricle was normal in size. Normal myocardial  perfusion scan,with no evidence of infarction or inducible  ischemia.  ------------------------------------------------------------------------  GATED ANALYSIS:  Post-stress gated wall motion analysis was performed (LVEF  = 65 %;LVEDV = 87 ml.), revealing normal LV function.  ------------------------------------------------------------------------  IMPRESSIONS:Normal Study  * The left ventricle was normal in size.  * Tracer uptake was homogeneous throughout the left  ventricle.  * Normal study; no evidence for myocardial infarction or  ischemia.  * Gated wallmotion analysis was performed, and shows  normal wall motion.  ------------------------------------------------------------------------  Confirmed on  2018 - 14:08:44 by Danna Torres MD  ------------------------------------------------------------------------    < end of copied text >

## 2023-01-07 NOTE — ED PROVIDER NOTE - OBJECTIVE STATEMENT
62-year-old female past medical history of breast cancer status post right mastectomy, chemo, radiation 10 years ago, type 2 diabetes, hypertension, history of heart failure (per chart review last echo 2021, 65% EF) presents with 2 weeks of SOB worsened in the past 4 days of worsening shortness of breath.  Patient with URI-like symptoms on the Dec 18/19, went to urgent care and received a chest x-ray 5 days ago, told today chest x-ray concerning for heart failure and to come to ED.  Patient was notices productive cough and "chest rattling," increased lower extremity swelling. Denies fevers, abdominal pain, N/V/D, calf tenderness or swelling. On 25mg hydrochlorothiazide, as lasix caused hypokalemia.

## 2023-01-07 NOTE — ED CDU PROVIDER INITIAL DAY NOTE - OBJECTIVE STATEMENT
62-year-old female past medical history of breast cancer status post right mastectomy, chemo, radiation 10 years ago, type 2 diabetes, hypertension, history of heart failure (per chart review last echo 2021, 65% EF) presents with 2 weeks of SOB worsened in the past 4 days of worsening shortness of breath.  Patient with URI-like symptoms on the Dec 18/19, went to urgent care and received a chest x-ray 5 days ago, told today chest x-ray concerning for heart failure and to come to ED.  Patient was notices productive cough and "chest rattling," increased lower extremity swelling. Denies fevers, abdominal pain, N/V/D, calf tenderness or swelling. On 25mg hydrochlorothiazide, as lasix caused hypokalemia.    CDU TESSA Hollins Note------  ED Provider Note reviewed as above.  Cardiology consult note reviewed.  Pt is a 62 year old female, PMH HTN, hyperlipidemia, Stage 3 Breast Cancer with R side Mastectomy/Chemo/XRT (2012) with residual right arm lymphedema, osteoarthritis to bilateral knees and fibromylagia directed to ED for pulmonary edema by Urgent care.  Patient reports she had cough, wheezing and SOB last week and went to Urgent Care on 12/30/22 receiving antibiotics and a script for chest x-ray.  patient had chest x-ray on 1/3/2022 and received call today reporting that they saw pulmonary edema and was directed to ED. 62-year-old female past medical history of breast cancer status post right mastectomy, chemo, radiation 10 years ago, type 2 diabetes, hypertension, history of heart failure (per chart review last echo 2021, 65% EF) presents with 2 weeks of SOB worsened in the past 4 days of worsening shortness of breath.  Patient with URI-like symptoms on the Dec 18/19, went to urgent care and received a chest x-ray 5 days ago, told today chest x-ray concerning for heart failure and to come to ED.  Patient was notices productive cough and "chest rattling," increased lower extremity swelling. Denies fevers, abdominal pain, N/V/D, calf tenderness or swelling. On 25mg hydrochlorothiazide, as lasix caused hypokalemia.    CDU TESSA Hollins Note------  ED Provider Note reviewed as above.  Cardiology consult note reviewed.  Pt is a 62 year old female, PMH Stage 3 breast cancer (s/p right mastectomy/CTX/RTX in 2012, with residual right arm lymphedema), HTN, hyperlipidemia, DM Type 2, osteoarthritis to bilateral knees, fibromylagia; pt presented to the ED after being directed to ED by Urgent care for concerns of pulmonary edema.  Patient reports cough, wheezing and SOB since last week; had been evaluated at urgent care center on 12/30/22 and was Rx'd antibiotics and given a script for a CXR.  Pt had CXR on 1/3/2022 and received call today reporting that they saw pulmonary edema; pt was directed to ED.  In the ED, VSS, pt afebrile.  EKG showed sinus bradycardia in low 50s.  WBC 5.29, Hb 10.5; CMP: grossly unremarkable; INR 1.17.  Trop 7, pro-.  VBG: lactate 1.2, pH 7.37.  COVID/FLU/RSV: NotDetected.  CXR was performed; no focal consolidations noted, no blunting of costophrenic angles noted, no effusion noted; no acute pathology noted; CXR is pending official radiology read.  In the ED, Cardiology was consulted (pt follows with Cardiologist Dr. Danna Torres); recs appreciated; pt was dispo'd to CDU for tele monitoring, echo, recs per Cardiology team following pt, general observation care / monitoring. 62-year-old female past medical history of breast cancer status post right mastectomy, chemo, radiation 10 years ago, type 2 diabetes, hypertension, history of heart failure (per chart review last echo 2021, 65% EF) presents with 2 weeks of SOB worsened in the past 4 days of worsening shortness of breath.  Patient with URI-like symptoms on the Dec 18/19, went to urgent care and received a chest x-ray 5 days ago, told today chest x-ray concerning for heart failure and to come to ED.  Patient was notices productive cough and "chest rattling," increased lower extremity swelling. Denies fevers, abdominal pain, N/V/D, calf tenderness or swelling. On 25mg hydrochlorothiazide, as lasix caused hypokalemia.    CDU TESSA Hollins Note------  ED Provider Note reviewed as above.  Cardiology consult note reviewed.  Pt is a 62 year old female, PMH Stage 3 breast cancer (s/p right mastectomy/CTX/RTX in 2012, with residual right arm lymphedema), hx/o heart failure (EF 65% on echo 4/23/2021), HTN, hyperlipidemia, DM Type 2, osteoarthritis to bilateral knees, fibromylagia; pt presented to the ED after being directed to ED by Urgent care for concerns of pulmonary edema.  Patient reports cough, wheezing and SOB since last week; had been evaluated at urgent care center on 12/30/22 and was Rx'd antibiotics and given a script for a CXR.  Pt had CXR on 1/3/2022 and received call today reporting that they saw pulmonary edema; pt was directed to ED.    In the ED, VSS, pt afebrile.  EKG showed sinus bradycardia in low 50s.  WBC 5.29, Hb 10.5; CMP: grossly unremarkable; INR 1.17.  Trop 7, pro-.  VBG: lactate 1.2, pH 7.37.  COVID/FLU/RSV: NotDetected.  CXR was performed; no focal consolidations noted, no blunting of costophrenic angles noted, no effusion noted; no acute pathology noted; CXR is pending official radiology read.  In the ED, Cardiology was consulted (pt follows with Cardiologist Dr. Danna Torres); recs appreciated; pt was dispo'd to CDU for tele monitoring, echo, recs per Cardiology team following pt, general observation care / monitoring.

## 2023-01-07 NOTE — ED PROVIDER NOTE - PHYSICAL EXAMINATION
GEN: Patient awake alert NAD, speaking in full sentences  HEENT: normocephalic, atraumatic, EOMI, no scleral icterus, moist MM  CARDIAC: RRR, S1, S2, no murmur.   PULM: mild crackles bases b/l  ABD: soft NT, ND, no rebound no guarding,  MSK: Moving all extremities, 2+ pitting edema b/l. 5/5 strength and full ROM in all extremities.     NEURO: A&Ox3, gait normal, no focal neurological deficits, CN 2-12 grossly intact  SKIN: warm, dry, no rash.

## 2023-01-07 NOTE — ED ADULT NURSE NOTE - OBJECTIVE STATEMENT
Patient A&o X4, history of asthma, HTN, depression, received in room 8, with complaints of SOB. Patient states, "I had a cold since [place] that won't go away and now I have SOB". Patient reports that she did an xray on Tuesday and resulted possible CHF. Patient complains of JOINER, denies sleeping on more pillows of recent. No b/l leg edema noted at this time, patient complains of SOB at rest. Patient able to speak in clear sentences, respirations equal and unlabored. Mild wheezing noted in b/l lungs, equal chest rise and fall noted. Denies CP, fever, chills, nausea, vomiting and diarrhea at this time. Skin warm and dry. Provider at bedside for eval, pending further orders.

## 2023-01-07 NOTE — CONSULT NOTE ADULT - NS ATTEND AMEND GEN_ALL_CORE FT
No evidence of heart failure.  I personally reviewed the echo, with normal biventricular function and normal valvular function.  No further inpatient cardiac testing, ok for d/c from our perspective.

## 2023-01-07 NOTE — ED ADULT NURSE NOTE - CAS ELECT INFOMATION PROVIDED
PROGRESS NOTE: 19 @ 15:24  	  • Reason for Ongoing Consultation: 	Depression and suicidal ideation     ID: [id number] Male with HEALTH ISSUES - PROBLEM Dx:          INTERVAL DATA:   • Interval Chief Complaint: Depression and suicidal attempt  • Interval History: Patient continues to be severely depressed, tearful during interview. He exhibited psychomotor agitation and was shaking legs.  Reported that he spoke to gf on the phone who is likely using substances and does not  want to see him again.  He disclosed that he had been thinking about killing himself for weeks which he saw as only way out.  Patient states "I don't know" when asked how he feels about being alive.  Denying current suicidal ideation but states he wants to feel better.       REVIEW OF VITALS/LABS/IMAGING/INVESTIGATIONS:   • Vital signs reviewed: Yes  • Vital Signs:	    T(C): 36.4 (19 @ 12:16), Max: 37.1 (19 @ 03:18)  HR: 84 (19 @ 12:16) (69 - 96)  BP: 123/86 (19 @ 12:16) (106/74 - 140/94)  RR: 18 (19 @ 12:16) (12 - 20)  SpO2: 99% (19 @ 12:16) (97% - 100%)    • Available labs reviewed: Yes  • Available Lab Results:                           13.6   9.3   )-----------( 283      ( 2019 20:24 )             39.2     -    142  |  104  |  12.0  ----------------------------<  98  3.7   |  24.0  |  1.00    Ca    8.9      2019 20:24          Urinalysis Basic - ( 2019 22:31 )    Color: Yellow / Appearance: Clear / S.025 / pH: x  Gluc: x / Ketone: Moderate  / Bili: Negative / Urobili: Negative mg/dL   Blood: x / Protein: 30 mg/dL / Nitrite: Negative   Leuk Esterase: Trace / RBC: 0-2 /HPF / WBC 0-2   Sq Epi: x / Non Sq Epi: Occasional / Bacteria: Occasional          MEDICATIONS:      PRN Medications: Given Clonidine and Odansetron  • PRN Medications since last evaluation	  • PRN Details	    Current Medications:   cloNIDine 0.1 milliGRAM(s) Oral every 6 hours PRN  ondansetron    Tablet 4 milliGRAM(s) Oral every 6 hours PRN     Medication Side Effects:  • Medication Side Effects or Adverse Reactions (new or ongoing)	None known  MEDICAL REVIEW OF SYSTEMS:    Medical ROS:  · Constitutional Symptoms	No complaints  · Eyes	No complaints  · Ears / Nose / Throat / Mouth	No complaints  · Cardiovascular	No complaints  · Respiratory	No complaints  · Gastrointestinal	No complaints  · Genitourinary	No complaints  · Musculoskeletal	No complaints  · Skin	No complaints  · Neurological	No complaints  · Psychiatric (see HPI)	See HPI  · Endocrine	No complaints  · Hematologic / Lymphatic	No complaints  · Allergic / Immunologic	No complaints    MENTAL STATUS EXAM:    Mental Status Exam:  · General Appearance	Well developed  · Body Habitus	Well nourished  · Hygiene	Fair  · Grooming	Fair  · Behavior	Cooperative  · Eye Contact	Good  · Relatedness	Good  · Impulse Control	Normal  · Muscle Tone / Strength	Normal muscle tone/strength  · Abnormal Movements	No abnormal movements  · Gait / Station	Other  · Other	not observed  · Speech Volume	Normal  · Speech Rate	Slowed  · Speech Spontaneity	Increased latency  · Speech Articulation	Normal  · Reported mood	Depressed  · Observed mood	Depressed  · Affect Range	Constricted  · Affect Congruence	Congruent  · Thought Process	Linear  · Thought Associations	Normal  · Thought Content	Unremarkable  · Perceptions	No abnormalities  · Oriented to Time	Yes  · Oriented to Place	Yes  · Oriented to Situation	Yes  · Oriented to Person	Yes  · Attention / Concentration	Normal  · Estimated Intelligence	Average  · Recent Memory	Normal  · Remote Memory	Normal  · Fund of Knowledge	Normal  · Language	No abnormalities noted  · Judgment (regarding everyday events)	Poor  · Insight (regarding psychiatric illness)	Poor    FORMULATION:    Formulation:  · Summary	Patient is a 51  year old man, hx of PTSD, previously in outpatient substance and psychiatry, no prior psychiatric hospitalizations, reported several prior history of undisclosed suicide attempts by overdose , domiciled with girlfriend and parents  with h/o opioid and Xanax abuse, denies prior rehabs/or detox, has been on suboxone in the past, brought in by police after father called 911. Asked to evaluate for suicide attempt as patient was found by police with machete on his neck.   Patient reports multiple psychosocial stressors (financial, problems with work, housing, girlfriend) and reports depressive sx's of increasing intensity over the last several months.  Patient with dx of Depressive disorder unspecified and symptoms are complicated by daily heroine use and sporadic Xanax abuse and there for substance induced depressive sx's need to be considered in deferential.  Today patient barricaded himself in room and had plan to kill himself by cutting his throat with machete.  Patient was interrupted by police who broke door down, and tazed patient when he had machete to throat. Patient continues to endorse suicidal ideation, is hopeless,  with high psychic anxiety.  Patient requires inpatient psychiatric hospitalization when medically cleared  · Differential	major depression, substance induced mood disorder  · Risk Assessment	Chronic risk due to status as older  male, hx of trauma, hx of substance abuse and depression. Acute risk due to recent drug overdose,  prior suicide attempts, substance use, impulsive behavior, depressive sx's, multiple stressors, hopelessness and presenting with interrupted suicide attempt and continued suicidal ideation with intent and plan.  Considered to be a high risk to self at this time.    AXIS:    Axis I:  Primary Dx Other recurrent depressive disorders F33.8. Secondary Dx 1 Opioid abuse F11.10. Secondary Dx 2 Benzodiazepine abuse F13.10. Secondary Dx 3 Cannabis abuse F12.10.     Axis III:  · Axis III	asthma     Axis IV:  · Axis IV	Problem related to social environment     Axis V:  · GAF	20    PLAN:    Plan:  ·Transfer to [facility] given by rolando scales/MARIA DEL ROSARIO instructions

## 2023-01-07 NOTE — ED ADULT NURSE REASSESSMENT NOTE - NS ED NURSE REASSESS COMMENT FT1
Break coverage RN: Received patient in room 8, waiting for xray result. Patient denies any pain/discomfort at this time. Patient is A&OX4, airway patent, breathing unlabored and even. Side rails up and safety maintained. Fall precaution in place. Call bells within reach. Family at bedside.

## 2023-01-07 NOTE — ED CDU PROVIDER INITIAL DAY NOTE - DETAILS
Tele monitoring, echo / recs as per Cardiology team following pt; general observation care / monitoring.

## 2023-01-07 NOTE — ED PROVIDER NOTE - CLINICAL SUMMARY MEDICAL DECISION MAKING FREE TEXT BOX
62-year-old female past medical history of breast cancer status post right mastectomy, chemo, radiation 10 years ago, type 2 diabetes, hypertension, history of heart failure (per chart review last echo 2021, 65% EF) presents with 2 weeks of SOB worsened in the past 4 days. Patient with URI-like symptoms on the 3 weeks ago, went to urgent care and received a chest x-ray 5 days ago, told today chest x-ray concerning for heart failure and to come to ED.  Patient has noticed productive cough and "chest rattling" increased lower extremity swelling. Patient not hypoxic sitting or ambulating, speaking full sentences, mild crackles at the bases bilaterally, abdomen soft, 2+ pitting edema bilaterally, No calf tenderness or swelling, Concern for new onset heart failure, recent URI, differential also includes but not limited to underlying pneumonia, ACS, low suspicion for PE. Patient for possible admission for optimization of medications. 62-year-old female past medical history of breast cancer status post right mastectomy, chemo, radiation 10 years ago, type 2 diabetes, hypertension, history of heart failure (per chart review last echo 2021, 65% EF) presents with 2 weeks of SOB worsened in the past 4 days. Patient with URI-like symptoms  3 weeks ago, went to urgent care and received a chest x-ray 5 days ago, told today chest x-ray concerning for heart failure and to come to ED.  Patient has noticed productive cough and "chest rattling," increased lower extremity swelling. Patient not hypoxic sitting or ambulating, speaking full sentences, mild crackles at the bases bilaterally, abdomen soft, 2+ pitting edema bilaterally, No calf tenderness or swelling, Concern for new onset heart failure, recent URI, differential also includes but not limited to underlying pneumonia, ACS, low suspicion for PE. Possible admission for optimization of medications and echo.

## 2023-01-07 NOTE — ED PROVIDER NOTE - ATTENDING CONTRIBUTION TO CARE
58 yo F hx breast cancer in remission s/p mastectomy, chemo and radiation therapy, node bx with R arm lymphedema, DM2, HTN,HLD, asthma, chronic pain anxiety/depression, CHF, presenting with complaints of increasing SOB/JOINER and orthopnea over the past week. Pt states she has been having increased leg swelling, and feels short of breath when speaking. She used to take lasix but was taken off of it 2/2 low potassium. Takes HCTZ. Last echo >2 years ago. On exam, b/l pitting edema to mid calf, crackles b/l lower lung fields, speaking in full sentences. Plan for labs, cxr, ekg, likely cdu for echo

## 2023-01-07 NOTE — ED ADULT NURSE REASSESSMENT NOTE - NS ED NURSE REASSESS COMMENT FT1
Report received from day shift RN. Patient resting comfortably, offers no complaints. Safety measures maintained.

## 2023-01-07 NOTE — CONSULT NOTE ADULT - ASSESSMENT
62F PMH HTN, HLD, T2DM, R side Breat CA s/p Mastectomy, Osteoarthritis directed to ED for concerns of Pulmonary Edema on X-ray from Urgent Care.     R/O Heart Failure  - No objection for continue monitoring in CDU  - No objection for planned echocardiogram in am.    - Please continue all cardiac medications  - Cardiac Enzymes q8h x3 with serial ECG  - Monitor telemetry and pulse oximetry and need for diuresis may call Cardiology for direction at [phone]  -Cardiology to monitor

## 2023-01-07 NOTE — ED PROVIDER NOTE - PROGRESS NOTE DETAILS
Discussed with cards patient concerning for volume overload. Patient to be placed in CDU.  -Ashok Dinero PGY-1

## 2023-01-07 NOTE — ED ADULT TRIAGE NOTE - CHIEF COMPLAINT QUOTE
states " I was sent in here from urgent care with CHF" h/o CHF and is having increased SOB with rattling in the chest " h/o breast Ca with mastectomy , chemo and radiation I n the past.

## 2023-01-07 NOTE — ED CDU PROVIDER INITIAL DAY NOTE - ATTENDING APP SHARED VISIT CONTRIBUTION OF CARE
jeremie: Patient with distant history of heart failure.  Comes in with shortness of breath in the setting of having received a chest x-ray 3 days ago and being called today and told that it had heart failure and she needed to go to the emergency department.  Patient had reasonable vital signs and a normal pulse ox upon presentation.  She was seen by the ED team and plan is to admit to cdu to have eval by cards as well as echo.  on exam pt is breathing well, bnp is not super elevated.  cant see her own cardiologist until march    I performed a history and physical exam of the patient and discussed their management with the resident and /or advanced care provider. I reviewed the resident and /or ACP's note and agree with the documented findings and plan of care. My medical decison making and observations are found above. jeremie: Patient with distant history of heart failure.  Comes in with shortness of breath in the setting of having received a chest x-ray 3 days ago and being called today and told that it had heart failure and she needed to go to the emergency department.  Patient had reasonable vital signs and a normal pulse ox upon presentation.  She was seen by the ED team and plan is to admit to cdu to have eval by cards as well as echo.  on exam pt is breathing well, bnp is not super elevated.  cant see her own cardiologist until march    I performed a history and physical exam of the patient and discussed their management with the resident and /or advanced care provider. I reviewed the resident and /or ACP's note and agree with the documented findings and plan of care. My medical decision making and observations are found above..

## 2023-01-07 NOTE — ED CDU PROVIDER INITIAL DAY NOTE - PHYSICAL EXAMINATION
CONSTITUTIONAL:  Well appearing, awake, alert, oriented to person, place, time/situation and in no apparent distress.  Pt. is objectively comfortable appearing and verbalizing in full, clear, effortless sentences.  ENMT: NC/AT.  Airway patent.  Nasal mucosa clear.  Moist mucous membranes.  Neck supple.  EYES:  Clear OU.  CARDIAC:  Normal rate, regular rhythm.  Heart sounds S1 S2.  No murmurs, gallops, or rubs.  RESPIRATORY:  Breath sounds clear and equal bilaterally.  No wheezes, no rales, no rhonchi.  GASTROINTESTINAL:  Abdomen soft, non-distended, non-tender.  No rebound, no guarding.  NEUROLOGICAL:  Alert and oriented to person/place/time/situation.  No focal deficits; no tremors noted.   MUSCULOSKELETAL:  Range of motion is not limited.  Extremities symmetrical, no pitting edema.  SKIN:  Skin color unremarkable.  Skin warm, dry, and intact.    PSYCHIATRIC:  Alert and oriented to person/place/time/situation.  Mood and affect WNL.  No apparent risk to self or others.

## 2023-01-08 VITALS
DIASTOLIC BLOOD PRESSURE: 66 MMHG | SYSTOLIC BLOOD PRESSURE: 138 MMHG | RESPIRATION RATE: 20 BRPM | OXYGEN SATURATION: 100 % | HEART RATE: 57 BPM | TEMPERATURE: 98 F

## 2023-01-08 LAB
CHOLEST SERPL-MCNC: 144 MG/DL — SIGNIFICANT CHANGE UP
D DIMER BLD IA.RAPID-MCNC: 272 NG/ML DDU — HIGH
HDLC SERPL-MCNC: 41 MG/DL — LOW
LIPID PNL WITH DIRECT LDL SERPL: 83 MG/DL — SIGNIFICANT CHANGE UP
NON HDL CHOLESTEROL: 103 MG/DL — SIGNIFICANT CHANGE UP
TRIGL SERPL-MCNC: 102 MG/DL — SIGNIFICANT CHANGE UP
TROPONIN T, HIGH SENSITIVITY RESULT: 9 NG/L — SIGNIFICANT CHANGE UP

## 2023-01-08 PROCEDURE — 71275 CT ANGIOGRAPHY CHEST: CPT | Mod: 26,MA

## 2023-01-08 PROCEDURE — 93970 EXTREMITY STUDY: CPT | Mod: 26

## 2023-01-08 PROCEDURE — 99239 HOSP IP/OBS DSCHRG MGMT >30: CPT

## 2023-01-08 PROCEDURE — 93306 TTE W/DOPPLER COMPLETE: CPT | Mod: 26

## 2023-01-08 RX ORDER — OMEPRAZOLE 10 MG/1
1 CAPSULE, DELAYED RELEASE ORAL
Qty: 0 | Refills: 0 | DISCHARGE

## 2023-01-08 RX ORDER — METFORMIN HYDROCHLORIDE 850 MG/1
2 TABLET ORAL
Qty: 0 | Refills: 0 | DISCHARGE

## 2023-01-08 RX ORDER — PANTOPRAZOLE SODIUM 20 MG/1
40 TABLET, DELAYED RELEASE ORAL
Refills: 0 | Status: DISCONTINUED | OUTPATIENT
Start: 2023-01-08 | End: 2023-01-11

## 2023-01-08 RX ORDER — CELECOXIB 200 MG/1
200 CAPSULE ORAL ONCE
Refills: 0 | Status: DISCONTINUED | OUTPATIENT
Start: 2023-01-08 | End: 2023-01-08

## 2023-01-08 RX ORDER — GABAPENTIN 400 MG/1
300 CAPSULE ORAL
Refills: 0 | Status: DISCONTINUED | OUTPATIENT
Start: 2023-01-08 | End: 2023-01-11

## 2023-01-08 RX ORDER — VALSARTAN 80 MG/1
320 TABLET ORAL DAILY
Refills: 0 | Status: DISCONTINUED | OUTPATIENT
Start: 2023-01-08 | End: 2023-01-11

## 2023-01-08 RX ORDER — BUPROPION HYDROCHLORIDE 150 MG/1
100 TABLET, EXTENDED RELEASE ORAL DAILY
Refills: 0 | Status: DISCONTINUED | OUTPATIENT
Start: 2023-01-08 | End: 2023-01-11

## 2023-01-08 RX ORDER — METHADONE HYDROCHLORIDE 40 MG/1
5 TABLET ORAL ONCE
Refills: 0 | Status: DISCONTINUED | OUTPATIENT
Start: 2023-01-08 | End: 2023-01-08

## 2023-01-08 RX ORDER — GABAPENTIN 400 MG/1
300 CAPSULE ORAL ONCE
Refills: 0 | Status: COMPLETED | OUTPATIENT
Start: 2023-01-08 | End: 2023-01-08

## 2023-01-08 RX ORDER — METHADONE HYDROCHLORIDE 40 MG/1
5 TABLET ORAL
Refills: 0 | Status: COMPLETED | OUTPATIENT
Start: 2023-01-08 | End: 2023-01-15

## 2023-01-08 RX ORDER — GABAPENTIN 400 MG/1
1 CAPSULE ORAL
Qty: 0 | Refills: 0 | DISCHARGE

## 2023-01-08 RX ORDER — AMLODIPINE BESYLATE 2.5 MG/1
5 TABLET ORAL
Refills: 0 | Status: DISCONTINUED | OUTPATIENT
Start: 2023-01-08 | End: 2023-01-11

## 2023-01-08 RX ORDER — LOSARTAN POTASSIUM 100 MG/1
1 TABLET, FILM COATED ORAL
Qty: 0 | Refills: 0 | DISCHARGE

## 2023-01-08 RX ORDER — METHADONE HYDROCHLORIDE 40 MG/1
1 TABLET ORAL
Qty: 0 | Refills: 0 | DISCHARGE

## 2023-01-08 RX ORDER — DILTIAZEM HCL 120 MG
1 CAPSULE, EXT RELEASE 24 HR ORAL
Qty: 0 | Refills: 0 | DISCHARGE

## 2023-01-08 RX ORDER — ACETAMINOPHEN 500 MG
975 TABLET ORAL EVERY 6 HOURS
Refills: 0 | Status: DISCONTINUED | OUTPATIENT
Start: 2023-01-08 | End: 2023-01-11

## 2023-01-08 RX ADMIN — BUPROPION HYDROCHLORIDE 100 MILLIGRAM(S): 150 TABLET, EXTENDED RELEASE ORAL at 11:16

## 2023-01-08 RX ADMIN — Medication 975 MILLIGRAM(S): at 01:51

## 2023-01-08 RX ADMIN — GABAPENTIN 300 MILLIGRAM(S): 400 CAPSULE ORAL at 01:52

## 2023-01-08 RX ADMIN — GABAPENTIN 300 MILLIGRAM(S): 400 CAPSULE ORAL at 06:21

## 2023-01-08 RX ADMIN — METHADONE HYDROCHLORIDE 5 MILLIGRAM(S): 40 TABLET ORAL at 06:21

## 2023-01-08 RX ADMIN — PANTOPRAZOLE SODIUM 40 MILLIGRAM(S): 20 TABLET, DELAYED RELEASE ORAL at 06:22

## 2023-01-08 RX ADMIN — METHADONE HYDROCHLORIDE 5 MILLIGRAM(S): 40 TABLET ORAL at 02:10

## 2023-01-08 RX ADMIN — VALSARTAN 320 MILLIGRAM(S): 80 TABLET ORAL at 06:22

## 2023-01-08 RX ADMIN — AMLODIPINE BESYLATE 5 MILLIGRAM(S): 2.5 TABLET ORAL at 06:21

## 2023-01-08 NOTE — ED CDU PROVIDER DISPOSITION NOTE - ATTENDING CONTRIBUTION TO CARE
1.58 62 h/o CHF h/o echo recently EF was 65%.  Pt c/o SOB, initially went to  showed pulm edema told to come to ED>  ProBNP was 300's.  Cards saw pt in ED and recc echo.  Pt reports her sister had a PE not long ago and she said her blood test for "lupus" was elevated (?DORCAS elevation); pt also is a survivor of breast CA and R arm LN dissection.  Check dimer - elevated -> Check CTA and duplexes.  If all acceptable can d/c home f/u cards as outpt.   VS:  unremarkable    GEN - NAD;   well appearing;   A+O x3   HEAD - NC/AT     ENT - PEERL, EOMI, mucous membranes    moist , no discharge      NECK: Neck supple, non-tender without lymphadenopathy, no masses, no JVD  PULM - CTA b/l,  symmetric breath sounds  COR -  normal heart sounds    ABD - , ND, NT, soft,  BACK - no CVA tenderness, nontender spine     EXTREMS - no edema, no deformity, warm and well perfused    SKIN - no rash    or bruising      NEUROLOGIC - alert, face symmetric, speech fluent, sensation nl, motor no focal deficit.

## 2023-01-08 NOTE — ED CDU PROVIDER SUBSEQUENT DAY NOTE - HISTORY
62 year old female, PMH Stage 3 breast cancer (s/p right mastectomy/CTX/RTX in 2012, with residual right arm lymphedema), hx/o heart failure (EF 65% on echo 4/23/2021), HTN, hyperlipidemia, DM Type 2, osteoarthritis to bilateral knees, fibromylagia; pt presented to the ED after being directed to ED by Urgent care for concerns of pulmonary edema.  Patient reports cough, wheezing and SOB since last week; had been evaluated at urgent care center on 12/30/22 and was Rx'd antibiotics and given a script for a CXR.  Pt had CXR on 1/3/2022 and received call today reporting that they saw pulmonary edema; pt was directed to ED.    In the ED, VSS, pt afebrile.  EKG showed sinus bradycardia in low 50s.  WBC 5.29, Hb 10.5; CMP: grossly unremarkable; INR 1.17.  Trop 7, pro-.  VBG: lactate 1.2, pH 7.37.  COVID/FLU/RSV: NotDetected.  CXR was performed; no focal consolidations noted, no blunting of costophrenic angles noted, no effusion noted; no acute pathology noted; CXR is pending official radiology read.  In the ED, Cardiology was consulted (pt follows with Cardiologist Dr. Danna Torres); recs appreciated; pt was dispo'd to CDU for tele monitoring, echo, recs per Cardiology team following pt, general observation care / monitoring.  In the interim, pt objectively noted to be resting comfortably; pt has been clinically stable; no issues thus far.

## 2023-01-08 NOTE — ED CDU PROVIDER SUBSEQUENT DAY NOTE - ATTENDING APP SHARED VISIT CONTRIBUTION OF CARE
62 h/o CHF h/o echo recently EF was 65%.  Pt c/o SOB, initially went to  showed pulm edema told to come to ED>  ProBNP was 300's.  Cards saw pt in ED and recc echo.  Pt reports her sister had a PE not long ago and she said her blood test for "lupus" was elevated (?DORCAS elevation); pt also is a survivor of breast CA and R arm LN dissection.  Check dimer - elevated -> Check CTA and duplexes.  If all acceptable can d/c home f/u cards as outpt.   VS:  unremarkable    GEN - NAD;   well appearing;   A+O x3   HEAD - NC/AT     ENT - PEERL, EOMI, mucous membranes    moist , no discharge      NECK: Neck supple, non-tender without lymphadenopathy, no masses, no JVD  PULM - CTA b/l,  symmetric breath sounds  COR -  normal heart sounds    ABD - , ND, NT, soft,  BACK - no CVA tenderness, nontender spine     EXTREMS - no edema, no deformity, warm and well perfused    SKIN - no rash    or bruising      NEUROLOGIC - alert, face symmetric, speech fluent, sensation nl, motor no focal deficit.

## 2023-01-08 NOTE — ED CDU PROVIDER DISPOSITION NOTE - PATIENT PORTAL LINK FT
You can access the FollowMyHealth Patient Portal offered by [facility] by registering at the following website: [email or web address]. By joining [facility]’s FollowMyHealth portal, you will also be able to view your health information using other applications (apps) compatible with our system.

## 2023-01-08 NOTE — ED CDU PROVIDER SUBSEQUENT DAY NOTE - NSICDXPASTMEDICALHX_GEN_ALL_CORE_FT
PAST MEDICAL HISTORY:  Abdominal hernia in 2012 7/23/19 ; pt states hernia repair 2008, 2018    Acid Reflux     Alcohol abuse--quit 8 years ago--goes to AA ADDENDUM:  at PAST appointment  patient states she quit alcohol approximately 2003    Anxiety     Arthritis     Asthma last rescue inhaler use "maybe 3 years ago when I had the flu or a cold"    b/l  Carpal tunnel syndrome tx PT/OT    Breast cancer 2012  right breast -- had mastectomy and then post op chemo and RT, followed with Herceptin for 1 year  2012 last chemo   2013 may last Herceptin  2013 last RT    Depression     Diabetes mellitus diagnosed in 2007 fs 86 am 170 pm    Diverticulosis     Drug abuse--quit 8 years ago--goes to AA ADDENDUM: at PAST appointment, patient states she quit alcohol in approximately 2003    ETOH abuse states she quit alcohol in 2003    Fibromyalgia     Former smoker     h/o   Fatty liver     h/o b/l fungal foot infection 7/23./19 pt denies    herniated lumbar disc     hiatal hernia last endoscopy 1.5 years ago    History  Uterine Fibroid s/p Hysterectomy 7/02    Hypercholesterolemia 7/23/19 ; pt reports improvement ; pt denies rx    Hypertension     Insomnia     Lymphedema, limb right side s/p right mastectomy    Miscarriage x 2    Morbid obesity     Neuropathy b/l feet    personal h/o CHF (congestive heart failure)--last hospitalized in 2005     sickel cell anemia trait     Sickle cell trait     Sleep apnea diagnosed 2007---supposed to use device but doesn't     Substance abuse quit in 2003 -- cocaine and marijuana    Thyroid nodule had negative biopsy

## 2023-01-08 NOTE — ED CDU PROVIDER DISPOSITION NOTE - CLINICAL COURSE
62 year old female, PMH Stage 3 breast cancer (s/p right mastectomy/CTX/RTX in 2012, with residual right arm lymphedema), hx/o heart failure (EF 65% on echo 4/23/2021), HTN, hyperlipidemia, DM Type 2, osteoarthritis to bilateral knees, fibromylagia; pt presented to the ED after being directed to ED by Urgent care for concerns of pulmonary edema.  Patient reports cough, wheezing and SOB since last week; had been evaluated at urgent care center on 12/30/22 and was Rx'd antibiotics and given a script for a CXR.  Pt had CXR on 1/3/2022 and received call today reporting that they saw pulmonary edema; pt was directed to ED.    In the ED, VSS, pt afebrile.  EKG showed sinus bradycardia in low 50s.  WBC 5.29, Hb 10.5; CMP: grossly unremarkable; INR 1.17.  Trop 7, rpt  trop 9 pro-.  VBG: lactate 1.2, pH 7.37.  COVID/FLU/RSV: NotDetected.  CXR was performed; no focal consolidations noted, no blunting of costophrenic angles noted, no effusion noted; no acute pathology noted; CXR is pending official radiology read.  In the ED, Cardiology was consulted (pt follows with Cardiologist Dr. Danna Torres); recs appreciated echo reviewed by cards Dr. Hamilton with normal biventricular function and normal valvular function. No further inpatient cardiac testing, ok for d/c from cards perspective. Ddimer was 272, Duplex No evidence of deep venous thrombosis in either lower extremity. Bilateral Baker's cysts, complex on the right. (patient aware of bakers cysts already, following with orthopedic), and CTA, no pulmonary thromboembolism. patient feels well and clinically, vitally stable for dc and amendable to plan of dc.

## 2023-01-09 NOTE — PHYSICAL EXAM
[de-identified] : Constitutional:  62 year old female, alert and oriented, cooperative, in no acute distress.\par \par HEENT \par NC/AT.  Appearance: symmetric\par \par Neck/Back\par Straight without deformity or instability.  Good ROM.\par \par Chest/Respiratory \par Respiratory effort: no intercostal retractions or use of accessory muscles. Nonlabored Breathing\par \par Skin \par On inspection, warm and dry without rashes or lesions.\par \par Mental Status: \par Judgment, insight: intact\par Orientation: oriented to time, place, and person\par \par Neurological:\par Sensory and Motor are grossly intact throughout\par \par Left Knee\par \par Inspection:\par     Skin intact, no rashes or lesions\par     No Effusion\par     Non-tender to palpation over tibial tubercle, patella, medial and lateral joint line, and pes insertion.\par \par Range of Motion:\par 	Extension - -5 degrees\par 	Flexion - 100 degrees\par 	Alignment - Varus 5 degrees\par 	Extensor lag: None\par \par Stability:\par      Demonstrates no Varus or Valgus instability\par      Negative Anterior or Posterior drawer.\par      Negative Lachman's\par \par Patella: stable, tracks well. \par \par Right Knee\par \par Inspection:\par     Skin intact, no rashes or lesions\par     No Effusion\par     Non-tender to palpation over tibial tubercle, patella, medial and lateral joint line, and pes insertion.\par     Slight external rotation of the right lower extremity\par \par Range of Motion:\par 	Extension - -10 degrees\par 	Flexion - 100 degrees\par 	Alignment - Varus 5 degrees\par 	Extensor lag: None\par \par \par Stability:\par      Demonstrates no Varus or Valgus instability\par      Negative Anterior or Posterior drawer.\par      Negative Lachman's\par \par Patella: stable, tracks well. \par \par Neurologic Exam\par     Motor intact including 5/5 Extensor Hallucis Longus, 5/5 Flexor Hallucis Longus, 5/5 Tibialis Anterior and 5/5 Gastrocnemius\par     Sensation Intact to Light Touch including Saphenous, Sural, Superficial Peroneal, Deep Peroneal, Tibial nerve distributions\par \par Vascular Exam\par     Foot is warm and well perfused with 2+ Dorsalis Pedis Pulse \par \par No pain with range of motion of the bilateral hips. No lumbar paraspinal muscle tenderness. \par Mild tenderness over bilateral greater trochanters [de-identified] : EXAM: [id number] - XR KNEE AP LAT OBL 3 VIEWS BI - ORDERED BY: MCKENZIE MORENO\par \par \par PROCEDURE DATE: 12/12/2022\par \par \par \par INTERPRETATION: CLINICAL INDICATION: bilateral knee pain\par \par EXAM:\par Frontal oblique lateral views of both knees from 12/12/2022 at 1644. Compared to prior study from 8/10/2020.\par \par IMPRESSION:\par No fractures dislocations or joint effusions.\par \par Redemonstrated advanced bilateral knee tricompartment osteoarthritis with medial tibiofemoral compartment predominance and slight lateral tibial translation. No joint margin erosions.\par \par Unremarkable quadriceps and patellar tendon shadows.\par \par Generalized osteopenia otherwise no discrete lytic or blastic lesions.\par \par --- End of Report ---\par \par HI SANCHEZ MD; Attending Radiologist\par This document has been electronically signed. Dec 13 2022 10:22AM

## 2023-01-09 NOTE — DISCUSSION/SUMMARY
[de-identified] : Ms. Cabrera is a 2-year-old female present to the office for evaluation of her bilateral knee pain.  Patient has been experiencing bilateral (right greater than left) knee pain for the last 30 years.  Pain has been worse over the last 6 to 8 years.  Pain is now affecting her quality of life and daily activities.  Pain affects her ambulation.  X-rays showed severe bilateral knee osteoarthritis.  Examination showed decreased bilateral knee range of motion.  Discussed with the patient examination and imaging findings.  Discussed with the patient the operative and nonoperative management of knee osteoarthritis, including total knee arthroplasty.  Discussed the nonoperative management of knee osteoarthritis, including physical therapy, anti-inflammatories, and injections.  Patient has tried PT, anti-inflammatory/pain medication, and injections.  Discussed total knee arthroplasty, including surgical procedure, hospital stay, recovery, physical therapy, DVT prophylaxis, and risks.  Patient would like to proceed with total knee arthroplasty.  Discussed the patient would need to undergo weight loss at this time prior to total knee arthroplasty.  Discussed goal of BMI less than 40. The risk and benefits of surgery were discussed, which include infection. The risk factors for infection, early loosening, and wound complications were discussed, which including with a BMI over 40. Discussed the ramifications of a periprosthetic infection and the potential devastating outcome. Explained that the risk increases as BMI increases. Discussed the benefits of diet and exercise for weight loss, and the benefits of weight loss on the joints themselves.  Discussed that patient would need medical, spine, neurology, and cardiac clearance prior to surgery, in addition to weight loss.  Patient would also like to reduce her methadone dosage prior to surgery.  Patient will follow-up in 1 month, after starting weight loss regimen and opioid reduction with pain management.  Patient understanding and in agreement with the plan.  All questions answered.\par  \par Discussed the imaging and physical exam findings with the patient consistent with endstage knee degenerative disease. The patient has failed conservative management including physical therapy, anti-inflammatories, and injections. The risks, benefits and alternatives to total knee replacement were discussed with the patient in detail and the patient elected to proceed with surgery. Discussed the surgical plan with the patient including implant options and surgical approach. \par \par Surgical risks including fracture requiring fixation, instability or dislocation, temporary or permanent leg length inequality, infection, bleeding, stiffness, failure to alleviate pain, failure to achieve desired results, need for further surgery, scar tissue formation, hardware failure, chronic pain, injury to nerves resulting in extremity dysfunction, injury to arteries and veins, deep vein thrombosis or pulmonary embolism requiring anticoagulation and medical risk factors including heart attack, stroke, death, neurological injury, pneumonia, kidney or other organ failure were discussed with the patient. \par \par Patient was understanding and in agreement with the treatment plan. All questions answered.\par \par Plan:\par - Weight loss regimen with goal BMI less than 40\par - Follow up with Spine Surgery\par - Follow up with Medicine, Cardiology and Neurology for possible clearance for total knee arthroplasty\par - Follow up with pain management\par - Follow up in 4 weeks for re-evaluation and management, possible total knee arthroplasty.\par \par \par Surgical Plan:\par Diagnosis: Right Knee Osteoarthritis\par Laterality: Right\par Operative procedure: Right Total Knee Arthroplasty\par Location: [facility]\par \par Clearances:\par      Medical: Pending\par      Cardiac: Pending\par      Neurology: Pending\par      Spine: Pending\par \par Comorbidities:\par      Metal Allergy: Positive (rashes to jewelry) (Angioedema to Ramipril)\par      Chronic Pain: Positive, Currently on Methadone 10mg per day\par      Diabetes: Positive, Last A1C: 6.1\par      Use of Anticoagulation: Negative\par      Atrial Fibrillation: Negative\par      History of VTE: Negative\par      History of Cardiac Stents: Negative\par      Skin Infections/Open Wounds: Negative\par      MRSA Infection/Colonization: Negative\par      Current Urinary Symptoms: Negative\par      Immunocompromise: Negative\par      Inflammatory Arthritis: History of Lupus Antibodies\par      Smoking: Negative\par      Drug Use: Negative\par      Alcohol Use: Sober for 19 years\par      Obstructive Sleep Apnea: Positive\par      Neurologic Disease: History of neuropathy in hands/feet, Has tremor (treated by Neurology)

## 2023-01-09 NOTE — REVIEW OF SYSTEMS
[FreeTextEntry5] : History of HTN [FreeTextEntry7] : History of Diverticulitis, GERD [FreeTextEntry8] : History of DEREK [de-identified] : History of Breast Cancer, History of Alcoholism (19 years sober), History of Lymphedema in Right arm, Fibromyalgia [de-identified] : History of Neuropathy [de-identified] : History of Bipolar [de-identified] : History of Diabetes (Last A1C: 6.1)

## 2023-01-09 NOTE — HISTORY OF PRESENT ILLNESS
[de-identified] : Ms. Cabrera is a 62-year-old female presents to the office for evaluation of her bilateral knee pain.  Patient has been experiencing bilateral (right greater than left) knee pain for about 30 years.  She states that it has been worse over the last 6 to 8 years.  She states that her right knee pain can cause her to stop walking.  She states that she can have some numbness in both legs when standing for longer periods of time.  Pain is now affecting her quality of life and daily activities.  Patient has tried meloxicam, gabapentin, and Tylenol for the pain.  She has a history of chronic pain and has been in pain management for about 20 years at [facility].  She is currently taking methadone 10 mg/day for about 12 years.  She was previously taking Percocet, prior to the methadone.  Patient has tried multiple knee injections, including cortisone injections for many years.  She has tried about 4 series of viscosupplementation injections.  Her last knee joint injections were in August 2022, which did not help.  She states that she received 3 gel injections in the right knee and 2 in the left, but pain worsened and she did not finish the left knee series.  She had an injection in her right IT band in November 2022.  Patient is also tried physical therapy for her knees, last in October 2022.  Patient has undergone significant weight loss, losing about 80 pounds, which gave some relief to her knee pain.  She also has a history of lumbar back pain and has tried RFA and epidurals.  She has tried physical therapy for her back pain, but her knee pain limited her back PT.  No recent trauma.  No fevers or chills.

## 2023-01-11 NOTE — PHYSICAL EXAM
[de-identified] : Oriented to time, place, person\par Mood: Normal\par Affect: Normal\par Appearance: Healthy, well appearing, no acute distress.\par Gait: Normal\par Assistive Devices: None\par \par Right shoulder exam:\par \par Inspection: No malalignment, No defects, No atrophy\par Skin: No masses, No lesions\par Neck: Negative Spurling, full ROM, no pain with ROM\par AROM: FF to 80, abduction to 90, ER to 0, IR to lower lumbar\par Painful arc ROM: pain with further motion. \par Tenderness: + bicipital tenderness, no tenderness to greater tuberosity/RTC insertion, + anterior shoulder/lesser tuberosity tenderness\par Strength: 3/5 ER, 5/5 IR in adduction, 2/5 supraspinatus testing, +drop arm test, negative Yoakum's test\par AC joint: No TTP/pain with cross arm testing\par Biceps: Speed Negative, Yergason Negative \par Impingement test: + Martinez, + Neer\par Vasc: 2+ radial pulse \par Stability: Stable \par Neuro: AIN, PIN, Ulnar nerve intact to motor\par Sensation: Intact to light touch throughout \par \par Left shoulder exam:\par \par Inspection: No malalignment, No defects, No atrophy, right upper extremity lymphedema. \par Skin: No masses, No lesions\par Neck: Negative Spurling, full ROM, no pain with ROM\par AROM: FF to 160, abduction to 90, ER to 0, IR to lower lumbar\par Painful arc ROM: Pain with further motion. \par Tenderness: + bicipital tenderness, no tenderness to greater tuberosity/RTC insertion, + anterior shoulder/lesser tuberosity tenderness\par Strength: 3/5 ER, 5/5 IR in adduction, 2/5 supraspinatus testing, +drop arm test, negative Yoakum's test\par AC joint: No TTP/pain with cross arm testing\par Biceps: Speed Negative, Yergason Negative \par Impingement test: + Martinez, + Neer\par Vasc: 2+ radial pulse \par Stability: Stable \par Neuro: AIN, PIN, Ulnar nerve intact to motor\par Sensation: Intact to light touch throughout  [de-identified] : The following radiographs were ordered and read by me during this patients visit. I reviewed each radiograph in detail with the patient and discussed the findings as highlighted below.\par \par 3 views of right shoulder were obtained today, 01/03/2023, that show no acute fracture or dislocation. There is moderate glenohumeral and moderate AC joint degenerative change seen. Type III acromion. There is no significant malalignment. Evidence of RTC arthropathy. \par \par 3 views of left shoulder were obtained today, 01/03/2023, that show no acute fracture or dislocation. There is moderate glenohumeral and moderate AC joint degenerative change seen. Type III acromion. There is humeral head elevation. Evidence of RTC arthropathy.

## 2023-01-11 NOTE — DISCUSSION/SUMMARY
[de-identified] : 61 y/o female with bilateral shoulder RTC arthropathy. \par \par A discussion was had with the patient regarding degenerative joint disease that results from rotator cuff injury and loss of joint congruence and glenohumeral wear.  This is consistent with rotator cuff arthropathy which is characterized by the combination of rotator cuff insufficiency as well as glenohumeral cartilage destruction and superior migration of the humeral head.  Discussed that progression is common due to the loss of the compressive effects of the rotator cuff as well as progression of functional decline.  Rotator cuff arthropathy tends to limit range of motion as well as pseudoparalysis.\par \par Rotator cuff arthropathy is classified by the acromiohumeral interval; GI >6mm, GII <5mm, GIII + acetabularization of acromion, GIV GH OA with or without acetabularization, GV humeral head collapse.  \par \par Nonoperative management with activity modification, injection therapy, and physical therapy are the first-line treatments.  Physical therapy is focused on scapular and rotator cuff strengthening in an attempt to maximize function and decrease anterior superior escape.  Surgical intervention can include surgical arthroscopy with unpredictable results, or reverse total shoulder arthroplasty for patients that have pseudoparalysis/anterior superior escape with a functioning axillary nerve.\par \par Recommendation: Conservative care & observation, this includes symptomatic care. OTC NSAID's or acetaminophen as tolerated, with application of ice to the area 2-3x daily for 20 minutes after periods of activity.  Consideration of reverse arthroplasty if patient continues to have debilitating pain.\par \par Follow-up as needed.

## 2023-01-11 NOTE — HISTORY OF PRESENT ILLNESS
[de-identified] : 62 year old female s/p left shoulder RTC repair 2018, Hx right arm lymphedema & cervical radiculopathy presents today with bilateral shoulder pain. L>R. The pain is constant worse with  elevation of the arm and weather changes . Takes Methadone, Meloxicam and gabapentin. She is under care of pain management for pain. \par \par The patient's past medical history, past surgical history, medications and allergies were reviewed by me today with the patient and documented accordingly. In addition, the patient's family and social history, which were noncontributory to this visit, were reviewed also.

## 2023-01-11 NOTE — ADDENDUM
[FreeTextEntry1] : This note was written by Brenna Schaeffer on 01/03/2023 acting solely as a scribe for Dr. Yogesh Mckeon.\par \par All medical record entries made by the Scribe were at my, Dr. Yogesh Mckeon, direction and personally dictated by me on 01/03/2023. I have personally reviewed the chart and agree that the record accurately reflects my personal performance of the history, physical exam, assessment and plan.

## 2023-01-19 ENCOUNTER — APPOINTMENT (OUTPATIENT)
Dept: PULMONOLOGY | Facility: CLINIC | Age: 63
End: 2023-01-19
Payer: MEDICARE

## 2023-01-19 ENCOUNTER — APPOINTMENT (OUTPATIENT)
Dept: RHEUMATOLOGY | Facility: CLINIC | Age: 63
End: 2023-01-19
Payer: MEDICARE

## 2023-01-19 VITALS
WEIGHT: 206 LBS | SYSTOLIC BLOOD PRESSURE: 120 MMHG | HEART RATE: 73 BPM | TEMPERATURE: 97.3 F | OXYGEN SATURATION: 95 % | HEIGHT: 60 IN | BODY MASS INDEX: 40.44 KG/M2 | DIASTOLIC BLOOD PRESSURE: 68 MMHG

## 2023-01-19 PROBLEM — Z87.891 PERSONAL HISTORY OF NICOTINE DEPENDENCE: Chronic | Status: ACTIVE | Noted: 2023-01-08

## 2023-01-19 PROCEDURE — 94060 EVALUATION OF WHEEZING: CPT

## 2023-01-19 PROCEDURE — 99215 OFFICE O/P EST HI 40 MIN: CPT

## 2023-01-19 PROCEDURE — 99203 OFFICE O/P NEW LOW 30 MIN: CPT | Mod: 25

## 2023-01-19 PROCEDURE — 94727 GAS DIL/WSHOT DETER LNG VOL: CPT

## 2023-01-19 PROCEDURE — 94729 DIFFUSING CAPACITY: CPT

## 2023-01-19 NOTE — HISTORY OF PRESENT ILLNESS
[FreeTextEntry1] : Patient reports upcoming bilateral total knee replacement in March 2023.  Most updated knee films reflect advanced bilateral knee tricompartmental osteoarthritis with medial tibiofemoral compartment predominance she explains having used methadone to help deal with the knee pain and however would like to taper off.  She explains prior viscosupplementation lending to worsening pain and finds it is related to concurrent vaccinations. She reports instability symptoms especially when walking for prolonged periods or climbing stairs. \par \par She explains diffuse arthralgias since COVID-19 infection over the summer. Patient with with blood testing in August 2022 reflecting DORCAS positivity 1: 320 in a speckled pattern with positive SS-A in the setting of elevated inflammatory markers. Patient apprehensive to initiate hydroxychloroquine therapy ; she had ophthalmology follow-up last week where a blot hemorrhage was noted in the right eye, thought to be secondary to hypertension; systolic blood pressure today of 120 though over the last 6 to 8 months it has ranged as high as 160. \par \par Patient with longstanding history of fibromyalgia patient is on multiple neuropathic pain therapies along with mood stabilizers. Patient also reports notable pain in the hands and the feet with cramping sensations; she explains sister with a diagnosis of rheumatoid arthritis. She denies accompanied swelling of the joints.   She explains long camarena of alcoholism and has been free of alcohol for the last 18 years as well as free of drug use from cocaine. She explains she has had shakiness and her tremors that are currently under evaluation and has initiated propranolol.\par \par She explains a history of HER2 positive breast CA status post right mastectomy with chemotx and radiation.\par She explains performing dietary and lifestyle modifications and has fluctuating weights during the pandemic.  Antiphospholipid antibodies were also noted the summer 2022 after COVID infection and repeated before the end of the year with repeat silica and mixing studies positive.  She denies history of thrombotic events.\par \par She otherwise denies visual disturbances, oral ulcers, dyspnea, chest pain, motor disturbances, Raynaud's, rash or systemic symptoms. \par

## 2023-01-19 NOTE — ASSESSMENT
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  Patient would like to try low-dose disease modifying agent of hydroxychloroquine to help with arthralgias in the setting of DORCAS positivity. Patient understands the importance of regular ophthalmology follow-up in the setting of retinal toxicity risk in the setting of hydroxychloroquine use.   Spoke with patient's ophthalmologist, Dr. Stallworth who will see the patient back in 3 months after HCQ use, visual screening testing .\par \par In the setting of repeat mixing study positivity, would recommend the use of baby aspirin.  Reached out to hematology colleagues to assess the utility of stronger anticoagulation in the setting of lupus anticoagulant .  \par Patient understands ischemic changes may be related to prior social history. White matter lesions are seen in connective tissue disease cases including lupus and therefore we will continue to monitor and assess the utility of additional immunosuppressive therapy. \par \par Patient will benefit from physical therapy to help with joint mobility and muscle strengthening.  Quadriceps strengthening exercises encouraged; patient to have bilateral knee replacements in the upcoming months.\par \par Patient understands the importance of weight loss reduction in the setting of HTN, DM and its association of cardiovascular risk.  Whole food plant-based diet encouraged.  The importance of dietary and lifestyle modifications was reiterated for the treatment of Fibromyalgia along with discussions on relaxation, stress-reducing techniques and importance of good sleep hygiene.  \par \par She is in agreement with the above plan and will return in three months' time.\par \par

## 2023-01-19 NOTE — REVIEW OF SYSTEMS
[Fever] : no fever [Chills] : no chills [Sore Throat] : no sore throat [Hoarseness] : no hoarseness [Chest Pain] : no chest pain [Palpitations] : no palpitations [Cough] : no cough [SOB on Exertion] : no shortness of breath during exertion [Joint Swelling] : no joint swelling [Joint Stiffness] : joint stiffness [Difficulty Walking] : difficulty walking [Feelings Of Weakness] : no feelings of weakness [Easy Bleeding] : no tendency for easy bleeding [Easy Bruising] : no tendency for easy bruising [de-identified] : Low mood

## 2023-01-19 NOTE — PHYSICAL EXAM
[General Appearance - Alert] : alert [General Appearance - In No Acute Distress] : in no acute distress [Auscultation Breath Sounds / Voice Sounds] : lungs were clear to auscultation bilaterally [Heart Sounds] : normal S1 and S2 [Edema] : there was no peripheral edema [No Spinal Tenderness] : no spinal tenderness [Nail Clubbing] : no clubbing  or cyanosis of the fingernails [Musculoskeletal - Swelling] : no joint swelling seen [Motor Tone] : muscle strength and tone were normal [FreeTextEntry1] : No active synovitis; trigger points are active over the trapezius and lateral epicondyles, paraspinal muscles, trochanteric bursa  [] : no rash [Skin Lesions] : no skin lesions [Motor Exam] : the motor exam was normal [Oriented To Time, Place, And Person] : oriented to person, place, and time [Impaired Insight] : insight and judgment were intact

## 2023-01-20 ENCOUNTER — APPOINTMENT (OUTPATIENT)
Dept: ORTHOPEDIC SURGERY | Facility: CLINIC | Age: 63
End: 2023-01-20

## 2023-02-02 ENCOUNTER — APPOINTMENT (OUTPATIENT)
Dept: ORTHOPEDIC SURGERY | Facility: CLINIC | Age: 63
End: 2023-02-02
Payer: MEDICARE

## 2023-02-02 VITALS
BODY MASS INDEX: 39.46 KG/M2 | OXYGEN SATURATION: 98 % | WEIGHT: 201 LBS | TEMPERATURE: 97.6 F | HEART RATE: 71 BPM | DIASTOLIC BLOOD PRESSURE: 80 MMHG | SYSTOLIC BLOOD PRESSURE: 148 MMHG | HEIGHT: 60 IN

## 2023-02-02 PROCEDURE — 72110 X-RAY EXAM L-2 SPINE 4/>VWS: CPT

## 2023-02-02 PROCEDURE — 99214 OFFICE O/P EST MOD 30 MIN: CPT

## 2023-02-02 NOTE — HISTORY OF PRESENT ILLNESS
[de-identified] : 62 year old female who presents for initial evaluation of lower back pain. Presents ambulating with cane. Patient states she has hx of herniated discs. She notes receiving RFAs from her pain management doctor which provided good relief. Patient reports taking 10 mg Methadone fro several years. She notes there was a recent mistake with the dosage and she has been dealing with elevated pain in her joints. Denies any urinary or bowel incontinence. Of note, referred by Dr. Mckenzie

## 2023-02-02 NOTE — DISCUSSION/SUMMARY
[de-identified] : 62 year old female with lumbar spondylosis, spondylolisthesis. She is currently undergoing conservative treatment with pain management.Continue with RFA, physical therapy/home exercise program, Tylenol, NSAIDs as medically indicated.  The patient will follow up with me in approximately 3 months, prn.  I encouraged the patient to follow-up sooner if there are any new or worsening symptoms.

## 2023-02-02 NOTE — PHYSICAL EXAM
[de-identified] : Lumbar Physical Exam\par \par Gait - antalgic, presents ambulating with cane. \par \par Station - Normal\par \par Sagittal balance - Normal\par \par Compensatory mechanism? - None\par \par Heel walk - Normal\par \par Toe walk - Normal\par \par Reflexes\par Patellar - normal\par Gastroc - normal\par Clonus - No\par \par \par \par Straight leg raise - none\par \par Pulses - 2+ dp/pt\par \par Range of motion - normal\par \par Sensation\par Sensation intact to light touch in L1, L2, L3, L4, L5 and S1 dermatomes bilaterally\par \par 	IP	Quad	HS	TA	Gastroc	EHL\par Right	5/5	5/5	5/5	5/5	5/5	5/5\par Left	5/5	5/5	5/5	5/5	5/5	5/5  [de-identified] : Lumbar Radiographs\par Spondylolisthesis, L4-L5\par Facet arthropathy\par

## 2023-02-10 ENCOUNTER — APPOINTMENT (OUTPATIENT)
Dept: ORTHOPEDIC SURGERY | Facility: CLINIC | Age: 63
End: 2023-02-10
Payer: MEDICARE

## 2023-02-10 VITALS
HEIGHT: 60 IN | SYSTOLIC BLOOD PRESSURE: 142 MMHG | WEIGHT: 201 LBS | HEART RATE: 80 BPM | BODY MASS INDEX: 39.46 KG/M2 | DIASTOLIC BLOOD PRESSURE: 83 MMHG | OXYGEN SATURATION: 98 %

## 2023-02-10 PROCEDURE — 73564 X-RAY EXAM KNEE 4 OR MORE: CPT | Mod: 50,59

## 2023-02-10 PROCEDURE — 77073 BONE LENGTH STUDIES: CPT

## 2023-02-10 PROCEDURE — 99213 OFFICE O/P EST LOW 20 MIN: CPT

## 2023-02-11 NOTE — H&P ADULT - PROBLEM SELECTOR PLAN 3
Based on outpatient records, baseline Hgb in 11 range. No reported bleeding.   -iron studies in AM Eczema

## 2023-02-12 NOTE — HISTORY OF PRESENT ILLNESS
[de-identified] : Ms. Cabrera is a 62-year-old female presents to the office for follow up of her bilateral knee pain.  Patient has been experiencing bilateral (right greater than left) knee pain for about 30 years.  She states that it has been worse over the last 6 to 8 years. Pain is now affecting her quality of life and daily activities.  Pain is worse when lying down and patient tries to keep her feet elevated.  Right knee pain is worse than her left at this time.  Patient has tried meloxicam, gabapentin, and Tylenol for the pain.  She has a history of chronic pain and has been in pain management for about 20 years at [facility].  Patient has stopped taking methadone and is currently only taking tramadol for her pain.  She did have some withdrawal from her opioid medications.  Patient has tried multiple knee injections, including cortisone injections for many years.  She has tried about 4 series of viscosupplementation injections.  Her last knee joint injections were in August 2022, which did not help.  She states that she received 3 gel injections in the right knee and 2 in the left, but pain worsened and she did not finish the left knee series.  She had an injection in her right IT band in November 2022.  Patient is also tried physical therapy for her knees, last in October 2022. She has tried physical therapy for her back pain, but her knee pain limited her back PT. patient has continued her weight loss and her BMI: 39.26.  No recent trauma.  No fevers or chills.  Patient was recently started on Symbicort by her pulmonologist.  She would like to move her surgical date from March into April.

## 2023-02-12 NOTE — REVIEW OF SYSTEMS
[Joint Pain] : joint pain [Joint Stiffness] : no joint stiffness [Joint Swelling] : no joint swelling [Fever] : no fever [Chills] : no chills [FreeTextEntry5] : History of HTN [FreeTextEntry7] : History of Diverticulitis, GERD [FreeTextEntry8] : History of DEREK [de-identified] : History of Breast Cancer, Alcoholism (19 years sober), Lymphedema in Right Arm, Fibromyalgia [de-identified] : Neuropathy [de-identified] : History of Bipolar [de-identified] : History of Diabetes (Last A1C: 6.1)

## 2023-02-12 NOTE — DISCUSSION/SUMMARY
[de-identified] : Ms. Cabrera is a 62-year-old female present to the office for evaluation of her bilateral knee pain.  Patient has been experiencing bilateral (right greater than left) knee pain for the last 30 years.  Pain has been worse over the last 6 to 8 years.  Pain is now affecting her quality of life and daily activities.  Pain affects her ambulation.  X-rays showed severe bilateral knee osteoarthritis.  Examination showed decreased bilateral knee range of motion.  Discussed with patient the examination and imaging findings.  Discussed with patient the operative and nonoperative management of knee osteoarthritis, including total knee arthroplasty.  Discussed the nonoperative management of knee osteoarthritis, including physical therapy, anti-inflammatories, and injections.  Patient has tried nonoperative management, but continues to have knee pain.  Discussed total knee arthroplasty at length, including surgical procedure, hospital course, DVT prophylaxis, antibiotics, physical therapy, recovery, and risks. Discussed goal of maintaining BMI less than 40. The risk and benefits of surgery were discussed, which include infection. The risk factors for infection, early loosening, and wound complications were discussed, which including with a BMI over 40. Discussed the ramifications of a periprosthetic infection and the potential devastating outcome. Discussed the benefits of diet and exercise for weight loss, and the benefits of weight loss on the joints themselves.  Discussed that patient would need medical, spine, neurology, pulmonary, and cardiac clearance prior to surgery.  Patient has already been evaluated by Dr. Knowles. Patient has reduced her opioid requirements and is currently only taking tramadol.  She would like to continue to wean opioids prior to surgery.  Patient would like to plan for surgery in April 2023.  Patient will follow-up in 6 weeks for continued management.  She will be planned for total knee arthroplasty.  Patient understanding and in agreement with the plan.  All questions answered.\par  \par Discussed the imaging and physical exam findings with the patient consistent with endstage knee degenerative disease. The patient has failed conservative management including physical therapy, anti-inflammatories, and injections. The risks, benefits and alternatives to total knee replacement were discussed with the patient in detail and the patient elected to proceed with surgery. Discussed the surgical plan with the patient including implant options and surgical approach. \par \par Surgical risks including fracture requiring fixation, instability or dislocation, temporary or permanent leg length inequality, infection, bleeding, stiffness, failure to alleviate pain, failure to achieve desired results, need for further surgery, scar tissue formation, hardware failure, chronic pain, injury to nerves resulting in extremity dysfunction, injury to arteries and veins, deep vein thrombosis or pulmonary embolism requiring anticoagulation and medical risk factors including heart attack, stroke, death, neurological injury, pneumonia, kidney or other organ failure were discussed with the patient. \par \par Patient was understanding and in agreement with the treatment plan. All questions answered.\par \par Plan:\par - Weight loss regimen with goal BMI less than 40\par - Follow up with Medicine, Cardiology, Pulmonary and Neurology for clearance for total knee arthroplasty\par - Follow up with pain management\par - Follow up in 6 weeks for re-evaluation and management\par -Right Total Knee Arthroplasty\par \par \par Surgical Plan:\par Diagnosis: Right Knee Osteoarthritis\par Laterality: Right\par Operative procedure: Right Total Knee Arthroplasty\par Location: [facility]\par \par Clearances:\par      Medical: Pending\par      Cardiac: Pending\par      Neurology: Pending\par      Spine: Cleared\par      Pulmonary: Pending\par \par Comorbidities:\par      Metal Allergy: Positive (rashes to jewelry) (Angioedema to Ramipril)\par      Chronic Pain: Positive, Off of Methadone. Currently on Tramadol\par      Diabetes: Positive, Last A1C: 6.1\par      Use of Anticoagulation: Negative\par      Atrial Fibrillation: Negative\par      History of VTE: Negative\par      History of Cardiac Stents: Negative\par      Skin Infections/Open Wounds: Negative\par      MRSA Infection/Colonization: Negative\par      Current Urinary Symptoms: Negative\par      Immunocompromise: Negative\par      Inflammatory Arthritis: History of Lupus Antibodies (Possibly positive due to COVID)\par      Smoking: Negative\par      Drug Use: Negative\par      Alcohol Use: Sober for 19 years\par      Obstructive Sleep Apnea: Positive\par      Neurologic Disease: History of neuropathy in hands/feet, Has tremor (treated by Neurology), History of Fibromyalgia

## 2023-03-13 ENCOUNTER — APPOINTMENT (OUTPATIENT)
Dept: INTERNAL MEDICINE | Facility: CLINIC | Age: 63
End: 2023-03-13
Payer: MEDICARE

## 2023-03-13 VITALS
SYSTOLIC BLOOD PRESSURE: 134 MMHG | TEMPERATURE: 98 F | DIASTOLIC BLOOD PRESSURE: 79 MMHG | HEIGHT: 61 IN | OXYGEN SATURATION: 98 % | BODY MASS INDEX: 38.33 KG/M2 | HEART RATE: 61 BPM | WEIGHT: 203 LBS

## 2023-03-13 PROCEDURE — 99214 OFFICE O/P EST MOD 30 MIN: CPT

## 2023-03-13 RX ORDER — BUDESONIDE AND FORMOTEROL FUMARATE DIHYDRATE 160; 4.5 UG/1; UG/1
160-4.5 AEROSOL RESPIRATORY (INHALATION) TWICE DAILY
Qty: 1 | Refills: 3 | Status: DISCONTINUED | COMMUNITY
Start: 2023-01-19 | End: 2023-03-13

## 2023-03-13 RX ORDER — TRAMADOL HYDROCHLORIDE 25 MG/1
TABLET, COATED ORAL
Refills: 0 | Status: ACTIVE | COMMUNITY

## 2023-03-13 NOTE — REVIEW OF SYSTEMS
[Fatigue] : fatigue [Unsteady Walking] : ataxia [Negative] : Respiratory [de-identified] : +tremor

## 2023-03-13 NOTE — PHYSICAL EXAM
[Normal] : no carotid or abdominal bruits heard, no varicosities, pedal pulses are present, no peripheral edema, no extremity clubbing or cyanosis and no palpable aorta [de-identified] : +tremor of head, hands

## 2023-03-13 NOTE — ASSESSMENT
[FreeTextEntry1] : 63 y/o F with obesity, arthritis, HTN, DM, Stage 3 breast ca s/p R mastectomy/chemo/xrt with chronic R arm lymphedema, chronic pain/fibromyalgia here for follow up.\par \par Chronic opioid use - following with methadone clinic; weaned off; had some withdrawal symptoms but patient doesn't want to go back on\par - Would continue f/u with methadone clinic\par \par Essential tremor - currently on propranolol 10 BID but still with symptoms; very bothersome\par - Will discuss with Neuro re: increasing to 60 to 80mg total BID as HR and BP will tolerate.\par \par OA  - plan for R knee replacement in April 2023\par - Will return for preop evaluation\par \par High cholesterol - multiple risk factors; had statin >10 years ago\par - Will recheck fasting lipid profile and start low dose statin\par \par RTC in 1 month for preop evaluation.

## 2023-03-13 NOTE — HISTORY OF PRESENT ILLNESS
[FreeTextEntry1] : Follow up [de-identified] : \par Was weaning off the methadone but prescription was written incorrectly and said started to have withdrawal symptoms in January 2023 - insomnia, feeling nerves are jumping.\par Has been completely off of methadone since January.\par Will be seeing MD at methadone clinic on 3/15.\par Pt doesn't want to restart at a low dose of methadone and wants to just be off of it completely since has been on it for >10 years.\par \par Bilateral knee arthritis - scheduled for R knee replacement for 4/24/23.\par \par SOB - started seeing Pulm 1/19/23. Was prescribed symbicort but was $400. \par Feels more fatigued. \par \par Fibromyalgia - saw Rheum 1/19/23. Was prescribed hydroxychloroquine. Had +DORCAS and +SS-A and +LA.\par Was taking consistently but stopped it last week. Says that wasn't helping. \par \par Tremor - saw Neuro and started on propranolol but still\par \par Since last visit has been doing plant-based diet ("Becerra fast") with no meat, no sugar.\par Stopped eating butter.\par Had been on statin >10 years ago and recalls having muscle aches.\par Trying to lose weight - will be walking again.

## 2023-03-17 ENCOUNTER — APPOINTMENT (OUTPATIENT)
Dept: CARDIOLOGY | Facility: CLINIC | Age: 63
End: 2023-03-17
Payer: MEDICARE

## 2023-03-17 ENCOUNTER — NON-APPOINTMENT (OUTPATIENT)
Age: 63
End: 2023-03-17

## 2023-03-17 VITALS
DIASTOLIC BLOOD PRESSURE: 83 MMHG | SYSTOLIC BLOOD PRESSURE: 126 MMHG | HEART RATE: 57 BPM | HEIGHT: 61 IN | WEIGHT: 207 LBS | BODY MASS INDEX: 39.08 KG/M2 | OXYGEN SATURATION: 99 %

## 2023-03-17 PROCEDURE — 93000 ELECTROCARDIOGRAM COMPLETE: CPT

## 2023-03-17 PROCEDURE — 99214 OFFICE O/P EST MOD 30 MIN: CPT

## 2023-03-17 NOTE — HISTORY OF PRESENT ILLNESS
[FreeTextEntry1] : Here for followup. Feels well. BP well controlled. Off Coreg. \par No new complaints\par EKG reviewed and unchanged\par BP has been fluctuating\par Here prior to knee replacement scheduled for 4/24/23\par Was in hospital in January with dyspnea and fatigue. Echo as below was normal. She is  still fatigued\par \par Echo 1/8/23:\par CONCLUSIONS:\par 1. Eccentric left ventricular hypertrophy (dilated left\par ventricle with normal relative wall thickness).\par 2. Normal left ventricular systolic function. No segmental\par wall motion abnormalities.\par 3. Normal right ventricular size and function.\par 4. Normal tricuspid valve.  Moderate tricuspid\par regurgitation.\par \par #HTN- On  HCTZ and Valsartan, on Norvasc 10 mg \par She increased her HCTZ to BID on her own\par #Preop eval- TTE Jan 2023 was normal, given fatigue and dyspnea-will check pharm nuclear stress test; if normal can proceed to OR without followup\par

## 2023-03-17 NOTE — DISCUSSION/SUMMARY
[FreeTextEntry1] : 62 year old woman with HTN here for followup\par #HTN- On  HCTZ and Valsartan, on Norvasc 10 mg \par She increased her HCTZ to BID on her own\par #Preop eval- TTE Jan 2023 was normal, given fatigue and dyspnea-will check pharm nuclear stress test; if normal can proceed to OR without followup\par  [EKG obtained to assist in diagnosis and management of assessed problem(s)] : EKG obtained to assist in diagnosis and management of assessed problem(s)

## 2023-03-23 ENCOUNTER — APPOINTMENT (OUTPATIENT)
Dept: PULMONOLOGY | Facility: CLINIC | Age: 63
End: 2023-03-23
Payer: MEDICARE

## 2023-03-23 VITALS
TEMPERATURE: 98.7 F | HEART RATE: 48 BPM | OXYGEN SATURATION: 96 % | WEIGHT: 204 LBS | DIASTOLIC BLOOD PRESSURE: 70 MMHG | SYSTOLIC BLOOD PRESSURE: 126 MMHG | BODY MASS INDEX: 38.55 KG/M2

## 2023-03-23 DIAGNOSIS — R05.9 COUGH, UNSPECIFIED: ICD-10-CM

## 2023-03-23 PROCEDURE — 99214 OFFICE O/P EST MOD 30 MIN: CPT

## 2023-03-28 NOTE — ASU PATIENT PROFILE, ADULT - MENTAL HEALTH CONDITIONS/SYMPTOMS, PROFILE
Pt presents with difficulty breathing. Diminished breath sounds, with grunting in triage. Pt reports chest tightness and nausea. PMH of Asthma with previous PICU stay. ALLIE SAHNI.
bipolar

## 2023-04-05 ENCOUNTER — OUTPATIENT (OUTPATIENT)
Dept: OUTPATIENT SERVICES | Facility: HOSPITAL | Age: 63
LOS: 1 days | End: 2023-04-05
Payer: MEDICARE

## 2023-04-05 ENCOUNTER — APPOINTMENT (OUTPATIENT)
Dept: CV DIAGNOSTICS | Facility: HOSPITAL | Age: 63
End: 2023-04-05

## 2023-04-05 DIAGNOSIS — C80.1 MALIGNANT (PRIMARY) NEOPLASM, UNSPECIFIED: Chronic | ICD-10-CM

## 2023-04-05 DIAGNOSIS — Z33.2 ENCOUNTER FOR ELECTIVE TERMINATION OF PREGNANCY: Chronic | ICD-10-CM

## 2023-04-05 DIAGNOSIS — E04.1 NONTOXIC SINGLE THYROID NODULE: Chronic | ICD-10-CM

## 2023-04-05 DIAGNOSIS — Z01.818 ENCOUNTER FOR OTHER PREPROCEDURAL EXAMINATION: ICD-10-CM

## 2023-04-05 DIAGNOSIS — H26.9 UNSPECIFIED CATARACT: Chronic | ICD-10-CM

## 2023-04-05 PROCEDURE — 78452 HT MUSCLE IMAGE SPECT MULT: CPT | Mod: 26,MH

## 2023-04-05 PROCEDURE — 93018 CV STRESS TEST I&R ONLY: CPT | Mod: GC

## 2023-04-05 PROCEDURE — 93016 CV STRESS TEST SUPVJ ONLY: CPT | Mod: GC

## 2023-04-07 ENCOUNTER — APPOINTMENT (OUTPATIENT)
Dept: ORTHOPEDIC SURGERY | Facility: CLINIC | Age: 63
End: 2023-04-07
Payer: MEDICARE

## 2023-04-07 VITALS
SYSTOLIC BLOOD PRESSURE: 132 MMHG | HEIGHT: 60 IN | BODY MASS INDEX: 38.87 KG/M2 | HEART RATE: 73 BPM | TEMPERATURE: 97.7 F | DIASTOLIC BLOOD PRESSURE: 67 MMHG | OXYGEN SATURATION: 95 % | WEIGHT: 198 LBS

## 2023-04-07 DIAGNOSIS — M17.11 UNILATERAL PRIMARY OSTEOARTHRITIS, RIGHT KNEE: ICD-10-CM

## 2023-04-07 PROCEDURE — 99214 OFFICE O/P EST MOD 30 MIN: CPT

## 2023-04-07 PROCEDURE — 73564 X-RAY EXAM KNEE 4 OR MORE: CPT | Mod: 50

## 2023-04-12 NOTE — DISCUSSION/SUMMARY
[de-identified] : Ms. Cabrera is a 62-year-old female present to the office for evaluation of her bilateral knee pain.  Patient has been experiencing bilateral (right greater than left) knee pain for the last 30 years.  Pain has been worse over the last 6 to 8 years.  Pain is now affecting her quality of life and daily activities.  Pain affects her ambulation.  X-rays showed severe bilateral knee osteoarthritis.  Examination showed decreased bilateral knee range of motion.  Discussed with patient the examination and imaging findings.  Discussed with patient the operative and nonoperative management of knee osteoarthritis, including total knee arthroplasty.  Discussed the nonoperative management of knee osteoarthritis, including physical therapy, anti-inflammatories, and injections.  Patient has tried nonoperative management, but continues to have knee pain.  Discussed total knee arthroplasty at length, including surgical procedure, hospital course, DVT prophylaxis, antibiotics, physical therapy, recovery, and risks. Discussed goal of maintaining BMI less than 40. The risk and benefits of surgery were discussed, which include infection. The risk factors for infection, early loosening, and wound complications were discussed, which including with a BMI over 40. Discussed the ramifications of a periprosthetic infection and the potential devastating outcome. Discussed the benefits of diet and exercise for weight loss, and the benefits of weight loss on the joints themselves. Patient has continued to work on her weight loss and her current BMI is 38.67.  Discussed that patient would need medical, spine, pulmonary, and cardiac clearance prior to surgery.  Patient has already been evaluated by Dr. Knowles. Patient has continued to reduce her opioid usage. She reports a history of rashes to jewelry. She would like to proceed with right TKA. Patient understanding and in agreement with the plan.  All questions answered.\par  \par Discussed the imaging and physical exam findings with the patient consistent with endstage knee degenerative disease. The patient has failed conservative management including physical therapy, anti-inflammatories, and injections. The risks, benefits and alternatives to total knee replacement were discussed with the patient in detail and the patient elected to proceed with surgery. Discussed the surgical plan with the patient including implant options and surgical approach. \par \par Surgical risks including fracture requiring fixation, instability or dislocation, temporary or permanent leg length inequality, infection, bleeding, stiffness, failure to alleviate pain, failure to achieve desired results, need for further surgery, scar tissue formation, hardware failure, chronic pain, injury to nerves resulting in extremity dysfunction, injury to arteries and veins, deep vein thrombosis or pulmonary embolism requiring anticoagulation and medical risk factors including heart attack, stroke, death, neurological injury, pneumonia, kidney or other organ failure were discussed with the patient. \par \par Patient was understanding and in agreement with the treatment plan. All questions answered.\par \par Plan:\par - Follow up with Medicine, Cardiology, Pulmonary for clearance for total knee arthroplasty\par - Follow up with pain management\par - Right Total Knee Arthroplasty\par \par \par Surgical Plan:\par Diagnosis: Right Knee Osteoarthritis\par Laterality: Right\par Operative procedure: Right Total Knee Arthroplasty\par Location: [facility]\par \par Clearances:\par      Medical: Pending\par      Cardiac: Pending\par      Spine: Cleared\par      Pulmonary: Pending\par \par Comorbidities:\par      Metal Allergy: Positive (rashes to jewelry) (Angioedema to Ramipril)\par      Chronic Pain: Positive, Off of Methadone. Currently on Tramadol (once per day)\par      Diabetes: Positive, Last A1C: 6.1\par      Use of Anticoagulation: Negative\par      Atrial Fibrillation: Negative\par      History of VTE: Negative\par      History of Cardiac Stents: Negative\par      Skin Infections/Open Wounds: Negative\par      MRSA Infection/Colonization: Negative\par      Current Urinary Symptoms: Negative\par      Immunocompromise: Negative\par      Inflammatory Arthritis: History of Lupus Antibodies (Possibly positive due to COVID)\par      Smoking: Negative\par      Drug Use: Negative\par      Alcohol Use: Sober for 19 years\par      Obstructive Sleep Apnea: Positive\par      Neurologic Disease: History of neuropathy in hands/feet, Has tremor (treated by Neurology), History of Fibromyalgia

## 2023-04-12 NOTE — PHYSICAL EXAM
[de-identified] : Constitutional:  62 year old female, alert and oriented, cooperative, in no acute distress.\par \par HEENT \par NC/AT.  Appearance: symmetric\par \par Neck/Back\par Straight without deformity or instability.  Good ROM.\par \par Chest/Respiratory \par Respiratory effort: no intercostal retractions or use of accessory muscles. Nonlabored Breathing\par \par Skin \par On inspection, warm and dry without rashes or lesions.\par \par Mental Status: \par Judgment, insight: intact\par Orientation: oriented to time, place, and person\par \par Neurological:\par Sensory and Motor are grossly intact throughout\par \par Left Knee\par \par Inspection:\par     Skin intact, no rashes or lesions\par     No Effusion\par     Non-tender to palpation over tibial tubercle, patella, medial and lateral joint line, and pes insertion.\par \par Range of Motion:\par 	Extension - -5 degrees\par 	Flexion - 100 degrees\par 	Alignment - Varus 5 degrees\par 	Extensor lag: None\par \par Stability:\par      Demonstrates no Varus or Valgus instability\par      Negative Anterior or Posterior drawer.\par      Negative Lachman's\par \par Patella: stable, tracks well. \par \par Right Knee\par \par Inspection:\par     Skin intact, no rashes or lesions\par     No Effusion\par     Non-tender to palpation over tibial tubercle, patella, medial and lateral joint line, and pes insertion.\par \par Range of Motion:\par 	Extension - -10 degrees\par 	Flexion - 100 degrees\par 	Alignment - Varus 5 degrees\par 	Extensor lag: None\par \par \par Stability:\par      Demonstrates no Varus or Valgus instability\par      Negative Anterior or Posterior drawer.\par      Negative Lachman's\par \par Patella: stable, tracks well. \par \par Neurologic Exam\par     Motor intact including 5/5 Extensor Hallucis Longus, 5/5 Flexor Hallucis Longus, 5/5 Tibialis Anterior and 5/5 Gastrocnemius\par     Sensation Intact to Light Touch including Saphenous, Sural, Superficial Peroneal, Deep Peroneal, Tibial nerve distributions\par \par Vascular Exam\par     Foot is warm and well perfused with 2+ Dorsalis Pedis Pulse \par \par No pain with range of motion of the bilateral hips. No lumbar paraspinal muscle tenderness.  [de-identified] : XRay: XRays of the Right Knee (4 Views) taken in the office today and reviewed with the patient. XRays demonstrate tricompartmental joint space narrowing, with bone on bone articulations in the medial and patellofemoral compartments, with subchondral sclerosis, overlying osteophytes, all consistent with severe osteoarthritis, KL thGthrthathdtheth:th th5th. There is varus alignment. (my personal interpretation) \par \par XRay: XRays of the Left Knee (4 Views) taken in the office today and reviewed with the patient. XRays demonstrate tricompartmental joint space narrowing, with bone on bone articulations in the medial and patellofemoral compartments, with subchondral sclerosis, overlying osteophytes, all consistent with severe osteoarthritis, KL thGthrthathdtheth:th th5th. There is varus alignment. (my personal interpretation) \par \par XRay:  XRays of the Leg Lengths (1 View) taken on 2/10/2022. XRays demonstrate near neutral alignment of the right lower extremity and slight varus alignment of the left lower extremity at the knee.  (my personal interpretation).\par  \par \par

## 2023-04-12 NOTE — REVIEW OF SYSTEMS
[Joint Pain] : joint pain [Negative] : Respiratory [Joint Stiffness] : no joint stiffness [Joint Swelling] : no joint swelling [Fever] : no fever [Chills] : no chills [FreeTextEntry5] : HTN [FreeTextEntry7] : Diverticulitis, GERD [FreeTextEntry8] : DEREK [de-identified] : History of Breast Cancer, Alcoholism (19 years sober), Lymphedema in Right Arm, Fibromyalgia.  [de-identified] : Neuropathy [de-identified] : Bipolar [de-identified] : History of Diabetes (Last A1C: 6.1).

## 2023-04-12 NOTE — HISTORY OF PRESENT ILLNESS
[de-identified] : 4/7/2023\par \par Ms. Daley presented to the office for follow-up of her bilateral knee pain.  Patient continues to experience bilateral (right greater than left) knee pain.  She also reports some right knee stiffness and occasional lower back pain.  Patient has some neuropathy in her hands and feet.  Patient continues to experience pain is affecting her quality of life and daily activities.  She has tried multiple knee injections, last in August 2022.  She has also tried physical therapy and has been in pain management.  Patient is planned for a right total knee arthroplasty.  She has a stress test scheduled for Wednesday.  Patient is currently taking meloxicam for her pain.\par \par 2/10/2023\par Ms. Daley is a 62-year-old female presents to the office for follow up of her bilateral knee pain.  Patient has been experiencing bilateral (right greater than left) knee pain for about 30 years.  She states that it has been worse over the last 6 to 8 years. Pain is now affecting her quality of life and daily activities.  Pain is worse when lying down and patient tries to keep her feet elevated.  Right knee pain is worse than her left at this time.  Patient has tried meloxicam, gabapentin, and Tylenol for the pain.  She has a history of chronic pain and has been [facility]  Patient has stopped taking methadone and is currently only taking tramadol for her pain.  She did have some withdrawal from her opioid medications.  Patient has tried multiple knee injections, including cortisone injections for many years.  She has tried about 4 series of viscosupplementation injections.  Her last knee joint injections were in August 2022, which did not help.  She states that she received 3 gel injections in the right knee and 2 in the left, but pain worsened and she did not finish the left knee series.  She had an injection in her right IT band in November 2022.  Patient is also tried physical therapy for her knees, last in October 2022. She has tried physical therapy for her back pain, but her knee pain limited her back PT. Patient has continued her weight loss and her BMI: 39.26.  No recent trauma.  No fevers or chills.  Patient was recently started on Symbicort by her pulmonologist.  She would like to move her surgical date from March into April.

## 2023-04-17 ENCOUNTER — APPOINTMENT (OUTPATIENT)
Dept: INTERNAL MEDICINE | Facility: CLINIC | Age: 63
End: 2023-04-17
Payer: MEDICARE

## 2023-04-17 VITALS
DIASTOLIC BLOOD PRESSURE: 89 MMHG | OXYGEN SATURATION: 97 % | SYSTOLIC BLOOD PRESSURE: 143 MMHG | WEIGHT: 198 LBS | TEMPERATURE: 98.1 F | HEIGHT: 60 IN | HEART RATE: 71 BPM | RESPIRATION RATE: 17 BRPM | BODY MASS INDEX: 38.87 KG/M2

## 2023-04-17 VITALS — DIASTOLIC BLOOD PRESSURE: 68 MMHG | SYSTOLIC BLOOD PRESSURE: 124 MMHG

## 2023-04-17 PROCEDURE — 99214 OFFICE O/P EST MOD 30 MIN: CPT

## 2023-04-17 RX ORDER — BLOOD SUGAR DIAGNOSTIC
STRIP MISCELLANEOUS DAILY
Qty: 100 | Refills: 3 | Status: COMPLETED | COMMUNITY
Start: 2020-03-02 | End: 2023-04-17

## 2023-04-17 RX ORDER — BLOOD-GLUCOSE METER
W/DEVICE EACH MISCELLANEOUS
Qty: 1 | Refills: 0 | Status: COMPLETED | COMMUNITY
Start: 2022-09-19 | End: 2023-04-17

## 2023-04-17 RX ORDER — BLOOD-GLUCOSE METER
W/DEVICE KIT MISCELLANEOUS
Qty: 1 | Refills: 0 | Status: COMPLETED | COMMUNITY
Start: 2022-09-15 | End: 2023-04-17

## 2023-04-17 NOTE — PHYSICAL EXAM
[Normal] : normal rate, regular rhythm, normal S1 and S2 and no murmur heard [de-identified] : +head tremor

## 2023-04-17 NOTE — HISTORY OF PRESENT ILLNESS
[No Adverse Anesthesia Reaction] : no adverse anesthesia reaction in self or family member [Diabetes] : diabetes [Aortic Stenosis] : no aortic stenosis [Atrial Fibrillation] : no atrial fibrillation [Coronary Artery Disease] : no coronary artery disease [Recent Myocardial Infarction] : no recent myocardial infarction [Implantable Device/Pacemaker] : no implantable device/pacemaker [Asthma] : asthma [COPD] : no COPD [Sleep Apnea] : no sleep apnea [Smoker] : not a smoker [Family Member] : no family member with adverse anesthesia reaction/sudden death [Self] : no previous adverse anesthesia reaction [Chronic Anticoagulation] : no chronic anticoagulation [Chronic Kidney Disease] : no chronic kidney disease [Unable to assess] : unable to assess [NSAIDs: _____] : NSAIDs: [unfilled] [FreeTextEntry1] : R total knee arthroplasty [FreeTextEntry2] : 4/24/23 [FreeTextEntry3] : Dr. Givens [FreeTextEntry4] : \par Also needs Cardiology and Pulm clearance.\par Pt has stairs in the home.\par Has CT scan scheduled for tomorrow.\par Also seeing Pulm and Rheum and Pain Management this week. Will be seeing presurgical testing.\par \par On meloxicam 7.5mg daily prn - last dose today. [FreeTextEntry6] : +JOINER - workup below. [FreeTextEntry7] : Nuclear stress 4/5/23: normal study\par Echo 1/8/23: eccentric LVH (dilated LV), normal LV systolic function. Moderate TR. Normal RV size and function.\par

## 2023-04-17 NOTE — ASSESSMENT
[High Risk Surgery - Intraperitoneal, Intrathoracic or Supringuinal Vascular Procedures] : High Risk Surgery - Intraperitoneal, Intrathoracic or Supringuinal Vascular Procedures - No (0) [Ischemic Heart Disease] : Ischemic Heart Disease - No (0) [Congestive Heart Failure] : Congestive Heart Failure - No (0) [Prior Cerebrovascular Accident or TIA] : Prior Cerebrovascular Accident or TIA - No (0) [Creatinine >= 2mg/dL (1 Point)] : Creatinine >= 2mg/dL - No (0) [Insulin-dependent Diabetic (1 Point)] : Insulin-dependent Diabetic - No (0) [0] : 0 , RCRI Class: I, Risk of Post-Op Cardiac Complications: 3.9%, 95% CI for Risk Estimate: 2.8% - 5.4% [Patient Optimized for Surgery] : Patient optimized for surgery [No Further Testing Recommended] : no further testing recommended [Modify medications prior to procedure] : Modify medications prior to procedure [As per surgery] : as per surgery [FreeTextEntry4] : 63 y/o F with obesity, arthritis, HTN, DM, essential tremor, Stage 3 breast ca s/p R mastectomy/chemo/xrt with chronic L arm lymphedema, chronic pain/fibromyalgia here for preop evaluation prior to R knee arthroplasty.\par Now off methadone. [FreeTextEntry7] : Hold meloxicam and other NSAIDs.

## 2023-04-17 NOTE — REVIEW OF SYSTEMS
[Dyspnea on Exertion] : dyspnea on exertion [Unsteady Walking] : ataxia [Negative] : Gastrointestinal [de-identified] : +tremor

## 2023-04-18 ENCOUNTER — OUTPATIENT (OUTPATIENT)
Dept: OUTPATIENT SERVICES | Facility: HOSPITAL | Age: 63
LOS: 1 days | End: 2023-04-18
Payer: MEDICARE

## 2023-04-18 ENCOUNTER — APPOINTMENT (OUTPATIENT)
Dept: RHEUMATOLOGY | Facility: CLINIC | Age: 63
End: 2023-04-18
Payer: MEDICARE

## 2023-04-18 ENCOUNTER — APPOINTMENT (OUTPATIENT)
Dept: CT IMAGING | Facility: IMAGING CENTER | Age: 63
End: 2023-04-18
Payer: MEDICARE

## 2023-04-18 VITALS
TEMPERATURE: 97.6 F | RESPIRATION RATE: 16 BRPM | HEIGHT: 60 IN | HEART RATE: 71 BPM | WEIGHT: 209 LBS | SYSTOLIC BLOOD PRESSURE: 136 MMHG | DIASTOLIC BLOOD PRESSURE: 81 MMHG | BODY MASS INDEX: 41.03 KG/M2 | OXYGEN SATURATION: 95 %

## 2023-04-18 VITALS
RESPIRATION RATE: 16 BRPM | OXYGEN SATURATION: 97 % | TEMPERATURE: 97 F | HEART RATE: 57 BPM | SYSTOLIC BLOOD PRESSURE: 142 MMHG | WEIGHT: 207.9 LBS | HEIGHT: 60 IN | DIASTOLIC BLOOD PRESSURE: 86 MMHG

## 2023-04-18 DIAGNOSIS — E04.1 NONTOXIC SINGLE THYROID NODULE: Chronic | ICD-10-CM

## 2023-04-18 DIAGNOSIS — M17.11 UNILATERAL PRIMARY OSTEOARTHRITIS, RIGHT KNEE: ICD-10-CM

## 2023-04-18 DIAGNOSIS — Z98.890 OTHER SPECIFIED POSTPROCEDURAL STATES: Chronic | ICD-10-CM

## 2023-04-18 DIAGNOSIS — Z87.09 PERSONAL HISTORY OF OTHER DISEASES OF THE RESPIRATORY SYSTEM: ICD-10-CM

## 2023-04-18 DIAGNOSIS — C80.1 MALIGNANT (PRIMARY) NEOPLASM, UNSPECIFIED: Chronic | ICD-10-CM

## 2023-04-18 DIAGNOSIS — G47.33 OBSTRUCTIVE SLEEP APNEA (ADULT) (PEDIATRIC): ICD-10-CM

## 2023-04-18 DIAGNOSIS — E11.9 TYPE 2 DIABETES MELLITUS WITHOUT COMPLICATIONS: ICD-10-CM

## 2023-04-18 DIAGNOSIS — F41.9 ANXIETY DISORDER, UNSPECIFIED: ICD-10-CM

## 2023-04-18 DIAGNOSIS — R06.09 OTHER FORMS OF DYSPNEA: ICD-10-CM

## 2023-04-18 DIAGNOSIS — H26.9 UNSPECIFIED CATARACT: Chronic | ICD-10-CM

## 2023-04-18 DIAGNOSIS — I10 ESSENTIAL (PRIMARY) HYPERTENSION: ICD-10-CM

## 2023-04-18 DIAGNOSIS — Z86.69 PERSONAL HISTORY OF OTHER DISEASES OF THE NERVOUS SYSTEM AND SENSE ORGANS: ICD-10-CM

## 2023-04-18 DIAGNOSIS — F10.11 ALCOHOL ABUSE, IN REMISSION: ICD-10-CM

## 2023-04-18 DIAGNOSIS — M17.0 BILATERAL PRIMARY OSTEOARTHRITIS OF KNEE: ICD-10-CM

## 2023-04-18 LAB
APPEARANCE UR: CLEAR — SIGNIFICANT CHANGE UP
BILIRUB UR-MCNC: NEGATIVE — SIGNIFICANT CHANGE UP
COLOR SPEC: SIGNIFICANT CHANGE UP
DIFF PNL FLD: NEGATIVE — SIGNIFICANT CHANGE UP
GLUCOSE UR QL: NEGATIVE — SIGNIFICANT CHANGE UP
KETONES UR-MCNC: NEGATIVE — SIGNIFICANT CHANGE UP
LEUKOCYTE ESTERASE UR-ACNC: NEGATIVE — SIGNIFICANT CHANGE UP
NITRITE UR-MCNC: NEGATIVE — SIGNIFICANT CHANGE UP
PH UR: 6.5 — SIGNIFICANT CHANGE UP (ref 5–8)
PROT UR-MCNC: NEGATIVE — SIGNIFICANT CHANGE UP
SP GR SPEC: 1.01 — SIGNIFICANT CHANGE UP (ref 1.01–1.05)
UROBILINOGEN FLD QL: SIGNIFICANT CHANGE UP

## 2023-04-18 PROCEDURE — 86077 PHYS BLOOD BANK SERV XMATCH: CPT

## 2023-04-18 PROCEDURE — 73700 CT LOWER EXTREMITY W/O DYE: CPT

## 2023-04-18 PROCEDURE — 99214 OFFICE O/P EST MOD 30 MIN: CPT

## 2023-04-18 PROCEDURE — 73700 CT LOWER EXTREMITY W/O DYE: CPT | Mod: 26,RT,MH

## 2023-04-18 RX ORDER — OMEPRAZOLE 10 MG/1
1 CAPSULE, DELAYED RELEASE ORAL
Qty: 0 | Refills: 0 | DISCHARGE

## 2023-04-18 RX ORDER — ACETAMINOPHEN 500 MG
0 TABLET ORAL
Qty: 0 | Refills: 0 | DISCHARGE

## 2023-04-18 RX ORDER — METHADONE HYDROCHLORIDE 40 MG/1
0.5 TABLET ORAL
Qty: 0 | Refills: 0 | DISCHARGE

## 2023-04-18 RX ORDER — MELOXICAM 15 MG/1
1 TABLET ORAL
Qty: 0 | Refills: 0 | DISCHARGE

## 2023-04-18 RX ORDER — SODIUM CHLORIDE 9 MG/ML
1000 INJECTION, SOLUTION INTRAVENOUS
Refills: 0 | Status: DISCONTINUED | OUTPATIENT
Start: 2023-04-24 | End: 2023-04-25

## 2023-04-18 RX ORDER — GABAPENTIN 400 MG/1
1 CAPSULE ORAL
Qty: 0 | Refills: 0 | DISCHARGE

## 2023-04-18 RX ORDER — SODIUM CHLORIDE 9 MG/ML
3 INJECTION INTRAMUSCULAR; INTRAVENOUS; SUBCUTANEOUS EVERY 8 HOURS
Refills: 0 | Status: DISCONTINUED | OUTPATIENT
Start: 2023-04-24 | End: 2023-04-27

## 2023-04-18 NOTE — H&P PST ADULT - PROBLEM SELECTOR PLAN 6
Last cardiac note in chart. Pending addendum, s/p stress test 4/2023 for clearance.    Copy of ekg echo and stress test in chart.

## 2023-04-18 NOTE — H&P PST ADULT - OTHER CARE PROVIDERS
Dr Figueroa endocrinologist                                                          Dr Torres cardiologist [phone]

## 2023-04-18 NOTE — H&P PST ADULT - MUSCULOSKELETAL
details… Right knee, ambulates with cane/decreased ROM due to pain/strength 5/5 bilateral upper extremities/abnormal gait

## 2023-04-18 NOTE — H&P PST ADULT - PROBLEM SELECTOR PLAN 5
Patient instructed to take valsartan amlodipine HCTZ with a sip of water on the morning of procedure.

## 2023-04-18 NOTE — H&P PST ADULT - HISTORY OF PRESENT ILLNESS
61 y/o female PMH HTN HLD CHF (last exacerbation 1/2023) asthma/COPD ETOH abuse diabetes neuropathy MADHURI tremor bipolar disorder presents to presurgical testing with diagnosis of unilateral primary osteoarthritis right knee. Pt with bilateral knee arthritis, with increasing pain and dysfunction of right knee despite conservative management. Pt is scheduled for right total knee arthroscopy.  63 y/o female PMH HTN HLD CHF (last exacerbation 1/2023) asthma/COPD ETOH abuse diabetes neuropathy MADHURI tremor bipolar disorder right breast cancer (right mastectomy) c/b right arm lymphedema presents to presurgical testing with diagnosis of unilateral primary osteoarthritis right knee. Pt with bilateral knee arthritis, with increasing pain and dysfunction of right knee despite conservative management. Pt is scheduled for right total knee arthroplasty.

## 2023-04-18 NOTE — H&P PST ADULT - PRIMARY CARE PROVIDER
Dr Rios  [phone]                                                                                    Dr Mathieu Lord pain   [phone]                           Dr Andersen pulmonary [phone]

## 2023-04-18 NOTE — H&P PST ADULT - LAST CARDIAC ANGIOGRAM/IMAGING
2002, [facility] 164t /st., pt. denies stent placement, "everything was clear when they went in but my heart stopped on the table"

## 2023-04-18 NOTE — H&P PST ADULT - ATTENDING COMMENTS
Denia Santos is a 62 year old female, past medical history of hypertension, diabetes, fibromyalgia, Breast Cancer, GERD, Diverticulitis, Bipolar, Neuropathy, Alcoholism (last in 2003), who presented to the office for her bilateral knee pain. Patient has been experiencing bilateral (right greater than left) knee pain for the last 30 years, but worsening over the last 6 to 8 years. Patient had tried anti-inflammatories (including Meloxicam), gabapentin, Tylenol and pain management. She was in pain management for about 20 years for her chronic pain. Patient was previously on Methadone, but had weaned off. She had tried multiple knee injections, including corticosteroids and viscosupplementation. Her last knee injection was in August 2022. Patient had also tried physical therapy, with her last PT in October 2022. Patient continued to have pain that was affecting her quality of life and daily activities. XRays showed severe bilateral knee osteoarthritis. Patient was indicated for a Right Total Knee Arthroplasty. Patient stated that she does have rashes and reactions to jewelry. She has a history of angioedema to Ramipril. Therefore, an Oxinium implant was chosen. Patient was cleared for surgery by Medicine, Cardiology, and Pulmonology.    Discussed the operative and nonoperative management of knee osteoarthritis at length with the patient. Nonoperative management would consist of pain control, physical therapy, and anti-inflammatories. The patient had failed conservative management, including physical therapy, anti-inflammatories, pain management, and injections. The patient did not want to continue nonoperative management due to the impact knee pain has had on the patient’s quality of life. Operative management would consist of total knee arthroplasty. The risks, benefits and alternatives to total knee arthroplasty were discussed with the patient in detail and the patient    elected to proceed with surgery. Discussed the surgical plan and implants with the patient. Discussed the recovery process following total knee arthroplasty at length, including, but not limited to, inpatient hospital stay, physical therapy, recovery, antibiotics, and DVT prophylaxis. Surgical risks including fracture requiring fixation, instability or dislocation, temporary or permanent leg length inequality, risks of cementation, infection, bleeding, stiffness, failure to alleviate pain, failure to achieve desired results, need for further surgery, scar tissue formation, hardware failure, component loosening, chronic pain, injury to nerves resulting in extremity dysfunction, injury to arteries and veins, deep vein thrombosis or pulmonary embolism requiring anticoagulation and medical risk factors including heart attack, stroke, death, neurological injury, pneumonia, kidney or other organ failure were discussed with the patient at length.    Following discussion, patient elected to proceed with Right Total Knee Arthroplasty. Informed consent was obtained. Patient's leg was then marked with verbal confirmation of the patient. Patient was understanding and in agreement with the operative plan. All questions were answered.    Physical Exam:  Skin intact, no erythema  Motor: Intact EHL/FHL/Tibialis Anterior/Gastrocnemius  Sensory: Intact Superficial Peroneal/Deep Peroneal/Saphenous/Sural/Tibial Nerves  Vascular: 2+ DP Pulse    Assessment/Plan:  Ms. Cabrera is a 62 year old female who presented for a Right Total Knee Arthroplasty    Plan:  -Right Total Knee Arthroplasty

## 2023-04-18 NOTE — H&P PST ADULT - PROBLEM SELECTOR PLAN 1
Patient tentatively scheduled for right knee arthroplasty for 4/24/23. Pre-op instructions provided. Pt given verbal and written instructions with teach back on chlorhexidine shampoo. Pt will take own omeprazole on the morning of procedure for GI prophylaxis.  Pt verbalized understanding with return demonstration.     CBC CMP A1C in chart from 4/17/23.  T&S UA Urine culture MRSA done.  recent EKG in chart.     Copy of medication evaluation in chart. Pending copy of pulmonary and cardiac evaluation.

## 2023-04-18 NOTE — H&P PST ADULT - NSICDXPASTSURGICALHX_GEN_ALL_CORE_FT
PAST SURGICAL HISTORY:  2002   h/o hysterectomy due to Fibroid--post op vaginal bleeding requiring a transfusion     2008  repair of ventral hernia with mesh Revision 2018    Cancer 2012  insertion of mediport -- to be excised on 9-22-14    Cataract Bilateral 2017    Radical mastectomy of right breast 3/30/12    S/P arthroscopy of left shoulder     Termination of pregnancy (fetus) x 3

## 2023-04-18 NOTE — H&P PST ADULT - PROBLEM SELECTOR PLAN 7
Pt instructed to take rescue inhaler as needed.    h/o asthma, COPD, chronic JOINER. Pending pulmonary evaluation. Appt on 4/20/23

## 2023-04-18 NOTE — H&P PST ADULT - NSICDXPASTMEDICALHX_GEN_ALL_CORE_FT
PAST MEDICAL HISTORY:  Abdominal hernia in 2012 7/23/19 ; pt states hernia repair 2008, 2018    Acid Reflux     Alcohol abuse--quit 8 years ago--goes to AA ADDENDUM:  at PAST appointment  patient states she quit alcohol approximately 2003    Anxiety     Arthritis     Asthma last rescue inhaler use "maybe 3 years ago when I had the flu or a cold"    b/l  Carpal tunnel syndrome tx PT/OT    Breast cancer 2012  right breast -- had mastectomy and then post op chemo and RT, followed with Herceptin for 1 year  2012 last chemo   2013 may last Herceptin  2013 last RT    Depression     Diabetes mellitus     Diverticulosis     Drug abuse--quit 8 years ago--goes to AA ADDENDUM: at PAST appointment, patient states she quit alcohol in approximately 2003    ETOH abuse states she quit alcohol in 2003    Fibromyalgia     Former smoker     h/o   Fatty liver     h/o b/l fungal foot infection 7/23./19 pt denies    herniated lumbar disc     hiatal hernia last endoscopy 1.5 years ago    History  Uterine Fibroid s/p Hysterectomy 7/02    History of COPD     Hypercholesterolemia 7/23/19 ; pt reports improvement ; pt denies rx    Hypertension     Insomnia     Lymphedema of right arm     Lymphedema, limb right side s/p right mastectomy    Miscarriage x 2    Morbid obesity     Neuropathy b/l feet    Primary osteoarthritis of right knee     sickel cell anemia trait     Sickle cell trait     Sleep apnea diagnosed 2007---supposed to use device but doesn't     Substance abuse quit in 2003 -- cocaine and marijuana    Thyroid nodule had negative biopsy

## 2023-04-19 ENCOUNTER — NON-APPOINTMENT (OUTPATIENT)
Age: 63
End: 2023-04-19

## 2023-04-19 PROBLEM — Z87.09 PERSONAL HISTORY OF OTHER DISEASES OF THE RESPIRATORY SYSTEM: Chronic | Status: ACTIVE | Noted: 2023-04-18

## 2023-04-19 PROBLEM — I89.0 LYMPHEDEMA, NOT ELSEWHERE CLASSIFIED: Chronic | Status: ACTIVE | Noted: 2023-04-18

## 2023-04-19 PROBLEM — M17.11 UNILATERAL PRIMARY OSTEOARTHRITIS, RIGHT KNEE: Chronic | Status: ACTIVE | Noted: 2023-04-18

## 2023-04-19 PROBLEM — E11.9 TYPE 2 DIABETES MELLITUS WITHOUT COMPLICATIONS: Chronic | Status: ACTIVE | Noted: 2023-04-18

## 2023-04-19 LAB
ALBUMIN SERPL ELPH-MCNC: 4.1 G/DL
ALP BLD-CCNC: 125 U/L
ALT SERPL-CCNC: 12 U/L
ANION GAP SERPL CALC-SCNC: 13 MMOL/L
AST SERPL-CCNC: 15 U/L
BASOPHILS # BLD AUTO: 0.03 K/UL
BASOPHILS NFR BLD AUTO: 0.6 %
BILIRUB SERPL-MCNC: 0.3 MG/DL
BUN SERPL-MCNC: 16 MG/DL
CALCIUM SERPL-MCNC: 9.7 MG/DL
CHLORIDE SERPL-SCNC: 99 MMOL/L
CO2 SERPL-SCNC: 26 MMOL/L
CREAT SERPL-MCNC: 1.01 MG/DL
CULTURE RESULTS: SIGNIFICANT CHANGE UP
EGFR: 62 ML/MIN/1.73M2
EOSINOPHIL # BLD AUTO: 0.15 K/UL
EOSINOPHIL NFR BLD AUTO: 2.9 %
ESTIMATED AVERAGE GLUCOSE: 131 MG/DL
GLUCOSE SERPL-MCNC: 187 MG/DL
HBA1C MFR BLD HPLC: 6.2 %
HCT VFR BLD CALC: 33.7 %
HGB BLD-MCNC: 10.9 G/DL
IMM GRANULOCYTES NFR BLD AUTO: 0.2 %
LYMPHOCYTES # BLD AUTO: 1.6 K/UL
LYMPHOCYTES NFR BLD AUTO: 30.8 %
MAN DIFF?: NORMAL
MCHC RBC-ENTMCNC: 29.6 PG
MCHC RBC-ENTMCNC: 32.3 GM/DL
MCV RBC AUTO: 91.6 FL
MONOCYTES # BLD AUTO: 0.31 K/UL
MONOCYTES NFR BLD AUTO: 6 %
MRSA PCR RESULT.: SIGNIFICANT CHANGE UP
NEUTROPHILS # BLD AUTO: 3.1 K/UL
NEUTROPHILS NFR BLD AUTO: 59.5 %
PLATELET # BLD AUTO: 272 K/UL
POTASSIUM SERPL-SCNC: 3.9 MMOL/L
PROT SERPL-MCNC: 7.6 G/DL
RBC # BLD: 3.68 M/UL
RBC # FLD: 17.9 %
S AUREUS DNA NOSE QL NAA+PROBE: SIGNIFICANT CHANGE UP
SODIUM SERPL-SCNC: 138 MMOL/L
SPECIMEN SOURCE: SIGNIFICANT CHANGE UP
WBC # FLD AUTO: 5.2 K/UL

## 2023-04-20 ENCOUNTER — APPOINTMENT (OUTPATIENT)
Dept: PULMONOLOGY | Facility: CLINIC | Age: 63
End: 2023-04-20
Payer: MEDICARE

## 2023-04-20 VITALS
OXYGEN SATURATION: 99 % | DIASTOLIC BLOOD PRESSURE: 68 MMHG | HEART RATE: 61 BPM | BODY MASS INDEX: 40.62 KG/M2 | SYSTOLIC BLOOD PRESSURE: 144 MMHG | TEMPERATURE: 97.3 F | WEIGHT: 208 LBS

## 2023-04-20 DIAGNOSIS — Z01.811 ENCOUNTER FOR PREPROCEDURAL RESPIRATORY EXAMINATION: ICD-10-CM

## 2023-04-20 PROCEDURE — 99214 OFFICE O/P EST MOD 30 MIN: CPT

## 2023-04-21 PROBLEM — Z01.811 PREOP PULMONARY/RESPIRATORY EXAM: Status: ACTIVE | Noted: 2023-04-21

## 2023-04-21 NOTE — HISTORY OF PRESENT ILLNESS
[Former] : former [< 20 pack-years] : < 20 pack-years [TextBox_4] : 63 yo female presents for evaluation of SOB for few weeks, subsequently seen in the ER at [facility]. CTPA was negative. She has a hx of "asthma" since 2003, without hospitalization with PRN albuterol MDI use, last 17 years ago. She has a hx of MADHURI on CPAP for eight years. She is compliant with treatment and denies sleep related complaints. [TextBox_29] : Denies snoring, daytime somnolence, apneic episodes, AM headaches
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
## 2023-04-21 NOTE — PROCEDURE
[FreeTextEntry1] : PFT results:Mild restriction with decreased diffusion. Bronchodilator response noted

## 2023-04-21 NOTE — PHYSICAL EXAM
[No Acute Distress] : no acute distress [Normal Oropharynx] : normal oropharynx [Normal Appearance] : normal appearance [No Neck Mass] : no neck mass [Normal Rate/Rhythm] : normal rate/rhythm [Murmur ___ / 6] : murmur [unfilled] / 6 [Normal S1, S2] : normal s1, s2 [Clear to Auscultation Bilaterally] : clear to auscultation bilaterally [No Resp Distress] : no resp distress [No Abnormalities] : no abnormalities [Benign] : benign [No Clubbing] : no clubbing [No Cyanosis] : no cyanosis [No Edema] : no edema [FROM] : FROM [Normal Color/ Pigmentation] : normal color/ pigmentation [No Focal Deficits] : no focal deficits [Oriented x3] : oriented x3 [Normal Affect] : normal affect [TextBox_2] : Increased BMI [TextBox_99] : walks with cane

## 2023-04-21 NOTE — ASU PATIENT PROFILE, ADULT - FALL HARM RISK - UNIVERSAL INTERVENTIONS
Bed in lowest position, wheels locked, appropriate side rails in place/Call bell, personal items and telephone in reach/Instruct patient to call for assistance before getting out of bed or chair/Non-slip footwear when patient is out of bed/[place] to call system/Physically safe environment - no spills, clutter or unnecessary equipment/Purposeful Proactive Rounding/Room/bathroom lighting operational, light cord in reach

## 2023-04-21 NOTE — REVIEW OF SYSTEMS
[Dyspnea] : no dyspnea [Arthralgias] : arthralgias [Chronic Pain] : chronic pain [Negative] : Endocrine

## 2023-04-21 NOTE — REVIEW OF SYSTEMS
[Dyspnea] : dyspnea [Arthralgias] : arthralgias [Chronic Pain] : chronic pain [Negative] : Endocrine

## 2023-04-21 NOTE — CONSULT LETTER
[Dear  ___] : Dear  [unfilled], [Courtesy Letter:] : I had the pleasure of seeing your patient, [unfilled], in my office today. [Please see my note below.] : Please see my note below. [Sincerely,] : Sincerely, [DrRoyer  ___] : Dr. MOORE

## 2023-04-21 NOTE — ASU PATIENT PROFILE, ADULT - NS PRO TALK SOMEONE YN
no
[facility] [place] Screening Program/Guide to Postpartum Care/Vaccinations/Shaken Baby Prevention Handout

## 2023-04-21 NOTE — DISCUSSION/SUMMARY
[FreeTextEntry1] : 61 yo female with mild OAD related complaints. I reviewed the PFT results with the patient. She is to use symbicort BID with PRN albuterol MDI. The mild restriction is likely weight related. Diet and weight loss stressed.Continued compliance with CPAP use was also stressed. She is to follow up with her PMD as before.

## 2023-04-21 NOTE — PHYSICAL EXAM
[No Acute Distress] : no acute distress [Normal Oropharynx] : normal oropharynx [Normal Appearance] : normal appearance [No Neck Mass] : no neck mass [Normal Rate/Rhythm] : normal rate/rhythm [Murmur ___ / 6] : murmur [unfilled] / 6 [Normal S1, S2] : normal s1, s2 [No Resp Distress] : no resp distress [Clear to Auscultation Bilaterally] : clear to auscultation bilaterally [No Abnormalities] : no abnormalities [Benign] : benign [No Clubbing] : no clubbing [No Cyanosis] : no cyanosis [FROM] : FROM [Normal Color/ Pigmentation] : normal color/ pigmentation [No Focal Deficits] : no focal deficits [Oriented x3] : oriented x3 [Normal Affect] : normal affect [TextBox_2] : Increased BMI [TextBox_99] : walks with cane [TextBox_105] : R upper extremity edema

## 2023-04-21 NOTE — HISTORY OF PRESENT ILLNESS
[Former] : former [< 20 pack-years] : < 20 pack-years [TextBox_4] : 63 yo female with hx of MADHURI on CPAP and mild OAD with restriction on prior PFT, presents for follow up. The patient feels "better" without use of inhaled meds as prescribed last visit three months ago.She denies cough or SOB. She is scheduled for right TKR next week. She is compliant with CPAP use and denies sleep related complaints. [TextBox_29] : Denies snoring, daytime somnolence, apneic episodes, AM headaches

## 2023-04-21 NOTE — ASU PATIENT PROFILE, ADULT - PAIN CHRONIC, PROFILE
Abraham Garcia  Male, 46 year old, 1973  Gender Pronouns:   Not Documented  Phone:   [phone] (M)  Last Weight:   88.9 kg  PCP:   None  Allergies:   Penicillins  Primary Ins:   DIMITRI  MRN: [id number]  Patient Portal:   Inactive  Next Appt:   With Dermatology  10/29/2019 at 8:00 AM  Last Appt With Me:   Update colon orders with Dr. Choudhary   Received: Today   Message Contents   Kendra Choudhary Gastro Nurse Message Pool Em/Cc/Pw             This patient was scheduled in 2018 for a colonoscopy with Dr. Pascual Choudhary but had to cancel. He is calling now to reschedule. Please update the orders.     Thanks, Mariya         yes

## 2023-04-21 NOTE — DISCUSSION/SUMMARY
[FreeTextEntry1] : 63 yo female with stable preop pulmonary exam prior to R TKR. Continued compliance with CPAP use  stressed,especially during post op period. At present there is no pulmonary contraindication for planned anesthesia and surgery. She is to follow up with her PMD as before,

## 2023-04-23 ENCOUNTER — TRANSCRIPTION ENCOUNTER (OUTPATIENT)
Age: 63
End: 2023-04-23

## 2023-04-24 ENCOUNTER — APPOINTMENT (OUTPATIENT)
Dept: ORTHOPEDIC SURGERY | Facility: HOSPITAL | Age: 63
End: 2023-04-24

## 2023-04-24 ENCOUNTER — TRANSCRIPTION ENCOUNTER (OUTPATIENT)
Age: 63
End: 2023-04-24

## 2023-04-24 ENCOUNTER — INPATIENT (INPATIENT)
Facility: HOSPITAL | Age: 63
LOS: 2 days | Discharge: HOME HEALTH | End: 2023-04-27
Attending: STUDENT IN AN ORGANIZED HEALTH CARE EDUCATION/TRAINING PROGRAM | Admitting: STUDENT IN AN ORGANIZED HEALTH CARE EDUCATION/TRAINING PROGRAM
Payer: MEDICARE

## 2023-04-24 VITALS
HEART RATE: 52 BPM | OXYGEN SATURATION: 100 % | RESPIRATION RATE: 16 BRPM | DIASTOLIC BLOOD PRESSURE: 68 MMHG | HEIGHT: 61 IN | TEMPERATURE: 98 F | WEIGHT: 227.08 LBS | SYSTOLIC BLOOD PRESSURE: 122 MMHG

## 2023-04-24 DIAGNOSIS — M17.11 UNILATERAL PRIMARY OSTEOARTHRITIS, RIGHT KNEE: ICD-10-CM

## 2023-04-24 DIAGNOSIS — Z98.890 OTHER SPECIFIED POSTPROCEDURAL STATES: Chronic | ICD-10-CM

## 2023-04-24 DIAGNOSIS — H26.9 UNSPECIFIED CATARACT: Chronic | ICD-10-CM

## 2023-04-24 DIAGNOSIS — C80.1 MALIGNANT (PRIMARY) NEOPLASM, UNSPECIFIED: Chronic | ICD-10-CM

## 2023-04-24 DIAGNOSIS — Z33.2 ENCOUNTER FOR ELECTIVE TERMINATION OF PREGNANCY: Chronic | ICD-10-CM

## 2023-04-24 LAB
GLUCOSE BLDC GLUCOMTR-MCNC: 113 MG/DL — HIGH (ref 70–99)
GLUCOSE BLDC GLUCOMTR-MCNC: 114 MG/DL — HIGH (ref 70–99)
GLUCOSE BLDC GLUCOMTR-MCNC: 121 MG/DL — HIGH (ref 70–99)
GLUCOSE BLDC GLUCOMTR-MCNC: 145 MG/DL — HIGH (ref 70–99)

## 2023-04-24 PROCEDURE — 73560 X-RAY EXAM OF KNEE 1 OR 2: CPT | Mod: 26,RT

## 2023-04-24 PROCEDURE — 27447 TOTAL KNEE ARTHROPLASTY: CPT | Mod: RT

## 2023-04-24 DEVICE — PIN SPD NON-RIMMED 65MM STRL: Type: IMPLANTABLE DEVICE | Site: RIGHT | Status: FUNCTIONAL

## 2023-04-24 DEVICE — PIN RIMMED SPEED 45MM: Type: IMPLANTABLE DEVICE | Site: RIGHT | Status: FUNCTIONAL

## 2023-04-24 DEVICE — FEM COMP JRNY II BCS BICRUCIATE RT SZ 4: Type: IMPLANTABLE DEVICE | Site: RIGHT | Status: FUNCTIONAL

## 2023-04-24 DEVICE — PATELLA 7.5 32MM: Type: IMPLANTABLE DEVICE | Site: RIGHT | Status: FUNCTIONAL

## 2023-04-24 DEVICE — CEMENT SIMPLEX WITH TOBRAMYCIN: Type: IMPLANTABLE DEVICE | Site: RIGHT | Status: FUNCTIONAL

## 2023-04-24 DEVICE — INSERT ART JRNY II BCS XLPE SZ 3-4 9MM RT: Type: IMPLANTABLE DEVICE | Site: RIGHT | Status: FUNCTIONAL

## 2023-04-24 DEVICE — BASEPLATE TIB JRNY NP SZ 4 RT: Type: IMPLANTABLE DEVICE | Site: RIGHT | Status: FUNCTIONAL

## 2023-04-24 DEVICE — PIN TROCAR GEN 1/8 X 3IN: Type: IMPLANTABLE DEVICE | Site: RIGHT | Status: FUNCTIONAL

## 2023-04-24 RX ORDER — GABAPENTIN 400 MG/1
300 CAPSULE ORAL THREE TIMES A DAY
Refills: 0 | Status: DISCONTINUED | OUTPATIENT
Start: 2023-04-24 | End: 2023-04-27

## 2023-04-24 RX ORDER — PANTOPRAZOLE SODIUM 20 MG/1
40 TABLET, DELAYED RELEASE ORAL
Refills: 0 | Status: DISCONTINUED | OUTPATIENT
Start: 2023-04-24 | End: 2023-04-27

## 2023-04-24 RX ORDER — VALSARTAN 80 MG/1
320 TABLET ORAL DAILY
Refills: 0 | Status: DISCONTINUED | OUTPATIENT
Start: 2023-04-24 | End: 2023-04-27

## 2023-04-24 RX ORDER — INSULIN LISPRO 100/ML
VIAL (ML) SUBCUTANEOUS AT BEDTIME
Refills: 0 | Status: DISCONTINUED | OUTPATIENT
Start: 2023-04-24 | End: 2023-04-27

## 2023-04-24 RX ORDER — DEXTROSE 50 % IN WATER 50 %
15 SYRINGE (ML) INTRAVENOUS ONCE
Refills: 0 | Status: DISCONTINUED | OUTPATIENT
Start: 2023-04-24 | End: 2023-04-27

## 2023-04-24 RX ORDER — DEXAMETHASONE 0.5 MG/5ML
8 ELIXIR ORAL ONCE
Refills: 0 | Status: COMPLETED | OUTPATIENT
Start: 2023-04-25 | End: 2023-04-25

## 2023-04-24 RX ORDER — HYDROMORPHONE HYDROCHLORIDE 2 MG/ML
0.5 INJECTION INTRAMUSCULAR; INTRAVENOUS; SUBCUTANEOUS
Refills: 0 | Status: DISCONTINUED | OUTPATIENT
Start: 2023-04-24 | End: 2023-04-24

## 2023-04-24 RX ORDER — OXYCODONE HYDROCHLORIDE 5 MG/1
5 TABLET ORAL ONCE
Refills: 0 | Status: DISCONTINUED | OUTPATIENT
Start: 2023-04-24 | End: 2023-04-24

## 2023-04-24 RX ORDER — ONDANSETRON 8 MG/1
4 TABLET, FILM COATED ORAL ONCE
Refills: 0 | Status: COMPLETED | OUTPATIENT
Start: 2023-04-24 | End: 2023-04-24

## 2023-04-24 RX ORDER — BUPROPION HYDROCHLORIDE 150 MG/1
100 TABLET, EXTENDED RELEASE ORAL EVERY 12 HOURS
Refills: 0 | Status: DISCONTINUED | OUTPATIENT
Start: 2023-04-24 | End: 2023-04-27

## 2023-04-24 RX ORDER — PANTOPRAZOLE SODIUM 20 MG/1
40 TABLET, DELAYED RELEASE ORAL ONCE
Refills: 0 | Status: COMPLETED | OUTPATIENT
Start: 2023-04-24 | End: 2023-04-24

## 2023-04-24 RX ORDER — SENNA PLUS 8.6 MG/1
2 TABLET ORAL AT BEDTIME
Refills: 0 | Status: DISCONTINUED | OUTPATIENT
Start: 2023-04-24 | End: 2023-04-27

## 2023-04-24 RX ORDER — SODIUM CHLORIDE 9 MG/ML
1000 INJECTION, SOLUTION INTRAVENOUS
Refills: 0 | Status: DISCONTINUED | OUTPATIENT
Start: 2023-04-24 | End: 2023-04-27

## 2023-04-24 RX ORDER — ACETAMINOPHEN 500 MG
1000 TABLET ORAL ONCE
Refills: 0 | Status: COMPLETED | OUTPATIENT
Start: 2023-04-25 | End: 2023-04-25

## 2023-04-24 RX ORDER — GLUCAGON INJECTION, SOLUTION 0.5 MG/.1ML
1 INJECTION, SOLUTION SUBCUTANEOUS ONCE
Refills: 0 | Status: DISCONTINUED | OUTPATIENT
Start: 2023-04-24 | End: 2023-04-27

## 2023-04-24 RX ORDER — CHLORHEXIDINE GLUCONATE 213 G/1000ML
1 SOLUTION TOPICAL ONCE
Refills: 0 | Status: COMPLETED | OUTPATIENT
Start: 2023-04-24 | End: 2023-04-24

## 2023-04-24 RX ORDER — ACETAMINOPHEN 500 MG
1000 TABLET ORAL ONCE
Refills: 0 | Status: COMPLETED | OUTPATIENT
Start: 2023-04-24 | End: 2023-04-24

## 2023-04-24 RX ORDER — INSULIN LISPRO 100/ML
VIAL (ML) SUBCUTANEOUS
Refills: 0 | Status: DISCONTINUED | OUTPATIENT
Start: 2023-04-24 | End: 2023-04-27

## 2023-04-24 RX ORDER — SODIUM CHLORIDE 9 MG/ML
500 INJECTION, SOLUTION INTRAVENOUS ONCE
Refills: 0 | Status: COMPLETED | OUTPATIENT
Start: 2023-04-24 | End: 2023-04-25

## 2023-04-24 RX ORDER — OXYCODONE HYDROCHLORIDE 5 MG/1
10 TABLET ORAL EVERY 4 HOURS
Refills: 0 | Status: DISCONTINUED | OUTPATIENT
Start: 2023-04-24 | End: 2023-04-27

## 2023-04-24 RX ORDER — DEXTROSE 50 % IN WATER 50 %
25 SYRINGE (ML) INTRAVENOUS ONCE
Refills: 0 | Status: DISCONTINUED | OUTPATIENT
Start: 2023-04-24 | End: 2023-04-27

## 2023-04-24 RX ORDER — SODIUM CHLORIDE 9 MG/ML
500 INJECTION, SOLUTION INTRAVENOUS ONCE
Refills: 0 | Status: COMPLETED | OUTPATIENT
Start: 2023-04-24 | End: 2023-04-24

## 2023-04-24 RX ORDER — CEFAZOLIN SODIUM 1 G
2000 VIAL (EA) INJECTION EVERY 8 HOURS
Refills: 0 | Status: COMPLETED | OUTPATIENT
Start: 2023-04-24 | End: 2023-04-25

## 2023-04-24 RX ORDER — SODIUM CHLORIDE 9 MG/ML
1000 INJECTION, SOLUTION INTRAVENOUS
Refills: 0 | Status: DISCONTINUED | OUTPATIENT
Start: 2023-04-24 | End: 2023-04-25

## 2023-04-24 RX ORDER — ALBUTEROL 90 UG/1
2 AEROSOL, METERED ORAL
Refills: 0 | Status: DISCONTINUED | OUTPATIENT
Start: 2023-04-24 | End: 2023-04-27

## 2023-04-24 RX ORDER — OXYCODONE HYDROCHLORIDE 5 MG/1
5 TABLET ORAL EVERY 4 HOURS
Refills: 0 | Status: DISCONTINUED | OUTPATIENT
Start: 2023-04-24 | End: 2023-04-27

## 2023-04-24 RX ORDER — ASPIRIN/CALCIUM CARB/MAGNESIUM 324 MG
325 TABLET ORAL
Refills: 0 | Status: DISCONTINUED | OUTPATIENT
Start: 2023-04-24 | End: 2023-04-27

## 2023-04-24 RX ORDER — ONDANSETRON 8 MG/1
4 TABLET, FILM COATED ORAL EVERY 6 HOURS
Refills: 0 | Status: DISCONTINUED | OUTPATIENT
Start: 2023-04-24 | End: 2023-04-27

## 2023-04-24 RX ORDER — KETOROLAC TROMETHAMINE 30 MG/ML
15 SYRINGE (ML) INJECTION EVERY 6 HOURS
Refills: 0 | Status: DISCONTINUED | OUTPATIENT
Start: 2023-04-24 | End: 2023-04-25

## 2023-04-24 RX ORDER — CELECOXIB 200 MG/1
200 CAPSULE ORAL EVERY 12 HOURS
Refills: 0 | Status: DISCONTINUED | OUTPATIENT
Start: 2023-04-25 | End: 2023-04-27

## 2023-04-24 RX ORDER — ACETAMINOPHEN 500 MG
975 TABLET ORAL EVERY 8 HOURS
Refills: 0 | Status: DISCONTINUED | OUTPATIENT
Start: 2023-04-25 | End: 2023-04-27

## 2023-04-24 RX ORDER — TRAMADOL HYDROCHLORIDE 50 MG/1
50 TABLET ORAL ONCE
Refills: 0 | Status: DISCONTINUED | OUTPATIENT
Start: 2023-04-24 | End: 2023-04-24

## 2023-04-24 RX ORDER — POLYETHYLENE GLYCOL 3350 17 G/17G
17 POWDER, FOR SOLUTION ORAL AT BEDTIME
Refills: 0 | Status: DISCONTINUED | OUTPATIENT
Start: 2023-04-24 | End: 2023-04-27

## 2023-04-24 RX ORDER — SODIUM CHLORIDE 0.65 %
2 AEROSOL, SPRAY (ML) NASAL
Refills: 0 | Status: DISCONTINUED | OUTPATIENT
Start: 2023-04-24 | End: 2023-04-27

## 2023-04-24 RX ORDER — DEXTROSE 50 % IN WATER 50 %
12.5 SYRINGE (ML) INTRAVENOUS ONCE
Refills: 0 | Status: DISCONTINUED | OUTPATIENT
Start: 2023-04-24 | End: 2023-04-27

## 2023-04-24 RX ORDER — TRAMADOL HYDROCHLORIDE 50 MG/1
50 TABLET ORAL EVERY 6 HOURS
Refills: 0 | Status: DISCONTINUED | OUTPATIENT
Start: 2023-04-24 | End: 2023-04-27

## 2023-04-24 RX ORDER — AMLODIPINE BESYLATE 2.5 MG/1
5 TABLET ORAL
Refills: 0 | Status: DISCONTINUED | OUTPATIENT
Start: 2023-04-24 | End: 2023-04-27

## 2023-04-24 RX ADMIN — ONDANSETRON 4 MILLIGRAM(S): 8 TABLET, FILM COATED ORAL at 12:30

## 2023-04-24 RX ADMIN — OXYCODONE HYDROCHLORIDE 5 MILLIGRAM(S): 5 TABLET ORAL at 12:30

## 2023-04-24 RX ADMIN — Medication 15 MILLIGRAM(S): at 16:38

## 2023-04-24 RX ADMIN — TRAMADOL HYDROCHLORIDE 50 MILLIGRAM(S): 50 TABLET ORAL at 06:52

## 2023-04-24 RX ADMIN — SODIUM CHLORIDE 100 MILLILITER(S): 9 INJECTION, SOLUTION INTRAVENOUS at 17:08

## 2023-04-24 RX ADMIN — CHLORHEXIDINE GLUCONATE 1 APPLICATION(S): 213 SOLUTION TOPICAL at 06:52

## 2023-04-24 RX ADMIN — SODIUM CHLORIDE 3 MILLILITER(S): 9 INJECTION INTRAMUSCULAR; INTRAVENOUS; SUBCUTANEOUS at 23:01

## 2023-04-24 RX ADMIN — HYDROMORPHONE HYDROCHLORIDE 0.5 MILLIGRAM(S): 2 INJECTION INTRAMUSCULAR; INTRAVENOUS; SUBCUTANEOUS at 12:30

## 2023-04-24 RX ADMIN — Medication 15 MILLIGRAM(S): at 17:02

## 2023-04-24 RX ADMIN — SODIUM CHLORIDE 500 MILLILITER(S): 9 INJECTION, SOLUTION INTRAVENOUS at 12:21

## 2023-04-24 RX ADMIN — Medication 325 MILLIGRAM(S): at 17:07

## 2023-04-24 RX ADMIN — AMLODIPINE BESYLATE 5 MILLIGRAM(S): 2.5 TABLET ORAL at 17:07

## 2023-04-24 RX ADMIN — Medication 100 MILLIGRAM(S): at 16:38

## 2023-04-24 RX ADMIN — Medication 1000 MILLIGRAM(S): at 18:08

## 2023-04-24 RX ADMIN — HYDROMORPHONE HYDROCHLORIDE 0.5 MILLIGRAM(S): 2 INJECTION INTRAMUSCULAR; INTRAVENOUS; SUBCUTANEOUS at 12:45

## 2023-04-24 RX ADMIN — POLYETHYLENE GLYCOL 3350 17 GRAM(S): 17 POWDER, FOR SOLUTION ORAL at 21:38

## 2023-04-24 RX ADMIN — SENNA PLUS 2 TABLET(S): 8.6 TABLET ORAL at 21:37

## 2023-04-24 RX ADMIN — SODIUM CHLORIDE 500 MILLILITER(S): 9 INJECTION, SOLUTION INTRAVENOUS at 15:18

## 2023-04-24 RX ADMIN — Medication 2 SPRAY(S): at 18:22

## 2023-04-24 RX ADMIN — HYDROMORPHONE HYDROCHLORIDE 0.5 MILLIGRAM(S): 2 INJECTION INTRAMUSCULAR; INTRAVENOUS; SUBCUTANEOUS at 12:50

## 2023-04-24 RX ADMIN — OXYCODONE HYDROCHLORIDE 5 MILLIGRAM(S): 5 TABLET ORAL at 13:04

## 2023-04-24 RX ADMIN — HYDROMORPHONE HYDROCHLORIDE 0.5 MILLIGRAM(S): 2 INJECTION INTRAMUSCULAR; INTRAVENOUS; SUBCUTANEOUS at 13:04

## 2023-04-24 RX ADMIN — SODIUM CHLORIDE 30 MILLILITER(S): 9 INJECTION, SOLUTION INTRAVENOUS at 06:52

## 2023-04-24 RX ADMIN — GABAPENTIN 300 MILLIGRAM(S): 400 CAPSULE ORAL at 14:21

## 2023-04-24 RX ADMIN — GABAPENTIN 300 MILLIGRAM(S): 400 CAPSULE ORAL at 21:35

## 2023-04-24 RX ADMIN — Medication 400 MILLIGRAM(S): at 18:04

## 2023-04-24 NOTE — PHYSICAL THERAPY INITIAL EVALUATION ADULT - ACTIVE RANGE OF MOTION EXAMINATION, REHAB EVAL
right hip and knee flexion 0-90 degrees, ankle WFL/bilateral upper extremity Active ROM was WFL (within functional limits)/Left LE Active ROM was WFL (within functional limits)

## 2023-04-24 NOTE — PATIENT PROFILE ADULT - HOME ACCESSIBILITY CONCERNS
Tammy L Schladweiler, NP  P  [facility] Nurse Ms Pool  Please notify negative HIV, Syphilis screen, chlamydia/gonorrhea.   Tammy L Schladweiler, NP    Patient was called with above results.      none

## 2023-04-24 NOTE — DISCHARGE NOTE PROVIDER - NSDCCPCAREPLAN_GEN_ALL_CORE_FT
PRINCIPAL DISCHARGE DIAGNOSIS  Diagnosis: Unilateral primary osteoarthritis, right knee  Assessment and Plan of Treatment: Diet: Continue regular diet upon discharge.   Activity: No heavy lifting > 25 lbs for 4 weeks. Avoid straining or excessive activity x 6 weeks.   -Continue to use your walker when ambulating until your postoperative follow up appointment.   Dressings: Keep dressing clean, dry, and intact. Your doctor will remove your bandage at your post-operative follow up appointment.   Other Care:   -You may shower when you get home but DO NOT soak dressing and/or incision. The water may run over your dressing/incision but DO NOT let the water directly hit your dressing/incision (take a shower with your wound away from the direct stream of water). NO hot tubes, NO bath rubs, NO swimming pools.   -Elevate your operative leg 2 feet above heart level for 2 hours in the morning, 2 hours in the afternoon, and 2 hours in the evening.   -Apply ice for 20min every time you elevate.   -Sit for 90 min/day: 45mins x2 or 30min x3  -DO NOT sit for more than the 90min/day. Walk or lay down when not elevating your leg.   -DO NOT place the elevation pillow behind your knees. Only place it under your calf and heel.   -DO NOT bend more than 45 degrees at the waist

## 2023-04-24 NOTE — ASU PREOP CHECKLIST - SELECT TESTS ORDERED
CBC/CMP/Type and Screen/EKG/POCT Blood Glucose  at 0703/CBC/CMP/Type and Screen/EKG/POCT Blood Glucose

## 2023-04-24 NOTE — DISCHARGE NOTE PROVIDER - CARE PROVIDER_API CALL
Wesly Givens)  Orthopedics  [address] [place] [address]  Phone: [phone]  Fax: [phone]  Follow Up Time: 2 weeks

## 2023-04-24 NOTE — PHYSICAL THERAPY INITIAL EVALUATION ADULT - PHYSICAL ASSIST/NONPHYSICAL ASSIST: GAIT, REHAB EVAL
Is recommended you hold the tizanidine as that may cause some of the tremulousness.  
verbal cues/1 person assist

## 2023-04-24 NOTE — OCCUPATIONAL THERAPY INITIAL EVALUATION ADULT - PERTINENT HX OF CURRENT PROBLEM, REHAB EVAL
Pt is a 62 year old female with hx of HTN, HLD, CHF (last exacerbation 1/2023) asthma/COPD, ETOH abuse, diabetes neuropathy, MADHURI, tremor, bipolar disorder, right breast cancer (right mastectomy) complicated by right arm lymphedema, & diagnosis of unilateral primary osteoarthritis right knee. Pt with increasing pain and dysfunction of right knee despite conservative management. Pt is now s/p right total knee arthroplasty on 4/24/23.

## 2023-04-24 NOTE — PATIENT PROFILE ADULT - FALL HARM RISK - UNIVERSAL INTERVENTIONS
Bed in lowest position, wheels locked, appropriate side rails in place/Call bell, personal items and telephone in reach/Instruct patient to call for assistance before getting out of bed or chair/Non-slip footwear when patient is out of bed/[place] to call system/Physically safe environment - no spills, clutter or unnecessary equipment/Purposeful Proactive Rounding/Room/bathroom lighting operational, light cord in reach

## 2023-04-24 NOTE — OCCUPATIONAL THERAPY INITIAL EVALUATION ADULT - RANGE OF MOTION EXAMINATION, LOWER EXTREMITY
except Right Knee Flexion/Extension limited secondary to surgery/bilateral LE Active ROM was WFL  (within functional limits)

## 2023-04-24 NOTE — OCCUPATIONAL THERAPY INITIAL EVALUATION ADULT - MD ORDER
Occupational Therapy (OT) to evaluate and treat. Per CLINTON Bermudez, pt is okay to participate in OT evaluation and perform activity as tolerated.

## 2023-04-24 NOTE — DISCHARGE NOTE PROVIDER - HOSPITAL COURSE
This is a 63yo Female with PMH of HTN, HLD, CHF (last exacerbation Jan 2023), Asthma, COPD, ETOH abuse, DM, neuropathy, MADHURI, tremors, bipolar disorder, breast CA s/p right mastectomy c/b RUE lymphedema, and OA who [facility]. Patient s/p right TKA with Dr. Givens on 4/24/2023. Patient tolerated the procedure well without any intraoperative complications. Patient tolerated physical therapy well, and the pain was controlled. Patient is weight bearing as tolerated with cane/walker as needed. Seen by medical attending for continuity of care and management and cleared for safe discharge. Keep dressing/incision clean, dry and intact. Any suture/staples to be removed on post-op day #14 at your office visit. Patient is on 325mg of Aspirin for DVT prophylaxis, please take for 6 weeks unless otherwise instructed by your surgeon. Please follow up with Dr. Givens in 2 weeks. Please follow up with your PMD for continuity of care and management as medications may have changed.   This is a 61yo Female with PMH of HTN, HLD, CHF (last exacerbation Jan 2023), Asthma, COPD, ETOH abuse, DM, neuropathy, MADHURI, tremors, bipolar disorder, breast CA s/p right mastectomy c/b RUE lymphedema, and OA who presents to [facility] for orthopedic surgery. Patient s/p right TKA with Dr. Givens on 4/24/2023. Patient tolerated the procedure well without any intraoperative complications. Patient tolerated physical therapy well, and the pain was controlled. Patient is weight bearing as tolerated with cane/walker as needed. Seen by medical attending for continuity of care and management and cleared for safe discharge. Keep dressing/incision clean, dry and intact. Any suture/staples to be removed on post-op day #14 at your office visit. Patient is on 325mg of Aspirin for DVT prophylaxis, please take for 6 weeks unless otherwise instructed by your surgeon. Please follow up with Dr. Givens in 2 weeks. Please follow up with your PMD for continuity of care and management as medications may have changed.  istop:  Reference #: [id number]

## 2023-04-24 NOTE — DISCHARGE NOTE PROVIDER - NSDCCPTREATMENT_GEN_ALL_CORE_FT
PRINCIPAL PROCEDURE  Procedure: Right total knee arthroplasty  Findings and Treatment: Pain control:    Standing:         -Acetaminophen 500mg - 2 tabs every 8 hours  As needed:        -Tramadol 50mg - 1 tab every 6 hours - Take only if needed for MODERATE pain       -oxycodone 5mg - 1 tab every 4-6 hours - Take only if needed for SEVERE or BREAKTHROUGH pain  Oxycodone and Tramadol have been sent to your pharmacy. Please do not drive, operate machinery, or make important decisions while taking these medications.   Other Medications: (Standing)  -Aspirin (Enteric Coated) 325mg every 12 hours - to prevent blood clots (for 6 weeks post operatively.)  -Protonix 40mg - 1 tab every 24 hours - to prevent stomach irritation/ulcers  -Senna 8.6mg - 2 pills every 24 hours - stool softener  -Miralax 17g - daily - constipation   Follow up: Please follow up at your prescheduled post-operative follow up appointment with Dr. Givens for 2 weeks after hospital discharge. Please call with any questions or concerns including fevers, worsening pain, pus from the wounds, redness of the skin and difficulty breathing or heaviness in the chest at [phone].     PRINCIPAL PROCEDURE  Procedure: Right total knee arthroplasty  Findings and Treatment: This is a 63yo Female with PMH of HTN, HLD, CHF (last exacerbation Jan 2023), Asthma, COPD, ETOH abuse, DM, neuropathy, MADHURI, tremors, bipolar disorder, breast CA s/p right mastectomy c/b RUE lymphedema, and OA who presents to [facility] for orthopedic surgery. Patient s/p right TKA with Dr. Givens on 4/24/2023. Patient tolerated the procedure well without any intraoperative complications. Patient tolerated physical therapy well, and the pain was controlled. Patient is weight bearing as tolerated with cane/walker as needed. Seen by medical attending for continuity of care and management and cleared for safe discharge. Keep dressing/incision clean, dry and intact. Any suture/staples to be removed on post-op day #14 at your office visit. Patient is on 325mg of Aspirin for DVT prophylaxis, please take for 6 weeks unless otherwise instructed by your surgeon. Please follow up with Dr. Givens in 2 weeks. Please follow up with your PMD for continuity of care and management as medications may have changed.  istop:  Reference #: [id number]

## 2023-04-24 NOTE — ASSESSMENT
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  Patient would like to reinitiate low-dose disease modifying agent of hydroxychloroquine to help with arthralgias in the setting of DORCAS positivity after upcoming knee replacement procedure.  Patient understands the importance of regular ophthalmology follow-up in the setting of retinal toxicity risk in the setting of hydroxychloroquine use.   \par Patient's ophthalmologist, Dr. Stallworth follows patient regularly and is in agreement with HCQ use.\par \par In the setting of repeat mixing study positivity, continues on baby aspirin.  Heme-onc colleagues to continue to monitor ; additional anticoagulation is felt not needed at this time as patient has not had thrombotic event in the past.\par \par Patient will benefit from continued physical therapy to help with joint mobility and muscle strengthening, notably after surgical replacement. \par \par Patient understands the importance of weight loss reduction in the setting of HTN, DM and its association of cardiovascular risk.  Whole food plant-based diet encouraged.  The importance of dietary and lifestyle modifications was reiterated for the treatment of Fibromyalgia along with discussions on relaxation, stress-reducing techniques and importance of good sleep hygiene.  \par \par She is in agreement with the above plan and will return in three months' time.\par \par

## 2023-04-24 NOTE — PHYSICAL THERAPY INITIAL EVALUATION ADULT - PERTINENT HX OF CURRENT PROBLEM, REHAB EVAL
Patient is 62 year old female PMH HTN HLD CHF (last exacerbation 1/2023) asthma/COPD ETOH abuse diabetes neuropathy MADHURI tremor bipolar disorder right breast cancer (right mastectomy) ccomplicated by right arm lymphedema presents to presurgical testing with diagnosis of unilateral primary osteoarthritis right knee.

## 2023-04-24 NOTE — OCCUPATIONAL THERAPY INITIAL EVALUATION ADULT - NSOTDISCHREC_GEN_A_CORE
Anticipate Home with no skilled OT services. Pt. may benefit from PT services. Pt reports her  is available to assist with ADL's as needed.

## 2023-04-24 NOTE — HISTORY OF PRESENT ILLNESS
[FreeTextEntry1] : Patient reports upcoming RT TKR ;  knee films reflect advanced bilateral knee tricompartmental osteoarthritis with medial tibiofemoral compartment predominance:  she explains having used methadone to help deal with the knee pain and however would like to taper off.  She explains prior viscosupplementation lending to worsening pain and finds it is related to concurrent vaccinations. She reports instability symptoms especially when walking for prolonged periods or climbing stairs. \par \par She had explained  diffuse arthralgias since COVID-19 infection over the summer. Patient with with blood testing in August 2022 reflecting DORCAS positivity 1: 320 in a speckled pattern with positive SS-A in the setting of elevated inflammatory markers. Patient initiated hydroxychloroquine therapy x 1 month before discontinuing as she felt it was offering minimal relief. Antiphospholipid antibodies were also noted the summer 2022 after COVID infection and repeated before the end of the year with repeat silica and mixing studies positive.  She denies history of thrombotic events.  Patient under the care of heme-onc colleagues and continues on baby aspirin daily.  Patient with history of HER2 positive breast CA status post right mastectomy with chemotx and radiation.\par \par Patient with longstanding history of fibromyalgia patient on multiple neuropathic pain therapies along with mood stabilizers. She denies accompanied swelling of the joints.   She explains long camarena of alcoholism and has been free of alcohol for the last 18 years as well as free of drug use from cocaine. She explains she has had shakiness and her tremors that are currently under evaluation and has initiated propranolol.\par \par \par She otherwise denies visual disturbances, oral ulcers, dyspnea, chest pain, motor disturbances, Raynaud's, rash or systemic symptoms. \par

## 2023-04-24 NOTE — DISCHARGE NOTE PROVIDER - NSDCFUSCHEDAPPT_GEN_ALL_CORE_FT
Karol Rios  [facility]  INTMED [address]  Scheduled Appointment: 07/17/2023    Paulino Andersen  [facility]  PULMMED [id number]  Scheduled Appointment: 07/20/2023     Wesly Givens  [facility]  ORTHOSURG [address] [facility]  Scheduled Appointment: 05/09/2023    Karol Rios  [facility]  INTMED [address]  Scheduled Appointment: 07/17/2023    Paulino Andersen  [facility]  PULMMED [id number]  Scheduled Appointment: 07/20/2023     Wesly Givens  [facility]  ORTHOSURG [address] [facility]  Scheduled Appointment: 05/09/2023    Karol Rios  [facility]  INTMED [address]  Scheduled Appointment: 07/17/2023    Paulino Andersen  [facility]  PULMMED [id number]  Scheduled Appointment: 07/20/2023    Sharon Garza  [facility]placed by [facility]  RHEUM [address] [facility]  Scheduled Appointment: 07/25/2023

## 2023-04-24 NOTE — OCCUPATIONAL THERAPY INITIAL EVALUATION ADULT - LIVES WITH, PROFILE
Pt. reports she lives with her  in a house with 1 step to enter using back entrance. Once inside, pt. reports she has about 5 steps to negotiate to basement level where main bedroom and bathroom are located. Per pt., she has a shower stall in her bathroom.

## 2023-04-24 NOTE — OCCUPATIONAL THERAPY INITIAL EVALUATION ADULT - FINE MOTOR COORDINATION, RIGHT HAND THUMB/FINGER OPPOSITION SKILLS, OT EVAL
"Diabetes Education Progress Note  Continuous Glucose Monitoring System (CGMS) Results    SUBJECTIVE: Bailey is a 67 y.o. female who has type 2 diabetes and is here for Continuous Glucose Monitoring System (CGMS) download.     OBJECTIVE:  Current Diabetes Medications:  Novolog and Basaglar insulin       CGM download glucose results:    Report shows  140 mg/dl average sensor glucose over the past week.  Standard deviation is +- 42 mg/dl.  Patient calibrates the CGM an average of 2.1 times daily.      Glucose 69% in target (70 - 180), 28% above target (181 - 400), 2% below target (56 - 69) and 1% hypo (39 - 55).  Most significant patterns of highs found after breakfast and late evening.    Average sensor glucose results:      PreLunch 110  PreSupper 129  3-hr PP/Bedtime  mg/dl.     Discussed timing of insulin taking her Novolog ten minutes before the meal to help \"give the Novolog a head start,\" to help tighten after meal hyperglycemia.      Time spent with patient was 60 minutes.     Hazel Del Rio RN, BSN, CDE  4/19/2018 12:49 PM     "
normal performance

## 2023-04-24 NOTE — ASU PREOP CHECKLIST - COMMENTS
took amlodipine, valsartan, gabapentin, valbutrin, hydrochlorthiazide, and omeprazole this am with sips of water. took amlodipine, valsartan, gabapentin, wellbutrin, hydrochlorthiazide, and omeprazole this am with sip of water.

## 2023-04-24 NOTE — OCCUPATIONAL THERAPY INITIAL EVALUATION ADULT - GENERAL OBSERVATIONS, REHAB EVAL
Pt. received semisupine in bed. No acute distress. Patient agreed to evaluation from Occupational Therapist. BP assessed: 115/65 mmHg. +Clean dry intact dressing to Right Knee, +Heplock, +Bilateral LE Venodynes.  bedside.

## 2023-04-24 NOTE — REVIEW OF SYSTEMS
[Fever] : no fever [Chills] : no chills [Sore Throat] : no sore throat [Hoarseness] : no hoarseness [Chest Pain] : no chest pain [Palpitations] : no palpitations [Cough] : no cough [SOB on Exertion] : no shortness of breath during exertion [Joint Swelling] : no joint swelling [Joint Stiffness] : joint stiffness [Difficulty Walking] : difficulty walking [Feelings Of Weakness] : no feelings of weakness [Easy Bleeding] : no tendency for easy bleeding [Easy Bruising] : no tendency for easy bruising [de-identified] : Low mood

## 2023-04-24 NOTE — OCCUPATIONAL THERAPY INITIAL EVALUATION ADULT - RANGE OF MOTION EXAMINATION, UPPER EXTREMITY
except Right Shoulder Flexion Active ROM 0-90 degrees/bilateral UE Active ROM was WFL  (within functional limits)

## 2023-04-24 NOTE — DISCHARGE NOTE PROVIDER - NSDCMRMEDTOKEN_GEN_ALL_CORE_FT
Albuterol (Eqv-ProAir HFA) 90 mcg/inh inhalation aerosol: 2 inhaled 2 times a day as needed for  bronchospasm  amLODIPine 5 mg oral tablet: 1 tab(s) orally 2 times a day  azelastine 137 mcg/inh (0.1%) nasal spray: 2 intranasally 2 times a day  buPROPion 100 mg/12 hours (SR) oral tablet, extended release: 1 tab(s) orally once a day  gabapentin 300 mg oral capsule: 1 orally 3 times a day  hydroCHLOROthiazide 25 mg oral tablet: 1 tab(s) orally once a day  meloxicam 7.5 mg oral tablet: 1 orally once a day LD ON 04/18/23  metFORMIN 500 mg oral tablet: 1 tab(s) orally 2 times a day  omeprazole 40 mg oral delayed release capsule: 1 orally 2 times a day  propranolol 10 mg oral tablet: 2 tab(s) orally 2 times a day  Rolling Walker: GEOVANNY: 99 months  traMADol 50 mg oral tablet: 1 orally once a day (at bedtime)  valsartan 320 mg oral tablet: 1 tab(s) orally once a day   acetaminophen 325 mg oral tablet: 2 tab(s) orally every 8 hours MDD: 6  Albuterol (Eqv-ProAir HFA) 90 mcg/inh inhalation aerosol: 2 inhaled 2 times a day as needed for  bronchospasm  amLODIPine 5 mg oral tablet: 1 tab(s) orally 2 times a day  aspirin 325 mg oral delayed release tablet: 1 tab(s) orally 2 times a day  azelastine 137 mcg/inh (0.1%) nasal spray: 2 intranasally 2 times a day  buPROPion 100 mg/12 hours (SR) oral tablet, extended release: 1 tab(s) orally once a day  gabapentin 300 mg oral capsule: 1 orally 3 times a day  hydroCHLOROthiazide 25 mg oral tablet: 1 tab(s) orally once a day  metFORMIN 500 mg oral tablet: 1 tab(s) orally 2 times a day  oxyCODONE 5 mg oral tablet: 1 tab(s) orally every 4 hours as needed for Moderate Pain (4 - 6) MDD: 6  pantoprazole 40 mg oral delayed release tablet: 1 tab(s) orally once a day (before a meal)  polyethylene glycol 3350 oral powder for reconstitution: 17 gram(s) orally once a day (at bedtime)  propranolol 10 mg oral tablet: 2 tab(s) orally 2 times a day  senna leaf extract oral tablet: 2 tab(s) orally once a day (at bedtime)  valsartan 320 mg oral tablet: 1 tab(s) orally once a day

## 2023-04-25 ENCOUNTER — TRANSCRIPTION ENCOUNTER (OUTPATIENT)
Age: 63
End: 2023-04-25

## 2023-04-25 LAB
A1C WITH ESTIMATED AVERAGE GLUCOSE RESULT: 6.1 % — HIGH (ref 4–5.6)
ANION GAP SERPL CALC-SCNC: 13 MMOL/L — SIGNIFICANT CHANGE UP (ref 7–14)
BUN SERPL-MCNC: 16 MG/DL — SIGNIFICANT CHANGE UP (ref 7–23)
CALCIUM SERPL-MCNC: 9.2 MG/DL — SIGNIFICANT CHANGE UP (ref 8.4–10.5)
CHLORIDE SERPL-SCNC: 96 MMOL/L — LOW (ref 98–107)
CO2 SERPL-SCNC: 23 MMOL/L — SIGNIFICANT CHANGE UP (ref 22–31)
CREAT SERPL-MCNC: 1.06 MG/DL — SIGNIFICANT CHANGE UP (ref 0.5–1.3)
EGFR: 59 ML/MIN/1.73M2 — LOW
ESTIMATED AVERAGE GLUCOSE: 128 — SIGNIFICANT CHANGE UP
GLUCOSE BLDC GLUCOMTR-MCNC: 144 MG/DL — HIGH (ref 70–99)
GLUCOSE BLDC GLUCOMTR-MCNC: 163 MG/DL — HIGH (ref 70–99)
GLUCOSE BLDC GLUCOMTR-MCNC: 169 MG/DL — HIGH (ref 70–99)
GLUCOSE BLDC GLUCOMTR-MCNC: 191 MG/DL — HIGH (ref 70–99)
GLUCOSE SERPL-MCNC: 157 MG/DL — HIGH (ref 70–99)
HCT VFR BLD CALC: 26 % — LOW (ref 34.5–45)
HGB BLD-MCNC: 8.6 G/DL — LOW (ref 11.5–15.5)
MCHC RBC-ENTMCNC: 28.6 PG — SIGNIFICANT CHANGE UP (ref 27–34)
MCHC RBC-ENTMCNC: 33.1 GM/DL — SIGNIFICANT CHANGE UP (ref 32–36)
MCV RBC AUTO: 86.4 FL — SIGNIFICANT CHANGE UP (ref 80–100)
NRBC # BLD: 0 /100 WBCS — SIGNIFICANT CHANGE UP (ref 0–0)
NRBC # FLD: 0 K/UL — SIGNIFICANT CHANGE UP (ref 0–0)
PLATELET # BLD AUTO: 243 K/UL — SIGNIFICANT CHANGE UP (ref 150–400)
POTASSIUM SERPL-MCNC: 3.9 MMOL/L — SIGNIFICANT CHANGE UP (ref 3.5–5.3)
POTASSIUM SERPL-SCNC: 3.9 MMOL/L — SIGNIFICANT CHANGE UP (ref 3.5–5.3)
RBC # BLD: 3.01 M/UL — LOW (ref 3.8–5.2)
RBC # FLD: 17.2 % — HIGH (ref 10.3–14.5)
SODIUM SERPL-SCNC: 132 MMOL/L — LOW (ref 135–145)
WBC # BLD: 7.93 K/UL — SIGNIFICANT CHANGE UP (ref 3.8–10.5)
WBC # FLD AUTO: 7.93 K/UL — SIGNIFICANT CHANGE UP (ref 3.8–10.5)

## 2023-04-25 RX ADMIN — Medication 400 MILLIGRAM(S): at 11:06

## 2023-04-25 RX ADMIN — Medication 975 MILLIGRAM(S): at 18:30

## 2023-04-25 RX ADMIN — Medication 100 MILLIGRAM(S): at 00:17

## 2023-04-25 RX ADMIN — POLYETHYLENE GLYCOL 3350 17 GRAM(S): 17 POWDER, FOR SOLUTION ORAL at 21:39

## 2023-04-25 RX ADMIN — OXYCODONE HYDROCHLORIDE 10 MILLIGRAM(S): 5 TABLET ORAL at 21:46

## 2023-04-25 RX ADMIN — Medication 975 MILLIGRAM(S): at 18:45

## 2023-04-25 RX ADMIN — Medication 101.6 MILLIGRAM(S): at 07:23

## 2023-04-25 RX ADMIN — PANTOPRAZOLE SODIUM 40 MILLIGRAM(S): 20 TABLET, DELAYED RELEASE ORAL at 07:31

## 2023-04-25 RX ADMIN — SODIUM CHLORIDE 3 MILLILITER(S): 9 INJECTION INTRAMUSCULAR; INTRAVENOUS; SUBCUTANEOUS at 13:10

## 2023-04-25 RX ADMIN — Medication 1: at 07:40

## 2023-04-25 RX ADMIN — SENNA PLUS 2 TABLET(S): 8.6 TABLET ORAL at 21:39

## 2023-04-25 RX ADMIN — Medication 1000 MILLIGRAM(S): at 11:32

## 2023-04-25 RX ADMIN — OXYCODONE HYDROCHLORIDE 10 MILLIGRAM(S): 5 TABLET ORAL at 17:00

## 2023-04-25 RX ADMIN — GABAPENTIN 300 MILLIGRAM(S): 400 CAPSULE ORAL at 13:06

## 2023-04-25 RX ADMIN — Medication 15 MILLIGRAM(S): at 13:10

## 2023-04-25 RX ADMIN — GABAPENTIN 300 MILLIGRAM(S): 400 CAPSULE ORAL at 07:31

## 2023-04-25 RX ADMIN — ONDANSETRON 4 MILLIGRAM(S): 8 TABLET, FILM COATED ORAL at 04:01

## 2023-04-25 RX ADMIN — Medication 325 MILLIGRAM(S): at 17:49

## 2023-04-25 RX ADMIN — Medication 400 MILLIGRAM(S): at 03:03

## 2023-04-25 RX ADMIN — Medication 1: at 11:12

## 2023-04-25 RX ADMIN — OXYCODONE HYDROCHLORIDE 10 MILLIGRAM(S): 5 TABLET ORAL at 08:54

## 2023-04-25 RX ADMIN — Medication 15 MILLIGRAM(S): at 00:16

## 2023-04-25 RX ADMIN — OXYCODONE HYDROCHLORIDE 10 MILLIGRAM(S): 5 TABLET ORAL at 22:46

## 2023-04-25 RX ADMIN — Medication 15 MILLIGRAM(S): at 07:31

## 2023-04-25 RX ADMIN — BUPROPION HYDROCHLORIDE 100 MILLIGRAM(S): 150 TABLET, EXTENDED RELEASE ORAL at 07:22

## 2023-04-25 RX ADMIN — SODIUM CHLORIDE 3 MILLILITER(S): 9 INJECTION INTRAMUSCULAR; INTRAVENOUS; SUBCUTANEOUS at 07:48

## 2023-04-25 RX ADMIN — OXYCODONE HYDROCHLORIDE 10 MILLIGRAM(S): 5 TABLET ORAL at 16:11

## 2023-04-25 RX ADMIN — Medication 15 MILLIGRAM(S): at 13:06

## 2023-04-25 RX ADMIN — CELECOXIB 200 MILLIGRAM(S): 200 CAPSULE ORAL at 17:49

## 2023-04-25 RX ADMIN — OXYCODONE HYDROCHLORIDE 10 MILLIGRAM(S): 5 TABLET ORAL at 09:45

## 2023-04-25 RX ADMIN — AMLODIPINE BESYLATE 5 MILLIGRAM(S): 2.5 TABLET ORAL at 17:50

## 2023-04-25 RX ADMIN — Medication 1000 MILLIGRAM(S): at 03:19

## 2023-04-25 RX ADMIN — Medication 325 MILLIGRAM(S): at 07:34

## 2023-04-25 RX ADMIN — Medication 15 MILLIGRAM(S): at 00:30

## 2023-04-25 RX ADMIN — SODIUM CHLORIDE 500 MILLILITER(S): 9 INJECTION, SOLUTION INTRAVENOUS at 07:22

## 2023-04-25 RX ADMIN — CELECOXIB 200 MILLIGRAM(S): 200 CAPSULE ORAL at 18:11

## 2023-04-25 RX ADMIN — SODIUM CHLORIDE 3 MILLILITER(S): 9 INJECTION INTRAMUSCULAR; INTRAVENOUS; SUBCUTANEOUS at 21:24

## 2023-04-25 RX ADMIN — BUPROPION HYDROCHLORIDE 100 MILLIGRAM(S): 150 TABLET, EXTENDED RELEASE ORAL at 17:49

## 2023-04-25 RX ADMIN — GABAPENTIN 300 MILLIGRAM(S): 400 CAPSULE ORAL at 21:39

## 2023-04-25 RX ADMIN — Medication 15 MILLIGRAM(S): at 07:45

## 2023-04-25 NOTE — DISCHARGE NOTE NURSING/CASE MANAGEMENT/SOCIAL WORK - NSDCPEFALRISK_GEN_ALL_CORE
For information on Fall & Injury Prevention, visit: [email or web address] OR  [email or web address] OR  https://www.cdc.gov/steadi/patient.html

## 2023-04-25 NOTE — DISCHARGE NOTE NURSING/CASE MANAGEMENT/SOCIAL WORK - NSSCTYPOFSERV_GEN_ALL_CORE
The above agency will contact you to arrange the time of initial Nursing and Physical Therapist services start of care.

## 2023-04-25 NOTE — DISCHARGE NOTE NURSING/CASE MANAGEMENT/SOCIAL WORK - PATIENT PORTAL LINK FT
You can access the FollowMyHealth Patient Portal offered by [facility] by registering at the following website: [email or web address]. By joining [employer]’s FollowMyHealth portal, you will also be able to view your health information using other applications (apps) compatible with our system.

## 2023-04-25 NOTE — DISCHARGE NOTE NURSING/CASE MANAGEMENT/SOCIAL WORK - NSDPDISTO_GEN_ALL_CORE
Home with home care Pt. is afebrile and offers no complaints. In no acute distress. Right knee aquacel dressing: clean, dry and intact. Pt is ambulating with a walker, tolerating diet well, and voiding in adequate amounts./Home with home care

## 2023-04-25 NOTE — PROGRESS NOTE ADULT - NS ATTEND AMEND GEN_ALL_CORE FT
Patient seen and examined. Ms. Cabrera is 62 year old female now status post right total knee arthroplasty. Patient is currently doing well. She did have some pain due to her fibromyalgia from the straps of the ice pack. She was able to walk about 50 feet yesterday with PT.    Physical Exam:  Dressing: Clean, Dry, Intact  Compartments soft, nondistended, non tender  Motor: Intact EHL/FHL/Tibialis Anterior/Gastrocnemius  Sensory: Intact Superficial Peroneal/Deep Peroneal/Saphenous/Sural/Tibial Nerves  Vascular: 2+ DP Pulse    Assessment/Plan:  Ms. Cabrera is 62 year old female now status post right total knee arthroplasty    Plan:  -Right Lower Extremity: Weight Bearing as Tolerated  -PT/OT, Out of Bed  -Pain Control: Per protocol  -Antibiotics: Ancef x 24 hours  -DVT Prophylaxis: Asprin 325mg twice per day  -Follow up Medicine  -Disposition: Home

## 2023-04-25 NOTE — DISCHARGE NOTE NURSING/CASE MANAGEMENT/SOCIAL WORK - NSDCPNINST_GEN_ALL_CORE
Notify Dr Givens if you experience any increase in pain not relieved with medication. Notify Dr Givens if you experience any increase in pain not relieved with medication, any redness, drainage or swelling around incision or any fever >100.5.  Drink plenty of fluids.  No heavy lifting or straining.  continue to elevate your leg, do exercises and continue cold therapy as instructed.  Use over the counter stool softeners to assist with constipation which can be a side effect of narcotic pain medication.  Continue to follow consistent carbohydrate diet and follow up with your PMD for continued management of your diabetes.   You have a post op appointment with Dr. Givens on May 9, 2023 @ 11am at [address] [place] [address]. If you are unable to keep this appointment, please call the office to reschedule. Notify Dr Givens if you experience any increase in pain not relieved with medication, any redness, drainage or swelling around incision or any fever >100.5.  Drink plenty of fluids.  No heavy lifting or straining.  continue to elevate your leg, do exercises and continue cold therapy as instructed.  Use over the counter stool softeners to assist with constipation which can be a side effect of narcotic pain medication.  Continue to follow consistent carbohydrate diet and follow up with your PMD for continued management of your diabetes.

## 2023-04-26 LAB
BLD GP AB SCN SERPL QL: POSITIVE — SIGNIFICANT CHANGE UP
GLUCOSE BLDC GLUCOMTR-MCNC: 117 MG/DL — HIGH (ref 70–99)
GLUCOSE BLDC GLUCOMTR-MCNC: 117 MG/DL — HIGH (ref 70–99)
GLUCOSE BLDC GLUCOMTR-MCNC: 139 MG/DL — HIGH (ref 70–99)
GLUCOSE BLDC GLUCOMTR-MCNC: 78 MG/DL — SIGNIFICANT CHANGE UP (ref 70–99)
HCT VFR BLD CALC: 27.4 % — LOW (ref 34.5–45)
HGB BLD-MCNC: 9 G/DL — LOW (ref 11.5–15.5)
MCHC RBC-ENTMCNC: 29.2 PG — SIGNIFICANT CHANGE UP (ref 27–34)
MCHC RBC-ENTMCNC: 32.8 GM/DL — SIGNIFICANT CHANGE UP (ref 32–36)
MCV RBC AUTO: 89 FL — SIGNIFICANT CHANGE UP (ref 80–100)
NRBC # BLD: 0 /100 WBCS — SIGNIFICANT CHANGE UP (ref 0–0)
NRBC # FLD: 0 K/UL — SIGNIFICANT CHANGE UP (ref 0–0)
PLATELET # BLD AUTO: 227 K/UL — SIGNIFICANT CHANGE UP (ref 150–400)
RBC # BLD: 3.08 M/UL — LOW (ref 3.8–5.2)
RBC # FLD: 17.8 % — HIGH (ref 10.3–14.5)
RH IG SCN BLD-IMP: NEGATIVE — SIGNIFICANT CHANGE UP
WBC # BLD: 8.65 K/UL — SIGNIFICANT CHANGE UP (ref 3.8–10.5)
WBC # FLD AUTO: 8.65 K/UL — SIGNIFICANT CHANGE UP (ref 3.8–10.5)

## 2023-04-26 RX ORDER — OXYCODONE HYDROCHLORIDE 5 MG/1
1 TABLET ORAL
Qty: 42 | Refills: 0
Start: 2023-04-26 | End: 2023-05-02

## 2023-04-26 RX ORDER — SENNA PLUS 8.6 MG/1
2 TABLET ORAL
Qty: 30 | Refills: 0
Start: 2023-04-26 | End: 2023-05-10

## 2023-04-26 RX ORDER — MELOXICAM 15 MG/1
1 TABLET ORAL
Refills: 0 | DISCHARGE

## 2023-04-26 RX ORDER — OMEPRAZOLE 10 MG/1
1 CAPSULE, DELAYED RELEASE ORAL
Refills: 0 | DISCHARGE

## 2023-04-26 RX ORDER — ASPIRIN/CALCIUM CARB/MAGNESIUM 324 MG
1 TABLET ORAL
Qty: 84 | Refills: 0
Start: 2023-04-26 | End: 2023-06-06

## 2023-04-26 RX ORDER — ACETAMINOPHEN 500 MG
2 TABLET ORAL
Qty: 42 | Refills: 0
Start: 2023-04-26 | End: 2023-05-02

## 2023-04-26 RX ORDER — TRAMADOL HYDROCHLORIDE 50 MG/1
1 TABLET ORAL
Refills: 0 | DISCHARGE

## 2023-04-26 RX ORDER — PANTOPRAZOLE SODIUM 20 MG/1
1 TABLET, DELAYED RELEASE ORAL
Qty: 42 | Refills: 0
Start: 2023-04-26 | End: 2023-06-06

## 2023-04-26 RX ORDER — POLYETHYLENE GLYCOL 3350 17 G/17G
17 POWDER, FOR SOLUTION ORAL
Qty: 0 | Refills: 0 | DISCHARGE
Start: 2023-04-26

## 2023-04-26 RX ADMIN — OXYCODONE HYDROCHLORIDE 5 MILLIGRAM(S): 5 TABLET ORAL at 21:41

## 2023-04-26 RX ADMIN — BUPROPION HYDROCHLORIDE 100 MILLIGRAM(S): 150 TABLET, EXTENDED RELEASE ORAL at 05:04

## 2023-04-26 RX ADMIN — VALSARTAN 320 MILLIGRAM(S): 80 TABLET ORAL at 05:05

## 2023-04-26 RX ADMIN — SENNA PLUS 2 TABLET(S): 8.6 TABLET ORAL at 21:41

## 2023-04-26 RX ADMIN — SODIUM CHLORIDE 3 MILLILITER(S): 9 INJECTION INTRAMUSCULAR; INTRAVENOUS; SUBCUTANEOUS at 13:14

## 2023-04-26 RX ADMIN — OXYCODONE HYDROCHLORIDE 5 MILLIGRAM(S): 5 TABLET ORAL at 22:41

## 2023-04-26 RX ADMIN — CELECOXIB 200 MILLIGRAM(S): 200 CAPSULE ORAL at 06:05

## 2023-04-26 RX ADMIN — SODIUM CHLORIDE 3 MILLILITER(S): 9 INJECTION INTRAMUSCULAR; INTRAVENOUS; SUBCUTANEOUS at 22:18

## 2023-04-26 RX ADMIN — Medication 975 MILLIGRAM(S): at 11:48

## 2023-04-26 RX ADMIN — SODIUM CHLORIDE 3 MILLILITER(S): 9 INJECTION INTRAMUSCULAR; INTRAVENOUS; SUBCUTANEOUS at 05:16

## 2023-04-26 RX ADMIN — OXYCODONE HYDROCHLORIDE 10 MILLIGRAM(S): 5 TABLET ORAL at 10:49

## 2023-04-26 RX ADMIN — AMLODIPINE BESYLATE 5 MILLIGRAM(S): 2.5 TABLET ORAL at 05:05

## 2023-04-26 RX ADMIN — POLYETHYLENE GLYCOL 3350 17 GRAM(S): 17 POWDER, FOR SOLUTION ORAL at 21:42

## 2023-04-26 RX ADMIN — Medication 325 MILLIGRAM(S): at 21:41

## 2023-04-26 RX ADMIN — CELECOXIB 200 MILLIGRAM(S): 200 CAPSULE ORAL at 19:50

## 2023-04-26 RX ADMIN — GABAPENTIN 300 MILLIGRAM(S): 400 CAPSULE ORAL at 21:41

## 2023-04-26 RX ADMIN — OXYCODONE HYDROCHLORIDE 10 MILLIGRAM(S): 5 TABLET ORAL at 11:48

## 2023-04-26 RX ADMIN — GABAPENTIN 300 MILLIGRAM(S): 400 CAPSULE ORAL at 13:38

## 2023-04-26 RX ADMIN — Medication 975 MILLIGRAM(S): at 10:48

## 2023-04-26 RX ADMIN — CELECOXIB 200 MILLIGRAM(S): 200 CAPSULE ORAL at 18:53

## 2023-04-26 RX ADMIN — PANTOPRAZOLE SODIUM 40 MILLIGRAM(S): 20 TABLET, DELAYED RELEASE ORAL at 05:05

## 2023-04-26 RX ADMIN — Medication 325 MILLIGRAM(S): at 10:50

## 2023-04-26 RX ADMIN — CELECOXIB 200 MILLIGRAM(S): 200 CAPSULE ORAL at 05:05

## 2023-04-26 RX ADMIN — GABAPENTIN 300 MILLIGRAM(S): 400 CAPSULE ORAL at 05:05

## 2023-04-26 RX ADMIN — Medication 975 MILLIGRAM(S): at 18:54

## 2023-04-26 RX ADMIN — Medication 975 MILLIGRAM(S): at 19:50

## 2023-04-26 NOTE — PROVIDER CONTACT NOTE (OTHER) - ASSESSMENT
Pt resting in bed at this time, and verbalizes feeling "tired." Pt denies dizziness, lightheadedness or any additional signs of hypotension. All other vitals signs stable at this time.

## 2023-04-26 NOTE — PROGRESS NOTE ADULT - ATTENDING COMMENTS
Patient seen and examined at 0800 on 4/26/2023. Ms. Cabrera is 62 year old female now status post right total knee arthroplasty. Patient is currently doing well. Patient's pain has improved. She did well with PT yesterday. She is planning on working on stairs with PT today.    Physical Exam:  Dressing: Clean, Dry, Intact  Compartments soft, nondistended, non tender  Motor: Intact EHL/FHL/Tibialis Anterior/Gastrocnemius  Sensory: Intact Superficial Peroneal/Deep Peroneal/Saphenous/Sural/Tibial Nerves  Vascular: 2+ DP Pulse    Assessment/Plan:  Ms. Cabrera is 62 year old female now status post right total knee arthroplasty    Plan:  -Right Lower Extremity: Weight Bearing as Tolerated  -PT/OT, Out of Bed  -Pain Control: Per protocol  -Antibiotics: Ancef x 24 hours  -DVT Prophylaxis: Asprin 325mg twice per day  -Follow up Medicine  -Disposition: Home.

## 2023-04-27 VITALS
TEMPERATURE: 98 F | RESPIRATION RATE: 17 BRPM | SYSTOLIC BLOOD PRESSURE: 109 MMHG | DIASTOLIC BLOOD PRESSURE: 61 MMHG | HEART RATE: 65 BPM | OXYGEN SATURATION: 100 %

## 2023-04-27 LAB
GLUCOSE BLDC GLUCOMTR-MCNC: 112 MG/DL — HIGH (ref 70–99)
GLUCOSE BLDC GLUCOMTR-MCNC: 127 MG/DL — HIGH (ref 70–99)

## 2023-04-27 RX ADMIN — SODIUM CHLORIDE 3 MILLILITER(S): 9 INJECTION INTRAMUSCULAR; INTRAVENOUS; SUBCUTANEOUS at 07:12

## 2023-04-27 RX ADMIN — CELECOXIB 200 MILLIGRAM(S): 200 CAPSULE ORAL at 06:50

## 2023-04-27 RX ADMIN — OXYCODONE HYDROCHLORIDE 5 MILLIGRAM(S): 5 TABLET ORAL at 10:52

## 2023-04-27 RX ADMIN — OXYCODONE HYDROCHLORIDE 5 MILLIGRAM(S): 5 TABLET ORAL at 14:01

## 2023-04-27 RX ADMIN — OXYCODONE HYDROCHLORIDE 5 MILLIGRAM(S): 5 TABLET ORAL at 09:52

## 2023-04-27 RX ADMIN — GABAPENTIN 300 MILLIGRAM(S): 400 CAPSULE ORAL at 05:52

## 2023-04-27 RX ADMIN — BUPROPION HYDROCHLORIDE 100 MILLIGRAM(S): 150 TABLET, EXTENDED RELEASE ORAL at 06:52

## 2023-04-27 RX ADMIN — CELECOXIB 200 MILLIGRAM(S): 200 CAPSULE ORAL at 05:51

## 2023-04-27 RX ADMIN — Medication 975 MILLIGRAM(S): at 06:50

## 2023-04-27 RX ADMIN — PANTOPRAZOLE SODIUM 40 MILLIGRAM(S): 20 TABLET, DELAYED RELEASE ORAL at 05:52

## 2023-04-27 RX ADMIN — AMLODIPINE BESYLATE 5 MILLIGRAM(S): 2.5 TABLET ORAL at 05:51

## 2023-04-27 RX ADMIN — OXYCODONE HYDROCHLORIDE 5 MILLIGRAM(S): 5 TABLET ORAL at 05:51

## 2023-04-27 RX ADMIN — OXYCODONE HYDROCHLORIDE 5 MILLIGRAM(S): 5 TABLET ORAL at 06:51

## 2023-04-27 RX ADMIN — VALSARTAN 320 MILLIGRAM(S): 80 TABLET ORAL at 05:52

## 2023-04-27 RX ADMIN — Medication 975 MILLIGRAM(S): at 05:50

## 2023-04-27 RX ADMIN — Medication 325 MILLIGRAM(S): at 09:52

## 2023-04-27 NOTE — PROGRESS NOTE ADULT - SUBJECTIVE AND OBJECTIVE BOX
ANESTHESIA POSTOP CHECK    62y Female POSTOP DAY 1     No COMPLAINTS    NO APPARENT ANESTHESIA COMPLICATIONS      
Orthopedic Surgery Progress Note    S: Patient seen and examined today. No acute events overnight. Pain is well controlled. Denies f/c, chest pain, sob, dizziness.    O:  Vital Signs Last 24 Hrs  T(C): 36.8 (26 Apr 2023 05:17), Max: 37.4 (25 Apr 2023 06:30)  T(F): 98.3 (26 Apr 2023 05:17), Max: 99.4 (25 Apr 2023 06:30)  HR: 74 (26 Apr 2023 05:17) (62 - 77)  BP: 119/48 (26 Apr 2023 05:17) (108/55 - 139/50)  BP(mean): --  RR: 18 (26 Apr 2023 05:17) (17 - 18)  SpO2: 99% (26 Apr 2023 05:17) (95% - 99%)    Parameters below as of 26 Apr 2023 05:17  Patient On (Oxygen Delivery Method): room air        Gen: NAD  RLE   Aquacel dressing clean, dry, intact. Area of spotting, dried  EHL/FHL/TA/GS intact  SILT DP/SP/Sharp/Sa  WWP distally                          8.6    7.93  )-----------( 243      ( 25 Apr 2023 06:00 )             26.0       04-25    132<L>  |  96<L>  |  16  ----------------------------<  157<H>  3.9   |  23  |  1.06          
Orthopedics Post-Op Check:  Patient was seen and examined at bedside. Denies CP/SOB/Dizziness/N/V/D/HA. Pain is well controlled at the moment.    Vital Signs Last 24 Hrs  T(C): 36.6 (24 Apr 2023 14:00), Max: 36.9 (24 Apr 2023 12:17)  T(F): 97.8 (24 Apr 2023 14:00), Max: 98.4 (24 Apr 2023 12:17)  HR: 67 (24 Apr 2023 14:00) (52 - 72)  BP: 115/56 (24 Apr 2023 14:00) (93/77 - 122/68)  BP(mean): 71 (24 Apr 2023 14:00) (66 - 81)  RR: 18 (24 Apr 2023 14:00) (13 - 22)  SpO2: 94% (24 Apr 2023 14:00) (94% - 100%)    Parameters below as of 24 Apr 2023 13:00  Patient On (Oxygen Delivery Method): mask, simple face    Labs: FU AM     Physical Exam:  Gen: NAD, A&Ox3  R LE:   Dressing C/D/I  Motor intact + EHL/FHL/TA/GS. Sensation is grossly intact.   Compartments are soft, extremities are warm, DP 2+        
Orthopedics Progress Note:  Patient was seen and examined at bedside. Denies CP/SOB/Dizziness/N/V/D/HA. Pain is well controlled overnight, reports some sensitivity when icing her knee but tolerated ambulation with physical therapy.    Vital Signs Last 24 Hrs  T(C): 36.8 (25 Apr 2023 05:44), Max: 37.1 (24 Apr 2023 21:39)  T(F): 98.2 (25 Apr 2023 05:44), Max: 98.8 (24 Apr 2023 21:39)  HR: 76 (25 Apr 2023 05:44) (52 - 80)  BP: 136/69 (25 Apr 2023 05:44) (93/77 - 157/72)  BP(mean): 71 (24 Apr 2023 14:30) (66 - 81)  RR: 17 (25 Apr 2023 05:44) (12 - 22)  SpO2: 98% (25 Apr 2023 05:44) (94% - 100%)    Parameters below as of 25 Apr 2023 05:44  Patient On (Oxygen Delivery Method): room air      Physical Exam:  Gen: NAD  RLE:   Dressing C/D/I, one small area of bloody saturation   Motor intact + EHL/FHL/TA/GS. Sensation is grossly intact.   Compartments are soft, extremities are warm, DP 2+        
Post op Day 3    Patient resting without complaints.  No chest pain, SOB, N/V. States pain is well controlled with pain regimen.    T(C): 37 (04-27-23 @ 02:16), Max: 37.3 (04-26-23 @ 18:01)  HR: 66 (04-27-23 @ 02:16) (62 - 73)  BP: 124/53 (04-27-23 @ 02:16) (95/62 - 124/53)  RR: 16 (04-27-23 @ 02:16) (16 - 18)  SpO2: 98% (04-27-23 @ 02:16) (97% - 99%)  Wt(kg): --    Exam:  Alert and Oriented, No Acute Distress  Lower Extremities: Right knee aquacel with quarter sized area of strikethrough, otherwise intact.  Calves Soft, Non-tender bilaterally  +PF/DF/EHL/FHL  SILT  +DP Pulse                          9.0    8.65  )-----------( 227      ( 26 Apr 2023 07:16 )             27.4

## 2023-04-27 NOTE — PROGRESS NOTE ADULT - PROBLEM SELECTOR PLAN 1
PT/OT-WBAT to RLE  IS  DVT PPx - Aspirin 325 mg BID  Pain Control PRN  Continue Current Tx.  Dispo home today    Amanda Prado PA-C  Team Pager: #[phone]

## 2023-04-27 NOTE — PROGRESS NOTE ADULT - NS ATTEND AMEND GEN_ALL_CORE FT
Patient seen and examined. Ms. Cabrera is 62 year old female now status post right total knee arthroplasty. Patient is currently doing well. Patient's pain has improved. She did well with PT yesterday. Patient would like to go home today. Dressing changed.    Physical Exam:  Dressing: Clean, Dry, Intact  Compartments soft, nondistended, non tender  Motor: Intact EHL/FHL/Tibialis Anterior/Gastrocnemius  Sensory: Intact Superficial Peroneal/Deep Peroneal/Saphenous/Sural/Tibial Nerves  Vascular: 2+ DP Pulse    Assessment/Plan:  Ms. Cabrera is 62 year old female now status post right total knee arthroplasty    Plan:  -Right Lower Extremity: Weight Bearing as Tolerated  -PT/OT, Out of Bed  -Pain Control: Per protocol  -Antibiotics: Ancef x 24 hours  -DVT Prophylaxis: Asprin 325mg twice per day  -Follow up Medicine  -Disposition: Home.

## 2023-04-27 NOTE — PROGRESS NOTE ADULT - ASSESSMENT
A/P 62y year old Female s/p R TKA  Pain Control  WBAT  PT/OOB  DVT PPx  Dispo Planning
A/P: 62y y/o Female s/p R/L total knee arthroplasty, DOS 4/24/23  - Pain control  - Antibiotic - Ancef postop  - Incentive Spirometry  - DVT prophylaxis: Aspirin 325mg BID  - F/U AM Labs  - PT/OT/WBAT  - Notify Orthopedics with any questions  
A/p: Patient is a 62y Female s/p Right TKA.  VSS. NAD.    
A/P: 62y y/o Female s/p R total knee arthroplasty, POD #0  - Pain control  - Antibiotic - Ancef postop  - Incentive Spirometry  - DVT prophylaxis: Venodynes/Aspirin 325mg BID  - F/U AM Labs  - PT/OT/WBAT  - Notify Orthopedics with any questions

## 2023-04-28 ENCOUNTER — NON-APPOINTMENT (OUTPATIENT)
Age: 63
End: 2023-04-28

## 2023-05-04 ENCOUNTER — NON-APPOINTMENT (OUTPATIENT)
Age: 63
End: 2023-05-04

## 2023-05-05 ENCOUNTER — TRANSCRIPTION ENCOUNTER (OUTPATIENT)
Age: 63
End: 2023-05-05

## 2023-05-05 ENCOUNTER — EMERGENCY (EMERGENCY)
Facility: HOSPITAL | Age: 63
LOS: 1 days | Discharge: HOME | End: 2023-05-05
Attending: STUDENT IN AN ORGANIZED HEALTH CARE EDUCATION/TRAINING PROGRAM | Admitting: STUDENT IN AN ORGANIZED HEALTH CARE EDUCATION/TRAINING PROGRAM
Payer: MEDICARE

## 2023-05-05 VITALS
DIASTOLIC BLOOD PRESSURE: 75 MMHG | RESPIRATION RATE: 16 BRPM | HEIGHT: 61 IN | TEMPERATURE: 98 F | HEART RATE: 82 BPM | SYSTOLIC BLOOD PRESSURE: 150 MMHG | OXYGEN SATURATION: 100 %

## 2023-05-05 DIAGNOSIS — Z33.2 ENCOUNTER FOR ELECTIVE TERMINATION OF PREGNANCY: Chronic | ICD-10-CM

## 2023-05-05 DIAGNOSIS — C80.1 MALIGNANT (PRIMARY) NEOPLASM, UNSPECIFIED: Chronic | ICD-10-CM

## 2023-05-05 DIAGNOSIS — Z98.890 OTHER SPECIFIED POSTPROCEDURAL STATES: Chronic | ICD-10-CM

## 2023-05-05 DIAGNOSIS — H26.9 UNSPECIFIED CATARACT: Chronic | ICD-10-CM

## 2023-05-05 PROCEDURE — 73562 X-RAY EXAM OF KNEE 3: CPT | Mod: 26,RT

## 2023-05-05 PROCEDURE — 93971 EXTREMITY STUDY: CPT | Mod: 26,LT

## 2023-05-05 PROCEDURE — 99285 EMERGENCY DEPT VISIT HI MDM: CPT | Mod: GC

## 2023-05-05 RX ORDER — CEFAZOLIN SODIUM 1 G
1000 VIAL (EA) INJECTION ONCE
Refills: 0 | Status: COMPLETED | OUTPATIENT
Start: 2023-05-05 | End: 2023-05-05

## 2023-05-05 RX ADMIN — Medication 100 MILLIGRAM(S): at 23:54

## 2023-05-05 NOTE — ED ADULT NURSE NOTE - NSIMPLEMENTINTERV_GEN_ALL_ED
Implemented All Universal Safety Interventions:  [place] to call system. Call bell, personal items and telephone within reach. Instruct patient to call for assistance. Room bathroom lighting operational. Non-slip footwear when patient is off stretcher. Physically safe environment: no spills, clutter or unnecessary equipment. Stretcher in lowest position, wheels locked, appropriate side rails in place.

## 2023-05-05 NOTE — ED PROVIDER NOTE - PROGRESS NOTE DETAILS
Itz Ibarra MD (PGY-2): orthopedics consulted. duplex negative. xray unremarkable. Itz Ibarra MD (PGY-2): imaging unremarkable. serology shows elevated inflammatory markers, confounded in the setting of recent surgery. seen by orthopedic team. presentation is consistent with post-operative discomfort and swelling. pt ambulaotry, reassured by negative study. discussed assessment and work up. at this time, will d/c with working diagnosis of post-operative state. close follow up in the next few days w/ her surgeon. ed return precautions discussed. pt happy and comfortable with plan of care.

## 2023-05-05 NOTE — ED ADULT NURSE NOTE - OBJECTIVE STATEMENT
Pt arrives to ED aaox4, ambulatory with cane, breathing even and unlabored in bed. Pt states she had a knee replacement surgery on April 24th, pt states since the surgery she has noticed an increase in redness and swelling. Upon inspection of surgical site, right knee is noted to be warm to touch, no pus or discharge present. Pt denies chest pain, SOB, dizziness, headache, blurry vision, chills. Bed in lowest position, call bell within reach.

## 2023-05-05 NOTE — ED PROVIDER NOTE - CLINICAL SUMMARY MEDICAL DECISION MAKING FREE TEXT BOX
63-year-old female, with a history of heart failure, type 2 diabetes, hypertension, fibromyalgia, osteoarthritis, presenting 2 to 3 weeks following right-sided total knee arthroplasty which was done here.   Vital signs unremarkable.  Nontoxic-appearing.  No clinical syndrome consistent with sepsis.  At this time we will treat empirically for cellulitis, Ancef.  No indication for immediate fluid resuscitation, will hold off in the heart failure patient.  Rule out DVT in the postoperative period.  Patient with intact range of motion of the right knee, not septic arthritis rule out given arthroplasty, will obtain ESR and CRP.  Ortho consultation for further assessment of same, may require arthrocentesis versus MRI.  Assess for displacement of hardware, low pretest suspicion, x-ray.  Likely admission for postoperative infection.

## 2023-05-05 NOTE — ED ADULT TRIAGE NOTE - CHIEF COMPLAINT QUOTE
s/p right total knee replacement 4/24/23. Pt states has had some swelling since surgery but since this morning calf and posterior knee feels very tight with some tingling to knee cap area. PMHX right breast cancer with mastectomy. +2 pitting edema right lower ext. Denies any chest pain or SOB. HV=[id number]

## 2023-05-05 NOTE — ED PROVIDER NOTE - PATIENT PORTAL LINK FT
You can access the FollowMyHealth Patient Portal offered by [facility] by registering at the following website: [email or web address]. By joining [employer]’s FollowMyHealth portal, you will also be able to view your health information using other applications (apps) compatible with our system.

## 2023-05-05 NOTE — ED ADULT TRIAGE NOTE - STATUS:
After Visit Summary   6/27/2017    Tracee Dominguez    MRN: [id number]           Patient Information     Date Of Birth          2000        Visit Information        Provider Department      6/27/2017 11:30 AM Hugh Orta MD [facility]        Today's Diagnoses     Preop general physical exam    -  1    Soft tissues foreign body          Care Instructions      Before Your Surgery      Call your surgeon if there is any change in your health. This includes signs of a cold or flu (such as a sore throat, runny nose, cough, rash or fever).    Do not smoke, drink alcohol or take over the counter medicine (unless your surgeon or primary care doctor tells you to) for the 24 hours before and after surgery.    If you take prescribed drugs: Follow your doctor s orders about which medicines to take and which to stop until after surgery.    Eating and drinking prior to surgery: follow the instructions from your surgeon    Take a shower or bath the night before surgery. Use the soap your surgeon gave you to gently clean your skin. If you do not have soap from your surgeon, use your regular soap. Do not shave or scrub the surgery site.  Wear clean pajamas and have clean sheets on your bed.           Follow-ups after your visit        Your next 10 appointments already scheduled     Jun 28, 2017   Procedure with Al Stratton MD   [facility] (--)    [address] [place] [id number] [phone]            Jun 28, 2017  7:30 AM CDT   XR SURGERY FREDRICK FLUORO L/T 5 MIN with [id number]   [facility] ([facility])    [id number] [place] [id number] [phone]           Please bring a list of your current medicines to your exam. (Include vitamins, minerals and over-thecounter medicines.) Leave your valuables at home.  Tell your doctor if there is a chance you may be pregnant.  You do not need to do anything  "special for this exam.            2017  8:30 AM CDT   Post Op with Al Stratton MD   [facility]    [address] [place] [address] [phone]              Future tests that were ordered for you today     Open Future Orders        Priority Expected Expires Ordered    XR Surgery FREDRICK L/T 5 Min Fluoro Routine 2017            Who to contact     If you have questions or need follow up information about today's clinic visit or your schedule please contact [facility] directly at [phone].  Normal or non-critical lab and imaging results will be communicated to you by MyChart, letter or phone within 4 business days after the clinic has received the results. If you do not hear from us within 7 days, please contact the clinic through [employer] or phone. If you have a critical or abnormal lab result, we will notify you by phone as soon as possible.  Submit refill requests through [employer] or call your pharmacy and they will forward the refill request to us. Please allow 3 business days for your refill to be completed.          Additional Information About Your Visit        [employer] that Give Information     [employer] lets you send messages to your doctor, view your test results, renew your prescriptions, schedule appointments and more. To sign up, go to [email or web address] . Click on \"Log in\" on the left side of the screen, which will take you to the Welcome page. Then click on \"Sign up Now\" on the right side of the page.     You will be asked to enter the access code listed below, as well as some personal information. Please follow the directions to create your username and password.     Your access code is: [email or web address]  Expires: 2017  9:08 AM     Your access code will  in 90 days. If you need help or a new code, please call your [facility] or [phone].        Care EveryWhere ID     This is your Care " EveryWhere ID. This could be used by other organizations to access your [facility] medical records  [id number]        Your Vitals Were     Pulse Temperature BMI (Body Mass Index)             68 97.2  F (36.2  C) (Oral) 24.25 kg/m2          Blood Pressure from Last 3 Encounters:   06/27/17 104/62   06/27/17 109/62   06/19/17 97/64    Weight from Last 3 Encounters:   06/27/17 148 lb (67.1 kg) (80 %)*   06/27/17 147 lb (66.7 kg) (79 %)*   06/19/17 151 lb (68.5 kg) (83 %)*     * Growth percentiles are based on [facility] 2-20 Years data.              Today, you had the following     No orders found for display       Primary Care Provider Office Phone # Fax #    Fatou Enrique [phone]       [facility] [address] [facility] [address]        Equal Access to Services     SHANNON PORTER : Hadii aad ku hadasho Sonandini, waaxda luqadaha, qaybta kaalmada adeegyada, jenifer dejesus . [facility] [phone].    ATENCIÓN: Si habla español, tiene a wang disposición servicios gratuitos de asistencia lingüística. Llame al [phone].    We comply with applicable federal civil rights laws and [place] laws. We do not discriminate on the basis of race, color, national origin, age, disability sex, sexual orientation or gender identity.            Thank you!     Thank you for choosing [facility]  for your care. Our goal is always to provide you with excellent care. Hearing back from our patients is one way we can continue to improve our services. Please take a few minutes to complete the written survey that you may receive in the mail after your visit with us. Thank you!             Your Updated Medication List - Protect others around you: Learn how to safely use, store and throw away your medicines at www.disposemymeds.org.          This list is accurate as of: 6/27/17 11:51 AM.  Always use your most recent med list.                   Brand Name Dispense Instructions  for use Diagnosis    albuterol 108 (90 BASE) MCG/ACT Inhaler    PROAIR HFA/PROVENTIL HFA/VENTOLIN HFA    1 Inhaler    Inhale 2 puffs into the lungs every 6 hours as needed for shortness of breath / dyspnea    Mild intermittent asthma without complication       desogestrel-ethinyl estradiol 0.15-30 MG-MCG per tablet    APRI    84 tablet    Take 1 tablet by mouth daily    Birth control counseling       etonogestrel 68 MG Impl    IMPLANON/NEXPLANON     1 each by Subdermal route once           Applied Intact

## 2023-05-05 NOTE — ED ADULT NURSE NOTE - CHIEF COMPLAINT QUOTE
s/p right total knee replacement 4/24/23. Pt states has had some swelling since surgery but since this morning calf and posterior knee feels very tight with some tingling to knee cap area. PMHX right breast cancer with mastectomy. +2 pitting edema right lower ext. Denies any chest pain or SOB. [id number]

## 2023-05-05 NOTE — ED PROVIDER NOTE - ATTENDING CONTRIBUTION TO CARE
I have personally performed a face to face medical and diagnostic evaluation of the patient. I have discussed with and reviewed the Resident's note and agree with the History, ROS, Physical Exam and MDM unless otherwise indicated. A brief summary of my personal evaluation and impression can be found below.    63y F w/ PMHx heart failure, type 2 diabetes, hypertension, fibromyalgia, osteoarthritis, presenting 2 to 3 weeks following right-sided total knee arthroplasty which was done here with worsening swelling to R knee. Patient states she has been ambulating at home since discharge with walker, denies recent falls or trauma. Was concerned about "possible rash behind my knee". Patient states she has been elevating leg at home, and applying ice also taking prescribed pain medication. Denies fever, chills, nausea, vomiting, numbness/tingling, purulent drainage from surgical site.     On exam: surgical site c/d/i, mild erythema surrounding surgical site ~2cm, able to range R knee, no calf tenderness, 2+ pitting edema in RLE, intact distal pulses, intact sensation.    Will obtain US to r/o DVT, xray of R knee, symptoms not c/w septic joint, gout, low suspicion for cellulitis as erythema is limited to border of surgical site, +ecchymosis to R posterior knee, will consult, obtain labs, pt is afebrile, pain control and reassess.

## 2023-05-05 NOTE — ED PROVIDER NOTE - OBJECTIVE STATEMENT
63-year-old female, with a history of heart failure, type 2 diabetes, hypertension, fibromyalgia, osteoarthritis, presenting 2 to 3 weeks following right-sided total knee arthroplasty which was done here.  Patient notes swelling and redness and pain associated with the right knee, tracking up and down.  No remarkable preceding events since the surgery.  Continues to be ambulatory.  No fevers or chills with this.  Review of systems otherwise negative.  Presenting for persistence of swelling and pain.    Allergy to ramipril.

## 2023-05-05 NOTE — ED PROVIDER NOTE - PHYSICAL EXAMINATION
Gen: NAD, non-toxic appearing  Head: normal appearing  HEENT: normal conjunctiva  Lung: no respiratory distress, speaking in full sentences     CV:  DP pulse intact over the right foot.  MSK:   Warm and markedly swollen right lower extremity, focus of swelling around the knee joint.  The surgical site is warm and somewhat red, without overt purulence.  Ecchymosis over the medial aspect of the knee joint.  No mary necrotic tissue.  Neuro:   Neurologically intact right lower extremity.  Skin:   See MSK section.

## 2023-05-06 VITALS
SYSTOLIC BLOOD PRESSURE: 124 MMHG | RESPIRATION RATE: 18 BRPM | HEART RATE: 64 BPM | OXYGEN SATURATION: 100 % | TEMPERATURE: 98 F | DIASTOLIC BLOOD PRESSURE: 58 MMHG

## 2023-05-06 LAB
ALBUMIN SERPL ELPH-MCNC: 3.5 G/DL — SIGNIFICANT CHANGE UP (ref 3.3–5)
ALP SERPL-CCNC: 144 U/L — HIGH (ref 40–120)
ALT FLD-CCNC: 10 U/L — SIGNIFICANT CHANGE UP (ref 4–33)
ANION GAP SERPL CALC-SCNC: 17 MMOL/L — HIGH (ref 7–14)
APTT BLD: 28.8 SEC — SIGNIFICANT CHANGE UP (ref 27–36.3)
AST SERPL-CCNC: 26 U/L — SIGNIFICANT CHANGE UP (ref 4–32)
BASOPHILS # BLD AUTO: 0.03 K/UL — SIGNIFICANT CHANGE UP (ref 0–0.2)
BASOPHILS NFR BLD AUTO: 0.5 % — SIGNIFICANT CHANGE UP (ref 0–2)
BILIRUB SERPL-MCNC: 0.4 MG/DL — SIGNIFICANT CHANGE UP (ref 0.2–1.2)
BUN SERPL-MCNC: 16 MG/DL — SIGNIFICANT CHANGE UP (ref 7–23)
CALCIUM SERPL-MCNC: 9.8 MG/DL — SIGNIFICANT CHANGE UP (ref 8.4–10.5)
CHLORIDE SERPL-SCNC: 96 MMOL/L — LOW (ref 98–107)
CO2 SERPL-SCNC: 23 MMOL/L — SIGNIFICANT CHANGE UP (ref 22–31)
CREAT SERPL-MCNC: 0.91 MG/DL — SIGNIFICANT CHANGE UP (ref 0.5–1.3)
CRP SERPL-MCNC: 76 MG/L — HIGH
EGFR: 70 ML/MIN/1.73M2 — SIGNIFICANT CHANGE UP
EOSINOPHIL # BLD AUTO: 0.22 K/UL — SIGNIFICANT CHANGE UP (ref 0–0.5)
EOSINOPHIL NFR BLD AUTO: 3.6 % — SIGNIFICANT CHANGE UP (ref 0–6)
ERYTHROCYTE [SEDIMENTATION RATE] IN BLOOD: >140 MM/HR — HIGH (ref 4–25)
GLUCOSE SERPL-MCNC: 119 MG/DL — HIGH (ref 70–99)
HCT VFR BLD CALC: 24 % — LOW (ref 34.5–45)
HGB BLD-MCNC: 7.7 G/DL — LOW (ref 11.5–15.5)
IANC: 3.81 K/UL — SIGNIFICANT CHANGE UP (ref 1.8–7.4)
IMM GRANULOCYTES NFR BLD AUTO: 0.3 % — SIGNIFICANT CHANGE UP (ref 0–0.9)
INR BLD: 1.14 RATIO — SIGNIFICANT CHANGE UP (ref 0.88–1.16)
LYMPHOCYTES # BLD AUTO: 1.65 K/UL — SIGNIFICANT CHANGE UP (ref 1–3.3)
LYMPHOCYTES # BLD AUTO: 27 % — SIGNIFICANT CHANGE UP (ref 13–44)
MAGNESIUM SERPL-MCNC: 1.6 MG/DL — SIGNIFICANT CHANGE UP (ref 1.6–2.6)
MCHC RBC-ENTMCNC: 28.5 PG — SIGNIFICANT CHANGE UP (ref 27–34)
MCHC RBC-ENTMCNC: 32.1 GM/DL — SIGNIFICANT CHANGE UP (ref 32–36)
MCV RBC AUTO: 88.9 FL — SIGNIFICANT CHANGE UP (ref 80–100)
MONOCYTES # BLD AUTO: 0.38 K/UL — SIGNIFICANT CHANGE UP (ref 0–0.9)
MONOCYTES NFR BLD AUTO: 6.2 % — SIGNIFICANT CHANGE UP (ref 2–14)
NEUTROPHILS # BLD AUTO: 3.81 K/UL — SIGNIFICANT CHANGE UP (ref 1.8–7.4)
NEUTROPHILS NFR BLD AUTO: 62.4 % — SIGNIFICANT CHANGE UP (ref 43–77)
NRBC # BLD: 0 /100 WBCS — SIGNIFICANT CHANGE UP (ref 0–0)
NRBC # FLD: 0.03 K/UL — HIGH (ref 0–0)
PLATELET # BLD AUTO: 433 K/UL — HIGH (ref 150–400)
POTASSIUM SERPL-MCNC: 4.1 MMOL/L — SIGNIFICANT CHANGE UP (ref 3.5–5.3)
POTASSIUM SERPL-SCNC: 4.1 MMOL/L — SIGNIFICANT CHANGE UP (ref 3.5–5.3)
PROT SERPL-MCNC: 8.9 G/DL — HIGH (ref 6–8.3)
PROTHROM AB SERPL-ACNC: 13.2 SEC — SIGNIFICANT CHANGE UP (ref 10.5–13.4)
RBC # BLD: 2.7 M/UL — LOW (ref 3.8–5.2)
RBC # FLD: 17.2 % — HIGH (ref 10.3–14.5)
SODIUM SERPL-SCNC: 136 MMOL/L — SIGNIFICANT CHANGE UP (ref 135–145)
WBC # BLD: 6.11 K/UL — SIGNIFICANT CHANGE UP (ref 3.8–10.5)
WBC # FLD AUTO: 6.11 K/UL — SIGNIFICANT CHANGE UP (ref 3.8–10.5)

## 2023-05-06 RX ORDER — ACETAMINOPHEN 500 MG
975 TABLET ORAL ONCE
Refills: 0 | Status: COMPLETED | OUTPATIENT
Start: 2023-05-06 | End: 2023-05-06

## 2023-05-06 RX ADMIN — Medication 975 MILLIGRAM(S): at 01:53

## 2023-05-06 NOTE — CONSULT NOTE ADULT - SUBJECTIVE AND OBJECTIVE BOX
Orthopaedic Surgery Consult Note    HPI:  63F s/p R TKA w/ VASILE 4/24/23 with Dr. Givens presents today for pain behind the knee and increased swelling of the RLE. Prior to this she has been working with home PT on ROM and otherwise recovering well.     PAST MEDICAL & SURGICAL HISTORY:  Fibromyalgia      b/l  Carpal tunnel syndrome  tx PT/OT      Abdominal hernia in 2012 7/23/19 ; pt states hernia repair 2008, 2018      History  Uterine Fibroid  s/p Hysterectomy 7/02      Sleep apnea diagnosed 2007---supposed to use device but doesn't      Acid Reflux      hiatal hernia  last endoscopy 1.5 years ago      h/o b/l fungal foot infection  7/23./19 pt denies      Alcohol abuse--quit 8 years ago--goes to AA  ADDENDUM:  at PAST appointment  patient states she quit alcohol approximately 2003      Drug abuse--quit 8 years ago--goes to   ADDENDUM: at PAST appointment, patient states she quit alcohol in approximately 2003      Asthma  last rescue inhaler use "maybe 3 years ago when I had the flu or a cold"      h/o   Fatty liver      Diverticulosis      Arthritis      Hypertension      Hypercholesterolemia  7/23/19 ; pt reports improvement ; pt denies rx      Morbid obesity      herniated lumbar disc      Depression      Anxiety      sickel cell anemia trait      ETOH abuse  states she quit alcohol in 2003      Lymphedema, limb  right side s/p right mastectomy      Neuropathy  b/l feet      Substance abuse  quit in 2003 -- cocaine and marijuana      Miscarriage  x 2      Breast cancer  2012  right breast -- had mastectomy and then post op chemo and RT, followed with Herceptin for 1 year  2012 last chemo   2013 may last Herceptin  2013 last RT      Thyroid nodule  had negative biopsy      Insomnia      Sickle cell trait      Former smoker      Lymphedema of right arm      Primary osteoarthritis of right knee      History of COPD      Diabetes mellitus      2002   h/o hysterectomy due to Fibroid--post op vaginal bleeding requiring a transfusion      2008  repair of ventral hernia with mesh  Revision 2018      Radical mastectomy of right breast  3/30/12      Cancer  2012  insertion of mediport -- to be excised on 9-22-14      Termination of pregnancy (fetus)  x 3      Cataract  Bilateral 2017      S/P arthroscopy of left shoulder        [] No significant past history as reviewed with the patient and family    MEDICATIONS  (STANDING):    MEDICATIONS  (PRN):    Allergies    environment--ragweed, dust, cats--sneezing and watery eyes, nasal congestion (Other)  ramipril (Angioedema)    Intolerances        Vital Signs Last 24 Hrs  T(C): 36.8 (06 May 2023 01:01), Max: 36.9 (05 May 2023 19:33)  T(F): 98.3 (06 May 2023 01:01), Max: 98.4 (05 May 2023 19:33)  HR: 64 (06 May 2023 01:01) (64 - 82)  BP: 124/58 (06 May 2023 01:01) (124/58 - 150/75)  BP(mean): --  RR: 18 (06 May 2023 01:01) (16 - 18)  SpO2: 100% (06 May 2023 01:01) (100% - 100%)    Parameters below as of 06 May 2023 01:01  Patient On (Oxygen Delivery Method): room air          PHYSICAL EXAM:  NAD                            7.7    6.11  )-----------( 433      ( 05 May 2023 23:06 )             24.0     05-05    136  |  96<L>  |  16  ----------------------------<  119<H>  4.1   |  23  |  0.91    Ca    9.8      05 May 2023 23:06  Mg     1.60     05-05    TPro  8.9<H>  /  Alb  3.5  /  TBili  0.4  /  DBili  x   /  AST  26  /  ALT  10  /  AlkPhos  144<H>  05-05    PT/INR - ( 05 May 2023 23:06 )   PT: 13.2 sec;   INR: 1.14 ratio         PTT - ( 05 May 2023 23:06 )  PTT:28.8 sec      IMAGING STUDIES:    ASSESSMENT AND PLAN  63y Female    Discussed with attending orthopaedic surgeon on call,     Orthopaedic Surgery Consult Note    HPI:  63F s/p R TKA w/ VASILE 4/24/23 with Dr. Givens presents today for pain behind the knee and increased swelling of the RLE that she noticed today. Prior to this she has been working with home PT on ROM and otherwise recovering well. She is ambulating with a cane. She denies fever, chills, wound drainage. She has been taking her  BID since surgery and has not missed any doses. She denies trauma. She states she has not been diligent about icing and elevating the RLE.     PAST MEDICAL & SURGICAL HISTORY:  Fibromyalgia      b/l  Carpal tunnel syndrome  tx PT/OT      Abdominal hernia in 2012 7/23/19 ; pt states hernia repair 2008, 2018      History  Uterine Fibroid  s/p Hysterectomy 7/02      Sleep apnea diagnosed 2007---supposed to use device but doesn't      Acid Reflux      hiatal hernia  last endoscopy 1.5 years ago      h/o b/l fungal foot infection  7/23./19 pt denies      Alcohol abuse--quit 8 years ago--goes to AA  ADDENDUM:  at PAST appointment  patient states she quit alcohol approximately 2003      Drug abuse--quit 8 years ago--goes to AA  ADDENDUM: at PAST appointment, patient states she quit alcohol in approximately 2003      Asthma  last rescue inhaler use "maybe 3 years ago when I had the flu or a cold"      h/o   Fatty liver      Diverticulosis      Arthritis      Hypertension      Hypercholesterolemia  7/23/19 ; pt reports improvement ; pt denies rx      Morbid obesity      herniated lumbar disc      Depression      Anxiety      sickel cell anemia trait      ETOH abuse  states she quit alcohol in 2003      Lymphedema, limb  right side s/p right mastectomy      Neuropathy  b/l feet      Substance abuse  quit in 2003 -- cocaine and marijuana      Miscarriage  x 2      Breast cancer  2012  right breast -- had mastectomy and then post op chemo and RT, followed with Herceptin for 1 year  2012 last chemo   2013 may last Herceptin  2013 last RT      Thyroid nodule  had negative biopsy      Insomnia      Sickle cell trait      Former smoker      Lymphedema of right arm      Primary osteoarthritis of right knee      History of COPD      Diabetes mellitus      2002   h/o hysterectomy due to Fibroid--post op vaginal bleeding requiring a transfusion      2008  repair of ventral hernia with mesh  Revision 2018      Radical mastectomy of right breast  3/30/12      Cancer  2012  insertion of mediport -- to be excised on 9-22-14      Termination of pregnancy (fetus)  x 3      Cataract  Bilateral 2017      S/P arthroscopy of left shoulder      MEDICATIONS  (STANDING):    MEDICATIONS  (PRN):    Allergies    environment--ragweed, dust, cats--sneezing and watery eyes, nasal congestion (Other)  ramipril (Angioedema)    Intolerances        Vital Signs Last 24 Hrs  T(C): 36.8 (06 May 2023 01:01), Max: 36.9 (05 May 2023 19:33)  T(F): 98.3 (06 May 2023 01:01), Max: 98.4 (05 May 2023 19:33)  HR: 64 (06 May 2023 01:01) (64 - 82)  BP: 124/58 (06 May 2023 01:01) (124/58 - 150/75)  BP(mean): --  RR: 18 (06 May 2023 01:01) (16 - 18)  SpO2: 100% (06 May 2023 01:01) (100% - 100%)    Parameters below as of 06 May 2023 01:01  Patient On (Oxygen Delivery Method): room air          PHYSICAL EXAM:  NAD  RLE:  Incision c/d/i w/o no erythema or ecchymosis  RLE slightly swollen compared to contralateral  5/5 EHL/FHL/TA/Gastrocnemius  Painless ROM hip/ankle  ROM knee 0-70  SILT DP/SP/S/S/T nerve distributions  Compartments soft and compressible  2+ Dorsalis Pedis pulse   +Jim's sign                          7.7    6.11  )-----------( 433      ( 05 May 2023 23:06 )             24.0     05-05    136  |  96<L>  |  16  ----------------------------<  119<H>  4.1   |  23  |  0.91    Ca    9.8      05 May 2023 23:06  Mg     1.60     05-05    TPro  8.9<H>  /  Alb  3.5  /  TBili  0.4  /  DBili  x   /  AST  26  /  ALT  10  /  AlkPhos  144<H>  05-05    PT/INR - ( 05 May 2023 23:06 )   PT: 13.2 sec;   INR: 1.14 ratio         PTT - ( 05 May 2023 23:06 )  PTT:28.8 sec      IMAGING STUDIES:  XR R knee: allowing for rotation, no change in alignment from immediate postop film, no fracture, small effusion    ASSESSMENT AND PLAN  63y Female with RLE swelling and popliteal pain s/p R TKA 4/24  - Duplex and XR negative  - WBAT RLE, continue to work with home PT  - Aquacel dressing replaced over knee  - Pain control PRN  - Continue  BID  - Follow up with Dr. Givens as scheduled on 5/9    Discussed with attending orthopaedic surgeon, Dr. Givens.

## 2023-05-06 NOTE — ED ADULT NURSE REASSESSMENT NOTE - NS ED NURSE REASSESS COMMENT FT1
Break RN note- Patient resting quietly in bed, breathing even and nonlabored. No acute distress. Patient complaining of right knee pain and requesting tylenol. Dr. Rose made aware. Patient to be medicated as ordered. Safety maintained. Patient stable upon exiting the room.
Pt at baseline mental status, breathing even and unlabored in bed. Pt denies chest pain, SOB, dizziness, headache, blurry vision, chills. Bed in lowest position, call bell within reach.

## 2023-05-09 ENCOUNTER — APPOINTMENT (OUTPATIENT)
Dept: ORTHOPEDIC SURGERY | Facility: CLINIC | Age: 63
End: 2023-05-09
Payer: MEDICARE

## 2023-05-09 VITALS — OXYGEN SATURATION: 97 % | DIASTOLIC BLOOD PRESSURE: 72 MMHG | HEART RATE: 79 BPM | SYSTOLIC BLOOD PRESSURE: 119 MMHG

## 2023-05-09 PROCEDURE — 73562 X-RAY EXAM OF KNEE 3: CPT | Mod: RT

## 2023-05-09 PROCEDURE — 99024 POSTOP FOLLOW-UP VISIT: CPT

## 2023-05-09 NOTE — HISTORY OF PRESENT ILLNESS
[de-identified] : 5/9/2023\par \par Ms. Daley presents to the office for follow-up of her right total knee arthroplasty.  Patient underwent right TKA on 4/24/2023.  She is currently doing well.  Patient did go to the emergency department on 5/5/2023 with pain and swelling of her right lower extremity.  US Duplex was negative.  Her pain and swelling have improved over the last few days.  Her pain is well controlled at this time.  She is taking occasional tramadol at night for her pain.  Patient is walking with a cane.  She has finished her home physical therapy.  No fevers or chills.  Patient remains on Aspirin 325mg twice per day for DVT prophylaxis.\par \par \par 4/7/2023\par \par Ms. Daley presented to the office for follow-up of her bilateral knee pain.  Patient continues to experience bilateral (right greater than left) knee pain.  She also reports some right knee stiffness and occasional lower back pain.  Patient has some neuropathy in her hands and feet.  Patient continues to experience pain is affecting her quality of life and daily activities.  She has tried multiple knee injections, last in August 2022.  She has also tried physical therapy and has been in pain management.  Patient is planned for a right total knee arthroplasty.  She has a stress test scheduled for Wednesday.  Patient is currently taking meloxicam for her pain.\par \par 2/10/2023\par Ms. Daley is a 62-year-old female presents to the office for follow up of her bilateral knee pain.  Patient has been experiencing bilateral (right greater than left) knee pain for about 30 years.  She states that it has been worse over the last 6 to 8 years. Pain is now affecting her quality of life and daily activities.  Pain is worse when lying down and patient tries to keep her feet elevated.  Right knee pain is worse than her left at this time.  Patient has tried meloxicam, gabapentin, and Tylenol for the pain.  She has a history of chronic pain and has been in pain management for about 20 years at [facility].  Patient has stopped taking methadone and is currently only taking tramadol for her pain.  She did have some withdrawal from her opioid medications.  Patient has tried multiple knee injections, including cortisone injections for many years.  She has tried about 4 series of viscosupplementation injections.  Her last knee joint injections were in August 2022, which did not help.  She states that she received 3 gel injections in the right knee and 2 in the left, but pain worsened and she did not finish the left knee series.  She had an injection in her right IT band in November 2022.  Patient is also tried physical therapy for her knees, last in October 2022. She has tried physical therapy for her back pain, but her knee pain limited her back PT. Patient has continued her weight loss and her BMI: 39.26.  No recent trauma.  No fevers or chills.  Patient was recently started on Symbicort by her pulmonologist.  She would like to move her surgical date from March into April.\par \par \par History: HTN, Diverticulitis, GERD, DEREK, History of Breast Cancer, Alcoholism (19 years sober), Lymphedema in Right Arm, Fibromyalgia, Neuropathy, Bipolar, Diabetes (Last A1C: 6.1)

## 2023-05-09 NOTE — PHYSICAL EXAM
[de-identified] : Constitutional: 63 year old female, alert and oriented, cooperative, in no acute distress.\par \par HEENT \par NC/AT.  Appearance: symmetric\par \par Neck/Back\par Straight without deformity or instability.  Good ROM.\par \par Chest/Respiratory \par Respiratory effort: no intercostal retractions or use of accessory muscles. Nonlabored Breathing\par \par Skin \par On inspection, warm and dry without rashes or lesions.\par \par Mental Status: \par Judgment, insight: intact\par Orientation: oriented to time, place, and person\par \par Neurological:\par Sensory and Motor are grossly intact throughout\par \par Right Knee\par \par Inspection:\par     Incision well healing. No erythema or drainage\par     No Effusion\par     Non-tender to palpation over tibial tubercle, patella, medial and lateral joint line, and pes insertion.\par \par Range of Motion:\par 	Extension - 0 degrees\par 	Flexion - 90 degrees\par 	Alignment - Valgus 5 degrees\par 	Extensor lag: None\par \par Stability:\par      Demonstrates no Varus or Valgus instability\par      Negative Anterior or Posterior drawer.\par      No mid flexion instability\par \par Patella: stable, tracks well. \par \par Neurologic Exam\par     Motor intact including 5/5 Extensor Hallucis Longus, 5/5 Flexor Hallucis Longus, 5/5 Tibialis Anterior and 5/5 Gastrocnemius\par     Sensation Intact to Light Touch including Saphenous, Sural, Superficial Peroneal, Deep Peroneal, Tibial nerve distributions\par \par Vascular Exam\par     Foot is warm and well perfused with 2+ Dorsalis Pedis Pulse \par \par No pain with range of motion of the bilateral hips or left knee. No lumbar paraspinal muscle tenderness.  [de-identified] : XRay:  XRays of the Right Knee (3 Views) taken in the office today and reviewed with the patient. XRays demonstrate a Right Total Knee Arthroplasty in good position and alignment. There is no obvious evidence of fracture, dislocation, osteolysis or loosening. (my personal interpretation)\par Components: Smith [employer] Nephew Journey II BCS Cemented\par \par \par \par ACC: [id number] EXAM: XR KNEE 3 VIEWS RT ORDERED BY: EZRA SIERRA\par \par PROCEDURE DATE: 05/05/2023\par \par \par \par INTERPRETATION: CLINICAL INFORMATION: Pain and swelling status post total knee arthroplasty\par \par EXAM: 3 views of the right knee\par \par COMPARISON: X-ray right knee 4/24/2023\par \par IMPRESSION:\par \par Right total knee arthroplasty is in near-anatomic alignment. Moderate knee joint effusion. No acute displaced fracture.\par \par --- End of Report ---\par \par \par BILL ADAMSON MD; Resident Radiologist\par This document has been electronically signed.\par MADELYN CINTRON M.D., ATTENDING RADIOLOGIST\par This document has been electronically signed. May 6 2023 9:09AM\par \par \par \par ACC: [id number] EXAM: US DPLX LWR EXT VEINS LTD RT ORDERED BY: EZRA SIERRA\par \par PROCEDURE DATE: 05/05/2023\par \par \par INTERPRETATION: CLINICAL HISTORY: Postop swelling\par \par COMPARISON: Ultrasound dated 1/8/2023.\par \par TECHNIQUE: Grayscale color and Doppler examination of the right lower extremity deep venous systems.\par \par FINDINGS:\par \par Sonographic evaluation of the right common femoral vein, superficial femoral vein and popliteal vein shows normal flow, compressibility, respiratory variation and augmentation.\par \par IMPRESSION:\par \par No evidence for acute DVT in the right lower extremity deep venous systems.\par \par --- End of Report ---\par \par BRIANA GALAN MD; Attending Radiologist\par This document has been electronically signed. May 5 2023 10:03PM

## 2023-05-09 NOTE — DISCUSSION/SUMMARY
[de-identified] : Ms. Daley is a 66-year-old female who presents to the office for follow-up of her right total knee arthroplasty.  Patient underwent right TKA on 4/20/2023.  She is currently doing well.  Patient to go to the emergency department on 5/5/2023 with right lower extremity pain and swelling, but this has significantly improved over the last few days.  Ultrasound duplex was negative.  X-rays showed a right total knee arthroplasty in good position and alignment.  Discussed with the patient the examination and imaging findings.  Discussed the recovery from total knee arthroplasty, including physical therapy and DVT prophylaxis.  Patient was given a referral for outpatient physical therapy.  She will continue her Aspirin 325mg twice per day for a total of 6 weeks for her DVT prophylaxis.  Discussed that patient may shower, but should not soak the wound.  Patient would like additional tramadol for her pain at night.  Tramadol prescription (Max 2 per day for 5 days, Total: 10) was given.  Patient will follow-up in 4 weeks for reevaluation and management.  Patient understanding and in agreement the plan.  All questions answered.\par \par Plan\par -Physical therapy\par -Aspirin 325mg twice per day for total of 6 weeks\par -Pain control\par -Patient may shower, but should not soak the wound\par -Follow-up in 4 weeks for reevaluation and management

## 2023-05-10 ENCOUNTER — TRANSCRIPTION ENCOUNTER (OUTPATIENT)
Age: 63
End: 2023-05-10

## 2023-05-11 LAB
CULTURE RESULTS: SIGNIFICANT CHANGE UP
CULTURE RESULTS: SIGNIFICANT CHANGE UP
SPECIMEN SOURCE: SIGNIFICANT CHANGE UP
SPECIMEN SOURCE: SIGNIFICANT CHANGE UP

## 2023-05-18 ENCOUNTER — TRANSCRIPTION ENCOUNTER (OUTPATIENT)
Age: 63
End: 2023-05-18

## 2023-05-24 ENCOUNTER — TRANSCRIPTION ENCOUNTER (OUTPATIENT)
Age: 63
End: 2023-05-24

## 2023-06-10 PROBLEM — R05.9 COUGH: Status: ACTIVE | Noted: 2023-01-19

## 2023-06-10 NOTE — PHYSICAL EXAM
[No Acute Distress] : no acute distress [Normal Oropharynx] : normal oropharynx [No Neck Mass] : no neck mass [Normal Appearance] : normal appearance [Normal Rate/Rhythm] : normal rate/rhythm [Murmur ___ / 6] : murmur [unfilled] / 6 [Normal S1, S2] : normal s1, s2 [No Resp Distress] : no resp distress [Clear to Auscultation Bilaterally] : clear to auscultation bilaterally [No Abnormalities] : no abnormalities [Benign] : benign [No Clubbing] : no clubbing [No Cyanosis] : no cyanosis [FROM] : FROM [Normal Color/ Pigmentation] : normal color/ pigmentation [No Focal Deficits] : no focal deficits [Oriented x3] : oriented x3 [Normal Affect] : normal affect [TextBox_2] : Increased BMI [TextBox_99] : walks with cane [TextBox_105] : R upper extremity edema

## 2023-06-10 NOTE — DISCUSSION/SUMMARY
[FreeTextEntry1] : 62-year-old female with history of MADHURI with related complaints.  Treatment with CPAP was recommended.  PSG will be repeated.  She is to use albuterol as needed alone for now.  PFT will be performed in the future.  She is to follow-up with her PMD as before.

## 2023-06-10 NOTE — REVIEW OF SYSTEMS
[Fatigue] : fatigue [Dyspnea] : no dyspnea [Arthralgias] : arthralgias [Chronic Pain] : chronic pain [Negative] : Endocrine

## 2023-06-10 NOTE — HISTORY OF PRESENT ILLNESS
[Former] : former [< 20 pack-years] : < 20 pack-years [TextBox_4] : 62-year-old female with history of MADHURI greater than 13 years presents for follow-up.  Patient was treated initially with CPAP has not been treated for nearly 12 years.  She complains of feeling "tired" associated with snoring daytime somnolence and fatigue.  She is scheduled to have a total knee replacement in the near future. [Awakes Unrefreshed] : awakes unrefreshed [Daytime Somnolence] : daytime somnolence [Fatigue] : fatigue [Snoring] : snoring

## 2023-06-13 ENCOUNTER — APPOINTMENT (OUTPATIENT)
Dept: ORTHOPEDIC SURGERY | Facility: CLINIC | Age: 63
End: 2023-06-13
Payer: MEDICARE

## 2023-06-13 PROCEDURE — 99024 POSTOP FOLLOW-UP VISIT: CPT

## 2023-06-13 PROCEDURE — 73562 X-RAY EXAM OF KNEE 3: CPT | Mod: RT

## 2023-06-18 NOTE — HISTORY OF PRESENT ILLNESS
[de-identified] : 6/13/2023\par \par Stephanie Daley is a 63-year-old female who presents to the office for follow-up of her right total knee arthroplasty.  Patient underwent right TKA on 4/24/2023.  She is currently doing well.  Right knee pain is significantly improved compared to prior to surgery.  She is currently in physical therapy. Patient continues to take her Aspirin 325 mg twice per day for DVT prophylaxis.  Patient has been doing home exercises on a bicycle. She is currently walking with a cane when outside, but is not using any assistive devices in the home.  Patient currently reports left lower back and left knee pain.  She continues to have left knee pain.  She had a recent RFA on her right lower back.  She is currently taking tramadol, gabapentin and Tylenol.  She states for the majority of her pain is currently in her left knee.  Left knee pain continues to affect her quality of life.  Patient has tried injections, physical therapy, and pain medications for her left knee.  Her last left knee injection was in August 2022.  She would like to discuss left total knee arthroplasty.\par \par 5/9/2023\par \par Ms. Daley presents to the office for follow-up of her right total knee arthroplasty.  Patient underwent right TKA on 4/24/2023.  She is currently doing well.  Patient did go to the emergency department on 5/5/2023 with pain and swelling of her right lower extremity.  US Duplex was negative.  Her pain and swelling have improved over the last few days.  Her pain is well controlled at this time.  She is taking occasional tramadol at night for her pain.  Patient is walking with a cane.  She has finished her home physical therapy.  No fevers or chills.  Patient remains on Aspirin 325mg twice per day for DVT prophylaxis.\par \par \par 4/7/2023\par \par Ms. Daley presented to the office for follow-up of her bilateral knee pain.  Patient continues to experience bilateral (right greater than left) knee pain.  She also reports some right knee stiffness and occasional lower back pain.  Patient has some neuropathy in her hands and feet.  Patient continues to experience pain is affecting her quality of life and daily activities.  She has tried multiple knee injections, last in August 2022.  She has also tried physical therapy and has been in pain management.  Patient is planned for a right total knee arthroplasty.  She has a stress test scheduled for Wednesday.  Patient is currently taking meloxicam for her pain.\par \par 2/10/2023\par Ms. Daley is a 62-year-old female presents to the office for follow up of her bilateral knee pain.  Patient has been experiencing bilateral (right greater than left) knee pain for about 30 years.  She states that it has been worse over the last 6 to 8 years. Pain is now affecting her quality of life and daily activities.  Pain is worse when lying down and patient tries to keep her feet elevated.  Right knee pain is worse than her left at this time.  Patient has tried meloxicam, gabapentin, and Tylenol for the pain.  She has a history of chronic pain and has been in pain management for about 20 years at [facility].  Patient has stopped taking methadone and is currently only taking tramadol for her pain.  She did have some withdrawal from her opioid medications.  Patient has tried multiple knee injections, including cortisone injections for many years.  She has tried about 4 series of viscosupplementation injections.  Her last knee joint injections were in August 2022, which did not help.  She states that she received 3 gel injections in the right knee and 2 in the left, but pain worsened and she did not finish the left knee series.  She had an injection in her right IT band in November 2022.  Patient is also tried physical therapy for her knees, last in October 2022. She has tried physical therapy for her back pain, but her knee pain limited her back PT. Patient has continued her weight loss and her BMI: 39.26.  No recent trauma.  No fevers or chills.  Patient was recently started on Symbicort by her pulmonologist.  She would like to move her surgical date from March into April.\par \par \par History: HTN, Diverticulitis, GERD, DEREK, History of Breast Cancer, Alcoholism (19 years sober), Lymphedema in Right Arm, Fibromyalgia, Neuropathy, Bipolar, Diabetes (Last A1C: 6.1)

## 2023-06-18 NOTE — DISCUSSION/SUMMARY
[de-identified] : Ms. Daley is a 66-year-old female who presents to the office for follow-up of her right total knee arthroplasty and management of her left knee osteoarthritis.  Patient underwent right TKA on 4/20/2023.  She is currently doing well.  Patient reports no significant right knee pain at this time.  She states majority of her pain is in her left knee.  Her left knee pain continues to affect her quality of life and daily activities.  X-rays showed a right total knee arthroplasty in good position and alignment.  Prior left knee x-rays showed severe left knee osteoarthritis.  Discussed with the patient the examination and imaging findings.  Discussed the recovery from total knee arthroplasty, including physical therapy and DVT prophylaxis.  Patient will continue her physical therapy.  She will continue her Aspirin 325mg twice per day for a total of 6 weeks for her DVT prophylaxis. Discussed with patient the operative and nonoperative management of her left knee osteoarthritis, including total knee arthroplasty.  Discussed the nonoperative management of knee osteoarthritis, including physical therapy, anti-inflammatories, and injections.  Patient has tried nonoperative management, but continues to have knee pain.  Discussed total knee arthroplasty at length, including surgical procedure, hospital course, DVT prophylaxis, antibiotics, physical therapy, recovery, and risks. Patient would like to proceed with left total knee arthroplasty.  Discussed that patient will require medical, pulmonary, and cardiac clearance prior to surgery. Patient would like left total knee arthroplasty in September. Patient will follow-up in 6 weeks for reevaluation and management.  Patient understanding and in agreement the plan.  All questions answered.\par \par Discussed the imaging and physical exam findings with the patient consistent with endstage knee degenerative disease. The patient has failed conservative management including physical therapy, injections, and pain control. The risks, benefits and alternatives to total knee replacement were discussed with the patient in detail and the patient elected to proceed with surgery. Discussed the surgical plan with the patient including implant options and surgical approach. \par \par Surgical risks including fracture requiring fixation, instability or dislocation, temporary or permanent leg length inequality, infection, bleeding, stiffness, failure to alleviate pain, failure to achieve desired results, need for further surgery, scar tissue formation, hardware failure, chronic pain, injury to nerves resulting in extremity dysfunction, injury to arteries and veins, deep vein thrombosis or pulmonary embolism requiring anticoagulation and medical risk factors including heart attack, stroke, death, neurological injury, pneumonia, kidney or other organ failure were discussed with the patient. \par \par Patient was understanding and in agreement with the treatment plan. All questions answered.\par \par Plan\par -Physical therapy\par -Complete Aspirin 325mg twice per day for total of 6 weeks\par -Pain control\par -Follow-up in 6 weeks for reevaluation and management, planning of Left TKA\par \par \par \par Surgical Plan:\par Diagnosis: Left Knee Osteoarthritis\par Laterality: Left\par Operative procedure: Left Total Knee Arthroplasty\par Location: [facility]\par \par Clearances:\par  Medical: Pending\par  Cardiac: Pending\par  Pulmonary: Pending\par  Rheumatology: Pending\par \par Comorbidities:\par  Metal Allergy: Positive (rashes to jewelry) (Angioedema to Ramipril)\par  Chronic Pain: Positive, Off of Methadone. Currently on Tramadol, Gabapentin, and Tylenol\par  Diabetes: Positive, Last A1C: 6.1\par  Use of Anticoagulation: Negative\par  Atrial Fibrillation: Negative\par  History of VTE: Negative\par  History of Cardiac Stents: Negative\par  Skin Infections/Open Wounds: Negative\par  MRSA Infection/Colonization: Negative\par  Current Urinary Symptoms: Negative\par  Immunocompromise: Negative\par  Inflammatory Arthritis: History of Lupus Antibodies (Possibly positive due to COVID)\par  Smoking: Negative\par  Drug Use: Negative\par  Alcohol Use: Sober for 19 years\par  Obstructive Sleep Apnea: Positive\par  Neurologic Disease: History of neuropathy in hands/feet, Has tremor (treated by Neurology), History of Fibromyalgia.

## 2023-06-18 NOTE — PHYSICAL EXAM
[de-identified] : XRay:  XRays of the Right Knee (3 Views) taken in the office today and reviewed with the patient. XRays demonstrate a Right Total Knee Arthroplasty in good position and alignment. There is no obvious evidence of fracture, dislocation, osteolysis or loosening. (my personal interpretation)\par Components: Smith and Nephew Journey II BCS Cemented\par \par XRay: XRays of the Left Knee (4 Views) taken on 4/7/2023. XRays demonstrate tricompartmental joint space narrowing, with bone on bone articulations in the medial compartment, with subchondral sclerosis, overlying osteophytes, all consistent with severe osteoarthritis, KL rdGrdrrdarddrderd:rd rd3rd. There is varus alignment. (my personal interpretation) \par \par \par \par ACC: [id number] EXAM: XR KNEE 3 VIEWS RT ORDERED BY: EZRA SIERRA\par \par PROCEDURE DATE: 05/05/2023\par \par \par \par INTERPRETATION: CLINICAL INFORMATION: Pain and swelling status post total knee arthroplasty\par \par EXAM: 3 views of the right knee\par \par COMPARISON: X-ray right knee 4/24/2023\par \par IMPRESSION:\par \par Right total knee arthroplasty is in near-anatomic alignment. Moderate knee joint effusion. No acute displaced fracture.\par \par --- End of Report ---\par \par \par BILL ADAMSON MD; Resident Radiologist\par This document has been electronically signed.\par MADELYN CINTRON M.D., ATTENDING RADIOLOGIST\par This document has been electronically signed. May 6 2023 9:09AM\par \par \par \par ACC: [id number] EXAM: US DPLX LWR EXT VEINS LTD RT ORDERED BY: EZRA SIERRA\par \par PROCEDURE DATE: 05/05/2023\par \par \par INTERPRETATION: CLINICAL HISTORY: Postop swelling\par \par COMPARISON: Ultrasound dated 1/8/2023.\par \par TECHNIQUE: Grayscale color and Doppler examination of the right lower extremity deep venous systems.\par \par FINDINGS:\par \par Sonographic evaluation of the right common femoral vein, superficial femoral vein and popliteal vein shows normal flow, compressibility, respiratory variation and augmentation.\par \par IMPRESSION:\par \par No evidence for acute DVT in the right lower extremity deep venous systems.\par \par --- End of Report ---\par \par BRIANA GALAN MD; Attending Radiologist\par This document has been electronically signed. May 5 2023 10:03PM [de-identified] : Constitutional: 63 year old female, alert and oriented, cooperative, in no acute distress.\par \par HEENT \par NC/AT.  Appearance: symmetric\par \par Neck/Back\par Straight without deformity or instability.  Good ROM.\par \par Chest/Respiratory \par Respiratory effort: no intercostal retractions or use of accessory muscles. Nonlabored Breathing\par \par Skin \par On inspection, warm and dry without rashes or lesions.\par \par Mental Status: \par Judgment, insight: intact\par Orientation: oriented to time, place, and person\par \par Neurological:\par Sensory and Motor are grossly intact throughout\par \par Right Knee\par \par Inspection:\par     Incision well healing. No erythema or drainage\par     No Effusion\par     Non-tender to palpation over tibial tubercle, patella, medial and lateral joint line, and pes insertion.\par \par Range of Motion:\par 	Extension - 0 degrees\par 	Flexion - 115 degrees\par 	Alignment - Valgus 5 degrees\par 	Extensor lag: None\par \par Stability:\par      Demonstrates no Varus or Valgus instability\par      Negative Anterior or Posterior drawer.\par      No mid flexion instability\par \par Patella: stable, tracks well. \par \par Left Knee\par \par Inspection:\par     Skin intact, no rashes or lesions\par     No Effusion\par     Non-tender to palpation over tibial tubercle, patella, and pes insertion.\par     Tenderness over the medial and lateral joint lines at the midcoronal line\par \par Range of Motion:\par 	Extension - -5 degrees\par 	Flexion - 110 degrees\par 	Alignment - Varus 5 degrees\par 	Extensor lag: None\par \par Stability:\par      Demonstrates no Varus or Valgus instability\par      Negative Anterior or Posterior drawer.\par      Negative Lachman's\par \par Patella: stable, tracks well. \par \par Neurologic Exam\par     Motor intact including 5/5 Extensor Hallucis Longus, 5/5 Flexor Hallucis Longus, 5/5 Tibialis Anterior and 5/5 Gastrocnemius\par     Sensation Intact to Light Touch including Saphenous, Sural, Superficial Peroneal, Deep Peroneal, Tibial nerve distributions\par \par Vascular Exam\par     Foot is warm and well perfused with 2+ Dorsalis Pedis Pulse

## 2023-06-19 NOTE — ED PROVIDER NOTE - CROS ED ROS STATEMENT
Is This A New Presentation, Or A Follow-Up?: Skin Lesions How Severe Is Your Skin Lesion?: mild all other ROS negative except as per HPI

## 2023-06-30 ENCOUNTER — NON-APPOINTMENT (OUTPATIENT)
Age: 63
End: 2023-06-30

## 2023-07-03 ENCOUNTER — NON-APPOINTMENT (OUTPATIENT)
Age: 63
End: 2023-07-03

## 2023-07-07 ENCOUNTER — RX RENEWAL (OUTPATIENT)
Age: 63
End: 2023-07-07

## 2023-07-14 ENCOUNTER — NON-APPOINTMENT (OUTPATIENT)
Age: 63
End: 2023-07-14

## 2023-07-17 ENCOUNTER — APPOINTMENT (OUTPATIENT)
Dept: INTERNAL MEDICINE | Facility: CLINIC | Age: 63
End: 2023-07-17
Payer: MEDICARE

## 2023-07-17 VITALS
HEIGHT: 61 IN | HEART RATE: 60 BPM | DIASTOLIC BLOOD PRESSURE: 71 MMHG | BODY MASS INDEX: 40.22 KG/M2 | TEMPERATURE: 98 F | OXYGEN SATURATION: 98 % | RESPIRATION RATE: 15 BRPM | WEIGHT: 213 LBS | SYSTOLIC BLOOD PRESSURE: 118 MMHG

## 2023-07-17 DIAGNOSIS — I10 ESSENTIAL (PRIMARY) HYPERTENSION: ICD-10-CM

## 2023-07-17 DIAGNOSIS — R60.0 LOCALIZED EDEMA: ICD-10-CM

## 2023-07-17 DIAGNOSIS — Z00.00 ENCOUNTER FOR GENERAL ADULT MEDICAL EXAMINATION W/OUT ABNORMAL FINDINGS: ICD-10-CM

## 2023-07-17 PROCEDURE — 99397 PER PM REEVAL EST PAT 65+ YR: CPT | Mod: 25,GY

## 2023-07-17 PROCEDURE — G0009: CPT

## 2023-07-17 PROCEDURE — 90677 PCV20 VACCINE IM: CPT

## 2023-07-17 RX ORDER — FLUTICASONE PROPIONATE AND SALMETEROL XINAFOATE 230; 21 UG/1; UG/1
230-21 AEROSOL, METERED RESPIRATORY (INHALATION)
Qty: 3 | Refills: 3 | Status: DISCONTINUED | COMMUNITY
Start: 2023-01-26 | End: 2023-07-17

## 2023-07-17 RX ORDER — MELOXICAM 7.5 MG/1
7.5 TABLET ORAL DAILY
Qty: 60 | Refills: 0 | Status: DISCONTINUED | COMMUNITY
Start: 2020-09-12 | End: 2023-07-17

## 2023-07-17 RX ORDER — IPRATROPIUM BROMIDE 21 UG/1
0.03 SPRAY NASAL
Refills: 0 | Status: DISCONTINUED | COMMUNITY
Start: 2020-09-18 | End: 2023-07-17

## 2023-07-17 RX ORDER — PROPRANOLOL HYDROCHLORIDE 10 MG/1
10 TABLET ORAL TWICE DAILY
Qty: 360 | Refills: 3 | Status: ACTIVE | COMMUNITY
Start: 2022-11-14 | End: 1900-01-01

## 2023-07-20 ENCOUNTER — APPOINTMENT (OUTPATIENT)
Dept: PULMONOLOGY | Facility: CLINIC | Age: 63
End: 2023-07-20
Payer: MEDICARE

## 2023-07-20 VITALS
WEIGHT: 217 LBS | BODY MASS INDEX: 41 KG/M2 | OXYGEN SATURATION: 98 % | TEMPERATURE: 97.3 F | SYSTOLIC BLOOD PRESSURE: 136 MMHG | DIASTOLIC BLOOD PRESSURE: 78 MMHG | HEART RATE: 87 BPM

## 2023-07-20 DIAGNOSIS — R53.83 OTHER FATIGUE: ICD-10-CM

## 2023-07-20 DIAGNOSIS — J44.9 CHRONIC OBSTRUCTIVE PULMONARY DISEASE, UNSPECIFIED: ICD-10-CM

## 2023-07-20 DIAGNOSIS — G47.30 SLEEP APNEA, UNSPECIFIED: ICD-10-CM

## 2023-07-20 PROCEDURE — 99214 OFFICE O/P EST MOD 30 MIN: CPT

## 2023-07-22 PROBLEM — R53.83 FATIGUE: Status: ACTIVE | Noted: 2023-07-22

## 2023-07-22 PROBLEM — J44.9 OAD (OBSTRUCTIVE AIRWAY DISEASE): Status: ACTIVE | Noted: 2023-04-21

## 2023-07-22 PROBLEM — G47.30 SLEEP APNEA: Status: ACTIVE | Noted: 2023-07-22

## 2023-07-25 ENCOUNTER — NON-APPOINTMENT (OUTPATIENT)
Age: 63
End: 2023-07-25

## 2023-07-25 ENCOUNTER — LABORATORY RESULT (OUTPATIENT)
Age: 63
End: 2023-07-25

## 2023-07-25 ENCOUNTER — APPOINTMENT (OUTPATIENT)
Dept: RHEUMATOLOGY | Facility: CLINIC | Age: 63
End: 2023-07-25
Payer: MEDICARE

## 2023-07-25 ENCOUNTER — APPOINTMENT (OUTPATIENT)
Dept: ORTHOPEDIC SURGERY | Facility: CLINIC | Age: 63
End: 2023-07-25

## 2023-07-25 VITALS
HEIGHT: 61 IN | RESPIRATION RATE: 16 BRPM | DIASTOLIC BLOOD PRESSURE: 75 MMHG | OXYGEN SATURATION: 97 % | BODY MASS INDEX: 40.4 KG/M2 | WEIGHT: 214 LBS | HEART RATE: 56 BPM | TEMPERATURE: 98.1 F | SYSTOLIC BLOOD PRESSURE: 125 MMHG

## 2023-07-25 PROCEDURE — 99214 OFFICE O/P EST MOD 30 MIN: CPT

## 2023-07-25 NOTE — PHYSICAL EXAM
[General Appearance - Alert] : alert [General Appearance - In No Acute Distress] : in no acute distress [Auscultation Breath Sounds / Voice Sounds] : lungs were clear to auscultation bilaterally [Heart Sounds] : normal S1 and S2 [Edema] : there was no peripheral edema [No Spinal Tenderness] : no spinal tenderness [Nail Clubbing] : no clubbing  or cyanosis of the fingernails [Musculoskeletal - Swelling] : no joint swelling seen [Motor Tone] : muscle strength and tone were normal [] : no rash [Skin Lesions] : no skin lesions [Motor Exam] : the motor exam was normal [Oriented To Time, Place, And Person] : oriented to person, place, and time [Impaired Insight] : insight and judgment were intact [FreeTextEntry1] : No active synovitis;

## 2023-07-25 NOTE — ASSESSMENT
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  Patient would like to reinitiate low-dose disease modifying agent of hydroxychloroquine to help with arthralgias in the setting of DORCAS positivity after joint replacements.  Patient understands the importance of regular ophthalmology follow-up in the setting of retinal toxicity risk in the setting of hydroxychloroquine use.   \par Patient's ophthalmologist, Dr. Stallworth follows patient regularly and is in agreement with HCQ use.\par \par In the setting of repeat mixing study positivity, continues on baby aspirin twice daily.  Heme-onc colleagues to continue to monitor ; additional anticoagulation is felt not needed at this time as patient has not had thrombotic event in the past.\par \par Patient will benefit from continued physical therapy to help with joint mobility and muscle strengthening, notably after surgical replacement. \par \par Patient understands the importance of weight loss reduction in the setting of HTN, DM and its association of cardiovascular risk.  Whole food plant-based diet encouraged.  The importance of dietary and lifestyle modifications was reiterated for the treatment of Fibromyalgia along with discussions on relaxation, stress-reducing techniques and importance of good sleep hygiene.  \par \par She is in agreement with the above plan and will return in three months' time.\par \par

## 2023-07-25 NOTE — HISTORY OF PRESENT ILLNESS
[FreeTextEntry1] : Patient underwent RT TKR in April and reports feeling well without pain or complication ; LT knee now with moderate pain and laxity symptoms ; knee films reflect advanced bilateral knee tricompartmental osteoarthritis with medial tibiofemoral compartment predominance:  she explains tapering off methadone with increased appetite and weight gain. \par \par She had explained  diffuse arthralgias since COVID-19 infection over the summer 2022 and once again July 2023 with minimal symptoms.   Patient with with blood testing in August 2022 reflecting DORCAS positivity 1: 320 in a speckled pattern with positive SS-A in the setting of elevated inflammatory markers. Patient initiated hydroxychloroquine therapy x 1 month before discontinuing as she felt it was offering minimal relief. Antiphospholipid antibodies were also noted the summer 2022 after COVID infection and repeated before the end of the year with repeat silica and mixing studies positive.  She denies history of thrombotic events.  Patient under the care of heme-onc colleagues and continues on baby aspirin daily.  Patient with history of HER2 positive breast CA status post right mastectomy with chemotx and radiation.\par \par Patient with longstanding history of fibromyalgia patient on multiple neuropathic pain therapies along with mood stabilizers. She denies accompanied swelling of the joints.   She explains long camarena of alcoholism and has been free of alcohol for the last 18 years as well as free of drug use from cocaine. She explains she has had shakiness and her tremors that are currently under evaluation and has initiated propranolol.\par \par \par She otherwise denies visual disturbances, oral ulcers, dyspnea, chest pain, motor disturbances, Raynaud's, rash or systemic symptoms. \par

## 2023-07-27 ENCOUNTER — APPOINTMENT (OUTPATIENT)
Dept: ORTHOPEDIC SURGERY | Facility: CLINIC | Age: 63
End: 2023-07-27
Payer: MEDICARE

## 2023-07-27 PROCEDURE — 73564 X-RAY EXAM KNEE 4 OR MORE: CPT | Mod: 50

## 2023-07-27 PROCEDURE — 99214 OFFICE O/P EST MOD 30 MIN: CPT

## 2023-08-03 ENCOUNTER — RX RENEWAL (OUTPATIENT)
Age: 63
End: 2023-08-03

## 2023-08-03 ENCOUNTER — NON-APPOINTMENT (OUTPATIENT)
Age: 63
End: 2023-08-03

## 2023-08-06 NOTE — DISCUSSION/SUMMARY
[de-identified] : Denia Daley is a 63-year-old female who presents the office for follow-up of her right total knee arthroplasty and her left knee osteoarthritis.  Patient's right total knee arthroplasty is currently doing well.  She does not have significant right knee pain at this time.  However, patient reports significant left knee pain that is affecting her quality of life.  X-rays showed likely knee arthroplasty in good position alignment.  Left knee x-ray showed severe left knee osteoarthritis.  Examination showed good right knee range of motion (0 to 120 degrees).  There was tenderness over the left knee and decreased left knee range of motion.  Discussed with patient the examination and imaging findings.  Discussed with patient the recovery from her right total knee arthroplasty, including physical therapy.  Patient will continue her physical therapy and home exercises for her right knee.  Discussed the management of her left knee osteoarthritis. Discussed with patient the operative and nonoperative management of knee osteoarthritis, including total knee arthroplasty.  Discussed the nonoperative management of knee osteoarthritis, including physical therapy, anti-inflammatories, and injections.  Patient has tried nonoperative management, but continues to have knee pain.  Discussed total knee arthroplasty at length, including surgical procedure, hospital course, DVT prophylaxis, antibiotics, physical therapy, recovery, and risks. Patient would like to proceed with total knee arthroplasty.  Discussed that patient will require medical, cardiac, and pulmonary clearance prior to surgery. Patient understanding and in agreement with the plan.  All questions answered.    Discussed the imaging and physical exam findings with the patient consistent with endstage knee degenerative disease. The patient has failed conservative management including physical therapy, pain control, and injections. The risks, benefits and alternatives to total knee replacement were discussed with the patient in detail and the patient elected to proceed with surgery. Discussed the surgical plan with the patient including implant options and surgical approach.   Surgical risks including fracture requiring fixation, instability or dislocation, temporary or permanent leg length inequality, infection, bleeding, stiffness, failure to alleviate pain, failure to achieve desired results, need for further surgery, scar tissue formation, hardware failure, chronic pain, injury to nerves resulting in extremity dysfunction, injury to arteries and veins, deep vein thrombosis or pulmonary embolism requiring anticoagulation and medical risk factors including heart attack, stroke, death, neurological injury, pneumonia, kidney or other organ failure were discussed with the patient.   Patient was understanding and in agreement with the treatment plan. All questions answered.  Plan: -Medical Clearance -Pulmonary Clearance -Cardiology Clearance -Left Total Knee Arthroplasty  Surgical Plan: Diagnosis: Left Knee Osteoarthritis Laterality: Left Operative procedure: Left Total Knee Arthroplasty Location: [facility]  DVT prophylaxis: Aspirin 325mg twice per day TXA: IV Plan for discharge: Home  Pre- & Post Operative Antibiotics: Cefazolin 3g  Clearances:  Medical: Pending  Cardiac: Pending  Pulmonary: Pending  Comorbidities:  Metal Allergy: Positive (rashes to jewelry) (Angioedema to Ramipril)  Chronic Pain: Positive, Off of Methadone. Currently on Tramadol, Gabapentin, Meloxicam, and Tylenol  Diabetes: Positive, Last A1C: 6.2  Use of Anticoagulation: Negative  Atrial Fibrillation: Negative  History of VTE: Negative  History of Cardiac Stents: Negative  Skin Infections/Open Wounds: Negative  MRSA Infection/Colonization: Negative  Current Urinary Symptoms: Negative  Immunocompromise: Negative  Inflammatory Arthritis: History of Lupus Antibodies (Possibly positive due to COVID)  Smoking: Negative  Drug Use: Negative  Alcohol Use: Sober for 19 years  Obstructive Sleep Apnea: Positive  Neurologic Disease: History of neuropathy in hands/feet, Has tremor (treated by Neurology), History of Fibromyalgia.

## 2023-08-06 NOTE — HISTORY OF PRESENT ILLNESS
[de-identified] : 7/27/2023  Denia Daley presents to the office for follow-up of her right total knee arthroplasty and left knee osteoarthritis.  Patient's right knee is currently doing well.  She has no significant issues.  Patient continues to have significant left knee pain.  She states that the left knee pain is affecting her quality of life.  She has been walking better due to her right TKA.  Patient uses a cane in the community, but is able to walk without the cane.  She has tried pain medications, physical therapy, and injections for the left knee.  Patient states that her back pain has improved after undergoing RFA and is going for an EMG tomorrow for her carpal tunnel.  6/13/2023  Stephanie Daley is a 63-year-old female who presents to the office for follow-up of her right total knee arthroplasty.  Patient underwent right TKA on 4/24/2023.  She is currently doing well.  Right knee pain is significantly improved compared to prior to surgery.  She is currently in physical therapy. Patient continues to take her Aspirin 325 mg twice per day for DVT prophylaxis.  Patient has been doing home exercises on a bicycle. She is currently walking with a cane when outside, but is not using any assistive devices in the home.  Patient currently reports left lower back and left knee pain.  She continues to have left knee pain.  She had a recent RFA on her right lower back.  She is currently taking tramadol, gabapentin and Tylenol.  She states for the majority of her pain is currently in her left knee.  Left knee pain continues to affect her quality of life.  Patient has tried injections, physical therapy, and pain medications for her left knee.  Her last left knee injection was in August 2022.  She would like to discuss left total knee arthroplasty.  5/9/2023  Ms. Daley presents to the office for follow-up of her right total knee arthroplasty.  Patient underwent right TKA on 4/24/2023.  She is currently doing well.  Patient did go to the emergency department on 5/5/2023 with pain and swelling of her right lower extremity.  US Duplex was negative.  Her pain and swelling have improved over the last few days.  Her pain is well controlled at this time.  She is taking occasional tramadol at night for her pain.  Patient is walking with a cane.  She has finished her home physical therapy.  No fevers or chills.  Patient remains on Aspirin 325mg twice per day for DVT prophylaxis.   4/7/2023  Ms. Daley presented to the office for follow-up of her bilateral knee pain.  Patient continues to experience bilateral (right greater than left) knee pain.  She also reports some right knee stiffness and occasional lower back pain.  Patient has some neuropathy in her hands and feet.  Patient continues to experience pain is affecting her quality of life and daily activities.  She has tried multiple knee injections, last in August 2022.  She has also tried physical therapy and has been in pain management.  Patient is planned for a right total knee arthroplasty.  She has a stress test scheduled for Wednesday.  Patient is currently taking meloxicam for her pain.  2/10/2023 Ms. Daley is a 62-year-old female presents to the office for follow up of her bilateral knee pain.  Patient has been experiencing bilateral (right greater than left) knee pain for about 30 years.  She states that it has been worse over the last 6 to 8 years. Pain is now affecting her quality of life and daily activities.  Pain is worse when lying down and patient tries to keep her feet elevated.  Right knee pain is worse than her left at this time.  Patient has tried meloxicam, gabapentin, and Tylenol for the pain.  She has a history of chronic pain and has been in pain management for about 20 years at [facility].  Patient has stopped taking methadone and is currently only taking tramadol for her pain.  She did have some withdrawal from her opioid medications.  Patient has tried multiple knee injections, including cortisone injections for many years.  She has tried about 4 series of viscosupplementation injections.  Her last knee joint injections were in August 2022, which did not help.  She states that she received 3 gel injections in the right knee and 2 in the left, but pain worsened and she did not finish the left knee series.  She had an injection in her right IT band in November 2022.  Patient is also tried physical therapy for her knees, last in October 2022. She has tried physical therapy for her back pain, but her knee pain limited her back PT. Patient has continued her weight loss and her BMI: 39.26.  No recent trauma.  No fevers or chills.  Patient was recently started on Symbicort by her pulmonologist.  She would like to move her surgical date from March into April.   History: HTN, Diverticulitis, GERD, DEREK, History of Breast Cancer, Alcoholism (19 years sober), Lymphedema in Right Arm, Fibromyalgia, Neuropathy, Bipolar, Diabetes (Last A1C: 6.1)

## 2023-08-06 NOTE — PHYSICAL EXAM
[de-identified] : Constitutional: 63 year old female, alert and oriented, cooperative, in no acute distress.  HEENT  NC/AT.  Appearance: symmetric  Neck/Back Straight without deformity or instability.  Good ROM.  Chest/Respiratory  Respiratory effort: no intercostal retractions or use of accessory muscles. Nonlabored Breathing  Skin  On inspection, warm and dry without rashes or lesions.  Mental Status:  Judgment, insight: intact Orientation: oriented to time, place, and person  Neurological: Sensory and Motor are grossly intact throughout  Right Knee  Inspection:     Incision well healing. No erythema or drainage     No Effusion     Non-tender to palpation over tibial tubercle, patella, medial and lateral joint line, and pes insertion.  Range of Motion: 	Extension - 0 degrees 	Flexion - 120 degrees 	Alignment - Valgus 5 degrees 	Extensor lag: None  Stability:      Demonstrates no Varus or Valgus instability      Negative Anterior or Posterior drawer.      No mid flexion instability  Patella: stable, tracks well.   Left Knee  Inspection:     Skin intact, no rashes or lesions     No Effusion     Non-tender to palpation over tibial tubercle, patella, and pes insertion.     Tenderness over the medial and lateral joint lines at the midcoronal line  Range of Motion: 	Extension - -5 degrees 	Flexion - 110 degrees 	Alignment - Varus 5 degrees 	Extensor lag: None  Stability:      Demonstrates no Varus or Valgus instability      Negative Anterior or Posterior drawer.      Negative Lachman's  Patella: stable, tracks well.   Neurologic Exam     Motor intact including 5/5 Extensor Hallucis Longus, 5/5 Flexor Hallucis Longus, 5/5 Tibialis Anterior and 5/5 Gastrocnemius     Sensation Intact to Light Touch including Saphenous, Sural, Superficial Peroneal, Deep Peroneal, Tibial nerve distributions  Vascular Exam     Foot is warm and well perfused with 2+ Dorsalis Pedis Pulse  [de-identified] : XRay:  XRays of the Right Knee (4 Views) taken in the office today and reviewed with the patient. XRays demonstrate a Right Total Knee Arthroplasty in good position and alignment. There is no obvious evidence of fracture, dislocation, osteolysis or loosening. (my personal interpretation) Components: [employer] B[employer] Cemented  XRay: XRays of the Left Knee (4 Views) taken in the office today and reviewed with the patient. XRays demonstrate tricompartmental joint space narrowing, with bone on bone articulations in the medial compartment, with subchondral sclerosis, overlying osteophytes, all consistent with severe osteoarthritis, KL rdGrdrrdarddrderd:rd rd3rd. There is varus alignment. (my personal interpretation)

## 2023-08-09 ENCOUNTER — NON-APPOINTMENT (OUTPATIENT)
Age: 63
End: 2023-08-09

## 2023-08-11 PROBLEM — I10 BENIGN ESSENTIAL HYPERTENSION: Status: ACTIVE | Noted: 2022-04-05

## 2023-08-11 NOTE — HEALTH RISK ASSESSMENT
[Patient reported mammogram was normal] : Patient reported mammogram was normal [MammogramDate] : 12/22 [MammogramComments] : L mammogram.

## 2023-08-11 NOTE — ASSESSMENT
[FreeTextEntry1] : 62 y/o F with obesity, arthritis s/p knee replacement, HTN, DM, Stage 3 breast cancer s/p R mastectomy/chemo/xrt with chronic R arm lymphedema, chronic pain/fibromyalgia here for CPE  Chronic pain - now off of opioids - Continue f/u with pain clinic  Tremor - INcrease propranolol to 20 BID - Decrease amlodipine back down to 5mg.  Hair loss - following with Derm. - Continue f/u with Dr. Altagracia Nunez ([facility])([phone])  HCM: - Colonoscopy 2017 - poor prep; needs repeat - L mammo (s/p R mastectomy) dec 2022 - negative - Prevnar 20 today - Ordered labs; to be done with next lab draw  RTC in 3 months or sooner prn

## 2023-08-11 NOTE — HISTORY OF PRESENT ILLNESS
[de-identified] : Last seen April 2023. Had R knee replacement in April 2023 and tolerated well. Has completed PT. Plan to have L knee replacement in Sept 2023. Has f/u with Dr. Givens.  Had COVID 7/1/23 - sister and nephew had it. Patient tested because of exposure but was asymptomatic.  MADHURI on CPAP and mild OAD - saw Pulm 4/20/23. Previously prescribed advair after viral infection; had presented with SOB. Not using because doesn't have any issues. Not really using  Will be getting EMG test because at night has been having pain going across top of L hand. Likely carpal tunnel syndrome.  HTN - on norvasc 10mg (increased since July 2022). In the last few weeks has noticed some L leg swelling at night. On hctz.  Seeing Dermatologist for hair loss.   Saw Rheum 4/2023.  Off of methadone since Jan 2023!! Has noticed that sweating has improved since being off.  Chronic L lymphedema.  Head tremor. [FreeTextEntry1] : CPE

## 2023-08-17 ENCOUNTER — APPOINTMENT (OUTPATIENT)
Dept: ULTRASOUND IMAGING | Facility: IMAGING CENTER | Age: 63
End: 2023-08-17
Payer: MEDICARE

## 2023-08-17 ENCOUNTER — OUTPATIENT (OUTPATIENT)
Dept: OUTPATIENT SERVICES | Facility: HOSPITAL | Age: 63
LOS: 1 days | End: 2023-08-17
Payer: MEDICARE

## 2023-08-17 DIAGNOSIS — Z33.2 ENCOUNTER FOR ELECTIVE TERMINATION OF PREGNANCY: Chronic | ICD-10-CM

## 2023-08-17 DIAGNOSIS — C80.1 MALIGNANT (PRIMARY) NEOPLASM, UNSPECIFIED: Chronic | ICD-10-CM

## 2023-08-17 DIAGNOSIS — Z98.890 OTHER SPECIFIED POSTPROCEDURAL STATES: Chronic | ICD-10-CM

## 2023-08-17 DIAGNOSIS — R60.0 LOCALIZED EDEMA: ICD-10-CM

## 2023-08-17 PROCEDURE — 93970 EXTREMITY STUDY: CPT

## 2023-08-17 PROCEDURE — 93970 EXTREMITY STUDY: CPT | Mod: 26

## 2023-08-21 ENCOUNTER — APPOINTMENT (OUTPATIENT)
Dept: NEUROLOGY | Facility: CLINIC | Age: 63
End: 2023-08-21

## 2023-08-28 ENCOUNTER — APPOINTMENT (OUTPATIENT)
Dept: NEUROLOGY | Facility: CLINIC | Age: 63
End: 2023-08-28
Payer: MEDICARE

## 2023-08-28 VITALS
SYSTOLIC BLOOD PRESSURE: 118 MMHG | WEIGHT: 215 LBS | HEART RATE: 79 BPM | HEIGHT: 61 IN | DIASTOLIC BLOOD PRESSURE: 65 MMHG | BODY MASS INDEX: 40.59 KG/M2

## 2023-08-28 DIAGNOSIS — R68.89 OTHER GENERAL SYMPTOMS AND SIGNS: ICD-10-CM

## 2023-08-28 PROCEDURE — 99214 OFFICE O/P EST MOD 30 MIN: CPT

## 2023-08-28 NOTE — HISTORY OF PRESENT ILLNESS
[FreeTextEntry1] : COVID VACCINATION 2x.   HPI-Interval hx [id number]: Since last seen, tried Propranolol 10mg BID, now increased to 20mg BID. Still some shaking, mostly head tremor and voice, hands seem to be quite steady. At times there is some effort in producing speech, very mild. Mostly subjective. BP ok. TKR R side in 9/2023, good after PT. Will likely go for L side as well.  Following with Pulm for MADHURI. Appetite ok, snacking a bit more, for stress. She is well on top of her medical issues and takes the lead on IADLs and financial issues @ home, helping . Rest is stable.  HPI-Interval hx [id number]: COVID 7/2022. MRI brain 5/2022 stable to 2018, to my eyes no significant changes.  States her gait is a bit more unsteady.  A1C 6.1% and BP under control now. COVID in July, with inflammatory indexes higher up. ET getting a bit worse in the head and hands when eating.  HPI-Interval hx 20220405: 2 weeks pt started to have neck pain, tight, for a few days; she then developed holocephalic pain, radiating to the front; she then developed nausea and vomiting with significant shaking of her hands. The HA then became throbbing in nature, with PPP, on and off for a few days. She too Tylenol with some benefit. She was seen at an urgent care, she received probably Benadryl and Reglan, with partial benefit.  She was also prescribed another medication ($2000) which she never got approved and never took. This was the first time she had a HA so severe. She still has pain in knees. She has developed a bit of ET type shaking in hands and head. She is trying to lose weight, c.a. 5-10lb in the last 6 months, but very gradually. Rest is stable.  HPI-Interval hx 20211012: Pt here for follow-up.. NPT done in 8/2021 are absolutely benign.   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 She is very positive and looking to the future. Sleep and appetite ok. She has decided to go on a diet and lost 30+ lbs, looking to lose more. She feels much better, lighter and has more endurance.     HPI-Interval hx [id number]:  recent SOB, possible CHF (like she had before in the past), will see PCP soon, she felt she gained weight and then lost it over a few weeks, as in retention. This also happened after COVID vaccination, knee shots and changing med to BB from CCB.  Fall 8/2020, tripped on steps carrying things, reports a lesion in the R knee.  Overall she feels ok, managing everything well.   Sleep and appetite are stable.  Mood is ok. Of note, she did not tolerate higher dose of Wellbutrin (150mg Daily) due to shakes/twitches.    HPI-Interval Hx [id number]: Pt here for follow-up. She feels her STM is a bit worse now, she tends to forget dates, names and recent events. She has to write a lot of notes and set alarms to remind herself to follow through with things.  she is still taking care of multiple things though, and trying to run her home. Rest is stable.  Ca in remission. Chemo last 8y ago. Appetite: stable Sleep: stable.  HPI-Interval Hx [id number]: Pt has been stable, just a lot of medical issues, including hip pain and L shoulder sx for rotator cuff lesion. She still has some issues walking with some unbalance, but no recent falls. Planning sx for knees. Anxiety is still persistent, she has not seen a therapist, has no time, but she talks to her  and sponsor a lot. Sleep and appetite are stable.  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   HPI-Interval Hx [id number]: Wellbutrin reduced to 100mg for tremor in her hand, remitted.  Had a fall with head trauma in Feb 2019, CT head negative ([facility]). She is very busy, and has a hard time managing her busy schedule.  She has a very positive outlook overall.   HPI-Interval Hx [id number]: MRI, MRA were negative, ESR 71, but vascular sx evaluation with Echo was negative for TA.  Still a lot of stress in her life. Feels better with Wellbutrin, her face pain and HA have stopped. She sleeps very well, and appetite is good. She keeps very busy, now preparing for her daughter's wedding.  HPI-Interval Hx [id number]: Pt here for follow-up. Over the last year, she has been pretty much stable. recent Bone Scan and CT have shown no secondary lesions from breast Ca.  She gets seldom sharp pain behind L eye for a few seconds, with slight HA, lasting 5-10 min. She also noticed her face sweats a lot, at times her arms too. This apparently is dating 10-20years. In all, she feels very irritable and she still c/o STM and attention issues.  PMH: 57yo RH AAW, with hx of fibromyalgia, bipolar depression, breast CA s/p resection and chemo in 2012, currently followed up and disease free, colon polyps, arthritis. She reports that she sometimes looses attentions and would forget things, e.g. that she is cooking something and would leave things on the stove. She has issues remembering names of people, but always had.  She does not report HA.  Mood: she often gets sad, cries a lot, but has had bipolar depression for a long time, used to be on Lexapro, VPA and Neurontin, stopped a few years ago. She used to be in a program at [facility], terminated when she had started using a plant product ([place]?). She has a hx of SI/SA in 2004, now denies any SI.   She has a hx of EtOH abuse, sober and in AA for many years.  She has a complex living situation, having to take care of her mother, daughter, grandchildren.  She does not drive, she never did, has fear of it.  Sleep: falls asleep readily, but has to urinate very frequently. Sleeps 10h daily.  Disabled, used to do clerical work in several different settings.  ADl and IADL intact.  Of note, she has a small nodule in L thyroid, under surveillance.

## 2023-08-28 NOTE — PHYSICAL EXAM
[General Appearance - Alert] : alert [General Appearance - In No Acute Distress] : in no acute distress [General Appearance - Well Nourished] : well nourished [General Appearance - Well Developed] : well developed [Oriented To Time, Place, And Person] : oriented to person, place, and time [Impaired Insight] : insight and judgment were intact [Affect] : the affect was normal [Over the Past 2 Weeks, Have You Felt Little Interest or Pleasure Doing Things?] : 2.) Over the past 2 weeks, have you felt little interest or pleasure doing things? Yes [Person] : oriented to person [Place] : oriented to place [Time] : oriented to time [Short Term Intact] : short term memory intact [Remote Intact] : remote memory intact [Registration Intact] : recent registration memory intact [Span Intact] : the attention span was normal [Concentration Intact] : normal concentrating ability [Visual Intact] : visual attention was ~T not ~L decreased [Naming Objects] : no difficulty naming common objects [Repeating Phrases] : no difficulty repeating a phrase [Writing A Sentence] : no difficulty writing a sentence [Fluency] : fluency intact [Comprehension] : comprehension intact [Reading] : reading intact [Current Events] : adequate knowledge of current events [Past History] : adequate knowledge of personal past history [Vocabulary] : adequate range of vocabulary [Total Score ___ / 30] : the patient achieved a score of [unfilled] /30 [Date / Time ___ / 5] : date / time [unfilled] / 5 [Place ___ / 5] : place [unfilled] / 5 [Registration ___ / 3] : registration [unfilled] / 3 [Serial Sevens ___/5] : serial sevens [unfilled] / 5 [Naming 2 Objects ___ / 2] : naming two objects [unfilled] / 2 [Repeating a Sentence ___ / 1] : repeating a sentence [unfilled] / 1 [Writing a Sentence ___ / 1] : write sentence [unfilled] / 1 [3-stage Verbal Command ___ / 3] : three-stage verbal command [unfilled] / 3 [Written Command ___ / 1] : written command [unfilled] / 1 [Copy a Design ___ / 1] : copy a design [unfilled] / 1 [Recall ___ / 3] : recall [unfilled] / 3 [Cranial Nerves Optic (II)] : visual acuity intact bilaterally,  visual fields full to confrontation, pupils equal round and reactive to light [Cranial Nerves Oculomotor (III)] : extraocular motion intact [Cranial Nerves Trigeminal (V)] : facial sensation intact symmetrically [Cranial Nerves Facial (VII)] : face symmetrical [Cranial Nerves Vestibulocochlear (VIII)] : hearing was intact bilaterally [Cranial Nerves Glossopharyngeal (IX)] : tongue and palate midline [Cranial Nerves Accessory (XI - Cranial And Spinal)] : head turning and shoulder shrug symmetric [Cranial Nerves Hypoglossal (XII)] : there was no tongue deviation with protrusion [Motor Strength] : muscle strength was normal in all four extremities [No Muscle Atrophy] : normal bulk in all four extremities [Motor Handedness Right-Handed] : the patient is right hand dominant [Sensation Tactile Decrease] : light touch was intact [Sensation Pain / Temperature Decrease] : pain and temperature was intact [Proprioception] : proprioception was intact [Balance] : balance was intact [2+] : Brachioradialis left 2+ [1+] : Patella left 1+ [0] : Ankle jerk left 0 [Sclera] : the sclera and conjunctiva were normal [PERRL With Normal Accommodation] : pupils were equal in size, round, reactive to light, with normal accommodation [Extraocular Movements] : extraocular movements were intact [No APD] : no afferent pupillary defect [No ANDERSON] : no internuclear ophthalmoplegia [Full Visual Field] : full visual field [Outer Ear] : the ears and nose were normal in appearance [Oropharynx] : the oropharynx was normal [Neck Appearance] : the appearance of the neck was normal [Neck Cervical Mass (___cm)] : no neck mass was observed [Jugular Venous Distention Increased] : there was no jugular-venous distention [Thyroid Diffuse Enlargement] : the thyroid was not enlarged [Thyroid Nodule] : there were no palpable thyroid nodules [Auscultation Breath Sounds / Voice Sounds] : lungs were clear to auscultation bilaterally [Heart Rate And Rhythm] : heart rate was normal and rhythm regular [Heart Sounds] : normal S1 and S2 [Heart Sounds Gallop] : no gallops [Murmurs] : no murmurs [Heart Sounds Pericardial Friction Rub] : no pericardial rub [Arterial Pulses Carotid] : carotid pulses were normal with no bruits [Full Pulse] : the pedal pulses are present [Edema] : there was no peripheral edema [Bowel Sounds] : normal bowel sounds [Abdomen Soft] : soft [Abdomen Tenderness] : non-tender [Abdomen Mass (___ Cm)] : no abdominal mass palpated [No CVA Tenderness] : no ~M costovertebral angle tenderness [No Spinal Tenderness] : no spinal tenderness [Abnormal Walk] : normal gait [Nail Clubbing] : no clubbing  or cyanosis of the fingernails [Musculoskeletal - Swelling] : no joint swelling seen [Motor Tone] : muscle strength and tone were normal [Skin Color & Pigmentation] : normal skin color and pigmentation [Skin Turgor] : normal skin turgor [] : no rash [Over the Past 2 Weeks, Have You Felt Down, Depressed, or Hopeless?] : 1.) Over the past 2 weeks, have you felt down, depressed, or hopeless? No [Motor Strength Upper Extremities Bilaterally] : strength was normal in both upper extremities [Motor Strength Lower Extremities Bilaterally] : strength was normal in both lower extremities [Romberg's Sign] : Romberg's sign was negtive [Allodynia] : no ~T allodynia present [Dysesthesia] : no dysesthesia [Hyperesthesia] : no hyperesthesia [Limited Balance] : balance was intact [Past-pointing] : there was no past-pointing [Tremor] : no tremor present [Dysdiadochokinesia Bilaterally] : not present [Coordination - Dysmetria Impaired Heel-to-Shin Bilateral] : not present [Coordination - Dysmetria Impaired Finger-to-Nose Bilateral] : not present [Plantar Reflex Right Only] : normal on the right [Plantar Reflex Left Only] : normal on the left [FreeTextEntry4] : Alt pattern: intact\par  Clock: 3/3\par  Luria: Intact.\par  Trail A: 20"; B: 50"\par  Fluency: intact. [FreeTextEntry5] : small pupils, surgical, reactive; no pain on OO compression.  [FreeTextEntry6] : strength  limited by bilateral knee pain. Mild ET type tremor in head and hands. [FreeTextEntry7] : decreased vibration sens distal LE. [FreeTextEntry8] : balance limited by bilateral knee pain [FreeTextEntry1] : L chest scar from old port.

## 2023-08-28 NOTE — ASSESSMENT
[FreeTextEntry1] : Assessment:  62yo RH AAW, with subjective memory issues.  Peripheral neuropathy in LE and antalgic limitation of RLE>LLE due to knees pain, s/p TKR R side, doing well. Mental and neuro exam continue to be ok. Last NPT are wnl. MRIs stable. Mild ET, marginal improvement with propranolol. Overall stable and doing well.  Diagnostic Impression: -anxiety/depression -subjective memory impairment -ET -neuropathy-DM.  Plan:  -Continue with current meds, consider Primidone if ET gets worse, if BP does not allow to increase BB further.

## 2023-08-29 ENCOUNTER — APPOINTMENT (OUTPATIENT)
Dept: ORTHOPEDIC SURGERY | Facility: CLINIC | Age: 63
End: 2023-08-29
Payer: MEDICARE

## 2023-08-29 PROCEDURE — 99214 OFFICE O/P EST MOD 30 MIN: CPT

## 2023-08-29 NOTE — ASSESSMENT
[FreeTextEntry1] : 64 y/o F with obesity, arthritis s/p knee replacement, HTN, DM, Stage 3 breast cancer s/p R mastectomy/chemo/xrt with chronic R arm lymphedema, chronic pain/fibromyalgia here for CPE  Chronic pain - now off of opioids - Continue f/u with pain clinic  Tremor - INcrease propranolol to 20 BID - Decrease amlodipine back down to 5mg.  Hair loss - following with Derm. - Continue f/u with Dr. Altagracia Nunez ([facility])([phone])  HCM: - Colonoscopy 2017 - poor prep; needs repeat - L mammo (s/p R mastectomy) dec 2022 - negative - Prevnar 20 today - Ordered labs; to be done with next lab draw  RTC in 3 months or sooner prn

## 2023-08-29 NOTE — ASSESSMENT
[FreeTextEntry1] : 64 y/o F with obesity, arthritis s/p knee replacement, HTN, DM, Stage 3 breast cancer s/p R mastectomy/chemo/xrt with chronic R arm lymphedema, chronic pain/fibromyalgia here for CPE  Chronic pain - now off of opioids - Continue f/u with pain clinic  Tremor - INcrease propranolol to 20 BID - Decrease amlodipine back down to 5mg.  Hair loss - following with Derm. - Continue f/u with Dr. Altagracia Nunez ([facility])([phone])  HCM: - Colonoscopy 2017 - poor prep; needs repeat - L mammo (s/p R mastectomy) dec 2022 - negative - Prevnar 20 today - Ordered labs; to be done with next lab draw  RTC in 3 months or sooner prn

## 2023-08-31 ENCOUNTER — APPOINTMENT (OUTPATIENT)
Dept: INTERNAL MEDICINE | Facility: CLINIC | Age: 63
End: 2023-08-31
Payer: MEDICARE

## 2023-08-31 VITALS
SYSTOLIC BLOOD PRESSURE: 137 MMHG | TEMPERATURE: 98 F | DIASTOLIC BLOOD PRESSURE: 70 MMHG | WEIGHT: 222 LBS | OXYGEN SATURATION: 98 % | BODY MASS INDEX: 41.91 KG/M2 | RESPIRATION RATE: 15 BRPM | HEART RATE: 60 BPM | HEIGHT: 61 IN

## 2023-08-31 PROCEDURE — 99214 OFFICE O/P EST MOD 30 MIN: CPT

## 2023-08-31 NOTE — PHYSICAL EXAM
[No Acute Distress] : no acute distress [Normal Oropharynx] : normal oropharynx [Normal Appearance] : normal appearance [No Neck Mass] : no neck mass [Normal Rate/Rhythm] : normal rate/rhythm [Murmur ___ / 6] : murmur [unfilled] / 6 [Normal S1, S2] : normal s1, s2 [No Resp Distress] : no resp distress [Clear to Auscultation Bilaterally] : clear to auscultation bilaterally [No Abnormalities] : no abnormalities [Benign] : benign [No Clubbing] : no clubbing [FROM] : FROM [No Cyanosis] : no cyanosis [Normal Color/ Pigmentation] : normal color/ pigmentation [No Focal Deficits] : no focal deficits [Oriented x3] : oriented x3 [Normal Affect] : normal affect [TextBox_2] : Increased BMI [TextBox_99] : walks with cane [TextBox_105] : R upper extremity edema

## 2023-08-31 NOTE — HISTORY OF PRESENT ILLNESS
[Former] : former [< 20 pack-years] : < 20 pack-years [Awakes Unrefreshed] : awakes unrefreshed [Daytime Somnolence] : daytime somnolence [Fatigue] : fatigue [Snoring] : snoring [TextBox_4] : 63-year-old female history of MADHURI and OAD presents for follow-up.  Patient has not used CPAP for nearly 10 years stating that she has lost weight and has fewer symptoms that are related.  She continues to complain of daytime somnolence  and is known to snore.  Continues to breathe well without use of albuterol.

## 2023-08-31 NOTE — ASSESSMENT
[Patient Optimized for Surgery] : Patient optimized for surgery [FreeTextEntry4] : 64 y/o F with obesity, arthritis, HTN, DM, essential tremor, Stage 3 breast cancer s/p R mastectomy/chemo/xrt with chronic L arm lymphedema, chronic pain/fibromyalgia here for preop evaluation prior to L knee arthroplasty.

## 2023-08-31 NOTE — DISCUSSION/SUMMARY
[FreeTextEntry1] : 3-year-old female with 3 of MADHURI presently not on treatment.  PSG will be performed.  Need for treatment of MADHURI was stressed discussed at great length with the patient.  She is to continue to lose weight and avoid sedative hypnotic meds as well as excessive alcohol intake.  She is to use albuterol as needed for her mild OAD.  Follow-up with her PMD was recommended.

## 2023-08-31 NOTE — PHYSICAL EXAM
[Normal] : normal rate, regular rhythm, normal S1 and S2 and no murmur heard [de-identified] : 2+ pitting edema

## 2023-08-31 NOTE — HISTORY OF PRESENT ILLNESS
[Sleep Apnea] : sleep apnea [No Adverse Anesthesia Reaction] : no adverse anesthesia reaction in self or family member [Diabetes] : diabetes [(Patient denies any chest pain, claudication, dyspnea on exertion, orthopnea, palpitations or syncope)] : Patient denies any chest pain, claudication, dyspnea on exertion, orthopnea, palpitations or syncope [No Pertinent Cardiac History] : no history of aortic stenosis, atrial fibrillation, coronary artery disease, recent myocardial infarction, or implantable device/pacemaker [Asthma] : no asthma [COPD] : no COPD [Smoker] : not a smoker [Chronic Anticoagulation] : no chronic anticoagulation [Chronic Kidney Disease] : no chronic kidney disease [Unable to assess] : unable to assess [FreeTextEntry1] : Left total knee arthroplasty [FreeTextEntry2] : 09/11/2023 [FreeTextEntry3] : Dr. Mckinnon [FreeTextEntry4] : Has gained some weight since last visit; emotional stress.  Scheduled to see cardiology on 9/5  Had sleep study last week - dx'ed with sleep apnea; pending CPAP titration.  Had lowered amlodipine down to 5mg PO daily due to LE edema  Saw Neuro for tremor; dx'ed with ET. Advised to continue propranolol  Dx'ed with recent anemia (hgb 9.0) - on iron once a day. [FreeTextEntry7] : Nuclear stress test 4/5/23: normal study  Echo 1/8/23: eccentric LVH (dilated LV), normal LV systolic function. Moderate TR. Normal RV size and function.

## 2023-08-31 NOTE — REVIEW OF SYSTEMS
[Fatigue] : fatigue [Arthralgias] : arthralgias [Chronic Pain] : chronic pain [Negative] : Endocrine [Dyspnea] : no dyspnea

## 2023-09-01 ENCOUNTER — NON-APPOINTMENT (OUTPATIENT)
Age: 63
End: 2023-09-01

## 2023-09-03 NOTE — REVIEW OF SYSTEMS
[Joint Pain] : joint pain [Negative] : Endocrine [Joint Stiffness] : no joint stiffness [Joint Swelling] : no joint swelling [Chills] : no chills [Fever] : no fever

## 2023-09-03 NOTE — HISTORY OF PRESENT ILLNESS
[de-identified] : 8/29/2023  Denia Daley presents to the office for follow-up of her right total knee arthroplasty and left knee osteoarthritis.  Patient's right knee is currently doing well.  She does get some occasional shock type pains.  She is able to walk without a cane.  Patient continues to have left knee pain.  She states that the left knee is still affecting her quality of life.  She has tried physical therapy, anti-inflammatories, and injections.  Patient was recently seen by neurology and diagnosed with an essential tremor.  She is going to see cardiology and her primary care physician for her clearances.  She had a recent sleep study for her MADHURI.  No falls or injuries.  7/27/2023  Denia Daley presents to the office for follow-up of her right total knee arthroplasty and left knee osteoarthritis.  Patient's right knee is currently doing well.  She has no significant issues.  Patient continues to have significant left knee pain.  She states that the left knee pain is affecting her quality of life.  She has been walking better due to her right TKA.  Patient uses a cane in the community, but is able to walk without the cane.  She has tried pain medications, physical therapy, and injections for the left knee.  Patient states that her back pain has improved after undergoing RFA and is going for an EMG tomorrow for her carpal tunnel.  6/13/2023  Stephanie Daley is a 63-year-old female who presents to the office for follow-up of her right total knee arthroplasty.  Patient underwent right TKA on 4/24/2023.  She is currently doing well.  Right knee pain is significantly improved compared to prior to surgery.  She is currently in physical therapy. Patient continues to take her Aspirin 325 mg twice per day for DVT prophylaxis.  Patient has been doing home exercises on a bicycle. She is currently walking with a cane when outside, but is not using any assistive devices in the home.  Patient currently reports left lower back and left knee pain.  She continues to have left knee pain.  She had a recent RFA on her right lower back.  She is currently taking tramadol, gabapentin and Tylenol.  She states for the majority of her pain is currently in her left knee.  Left knee pain continues to affect her quality of life.  Patient has tried injections, physical therapy, and pain medications for her left knee.  Her last left knee injection was in August 2022.  She would like to discuss left total knee arthroplasty.  5/9/2023  Ms. Daley presents to the office for follow-up of her right total knee arthroplasty.  Patient underwent right TKA on 4/24/2023.  She is currently doing well.  Patient did go to the emergency department on 5/5/2023 with pain and swelling of her right lower extremity.  US Duplex was negative.  Her pain and swelling have improved over the last few days.  Her pain is well controlled at this time.  She is taking occasional tramadol at night for her pain.  Patient is walking with a cane.  She has finished her home physical therapy.  No fevers or chills.  Patient remains on Aspirin 325mg twice per day for DVT prophylaxis.   4/7/2023  Ms. Daley presented to the office for follow-up of her bilateral knee pain.  Patient continues to experience bilateral (right greater than left) knee pain.  She also reports some right knee stiffness and occasional lower back pain.  Patient has some neuropathy in her hands and feet.  Patient continues to experience pain is affecting her quality of life and daily activities.  She has tried multiple knee injections, last in August 2022.  She has also tried physical therapy and has been in pain management.  Patient is planned for a right total knee arthroplasty.  She has a stress test scheduled for Wednesday.  Patient is currently taking meloxicam for her pain.  2/10/2023 Ms. Daley is a 62-year-old female presents to the office for follow up of her bilateral knee pain.  Patient has been experiencing bilateral (right greater than left) knee pain for about 30 years.  She states that it has been worse over the last 6 to 8 years. Pain is now affecting her quality of life and daily activities.  Pain is worse when lying down and patient tries to keep her feet elevated.  Right knee pain is worse than her left at this time.  Patient has tried meloxicam, gabapentin, and Tylenol for the pain.  She has a history of chronic pain and has been in pain management for about 20 years at [facility].  Patient has stopped taking methadone and is currently only taking tramadol for her pain.  She did have some withdrawal from her opioid medications.  Patient has tried multiple knee injections, including cortisone injections for many years.  She has tried about 4 series of viscosupplementation injections.  Her last knee joint injections were in August 2022, which did not help.  She states that she received 3 gel injections in the right knee and 2 in the left, but pain worsened and she did not finish the left knee series.  She had an injection in her right IT band in November 2022.  Patient is also tried physical therapy for her knees, last in October 2022. She has tried physical therapy for her back pain, but her knee pain limited her back PT. Patient has continued her weight loss and her BMI: 39.26.  No recent trauma.  No fevers or chills.  Patient was recently started on Symbicort by her pulmonologist.  She would like to move her surgical date from March into April.   History: HTN, Diverticulitis, GERD, DEREK, History of Breast Cancer, Alcoholism (19 years sober), Lymphedema in Right Arm, Fibromyalgia, Neuropathy, Bipolar, Diabetes (Last A1C: 6.1)

## 2023-09-03 NOTE — DISCUSSION/SUMMARY
[de-identified] : Denia Daley is a 63-year-old female who presents the office for follow-up of her right total knee arthroplasty and her left knee osteoarthritis.  Patient's right total knee arthroplasty is currently doing well. However, patient reports significant left knee pain that is affecting her quality of life.  Patient has tried physical therapy, anti-inflammatories, and injections.  Prior x-rays showed likely knee arthroplasty in good position alignment.  Prior left knee x-ray showed severe left knee osteoarthritis.  Examination showed good right knee range of motion (0 to 120 degrees).  There was tenderness over the left knee and decreased left knee range of motion.  Discussed with patient the examination and imaging findings.  Discussed with patient the recovery from her right total knee arthroplasty, including physical therapy.  Patient will continue her physical therapy and home exercises for her right knee.  Discussed the management of her left knee osteoarthritis. Discussed with patient the operative and nonoperative management of knee osteoarthritis, including total knee arthroplasty.  Discussed the nonoperative management of knee osteoarthritis, including physical therapy, anti-inflammatories, and injections.  Patient has tried nonoperative management, but continues to have knee pain.  Discussed total knee arthroplasty at length, including surgical procedure, hospital course, DVT prophylaxis, antibiotics, physical therapy, recovery, and risks. Patient would like to proceed with left total knee arthroplasty.  Discussed that patient will require medical and cardiac clearance prior to surgery. Patient understanding and in agreement with the plan.  All questions answered.    Discussed the imaging and physical exam findings with the patient consistent with endstage knee degenerative disease. The patient has failed conservative management including physical therapy, pain control, and injections. The risks, benefits and alternatives to total knee replacement were discussed with the patient in detail and the patient elected to proceed with surgery. Discussed the surgical plan with the patient including implant options and surgical approach.   Surgical risks including fracture requiring fixation, instability or dislocation, temporary or permanent leg length inequality, infection, bleeding, stiffness, failure to alleviate pain, failure to achieve desired results, need for further surgery, scar tissue formation, hardware failure, chronic pain, injury to nerves resulting in extremity dysfunction, injury to arteries and veins, deep vein thrombosis or pulmonary embolism requiring anticoagulation and medical risk factors including heart attack, stroke, death, neurological injury, pneumonia, kidney or other organ failure were discussed with the patient.   Patient was understanding and in agreement with the treatment plan. All questions answered.  Plan: -Medical Clearance -Cardiology Clearance -Left Total Knee Arthroplasty  Surgical Plan: Diagnosis: Left Knee Osteoarthritis Laterality: Left Operative procedure: Left Total Knee Arthroplasty Location: [facility]  DVT prophylaxis: Aspirin 325mg twice per day TXA: IV Plan for discharge: Home  Pre- & Post Operative Antibiotics: Cefazolin 3g  Clearances:  Medical: Pending  Cardiac: Pending  Comorbidities:  Metal Allergy: Positive (rashes to jewelry) (Angioedema to Ramipril)  Chronic Pain: Positive, Off of Methadone. Currently on Tramadol, Gabapentin, Meloxicam, and Tylenol  Diabetes: Positive, Last A1C: 6.2  Use of Anticoagulation: Negative  Atrial Fibrillation: Negative  History of VTE: Negative  History of Cardiac Stents: Negative  Skin Infections/Open Wounds: Negative  MRSA Infection/Colonization: Negative  Current Urinary Symptoms: Negative  Immunocompromise: Negative  Inflammatory Arthritis: History of Lupus Antibodies (Possibly positive due to COVID)  Smoking: Negative  Drug Use: Negative  Alcohol Use: Sober for 19 years  Obstructive Sleep Apnea: Positive  Neurologic Disease: History of neuropathy in hands/feet, Has tremor (treated by Neurology), History of Fibromyalgia.

## 2023-09-03 NOTE — PHYSICAL EXAM
[de-identified] : Constitutional: 63 year old female, alert and oriented, cooperative, in no acute distress.  HEENT  NC/AT.  Appearance: symmetric  Neck/Back Straight without deformity or instability.  Good ROM.  Chest/Respiratory  Respiratory effort: no intercostal retractions or use of accessory muscles. Nonlabored Breathing  Skin  On inspection, warm and dry without rashes or lesions.  Mental Status:  Judgment, insight: intact Orientation: oriented to time, place, and person  Neurological: Sensory and Motor are grossly intact throughout  Right Knee  Inspection:     Incision well healing. No erythema or drainage     No Effusion     Non-tender to palpation over tibial tubercle, patella, medial and lateral joint line, and pes insertion.  Range of Motion: 	Extension - 0 degrees 	Flexion - 120 degrees 	Alignment - Valgus 5 degrees 	Extensor lag: None  Stability:      Demonstrates no Varus or Valgus instability      Negative Anterior or Posterior drawer.      No mid flexion instability  Patella: stable, tracks well.   Left Knee  Inspection:     Skin intact, no rashes or lesions     No Effusion     Non-tender to palpation over tibial tubercle, patella, and pes insertion.     Tenderness over the medial and lateral joint lines at the midcoronal line  Range of Motion: 	Extension - -5 degrees 	Flexion - 110 degrees 	Alignment - Varus 5 degrees 	Extensor lag: None  Stability:      Demonstrates no Varus or Valgus instability      Negative Anterior or Posterior drawer.      Negative Lachman's  Patella: stable, tracks well.   Neurologic Exam     Motor intact including 5/5 Extensor Hallucis Longus, 5/5 Flexor Hallucis Longus, 5/5 Tibialis Anterior and 5/5 Gastrocnemius     Sensation Intact to Light Touch including Saphenous, Sural, Superficial Peroneal, Deep Peroneal, Tibial nerve distributions  Vascular Exam     Foot is warm and well perfused with 2+ Dorsalis Pedis Pulse  [de-identified] : XRay:  XRays of the Right Knee (4 Views) taken on 7/27/2923. XRays demonstrate a Right Total Knee Arthroplasty in good position and alignment. There is no obvious evidence of fracture, dislocation, osteolysis or loosening. (my personal interpretation) Components: Smith and Nephew Journey II BCS Cemented  XRay: XRays of the Left Knee (4 Views) taken on 7/27/2023. XRays demonstrate tricompartmental joint space narrowing, with bone on bone articulations in the medial compartment, with subchondral sclerosis, overlying osteophytes, all consistent with severe osteoarthritis, KL thGthrthathdtheth:th th5th. There is varus alignment. (my personal interpretation)

## 2023-09-05 ENCOUNTER — APPOINTMENT (OUTPATIENT)
Dept: CARDIOLOGY | Facility: CLINIC | Age: 63
End: 2023-09-05
Payer: MEDICARE

## 2023-09-05 ENCOUNTER — OUTPATIENT (OUTPATIENT)
Dept: OUTPATIENT SERVICES | Facility: HOSPITAL | Age: 63
LOS: 1 days | End: 2023-09-05
Payer: MEDICARE

## 2023-09-05 ENCOUNTER — NON-APPOINTMENT (OUTPATIENT)
Age: 63
End: 2023-09-05

## 2023-09-05 VITALS
OXYGEN SATURATION: 99 % | DIASTOLIC BLOOD PRESSURE: 68 MMHG | RESPIRATION RATE: 16 BRPM | SYSTOLIC BLOOD PRESSURE: 121 MMHG | HEIGHT: 60 IN | TEMPERATURE: 98 F | WEIGHT: 216.05 LBS | HEART RATE: 60 BPM

## 2023-09-05 VITALS
HEART RATE: 67 BPM | DIASTOLIC BLOOD PRESSURE: 64 MMHG | HEIGHT: 61 IN | SYSTOLIC BLOOD PRESSURE: 118 MMHG | WEIGHT: 216 LBS | RESPIRATION RATE: 16 BRPM | OXYGEN SATURATION: 98 % | BODY MASS INDEX: 40.78 KG/M2

## 2023-09-05 DIAGNOSIS — M17.12 UNILATERAL PRIMARY OSTEOARTHRITIS, LEFT KNEE: ICD-10-CM

## 2023-09-05 DIAGNOSIS — Z91.89 OTHER SPECIFIED PERSONAL RISK FACTORS, NOT ELSEWHERE CLASSIFIED: ICD-10-CM

## 2023-09-05 DIAGNOSIS — C80.1 MALIGNANT (PRIMARY) NEOPLASM, UNSPECIFIED: Chronic | ICD-10-CM

## 2023-09-05 DIAGNOSIS — F41.9 ANXIETY DISORDER, UNSPECIFIED: ICD-10-CM

## 2023-09-05 DIAGNOSIS — Z86.69 PERSONAL HISTORY OF OTHER DISEASES OF THE NERVOUS SYSTEM AND SENSE ORGANS: ICD-10-CM

## 2023-09-05 DIAGNOSIS — J44.9 CHRONIC OBSTRUCTIVE PULMONARY DISEASE, UNSPECIFIED: ICD-10-CM

## 2023-09-05 DIAGNOSIS — Z01.818 ENCOUNTER FOR OTHER PREPROCEDURAL EXAMINATION: ICD-10-CM

## 2023-09-05 DIAGNOSIS — G47.33 OBSTRUCTIVE SLEEP APNEA (ADULT) (PEDIATRIC): ICD-10-CM

## 2023-09-05 DIAGNOSIS — E11.9 TYPE 2 DIABETES MELLITUS WITHOUT COMPLICATIONS: ICD-10-CM

## 2023-09-05 DIAGNOSIS — E66.01 MORBID (SEVERE) OBESITY DUE TO EXCESS CALORIES: ICD-10-CM

## 2023-09-05 DIAGNOSIS — H26.9 UNSPECIFIED CATARACT: Chronic | ICD-10-CM

## 2023-09-05 DIAGNOSIS — Z33.2 ENCOUNTER FOR ELECTIVE TERMINATION OF PREGNANCY: Chronic | ICD-10-CM

## 2023-09-05 DIAGNOSIS — Z98.890 OTHER SPECIFIED POSTPROCEDURAL STATES: Chronic | ICD-10-CM

## 2023-09-05 DIAGNOSIS — F10.11 ALCOHOL ABUSE, IN REMISSION: ICD-10-CM

## 2023-09-05 DIAGNOSIS — Z96.651 PRESENCE OF RIGHT ARTIFICIAL KNEE JOINT: Chronic | ICD-10-CM

## 2023-09-05 DIAGNOSIS — I10 ESSENTIAL (PRIMARY) HYPERTENSION: ICD-10-CM

## 2023-09-05 LAB
ALBUMIN SERPL ELPH-MCNC: 4.2 G/DL
ALP BLD-CCNC: 114 U/L
ALT SERPL-CCNC: 11 U/L
ANION GAP SERPL CALC-SCNC: 12 MMOL/L
APPEARANCE UR: CLEAR — SIGNIFICANT CHANGE UP
AST SERPL-CCNC: 17 U/L
BILIRUB SERPL-MCNC: 0.3 MG/DL
BILIRUB UR-MCNC: NEGATIVE — SIGNIFICANT CHANGE UP
BLD GP AB SCN SERPL QL: POSITIVE — SIGNIFICANT CHANGE UP
BUN SERPL-MCNC: 17 MG/DL
CALCIUM SERPL-MCNC: 9.8 MG/DL
CHLORIDE SERPL-SCNC: 101 MMOL/L
CO2 SERPL-SCNC: 25 MMOL/L
COLOR SPEC: SIGNIFICANT CHANGE UP
CREAT SERPL-MCNC: 0.97 MG/DL
DIFF PNL FLD: NEGATIVE — SIGNIFICANT CHANGE UP
EGFR: 65 ML/MIN/1.73M2
ESTIMATED AVERAGE GLUCOSE: 128 MG/DL
FERRITIN SERPL-MCNC: 18 NG/ML
FOLATE SERPL-MCNC: >20 NG/ML
GLUCOSE SERPL-MCNC: 168 MG/DL
GLUCOSE UR QL: NEGATIVE MG/DL — SIGNIFICANT CHANGE UP
HBA1C MFR BLD HPLC: 6.1 %
IRON SATN MFR SERPL: 14 %
IRON SERPL-MCNC: 49 UG/DL
KETONES UR-MCNC: NEGATIVE MG/DL — SIGNIFICANT CHANGE UP
LEUKOCYTE ESTERASE UR-ACNC: NEGATIVE — SIGNIFICANT CHANGE UP
NITRITE UR-MCNC: NEGATIVE — SIGNIFICANT CHANGE UP
PH UR: 6 — SIGNIFICANT CHANGE UP (ref 5–8)
POTASSIUM SERPL-SCNC: 3.9 MMOL/L
PROT SERPL-MCNC: 7.6 G/DL
PROT UR-MCNC: NEGATIVE MG/DL — SIGNIFICANT CHANGE UP
RH IG SCN BLD-IMP: NEGATIVE — SIGNIFICANT CHANGE UP
SODIUM SERPL-SCNC: 138 MMOL/L
SP GR SPEC: 1.01 — SIGNIFICANT CHANGE UP (ref 1–1.03)
TIBC SERPL-MCNC: 361 UG/DL
UIBC SERPL-MCNC: 312 UG/DL
UROBILINOGEN FLD QL: 0.2 MG/DL — SIGNIFICANT CHANGE UP (ref 0.2–1)
VIT B12 SERPL-MCNC: 1133 PG/ML

## 2023-09-05 PROCEDURE — 99214 OFFICE O/P EST MOD 30 MIN: CPT

## 2023-09-05 PROCEDURE — 86077 PHYS BLOOD BANK SERV XMATCH: CPT

## 2023-09-05 PROCEDURE — 93000 ELECTROCARDIOGRAM COMPLETE: CPT

## 2023-09-05 RX ORDER — GABAPENTIN 400 MG/1
1 CAPSULE ORAL
Refills: 0 | DISCHARGE

## 2023-09-05 RX ORDER — AMLODIPINE BESYLATE 2.5 MG/1
1 TABLET ORAL
Qty: 0 | Refills: 0 | DISCHARGE

## 2023-09-05 RX ORDER — AZELASTINE 137 UG/1
2 SPRAY, METERED NASAL
Refills: 0 | DISCHARGE

## 2023-09-05 NOTE — HISTORY OF PRESENT ILLNESS
[de-identified] : Last seen April 2023. Had R knee replacement in April 2023 and tolerated well. Has completed PT. Plan to have L knee replacement in Sept 2023. Has f/u with Dr. Givens.  Had COVID 7/1/23 - sister and nephew had it. Patient tested because of exposure but was asymptomatic.  MADHURI on CPAP and mild OAD - saw Pulm 4/20/23. Previously prescribed advair after viral infection; had presented with SOB. Not using because doesn't have any issues. Not really using  Will be getting EMG test because at night has been having pain going across top of L hand. Likely carpal tunnel syndrome.  HTN - on norvasc 10mg (increased since July 2022). In the last few weeks has noticed some L leg swelling at night. On hctz.  Seeing Dermatologist for hair loss.   Saw Rheum 4/2023.  Off of methadone since Jan 2023!! Has noticed that sweating has improved since being off.  Chronic L lymphedema.  Head tremor. [FreeTextEntry1] : Here for followup. Feels well. BP well controlled. Off Coreg.  No new complaints EKG reviewed and unchanged Here prior to knee replacement scheduled for 9/11/23  Echo 1/8/23: CONCLUSIONS: 1. Eccentric left ventricular hypertrophy (dilated left ventricle with normal relative wall thickness). 2. Normal left ventricular systolic function. No segmental wall motion abnormalities. 3. Normal right ventricular size and function. 4. Normal tricuspid valve.  Moderate tricuspid regurgitation.  #HTN- On  HCTZ and Valsartan, on Norvasc 10 mg  #Preop eval- TTE Jan 2023 was normal, Nuclear stress test April 2023 normal No further cardiac workup needed, patient optimized for TKR

## 2023-09-05 NOTE — H&P PST ADULT - HISTORY OF PRESENT ILLNESS
64 y/o female PMH HTN HLD CHF (last exacerbation 1/2023) asthma/COPD ETOH abuse diabetes neuropathy MADHURI ( denies use of CPAP) tremor bipolar disorder right breast cancer (right mastectomy) c/b right arm lymphedema presents to presurgical testing with diagnosis of unilateral primary osteoarthritis left knee. Pt with bilateral knee arthritis, with increasing pain and dysfunction of left  knee despite conservative management. Pt is scheduled for left total knee arthroplasty.

## 2023-09-05 NOTE — HISTORY OF PRESENT ILLNESS
[de-identified] : Last seen April 2023. Had R knee replacement in April 2023 and tolerated well. Has completed PT. Plan to have L knee replacement in Sept 2023. Has f/u with Dr. Givens.  Had COVID 7/1/23 - sister and nephew had it. Patient tested because of exposure but was asymptomatic.  MADHURI on CPAP and mild OAD - saw Pulm 4/20/23. Previously prescribed advair after viral infection; had presented with SOB. Not using because doesn't have any issues. Not really using  Will be getting EMG test because at night has been having pain going across top of L hand. Likely carpal tunnel syndrome.  HTN - on norvasc 10mg (increased since July 2022). In the last few weeks has noticed some L leg swelling at night. On hctz.  Seeing Dermatologist for hair loss.   Saw Rheum 4/2023.  Off of methadone since Jan 2023!! Has noticed that sweating has improved since being off.  Chronic L lymphedema.  Head tremor. [FreeTextEntry1] : Here for followup. Feels well. BP well controlled. Off Coreg.  No new complaints EKG reviewed and unchanged Here prior to knee replacement scheduled for 9/11/23  Echo 1/8/23: CONCLUSIONS: 1. Eccentric left ventricular hypertrophy (dilated left ventricle with normal relative wall thickness). 2. Normal left ventricular systolic function. No segmental wall motion abnormalities. 3. Normal right ventricular size and function. 4. Normal tricuspid valve.  Moderate tricuspid regurgitation.  #HTN- On  HCTZ and Valsartan, on Norvasc 10 mg  #Preop eval- TTE Jan 2023 was normal, Nuclear stress test April 2023 normal No further cardiac workup needed, patient optimized for TKR

## 2023-09-05 NOTE — H&P PST ADULT - PROBLEM SELECTOR PLAN 1
Patient tentatively scheduled for left total knee arthroplasty on 9/11/23.  Pre-op instructions provided. Pt given verbal and written instructions with teach back on chlorhexidine wash and pepcid. Pt verbalized understanding with return demonstration.   CBC, CMP A1C in chart from -8/31/23  T&S, UA, Urine c/s, MRSA- pending   EKG in chart from 3/17/23 Patient tentatively scheduled for left total knee arthroplasty on 9/11/23.  Pre-op instructions provided. Pt given verbal and written instructions with teach back on chlorhexidine wash and pepcid. Pt verbalized understanding with return demonstration.   CBC, CMP A1C in chart from -8/31/23  T&S, UA, Urine c/s, MRSA- pending

## 2023-09-05 NOTE — DISCUSSION/SUMMARY
[FreeTextEntry1] : 63 year old woman with HTN here for followup #HTN- On  HCTZ and Valsartan, on Norvasc 10 mg  #Preop eval- TTE Jan 2023 was normal, Nuclear stress test April 2023 normal No further cardiac workup needed, patient optimized for TKR  [EKG obtained to assist in diagnosis and management of assessed problem(s)] : EKG obtained to assist in diagnosis and management of assessed problem(s)

## 2023-09-05 NOTE — H&P PST ADULT - PRIMARY CARE PROVIDER
Dr Rios  (PCP)                                                                                     Dr Mathieu Lord pain   [phone]                           Dr Andersen pulmonary [phone]

## 2023-09-05 NOTE — H&P PST ADULT - LAST CARDIAC ANGIOGRAM/IMAGING
2002, [facility] 164t /st., pt. denies stent placement, "everything was clear when they went in but my heart stopped on the table"

## 2023-09-05 NOTE — H&P PST ADULT - NSICDXFAMILYHX_GEN_ALL_CORE_FT
Quality 130: Documentation Of Current Medications In The Medical Record: Current Medications Documented Detail Level: Detailed Quality 431: Preventive Care And Screening: Unhealthy Alcohol Use - Screening: Patient not identified as an unhealthy alcohol user when screened for unhealthy alcohol use using a systematic screening method Quality 110: Preventive Care And Screening: Influenza Immunization: Influenza Immunization previously received during influenza season Quality 402: Tobacco Use And Help With Quitting Among Adolescents: Patient screened for tobacco and never smoked FAMILY HISTORY:  Family history of leukemia, father  age 56 from leukemia    Sibling  Still living? Unknown  Family history of rheumatoid arthritis, Age at diagnosis: Age Unknown

## 2023-09-05 NOTE — H&P PST ADULT - PROBLEM SELECTOR PLAN 8
Patient instructed to take propanolol with a sip of water on the morning of procedure. Patient verbalized understanding.

## 2023-09-05 NOTE — H&P PST ADULT - PROBLEM SELECTOR PLAN 4
Patient instructed to take valsartan, amlodipine with a sip of water on the morning of procedure. Patient verbalized understanding.    patient obtained Medical evaluation - copy in chart Patient instructed to take valsartan, amlodipine with a sip of water on the morning of procedure. Patient verbalized understanding.    patient obtained Medical evaluation and cardiology eval - copy in chart

## 2023-09-05 NOTE — H&P PST ADULT - MUSCULOSKELETAL
left knee, ambulates with cane/decreased ROM due to pain/strength 5/5 bilateral upper extremities/abnormal gait details…

## 2023-09-05 NOTE — H&P PST ADULT - PROBLEM SELECTOR PLAN 6
Patient instructed to take buproprion with a sip of water on the morning of procedure. Patient verbalized understanding.

## 2023-09-05 NOTE — H&P PST ADULT - NSICDXPASTSURGICALHX_GEN_ALL_CORE_FT
PAST SURGICAL HISTORY:  2002   h/o hysterectomy due to Fibroid--post op vaginal bleeding requiring a transfusion     2008  repair of ventral hernia with mesh Revision 2018    Cancer 2012  insertion of mediport -- to be excised on 9-22-14    Cataract Bilateral 2017    H/O total knee replacement, right     Radical mastectomy of right breast 3/30/12    S/P arthroscopy of left shoulder     Termination of pregnancy (fetus) x 3

## 2023-09-05 NOTE — H&P PST ADULT - OTHER CARE PROVIDERS
Dr Figueroa endocrinologist                                                          Dr Torres cardiologist [phone]

## 2023-09-06 ENCOUNTER — OUTPATIENT (OUTPATIENT)
Dept: OUTPATIENT SERVICES | Facility: HOSPITAL | Age: 63
LOS: 1 days | Discharge: HOME | End: 2023-09-06

## 2023-09-06 ENCOUNTER — NON-APPOINTMENT (OUTPATIENT)
Age: 63
End: 2023-09-06

## 2023-09-06 DIAGNOSIS — H26.9 UNSPECIFIED CATARACT: Chronic | ICD-10-CM

## 2023-09-06 DIAGNOSIS — Z96.651 PRESENCE OF RIGHT ARTIFICIAL KNEE JOINT: Chronic | ICD-10-CM

## 2023-09-06 DIAGNOSIS — C50.919 MALIGNANT NEOPLASM OF UNSPECIFIED SITE OF UNSPECIFIED FEMALE BREAST: ICD-10-CM

## 2023-09-06 DIAGNOSIS — Z33.2 ENCOUNTER FOR ELECTIVE TERMINATION OF PREGNANCY: Chronic | ICD-10-CM

## 2023-09-06 DIAGNOSIS — Z98.890 OTHER SPECIFIED POSTPROCEDURAL STATES: Chronic | ICD-10-CM

## 2023-09-06 DIAGNOSIS — C80.1 MALIGNANT (PRIMARY) NEOPLASM, UNSPECIFIED: Chronic | ICD-10-CM

## 2023-09-06 LAB
CULTURE RESULTS: SIGNIFICANT CHANGE UP
HCT VFR BLD CALC: 31.8 %
HGB BLD-MCNC: 9.7 G/DL
MCHC RBC-ENTMCNC: 25.5 PG
MCHC RBC-ENTMCNC: 30.5 GM/DL
MCV RBC AUTO: 83.5 FL
MRSA PCR RESULT.: SIGNIFICANT CHANGE UP
PLATELET # BLD AUTO: 286 K/UL
RBC # BLD: 3.81 M/UL
RBC # FLD: 22.4 %
S AUREUS DNA NOSE QL NAA+PROBE: SIGNIFICANT CHANGE UP
SPECIMEN SOURCE: SIGNIFICANT CHANGE UP
WBC # FLD AUTO: 4.06 K/UL

## 2023-09-07 ENCOUNTER — RESULT REVIEW (OUTPATIENT)
Age: 63
End: 2023-09-07

## 2023-09-07 ENCOUNTER — LABORATORY RESULT (OUTPATIENT)
Age: 63
End: 2023-09-07

## 2023-09-07 ENCOUNTER — APPOINTMENT (OUTPATIENT)
Dept: HEMATOLOGY ONCOLOGY | Facility: CLINIC | Age: 63
End: 2023-09-07
Payer: MEDICARE

## 2023-09-07 VITALS
HEART RATE: 68 BPM | TEMPERATURE: 97.1 F | DIASTOLIC BLOOD PRESSURE: 68 MMHG | RESPIRATION RATE: 17 BRPM | WEIGHT: 218.99 LBS | BODY MASS INDEX: 41.34 KG/M2 | HEIGHT: 60.98 IN | OXYGEN SATURATION: 97 % | SYSTOLIC BLOOD PRESSURE: 112 MMHG

## 2023-09-07 LAB
BASOPHILS # BLD AUTO: 0.02 K/UL — SIGNIFICANT CHANGE UP (ref 0–0.2)
BASOPHILS NFR BLD AUTO: 0.6 % — SIGNIFICANT CHANGE UP (ref 0–2)
EOSINOPHIL # BLD AUTO: 0.07 K/UL — SIGNIFICANT CHANGE UP (ref 0–0.5)
EOSINOPHIL NFR BLD AUTO: 2.1 % — SIGNIFICANT CHANGE UP (ref 0–6)
HCT VFR BLD CALC: 29.9 % — LOW (ref 34.5–45)
HGB BLD-MCNC: 9.5 G/DL — LOW (ref 11.5–15.5)
IMM GRANULOCYTES NFR BLD AUTO: 0 % — SIGNIFICANT CHANGE UP (ref 0–0.9)
LYMPHOCYTES # BLD AUTO: 1.21 K/UL — SIGNIFICANT CHANGE UP (ref 1–3.3)
LYMPHOCYTES # BLD AUTO: 36 % — SIGNIFICANT CHANGE UP (ref 13–44)
MCHC RBC-ENTMCNC: 25.6 PG — LOW (ref 27–34)
MCHC RBC-ENTMCNC: 31.8 G/DL — LOW (ref 32–36)
MCV RBC AUTO: 80.6 FL — SIGNIFICANT CHANGE UP (ref 80–100)
MONOCYTES # BLD AUTO: 0.29 K/UL — SIGNIFICANT CHANGE UP (ref 0–0.9)
MONOCYTES NFR BLD AUTO: 8.6 % — SIGNIFICANT CHANGE UP (ref 2–14)
NEUTROPHILS # BLD AUTO: 1.77 K/UL — LOW (ref 1.8–7.4)
NEUTROPHILS NFR BLD AUTO: 52.7 % — SIGNIFICANT CHANGE UP (ref 43–77)
NRBC # BLD: 0 /100 WBCS — SIGNIFICANT CHANGE UP (ref 0–0)
PLATELET # BLD AUTO: 240 K/UL — SIGNIFICANT CHANGE UP (ref 150–400)
RBC # BLD: 3.71 M/UL — LOW (ref 3.8–5.2)
RBC # FLD: 22.2 % — HIGH (ref 10.3–14.5)
RETICS #: 68.3 K/UL — SIGNIFICANT CHANGE UP (ref 25–125)
RETICS/RBC NFR: 1.8 % — SIGNIFICANT CHANGE UP (ref 0.5–2.5)
WBC # BLD: 3.36 K/UL — LOW (ref 3.8–10.5)
WBC # FLD AUTO: 3.36 K/UL — LOW (ref 3.8–10.5)

## 2023-09-07 PROCEDURE — 99214 OFFICE O/P EST MOD 30 MIN: CPT

## 2023-09-07 NOTE — PHYSICAL EXAM
[Restricted in physically strenuous activity but ambulatory and able to carry out work of a light or sedentary nature] : Status 1- Restricted in physically strenuous activity but ambulatory and able to carry out work of a light or sedentary nature, e.g., light house work, office work [Normal] : affect appropriate [de-identified] : RUE lymphedema  [de-identified] : ambulating with cane

## 2023-09-07 NOTE — HISTORY OF PRESENT ILLNESS
[Disease: _____________________] : Disease: [unfilled] [T: ___] : T[unfilled] [N: ___] : N[unfilled] [AJCC Stage: ____] : AJCC Stage: [unfilled] [de-identified] : Age 52: advanced breast cancer status post right mastectomy and axillary lymph node dissection on 3/30/2012 followed by chemotherapy and radiation.  \par  She had been maintaining her port every 6 weeks until March 2014.  She had axillary lymph node noted in prior imaging which was felt to be benign.  She had a biopsy that was done in May of 2014 that was benign. Pathology showed reactive lymph node which was benign.  She continues to have tenderness at the biopsy site.  PET/ CT performed on 8/6/14 show postsurgical changes in the right chest wall.  Resolution of previously seen mild hypermetabolism in left axillary lymph node.  Given her port not functioning and her PET scan negative, port removed 2014. [de-identified] : invasive poorly differentiated ductal carcinoma ER 0, OR 0 [id number] 3+ [de-identified] : ChemoRT completed 2012 [de-identified] : Since last visit 11/2022, she had R knee replacement and planning to have L knee replacement next week. She has some stressors at home and has been eating more than usual. She is trying to get back on track to lose weight. Since covid in July, has been feeling more fatigued: tiring more easily with her everyday chores. She has been seeing rheumatology also for multiple positive autoantibodies including DORCAS positivity along with joint pain: has been taking hydroxychloroquine since July. No other new medications. Started once a day iron supplement after mild anemia found on follow up with PCP. She denies any change in bowel habits, increased GI upset with meloxicam or new back pain.

## 2023-09-07 NOTE — ASSESSMENT
[FreeTextEntry1] : She is a 64 y/o F with history of Stage III right breast cancer s/p mastectomy followed by chemotherapy and RT for ER negative/ Rkd5gto positive disease 2012. No new signs or symptoms of breast cancer recurrence. Past baseline hemoglobin prior to covid infection was 11g/dL. Currently has been 9g/dL with improvement on iron supplement. Reviewed recent anemia work up with normal iron studies (iron supplement for 2 months) and low normal ferritin level. There may have been iron deficiency component after knee surgery and currently has improved. We reviewed differential: covid induced anemia versus anemia from developing rheumatologic issue since covid: autoantibodies which is a mechanism of affecting erythropoietin sensitivity / response. Will rule out non autoimmune hemolysis labs, check reticulocyte. Rouleaux on smear may be secondary to autoantibodies but will add SPEP and free light chains to rule out myeloma. Currently anemia is improved and there is no hematologic contraindication to planned knee surgery. She is agreeable for transfusion if her blood counts decline to less than 7g/dL; has received in past during breast cancer treatment.

## 2023-09-07 NOTE — RESULTS/DATA
[FreeTextEntry1] : Peripheral smear review: anisocytosis, normochromic, rare schistocyte; rouleaux present on periphery; neutrophil predominance, lymphs, plts 24 to 26/ hpf with large platelets.

## 2023-09-07 NOTE — REVIEW OF SYSTEMS
[Fatigue] : fatigue [Recent Change In Weight] : ~T recent weight change [Diarrhea: Grade 0] : Diarrhea: Grade 0 [Joint Pain] : joint pain [Muscle Pain] : muscle pain [Negative] : Allergic/Immunologic [Fever] : no fever [Chills] : no chills [Night Sweats] : no night sweats [Joint Stiffness] : no joint stiffness [Muscle Weakness] : no muscle weakness [Confused] : no confusion [Dizziness] : no dizziness [Fainting] : no fainting [Difficulty Walking] : no difficulty walking [FreeTextEntry2] : weight gain  [FreeTextEntry9] : shoulder and knee pain along with fibromyalgia  [de-identified] : walks with cane

## 2023-09-08 ENCOUNTER — NON-APPOINTMENT (OUTPATIENT)
Age: 63
End: 2023-09-08

## 2023-09-08 LAB
DEPRECATED KAPPA LC FREE/LAMBDA SER: 2.1 RATIO
HAPTOGLOB SERPL-MCNC: 258 MG/DL
KAPPA LC CSF-MCNC: 2.34 MG/DL
KAPPA LC SERPL-MCNC: 4.91 MG/DL
LDH SERPL-CCNC: 206 U/L

## 2023-09-09 ENCOUNTER — NON-APPOINTMENT (OUTPATIENT)
Age: 63
End: 2023-09-09

## 2023-09-11 ENCOUNTER — APPOINTMENT (OUTPATIENT)
Dept: ORTHOPEDIC SURGERY | Facility: HOSPITAL | Age: 63
End: 2023-09-11

## 2023-09-13 LAB
ALBUMIN MFR SERPL ELPH: 46.9 %
ALBUMIN SERPL-MCNC: 3.6 G/DL
ALBUMIN/GLOB SERPL: 0.9 RATIO
ALPHA1 GLOB MFR SERPL ELPH: 2.9 %
ALPHA1 GLOB SERPL ELPH-MCNC: 0.2 G/DL
ALPHA2 GLOB MFR SERPL ELPH: 12.5 %
ALPHA2 GLOB SERPL ELPH-MCNC: 1 G/DL
B-GLOBULIN MFR SERPL ELPH: 14.2 %
B-GLOBULIN SERPL ELPH-MCNC: 1.1 G/DL
GAMMA GLOB FLD ELPH-MCNC: 1.8 G/DL
GAMMA GLOB MFR SERPL ELPH: 23.5 %
INTERPRETATION SERPL IEP-IMP: NORMAL
PROT SERPL-MCNC: 7.6 G/DL
PROT SERPL-MCNC: 7.6 G/DL

## 2023-10-19 ENCOUNTER — APPOINTMENT (OUTPATIENT)
Dept: ORTHOPEDIC SURGERY | Facility: CLINIC | Age: 63
End: 2023-10-19

## 2023-10-26 ENCOUNTER — APPOINTMENT (OUTPATIENT)
Dept: RHEUMATOLOGY | Facility: CLINIC | Age: 63
End: 2023-10-26
Payer: MEDICARE

## 2023-10-26 ENCOUNTER — NON-APPOINTMENT (OUTPATIENT)
Age: 63
End: 2023-10-26

## 2023-10-26 VITALS
HEIGHT: 60 IN | BODY MASS INDEX: 44.37 KG/M2 | RESPIRATION RATE: 16 BRPM | WEIGHT: 226 LBS | TEMPERATURE: 97.9 F | DIASTOLIC BLOOD PRESSURE: 79 MMHG | HEART RATE: 64 BPM | OXYGEN SATURATION: 97 % | SYSTOLIC BLOOD PRESSURE: 143 MMHG

## 2023-10-26 DIAGNOSIS — M12.811 OTHER SPECIFIC ARTHROPATHIES, NOT ELSEWHERE CLASSIFIED, RIGHT SHOULDER: ICD-10-CM

## 2023-10-26 DIAGNOSIS — R76.8 OTHER SPECIFIED ABNORMAL IMMUNOLOGICAL FINDINGS IN SERUM: ICD-10-CM

## 2023-10-26 DIAGNOSIS — M12.812 OTHER SPECIFIC ARTHROPATHIES, NOT ELSEWHERE CLASSIFIED, RIGHT SHOULDER: ICD-10-CM

## 2023-10-26 PROCEDURE — 99214 OFFICE O/P EST MOD 30 MIN: CPT

## 2023-10-26 RX ORDER — METFORMIN HYDROCHLORIDE 500 MG/1
500 TABLET, COATED ORAL TWICE DAILY
Qty: 30 | Refills: 0 | Status: COMPLETED | COMMUNITY
Start: 2023-10-26 | End: 2023-11-10

## 2023-10-27 PROBLEM — R76.8 SS-A ANTIBODY POSITIVE: Status: ACTIVE | Noted: 2022-08-18

## 2023-10-27 PROBLEM — M12.811 ROTATOR CUFF ARTHROPATHY OF BOTH SHOULDERS: Status: ACTIVE | Noted: 2023-01-11

## 2023-11-06 NOTE — H&P PST ADULT - HEART RATE (BEATS/MIN)
Rx Refill Note  Requested Prescriptions     Pending Prescriptions Disp Refills    cetirizine (zyrTEC) 10 MG tablet [Pharmacy Med Name: CETIRIZINE HCL 10MG TABS] 30 tablet 5     Sig: TAKE ONE TABLET BY MOUTH EVERY DAY AS NEEDED FOR ALLERGIES OR RHINITIS      Last office visit with prescribing clinician: 10/9/2023         Jeri Pereyra MA  11/06/23, 13:36 CST   64

## 2023-11-07 ENCOUNTER — APPOINTMENT (OUTPATIENT)
Dept: ORTHOPEDIC SURGERY | Facility: CLINIC | Age: 63
End: 2023-11-07
Payer: MEDICARE

## 2023-11-07 PROCEDURE — 99213 OFFICE O/P EST LOW 20 MIN: CPT

## 2023-11-07 PROCEDURE — 73562 X-RAY EXAM OF KNEE 3: CPT | Mod: 50

## 2023-12-04 ENCOUNTER — APPOINTMENT (OUTPATIENT)
Dept: INTERNAL MEDICINE | Facility: CLINIC | Age: 63
End: 2023-12-04
Payer: MEDICARE

## 2023-12-04 ENCOUNTER — NON-APPOINTMENT (OUTPATIENT)
Age: 63
End: 2023-12-04

## 2023-12-04 VITALS
SYSTOLIC BLOOD PRESSURE: 133 MMHG | OXYGEN SATURATION: 97 % | HEART RATE: 82 BPM | WEIGHT: 227 LBS | DIASTOLIC BLOOD PRESSURE: 76 MMHG | BODY MASS INDEX: 44.33 KG/M2 | RESPIRATION RATE: 15 BRPM

## 2023-12-04 DIAGNOSIS — M25.542 PAIN IN JOINTS OF RIGHT HAND: ICD-10-CM

## 2023-12-04 DIAGNOSIS — M25.541 PAIN IN JOINTS OF RIGHT HAND: ICD-10-CM

## 2023-12-04 PROCEDURE — 99214 OFFICE O/P EST MOD 30 MIN: CPT | Mod: 25

## 2023-12-04 PROCEDURE — G0008: CPT

## 2023-12-04 PROCEDURE — 36415 COLL VENOUS BLD VENIPUNCTURE: CPT

## 2023-12-04 PROCEDURE — 90662 IIV NO PRSV INCREASED AG IM: CPT

## 2023-12-07 LAB
ANION GAP SERPL CALC-SCNC: 15 MMOL/L
BASOPHILS # BLD AUTO: 0 K/UL
BASOPHILS # BLD AUTO: 0 K/UL
BASOPHILS NFR BLD AUTO: 0 %
BASOPHILS NFR BLD AUTO: 0 %
BUN SERPL-MCNC: 14 MG/DL
CALCIUM SERPL-MCNC: 10 MG/DL
CHLORIDE SERPL-SCNC: 98 MMOL/L
CO2 SERPL-SCNC: 25 MMOL/L
CREAT SERPL-MCNC: 0.85 MG/DL
DACRYOCYTES BLD QL SMEAR: SLIGHT
EGFR: 76 ML/MIN/1.73M2
EOSINOPHIL # BLD AUTO: 0.22 K/UL
EOSINOPHIL # BLD AUTO: 0.22 K/UL
EOSINOPHIL NFR BLD AUTO: 4.5 %
EOSINOPHIL NFR BLD AUTO: 4.5 %
ESTIMATED AVERAGE GLUCOSE: 148 MG/DL
FERRITIN SERPL-MCNC: 82 NG/ML
GLUCOSE SERPL-MCNC: 202 MG/DL
HBA1C MFR BLD HPLC: 6.8 %
HCT VFR BLD CALC: 31.8 %
HGB BLD-MCNC: 10 G/DL
HYPOCHROMIA BLD QL SMEAR: SLIGHT
IRON SATN MFR SERPL: 15 %
IRON SERPL-MCNC: 41 UG/DL
LYMPHOCYTES # BLD AUTO: 1.52 K/UL
LYMPHOCYTES # BLD AUTO: 1.52 K/UL
LYMPHOCYTES NFR BLD AUTO: 30.6 %
LYMPHOCYTES NFR BLD AUTO: 30.6 %
MAN DIFF?: NORMAL
MCHC RBC-ENTMCNC: 28.2 PG
MCHC RBC-ENTMCNC: 31.4 GM/DL
MCV RBC AUTO: 89.6 FL
MONOCYTES # BLD AUTO: 0.4 K/UL
MONOCYTES # BLD AUTO: 0.4 K/UL
MONOCYTES NFR BLD AUTO: 8.1 %
MONOCYTES NFR BLD AUTO: 8.1 %
MSMEAR: NORMAL
NEUTROPHILS # BLD AUTO: 2.82 K/UL
NEUTROPHILS # BLD AUTO: 2.82 K/UL
NEUTROPHILS NFR BLD AUTO: 56.8 %
NEUTROPHILS NFR BLD AUTO: 56.8 %
PLAT MORPH BLD: NORMAL
PLATELET # BLD AUTO: 388 K/UL
POIKILOCYTOSIS BLD QL SMEAR: SLIGHT
POLYCHROMASIA BLD QL SMEAR: SLIGHT
POTASSIUM SERPL-SCNC: 3.9 MMOL/L
RBC # BLD: 3.55 M/UL
RBC # BLD: 3.55 M/UL
RBC # FLD: 17 %
RBC BLD AUTO: ABNORMAL
RETICS # AUTO: 1.8 %
RETICS AGGREG/RBC NFR: 64.6 K/UL
SODIUM SERPL-SCNC: 138 MMOL/L
TIBC SERPL-MCNC: 266 UG/DL
UIBC SERPL-MCNC: 225 UG/DL
WBC # FLD AUTO: 4.97 K/UL

## 2023-12-30 PROBLEM — M25.541 ARTHRALGIA OF BOTH HANDS: Status: RESOLVED | Noted: 2022-08-02 | Resolved: 2023-12-30

## 2023-12-30 NOTE — PHYSICAL EXAM
[Normal] : soft, non-tender, non-distended, no masses palpated, no HSM and normal bowel sounds [de-identified] : well healed surgical scar on R knee

## 2023-12-30 NOTE — HISTORY OF PRESENT ILLNESS
[FreeTextEntry1] : Follow up [de-identified] : Underwent R knee replacement (2023). Initially felt great. But 1 month ago had a bad flare of fibromyalgia in November.  L knee replacement never happened due to anemia. Plan likely to occur in 2024. Saw Ortho 2023. But concerned because she has gained weight. Anemia discussed with Heme 2023 and advised no transfusion or IV iron infusion needed and should continue oral iron supplementation.  Weight was 209 lb after surgery and now is 222 lb on home scale. Had tried Byetta previously but Endo had stopped. Lots of emotional eating.  Fibromyalgia - saw Rheum 10/2023. Recommended reinitiation of hydroxychloroquine (but patient never got a new rx so will run out). Taking baby ASA.  Pt reports that a few weeks ago was having some stomach symptoms early in the morning. Started having pain in the L arm and pressure in chest - got up and started coughing, took a baby ASA, went back to sleep and when woke up Saw Cards 2023.  Thinks might have a cold. Took home COVID test. +runny nose. No fevers. No cough.  Tremor - pt thinks it's related to the bupropion. Had stopped for 2 days and tremor stopped. Taking it only every 2-3 days.  Lots of social stressors: daughter back in [place] and has 2 children. Grandchildren's father  2023. 's aunt  26-Sep-2019 14:39

## 2023-12-30 NOTE — ASSESSMENT
[FreeTextEntry1] : 64 y/o F with obesity, arthritis s/p R knee replacement (2023), HTN, DM, stage 3 breast cancer s/p R mastectomy/chemo/xrt with chronic R arm lymphedema, chronic pain, fibromyalgia here for follow up.  L knee OA - pending knee replacement but postponed due to anemia. Seen by Heme - Check anemia labs - Advised to continue iron  Tremor - pt concerned may be related to bupropion. - Pt to discuss with psych re: weaning  HCM: flu vaccine today - colonoscopy 2017 - poor prep; needs repeat - prevnar 20 July 2023 - L mammo (s/p r mastectomy) Dec 2022 --> due for repeat  RTC in 3 months or sooner prn.

## 2024-01-04 ENCOUNTER — APPOINTMENT (OUTPATIENT)
Dept: ORTHOPEDIC SURGERY | Facility: CLINIC | Age: 64
End: 2024-01-04
Payer: MEDICARE

## 2024-01-04 PROCEDURE — 99213 OFFICE O/P EST LOW 20 MIN: CPT

## 2024-01-07 NOTE — PHYSICAL EXAM
[de-identified] : Constitutional: 63 year old female, alert and oriented, cooperative, in no acute distress.  HEENT  NC/AT.  Appearance: symmetric  Neck/Back Straight without deformity or instability.  Good ROM.  Chest/Respiratory  Respiratory effort: no intercostal retractions or use of accessory muscles. Nonlabored Breathing  Skin  On inspection, warm and dry without rashes or lesions.  Mental Status:  Judgment, insight: intact Orientation: oriented to time, place, and person  Neurological: Sensory and Motor are grossly intact throughout  Right Knee  Inspection:     Incision well healing. No erythema or drainage     No Effusion     Non-tender to palpation over tibial tubercle, patella, medial and lateral joint line, and pes insertion.  Range of Motion: 	Extension - 0 degrees 	Flexion - 120 degrees 	Alignment - Valgus 5 degrees 	Extensor lag: None  Stability:      Demonstrates no Varus or Valgus instability      Negative Anterior or Posterior drawer.      No mid flexion instability  Patella: stable, tracks well.   Left Knee  Inspection:     Skin intact, no rashes or lesions     No Effusion     Non-tender to palpation over tibial tubercle, patella, lateral joint line and pes insertion.     Tenderness over the medial joint line (anteriorly)  Range of Motion: 	Extension - -5 degrees 	Flexion - 115 degrees 	Alignment - Varus 5 degrees 	Extensor lag: None  Stability:      Demonstrates no Varus or Valgus instability      Negative Anterior or Posterior drawer.      Negative Lachman's  Patella: stable, tracks well.   Neurologic Exam     Motor intact including 5/5 Extensor Hallucis Longus, 5/5 Flexor Hallucis Longus, 5/5 Tibialis Anterior and 5/5 Gastrocnemius     Sensation Intact to Light Touch including Saphenous, Sural, Superficial Peroneal, Deep Peroneal, Tibial nerve distributions  Vascular Exam     Foot is warm and well perfused with 2+ Dorsalis Pedis Pulse  [de-identified] : XRay:  XRays of the Right Knee (3 Views) taken on 11/7/2023. XRays demonstrate a Right Total Knee Arthroplasty in good position and alignment. There is no obvious evidence of fracture, dislocation, osteolysis or loosening. (my personal interpretation) Components: Smith and Nephew Journey II BCS Cemented  XRay: XRays of the Left Knee (3 Views) taken on 11/7/2023. XRays demonstrate tricompartmental joint space narrowing, with bone on bone articulations in the medial compartment, with subchondral sclerosis, overlying osteophytes, all consistent with severe osteoarthritis, KL thGthrthathdtheth:th th5th. There is varus alignment. (my personal interpretation)

## 2024-01-07 NOTE — HISTORY OF PRESENT ILLNESS
[de-identified] : 1/4/2024  Denia Daley presented to the office for follow-up of her right total knee arthroplasty and management of her left knee osteoarthritis.  Patient's right knee is doing well overall.  She did have a fibromyalgia flare and had some pain.  She would like to restart physical therapy.  Patient continues to have left knee pain.  Pain is worse with going up stairs and in bed.  No falls.  No fevers or chills.  Patient does have back pain.  She states that her last weight was 218 pounds and that her last hemoglobin was 10.0.  She is currently on iron and has somewhat improved.  11/7/2023  Denia Daley presented to the office for follow-up of her right total knee arthroplasty and left knee osteoarthritis.  Patient was increasingly active about 10 days ago on Saturday.  She had knee pain afterwards and had pain last week.  Pain was worse with leaning forward.  No falls.  No fevers or chills.  No significant swelling.  Patient states that her pain is improving.  She continues to have left knee pain and had pain after the increased activity, which has been improving.  Pain is located over the medial and lateral knees.  8/29/2023  Denia Daley presents to the office for follow-up of her right total knee arthroplasty and left knee osteoarthritis.  Patient's right knee is currently doing well.  She does get some occasional shock type pains.  She is able to walk without a cane.  Patient continues to have left knee pain.  She states that the left knee is still affecting her quality of life.  She has tried physical therapy, anti-inflammatories, and injections.  Patient was recently seen by neurology and diagnosed with an essential tremor.  She is going to see cardiology and her primary care physician for her clearances.  She had a recent sleep study for her MADHURI.  No falls or injuries.  7/27/2023  Denia Daley presents to the office for follow-up of her right total knee arthroplasty and left knee osteoarthritis.  Patient's right knee is currently doing well.  She has no significant issues.  Patient continues to have significant left knee pain.  She states that the left knee pain is affecting her quality of life.  She has been walking better due to her right TKA.  Patient uses a cane in the community, but is able to walk without the cane.  She has tried pain medications, physical therapy, and injections for the left knee.  Patient states that her back pain has improved after undergoing RFA and is going for an EMG tomorrow for her carpal tunnel.  6/13/2023  Stephanie Daley is a 63-year-old female who presents to the office for follow-up of her right total knee arthroplasty.  Patient underwent right TKA on 4/24/2023.  She is currently doing well.  Right knee pain is significantly improved compared to prior to surgery.  She is currently in physical therapy. Patient continues to take her Aspirin 325 mg twice per day for DVT prophylaxis.  Patient has been doing home exercises on a bicycle. She is currently walking with a cane when outside, but is not using any assistive devices in the home.  Patient currently reports left lower back and left knee pain.  She continues to have left knee pain.  She had a recent RFA on her right lower back.  She is currently taking tramadol, gabapentin and Tylenol.  She states for the majority of her pain is currently in her left knee.  Left knee pain continues to affect her quality of life.  Patient has tried injections, physical therapy, and pain medications for her left knee.  Her last left knee injection was in August 2022.  She would like to discuss left total knee arthroplasty.  5/9/2023  Ms. Daley presents to the office for follow-up of her right total knee arthroplasty.  Patient underwent right TKA on 4/24/2023.  She is currently doing well.  Patient did go to the emergency department on 5/5/2023 with pain and swelling of her right lower extremity.  US Duplex was negative.  Her pain and swelling have improved over the last few days.  Her pain is well controlled at this time.  She is taking occasional tramadol at night for her pain.  Patient is walking with a cane.  She has finished her home physical therapy.  No fevers or chills.  Patient remains on Aspirin 325mg twice per day for DVT prophylaxis.   4/7/2023  Ms. Daley presented to the office for follow-up of her bilateral knee pain.  Patient continues to experience bilateral (right greater than left) knee pain.  She also reports some right knee stiffness and occasional lower back pain.  Patient has some neuropathy in her hands and feet.  Patient continues to experience pain is affecting her quality of life and daily activities.  She has tried multiple knee injections, last in August 2022.  She has also tried physical therapy and has been in pain management.  Patient is planned for a right total knee arthroplasty.  She has a stress test scheduled for Wednesday.  Patient is currently taking meloxicam for her pain.  2/10/2023 Ms. Daley is a 62-year-old female presents to the office for follow up of her bilateral knee pain.  Patient has been experiencing bilateral (right greater than left) knee pain for about 30 years.  She states that it has been worse over the last 6 to 8 years. Pain is now affecting her quality of life and daily activities.  Pain is worse when lying down and patient tries to keep her feet elevated.  Right knee pain is worse than her left at this time.  Patient has tried meloxicam, gabapentin, and Tylenol for the pain.  She has a history of chronic pain and has been in pain management for about 20 years at [facility].  Patient has stopped taking methadone and is currently only taking tramadol for her pain.  She did have some withdrawal from her opioid medications.  Patient has tried multiple knee injections, including cortisone injections for many years.  She has tried about 4 series of viscosupplementation injections.  Her last knee joint injections were in August 2022, which did not help.  She states that she received 3 gel injections in the right knee and 2 in the left, but pain worsened and she did not finish the left knee series.  She had an injection in her right IT band in November 2022.  Patient is also tried physical therapy for her knees, last in October 2022. She has tried physical therapy for her back pain, but her knee pain limited her back PT. Patient has continued her weight loss and her BMI: 39.26.  No recent trauma.  No fevers or chills.  Patient was recently started on Symbicort by her pulmonologist.  She would like to move her surgical date from March into April.   History: HTN, Diverticulitis, GERD, DEREK, History of Breast Cancer, Alcoholism (19 years sober), Lymphedema in Right Arm, Fibromyalgia, Neuropathy, Bipolar, Diabetes (Last A1C: 6.1)

## 2024-01-07 NOTE — DISCUSSION/SUMMARY
[de-identified] : Denia Daley is a 63-year-old female presented to the office for follow-up of her right total knee arthroplasty and management of her left knee osteoarthritis. Prior X-rays showed right total knee arthroplasty in good position and alignment.  There was severe left knee osteoarthritis.  Examination showed good bilateral knee range of motion.  Discussed with the patient the examination and imaging findings.  Discussed with patient the management of her right total knee arthroplasty, including exercise.  Patient will continue her home exercises.  Discussed the operative and nonoperative management of her left knee osteoarthritis, including total knee arthroplasty.  Patient would like to consider left TKA.  Discussed continued optimization of her weight and anemia.  Physical therapy discussed the management of her knee pain at this time, including exercise and anti-inflammatories.  Patient was given a referral to physical therapy.  She will take over-the-counter anti-inflammatories as needed for her pain control.  Patient will continue to follow-up with her primary care physician and hematologist for management of her anemia.  Patient will follow-up in 6 weeks for reevaluation and management, consideration of left TKA. Patient understanding and in agreement with the plan.  All questions answered.  Plan: -Physical Therapy -Over-the-counter anti-inflammatories as needed for pain control -Follow-up with medicine and hematology for management of anemia -Weight Loss -Follow-up in 6 weeks for reevaluation and management

## 2024-01-11 ENCOUNTER — APPOINTMENT (OUTPATIENT)
Dept: RHEUMATOLOGY | Facility: CLINIC | Age: 64
End: 2024-01-11
Payer: MEDICARE

## 2024-01-11 VITALS
TEMPERATURE: 98 F | SYSTOLIC BLOOD PRESSURE: 137 MMHG | OXYGEN SATURATION: 97 % | BODY MASS INDEX: 43.39 KG/M2 | RESPIRATION RATE: 16 BRPM | WEIGHT: 221 LBS | DIASTOLIC BLOOD PRESSURE: 76 MMHG | HEIGHT: 60 IN | HEART RATE: 63 BPM

## 2024-01-11 DIAGNOSIS — M65.9 SYNOVITIS AND TENOSYNOVITIS, UNSPECIFIED: ICD-10-CM

## 2024-01-11 DIAGNOSIS — M17.0 BILATERAL PRIMARY OSTEOARTHRITIS OF KNEE: ICD-10-CM

## 2024-01-11 DIAGNOSIS — M75.102 UNSPECIFIED ROTATOR CUFF TEAR OR RUPTURE OF LEFT SHOULDER, NOT SPECIFIED AS TRAUMATIC: ICD-10-CM

## 2024-01-11 DIAGNOSIS — M12.812 UNSPECIFIED ROTATOR CUFF TEAR OR RUPTURE OF LEFT SHOULDER, NOT SPECIFIED AS TRAUMATIC: ICD-10-CM

## 2024-01-11 PROCEDURE — 99214 OFFICE O/P EST MOD 30 MIN: CPT | Mod: 25

## 2024-01-11 PROCEDURE — 20550 NJX 1 TENDON SHEATH/LIGAMENT: CPT

## 2024-01-11 RX ORDER — METHYLPRED ACET/NACL,ISO-OS/PF 80 MG/ML
80 VIAL (ML) INJECTION
Qty: 1 | Refills: 0 | Status: COMPLETED | OUTPATIENT
Start: 2024-01-11

## 2024-01-11 RX ORDER — HYDROXYCHLOROQUINE SULFATE 200 MG/1
200 TABLET, FILM COATED ORAL TWICE DAILY
Qty: 180 | Refills: 1 | Status: ACTIVE | COMMUNITY
Start: 2023-07-28 | End: 1900-01-01

## 2024-01-11 RX ADMIN — Medication 0 MG/ML: at 00:00

## 2024-01-12 PROBLEM — M17.0 LOCALIZED OSTEOARTHRITIS OF KNEES, BILATERAL: Status: ACTIVE | Noted: 2019-02-02

## 2024-01-12 PROBLEM — M75.102 ROTATOR CUFF TEAR ARTHROPATHY, LEFT: Status: ACTIVE | Noted: 2019-06-21

## 2024-01-12 NOTE — ASSESSMENT
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 Patient understands the importance of regular ophthalmology follow-up in the setting of retinal toxicity risk in the setting of hydroxychloroquine use. Spoke with patient's ophthalmologist, Dr. Stallworth who will monitor patient regularly for visual screening testing.  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 In the setting of repeat mixing study positivity, would recommend the use of baby aspirin.  Patient understands ischemic changes may be related to prior social history. White matter lesions are seen in connective tissue disease cases including lupus and therefore we will continue to monitor and assess the utility of additional immunosuppressive therapy.  Heme would like to c/w current regimen.  Patient will benefit from physical therapy to help with joint mobility and muscle strengthening. Quadriceps strengthening exercises encouraged; patient to have LT TKR in the upcoming months. Paraffin wax and Glucosamine supplements recommended for the pain stemming from hand OA.  Cortisone injection given into the flexor sheath for first digit tenosynovitis as patient refrains from NSAID therapy.   Patient understands the importance of weight loss reduction in the setting of HTN, DM and its association of cardiovascular risk. Whole food plant-based diet encouraged. The importance of dietary and lifestyle modifications was emphasized along with discussions on relaxation, stress-reducing techniques and importance of good sleep hygiene. She is in agreement with the above plan and will return in three months' time.

## 2024-01-12 NOTE — PROCEDURE
[Other Date:___] : Date: [unfilled] [Patient] : the patient [Risks] : risks [Benefits] : benefits [Consent Obtained] : written consent was obtained prior to the procedure and is detailed in the patient's record [Therapeutic] : therapeutic [#1 Site: ______] : #1 site identified in the [unfilled] [25 gauge 1 inch] : A 25 gauge 1 inch needle was used [Depomedrol ___ mg] : Depomedrol [unfilled] mg [Tolerated Well] : the patient tolerated the procedure well [No Complications] : there were no complications [Patient Instructed to Call] : patient was instructed to call if redness at site, a decrease in range of motion or an increase in pain is noted after procedure.

## 2024-01-12 NOTE — REVIEW OF SYSTEMS
[Fever] : no fever [Chills] : no chills [Sore Throat] : no sore throat [Hoarseness] : no hoarseness [Chest Pain] : no chest pain [Palpitations] : no palpitations [Cough] : no cough [SOB on Exertion] : no shortness of breath during exertion [Joint Swelling] : no joint swelling [Joint Stiffness] : joint stiffness [Difficulty Walking] : difficulty walking [Feelings Of Weakness] : no feelings of weakness [Easy Bleeding] : no tendency for easy bleeding [Easy Bruising] : no tendency for easy bruising [de-identified] : Low mood

## 2024-01-12 NOTE — HISTORY OF PRESENT ILLNESS
[FreeTextEntry1] : Patient with arthralgias, notes locking of the RT knee s/p  RT TRK 2023 , along with LUE and LLE stiffness.  She awaits upcoming LT TKR once anemia has improved with last Hb in the10 range.  Knee films reflect advanced bilateral knee tricompartmental osteoarthritis with medial tibiofemoral compartment predominance:  she explains having used methadone to help deal with the knee pain and however would like to taper off.  She explains prior viscosupplementation lending to worsening pain and finds it is related to concurrent vaccinations. She reports instability symptoms especially when walking for prolonged periods or climbing stairs.  She reports pain in the left hand and senses a locking sensation over the thumb radiating to the wrist.  and does recall heavy lifting after helping move daughter recently ;she has not re- initiated hydroxychloroquine as of yet.  She had explained diffuse arthralgias since COVID-19 infection over the summer. Patient with blood testing in August 2022 reflecting DORCAS positivity 1: 320 in a speckled pattern with positive SS-A in the setting of elevated inflammatory markers. Patient initiated hydroxychloroquine therapy x 1 month before discontinuing as she felt it was offering minimal relief. Antiphospholipid antibodies were also noted the summer 2022 after COVID infection and repeated before the end of the year with repeat silica and mixing studies positive.  She denies history of thrombotic events.  Patient under the care of heme-onc colleagues and continues on baby aspirin daily.  Patient with history of HER2 positive breast CA status post right mastectomy with chemotx and radiation.  Patient with longstanding history of fibromyalgia patient on multiple neuropathic pain therapies along with mood stabilizers. She denies accompanied swelling of the joints.   She explains long camarena of alcoholism and has been free of alcohol for the last 18 years as well as free of drug use from cocaine. She explains she has had shakiness and her tremors that are currently under evaluation and has initiated propranolol.   She otherwise denies visual disturbances, oral ulcers, dyspnea, chest pain, motor disturbances, Raynaud's, rash or systemic symptoms.

## 2024-01-12 NOTE — PHYSICAL EXAM
[General Appearance - Alert] : alert [General Appearance - In No Acute Distress] : in no acute distress [Auscultation Breath Sounds / Voice Sounds] : lungs were clear to auscultation bilaterally [Heart Sounds] : normal S1 and S2 [Edema] : there was no peripheral edema [No Spinal Tenderness] : no spinal tenderness [Nail Clubbing] : no clubbing  or cyanosis of the fingernails [Musculoskeletal - Swelling] : no joint swelling seen [Motor Tone] : muscle strength and tone were normal [FreeTextEntry1] : Tenderness appreciated over the left first MCP joint with hard nodule palpated; positive Finkelstein test CMC grind negative otherwise no active hand synovitis; tenderness appreciated over the medial joint line of the knee without ballotable fluid [] : no rash [Skin Lesions] : no skin lesions [Motor Exam] : the motor exam was normal [Oriented To Time, Place, And Person] : oriented to person, place, and time [Impaired Insight] : insight and judgment were intact

## 2024-01-30 NOTE — ASU PATIENT PROFILE, ADULT - NS PRO PT RIGHT SUPPORT PERSON
Get xray now, we will call with results  Start steroids twice a day for 5 days, then once a day for 3 days  Take Augmentin twice a day for 10 days for sinus infection    Continue with albuterol nebulizer and flovent    If fever or worsening symptoms, call or return to clinic    If symptoms improve but chest cough lingers, this may take up to two weeks to resolve.   Declines

## 2024-02-01 ENCOUNTER — APPOINTMENT (OUTPATIENT)
Dept: ORTHOPEDIC SURGERY | Facility: CLINIC | Age: 64
End: 2024-02-01
Payer: MEDICARE

## 2024-02-01 PROCEDURE — 99214 OFFICE O/P EST MOD 30 MIN: CPT

## 2024-02-03 NOTE — HISTORY OF PRESENT ILLNESS
[de-identified] : 2/1/2024  Denia Daley presents to the office for follow-up of her right total knee arthroplasty and management of her left knee osteoarthritis.  Patient has been in physical therapy and is doing well.  Right knee is improving, but she has some lateral pain.  She continues to have left knee pain.  Left knee pain is not significantly changed.  Her left knee continues to affect her quality of life.  Patient had a recent right-sided RFA on Tuesday and is planned for a left-sided RFA.  No lightheadedness or fatigue.  No falls or injuries.  Patient states that she is 217 lbs. at this time.  1/4/2024  Denia Daley presented to the office for follow-up of her right total knee arthroplasty and management of her left knee osteoarthritis.  Patient's right knee is doing well overall.  She did have a fibromyalgia flare and had some pain.  She would like to restart physical therapy.  Patient continues to have left knee pain.  Pain is worse with going up stairs and in bed.  No falls.  No fevers or chills.  Patient does have back pain.  She states that her last weight was 218 pounds and that her last hemoglobin was 10.0.  She is currently on iron and has somewhat improved.  11/7/2023  Denia Daley presented to the office for follow-up of her right total knee arthroplasty and left knee osteoarthritis.  Patient was increasingly active about 10 days ago on Saturday.  She had knee pain afterwards and had pain last week.  Pain was worse with leaning forward.  No falls.  No fevers or chills.  No significant swelling.  Patient states that her pain is improving.  She continues to have left knee pain and had pain after the increased activity, which has been improving.  Pain is located over the medial and lateral knees.  8/29/2023  Denia Daley presents to the office for follow-up of her right total knee arthroplasty and left knee osteoarthritis.  Patient's right knee is currently doing well.  She does get some occasional shock type pains.  She is able to walk without a cane.  Patient continues to have left knee pain.  She states that the left knee is still affecting her quality of life.  She has tried physical therapy, anti-inflammatories, and injections.  Patient was recently seen by neurology and diagnosed with an essential tremor.  She is going to see cardiology and her primary care physician for her clearances.  She had a recent sleep study for her MADHURI.  No falls or injuries.  7/27/2023  Denia Daley presents to the office for follow-up of her right total knee arthroplasty and left knee osteoarthritis.  Patient's right knee is currently doing well.  She has no significant issues.  Patient continues to have significant left knee pain.  She states that the left knee pain is affecting her quality of life.  She has been walking better due to her right TKA.  Patient uses a cane in the community, but is able to walk without the cane.  She has tried pain medications, physical therapy, and injections for the left knee.  Patient states that her back pain has improved after undergoing RFA and is going for an EMG tomorrow for her carpal tunnel.  6/13/2023  Stephanie Daley is a 63-year-old female who presents to the office for follow-up of her right total knee arthroplasty.  Patient underwent right TKA on 4/24/2023.  She is currently doing well.  Right knee pain is significantly improved compared to prior to surgery.  She is currently in physical therapy. Patient continues to take her Aspirin 325 mg twice per day for DVT prophylaxis.  Patient has been doing home exercises on a bicycle. She is currently walking with a cane when outside, but is not using any assistive devices in the home.  Patient currently reports left lower back and left knee pain.  She continues to have left knee pain.  She had a recent RFA on her right lower back.  She is currently taking tramadol, gabapentin and Tylenol.  She states for the majority of her pain is currently in her left knee.  Left knee pain continues to affect her quality of life.  Patient has tried injections, physical therapy, and pain medications for her left knee.  Her last left knee injection was in August 2022.  She would like to discuss left total knee arthroplasty.  5/9/2023  Ms. Daley presents to the office for follow-up of her right total knee arthroplasty.  Patient underwent right TKA on 4/24/2023.  She is currently doing well.  Patient did go to the emergency department on 5/5/2023 with pain and swelling of her right lower extremity.  US Duplex was negative.  Her pain and swelling have improved over the last few days.  Her pain is well controlled at this time.  She is taking occasional tramadol at night for her pain.  Patient is walking with a cane.  She has finished her home physical therapy.  No fevers or chills.  Patient remains on Aspirin 325mg twice per day for DVT prophylaxis.   4/7/2023  Ms. Daley presented to the office for follow-up of her bilateral knee pain.  Patient continues to experience bilateral (right greater than left) knee pain.  She also reports some right knee stiffness and occasional lower back pain.  Patient has some neuropathy in her hands and feet.  Patient continues to experience pain is affecting her quality of life and daily activities.  She has tried multiple knee injections, last in August 2022.  She has also tried physical therapy and has been in pain management.  Patient is planned for a right total knee arthroplasty.  She has a stress test scheduled for Wednesday.  Patient is currently taking meloxicam for her pain.  2/10/2023 Ms. Daley is a 62-year-old female presents to the office for follow up of her bilateral knee pain.  Patient has been experiencing bilateral (right greater than left) knee pain for about 30 years.  She states that it has been worse over the last 6 to 8 years. Pain is now affecting her quality of life and daily activities.  Pain is worse when lying down and patient tries to keep her feet elevated.  Right knee pain is worse than her left at this time.  Patient has tried meloxicam, gabapentin, and Tylenol for the pain.  She has a history of chronic pain and has been in pain management for about 20 years at [facility].  Patient has stopped taking methadone and is currently only taking tramadol for her pain.  She did have some withdrawal from her opioid medications.  Patient has tried multiple knee injections, including cortisone injections for many years.  She has tried about 4 series of viscosupplementation injections.  Her last knee joint injections were in August 2022, which did not help.  She states that she received 3 gel injections in the right knee and 2 in the left, but pain worsened and she did not finish the left knee series.  She had an injection in her right IT band in November 2022.  Patient is also tried physical therapy for her knees, last in October 2022. She has tried physical therapy for her back pain, but her knee pain limited her back PT. Patient has continued her weight loss and her BMI: 39.26.  No recent trauma.  No fevers or chills.  Patient was recently started on Symbicort by her pulmonologist.  She would like to move her surgical date from March into April.   History: HTN, Diverticulitis, GERD, DEREK, History of Breast Cancer, Alcoholism (19 years sober), Lymphedema in Right Arm, Fibromyalgia, Neuropathy, Bipolar, Diabetes (Last A1C: 6.1)

## 2024-02-03 NOTE — DISCUSSION/SUMMARY
[de-identified] : Denia Daley is a 63-year-old female presented to the office for follow-up of her right total knee arthroplasty and management of her left knee osteoarthritis. Prior X-rays showed right total knee arthroplasty in good position and alignment.  There was severe left knee osteoarthritis.  Examination showed good bilateral knee range of motion.  Discussed with the patient the examination and imaging findings.  Discussed with patient the management of her right total knee arthroplasty, including exercise.  Patient will continue her home exercises.  Discussed the operative and nonoperative management of her left knee osteoarthritis, including total knee arthroplasty. Discussed total knee arthroplasty at length, including surgical procedure, hospital course, DVT prophylaxis, antibiotics, physical therapy, recovery, and risks. Patient would like to proceed with total knee arthroplasty.  Patient would like to proceed with left TKA.  Discussed continued optimization of her weight and anemia.  Patient will continue to follow-up with her primary care physician and hematologist for management of her anemia.  Discussed that patient will need medical, cardiac, and hematology clearance prior to TKA.  Patient will follow-up in 4 weeks for reevaluation and management. Patient understanding and in agreement with the plan.  All questions answered.  Discussed the imaging and physical exam findings with the patient consistent with endstage knee degenerative disease. The patient has failed conservative management including physical therapy, anti-inflammatories, and injections The risks, benefits and alternatives to total knee replacement were discussed with the patient in detail and the patient elected to proceed with surgery. Discussed the surgical plan with the patient including implant options and surgical approach.   Surgical risks including fracture requiring fixation, instability or dislocation, temporary or permanent leg length inequality, infection, bleeding, stiffness, failure to alleviate pain, failure to achieve desired results, need for further surgery, scar tissue formation, hardware failure, chronic pain, injury to nerves resulting in extremity dysfunction, injury to arteries and veins, deep vein thrombosis or pulmonary embolism requiring anticoagulation and medical risk factors including heart attack, stroke, death, neurological injury, pneumonia, kidney or other organ failure were discussed with the patient.   Patient was understanding and in agreement with the treatment plan. All questions answered.  Plan: -Physical Therapy -Medical Clearance -Cardiac Clearance -Hematology Clearance -Continue Weight Loss -Follow up H/H -Follow up in 4 weeks -Left Total Knee Arthroplasty   Surgical Plan: Diagnosis: Left Knee Osteoarthritis Laterality: Left Operative procedure: Left Total Knee Arthroplasty Location: [facility]  DVT prophylaxis: Aspirin 325mg twice per day TXA: IV Plan for discharge: Home Pre- & Post Operative Antibiotics: Cefazolin 3g  Clearances:  Medical: Pending  Cardiac: Pending   Hematology: Pending  Comorbidities:  Metal Allergy: Positive (rashes to jewelry) (Angioedema to Ramipril)  Chronic Pain: Positive  Diabetes: Positive, Last A1C: 6.8  Use of Anticoagulation: Negative  Atrial Fibrillation: Negative  History of VTE: Negative  History of Cardiac Stents: Negative  Skin Infections/Open Wounds: Negative  MRSA Infection/Colonization: Negative  Current Urinary Symptoms: Negative  Immunocompromise: Negative  Inflammatory Arthritis: History of Lupus Antibodies (Possibly positive due to COVID)  Smoking: Negative  Drug Use: Negative  Alcohol Use: Sober for 19 years  Obstructive Sleep Apnea: Positive  Neurologic Disease: History of neuropathy in hands/feet, Has tremor (treated by Neurology), History of Fibromyalgia.

## 2024-02-03 NOTE — PHYSICAL EXAM
[de-identified] : Constitutional: 63 year old female, alert and oriented, cooperative, in no acute distress.  HEENT  NC/AT.  Appearance: symmetric  Neck/Back Straight without deformity or instability.  Good ROM.  Chest/Respiratory  Respiratory effort: no intercostal retractions or use of accessory muscles. Nonlabored Breathing  Skin  On inspection, warm and dry without rashes or lesions.  Mental Status:  Judgment, insight: intact Orientation: oriented to time, place, and person  Neurological: Sensory and Motor are grossly intact throughout  Right Knee  Inspection:     Incision well healing. No erythema or drainage     No Effusion     Non-tender to palpation over tibial tubercle, patella, medial and lateral joint line, and pes insertion.  Range of Motion: 	Extension - 0 degrees 	Flexion - 120 degrees 	Alignment - Valgus 5 degrees 	Extensor lag: None  Stability:      Demonstrates no Varus or Valgus instability      Negative Anterior or Posterior drawer.      No mid flexion instability  Patella: stable, tracks well.   Left Knee  Inspection:     Skin intact, no rashes or lesions     No Effusion     Non-tender to palpation over tibial tubercle, patella, medial and lateral joint line and pes insertion.  Range of Motion: 	Extension - -5 degrees 	Flexion - 115 degrees 	Alignment - Varus 5 degrees 	Extensor lag: None  Stability:      Demonstrates no Varus or Valgus instability      Negative Anterior or Posterior drawer.      Negative Lachman's  Patella: stable, tracks well.   Neurologic Exam     Motor intact including 5/5 Extensor Hallucis Longus, 5/5 Flexor Hallucis Longus, 5/5 Tibialis Anterior and 5/5 Gastrocnemius     Sensation Intact to Light Touch including Saphenous, Sural, Superficial Peroneal, Deep Peroneal, Tibial nerve distributions  Vascular Exam     Foot is warm and well perfused with 2+ Dorsalis Pedis Pulse  [de-identified] : XRay:  XRays of the Right Knee (3 Views) taken on 11/7/2023. XRays demonstrate a Right Total Knee Arthroplasty in good position and alignment. There is no obvious evidence of fracture, dislocation, osteolysis or loosening. (my personal interpretation) Components: [employer] Cemented  XRay: XRays of the Left Knee (3 Views) taken on 11/7/2023. XRays demonstrate tricompartmental joint space narrowing, with bone on bone articulations in the medial compartment, with subchondral sclerosis, overlying osteophytes, all consistent with severe osteoarthritis, KL rdGrdrrdarddrderd:rd rd3rd. There is varus alignment. (my personal interpretation)

## 2024-02-09 ENCOUNTER — OUTPATIENT (OUTPATIENT)
Dept: OUTPATIENT SERVICES | Facility: HOSPITAL | Age: 64
LOS: 1 days | Discharge: HOME | End: 2024-02-09

## 2024-02-09 DIAGNOSIS — Z98.890 OTHER SPECIFIED POSTPROCEDURAL STATES: Chronic | ICD-10-CM

## 2024-02-09 DIAGNOSIS — C50.919 MALIGNANT NEOPLASM OF UNSPECIFIED SITE OF UNSPECIFIED FEMALE BREAST: ICD-10-CM

## 2024-02-09 DIAGNOSIS — Z96.651 PRESENCE OF RIGHT ARTIFICIAL KNEE JOINT: Chronic | ICD-10-CM

## 2024-02-09 DIAGNOSIS — C80.1 MALIGNANT (PRIMARY) NEOPLASM, UNSPECIFIED: Chronic | ICD-10-CM

## 2024-02-09 DIAGNOSIS — Z33.2 ENCOUNTER FOR ELECTIVE TERMINATION OF PREGNANCY: Chronic | ICD-10-CM

## 2024-02-09 DIAGNOSIS — H26.9 UNSPECIFIED CATARACT: Chronic | ICD-10-CM

## 2024-02-14 PROBLEM — D64.9 ANEMIA: Status: ACTIVE | Noted: 2021-04-26

## 2024-02-15 ENCOUNTER — APPOINTMENT (OUTPATIENT)
Dept: HEMATOLOGY ONCOLOGY | Facility: CLINIC | Age: 64
End: 2024-02-15
Payer: MEDICARE

## 2024-02-15 VITALS
OXYGEN SATURATION: 98 % | DIASTOLIC BLOOD PRESSURE: 73 MMHG | BODY MASS INDEX: 43.06 KG/M2 | SYSTOLIC BLOOD PRESSURE: 124 MMHG | HEART RATE: 63 BPM | WEIGHT: 220.46 LBS | RESPIRATION RATE: 16 BRPM | TEMPERATURE: 97.2 F

## 2024-02-15 DIAGNOSIS — D64.9 ANEMIA, UNSPECIFIED: ICD-10-CM

## 2024-02-15 PROCEDURE — 99213 OFFICE O/P EST LOW 20 MIN: CPT

## 2024-02-15 RX ORDER — BUPROPION HYDROCHLORIDE 100 MG/1
100 TABLET, FILM COATED, EXTENDED RELEASE ORAL
Qty: 90 | Refills: 3 | Status: DISCONTINUED | COMMUNITY
Start: 2019-02-06 | End: 2024-02-15

## 2024-02-15 NOTE — HISTORY OF PRESENT ILLNESS
[Disease: _____________________] : Disease: [unfilled] [T: ___] : T[unfilled] [N: ___] : N[unfilled] [AJCC Stage: ____] : AJCC Stage: [unfilled] [de-identified] : Age 52: advanced breast cancer status post right mastectomy and axillary lymph node dissection on 3/30/2012 followed by chemotherapy and radiation.  \par  She had been maintaining her port every 6 weeks until March 2014.  She had axillary lymph node noted in prior imaging which was felt to be benign.  She had a biopsy that was done in May of 2014 that was benign. Pathology showed reactive lymph node which was benign.  She continues to have tenderness at the biopsy site.  PET/ CT performed on 8/6/14 show postsurgical changes in the right chest wall.  Resolution of previously seen mild hypermetabolism in left axillary lymph node.  Given her port not functioning and her PET scan negative, port removed 2014. [de-identified] : ChemoRT completed 2012 [de-identified] : invasive poorly differentiated ductal carcinoma ER 0, NC 0 [id number] 3+ [de-identified] : She is planning to have L knee replacement soon and was told by orthopedics that she would need clearance from hematological standpoint. She has been feeling stressed as she will be caregiver for her daughter's children (ages 5 and 10) but has support from family at home. She denies any new breast or chest wall changes. No new health changes or medications.

## 2024-02-15 NOTE — REVIEW OF SYSTEMS
[Fatigue] : fatigue [Recent Change In Weight] : ~T recent weight change [Diarrhea: Grade 0] : Diarrhea: Grade 0 [Joint Pain] : joint pain [Muscle Pain] : muscle pain [Negative] : Allergic/Immunologic [FreeTextEntry2] : weight gain  [Fever] : no fever [Chills] : no chills [Night Sweats] : no night sweats [Joint Stiffness] : no joint stiffness [Muscle Weakness] : no muscle weakness [Confused] : no confusion [Dizziness] : no dizziness [Fainting] : no fainting [Difficulty Walking] : no difficulty walking [FreeTextEntry9] : shoulder and knee pain along with fibromyalgia  [de-identified] : walks with cane

## 2024-02-15 NOTE — PHYSICAL EXAM
[Restricted in physically strenuous activity but ambulatory and able to carry out work of a light or sedentary nature] : Status 1- Restricted in physically strenuous activity but ambulatory and able to carry out work of a light or sedentary nature, e.g., light house work, office work [Normal] : affect appropriate [de-identified] : RUE lymphedema  [de-identified] : ambulating with cane

## 2024-02-15 NOTE — ASSESSMENT
[FreeTextEntry1] : She is a 64 y/o F with history of Stage III right breast cancer s/p mastectomy followed by chemotherapy and RT for ER negative/ Opy5mgw positive disease 2012. No new signs or symptoms of breast cancer recurrence. She is scheduled to have annual mammogram/sonogram this week. Past baseline hemoglobin prior to covid infection was 11g/dL. Hemoglobin from 12/2023 was 10g/dL with improvement on iron supplement. Will order repeat labs and if anemia stable, no hematologic contraindication to planned knee surgery. Questions answered to her satisfaction. She is agreeable with plan.

## 2024-02-16 ENCOUNTER — APPOINTMENT (OUTPATIENT)
Dept: MAMMOGRAPHY | Facility: IMAGING CENTER | Age: 64
End: 2024-02-16
Payer: MEDICARE

## 2024-02-16 ENCOUNTER — RESULT REVIEW (OUTPATIENT)
Age: 64
End: 2024-02-16

## 2024-02-16 ENCOUNTER — OUTPATIENT (OUTPATIENT)
Dept: OUTPATIENT SERVICES | Facility: HOSPITAL | Age: 64
LOS: 1 days | End: 2024-02-16
Payer: MEDICARE

## 2024-02-16 ENCOUNTER — APPOINTMENT (OUTPATIENT)
Dept: ULTRASOUND IMAGING | Facility: IMAGING CENTER | Age: 64
End: 2024-02-16
Payer: MEDICARE

## 2024-02-16 ENCOUNTER — APPOINTMENT (OUTPATIENT)
Dept: HEMATOLOGY ONCOLOGY | Facility: CLINIC | Age: 64
End: 2024-02-16

## 2024-02-16 ENCOUNTER — LABORATORY RESULT (OUTPATIENT)
Age: 64
End: 2024-02-16

## 2024-02-16 DIAGNOSIS — C80.1 MALIGNANT (PRIMARY) NEOPLASM, UNSPECIFIED: Chronic | ICD-10-CM

## 2024-02-16 DIAGNOSIS — H26.9 UNSPECIFIED CATARACT: Chronic | ICD-10-CM

## 2024-02-16 DIAGNOSIS — Z33.2 ENCOUNTER FOR ELECTIVE TERMINATION OF PREGNANCY: Chronic | ICD-10-CM

## 2024-02-16 DIAGNOSIS — Z96.651 PRESENCE OF RIGHT ARTIFICIAL KNEE JOINT: Chronic | ICD-10-CM

## 2024-02-16 DIAGNOSIS — Z98.890 OTHER SPECIFIED POSTPROCEDURAL STATES: Chronic | ICD-10-CM

## 2024-02-16 DIAGNOSIS — C50.919 MALIGNANT NEOPLASM OF UNSPECIFIED SITE OF UNSPECIFIED FEMALE BREAST: ICD-10-CM

## 2024-02-16 LAB
BASOPHILS # BLD AUTO: 0.04 K/UL — SIGNIFICANT CHANGE UP (ref 0–0.2)
BASOPHILS NFR BLD AUTO: 0.8 % — SIGNIFICANT CHANGE UP (ref 0–2)
EOSINOPHIL # BLD AUTO: 0.17 K/UL — SIGNIFICANT CHANGE UP (ref 0–0.5)
EOSINOPHIL NFR BLD AUTO: 3.3 % — SIGNIFICANT CHANGE UP (ref 0–6)
HCT VFR BLD CALC: 30.4 % — LOW (ref 34.5–45)
HGB BLD-MCNC: 9.9 G/DL — LOW (ref 11.5–15.5)
IMM GRANULOCYTES NFR BLD AUTO: 0.4 % — SIGNIFICANT CHANGE UP (ref 0–0.9)
LYMPHOCYTES # BLD AUTO: 1.21 K/UL — SIGNIFICANT CHANGE UP (ref 1–3.3)
LYMPHOCYTES # BLD AUTO: 23.5 % — SIGNIFICANT CHANGE UP (ref 13–44)
MCHC RBC-ENTMCNC: 27 PG — SIGNIFICANT CHANGE UP (ref 27–34)
MCHC RBC-ENTMCNC: 32.6 G/DL — SIGNIFICANT CHANGE UP (ref 32–36)
MCV RBC AUTO: 82.8 FL — SIGNIFICANT CHANGE UP (ref 80–100)
MONOCYTES # BLD AUTO: 0.34 K/UL — SIGNIFICANT CHANGE UP (ref 0–0.9)
MONOCYTES NFR BLD AUTO: 6.6 % — SIGNIFICANT CHANGE UP (ref 2–14)
NEUTROPHILS # BLD AUTO: 3.36 K/UL — SIGNIFICANT CHANGE UP (ref 1.8–7.4)
NEUTROPHILS NFR BLD AUTO: 65.4 % — SIGNIFICANT CHANGE UP (ref 43–77)
NRBC # BLD: 0 /100 WBCS — SIGNIFICANT CHANGE UP (ref 0–0)
PLATELET # BLD AUTO: 380 K/UL — SIGNIFICANT CHANGE UP (ref 150–400)
RBC # BLD: 3.67 M/UL — LOW (ref 3.8–5.2)
RBC # FLD: 17.2 % — HIGH (ref 10.3–14.5)
RETICS #: 56.5 K/UL — SIGNIFICANT CHANGE UP (ref 25–125)
RETICS/RBC NFR: 1.5 % — SIGNIFICANT CHANGE UP (ref 0.5–2.5)
WBC # BLD: 5.14 K/UL — SIGNIFICANT CHANGE UP (ref 3.8–10.5)
WBC # FLD AUTO: 5.14 K/UL — SIGNIFICANT CHANGE UP (ref 3.8–10.5)

## 2024-02-16 PROCEDURE — 76641 ULTRASOUND BREAST COMPLETE: CPT

## 2024-02-16 PROCEDURE — 76641 ULTRASOUND BREAST COMPLETE: CPT | Mod: 26,LT,GY

## 2024-02-16 PROCEDURE — 77063 BREAST TOMOSYNTHESIS BI: CPT | Mod: 26,52

## 2024-02-16 PROCEDURE — 77067 SCR MAMMO BI INCL CAD: CPT | Mod: 26,LT,52

## 2024-02-16 PROCEDURE — 77067 SCR MAMMO BI INCL CAD: CPT

## 2024-02-16 PROCEDURE — 77063 BREAST TOMOSYNTHESIS BI: CPT

## 2024-02-20 LAB
ALBUMIN SERPL ELPH-MCNC: 4.1 G/DL
ALP BLD-CCNC: 119 U/L
ALT SERPL-CCNC: 8 U/L
ANION GAP SERPL CALC-SCNC: 14 MMOL/L
AST SERPL-CCNC: 14 U/L
BILIRUB SERPL-MCNC: 0.3 MG/DL
BUN SERPL-MCNC: 15 MG/DL
CALCIUM SERPL-MCNC: 10.2 MG/DL
CHLORIDE SERPL-SCNC: 99 MMOL/L
CO2 SERPL-SCNC: 26 MMOL/L
CREAT SERPL-MCNC: 0.99 MG/DL
DEPRECATED KAPPA LC FREE/LAMBDA SER: 1.95 RATIO
EGFR: 63 ML/MIN/1.73M2
FERRITIN SERPL-MCNC: 93 NG/ML
GLUCOSE SERPL-MCNC: 87 MG/DL
HAPTOGLOB SERPL-MCNC: 644 MG/DL
IRON SATN MFR SERPL: 15 %
IRON SERPL-MCNC: 42 UG/DL
KAPPA LC CSF-MCNC: 4.84 MG/DL
KAPPA LC SERPL-MCNC: 9.42 MG/DL
LDH SERPL-CCNC: 215 U/L
POTASSIUM SERPL-SCNC: 4 MMOL/L
PROT SERPL-MCNC: 9.1 G/DL
SODIUM SERPL-SCNC: 139 MMOL/L
TIBC SERPL-MCNC: 288 UG/DL
UIBC SERPL-MCNC: 247 UG/DL

## 2024-02-21 LAB
ALBUMIN MFR SERPL ELPH: 39.2 %
ALBUMIN SERPL-MCNC: 3.7 G/DL
ALBUMIN/GLOB SERPL: 0.6 RATIO
ALPHA1 GLOB MFR SERPL ELPH: 4.3 %
ALPHA1 GLOB SERPL ELPH-MCNC: 0.4 G/DL
ALPHA2 GLOB MFR SERPL ELPH: 17.5 %
ALPHA2 GLOB SERPL ELPH-MCNC: 1.6 G/DL
B-GLOBULIN MFR SERPL ELPH: 14.4 %
B-GLOBULIN SERPL ELPH-MCNC: 1.4 G/DL
GAMMA GLOB FLD ELPH-MCNC: 2.3 G/DL
GAMMA GLOB MFR SERPL ELPH: 24.6 %
INTERPRETATION SERPL IEP-IMP: NORMAL
PROT SERPL-MCNC: 9.4 G/DL
PROT SERPL-MCNC: 9.4 G/DL

## 2024-02-29 ENCOUNTER — APPOINTMENT (OUTPATIENT)
Dept: INTERNAL MEDICINE | Facility: CLINIC | Age: 64
End: 2024-02-29
Payer: MEDICARE

## 2024-02-29 VITALS
TEMPERATURE: 98 F | HEIGHT: 60 IN | WEIGHT: 216 LBS | DIASTOLIC BLOOD PRESSURE: 72 MMHG | HEART RATE: 79 BPM | OXYGEN SATURATION: 96 % | SYSTOLIC BLOOD PRESSURE: 121 MMHG | BODY MASS INDEX: 42.41 KG/M2

## 2024-02-29 DIAGNOSIS — Z87.19 PERSONAL HISTORY OF OTHER DISEASES OF THE DIGESTIVE SYSTEM: ICD-10-CM

## 2024-02-29 DIAGNOSIS — R06.09 OTHER FORMS OF DYSPNEA: ICD-10-CM

## 2024-02-29 DIAGNOSIS — Z86.59 PERSONAL HISTORY OF OTHER MENTAL AND BEHAVIORAL DISORDERS: ICD-10-CM

## 2024-02-29 DIAGNOSIS — C50.919 MALIGNANT NEOPLASM OF UNSPECIFIED SITE OF UNSPECIFIED FEMALE BREAST: ICD-10-CM

## 2024-02-29 DIAGNOSIS — G25.0 ESSENTIAL TREMOR: ICD-10-CM

## 2024-02-29 DIAGNOSIS — E11.40 TYPE 2 DIABETES MELLITUS WITH DIABETIC NEUROPATHY, UNSPECIFIED: ICD-10-CM

## 2024-02-29 PROCEDURE — 99214 OFFICE O/P EST MOD 30 MIN: CPT

## 2024-02-29 RX ORDER — ALBUTEROL SULFATE 90 UG/1
108 (90 BASE) INHALANT RESPIRATORY (INHALATION)
Qty: 1 | Refills: 3 | Status: DISCONTINUED | COMMUNITY
Start: 2023-01-19 | End: 2024-02-29

## 2024-02-29 NOTE — HISTORY OF PRESENT ILLNESS
[Aortic Stenosis] : no aortic stenosis [Atrial Fibrillation] : no atrial fibrillation [Coronary Artery Disease] : no coronary artery disease [Recent Myocardial Infarction] : no recent myocardial infarction [Implantable Device/Pacemaker] : no implantable device/pacemaker [Asthma] : no asthma [COPD] : no COPD [Smoker] : not a smoker [Sleep Apnea] : no sleep apnea [Family Member] : no family member with adverse anesthesia reaction/sudden death [Self] : no previous adverse anesthesia reaction [Chronic Anticoagulation] : chronic anticoagulation [Chronic Kidney Disease] : no chronic kidney disease [Diabetes] : diabetes [Poor (<4 METs)] : Poor (<4 METs) [FreeTextEntry1] : left TKA [Anti-Platelet Agents: _____] : Anti-Platelet Agents: [unfilled] [FreeTextEntry2] : 3.18.24 [FreeTextEntry3] : Dr Wesly Givens [FreeTextEntry4] : 63 year old woman with a history of breast carcinoma, arthritis, diabetes, OA, hypertension, hx of depression /anx quiet now, fibromyalgia, GERD here for clearance for left TKA on 3.18.25. [FreeTextEntry6] : no CP and plans on cardiac clearance prior to the surgery.

## 2024-02-29 NOTE — CONSULT LETTER
[Dear  ___] : Dear  [unfilled], [Consult Letter:] : I had the pleasure of evaluating your patient, [unfilled]. [( Thank you for referring [unfilled] for consultation for _____ )] : Thank you for referring [unfilled] for consultation for [unfilled] [Please see my note below.] : Please see my note below. [Sincerely,] : Sincerely, [FreeTextEntry3] : Tisha Best MD

## 2024-02-29 NOTE — ASSESSMENT
[Patient Requires Further Testing] : Patient requires further testing [Cardiology consultation] : Cardiology consultation [Other: _____] : [unfilled] [FreeTextEntry4] : She is clinically stable , awaiting  1 cardiac clearance 2 pulmonary clearance 3 pre op testing  [FreeTextEntry5] : per cardiology and ortho  [FreeTextEntry7] : on the morning of the surgery no HCTZ and no metformin

## 2024-03-04 ENCOUNTER — APPOINTMENT (OUTPATIENT)
Dept: INTERNAL MEDICINE | Facility: CLINIC | Age: 64
End: 2024-03-04

## 2024-03-05 ENCOUNTER — NON-APPOINTMENT (OUTPATIENT)
Age: 64
End: 2024-03-05

## 2024-03-05 ENCOUNTER — APPOINTMENT (OUTPATIENT)
Dept: CARDIOLOGY | Facility: CLINIC | Age: 64
End: 2024-03-05
Payer: MEDICARE

## 2024-03-05 ENCOUNTER — APPOINTMENT (OUTPATIENT)
Dept: ORTHOPEDIC SURGERY | Facility: CLINIC | Age: 64
End: 2024-03-05
Payer: MEDICARE

## 2024-03-05 VITALS
DIASTOLIC BLOOD PRESSURE: 77 MMHG | HEIGHT: 60 IN | OXYGEN SATURATION: 96 % | WEIGHT: 216 LBS | SYSTOLIC BLOOD PRESSURE: 110 MMHG | HEART RATE: 76 BPM | BODY MASS INDEX: 42.41 KG/M2

## 2024-03-05 PROCEDURE — 93000 ELECTROCARDIOGRAM COMPLETE: CPT

## 2024-03-05 PROCEDURE — 99214 OFFICE O/P EST MOD 30 MIN: CPT

## 2024-03-05 PROCEDURE — 99024 POSTOP FOLLOW-UP VISIT: CPT

## 2024-03-05 RX ORDER — VALSARTAN 320 MG/1
320 TABLET, COATED ORAL
Qty: 90 | Refills: 3 | Status: ACTIVE | COMMUNITY
Start: 2019-10-25 | End: 1900-01-01

## 2024-03-05 NOTE — DISCUSSION/SUMMARY
[FreeTextEntry1] : 63 year old woman with HTN here for followup #HTN- On  HCTZ and Valsartan, on Norvasc 10 mg  #Preop eval- TTE Jan 2023 was normal, Nuclear stress test April 2023 normal. Medically optimized for surgery. from cardiac perspective No further cardiac workup needed, patient optimized for TKR  [EKG obtained to assist in diagnosis and management of assessed problem(s)] : EKG obtained to assist in diagnosis and management of assessed problem(s)

## 2024-03-05 NOTE — HISTORY OF PRESENT ILLNESS
[FreeTextEntry1] : Here for followup. Feels well. BP well controlled. Off Coreg.  No new complaints EKG reviewed and unchanged Here prior to knee replacement scheduled for 3/18/24 (had other knee in September 2023)  Echo 1/8/23: CONCLUSIONS: 1. Eccentric left ventricular hypertrophy (dilated left ventricle with normal relative wall thickness). 2. Normal left ventricular systolic function. No segmental wall motion abnormalities. 3. Normal right ventricular size and function. 4. Normal tricuspid valve.  Moderate tricuspid regurgitation.  #HTN- On  HCTZ and Valsartan, on Norvasc 10 mg  #Preop eval- TTE Jan 2023 was normal, Nuclear stress test April 2023 normal No further cardiac workup needed, patient optimized for TKR

## 2024-03-05 NOTE — PHYSICAL EXAM
[Well Developed] : well developed [No Acute Distress] : no acute distress [Well Nourished] : well nourished [Normal Venous Pressure] : normal venous pressure [Normal Conjunctiva] : normal conjunctiva [Normal S1, S2] : normal S1, S2 [No Carotid Bruit] : no carotid bruit [No Murmur] : no murmur [No Rub] : no rub [Clear Lung Fields] : clear lung fields [No Gallop] : no gallop [Good Air Entry] : good air entry [No Respiratory Distress] : no respiratory distress  [Non Tender] : non-tender [Soft] : abdomen soft [Normal Bowel Sounds] : normal bowel sounds [No Masses/organomegaly] : no masses/organomegaly [No Edema] : no edema [Normal Gait] : normal gait [No Cyanosis] : no cyanosis [No Clubbing] : no clubbing [No Varicosities] : no varicosities [No Rash] : no rash [Moves all extremities] : moves all extremities [No Skin Lesions] : no skin lesions [Normal Speech] : normal speech [Alert and Oriented] : alert and oriented [No Focal Deficits] : no focal deficits [Normal memory] : normal memory

## 2024-03-10 NOTE — DISCUSSION/SUMMARY
[de-identified] : Denia Daley is a 63-year-old female presented to the office for follow-up of her right total knee arthroplasty and management of her left knee osteoarthritis. Prior X-rays showed right total knee arthroplasty in good position and alignment.  There was severe left knee osteoarthritis.  Examination showed good bilateral knee range of motion.  Discussed with the patient the examination and imaging findings.  Discussed with patient the management of her right total knee arthroplasty, including exercise.  Patient will continue her home exercises.  Discussed the operative and nonoperative management of her left knee osteoarthritis, including total knee arthroplasty. Discussed total knee arthroplasty at length, including surgical procedure, hospital course, DVT prophylaxis, antibiotics, physical therapy, recovery, and risks. Patient would like to proceed with total knee arthroplasty.  Patient would like to proceed with left TKA..  Discussed that patient will need medical, cardiac, pulmonology, and hematology clearance prior to TKA.  Discussed following up H/H. Patient understanding and in agreement with the plan.  All questions answered.  Discussed the imaging and physical exam findings with the patient consistent with endstage knee degenerative disease. The patient has failed conservative management including physical therapy, anti-inflammatories, and injections The risks, benefits and alternatives to total knee replacement were discussed with the patient in detail and the patient elected to proceed with surgery. Discussed the surgical plan with the patient including implant options and surgical approach.   Surgical risks including fracture requiring fixation, instability or dislocation, temporary or permanent leg length inequality, infection, bleeding, stiffness, failure to alleviate pain, failure to achieve desired results, need for further surgery, scar tissue formation, hardware failure, chronic pain, injury to nerves resulting in extremity dysfunction, injury to arteries and veins, deep vein thrombosis or pulmonary embolism requiring anticoagulation and medical risk factors including heart attack, stroke, death, neurological injury, pneumonia, kidney or other organ failure were discussed with the patient.   Patient was understanding and in agreement with the treatment plan. All questions answered.  Plan: -Physical Therapy -Medical Clearance -Cardiac Clearance -Hematology Clearance -Pulmonology Clearance -Continue Weight Loss -Follow up H/H -Left Total Knee Arthroplasty   Surgical Plan: Diagnosis: Left Knee Osteoarthritis Laterality: Left Operative procedure: Left Total Knee Arthroplasty Location: [facility]  DVT prophylaxis: Aspirin 325mg twice per day TXA: IV Plan for discharge: Home Pre- & Post Operative Antibiotics: Cefazolin 3g  Clearances:  Medical: Pending  Cardiac: Pending   Hematology: Pending     Pulmonology: Pending  Comorbidities:  Metal Allergy: Positive (rashes to jewelry) (Angioedema to Ramipril)  Chronic Pain: Positive  Diabetes: Positive, Last A1C: 6.8  Use of Anticoagulation: Negative  Atrial Fibrillation: Negative  History of VTE: Negative  History of Cardiac Stents: Negative  Skin Infections/Open Wounds: Negative  MRSA Infection/Colonization: Negative  Current Urinary Symptoms: Negative  Immunocompromise: Negative  Inflammatory Arthritis: History of Lupus Antibodies (Possibly positive due to COVID)  Smoking: Negative  Drug Use: Negative  Alcohol Use: Sober for 19 years  Obstructive Sleep Apnea: Positive  Neurologic Disease: History of neuropathy in hands/feet, Has tremor (treated by Neurology), History of Fibromyalgia.

## 2024-03-10 NOTE — HISTORY OF PRESENT ILLNESS
[de-identified] : 3/5/2024  Denia Daley presents to the office for follow-up of her right total knee arthroplasty and management of her left knee osteoarthritis.  Her right knee has been in physical therapy and is doing well.  She continues to have left knee pain.  Left knee pain continues to affect her quality of life.  Her weight is 216 pounds and she is working on weight loss.  No falls.  No fevers or chills.  2/1/2024  Denia Daley presents to the office for follow-up of her right total knee arthroplasty and management of her left knee osteoarthritis.  Patient has been in physical therapy and is doing well.  Right knee is improving, but she has some lateral pain.  She continues to have left knee pain.  Left knee pain is not significantly changed.  Her left knee continues to affect her quality of life.  Patient had a recent right-sided RFA on Tuesday and is planned for a left-sided RFA.  No lightheadedness or fatigue.  No falls or injuries.  Patient states that she is 217 lbs. at this time.  1/4/2024  Denia Daley presented to the office for follow-up of her right total knee arthroplasty and management of her left knee osteoarthritis.  Patient's right knee is doing well overall.  She did have a fibromyalgia flare and had some pain.  She would like to restart physical therapy.  Patient continues to have left knee pain.  Pain is worse with going up stairs and in bed.  No falls.  No fevers or chills.  Patient does have back pain.  She states that her last weight was 218 pounds and that her last hemoglobin was 10.0.  She is currently on iron and has somewhat improved.  11/7/2023  Denia Daley presented to the office for follow-up of her right total knee arthroplasty and left knee osteoarthritis.  Patient was increasingly active about 10 days ago on Saturday.  She had knee pain afterwards and had pain last week.  Pain was worse with leaning forward.  No falls.  No fevers or chills.  No significant swelling.  Patient states that her pain is improving.  She continues to have left knee pain and had pain after the increased activity, which has been improving.  Pain is located over the medial and lateral knees.  8/29/2023  Denia Daley presents to the office for follow-up of her right total knee arthroplasty and left knee osteoarthritis.  Patient's right knee is currently doing well.  She does get some occasional shock type pains.  She is able to walk without a cane.  Patient continues to have left knee pain.  She states that the left knee is still affecting her quality of life.  She has tried physical therapy, anti-inflammatories, and injections.  Patient was recently seen by neurology and diagnosed with an essential tremor.  She is going to see cardiology and her primary care physician for her clearances.  She had a recent sleep study for her MADHURI.  No falls or injuries.  7/27/2023  Denia Daley presents to the office for follow-up of her right total knee arthroplasty and left knee osteoarthritis.  Patient's right knee is currently doing well.  She has no significant issues.  Patient continues to have significant left knee pain.  She states that the left knee pain is affecting her quality of life.  She has been walking better due to her right TKA.  Patient uses a cane in the community, but is able to walk without the cane.  She has tried pain medications, physical therapy, and injections for the left knee.  Patient states that her back pain has improved after undergoing RFA and is going for an EMG tomorrow for her carpal tunnel.  6/13/2023  Stephanie Daley is a 63-year-old female who presents to the office for follow-up of her right total knee arthroplasty.  Patient underwent right TKA on 4/24/2023.  She is currently doing well.  Right knee pain is significantly improved compared to prior to surgery.  She is currently in physical therapy. Patient continues to take her Aspirin 325 mg twice per day for DVT prophylaxis.  Patient has been doing home exercises on a bicycle. She is currently walking with a cane when outside, but is not using any assistive devices in the home.  Patient currently reports left lower back and left knee pain.  She continues to have left knee pain.  She had a recent RFA on her right lower back.  She is currently taking tramadol, gabapentin and Tylenol.  She states for the majority of her pain is currently in her left knee.  Left knee pain continues to affect her quality of life.  Patient has tried injections, physical therapy, and pain medications for her left knee.  Her last left knee injection was in August 2022.  She would like to discuss left total knee arthroplasty.  5/9/2023  Ms. Daley presents to the office for follow-up of her right total knee arthroplasty.  Patient underwent right TKA on 4/24/2023.  She is currently doing well.  Patient did go to the emergency department on 5/5/2023 with pain and swelling of her right lower extremity.  US Duplex was negative.  Her pain and swelling have improved over the last few days.  Her pain is well controlled at this time.  She is taking occasional tramadol at night for her pain.  Patient is walking with a cane.  She has finished her home physical therapy.  No fevers or chills.  Patient remains on Aspirin 325mg twice per day for DVT prophylaxis.   4/7/2023  Ms. Daley presented to the office for follow-up of her bilateral knee pain.  Patient continues to experience bilateral (right greater than left) knee pain.  She also reports some right knee stiffness and occasional lower back pain.  Patient has some neuropathy in her hands and feet.  Patient continues to experience pain is affecting her quality of life and daily activities.  She has tried multiple knee injections, last in August 2022.  She has also tried physical therapy and has been in pain management.  Patient is planned for a right total knee arthroplasty.  She has a stress test scheduled for Wednesday.  Patient is currently taking meloxicam for her pain.  2/10/2023 Ms. Daley is a 62-year-old female presents to the office for follow up of her bilateral knee pain.  Patient has been experiencing bilateral (right greater than left) knee pain for about 30 years.  She states that it has been worse over the last 6 to 8 years. Pain is now affecting her quality of life and daily activities.  Pain is worse when lying down and patient tries to keep her feet elevated.  Right knee pain is worse than her left at this time.  Patient has tried meloxicam, gabapentin, and Tylenol for the pain.  She has a history of chronic pain and has been in pain management for about 20 years at [facility].  Patient has stopped taking methadone and is currently only taking tramadol for her pain.  She did have some withdrawal from her opioid medications.  Patient has tried multiple knee injections, including cortisone injections for many years.  She has tried about 4 series of viscosupplementation injections.  Her last knee joint injections were in August 2022, which did not help.  She states that she received 3 gel injections in the right knee and 2 in the left, but pain worsened and she did not finish the left knee series.  She had an injection in her right IT band in November 2022.  Patient is also tried physical therapy for her knees, last in October 2022. She has tried physical therapy for her back pain, but her knee pain limited her back PT. Patient has continued her weight loss and her BMI: 39.26.  No recent trauma.  No fevers or chills.  Patient was recently started on Symbicort by her pulmonologist.  She would like to move her surgical date from March into April.   History: HTN, Diverticulitis, GERD, DEREK, History of Breast Cancer, Alcoholism (19 years sober), Lymphedema in Right Arm, Fibromyalgia, Neuropathy, Bipolar, Diabetes (Last A1C: 6.1)

## 2024-03-10 NOTE — PHYSICAL EXAM
[de-identified] : Constitutional: 63 year old female, alert and oriented, cooperative, in no acute distress.  HEENT  NC/AT.  Appearance: symmetric  Neck/Back Straight without deformity or instability.  Good ROM.  Chest/Respiratory  Respiratory effort: no intercostal retractions or use of accessory muscles. Nonlabored Breathing  Skin  On inspection, warm and dry without rashes or lesions.  Mental Status:  Judgment, insight: intact Orientation: oriented to time, place, and person  Neurological: Sensory and Motor are grossly intact throughout  Right Knee  Inspection:     Incision well healing. No erythema or drainage     No Effusion     Non-tender to palpation over tibial tubercle, patella, medial and lateral joint line, and pes insertion.  Range of Motion: 	Extension - 0 degrees 	Flexion - 120 degrees 	Alignment - Valgus 5 degrees 	Extensor lag: None  Stability:      Demonstrates no Varus or Valgus instability      Negative Anterior or Posterior drawer.      No mid flexion instability  Patella: stable, tracks well.   Left Knee  Inspection:     Skin intact, no rashes or lesions     No Effusion     Non-tender to palpation over tibial tubercle, patella, medial and lateral joint line and pes insertion.  Range of Motion: 	Extension - -5 degrees 	Flexion - 115 degrees 	Alignment - Varus 5 degrees 	Extensor lag: None  Stability:      Demonstrates no Varus or Valgus instability      Negative Anterior or Posterior drawer.      Negative Lachman's  Patella: stable, tracks well.   Neurologic Exam     Motor intact including 5/5 Extensor Hallucis Longus, 5/5 Flexor Hallucis Longus, 5/5 Tibialis Anterior and 5/5 Gastrocnemius     Sensation Intact to Light Touch including Saphenous, Sural, Superficial Peroneal, Deep Peroneal, Tibial nerve distributions  Vascular Exam     Foot is warm and well perfused with 2+ Dorsalis Pedis Pulse  [de-identified] : XRay:  XRays of the Right Knee (3 Views) taken on 11/7/2023. XRays demonstrate a Right Total Knee Arthroplasty in good position and alignment. There is no obvious evidence of fracture, dislocation, osteolysis or loosening. (my personal interpretation) Components: [employer] Cemented  XRay: XRays of the Left Knee (3 Views) taken today in the office and discussed with the patient. XRays demonstrate tricompartmental joint space narrowing, with bone on bone articulations in the medial compartment, with subchondral sclerosis, overlying osteophytes, all consistent with severe osteoarthritis, KL thGthrthathdtheth:th th5th. There is varus alignment. (my personal interpretation)

## 2024-03-13 ENCOUNTER — OUTPATIENT (OUTPATIENT)
Dept: OUTPATIENT SERVICES | Facility: HOSPITAL | Age: 64
LOS: 1 days | End: 2024-03-13
Payer: MEDICARE

## 2024-03-13 VITALS
TEMPERATURE: 98 F | RESPIRATION RATE: 20 BRPM | OXYGEN SATURATION: 98 % | SYSTOLIC BLOOD PRESSURE: 118 MMHG | HEART RATE: 62 BPM | DIASTOLIC BLOOD PRESSURE: 69 MMHG | WEIGHT: 214.95 LBS

## 2024-03-13 DIAGNOSIS — Z96.651 PRESENCE OF RIGHT ARTIFICIAL KNEE JOINT: Chronic | ICD-10-CM

## 2024-03-13 DIAGNOSIS — E11.9 TYPE 2 DIABETES MELLITUS WITHOUT COMPLICATIONS: ICD-10-CM

## 2024-03-13 DIAGNOSIS — I50.9 HEART FAILURE, UNSPECIFIED: ICD-10-CM

## 2024-03-13 DIAGNOSIS — M17.11 UNILATERAL PRIMARY OSTEOARTHRITIS, RIGHT KNEE: ICD-10-CM

## 2024-03-13 DIAGNOSIS — Z87.39 PERSONAL HISTORY OF OTHER DISEASES OF THE MUSCULOSKELETAL SYSTEM AND CONNECTIVE TISSUE: ICD-10-CM

## 2024-03-13 DIAGNOSIS — C80.1 MALIGNANT (PRIMARY) NEOPLASM, UNSPECIFIED: Chronic | ICD-10-CM

## 2024-03-13 DIAGNOSIS — H26.9 UNSPECIFIED CATARACT: Chronic | ICD-10-CM

## 2024-03-13 DIAGNOSIS — Z98.890 OTHER SPECIFIED POSTPROCEDURAL STATES: Chronic | ICD-10-CM

## 2024-03-13 DIAGNOSIS — Z33.2 ENCOUNTER FOR ELECTIVE TERMINATION OF PREGNANCY: Chronic | ICD-10-CM

## 2024-03-13 DIAGNOSIS — Z86.69 PERSONAL HISTORY OF OTHER DISEASES OF THE NERVOUS SYSTEM AND SENSE ORGANS: ICD-10-CM

## 2024-03-13 LAB
A1C WITH ESTIMATED AVERAGE GLUCOSE RESULT: 5.9 % — HIGH (ref 4–5.6)
ALBUMIN SERPL ELPH-MCNC: 3.5 G/DL — SIGNIFICANT CHANGE UP (ref 3.3–5)
ALP SERPL-CCNC: 96 U/L — SIGNIFICANT CHANGE UP (ref 40–120)
ALT FLD-CCNC: 5 U/L — SIGNIFICANT CHANGE UP (ref 4–33)
ANION GAP SERPL CALC-SCNC: 14 MMOL/L — SIGNIFICANT CHANGE UP (ref 7–14)
APPEARANCE UR: CLEAR — SIGNIFICANT CHANGE UP
AST SERPL-CCNC: 17 U/L — SIGNIFICANT CHANGE UP (ref 4–32)
BACTERIA # UR AUTO: NEGATIVE /HPF — SIGNIFICANT CHANGE UP
BILIRUB SERPL-MCNC: 0.2 MG/DL — SIGNIFICANT CHANGE UP (ref 0.2–1.2)
BILIRUB UR-MCNC: NEGATIVE — SIGNIFICANT CHANGE UP
BLD GP AB SCN SERPL QL: POSITIVE — SIGNIFICANT CHANGE UP
BUN SERPL-MCNC: 15 MG/DL — SIGNIFICANT CHANGE UP (ref 7–23)
CALCIUM SERPL-MCNC: 9.5 MG/DL — SIGNIFICANT CHANGE UP (ref 8.4–10.5)
CAST: 1 /LPF — SIGNIFICANT CHANGE UP (ref 0–4)
CHLORIDE SERPL-SCNC: 97 MMOL/L — LOW (ref 98–107)
CO2 SERPL-SCNC: 25 MMOL/L — SIGNIFICANT CHANGE UP (ref 22–31)
COLOR SPEC: YELLOW — SIGNIFICANT CHANGE UP
CREAT SERPL-MCNC: 1.02 MG/DL — SIGNIFICANT CHANGE UP (ref 0.5–1.3)
DIFF PNL FLD: NEGATIVE — SIGNIFICANT CHANGE UP
EGFR: 61 ML/MIN/1.73M2 — SIGNIFICANT CHANGE UP
ESTIMATED AVERAGE GLUCOSE: 123 — SIGNIFICANT CHANGE UP
GLUCOSE SERPL-MCNC: 168 MG/DL — HIGH (ref 70–99)
GLUCOSE UR QL: NEGATIVE MG/DL — SIGNIFICANT CHANGE UP
HCT VFR BLD CALC: 29.1 % — LOW (ref 34.5–45)
HGB BLD-MCNC: 9.3 G/DL — LOW (ref 11.5–15.5)
KETONES UR-MCNC: ABNORMAL MG/DL
LEUKOCYTE ESTERASE UR-ACNC: ABNORMAL
MCHC RBC-ENTMCNC: 25.9 PG — LOW (ref 27–34)
MCHC RBC-ENTMCNC: 32 GM/DL — SIGNIFICANT CHANGE UP (ref 32–36)
MCV RBC AUTO: 81.1 FL — SIGNIFICANT CHANGE UP (ref 80–100)
MRSA PCR RESULT.: SIGNIFICANT CHANGE UP
NITRITE UR-MCNC: NEGATIVE — SIGNIFICANT CHANGE UP
NRBC # BLD: 0 /100 WBCS — SIGNIFICANT CHANGE UP (ref 0–0)
NRBC # FLD: 0 K/UL — SIGNIFICANT CHANGE UP (ref 0–0)
PH UR: 5.5 — SIGNIFICANT CHANGE UP (ref 5–8)
PLATELET # BLD AUTO: 383 K/UL — SIGNIFICANT CHANGE UP (ref 150–400)
POTASSIUM SERPL-MCNC: 4 MMOL/L — SIGNIFICANT CHANGE UP (ref 3.5–5.3)
POTASSIUM SERPL-SCNC: 4 MMOL/L — SIGNIFICANT CHANGE UP (ref 3.5–5.3)
PROT SERPL-MCNC: 8.8 G/DL — HIGH (ref 6–8.3)
PROT UR-MCNC: SIGNIFICANT CHANGE UP MG/DL
RBC # BLD: 3.59 M/UL — LOW (ref 3.8–5.2)
RBC # FLD: 16.6 % — HIGH (ref 10.3–14.5)
RBC CASTS # UR COMP ASSIST: 2 /HPF — SIGNIFICANT CHANGE UP (ref 0–4)
RH IG SCN BLD-IMP: NEGATIVE — SIGNIFICANT CHANGE UP
RH IG SCN BLD-IMP: NEGATIVE — SIGNIFICANT CHANGE UP
S AUREUS DNA NOSE QL NAA+PROBE: SIGNIFICANT CHANGE UP
SODIUM SERPL-SCNC: 136 MMOL/L — SIGNIFICANT CHANGE UP (ref 135–145)
SP GR SPEC: 1.02 — SIGNIFICANT CHANGE UP (ref 1–1.03)
SQUAMOUS # UR AUTO: 2 /HPF — SIGNIFICANT CHANGE UP (ref 0–5)
UROBILINOGEN FLD QL: 1 MG/DL — SIGNIFICANT CHANGE UP (ref 0.2–1)
WBC # BLD: 5.08 K/UL — SIGNIFICANT CHANGE UP (ref 3.8–10.5)
WBC # FLD AUTO: 5.08 K/UL — SIGNIFICANT CHANGE UP (ref 3.8–10.5)
WBC UR QL: 0 /HPF — SIGNIFICANT CHANGE UP (ref 0–5)

## 2024-03-13 PROCEDURE — 86077 PHYS BLOOD BANK SERV XMATCH: CPT

## 2024-03-13 RX ORDER — BUPROPION HYDROCHLORIDE 150 MG/1
1 TABLET, EXTENDED RELEASE ORAL
Qty: 0 | Refills: 0 | DISCHARGE

## 2024-03-13 RX ORDER — SODIUM CHLORIDE 9 MG/ML
3 INJECTION INTRAMUSCULAR; INTRAVENOUS; SUBCUTANEOUS EVERY 8 HOURS
Refills: 0 | Status: DISCONTINUED | OUTPATIENT
Start: 2024-03-18 | End: 2024-03-19

## 2024-03-13 RX ORDER — SODIUM CHLORIDE 9 MG/ML
1000 INJECTION, SOLUTION INTRAVENOUS
Refills: 0 | Status: DISCONTINUED | OUTPATIENT
Start: 2024-03-18 | End: 2024-03-19

## 2024-03-13 RX ORDER — CHLORHEXIDINE GLUCONATE 213 G/1000ML
1 SOLUTION TOPICAL DAILY
Refills: 0 | Status: DISCONTINUED | OUTPATIENT
Start: 2024-03-18 | End: 2024-03-19

## 2024-03-13 RX ORDER — ALBUTEROL 90 UG/1
2 AEROSOL, METERED ORAL
Refills: 0 | DISCHARGE

## 2024-03-13 NOTE — H&P PST ADULT - ADDITIONAL PE
Missing teeth in the upper and lower arches of mouth.  DASI score: 7.99 Missing teeth in the upper and lower arches of mouth.  DASI score points 9.95; METS 3.97

## 2024-03-13 NOTE — H&P PST ADULT - NEGATIVE ENMT SYMPTOMS
no hearing difficulty/no ear pain/no throat pain/no dysphagia no hearing difficulty/no ear pain/no tinnitus/no sinus symptoms/no nasal discharge/no throat pain/no dysphagia

## 2024-03-13 NOTE — H&P PST ADULT - PROBLEM SELECTOR PLAN 2
Patient instructed to hold Metformin the morning of procedure. Pt stated understanding. Pt does not use CPAP machine.    Airway precautions recommended.

## 2024-03-13 NOTE — H&P PST ADULT - AGENT'S NAME
Left message for patient to call back to providers office at [place]- [phone]    CCR - Please Sarika/Star RODNEY_RAMSEY Hernandez/Joy/Malou Non-Triage       Johnny Bolden Johnny Finley

## 2024-03-13 NOTE — H&P PST ADULT - ASSESSMENT
unilateral primary osteoarthritis left knee  64 y/o female PMH HTN HLD CHF, asthma/COPD, ETOH abuse, neuropathy, MADHURI ( denies use of CPAP), tremors, bipolar disorder, right breast cancer (right mastectomy) c/b right arm lymphedema presents for preop evaluation of left knee replacement d/t osteoarthritis of left knee. Surgery schedule for 3/18/2024.

## 2024-03-13 NOTE — H&P PST ADULT - PROBLEM SELECTOR PLAN 4
Patient instructed to take valsartan, amlodipine with a sip of water on the morning of procedure. Patient verbalized understanding.    patient obtained Medical evaluation and cardiology eval - copy in chart Cardiac clearance, EKG, and stress test in chart.  Pt instructed to take valsartan on DOS w/ small sip of water.

## 2024-03-13 NOTE — H&P PST ADULT - NEUROLOGICAL SYMPTOMS
"I have numbness in my hands and my feet at times "/tremors "I have numbness in my hands and my feet at times "/weakness/tremors

## 2024-03-13 NOTE — H&P PST ADULT - MS GEN HX ROS MEA POS PC
b/l shoulder and b/l knee arthritis, left knee is worse/joint pain/stiffness b/l shoulder and b/l knee arthritis, left knee is worse/arthralgia/arthritis/joint pain/stiffness

## 2024-03-13 NOTE — H&P PST ADULT - NSICDXPASTSURGICALHX_GEN_ALL_CORE_FT
PAST SURGICAL HISTORY:  2002   h/o hysterectomy due to Fibroid--post op vaginal bleeding requiring a transfusion     2008  repair of ventral hernia with mesh Revision 2018    Cancer 2012  insertion of mediport -- to be excised on 9-22-14    Cataract Bilateral 2017    H/O total knee replacement, right     Radical mastectomy of right breast 3/30/12    S/P arthroscopy of left shoulder     Termination of pregnancy (fetus) x 3     PAST SURGICAL HISTORY:  2002   h/o hysterectomy due to Fibroid--post op vaginal bleeding requiring a transfusion     2008  repair of ventral hernia with mesh Revision 2018    Cataract Bilateral 2017    H/O total knee replacement, right     Radical mastectomy of right breast 3/30/12    S/P arthroscopy of left shoulder     Termination of pregnancy (fetus) x 3

## 2024-03-13 NOTE — H&P PST ADULT - HISTORY OF PRESENT ILLNESS
64 y/o female PMH HTN HLD CHF (last exacerbation 1/2023) asthma/COPD ETOH abuse diabetes neuropathy MADHURI ( denies use of CPAP) tremor bipolar disorder right breast cancer (right mastectomy) c/b right arm lymphedema presents to presurgical testing with diagnosis of unilateral primary osteoarthritis left knee. Pt with bilateral knee arthritis, with increasing pain and dysfunction of left  knee despite conservative management. Pt is scheduled for left total knee arthroplasty.  62 y/o female PMH HTN HLD CHF, asthma/COPD, ETOH abuse, neuropathy, MADHURI ( denies use of CPAP), tremors, bipolar disorder, right breast cancer (right mastectomy) c/b right arm lymphedema presents to presurgical testing with diagnosis of unilateral primary osteoarthritis left knee. Pt with bilateral knee arthritis, with increasing pain and dysfunction of left  knee despite conservative management.  Pt was scheduled to have left knee replacement in September but pt had to reschedule as pt was anemic.  Pt had right knee replacement in April 2023. 64 y/o female PMH HTN HLD CHF, asthma/COPD, ETOH abuse, neuropathy, MADHURI ( denies use of CPAP), tremors, bipolar disorder, right breast cancer (right mastectomy) c/b right arm lymphedema presents to presurgical testing with diagnosis of unilateral primary osteoarthritis left knee. Pt with bilateral knee arthritis, with increasing pain and dysfunction of left  knee despite conservative management.  Pt was scheduled to have left knee replacement in September 2023 but pt had to reschedule as pt was anemic.  Pt had right knee replacement in April 2023.

## 2024-03-13 NOTE — H&P PST ADULT - NEGATIVE GENERAL SYMPTOMS
no fever/no chills/no sweating/no anorexia/no weight loss/no weight gain/no polyphagia/no polyuria/no polydipsia uses cane to walk/no fever/no chills/no sweating/no anorexia/no weight loss/no weight gain/no polyphagia/no polyuria/no polydipsia

## 2024-03-13 NOTE — H&P PST ADULT - ATTENDING COMMENTS
Denia Santos is a 63 year old female, past medical history of hypertension, diabetes, fibromyalgia, Breast Cancer, GERD, Diverticulitis, Bipolar, Neuropathy, Alcoholism (last in 2003), who presented to the office for her bilateral knee pain. Patient has been experiencing bilateral knee pain for the last 30 years, but worsening over the last 6 to 8 years. She underwent Right TKA in 2023 and did well. Patient had tried anti-inflammatories (including Meloxicam), gabapentin, Tylenol and pain management. She was in pain management for about 20 years for her chronic pain. Patient was previously on Methadone, but had weaned off. She had tried multiple knee injections, including corticosteroids and viscosupplementation. Patient had also tried physical therapy. Pain was affecting her quality of life. XRays showed severe left knee osteoarthritis. Patient was indicated for a Left Total Knee Arthroplasty. Patient stated that she does have rashes and reactions to jewelry. She has a history of angioedema to Ramipril. Therefore, an Oxinium implant was chosen. Patient was cleared for surgery by Medicine, Cardiology, Hematology, and Pulmonology. Patient was previously rescheduled due to her anemia. She was treated with iron supplementation. On presurgical testing, patient’s hemoglobin was found to be 9.3. Discussed patient’s anemia with hematology. As per hematology, labs were reviewed and patient was found to be at baseline anemia. There was no further optimization needed and patient was cleared for surgery.     Discussed the operative and nonoperative management of knee osteoarthritis at length with the patient. Nonoperative management would consist of pain control, physical therapy, and anti-inflammatories. The patient had failed conservative management, including physical therapy, anti-inflammatories, pain management, and injections. The patient did not want to continue nonoperative management due to the impact knee pain has had on the patient’s quality of life. Operative management would consist of total knee arthroplasty. The risks, benefits and alternatives to total knee arthroplasty were discussed with the patient in detail and the patient elected to proceed with surgery. Discussed the surgical plan and implants with the patient. Discussed the recovery process following total knee arthroplasty at length, including, but not limited to, inpatient hospital stay, physical therapy, recovery, antibiotics, and DVT prophylaxis. Surgical risks including fracture requiring fixation, instability or dislocation, temporary or permanent leg length inequality, risks of cementation, infection, bleeding, stiffness, failure to alleviate pain, failure to achieve desired results, need for further surgery, scar tissue formation, hardware failure, component loosening, chronic pain, injury to nerves resulting in extremity dysfunction, injury to arteries and veins, deep vein thrombosis or pulmonary embolism requiring anticoagulation and medical risk factors including heart attack, stroke, death, neurological injury, pneumonia, kidney or other organ failure were discussed with the patient at length.     Following discussion, patient elected to proceed with Left Total Knee Arthroplasty. Informed consent was obtained. Patient's leg was then marked with verbal confirmation of the patient. Patient was understanding and in agreement with the operative plan. All questions were answered.?     Assessment/Plan:  Denia Danielle is a 63 year old female who presented for a Left Total Knee Arthroplasty    Plan:  -Left Total Knee Arthroplasty  -Clearances in Chart

## 2024-03-13 NOTE — H&P PST ADULT - MUSCULOSKELETAL COMMENTS
ataxia preop dx: unilateral primary osteoarthritis left knee ataxia preop dx: unilateral primary osteoarthritis left knee; pt had right knee replacement in 2023

## 2024-03-13 NOTE — H&P PST ADULT - MUSCULOSKELETAL
details… left knee, ambulates with cane/decreased ROM due to pain/strength 5/5 bilateral upper extremities/abnormal gait weakness on left knee, ambulates with cane/decreased ROM due to pain/strength 5/5 bilateral upper extremities/abnormal gait

## 2024-03-13 NOTE — H&P PST ADULT - PROBLEM SELECTOR PLAN 1
Patient tentatively scheduled for left total knee arthroplasty on 9/11/23.  Pre-op instructions provided. Pt given verbal and written instructions with teach back on chlorhexidine wash and pepcid. Pt verbalized understanding with return demonstration.   CBC, CMP A1C in chart from -8/31/23  T&S, UA, Urine c/s, MRSA- pending Pt presents for preop evaluation of left knee replacement d/t osteoarthritis of left knee. Surgery schedule for 3/18/2024.  labs pending, ekg in chart.  Preop instructions provided to pt.  Chlorhexidine scrubs provided to pt with instructions.

## 2024-03-13 NOTE — H&P PST ADULT - LAST CARDIAC ANGIOGRAM/IMAGING
2002, [facility] 164t /st., pt. denies stent placement, "everything was clear when they went in but my heart stopped on the table"

## 2024-03-13 NOTE — H&P PST ADULT - NSICDXPASTMEDICALHX_GEN_ALL_CORE_FT
PAST MEDICAL HISTORY:  Abdominal hernia in 2012 7/23/19 ; pt states hernia repair 2008, 2018    Acid Reflux     Alcohol abuse--quit 8 years ago--goes to AA ADDENDUM:  at PAST appointment  patient states she quit alcohol approximately 2003    Anxiety     Arthritis     Asthma last rescue inhaler use "maybe 3 years ago when I had the flu or a cold"    b/l  Carpal tunnel syndrome tx PT/OT    Breast cancer 2012  right breast -- had mastectomy and then post op chemo and RT, followed with Herceptin for 1 year  2012 last chemo   2013 may last Herceptin  2013 last RT    Depression     Diabetes mellitus     Diverticulosis     Drug abuse--quit 8 years ago--goes to AA ADDENDUM: at PAST appointment, patient states she quit alcohol in approximately 2003    ETOH abuse states she quit alcohol in 2003    Fibromyalgia     Former smoker     h/o   Fatty liver     h/o b/l fungal foot infection 7/23./19 pt denies    herniated lumbar disc     hiatal hernia last endoscopy 1.5 years ago    History  Uterine Fibroid s/p Hysterectomy 7/02    History of COPD     Hypercholesterolemia 7/23/19 ; pt reports improvement ; pt denies rx    Hypertension     Insomnia     Lymphedema of right arm     Lymphedema, limb right side s/p right mastectomy    Miscarriage x 2    Morbid obesity     Neuropathy b/l feet    Primary osteoarthritis of right knee     sickel cell anemia trait     Sickle cell trait     Sleep apnea diagnosed 2007---supposed to use device but doesn't     Substance abuse quit in 2003 -- cocaine and marijuana    Thyroid nodule had negative biopsy     PAST MEDICAL HISTORY:  Abdominal hernia in 2012 7/23/19 ; pt states hernia repair 2008, 2018    Acid Reflux     Alcohol abuse--quit 8 years ago--goes to AA ADDENDUM:  at PAST appointment  patient states she quit alcohol approximately 2003    Anemia     Anxiety     Arthritis     Asthma last rescue inhaler use "maybe 3 years ago when I had the flu or a cold"    b/l  Carpal tunnel syndrome tx PT/OT    Breast cancer 2012  right breast -- had mastectomy and then post op chemo and RT, followed with Herceptin for 1 year  2012 last chemo   2013 may last Herceptin  2013 last RT    Depression     Diabetes mellitus     Diverticulosis     Drug abuse--quit 8 years ago--goes to AA ADDENDUM: at PAST appointment, patient states she quit alcohol in approximately 2003    ETOH abuse states she quit alcohol in 2003    Fibromyalgia     Former smoker     h/o   Fatty liver     h/o b/l fungal foot infection 7/23./19 pt denies    H/O CHF     H/O: osteoarthritis     herniated lumbar disc     hiatal hernia last endoscopy 1.5 years ago    History  Uterine Fibroid s/p Hysterectomy 7/02    History of COPD     Hypercholesterolemia 7/23/19 ; pt reports improvement ; pt denies rx    Hypertension     Insomnia     Lymphedema of right arm     Lymphedema, limb right side s/p right mastectomy    Miscarriage x 2    Morbid obesity     Neuropathy b/l feet    Primary osteoarthritis of right knee     sickel cell anemia trait     Sickle cell trait     Sleep apnea diagnosed 2007---supposed to use device but doesn't     Substance abuse quit in 2003 -- cocaine and marijuana    Thyroid nodule had negative biopsy

## 2024-03-13 NOTE — H&P PST ADULT - PROBLEM SELECTOR PLAN 3
Patient with MADHURI denies use of CPAP.  Results in chart a1c pending, will order FS for the DOS.  Pt advised to stop metformin on DOS.

## 2024-03-14 ENCOUNTER — APPOINTMENT (OUTPATIENT)
Dept: PULMONOLOGY | Facility: CLINIC | Age: 64
End: 2024-03-14
Payer: MEDICARE

## 2024-03-14 VITALS
OXYGEN SATURATION: 97 % | DIASTOLIC BLOOD PRESSURE: 64 MMHG | TEMPERATURE: 97.7 F | SYSTOLIC BLOOD PRESSURE: 140 MMHG | HEART RATE: 72 BPM | WEIGHT: 215 LBS | BODY MASS INDEX: 41.99 KG/M2

## 2024-03-14 PROCEDURE — 94727 GAS DIL/WSHOT DETER LNG VOL: CPT

## 2024-03-14 PROCEDURE — 99214 OFFICE O/P EST MOD 30 MIN: CPT | Mod: 25

## 2024-03-14 PROCEDURE — 94060 EVALUATION OF WHEEZING: CPT

## 2024-03-14 PROCEDURE — 94729 DIFFUSING CAPACITY: CPT

## 2024-03-14 NOTE — DISCUSSION/SUMMARY
[FreeTextEntry1] : 63-year-old female with history of MADHURI with stable preop pulmonary evaluation prior to left total knee replacement.  I reviewed the PFT results with the patient.  I also discussed her sleep apnea and the need for treatment during the perioperative period.  Empiric CPAP use the night of surgery is strongly recommended.  AutoPap will be ordered for outpatient use.  I again stressed the need for treatment of sleep apnea and the patient appears to understand.  She is to follow-up with her PMD as before.

## 2024-03-14 NOTE — PHYSICAL EXAM
[No Acute Distress] : no acute distress [Normal Oropharynx] : normal oropharynx [Normal Appearance] : normal appearance [No Neck Mass] : no neck mass [Normal Rate/Rhythm] : normal rate/rhythm [Murmur ___ / 6] : murmur [unfilled] / 6 [Normal S1, S2] : normal s1, s2 [No Resp Distress] : no resp distress [Clear to Auscultation Bilaterally] : clear to auscultation bilaterally [No Abnormalities] : no abnormalities [Benign] : benign [No Clubbing] : no clubbing [No Cyanosis] : no cyanosis [FROM] : FROM [Normal Color/ Pigmentation] : normal color/ pigmentation [No Focal Deficits] : no focal deficits [Oriented x3] : oriented x3 [Normal Affect] : normal affect [TextBox_105] : R upper extremity edema [TextBox_2] : Increased BMI [TextBox_99] : walks with cane

## 2024-03-14 NOTE — HISTORY OF PRESENT ILLNESS
[Former] : former [< 20 pack-years] : < 20 pack-years [Awakes Unrefreshed] : awakes unrefreshed [Daytime Somnolence] : daytime somnolence [Fatigue] : fatigue [Snoring] : snoring [TextBox_4] : 63-year-old female with history of MADHURI presents for preop pulmonary evaluation prior to left total knee replacement.  Patient has been poorly compliant with CPAP use in the past, presently without her machine.  She has sleep-related complaints.  Her breathing has been "okay" without shortness of breath, cough, chest pain, hemoptysis, night sweats or weight loss.

## 2024-03-15 ENCOUNTER — NON-APPOINTMENT (OUTPATIENT)
Age: 64
End: 2024-03-15

## 2024-03-15 NOTE — ASU PATIENT PROFILE, ADULT - FALL HARM RISK - HARM RISK INTERVENTIONS

## 2024-03-16 PROBLEM — D64.9 ANEMIA, UNSPECIFIED: Chronic | Status: ACTIVE | Noted: 2024-03-13

## 2024-03-16 PROBLEM — Z87.39 PERSONAL HISTORY OF OTHER DISEASES OF THE MUSCULOSKELETAL SYSTEM AND CONNECTIVE TISSUE: Chronic | Status: ACTIVE | Noted: 2024-03-13

## 2024-03-16 PROBLEM — Z86.79 PERSONAL HISTORY OF OTHER DISEASES OF THE CIRCULATORY SYSTEM: Chronic | Status: ACTIVE | Noted: 2024-03-13

## 2024-03-16 LAB
-  AMPICILLIN: SIGNIFICANT CHANGE UP
-  CIPROFLOXACIN: SIGNIFICANT CHANGE UP
-  LEVOFLOXACIN: SIGNIFICANT CHANGE UP
-  NITROFURANTOIN: SIGNIFICANT CHANGE UP
-  TETRACYCLINE: SIGNIFICANT CHANGE UP
-  VANCOMYCIN: SIGNIFICANT CHANGE UP
CULTURE RESULTS: ABNORMAL
METHOD TYPE: SIGNIFICANT CHANGE UP
ORGANISM # SPEC MICROSCOPIC CNT: ABNORMAL
ORGANISM # SPEC MICROSCOPIC CNT: ABNORMAL
SPECIMEN SOURCE: SIGNIFICANT CHANGE UP

## 2024-03-17 ENCOUNTER — TRANSCRIPTION ENCOUNTER (OUTPATIENT)
Age: 64
End: 2024-03-17

## 2024-03-18 ENCOUNTER — TRANSCRIPTION ENCOUNTER (OUTPATIENT)
Age: 64
End: 2024-03-18

## 2024-03-18 ENCOUNTER — INPATIENT (INPATIENT)
Facility: HOSPITAL | Age: 64
LOS: 0 days | Discharge: HOME HEALTH | End: 2024-03-19
Attending: STUDENT IN AN ORGANIZED HEALTH CARE EDUCATION/TRAINING PROGRAM | Admitting: STUDENT IN AN ORGANIZED HEALTH CARE EDUCATION/TRAINING PROGRAM
Payer: MEDICARE

## 2024-03-18 ENCOUNTER — APPOINTMENT (OUTPATIENT)
Dept: ORTHOPEDIC SURGERY | Facility: HOSPITAL | Age: 64
End: 2024-03-18

## 2024-03-18 VITALS
HEIGHT: 60 IN | DIASTOLIC BLOOD PRESSURE: 53 MMHG | TEMPERATURE: 98 F | WEIGHT: 214.95 LBS | HEART RATE: 86 BPM | OXYGEN SATURATION: 97 % | SYSTOLIC BLOOD PRESSURE: 125 MMHG | RESPIRATION RATE: 16 BRPM

## 2024-03-18 DIAGNOSIS — H26.9 UNSPECIFIED CATARACT: Chronic | ICD-10-CM

## 2024-03-18 DIAGNOSIS — Z33.2 ENCOUNTER FOR ELECTIVE TERMINATION OF PREGNANCY: Chronic | ICD-10-CM

## 2024-03-18 DIAGNOSIS — Z98.890 OTHER SPECIFIED POSTPROCEDURAL STATES: Chronic | ICD-10-CM

## 2024-03-18 DIAGNOSIS — M17.11 UNILATERAL PRIMARY OSTEOARTHRITIS, RIGHT KNEE: ICD-10-CM

## 2024-03-18 DIAGNOSIS — Z96.651 PRESENCE OF RIGHT ARTIFICIAL KNEE JOINT: Chronic | ICD-10-CM

## 2024-03-18 LAB
GLUCOSE BLDC GLUCOMTR-MCNC: 130 MG/DL — HIGH (ref 70–99)
GLUCOSE BLDC GLUCOMTR-MCNC: 147 MG/DL — HIGH (ref 70–99)
GLUCOSE BLDC GLUCOMTR-MCNC: 177 MG/DL — HIGH (ref 70–99)

## 2024-03-18 PROCEDURE — 73560 X-RAY EXAM OF KNEE 1 OR 2: CPT | Mod: 26,LT

## 2024-03-18 PROCEDURE — 27447 TOTAL KNEE ARTHROPLASTY: CPT | Mod: LT

## 2024-03-18 DEVICE — CEMENT SIMPLEX WITH TOBRAMYCIN: Type: IMPLANTABLE DEVICE | Site: LEFT | Status: FUNCTIONAL

## 2024-03-18 DEVICE — FEM COMP JRNY II BCS BICRUCIATE LT SZ 4: Type: IMPLANTABLE DEVICE | Site: LEFT | Status: FUNCTIONAL

## 2024-03-18 DEVICE — PIN RIMMED SPEED 45MM: Type: IMPLANTABLE DEVICE | Site: LEFT | Status: FUNCTIONAL

## 2024-03-18 DEVICE — PATELLA 7.5 32MM: Type: IMPLANTABLE DEVICE | Site: LEFT | Status: FUNCTIONAL

## 2024-03-18 DEVICE — BASEPLATE TIB JRNY NP SZ 4 LT: Type: IMPLANTABLE DEVICE | Site: LEFT | Status: FUNCTIONAL

## 2024-03-18 DEVICE — IMPLANTABLE DEVICE: Type: IMPLANTABLE DEVICE | Site: LEFT | Status: FUNCTIONAL

## 2024-03-18 DEVICE — PIN TROCAR GEN 1/8 X 3IN: Type: IMPLANTABLE DEVICE | Site: LEFT | Status: FUNCTIONAL

## 2024-03-18 RX ORDER — FENTANYL CITRATE 50 UG/ML
25 INJECTION INTRAVENOUS
Refills: 0 | Status: DISCONTINUED | OUTPATIENT
Start: 2024-03-18 | End: 2024-03-18

## 2024-03-18 RX ORDER — ACETAMINOPHEN 500 MG
1000 TABLET ORAL ONCE
Refills: 0 | Status: COMPLETED | OUTPATIENT
Start: 2024-03-19 | End: 2024-03-19

## 2024-03-18 RX ORDER — GLUCAGON INJECTION, SOLUTION 0.5 MG/.1ML
1 INJECTION, SOLUTION SUBCUTANEOUS ONCE
Refills: 0 | Status: DISCONTINUED | OUTPATIENT
Start: 2024-03-18 | End: 2024-03-19

## 2024-03-18 RX ORDER — DEXTROSE 50 % IN WATER 50 %
12.5 SYRINGE (ML) INTRAVENOUS ONCE
Refills: 0 | Status: DISCONTINUED | OUTPATIENT
Start: 2024-03-18 | End: 2024-03-19

## 2024-03-18 RX ORDER — DEXTROSE 50 % IN WATER 50 %
15 SYRINGE (ML) INTRAVENOUS ONCE
Refills: 0 | Status: DISCONTINUED | OUTPATIENT
Start: 2024-03-18 | End: 2024-03-19

## 2024-03-18 RX ORDER — AMLODIPINE BESYLATE 2.5 MG/1
5 TABLET ORAL DAILY
Refills: 0 | Status: DISCONTINUED | OUTPATIENT
Start: 2024-03-18 | End: 2024-03-19

## 2024-03-18 RX ORDER — CEFAZOLIN SODIUM 1 G
2000 VIAL (EA) INJECTION EVERY 8 HOURS
Refills: 0 | Status: DISCONTINUED | OUTPATIENT
Start: 2024-03-18 | End: 2024-03-19

## 2024-03-18 RX ORDER — GABAPENTIN 400 MG/1
300 CAPSULE ORAL THREE TIMES A DAY
Refills: 0 | Status: DISCONTINUED | OUTPATIENT
Start: 2024-03-18 | End: 2024-03-19

## 2024-03-18 RX ORDER — TRAMADOL HYDROCHLORIDE 50 MG/1
50 TABLET ORAL ONCE
Refills: 0 | Status: DISCONTINUED | OUTPATIENT
Start: 2024-03-18 | End: 2024-03-18

## 2024-03-18 RX ORDER — OXYCODONE HYDROCHLORIDE 5 MG/1
10 TABLET ORAL EVERY 4 HOURS
Refills: 0 | Status: DISCONTINUED | OUTPATIENT
Start: 2024-03-18 | End: 2024-03-19

## 2024-03-18 RX ORDER — SODIUM CHLORIDE 9 MG/ML
500 INJECTION, SOLUTION INTRAVENOUS ONCE
Refills: 0 | Status: COMPLETED | OUTPATIENT
Start: 2024-03-18 | End: 2024-03-18

## 2024-03-18 RX ORDER — HYDROXYCHLOROQUINE SULFATE 200 MG
1 TABLET ORAL
Refills: 0 | DISCHARGE

## 2024-03-18 RX ORDER — SODIUM CHLORIDE 9 MG/ML
1000 INJECTION, SOLUTION INTRAVENOUS
Refills: 0 | Status: DISCONTINUED | OUTPATIENT
Start: 2024-03-18 | End: 2024-03-19

## 2024-03-18 RX ORDER — ASPIRIN/CALCIUM CARB/MAGNESIUM 324 MG
325 TABLET ORAL
Refills: 0 | Status: DISCONTINUED | OUTPATIENT
Start: 2024-03-18 | End: 2024-03-19

## 2024-03-18 RX ORDER — ONDANSETRON 8 MG/1
4 TABLET, FILM COATED ORAL EVERY 6 HOURS
Refills: 0 | Status: DISCONTINUED | OUTPATIENT
Start: 2024-03-18 | End: 2024-03-19

## 2024-03-18 RX ORDER — DEXTROSE 50 % IN WATER 50 %
25 SYRINGE (ML) INTRAVENOUS ONCE
Refills: 0 | Status: DISCONTINUED | OUTPATIENT
Start: 2024-03-18 | End: 2024-03-19

## 2024-03-18 RX ORDER — MAGNESIUM HYDROXIDE 400 MG/1
30 TABLET, CHEWABLE ORAL DAILY
Refills: 0 | Status: DISCONTINUED | OUTPATIENT
Start: 2024-03-18 | End: 2024-03-19

## 2024-03-18 RX ORDER — VALSARTAN 80 MG/1
320 TABLET ORAL DAILY
Refills: 0 | Status: DISCONTINUED | OUTPATIENT
Start: 2024-03-18 | End: 2024-03-19

## 2024-03-18 RX ORDER — KETOROLAC TROMETHAMINE 30 MG/ML
15 SYRINGE (ML) INJECTION EVERY 6 HOURS
Refills: 0 | Status: DISCONTINUED | OUTPATIENT
Start: 2024-03-18 | End: 2024-03-19

## 2024-03-18 RX ORDER — POLYETHYLENE GLYCOL 3350 17 G/17G
17 POWDER, FOR SOLUTION ORAL AT BEDTIME
Refills: 0 | Status: DISCONTINUED | OUTPATIENT
Start: 2024-03-18 | End: 2024-03-19

## 2024-03-18 RX ORDER — HYDROCHLOROTHIAZIDE 25 MG
1 TABLET ORAL
Qty: 0 | Refills: 0 | DISCHARGE

## 2024-03-18 RX ORDER — PROPRANOLOL HCL 160 MG
2 CAPSULE, EXTENDED RELEASE 24HR ORAL
Refills: 0 | DISCHARGE

## 2024-03-18 RX ORDER — GABAPENTIN 400 MG/1
1 CAPSULE ORAL
Refills: 0 | DISCHARGE

## 2024-03-18 RX ORDER — METFORMIN HYDROCHLORIDE 850 MG/1
1 TABLET ORAL
Qty: 0 | Refills: 0 | DISCHARGE

## 2024-03-18 RX ORDER — OMEPRAZOLE 10 MG/1
1 CAPSULE, DELAYED RELEASE ORAL
Refills: 0 | DISCHARGE

## 2024-03-18 RX ORDER — ACETAMINOPHEN 500 MG
1000 TABLET ORAL ONCE
Refills: 0 | Status: COMPLETED | OUTPATIENT
Start: 2024-03-18 | End: 2024-03-18

## 2024-03-18 RX ORDER — HYDROMORPHONE HYDROCHLORIDE 2 MG/ML
0.5 INJECTION INTRAMUSCULAR; INTRAVENOUS; SUBCUTANEOUS
Refills: 0 | Status: DISCONTINUED | OUTPATIENT
Start: 2024-03-18 | End: 2024-03-18

## 2024-03-18 RX ORDER — SODIUM CHLORIDE 9 MG/ML
500 INJECTION, SOLUTION INTRAVENOUS ONCE
Refills: 0 | Status: COMPLETED | OUTPATIENT
Start: 2024-03-19 | End: 2024-03-19

## 2024-03-18 RX ORDER — VALSARTAN 80 MG/1
1 TABLET ORAL
Qty: 0 | Refills: 0 | DISCHARGE

## 2024-03-18 RX ORDER — TRAMADOL HYDROCHLORIDE 50 MG/1
50 TABLET ORAL EVERY 6 HOURS
Refills: 0 | Status: DISCONTINUED | OUTPATIENT
Start: 2024-03-18 | End: 2024-03-19

## 2024-03-18 RX ORDER — PANTOPRAZOLE SODIUM 20 MG/1
40 TABLET, DELAYED RELEASE ORAL ONCE
Refills: 0 | Status: COMPLETED | OUTPATIENT
Start: 2024-03-18 | End: 2024-03-18

## 2024-03-18 RX ORDER — DEXAMETHASONE 0.5 MG/5ML
8 ELIXIR ORAL ONCE
Refills: 0 | Status: COMPLETED | OUTPATIENT
Start: 2024-03-19 | End: 2024-03-19

## 2024-03-18 RX ORDER — OXYCODONE HYDROCHLORIDE 5 MG/1
5 TABLET ORAL EVERY 4 HOURS
Refills: 0 | Status: DISCONTINUED | OUTPATIENT
Start: 2024-03-18 | End: 2024-03-19

## 2024-03-18 RX ORDER — AMLODIPINE BESYLATE 2.5 MG/1
1 TABLET ORAL
Refills: 0 | DISCHARGE

## 2024-03-18 RX ORDER — HYDROXYCHLOROQUINE SULFATE 200 MG
200 TABLET ORAL
Refills: 0 | Status: DISCONTINUED | OUTPATIENT
Start: 2024-03-18 | End: 2024-03-19

## 2024-03-18 RX ORDER — PANTOPRAZOLE SODIUM 20 MG/1
40 TABLET, DELAYED RELEASE ORAL
Refills: 0 | Status: DISCONTINUED | OUTPATIENT
Start: 2024-03-18 | End: 2024-03-19

## 2024-03-18 RX ORDER — INSULIN LISPRO 100/ML
VIAL (ML) SUBCUTANEOUS
Refills: 0 | Status: DISCONTINUED | OUTPATIENT
Start: 2024-03-18 | End: 2024-03-19

## 2024-03-18 RX ORDER — ACETAMINOPHEN 500 MG
1000 TABLET ORAL EVERY 8 HOURS
Refills: 0 | Status: DISCONTINUED | OUTPATIENT
Start: 2024-03-19 | End: 2024-03-19

## 2024-03-18 RX ORDER — SENNA PLUS 8.6 MG/1
2 TABLET ORAL AT BEDTIME
Refills: 0 | Status: DISCONTINUED | OUTPATIENT
Start: 2024-03-18 | End: 2024-03-19

## 2024-03-18 RX ORDER — INSULIN LISPRO 100/ML
VIAL (ML) SUBCUTANEOUS AT BEDTIME
Refills: 0 | Status: DISCONTINUED | OUTPATIENT
Start: 2024-03-18 | End: 2024-03-19

## 2024-03-18 RX ADMIN — Medication 15 MILLIGRAM(S): at 22:29

## 2024-03-18 RX ADMIN — Medication 200 MILLIGRAM(S): at 18:36

## 2024-03-18 RX ADMIN — SODIUM CHLORIDE 3 MILLILITER(S): 9 INJECTION INTRAMUSCULAR; INTRAVENOUS; SUBCUTANEOUS at 22:24

## 2024-03-18 RX ADMIN — SODIUM CHLORIDE 500 MILLILITER(S): 9 INJECTION, SOLUTION INTRAVENOUS at 16:05

## 2024-03-18 RX ADMIN — Medication 100 MILLIGRAM(S): at 21:18

## 2024-03-18 RX ADMIN — SENNA PLUS 2 TABLET(S): 8.6 TABLET ORAL at 21:20

## 2024-03-18 RX ADMIN — HYDROMORPHONE HYDROCHLORIDE 0.5 MILLIGRAM(S): 2 INJECTION INTRAMUSCULAR; INTRAVENOUS; SUBCUTANEOUS at 17:30

## 2024-03-18 RX ADMIN — TRAMADOL HYDROCHLORIDE 50 MILLIGRAM(S): 50 TABLET ORAL at 11:13

## 2024-03-18 RX ADMIN — CHLORHEXIDINE GLUCONATE 1 APPLICATION(S): 213 SOLUTION TOPICAL at 10:41

## 2024-03-18 RX ADMIN — Medication 15 MILLIGRAM(S): at 23:30

## 2024-03-18 RX ADMIN — Medication 325 MILLIGRAM(S): at 18:30

## 2024-03-18 RX ADMIN — HYDROMORPHONE HYDROCHLORIDE 0.5 MILLIGRAM(S): 2 INJECTION INTRAMUSCULAR; INTRAVENOUS; SUBCUTANEOUS at 18:00

## 2024-03-18 RX ADMIN — OXYCODONE HYDROCHLORIDE 10 MILLIGRAM(S): 5 TABLET ORAL at 21:21

## 2024-03-18 RX ADMIN — OXYCODONE HYDROCHLORIDE 10 MILLIGRAM(S): 5 TABLET ORAL at 22:21

## 2024-03-18 RX ADMIN — SODIUM CHLORIDE 500 MILLILITER(S): 9 INJECTION, SOLUTION INTRAVENOUS at 21:21

## 2024-03-18 RX ADMIN — PANTOPRAZOLE SODIUM 40 MILLIGRAM(S): 20 TABLET, DELAYED RELEASE ORAL at 11:13

## 2024-03-18 RX ADMIN — TRAMADOL HYDROCHLORIDE 50 MILLIGRAM(S): 50 TABLET ORAL at 19:41

## 2024-03-18 RX ADMIN — Medication 400 MILLIGRAM(S): at 23:55

## 2024-03-18 RX ADMIN — TRAMADOL HYDROCHLORIDE 50 MILLIGRAM(S): 50 TABLET ORAL at 18:31

## 2024-03-18 RX ADMIN — GABAPENTIN 300 MILLIGRAM(S): 400 CAPSULE ORAL at 21:20

## 2024-03-18 NOTE — OCCUPATIONAL THERAPY INITIAL EVALUATION ADULT - RANGE OF MOTION EXAMINATION, UPPER EXTREMITY
except right shoulder 0-90 flexion and left shoulder 0-100 flexion actively/bilateral UE Active ROM was WFL  (within functional limits)

## 2024-03-18 NOTE — PHYSICAL THERAPY INITIAL EVALUATION ADULT - GENERAL OBSERVATIONS, REHAB EVAL
Pt received supine in bed in PACU , +tele monitor , +IV , +ice packs on left knee , HR 77 SpO2 100%.

## 2024-03-18 NOTE — PHYSICAL THERAPY INITIAL EVALUATION ADULT - NSPTDISCHREC_GEN_A_CORE
-- DO NOT REPLY / DO NOT REPLY ALL --  -- Message is from the Phonetime--      First Attempt - 05/21/2020     - Date of discharge to home:  05/20/2020 diagnosed with     c-difficile colitis. - Patient discharged from [facility]    - Date of first attempt to contact patient: 5/21/2020    Follow up appointment made: Yes, with Dr. Facundo Ferrer on May 26, 2020 @ 9:30 am over phone. Home PT to improve strength, balance and functional mobility and to optimize safety in the home environment

## 2024-03-18 NOTE — DISCHARGE NOTE PROVIDER - NSDCFUSCHEDAPPT_GEN_ALL_CORE_FT
Vinny Cartwright  [facility]  NEUROLOGY [address] [facility]  Scheduled Appointment: 03/26/2024    Sharon Garza  [facility]  RHEUM [address] [facility]  Scheduled Appointment: 04/11/2024     Vinny Cartwright  [facility]  NEUROLOGY [address] [facility]  Scheduled Appointment: 03/26/2024    Wesly Givens  [facility]  ORTHOSURG [address] [facility]  Scheduled Appointment: 03/29/2024    Sharon Garza  [facility]  RHEUM [address] [facility]  Scheduled Appointment: 04/11/2024

## 2024-03-18 NOTE — PHYSICAL THERAPY INITIAL EVALUATION ADULT - ACTIVE RANGE OF MOTION EXAMINATION, REHAB EVAL
except R shoulder flexion 0-90 degrees, left shoulder flexion 0-100 degrees and left knee flexion 0-90 degrees active assisted./bilateral upper extremity Active ROM was WFL (within functional limits)/bilateral  lower extremity Active ROM was WFL (within functional limits)

## 2024-03-18 NOTE — DISCHARGE NOTE PROVIDER - HOSPITAL COURSE
This is a 64yo Female with PMH of HTN, HLD, CHF, asthma, COPD, SC, EtOH abuse, neuropathy, MADHURI (no CPAP), tremors, BPD, right breast CA s/p chemo/XRT/mastectomy c/b RUE lymphedema who presents to [facility] for orthopedic surgery. Patient s/p L TKA with Dr. Givens on 3/18/2024. Patient tolerated the procedure well without any intraoperative complications. Patient tolerated physical therapy well, and the pain was controlled. Patient is weight bearing as tolerated with cane/walker as needed. Seen by medical attending for continuity of care and management and cleared for safe discharge. Keep dressing/incision clean, dry and intact. Any suture/staples to be removed on post-op day #14 at your office visit. Patient is on 325mg of ASA for DVT prophylaxis, please take for 6 weeks unless otherwise instructed by your surgeon. While inpatient she was on ancef standing and will be discharged on Duricef 500mg twice a day x 14 days. Please follow up with Dr. Givens in 2 weeks. Please follow up with your PMD for continuity of care and management as medications may have changed.

## 2024-03-18 NOTE — DISCHARGE NOTE PROVIDER - CARE PROVIDER_API CALL
Wesly Givens  Orthopaedic Surgery  [address] [place] [address], [facility] [address]  [place] [address]  Phone: [phone]  Fax: [phone]  Established Patient  Follow Up Time:

## 2024-03-18 NOTE — OCCUPATIONAL THERAPY INITIAL EVALUATION ADULT - GENERAL OBSERVATIONS, REHAB EVAL
Patient found semi-reclined in bed, NAD, and able to follow directions. Vitals: HR . Patient agreeable to participate in skilled OT evaluation. Patient found semi-reclined in bed, NAD, and able to follow directions. Vitals: HR 77 BPM. Patient agreeable to participate in skilled OT evaluation.

## 2024-03-18 NOTE — ASU PREOP CHECKLIST - STERILIZATION AFFIRMATION
Per Madison Yao PA-C, patient informed cultures showed no evidence of bacteria or fungal growth. Pathology still pending. Patient is asking if she needs to start the antimicrobial soaks since cultures came back showing no growth, and if she does, when to start. Patient has follow up appointment 7/1/22. Writer returned call to patient and advised she does not need to do antimicrobial soaks unless she develops any redness. Patient voiced understanding and was glad she doesn't need to do so at this time. Advised she keep wound clean and dry and to look at wound each time she changes her dressings. Should she develop any signs of infection she is to contact us during hours, or go to ER if after hours.
n/a

## 2024-03-18 NOTE — DISCHARGE NOTE PROVIDER - NSDCCPCAREPLAN_GEN_ALL_CORE_FT
PRINCIPAL DISCHARGE DIAGNOSIS  Diagnosis: H/O: osteoarthritis  Assessment and Plan of Treatment: Diet: Continue regular diet upon discharge.   Activity: No heavy lifting > 25 lbs for 4 weeks. Avoid straining or excessive activity x 6 weeks.   -Continue to use your walker when ambulating until your postoperative follow up appointment.   Dressings: Keep dressing clean, dry, and intact. Your doctor will remove your bandage at your post-operative follow up appointment.   Other Care:   -You may shower when you get home but DO NOT soak dressing and/or incision. The water may run over your dressing/incision but DO NOT let the water directly hit your dressing/incision (take a shower with your wound away from the direct stream of water). NO hot tubes, NO bath rubs, NO swimming pools.   -Elevate your operative leg 2 feet above heart level for 2 hours in the morning, 2 hours in the afternoon, and 2 hours in the evening.   -Apply ice for 20min every time you elevate.   -Sit for 90 min/day: 45mins x2 or 30min x3  -DO NOT sit for more than the 90min/day. Walk or lay down when not elevating your leg.   -DO NOT place the elevation pillow behind your knees. Only place it under your calf and heel.   -DO NOT bend more than 45 degrees at the waist

## 2024-03-18 NOTE — DISCHARGE NOTE PROVIDER - NSDCCPTREATMENT_GEN_ALL_CORE_FT
PRINCIPAL PROCEDURE  Procedure: Left total knee arthroplasty  Findings and Treatment: Pain control:    Standing:         -Acetaminophen 500mg - 2 tabs every 8 hours  As needed:        -Tramadol 50mg - 1 tab every 6 hours - Take only if needed for MODERATE pain       -oxycodone 5mg - 1 tab every 4-6 hours - Take only if needed for SEVERE or BREAKTHROUGH pain  Oxycodone and Tramadol have been sent to your pharmacy. Please do not drive, operate machinery, or make important decisions while taking these medications.   Other Medications: (Standing)  -Aspirin (Enteric Coated) 325mg every 12 hours - to prevent blood clots (for 6 weeks post operatively.)  -Duricef 500mg every 12 hours x 14 days  -Celebrex 200mg every 12hours x 14 days  -Protonix 40mg - 1 tab every 24 hours - to prevent stomach irritation/ulcers  -Senna 8.6mg - 2 pills every 24 hours - stool softener  -Miralax 17g - daily - constipation   Follow up: Please follow up at your prescheduled post-operative follow up appointment with Dr. Givens for 2 weeks after hospital discharge. Please call with any questions or concerns including fevers, worsening pain, pus from the wounds, redness of the skin and difficulty breathing or heaviness in the chest at [phone].     PRINCIPAL PROCEDURE  Procedure: Left total knee arthroplasty  Findings and Treatment: Pain control:    Standing:         -Acetaminophen 500mg - 2 tabs every 8 hours  As needed:        -Tramadol 50mg - 1 tab every 6 hours - Take only if needed for MODERATE pain       -oxycodone 5mg - 1 tab every 4-6 hours - Take only if needed for SEVERE or BREAKTHROUGH pain  Oxycodone and Tramadol have been sent to your pharmacy. Please do not drive, operate machinery, or make important decisions while taking these medications.   Other Medications: (Standing)  -Aspirin (Enteric Coated) 325mg every 12 hours - to prevent blood clots (for 6 weeks post operatively.)  -Duricef 500mg every 12 hours x 14 days  -Celebrex 200mg every 12hours x 14 days  -Continue home omeprazole - 1 tab every 24 hours - to prevent stomach irritation/ulcers  -Senna 8.6mg - 2 pills every 24 hours - stool softener  -Miralax 17g - daily - constipation   Follow up: Please follow up at your prescheduled post-operative follow up appointment with Dr. Givens for 2 weeks after hospital discharge. Please call with any questions or concerns including fevers, worsening pain, pus from the wounds, redness of the skin and difficulty breathing or heaviness in the chest at [phone].

## 2024-03-18 NOTE — PROGRESS NOTE ADULT - ASSESSMENT
A/P: 63y y/o Female s/p L total knee arthroplasty, POD #0  - Pain control  - Antibiotic - Ancef postop  - Incentive Spirometry  - DVT prophylaxis: Venodynes/Aspirin 325mg BID  - F/U AM Labs  - PT/OT/WBAT  - Notify Orthopedics with any questions

## 2024-03-18 NOTE — PHYSICAL THERAPY INITIAL EVALUATION ADULT - PATIENT PROFILE REVIEW, REHAB EVAL
ACTIVITY ORDER- OOB TO CHAIR , ok for PT evaluation for ambulation as tolerated per RN Sal[place]utty/yes

## 2024-03-18 NOTE — PATIENT PROFILE ADULT - FALL HARM RISK - HARM RISK INTERVENTIONS
Assistance with ambulation/Assistance OOB with selected safe patient handling equipment/Communicate Risk of Fall with Harm to all staff/Discuss with provider need for PT consult/Monitor gait and stability/Provide patient with walking aids - walker, cane, crutches/Reinforce activity limits and safety measures with patient and family/Sit up slowly, dangle for a short time, stand at bedside before walking/Tailored Fall Risk Interventions/Use of alarms - bed, chair and/or voice tab/Visual Cue: Yellow wristband and red socks/Bed in lowest position, wheels locked, appropriate side rails in place/Call bell, personal items and telephone in reach/Instruct patient to call for assistance before getting out of bed or chair/Non-slip footwear when patient is out of bed/[place] to call system/Physically safe environment - no spills, clutter or unnecessary equipment/Purposeful Proactive Rounding/Room/bathroom lighting operational, light cord in reach

## 2024-03-18 NOTE — ASU PREOP CHECKLIST - COMMENTS
pt took nodrvasc, valsartan, prppranolol and omperazole at 5 am pt took nodrvasc, valsartan, propanolol and omperazole at 5 am

## 2024-03-18 NOTE — DISCHARGE NOTE PROVIDER - NSDCMRMEDTOKEN_GEN_ALL_CORE_FT
amLODIPine 5 mg oral tablet: 1 tab(s) orally once a day  gabapentin 300 mg oral capsule: 1 cap(s) orally 3 times a day  hydroCHLOROthiazide 25 mg oral tablet: 1 tab(s) orally once a day  hydroxychloroquine 200 mg oral tablet: 1 tab(s) orally 2 times a day  meloxicam 7.5 mg oral tablet: 1 tab(s) orally once a day PRN  metFORMIN 500 mg oral tablet: 1 tab(s) orally 2 times a day  omeprazole 40 mg oral delayed release capsule: 1 cap(s) orally 2 times a day  propranolol 10 mg oral tablet: 2 tab(s) orally 2 times a day  traMADol 50 mg oral tablet: 1 tab(s) orally once a day (at bedtime)  valsartan 320 mg oral tablet: 1 tab(s) orally once a day   acetaminophen 500 mg oral tablet: 2 tab(s) orally every 8 hours  amLODIPine 5 mg oral tablet: 1 tab(s) orally once a day  aspirin 325 mg oral delayed release tablet: 1 tab(s) orally 2 times a day  cefadroxil 500 mg oral capsule: 1 cap(s) orally every 12 hours  gabapentin 300 mg oral capsule: 1 cap(s) orally 3 times a day  hydroCHLOROthiazide 25 mg oral tablet: 1 tab(s) orally once a day  hydroxychloroquine 200 mg oral tablet: 1 tab(s) orally 2 times a day  metFORMIN 500 mg oral tablet: 1 tab(s) orally 2 times a day  Narcan 4 mg/0.1 mL nasal spray: 0.4 milligram(s) intranasally once MDD: 1  omeprazole 40 mg oral delayed release capsule: 1 cap(s) orally 2 times a day  oxyCODONE 5 mg oral tablet: 1 tab(s) orally every 6 hours as needed for  severe pain MDD: 4  polyethylene glycol 3350 oral powder for reconstitution: 17 gram(s) orally once a day (at bedtime)  propranolol 10 mg oral tablet: 2 tab(s) orally 2 times a day  senna leaf extract oral tablet: 2 tab(s) orally once a day (at bedtime)  traMADol 50 mg oral tablet: 1 tab(s) orally every 8 hours as needed for  moderate pain MDD: 3  valsartan 320 mg oral tablet: 1 tab(s) orally once a day

## 2024-03-19 ENCOUNTER — TRANSCRIPTION ENCOUNTER (OUTPATIENT)
Age: 64
End: 2024-03-19

## 2024-03-19 VITALS
RESPIRATION RATE: 18 BRPM | SYSTOLIC BLOOD PRESSURE: 145 MMHG | OXYGEN SATURATION: 97 % | TEMPERATURE: 98 F | DIASTOLIC BLOOD PRESSURE: 56 MMHG | HEART RATE: 86 BPM

## 2024-03-19 DIAGNOSIS — E11.9 TYPE 2 DIABETES MELLITUS WITHOUT COMPLICATIONS: ICD-10-CM

## 2024-03-19 DIAGNOSIS — M17.12 UNILATERAL PRIMARY OSTEOARTHRITIS, LEFT KNEE: ICD-10-CM

## 2024-03-19 DIAGNOSIS — Z87.39 PERSONAL HISTORY OF OTHER DISEASES OF THE MUSCULOSKELETAL SYSTEM AND CONNECTIVE TISSUE: ICD-10-CM

## 2024-03-19 DIAGNOSIS — D64.9 ANEMIA, UNSPECIFIED: ICD-10-CM

## 2024-03-19 DIAGNOSIS — I10 ESSENTIAL (PRIMARY) HYPERTENSION: ICD-10-CM

## 2024-03-19 LAB
ANION GAP SERPL CALC-SCNC: 12 MMOL/L — SIGNIFICANT CHANGE UP (ref 7–14)
BUN SERPL-MCNC: 13 MG/DL — SIGNIFICANT CHANGE UP (ref 7–23)
CALCIUM SERPL-MCNC: 8.7 MG/DL — SIGNIFICANT CHANGE UP (ref 8.4–10.5)
CHLORIDE SERPL-SCNC: 98 MMOL/L — SIGNIFICANT CHANGE UP (ref 98–107)
CO2 SERPL-SCNC: 24 MMOL/L — SIGNIFICANT CHANGE UP (ref 22–31)
CREAT SERPL-MCNC: 1.02 MG/DL — SIGNIFICANT CHANGE UP (ref 0.5–1.3)
EGFR: 61 ML/MIN/1.73M2 — SIGNIFICANT CHANGE UP
GLUCOSE BLDC GLUCOMTR-MCNC: 141 MG/DL — HIGH (ref 70–99)
GLUCOSE BLDC GLUCOMTR-MCNC: 148 MG/DL — HIGH (ref 70–99)
GLUCOSE BLDC GLUCOMTR-MCNC: 183 MG/DL — HIGH (ref 70–99)
GLUCOSE BLDC GLUCOMTR-MCNC: 93 MG/DL — SIGNIFICANT CHANGE UP (ref 70–99)
GLUCOSE SERPL-MCNC: 148 MG/DL — HIGH (ref 70–99)
HCT VFR BLD CALC: 24.5 % — LOW (ref 34.5–45)
HGB BLD-MCNC: 8 G/DL — LOW (ref 11.5–15.5)
MCHC RBC-ENTMCNC: 26.6 PG — LOW (ref 27–34)
MCHC RBC-ENTMCNC: 32.7 GM/DL — SIGNIFICANT CHANGE UP (ref 32–36)
MCV RBC AUTO: 81.4 FL — SIGNIFICANT CHANGE UP (ref 80–100)
NRBC # BLD: 0 /100 WBCS — SIGNIFICANT CHANGE UP (ref 0–0)
NRBC # FLD: 0 K/UL — SIGNIFICANT CHANGE UP (ref 0–0)
PLATELET # BLD AUTO: 331 K/UL — SIGNIFICANT CHANGE UP (ref 150–400)
POTASSIUM SERPL-MCNC: 4.2 MMOL/L — SIGNIFICANT CHANGE UP (ref 3.5–5.3)
POTASSIUM SERPL-SCNC: 4.2 MMOL/L — SIGNIFICANT CHANGE UP (ref 3.5–5.3)
RBC # BLD: 3.01 M/UL — LOW (ref 3.8–5.2)
RBC # FLD: 16.5 % — HIGH (ref 10.3–14.5)
SODIUM SERPL-SCNC: 134 MMOL/L — LOW (ref 135–145)
WBC # BLD: 5.6 K/UL — SIGNIFICANT CHANGE UP (ref 3.8–10.5)
WBC # FLD AUTO: 5.6 K/UL — SIGNIFICANT CHANGE UP (ref 3.8–10.5)

## 2024-03-19 PROCEDURE — 99223 1ST HOSP IP/OBS HIGH 75: CPT

## 2024-03-19 RX ORDER — POLYETHYLENE GLYCOL 3350 17 G/17G
17 POWDER, FOR SOLUTION ORAL
Qty: 238 | Refills: 0
Start: 2024-03-19 | End: 2024-04-01

## 2024-03-19 RX ORDER — OXYCODONE HYDROCHLORIDE 5 MG/1
1 TABLET ORAL
Qty: 28 | Refills: 0
Start: 2024-03-19 | End: 2024-03-25

## 2024-03-19 RX ORDER — NALOXONE HYDROCHLORIDE 4 MG/.1ML
0.4 SPRAY NASAL
Qty: 1 | Refills: 0
Start: 2024-03-19 | End: 2024-03-19

## 2024-03-19 RX ORDER — TRAMADOL HYDROCHLORIDE 50 MG/1
1 TABLET ORAL
Qty: 18 | Refills: 0
Start: 2024-03-19 | End: 2024-03-24

## 2024-03-19 RX ORDER — ASPIRIN/CALCIUM CARB/MAGNESIUM 324 MG
1 TABLET ORAL
Qty: 84 | Refills: 0
Start: 2024-03-19 | End: 2024-04-29

## 2024-03-19 RX ORDER — ACETAMINOPHEN 500 MG
2 TABLET ORAL
Qty: 0 | Refills: 0 | DISCHARGE
Start: 2024-03-19

## 2024-03-19 RX ORDER — MELOXICAM 15 MG/1
1 TABLET ORAL
Refills: 0 | DISCHARGE

## 2024-03-19 RX ORDER — TRAMADOL HYDROCHLORIDE 50 MG/1
1 TABLET ORAL
Refills: 0 | DISCHARGE

## 2024-03-19 RX ORDER — SENNA PLUS 8.6 MG/1
2 TABLET ORAL
Qty: 28 | Refills: 0
Start: 2024-03-19 | End: 2024-04-01

## 2024-03-19 RX ADMIN — Medication 100 MILLIGRAM(S): at 04:59

## 2024-03-19 RX ADMIN — OXYCODONE HYDROCHLORIDE 10 MILLIGRAM(S): 5 TABLET ORAL at 14:40

## 2024-03-19 RX ADMIN — Medication 100 MILLIGRAM(S): at 13:07

## 2024-03-19 RX ADMIN — Medication 325 MILLIGRAM(S): at 18:40

## 2024-03-19 RX ADMIN — Medication 15 MILLIGRAM(S): at 04:59

## 2024-03-19 RX ADMIN — Medication 101.6 MILLIGRAM(S): at 05:03

## 2024-03-19 RX ADMIN — SODIUM CHLORIDE 500 MILLILITER(S): 9 INJECTION, SOLUTION INTRAVENOUS at 05:04

## 2024-03-19 RX ADMIN — SODIUM CHLORIDE 3 MILLILITER(S): 9 INJECTION INTRAMUSCULAR; INTRAVENOUS; SUBCUTANEOUS at 05:13

## 2024-03-19 RX ADMIN — OXYCODONE HYDROCHLORIDE 10 MILLIGRAM(S): 5 TABLET ORAL at 14:41

## 2024-03-19 RX ADMIN — Medication 15 MILLIGRAM(S): at 10:00

## 2024-03-19 RX ADMIN — PANTOPRAZOLE SODIUM 40 MILLIGRAM(S): 20 TABLET, DELAYED RELEASE ORAL at 07:26

## 2024-03-19 RX ADMIN — VALSARTAN 320 MILLIGRAM(S): 80 TABLET ORAL at 05:04

## 2024-03-19 RX ADMIN — Medication 15 MILLIGRAM(S): at 05:59

## 2024-03-19 RX ADMIN — Medication 1000 MILLIGRAM(S): at 00:55

## 2024-03-19 RX ADMIN — GABAPENTIN 300 MILLIGRAM(S): 400 CAPSULE ORAL at 13:06

## 2024-03-19 RX ADMIN — SODIUM CHLORIDE 3 MILLILITER(S): 9 INJECTION INTRAMUSCULAR; INTRAVENOUS; SUBCUTANEOUS at 13:05

## 2024-03-19 RX ADMIN — OXYCODONE HYDROCHLORIDE 10 MILLIGRAM(S): 5 TABLET ORAL at 10:00

## 2024-03-19 RX ADMIN — Medication 200 MILLIGRAM(S): at 05:04

## 2024-03-19 RX ADMIN — Medication 15 MILLIGRAM(S): at 14:40

## 2024-03-19 RX ADMIN — OXYCODONE HYDROCHLORIDE 10 MILLIGRAM(S): 5 TABLET ORAL at 09:03

## 2024-03-19 RX ADMIN — Medication 15 MILLIGRAM(S): at 09:03

## 2024-03-19 RX ADMIN — Medication 200 MILLIGRAM(S): at 18:44

## 2024-03-19 RX ADMIN — GABAPENTIN 300 MILLIGRAM(S): 400 CAPSULE ORAL at 05:01

## 2024-03-19 RX ADMIN — Medication 325 MILLIGRAM(S): at 05:02

## 2024-03-19 RX ADMIN — Medication 400 MILLIGRAM(S): at 07:25

## 2024-03-19 RX ADMIN — Medication 15 MILLIGRAM(S): at 14:41

## 2024-03-19 RX ADMIN — AMLODIPINE BESYLATE 5 MILLIGRAM(S): 2.5 TABLET ORAL at 05:02

## 2024-03-19 RX ADMIN — Medication 1: at 11:59

## 2024-03-19 NOTE — PROGRESS NOTE ADULT - ATTENDING COMMENTS
Patient seen and examined. Denia Santos is a 63 year old female now status post Left Total Knee Arthroplasty. No acute events overnight. Pain is well controlled.    Physical Exam:  Dressing: Clean, Dry, Intact  Motor: Intact EHL/FHL/Tibialis Anterior/Gastrocnemius  Sensory: Intact Superficial Peroneal/Deep Peroneal/Saphenous/Sural/Tibial Nerves  Vascular: 2+ DP Pulse    Assessment/Plan:  Denia Santos is a 63 year old female now status post Left Total Knee Arthroplasty.    Plan:  -Left Lower Extremity: Weight Bearing as Tolerated  -PT/OT, Out of Bed  -Pain Control: Per Protocol  -Antibiotics: Ancef x24 hours then Duricef  -DVT Prophylaxis: Aspirin 325mg twice per day  -Disposition: Home

## 2024-03-19 NOTE — DISCHARGE NOTE NURSING/CASE MANAGEMENT/SOCIAL WORK - PATIENT PORTAL LINK FT
You can access the FollowMyHealth Patient Portal offered by [facility] by registering at the following website: [email or web address]. By joining [employer]s FollowMyHealth portal, you will also be able to view your health information using other applications (apps) compatible with our system.

## 2024-03-19 NOTE — CONSULT NOTE ADULT - PROBLEM SELECTOR RECOMMENDATION 7
Hold home Metformin   - A1c = 5.8   - C/w low dose insulin sliding scale   - Monitor FS qAC and qHS  - Will adjust regimen as needed based on insulin requirements

## 2024-03-19 NOTE — CONSULT NOTE ADULT - PROBLEM SELECTOR RECOMMENDATION 3
Patient appears to have anemia at baseline, levels fluctuate ~9-10.  - Per chart review, has sickle cell trait   - Recommended outpatient age appropriate cancer screenings and GI eval (EGD/colonoscopy). Discussed this with patient who is agreeable     Baseline Hgb 8 post-op   - Expected blood loss in post-op state  - No signs/symptoms of acute bleeding  - Monitor CBC   - Transfuse for Hgb < 7 or symptomatic

## 2024-03-19 NOTE — DISCHARGE NOTE NURSING/CASE MANAGEMENT/SOCIAL WORK - NSDCPEFALRISK_GEN_ALL_CORE
For information on Fall & Injury Prevention, visit: [email or web address] OR  [email or web address] OR  https://www.cdc.gov/steadi/patient.html

## 2024-03-19 NOTE — CONSULT NOTE ADULT - PROBLEM SELECTOR RECOMMENDATION 4
No evidence of acute CHF   - TTE from 1/2023 reviewed, dilated left ventricle with normal relative wall thickness and normal left ventricular systolic function. EF = 62%   - C/w home BP meds as below

## 2024-03-19 NOTE — PROGRESS NOTE ADULT - SUBJECTIVE AND OBJECTIVE BOX
ANESTHESIA POSTOP CHECK    63y Female POSTOP DAY 1 S/P L TKA    Vital Signs Last 24 Hrs  T(C): 37.3 (19 Mar 2024 09:34), Max: 37.3 (19 Mar 2024 09:34)  T(F): 99.1 (19 Mar 2024 09:34), Max: 99.1 (19 Mar 2024 09:34)  HR: 83 (19 Mar 2024 09:34) (77 - 92)  BP: 122/47 (19 Mar 2024 09:34) (116/58 - 163/55)  BP(mean): 89 (18 Mar 2024 19:00) (66 - 89)  RR: 18 (19 Mar 2024 09:34) (14 - 26)  SpO2: 95% (19 Mar 2024 09:34) (94% - 100%)    Parameters below as of 19 Mar 2024 09:34  Patient On (Oxygen Delivery Method): room air      I&O's Summary    18 Mar 2024 07:01  -  19 Mar 2024 07:00  --------------------------------------------------------  IN: 990 mL / OUT: 1300 mL / NET: -310 mL    19 Mar 2024 07:01  -  19 Mar 2024 12:56  --------------------------------------------------------  IN: 0 mL / OUT: 400 mL / NET: -400 mL        [X ] NO APPARENT ANESTHESIA COMPLICATIONS      Comments: 
Pt seen/examined. Doing well. Pain controlled. No acute overnight complaints or events.    T(C): 36.9 (03-19-24 @ 05:00), Max: 37 (03-18-24 @ 15:50)  HR: 92 (03-19-24 @ 05:00) (77 - 92)  BP: 158/49 (03-19-24 @ 05:00) (116/58 - 163/55)  RR: 18 (03-19-24 @ 05:00) (14 - 26)  SpO2: 94% (03-19-24 @ 05:00) (94% - 100%)  Wt(kg): --  - Gen: NAD    Physical Exam:  Gen: NAD, A&Ox3  L LE:   Dressing C/D/I  Motor intact + EHL/FHL/TA/GS. Sensation is grossly intact.   Compartments are soft, extremities are warm, DP 2+    A/P: 63y y/o Female s/p L total knee arthroplasty, POD #1    - Pain control  - Antibiotic - Ancef postop  - Incentive Spirometry  - DVT prophylaxis: Venodynes/Aspirin 325mg BID  - F/U AM Labs  - PT/OT/WBAT  - Notify Orthopedics with any questions
Orthopedics Post-Op Check:  Patient was seen and examined at bedside. Denies CP/SOB/Dizziness/N/V/D/HA. Pain is well controlled at the moment.    Vital Signs Last 24 Hrs  T(C): 37 (18 Mar 2024 15:50), Max: 37 (18 Mar 2024 15:50)  T(F): 98.6 (18 Mar 2024 15:50), Max: 98.6 (18 Mar 2024 15:50)  HR: 81 (18 Mar 2024 16:30) (78 - 86)  BP: 143/53 (18 Mar 2024 16:30) (125/53 - 143/53)  BP(mean): 74 (18 Mar 2024 16:30) (66 - 74)  RR: 17 (18 Mar 2024 16:30) (14 - 22)  SpO2: 99% (18 Mar 2024 16:30) (97% - 100%)    Parameters below as of 18 Mar 2024 16:30  Patient On (Oxygen Delivery Method): room air    Labs: FU AM    Physical Exam:  Gen: NAD, A&Ox3  L LE:   Dressing C/D/I  Motor intact + EHL/FHL/TA/GS. Sensation is grossly intact.   Compartments are soft, extremities are warm, DP 2+

## 2024-03-19 NOTE — CONSULT NOTE ADULT - SUBJECTIVE AND OBJECTIVE BOX
Patient is a 63y old  Female who presents with a chief complaint of s/p L TKA  (18 Mar 2024 19:50)      HPI:  63F hx of HTN, HLD CHF, asthma/COPD, ETOH abuse (quit > 20 years ago), neuropathy, MADHURI (non-compliant with CPAP), tremors, bipolar disorder, right breast cancer (s/p chemo/RT and right mastectomy c/b right arm lymphedema), primary osteoarthritis left knee presenting for left knee replacement.     Patient seen and examined POD #1. States she was able to get out of bed and work with PT. She states her pain is manageable currently. She denies chest pain, shortness of breath, fevers, chills. Denies nausea/vomiting.     Allergies:   ramipril (Angioedema)  environment--ragweed, dust, cats--sneezing and watery eyes, nasal congestion (Other)    HOME MEDICATIONS: [X] Reviewed  · 	propranolol 10 mg oral tablet: Last Dose Taken:  , 2 tab(s) orally 2 times a day  · 	Albuterol (Eqv-ProAir HFA) 90 mcg/inh inhalation aerosol: Last Dose Taken:  , 2 inhaled 2 times a day as needed for  bronchospasm  · 	amLODIPine 5 mg oral tablet: Last Dose Taken:  , 1 tab(s) orally once a day  · 	metFORMIN 500 mg oral tablet: Last Dose Taken:  , 1 tab(s) orally 2 times a day  · 	meloxicam 7.5 mg oral tablet: Last Dose Taken: 05-Sep-2023 , 1 tab(s) orally once a day  · 	hydroxychloroquine 200 mg oral tablet: Last Dose Taken:  , 1 tab(s) orally 2 times a day  · 	omeprazole 40 mg oral delayed release capsule: Last Dose Taken:  , 1 cap(s) orally 2 times a day  · 	valsartan 320 mg oral tablet: Last Dose Taken:  , 1 tab(s) orally once a day  · 	gabapentin 300 mg oral capsule: Last Dose Taken:  , 1 cap(s) orally 2 times a day  · 	hydroCHLOROthiazide 25 mg oral tablet: Last Dose Taken:  , 1 tab(s) orally once a day    MEDICATIONS  (STANDING):  acetaminophen     Tablet .. 1000 milliGRAM(s) Oral every 8 hours  amLODIPine   Tablet 5 milliGRAM(s) Oral daily  aspirin enteric coated 325 milliGRAM(s) Oral two times a day  ceFAZolin   IVPB 2000 milliGRAM(s) IV Intermittent every 8 hours  chlorhexidine 2% Cloths 1 Application(s) Topical daily  dextrose 5%. 1000 milliLiter(s) (50 mL/Hr) IV Continuous <Continuous>  dextrose 5%. 1000 milliLiter(s) (100 mL/Hr) IV Continuous <Continuous>  dextrose 50% Injectable 25 Gram(s) IV Push once  dextrose 50% Injectable 12.5 Gram(s) IV Push once  dextrose 50% Injectable 25 Gram(s) IV Push once  gabapentin 300 milliGRAM(s) Oral three times a day  glucagon  Injectable 1 milliGRAM(s) IntraMuscular once  hydrochlorothiazide 25 milliGRAM(s) Oral daily  hydroxychloroquine 200 milliGRAM(s) Oral two times a day  insulin lispro (ADMELOG) corrective regimen sliding scale   SubCutaneous three times a day before meals  insulin lispro (ADMELOG) corrective regimen sliding scale   SubCutaneous at bedtime  lactated ringers. 1000 milliLiter(s) (30 mL/Hr) IV Continuous <Continuous>  pantoprazole    Tablet 40 milliGRAM(s) Oral before breakfast  polyethylene glycol 3350 17 Gram(s) Oral at bedtime  propranolol 20 milliGRAM(s) Oral two times a day  senna 2 Tablet(s) Oral at bedtime  sodium chloride 0.9% lock flush 3 milliLiter(s) IV Push every 8 hours  valsartan 320 milliGRAM(s) Oral daily    MEDICATIONS  (PRN):  dextrose Oral Gel 15 Gram(s) Oral once PRN Blood Glucose LESS THAN 70 milliGRAM(s)/deciliter  magnesium hydroxide Suspension 30 milliLiter(s) Oral daily PRN Constipation  ondansetron Injectable 4 milliGRAM(s) IV Push every 6 hours PRN Nausea and/or Vomiting  oxyCODONE    IR 5 milliGRAM(s) Oral every 4 hours PRN Moderate Pain (4 - 6)  oxyCODONE    IR 10 milliGRAM(s) Oral every 4 hours PRN Severe Pain (7 - 10)  traMADol 50 milliGRAM(s) Oral every 6 hours PRN Mild Pain (1 - 3)      PAST MEDICAL & SURGICAL HISTORY:  Fibromyalgia  b/l  Carpal tunnel syndrome  Abdominal hernia in 20 ; pt states hernia repair ,     History  Uterine Fibroid  s/p Hysterectomy     Sleep apnea diagnosed ---supposed to use device but doesn't  Acid Reflux  hiatal hernia  Alcohol abuse  patient states she quit alcohol approximately   h/o   Fatty liver  Diverticulosis  Arthritis  Hypertension  Morbid obesity  herniated lumbar disc  Depression  Anxiety  sickel cell anemia trait    Lymphedema, limb  right side s/p right mastectomy    Neuropathy  b/l feet    Substance abuse  quit in  -- cocaine and marijuana    Miscarriage  x 2    Breast cancer  2012  right breast -- had mastectomy and then post op chemo and RT, followed with Herceptin for 1 year   last chemo   2013 may last Herceptin   last RT  Primary osteoarthritis of right knee  History of COPD  Diabetes mellitus      repair of ventral hernia with mesh  Revision 2018    Radical mastectomy of right breast  3/30/12    Termination of pregnancy (fetus)  x 3    Cataract  Bilateral 2017    S/P arthroscopy of left shoulder    H/O total knee replacement, right      [X] Reviewed     SOCIAL HISTORY:  Residence: [ ] [facility]  [ ] SNF  [X] Community  [ ] Substance abuse: former substance abuse, quit > 20 years ago   [ ] Tobacco:  former tobacco abuse, quit > 20 years ago   [ ] Alcohol use:  former ETOH abuse, quit > 20 years ago     FAMILY HISTORY:  Family history of leukemia  father  age 56 from leukemia    Family history of rheumatoid arthritis (Sibling)  sister age 54 from RA    [ ] No pertinent family history in first degree relatives     REVIEW OF SYSTEMS:    CONSTITUTIONAL: No fever, weight loss, or fatigue  EYES: No eye pain, visual disturbances, or discharge  ENMT:  No difficulty hearing, tinnitus, vertigo; No sinus or throat pain  NECK: No pain or stiffness  BREASTS: No pain, masses, or nipple discharge  RESPIRATORY: No cough, wheezing, chills or hemoptysis; No shortness of breath  CARDIOVASCULAR: No chest pain, palpitations, dizziness, or leg swelling  GASTROINTESTINAL: No abdominal or epigastric pain. No nausea, vomiting, or hematemesis; No diarrhea or constipation. No melena or hematochezia.  GENITOURINARY: No dysuria, frequency, hematuria, or incontinence  NEUROLOGICAL: No headaches, memory loss, loss of strength, numbness, or tremors  SKIN: No itching, burning, rashes, or lesions   LYMPH NODES: No enlarged glands  ENDOCRINE: No heat or cold intolerance; No hair loss  PSYCHIATRIC: No depression, anxiety, mood swings, or difficulty sleeping  HEME/LYMPH: No easy bruising, or bleeding gums  ALLERGY AND IMMUNOLOGIC: No hives or eczema    [  ] All other ROS negative  [  ] Unable to obtain due to poor mental status    Vital Signs Last 24 Hrs  T(C): 36.4 (19 Mar 2024 13:17), Max: 37.3 (19 Mar 2024 09:34)  T(F): 97.6 (19 Mar 2024 13:17), Max: 99.1 (19 Mar 2024 09:34)  HR: 76 (19 Mar 2024 13:17) (76 - 92)  BP: 142/48 (19 Mar 2024 13:17) (116/58 - 163/55)  BP(mean): 89 (18 Mar 2024 19:00) (66 - 89)  RR: 18 (19 Mar 2024 13:17) (14 - 26)  SpO2: 97% (19 Mar 2024 13:17) (94% - 100%)    Parameters below as of 19 Mar 2024 13:17  Patient On (Oxygen Delivery Method): room air    PHYSICAL EXAM:  GENERAL: NAD  HEAD:  Atraumatic, Normocephalic  EYES: conjunctiva and sclera clear  ENMT: Moist mucous membranes  RESPIRATORY: Clear to auscultation bilaterally; No rales, rhonchi, wheezing, or rubs  CARDIOVASCULAR: Regular rate and rhythm; No murmurs, rubs, or gallops  GASTROINTESTINAL: Soft, Nontender, Nondistended; Bowel sounds present  EXTREMITIES:  L knee w/ compartments soft; incision c/d/i   NERVOUS SYSTEM:  Alert & Oriented X3; Moving all 4 extremities; No gross sensory deficits      LABS:                        8.0    5.60  )-----------( 331      ( 19 Mar 2024 05:41 )             24.5     Hemoglobin: 8.0 g/dL ( @ 05:41)    -    134<L>  |  98  |  13  ----------------------------<  148<H>  4.2   |  24  |  1.02    Ca    8.7      19 Mar 2024 05:41        Urinalysis Basic - ( 19 Mar 2024 05:41 )    Color: x / Appearance: x / SG: x / pH: x  Gluc: 148 mg/dL / Ketone: x  / Bili: x / Urobili: x   Blood: x / Protein: x / Nitrite: x   Leuk Esterase: x / RBC: x / WBC x   Sq Epi: x / Non Sq Epi: x / Bacteria: x      CAPILLARY BLOOD GLUCOSE      POCT Blood Glucose.: 183 mg/dL (19 Mar 2024 11:30)

## 2024-03-19 NOTE — DISCHARGE NOTE NURSING/CASE MANAGEMENT/SOCIAL WORK - NSDCPNINST_GEN_ALL_CORE
You have a Post op appt scheduled for March 29th, 2024 at 10:45 AM.  Maintain Knee incision and dressing clean dry and intact. Call MD with any signs of infection ie fever, redness, drainage, or with signs of bleeding, unrelieved increased pain, or persistent nausea. Continue to drink plenty of fluids. Avoid strenuous activity and constipation which may be a side effect from taking certain medications and narcotics.  Total Knee replacement precautions reviewed with patient. Safety and fall prevention measures reinforced.  Continue Diabetes management, diet and glucose monitoring, know your A1C blood level and follow up with PMD for continuity of care. Remember hand hygiene, skin inspection for prevention of infection.

## 2024-03-19 NOTE — CONSULT NOTE ADULT - PROBLEM SELECTOR RECOMMENDATION 2
Outpatient pulm notes reviewed, patient is non-compliant with CPAP but should empirically be on CPAP in post-op setting   - Recommend: CPAP qHS (can start at 5)   - Outpatient follow-up with pulm for MADHURI  - Patient aware she requires CPAP

## 2024-03-19 NOTE — CONSULT NOTE ADULT - PROBLEM SELECTOR RECOMMENDATION 5
BP acceptable  - Can c/w home meds: propranolol, amlodipine, valsartan and HCTZ w/ hold parameters  - Monitor BP

## 2024-03-19 NOTE — CONSULT NOTE ADULT - PROBLEM SELECTOR RECOMMENDATION 9
S/p L TKA on 3/18   - care per primary orthopedic team   - multimodal pain control + bowel regimen   - PT/OT  - Encourage OOB   - Encourage incentive spirometry   - DVT ppx: ASA 325mg BID   - Post operative labs reviewed, anemia as below   - Post-op antibiotics per ortho team

## 2024-03-19 NOTE — CONSULT NOTE ADULT - ASSESSMENT
63F hx of HTN, HLD CHF, asthma/COPD, ETOH abuse (quit > 20 years ago), neuropathy, MADHURI (non-compliant with CPAP), tremors, bipolar disorder, right breast cancer (s/p chemo/RT and right mastectomy c/b right arm lymphedema), primary osteoarthritis left knee presenting for left knee replacement.

## 2024-03-20 ENCOUNTER — TRANSCRIPTION ENCOUNTER (OUTPATIENT)
Age: 64
End: 2024-03-20

## 2024-03-22 ENCOUNTER — TRANSCRIPTION ENCOUNTER (OUTPATIENT)
Age: 64
End: 2024-03-22

## 2024-03-23 ENCOUNTER — TRANSCRIPTION ENCOUNTER (OUTPATIENT)
Age: 64
End: 2024-03-23

## 2024-03-23 ENCOUNTER — EMERGENCY (EMERGENCY)
Facility: HOSPITAL | Age: 64
LOS: 1 days | Discharge: HOME | End: 2024-03-23
Attending: STUDENT IN AN ORGANIZED HEALTH CARE EDUCATION/TRAINING PROGRAM | Admitting: STUDENT IN AN ORGANIZED HEALTH CARE EDUCATION/TRAINING PROGRAM
Payer: MEDICARE

## 2024-03-23 VITALS
SYSTOLIC BLOOD PRESSURE: 111 MMHG | RESPIRATION RATE: 18 BRPM | DIASTOLIC BLOOD PRESSURE: 54 MMHG | OXYGEN SATURATION: 97 % | HEIGHT: 60 IN | HEART RATE: 64 BPM | TEMPERATURE: 98 F

## 2024-03-23 DIAGNOSIS — Z98.890 OTHER SPECIFIED POSTPROCEDURAL STATES: Chronic | ICD-10-CM

## 2024-03-23 DIAGNOSIS — Z33.2 ENCOUNTER FOR ELECTIVE TERMINATION OF PREGNANCY: Chronic | ICD-10-CM

## 2024-03-23 DIAGNOSIS — Z96.651 PRESENCE OF RIGHT ARTIFICIAL KNEE JOINT: Chronic | ICD-10-CM

## 2024-03-23 DIAGNOSIS — H26.9 UNSPECIFIED CATARACT: Chronic | ICD-10-CM

## 2024-03-23 PROCEDURE — 99284 EMERGENCY DEPT VISIT MOD MDM: CPT | Mod: GC

## 2024-03-23 NOTE — ED ADULT TRIAGE NOTE - CHIEF COMPLAINT QUOTE
Pt reports she had left knee surgery 5 days ago, noticed some bloody drainage coming from the site 3 days ago. (knee noted to be swollen).  Denies fevers Phx HTN, DM, Asthma, COPD, CHF, Takes 324mg aspirin BID unsure why. Pt reports she had left knee surgery 5 days ago, noticed some bloody drainage coming from the site 3 days ago. (knee noted to be swollen).  Denies fevers Phx HTN, DM, Asthma, COPD, CHF, Takes 324mg aspirin BID unsure why.    Addendum: FSBS 62 in triage. Pt awake and alert. FATEMEH and Charge RN notified

## 2024-03-24 VITALS
DIASTOLIC BLOOD PRESSURE: 60 MMHG | OXYGEN SATURATION: 98 % | HEART RATE: 66 BPM | RESPIRATION RATE: 18 BRPM | SYSTOLIC BLOOD PRESSURE: 112 MMHG | TEMPERATURE: 99 F

## 2024-03-24 RX ORDER — ACETAMINOPHEN 500 MG
975 TABLET ORAL ONCE
Refills: 0 | Status: COMPLETED | OUTPATIENT
Start: 2024-03-24 | End: 2024-03-24

## 2024-03-24 RX ORDER — OXYCODONE HYDROCHLORIDE 5 MG/1
5 TABLET ORAL ONCE
Refills: 0 | Status: DISCONTINUED | OUTPATIENT
Start: 2024-03-24 | End: 2024-03-24

## 2024-03-24 RX ADMIN — Medication 975 MILLIGRAM(S): at 01:17

## 2024-03-24 RX ADMIN — OXYCODONE HYDROCHLORIDE 5 MILLIGRAM(S): 5 TABLET ORAL at 01:17

## 2024-03-24 NOTE — ED PROVIDER NOTE - PROGRESS NOTE DETAILS
Jm Mahajan MD (PGY3): ortho at bedside. Jm Mahajan MD (PGY3): Dressing changed by orthopedic surgery.   Patient has follow-up appointment on Friday.  No indication for labs or imaging at this time.  Strict return precautions discussed.

## 2024-03-24 NOTE — ED PROVIDER NOTE - PATIENT PORTAL LINK FT
You can access the FollowMyHealth Patient Portal offered by [facility] by registering at the following website: [email or web address]. By joining [facility]’s FollowMyHealth portal, you will also be able to view your health information using other applications (apps) compatible with our system.

## 2024-03-24 NOTE — ED ADULT NURSE NOTE - OBJECTIVE STATEMENT
63 year old female brought to room 22A. Pt reports she had left knee surgery 5 days ago, noticed some bloody drainage coming from the site 3 days ago. (knee noted to be swollen).  Denies fevers Phx HTN, DM, Asthma, COPD, CHF, Takes 324mg aspirin BID unsure why.  Addendum: FSBS 62 in triage. Pt awake and alert. FATEMEH and Charge RN notified. Patient given apple juice and FS was 100. Patient has a closed wound from a left knee replacement surgery. Site is covered with guaze. Site has small amount of blood drainage in the guaze. Patient has limited range of motion to the left knee. Positive pulse motor sensory to all extremities. Patient uses a cane to ambulate.   patient denies shortness of breath, chest pain, nausea, vomiting, chill, fever. Patient normal sinus on the monitor. Respirations equal and adequate. Patients 22 gauge left wrist IV patent, no signs of infiltration. Safety measures in place, call bell within reach. Patient stable upon leaving the room.

## 2024-03-24 NOTE — ED PROVIDER NOTE - NSFOLLOWUPINSTRUCTIONS_ED_ALL_ED_FT
You were seen in the Emergency Department for bleeding from surgical site. Lab and imaging results, if performed, were discussed with you along with your discharge diagnosis.   You are seen by the orthopedic surgery team.  Your bandage was changed.    Follow-up with your orthopedic surgeon at your scheduled appointment on Friday, March 29. Follow up with your doctor in 1 week - bring copies of your results if you were given. If you do not have a primary doctor, please call [phone] to find one convenient for you.    Continue all prescribed medications.       Return to ED for any new or worsening symptoms including but not limited to: development of Worsening bleeding, worsening pain, chest pain, shortness of breath, fever, vomiting, focal numbness, weakness or tingling, any severe CP, headache, abdominal pain, back pain.      Rest and keep yourself hydrated with fluids.

## 2024-03-24 NOTE — ED ADULT NURSE NOTE - NSFALLUNIVINTERV_ED_ALL_ED
Bed/Stretcher in lowest position, wheels locked, appropriate side rails in place/Call bell, personal items and telephone in reach/Instruct patient to call for assistance before getting out of bed/chair/stretcher/Non-slip footwear applied when patient is off stretcher/[place] to call system/Physically safe environment - no spills, clutter or unnecessary equipment/Purposeful proactive rounding/Room/bathroom lighting operational, light cord in reach

## 2024-03-24 NOTE — ED ADULT NURSE NOTE - CHIEF COMPLAINT QUOTE
Pt reports she had left knee surgery 5 days ago, noticed some bloody drainage coming from the site 3 days ago. (knee noted to be swollen).  Denies fevers Phx HTN, DM, Asthma, COPD, CHF, Takes 324mg aspirin BID unsure why.    Addendum: FSBS 62 in triage. Pt awake and alert. FATEMEH and Charge RN notified

## 2024-03-24 NOTE — ED PROVIDER NOTE - CARE PROVIDER_API CALL
Wesly Givens  Orthopaedic Surgery  [address], [address]  [place] [address]  Phone: [phone]  Fax: [phone]  Follow Up Time: 4-6 Days  
0

## 2024-03-24 NOTE — ED PROVIDER NOTE - ATTENDING CONTRIBUTION TO CARE
I have personally performed a face to face medical and diagnostic evaluation of the patient. I have discussed with and reviewed the Resident's note and agree with the History, ROS, Physical Exam and MDM unless otherwise indicated. A brief summary of my personal evaluation and impression can be found below.    62yo Female w Morbidities as listed above, recent L TKA with Dr. Givens on 3/18/2024   Presenting with concern for bleeding around surgical site.  Began on Thursday -  describes as mild drainage, started to soak the bandage that was placed on the incision site.  Only on aspirin no anticoagulation.  No falls or injuries. denies any increasing pain, nausea, vomiting, fevers or chills.  States that she has otherwise been healing okay, no new edema. On exam, vital signs stable however patient's initial fingerstick in the 60s in triage.  States that she has been eating and drinking okay.  Only on metformin for diabetes.  Asymptomatic for hypoglycemia, patient given juice with improvement of fingerstick.    Mild serosanguineous drainage noted on dressing, no active drainage.  No evidence of infection at surgical site at this time.  Patient overall well-appearing.   Based on story and exam, likely just postsurgical dehiscence. Orthopedic team  at bedside, will speak to Dr. Givens for final recommendations.  No indication for labs or imaging at this time.  need with orthopedic team.  Patient ordered for oxycodone and Tylenol, the 2 pain medications patient was discharged on.  Reassess to dispo. Stacia Jordan, ED Attending

## 2024-03-24 NOTE — ED PROVIDER NOTE - CLINICAL SUMMARY MEDICAL DECISION MAKING FREE TEXT BOX
HPI: 62yo Female with PMH of HTN, HLD, CHF, asthma, COPD, SC, EtOH abuse, neuropathy, MADHURI (no CPAP), tremors, BPD, right breast CA s/p chemo/XRT/mastectomy c/b RUE lymphedema s/p L TKA with Dr. Givens on 3/18/2024 Presenting to ED with bleeding around surgical sites.  Began on Thursday, during PT noticed  an area of bleeding under the bandage measuring about 3 cm in diameter.  This was circled by the physical therapist and on the next physical therapy appointment noticed is a area of diameter approximately 4 cm.  No bleeding is seeping through the bandage.  Ambulating with a cane and walker.  No falls or injuries.  Only on aspirin no other anticoagulation.   No numbness or tingling.   No falls or injuries.  Patient spoke to Dr. Givens  who recommended the patient come to the emergency department to be evaluated.    CONSTITUTIONAL: No fevers, no chills, no lightheadedness, no dizziness  EYES: no visual changes, no eye pain  EARS: no ear drainage, no ear pain, no change in hearing  NOSE: no nasal congestion  MOUTH/THROAT: no sore throat  CV: No chest pain, no palpitations  RESP: No SOB, no cough  GI: No n/v/d, no abd pain  : no dysuria, no hematuria, no flank pain  MSK: see HPI   SKIN: no rashes  NEURO: no headache, no focal weakness, no decreased sensation/parasthesias   PSYCHIATRIC: no known mental health issues     Vitals: 111/54, RR 18, HR 64, afebrile    General: Alert and Orientated x 3. No apparent distress.  Head: Normocephalic and atraumatic.  Eyes: PERRLA with EOMI.  Neck: Supple. Trachea midline.   Cardiac: Normal S1 and S2 w/ RRR. No murmurs appreciated. No JVD appreciated.  Pulmonary: CTA bilaterally. No increased WOB. No wheezes or crackles.  Abdominal: Soft, non-tender. (+) bowel sounds appreciated in all 4 quadrants. No hepatosplenomegaly.   Neurologic: No focal sensory or motor deficits.  Musculoskeletal: L knee:   Full range of motion, no sensory deficits,  overlying bandage with obvious area of bleeding underneath measuring about 5 cm in diameter.  No exsanguination.  Skin: Color appropriate for race. Intact, warm, and well-perfused.  Psychiatric: Appropriate mood and affect. No apparent risk to self or others.    MDM: 62yo Female with PMH of HTN, HLD, CHF, asthma, COPD, SC, EtOH abuse, neuropathy, MADHURI (no CPAP), tremors, BPD, right breast CA s/p chemo/XRT/mastectomy c/b RUE lymphedema s/p L TKA with Dr. Givens on 3/18/2024   Presenting with concern for bleeding around surgical site.  Began on Thursday.  Only on aspirin.  No falls or injuries.  Likely just normal postsurgical complication given no exsanguination.  Will speak to Ortho regarding evaluation and any recommended workup such as imaging.  Disposition likely discharge with ortho follow-up. HPI: 62yo Female with PMH of HTN, HLD, CHF, asthma, COPD, SC, EtOH abuse, neuropathy, MADHURI (no CPAP), tremors, BPD, right breast CA s/p chemo/XRT/mastectomy c/b RUE lymphedema s/p L TKA with Dr. Givens on 3/18/2024 Presenting to ED with bleeding around surgical sites.  Began on Thursday, during PT noticed  an area of bleeding under the bandage measuring about 3 cm in diameter.  This was circled by the physical therapist and on the next physical therapy appointment noticed is a area of diameter approximately 4 cm.  No bleeding is seeping through the bandage.  Ambulating with a cane and walker.  No falls or injuries.  Only on aspirin no other anticoagulation.   No numbness or tingling.   No falls or injuries.  Patient spoke to Dr. Givens  who recommended the patient come to the emergency department to be evaluated.    CONSTITUTIONAL: No fevers, no chills, no lightheadedness, no dizziness  EYES: no visual changes, no eye pain  EARS: no ear drainage, no ear pain, no change in hearing  NOSE: no nasal congestion  MOUTH/THROAT: no sore throat  CV: No chest pain, no palpitations  RESP: No SOB, no cough  GI: No n/v/d, no abd pain  : no dysuria, no hematuria, no flank pain  MSK: see HPI   SKIN: no rashes  NEURO: no headache, no focal weakness, no decreased sensation/parasthesias   PSYCHIATRIC: no known mental health issues     Vitals: 111/54, RR 18, HR 64, afebrile    General: Alert and Orientated x 3. No apparent distress.  Head: Normocephalic and atraumatic.  Eyes: PERRLA with EOMI.  Neck: Supple. Trachea midline.   Cardiac: Normal S1 and S2 w/ RRR. No murmurs appreciated. No JVD appreciated.  Pulmonary: CTA bilaterally. No increased WOB. No wheezes or crackles.  Abdominal: Soft, non-tender. (+) bowel sounds appreciated in all 4 quadrants. No hepatosplenomegaly.   Neurologic: No focal sensory or motor deficits.  Musculoskeletal: L knee:   Full range of motion, no sensory deficits,  overlying bandage with obvious area of bleeding underneath measuring about 5 cm in diameter.  On removal of bandage there is well-healing laceration from the distal knee to the distal femur.  No active bleeding.  No dehiscence.  Skin: Color appropriate for race. Intact, warm, and well-perfused.  Psychiatric: Appropriate mood and affect. No apparent risk to self or others.    MDM: 62yo Female with PMH of HTN, HLD, CHF, asthma, COPD, SC, EtOH abuse, neuropathy, MADHURI (no CPAP), tremors, BPD, right breast CA s/p chemo/XRT/mastectomy c/b RUE lymphedema s/p L TKA with Dr. Givens on 3/18/2024   Presenting with concern for bleeding around surgical site.  Began on Thursday.  Only on aspirin.  No falls or injuries.  Likely just normal postsurgical complication given no exsanguination.  Will speak to Ortho regarding evaluation and any recommended workup such as imaging.  Disposition likely discharge with ortho follow-up.

## 2024-03-24 NOTE — CONSULT NOTE ADULT - ASSESSMENT
63 year old female s/p L TKA on 3/18 presenting to ED with concern for wound drainage no with incision cleaned at beside and bandage exchanged    PLAN  - WBAT LLE  - Continue home meds as discharged   - Pain control as needed  - PHUONG Givens this week

## 2024-03-24 NOTE — ED CLERICAL - NS ED CLERK NOTE PRE-ARRIVAL INFORMATION; ADDITIONAL PRE-ARRIVAL INFORMATION
This patient is enrolled in a readmission reduction program and has active care navigation. This patient can be followed up by the care navigation team within 24 hours. To arrange close follow-up or to obtain additional clinical information about this patient, please call the contact number above.     Please consider CDU for management if appropriate.

## 2024-03-24 NOTE — CONSULT NOTE ADULT - SUBJECTIVE AND OBJECTIVE BOX
Pt is a 63 year old female s/p L TKA w Dr. Givens on 3/18.  PT's hospital course was uncomplicated and surgery went well.  Pt was recovering well and was discharged home the following day.  She has been working with PT at home and managing her pain well.  Denies any SOB, chest pain, n/v, dizziness.  Yesterday at PT therapist noted some sanguinous drainage pooling under bandage which has increased in size since then.  Pt spoke to Dr. Givens and sent images to him and was advised to come to ED to have it looked at.      Vital Signs Last 24 Hrs  T(C): 36.5 (23 Mar 2024 23:17), Max: 36.5 (23 Mar 2024 23:17)  T(F): 97.7 (23 Mar 2024 23:17), Max: 97.7 (23 Mar 2024 23:17)  HR: 64 (23 Mar 2024 23:17) (64 - 64)  BP: 111/54 (23 Mar 2024 23:17) (111/54 - 111/54)  BP(mean): --  RR: 18 (23 Mar 2024 23:17) (18 - 18)  SpO2: 97% (23 Mar 2024 23:17) (97% - 97%)    Parameters below as of 23 Mar 2024 23:17  Patient On (Oxygen Delivery Method): room air      Physical Exam  Gen: NAD comfortable in bed   Resp: Non labored  LLE:  Aquacel in place with large amount of pooled blood beneath at distal extent   Bandage removed and incision appears clean and intact  No noted areas of dehiscence  No focal areas of noted drainage  Motor: IP/quad/gastroc/sol/TA/EHL/FHL intact   SILT throughout     Procedure:  Aquacel bandage removed.  Using sterile gloves the site was cleaned with sterile saline.  Dermabond was reapplied to the incision and allowed to dry.  Once dry, a new aquacel bandage was applied.

## 2024-03-25 ENCOUNTER — TRANSCRIPTION ENCOUNTER (OUTPATIENT)
Age: 64
End: 2024-03-25

## 2024-03-25 ENCOUNTER — EMERGENCY (EMERGENCY)
Facility: HOSPITAL | Age: 64
LOS: 1 days | Discharge: HOME | End: 2024-03-25
Attending: EMERGENCY MEDICINE | Admitting: EMERGENCY MEDICINE
Payer: MEDICARE

## 2024-03-25 VITALS
RESPIRATION RATE: 16 BRPM | DIASTOLIC BLOOD PRESSURE: 67 MMHG | HEIGHT: 60 IN | TEMPERATURE: 98 F | SYSTOLIC BLOOD PRESSURE: 133 MMHG | HEART RATE: 90 BPM | OXYGEN SATURATION: 100 %

## 2024-03-25 VITALS
HEART RATE: 74 BPM | SYSTOLIC BLOOD PRESSURE: 154 MMHG | TEMPERATURE: 98 F | OXYGEN SATURATION: 98 % | RESPIRATION RATE: 15 BRPM | DIASTOLIC BLOOD PRESSURE: 67 MMHG

## 2024-03-25 DIAGNOSIS — Z98.890 OTHER SPECIFIED POSTPROCEDURAL STATES: Chronic | ICD-10-CM

## 2024-03-25 DIAGNOSIS — Z33.2 ENCOUNTER FOR ELECTIVE TERMINATION OF PREGNANCY: Chronic | ICD-10-CM

## 2024-03-25 DIAGNOSIS — H26.9 UNSPECIFIED CATARACT: Chronic | ICD-10-CM

## 2024-03-25 DIAGNOSIS — Z96.651 PRESENCE OF RIGHT ARTIFICIAL KNEE JOINT: Chronic | ICD-10-CM

## 2024-03-25 PROCEDURE — 99283 EMERGENCY DEPT VISIT LOW MDM: CPT

## 2024-03-25 NOTE — ED PROVIDER NOTE - NSICDXPASTSURGICALHX_GEN_ALL_CORE_FT
PAST SURGICAL HISTORY:  2002   h/o hysterectomy due to Fibroid--post op vaginal bleeding requiring a transfusion     2008  repair of ventral hernia with mesh Revision 2018    Cataract Bilateral 2017    H/O total knee replacement, right     Radical mastectomy of right breast 3/30/12    S/P arthroscopy of left shoulder     Termination of pregnancy (fetus) x 3

## 2024-03-25 NOTE — ED PROVIDER NOTE - PATIENT PORTAL LINK FT
You can access the FollowMyHealth Patient Portal offered by [facility] by registering at the following website: [email or web address]. By joining [facility]’s FollowMyHealth portal, you will also be able to view your health information using other applications (apps) compatible with our system.

## 2024-03-25 NOTE — CONSULT NOTE ADULT - SUBJECTIVE AND OBJECTIVE BOX
Orthopaedic Surgery Consult Note    HPI:    63y Female PMH of HTN, HLD, CHF, asthma, COPD, SC, EtOH abuse, neuropathy, MADHURI (no CPAP), tremors, BPD, right breast CA s/p chemo/XRT/mastectomy c/b RUE lymphedema s/p L TKA with Dr. Givens on 3/18/2024 p/w minor serosanguinous bleeding from incisional site. No signs of infection. No fever/chills. No weakness, numbness.     ROS: As documented in HPI, otherwise negative.    PAST MEDICAL & SURGICAL HISTORY:  Fibromyalgia      b/l  Carpal tunnel syndrome  tx PT/OT      Abdominal hernia in 2012 7/23/19 ; pt states hernia repair 2008, 2018      History  Uterine Fibroid  s/p Hysterectomy 7/02      Sleep apnea diagnosed 2007---supposed to use device but doesn't      Acid Reflux      hiatal hernia  last endoscopy 1.5 years ago      h/o b/l fungal foot infection  7/23./19 pt denies      Alcohol abuse--quit 8 years ago--goes to   ADDENDUM:  at PAST appointment  patient states she quit alcohol approximately 2003      Drug abuse--quit 8 years ago--goes to   ADDENDUM: at PAST appointment, patient states she quit alcohol in approximately 2003      Asthma  last rescue inhaler use "maybe 3 years ago when I had the flu or a cold"      h/o   Fatty liver      Diverticulosis      Arthritis      Hypertension      Hypercholesterolemia  7/23/19 ; pt reports improvement ; pt denies rx      Morbid obesity      herniated lumbar disc      Depression      Anxiety      sickel cell anemia trait      ETOH abuse  states she quit alcohol in 2003      Lymphedema, limb  right side s/p right mastectomy      Neuropathy  b/l feet      Substance abuse  quit in 2003 -- cocaine and marijuana      Miscarriage  x 2      Breast cancer  2012  right breast -- had mastectomy and then post op chemo and RT, followed with Herceptin for 1 year  2012 last chemo   2013 may last Herceptin  2013 last RT      Thyroid nodule  had negative biopsy      Insomnia      Sickle cell trait      Former smoker      Lymphedema of right arm      Primary osteoarthritis of right knee      History of COPD      Diabetes mellitus      H/O: osteoarthritis      H/O CHF      Anemia      2002   h/o hysterectomy due to Fibroid--post op vaginal bleeding requiring a transfusion      2008  repair of ventral hernia with mesh  Revision 2018      Radical mastectomy of right breast  3/30/12      Termination of pregnancy (fetus)  x 3      Cataract  Bilateral 2017      S/P arthroscopy of left shoulder      H/O total knee replacement, right        [] No significant past history as reviewed with the patient and family    MEDICATIONS  (STANDING):    MEDICATIONS  (PRN):    Allergies    environment--ragweed, dust, cats--sneezing and watery eyes, nasal congestion (Other)  ramipril (Angioedema)    Intolerances        Vital Signs Last 24 Hrs  T(C): 36.7 (25 Mar 2024 06:49), Max: 36.7 (25 Mar 2024 05:56)  T(F): 98.1 (25 Mar 2024 06:49), Max: 98.1 (25 Mar 2024 06:49)  HR: 74 (25 Mar 2024 06:49) (74 - 90)  BP: 154/67 (25 Mar 2024 06:49) (133/67 - 154/67)  BP(mean): --  RR: 15 (25 Mar 2024 06:49) (15 - 16)  SpO2: 98% (25 Mar 2024 06:49) (98% - 100%)    Parameters below as of 25 Mar 2024 06:49  Patient On (Oxygen Delivery Method): room air        Physical Exam  Gen: NAD comfortable in bed   Resp: Non labored  LLE:  Bandage removed and incision appears clean and intact  No noted areas of dehiscence  No focal areas of noted drainage, no purulence  Motor: IP/quad/gastroc/sol/TA/EHL/FHL intact   SILT throughout     Procedure:  Aquacel bandage removed.  Using sterile gloves the site was cleaned with sterile saline.  Dermabond was reapplied to the incision and allowed to dry.  Once dry, a new aquacel bandage was applied.     63 year old female s/p L TKA on 3/18 presenting to ED with concern for wound drainage. Incision cleaned at beside and prevena wound vac applied bedside    PLAN  - WBAT LLE  - Continue home meds as discharged   - Pain control as needed  - PHUONG Givens in 1-2 weeks Orthopaedic Surgery Consult Note    HPI:    63y Female PMH of HTN, HLD, CHF, asthma, COPD, SC, EtOH abuse, neuropathy, MADHURI (no CPAP), tremors, BPD, right breast CA s/p chemo/XRT/mastectomy c/b RUE lymphedema s/p L TKA with Dr. Givens on 3/18/2024 p/w minor serosanguinous bleeding from incisional site. No signs of infection. No fever/chills. No weakness, numbness.     ROS: As documented in HPI, otherwise negative.    PAST MEDICAL & SURGICAL HISTORY:  Fibromyalgia      b/l  Carpal tunnel syndrome  tx PT/OT      Abdominal hernia in 2012 7/23/19 ; pt states hernia repair 2008, 2018      History  Uterine Fibroid  s/p Hysterectomy 7/02      Sleep apnea diagnosed 2007---supposed to use device but doesn't      Acid Reflux      hiatal hernia  last endoscopy 1.5 years ago      h/o b/l fungal foot infection  7/23./19 pt denies      Alcohol abuse--quit 8 years ago--goes to   ADDENDUM:  at PAST appointment  patient states she quit alcohol approximately 2003      Drug abuse--quit 8 years ago--goes to   ADDENDUM: at PAST appointment, patient states she quit alcohol in approximately 2003      Asthma  last rescue inhaler use "maybe 3 years ago when I had the flu or a cold"      h/o   Fatty liver      Diverticulosis      Arthritis      Hypertension      Hypercholesterolemia  7/23/19 ; pt reports improvement ; pt denies rx      Morbid obesity      herniated lumbar disc      Depression      Anxiety      sickel cell anemia trait      ETOH abuse  states she quit alcohol in 2003      Lymphedema, limb  right side s/p right mastectomy      Neuropathy  b/l feet      Substance abuse  quit in 2003 -- cocaine and marijuana      Miscarriage  x 2      Breast cancer  2012  right breast -- had mastectomy and then post op chemo and RT, followed with Herceptin for 1 year  2012 last chemo   2013 may last Herceptin  2013 last RT      Thyroid nodule  had negative biopsy      Insomnia      Sickle cell trait      Former smoker      Lymphedema of right arm      Primary osteoarthritis of right knee      History of COPD      Diabetes mellitus      H/O: osteoarthritis      H/O CHF      Anemia      2002   h/o hysterectomy due to Fibroid--post op vaginal bleeding requiring a transfusion      2008  repair of ventral hernia with mesh  Revision 2018      Radical mastectomy of right breast  3/30/12      Termination of pregnancy (fetus)  x 3      Cataract  Bilateral 2017      S/P arthroscopy of left shoulder      H/O total knee replacement, right        [] No significant past history as reviewed with the patient and family    MEDICATIONS  (STANDING):    MEDICATIONS  (PRN):    Allergies    environment--ragweed, dust, cats--sneezing and watery eyes, nasal congestion (Other)  ramipril (Angioedema)    Intolerances        Vital Signs Last 24 Hrs  T(C): 36.7 (25 Mar 2024 06:49), Max: 36.7 (25 Mar 2024 05:56)  T(F): 98.1 (25 Mar 2024 06:49), Max: 98.1 (25 Mar 2024 06:49)  HR: 74 (25 Mar 2024 06:49) (74 - 90)  BP: 154/67 (25 Mar 2024 06:49) (133/67 - 154/67)  BP(mean): --  RR: 15 (25 Mar 2024 06:49) (15 - 16)  SpO2: 98% (25 Mar 2024 06:49) (98% - 100%)    Parameters below as of 25 Mar 2024 06:49  Patient On (Oxygen Delivery Method): room air        Physical Exam  Gen: NAD comfortable in bed   Resp: Non labored  LLE:  Bandage removed and incision appears clean and intact  No noted areas of dehiscence  No focal areas of noted drainage, no purulence  Motor: IP/quad/gastroc/sol/TA/EHL/FHL intact   SILT throughout     Procedure:  Aquacel bandage removed.  Using sterile gloves the site was cleaned with alcohol and chloroprep.  Once dry, a Prevena Vac was applied.     63 year old female s/p L TKA on 3/18 presenting to ED with concern for wound drainage. Incision cleaned at beside and prevena wound vac applied bedside    PLAN  - WBAT LLE  - Continue home meds as discharged   - Pain control as needed  - PHUONG Givens in 1-2 weeks

## 2024-03-25 NOTE — CONSULT NOTE ADULT - ATTENDING COMMENTS
Patient seen and examined. Denia CharlesChantaleTushar is a 63 year old female now status post left TKA. Patient was experiencing some wound drainage and presented to the ER on 3/23/2024. At the time the incision appeared clean, dry, and intact, with no expressible drainage. A new Aquacel dressing was placed. Patient had a recurrence of wound drainage on 3/24/2024. She was recommended to present to the ER for evaluation. Patient presented to the ER on 3/25/2024. Dressing was removed. The incision appeared to be well healing. There was again no expressible drainage. The incision was cleaned with alcohol and chloroprep. A Prevena Incisional Vac dressing was applied using aseptic technique.       Plan:  -Prevena Wound Vac  -Continue Duricef  -Follow up in the office on 3/29/2024

## 2024-03-25 NOTE — ED CLERICAL - NS ED CLERK NOTE PRE-ARRIVAL INFORMATION; ADDITIONAL PRE-ARRIVAL INFORMATION
This patient is enrolled in the comprehensive joint replacement (CJR) program and has active care navigation.     -This patient can be followed up by the care navigation team within 24 hours. To arrange close follow-up or to obtain additional clinical information about this patient, please call the contact number above.     -Please call the hospitalist for consultation ([phone]) PRIOR to admission or observation.  The hospitalist is part of the care team and can assist in acute medical management.     -Please call the orthopedic resident ([phone]) for ALL patients who are admitted or placed in observation.

## 2024-03-25 NOTE — ED ADULT NURSE NOTE - NSFALLRISKINTERV_ED_ALL_ED

## 2024-03-25 NOTE — ED PROVIDER NOTE - PHYSICAL EXAMINATION
GENERAL: well appearing in no acute distress, non-toxic appearing  CARDIAC: regular rate and rhythm, normal S1S2, no appreciable murmurs, 2+ pulses in UE/LE b/l  PULM: normal breath sounds, clear to ascultation bilaterally, no rales, rhonchi, wheezing  MSK: +ROM intact left knee limited 2/2 pain, no peripheral edema, no calf tenderness b/l  SKIN: Left surgical site with bandage with sanguineous saturation, no surrounding erythema or purulence.

## 2024-03-25 NOTE — ED PROVIDER NOTE - ATTENDING CONTRIBUTION TO CARE
4yo Female with PMH of HTN, HLD, CHF, asthma, COPD, SC, EtOH abuse, neuropathy, MADHURI (no CPAP), tremors, BPD, right breast CA s/p chemo/XRT/mastectomy c/b RUE lymphedema s/p L TKA with Dr. Givens on 3/18/2024 Presenting to ED with bleeding around surgical sites. Pt was seen here in ED yesterday for same complaint, was seen by ortho who provided wound care and redressed wound and was discharged home. Pt spoke to Dr. Givens later that afternoon after discharge, by that time her new bandage was saturated with blood, and was endorsing some sharp pain at the site. Dr. Givens instructed pt to return to ED for additional wound care, however pt stated she waited until today as she was too tired to return yesterday evening. Pain control at home with Tylenol, tramadol and oxycodone. Pt has missed last 2 days PT sessions due to PT refusing to work with her while her incision is bleeding. Denies fever/chills, cp, sob, abd pain, n/v/d. AVSS here are stable, exam as above. c/f post-surgical bleeding, no clinical signs of infection, will consult ortho for wound care and reassess.

## 2024-03-25 NOTE — ED PROVIDER NOTE - NSFOLLOWUPINSTRUCTIONS_ED_ALL_ED_FT
Negative Pressure Wound Therapy Home Guide  Negative pressure wound therapy (NPWT) uses a sponge or foam-like material (dressing) placed on or inside the wound. The wound is then covered and sealed with a cover dressing that sticks to your skin (is adhesive) to keep air out. A tube is attached to the cover dressing, and this tube connects to a small pump. The pump removes drainage from the wound.    NPWT helps to increase blood flow to the wound and heal it from the inside. NPWT also helps pull the edges of the wound together and removes fluids and germs from the wound. NPWT may also be called wound vac.    What are the risks?  NPWT is usually safe to use. However, problems can occur, including:  Skin irritation from the dressing adhesive.  Bleeding.  Infection. Signs of infection include:  More redness, swelling, or pain.  More fluid or blood.  Warmth or hardness around the wound.  Pus or a bad smell.  Red streaks leading from the wound.  Dehydration. Wounds with large amounts of drainage can cause excessive body fluid loss.  Pain.  Supplies needed:  Wound cleanser or salt-water solution (saline).  Skin protectant. This may be a wipe, film, or spray.  Clean or germ-free (sterile) scissors.  New sponge or foam.  Cover dressing.  Gauze pad.  Tape.  Also have available:  Soap and water, or hand .  Disposable gloves.  Eye protection.  A disposable garbage bag.  Protective clothing.  How to change your dressing  Change the dressing as scheduled, if the dressing loses suction, or the pump is off for 2 hours or more.    Prepare to change your dressing    A person wearing protective gloves and a gown.   Take pain medicine 30 minutes before changing the dressing if told by your health care provider.  Wash your hands with soap and water for at least 20 seconds before you change the dressing. If soap and water are not available, use hand   Dry your hands with a clean towel.  Set up a clean station for wound care and set a plastic bag on or near your work surface for trash.  Open the dressing package so that the sponge dressing remains on the inside of the package.  Wear gloves, protective clothing, and eye protection.  Remove the old dressing    Washing hands with soap and water at a sink.   Turn off the pump and disconnect the tubing from the dressing.  Carefully remove the adhesive cover dressing in the direction of your hair growth.  Remove the sponge dressing that is inside the wound. If the sponge sticks, use a wound cleanser or saline solution to wet the sponge and help it come off more easily.  Throw the old sponge and cover dressing supplies into the garbage bag.  Check the wound for signs of infection, including a bad smell, drainage (bleeding or pus), or new color changes (black, grey, yellow, or white) in the wound.  Remove your gloves by grabbing the cuff and turning the glove inside out. Place the gloves in the trash immediately.  Wash your hands with soap and water for at least 20 seconds. If soap and water are not available, use hand .  Dry your hands with a clean towel.  Clean your wound    Wear gloves, protective clothing, and eye protection. Follow your health care provider's instructions on how to clean your wound. You may be told to:  Clean the wound using a saline solution or a wound cleanser and a clean gauze pad.  Pat the wound dry with a gauze pad. Do not rub the wound.  Throw the gauze pad into the garbage bag.  Remove your gloves by grabbing the cuff and turning the glove inside out. Place the gloves in the trash immediately.  Wash your hands with soap and water for at least 20 seconds. If soap and water are not available, use hand   Dry your hands with a clean towel.  Apply the new dressing    Wear gloves, protective clothing, and eye protection.  If told by your health care provider, apply a skin protectant to any skin that will be exposed to adhesive. Let the skin protectant dry.  Cut a piece of new sponge dressing and put it on or in the wound.  Using clean scissors, cut a nickel-sized hole in the new cover dressing.  Apply the cover dressing.  Attach the suction tube over the hole in the cover dressing.  Take off your gloves. Put them in the plastic bag with the old dressing. Tie the bag shut and throw it away.  Wash your hands with soap and water for at least 20 seconds. If soap and water are not available, use hand .  Dry your hands with a clean towel.  Turn the pump back on. The sponge dressing should collapse. Do not change the settings on the machine without talking to a health care provider.  Replace the container in the pump that collects fluid if it is full. Replace the container per the 's instructions or at least once a week, even if it is not full.  Follow your health care provider'sinstructions on how often you need to change and apply the new NPWT dressing.    General tips and recommendations  If the alarm sounds:    Stay calm and prepare to troubleshoot.  Do not turn off the pump or do anything with the dressing.  The alarm may go off for many reasons, including:  The battery is low. Change the battery or plug the device into electrical power.  The dressing has a leak. Find the leak and put tape over the leak.  The fluid collection container is full. Change the fluid container.  Call your health care provider right away if you cannot fix the problem.  Explain to your health care provider what is happening. Follow his or her instructions.  General instructions    Change the dressing as scheduled, if the dressing loses suction, or the pump is off for 2 hours or more.  Do not turn off the pump unless told to do so by your health care provider.  Do not turn off the pump for more than 2 hours. If the pump is off or you lose suction to the dressing for more than 2 hours, the dressing will need to be changed.  Check the machine frequently to make sure that therapy is on and that all clamps are open.  Do not use over-the-counter medicated or antiseptic creams, sprays, liquids, or dressings unless told by your health care provider.  Do not take baths, swim, or use a hot tub until your health care provider approves. You may only be allowed to take sponge baths. Ask your health care provider if you may take showers. If your health care provider says it is okay to shower:  Do not take the pump into the shower.  Make sure the wound dressing is protected and sealed. The wound dressing must stay dry.  Contact a health care provider if:  You have new pain.  You develop irritation, a rash, or itching around the wound or dressing.  You see new color changes (black, grey, yellow, or white) in the wound.  The dressing changes are painful or cause bleeding.  The pump has been off for more than 2 hours, and you do not know how to change the dressing.  The pump alarm goes off, and you do not know what to do.  Get help right away if:  You have a lot of bleeding.  The wound breaks open.  You have severe pain.  You have signs of infection, such as:  More redness, swelling, or pain.  More fluid or blood.  Warmth or hardness.  Pus or a bad smell.  Red streaks leading from the wound.  A fever.  You see a sudden change in the color or texture of the drainage.  You have signs of dehydration, such as:  Little or no tears, urine, or sweat.  Muscle cramps.  Very dry mouth.  Headache.  Dizziness or confusion.  Summary  NPWT uses a sponge or dressing placed on or inside the wound.  NPWT helps to increase blood flow to the wound and heal it from the inside.  Follow your health care provider's instructions on how to clean your wound and how to change the dressing.  Contact a health care provider if you have new pain, an irritation, or a rash, or if the alarm goes off and you do not know what to do.  Get help right away if you have a lot of bleeding, your wound breaks open, or you have severe pain. Also, get help if you have signs of infection.

## 2024-03-25 NOTE — ED ADULT TRIAGE NOTE - CHIEF COMPLAINT QUOTE
pt c/o bloody drainage to surgical site and pain s/p left knee replacement on March 18th. states sent by surgeon. Hx. HTN. DM2. Asthma, COPD, CHF. pt denies fevers. pt well appearing.

## 2024-03-25 NOTE — ED PROVIDER NOTE - CHIEF COMPLAINT
The patient is a 63y Female complaining of sent by MD. [facility] Counseling: I discussed with the patient the risks of valacyclovir including but not limited to kidney damage, nausea, vomiting and severe allergy.  The patient understands that if the infection seems to be worsening or is not improving, they are to call.

## 2024-03-25 NOTE — ED PROVIDER NOTE - PRINCIPAL DIAGNOSIS
Pt here today for Mastitis f/u  C/o lump on the right under boob  Phone # [phone] (home)      Surgical complication

## 2024-03-25 NOTE — ED PROVIDER NOTE - NSICDXPASTMEDICALHX_GEN_ALL_CORE_FT
PAST MEDICAL HISTORY:  Abdominal hernia in 2012 7/23/19 ; pt states hernia repair 2008, 2018    Acid Reflux     Alcohol abuse--quit 8 years ago--goes to AA ADDENDUM:  at PAST appointment  patient states she quit alcohol approximately 2003    Anemia     Anxiety     Arthritis     Asthma last rescue inhaler use "maybe 3 years ago when I had the flu or a cold"    b/l  Carpal tunnel syndrome tx PT/OT    Breast cancer 2012  right breast -- had mastectomy and then post op chemo and RT, followed with Herceptin for 1 year  2012 last chemo   2013 may last Herceptin  2013 last RT    Depression     Diabetes mellitus     Diverticulosis     Drug abuse--quit 8 years ago--goes to AA ADDENDUM: at PAST appointment, patient states she quit alcohol in approximately 2003    ETOH abuse states she quit alcohol in 2003    Fibromyalgia     Former smoker     h/o   Fatty liver     h/o b/l fungal foot infection 7/23./19 pt denies    H/O CHF     H/O: osteoarthritis     herniated lumbar disc     hiatal hernia last endoscopy 1.5 years ago    History  Uterine Fibroid s/p Hysterectomy 7/02    History of COPD     Hypercholesterolemia 7/23/19 ; pt reports improvement ; pt denies rx    Hypertension     Insomnia     Lymphedema of right arm     Lymphedema, limb right side s/p right mastectomy    Miscarriage x 2    Morbid obesity     Neuropathy b/l feet    Primary osteoarthritis of right knee     sickel cell anemia trait     Sickle cell trait     Sleep apnea diagnosed 2007---supposed to use device but doesn't     Substance abuse quit in 2003 -- cocaine and marijuana    Thyroid nodule had negative biopsy

## 2024-03-25 NOTE — ED ADULT NURSE NOTE - OBJECTIVE STATEMENT
Pt is A&O x 4, ambulatory w/ cane and one person assist, shows no signs of acute distress. Presents to the ED w/ sanguineous drainage from left knee surgical wound. Pt had knee replacement surgery on 3/18/24. States she began having drainage over the weekend, for which she was seen at [facility], dressing was changed and pt was discharged. Pt indicates that dressing is again saturated and she was advised by her surgeon to come to the ED. Endorses sharp left knee pain, exacerbated with movement. Denies numbness/tingling/burning to affected extremity. Also denies n/v/d, abdominal discomfort, gi discomfort, dizziness/blurry vision, SOB or chest discomfort. Gross and light sensation intact to affected extremity. Cap refill <3 second. Pending ortho consultation.

## 2024-03-25 NOTE — ED PROVIDER NOTE - CLINICAL SUMMARY MEDICAL DECISION MAKING FREE TEXT BOX
4yo Female with PMH of HTN, HLD, CHF, asthma, COPD, SC, EtOH abuse, neuropathy, MADHURI (no CPAP), tremors, BPD, right breast CA s/p chemo/XRT/mastectomy c/b RUE lymphedema s/p L TKA with Dr. Givens on 3/18/2024 Presenting to ED with bleeding around surgical sites. Pt was seen here in ED yesterday for same complaint, was seen by ortho who provided wound care and redressed wound and was discharged home. Pt spoke to Dr. Givens later that afternoon after discharge, by that time her new bandage was saturated with blood, and was endorsing some sharp pain at the site. Dr. Givens instructed pt to return to ED for additional wound care, however pt stated she waited until today as she was too tired to return yesterday evening. Pain control at home with Tylenol, tramadol and oxycodone. Pt has missed last 2 days PT sessions due to PT refusing to work with her while her incision is bleeding. Denies fever/chills, cp, sob, abd pain, n/v/d. AVSS here are stable, exam as above. c/f post-surgical bleeding, no clinical signs of infection, will consult ortho for wound care and reassess.
99

## 2024-03-26 ENCOUNTER — TRANSCRIPTION ENCOUNTER (OUTPATIENT)
Age: 64
End: 2024-03-26

## 2024-03-26 ENCOUNTER — APPOINTMENT (OUTPATIENT)
Dept: NEUROLOGY | Facility: CLINIC | Age: 64
End: 2024-03-26

## 2024-03-26 ENCOUNTER — APPOINTMENT (OUTPATIENT)
Age: 64
End: 2024-03-26
Payer: MEDICARE

## 2024-03-26 DIAGNOSIS — M17.12 UNILATERAL PRIMARY OSTEOARTHRITIS, LEFT KNEE: ICD-10-CM

## 2024-03-26 DIAGNOSIS — F41.9 ANXIETY DISORDER, UNSPECIFIED: ICD-10-CM

## 2024-03-26 PROCEDURE — 99443: CPT | Mod: 93

## 2024-03-26 RX ORDER — OXYCODONE 5 MG/1
5 TABLET ORAL
Refills: 0 | Status: ACTIVE | COMMUNITY
Start: 2024-03-26

## 2024-03-26 RX ORDER — ASPIRIN 325 MG/1
325 TABLET, FILM COATED ORAL TWICE DAILY
Refills: 0 | Status: ACTIVE | COMMUNITY
Start: 2024-03-26

## 2024-03-26 NOTE — COUNSELING
[Fall prevention counseling provided] : Fall prevention counseling provided [Adequate lighting] : Adequate lighting [No throw rugs] : No throw rugs [Use recommended devices] : Use recommended devices

## 2024-03-26 NOTE — HISTORY OF PRESENT ILLNESS
[Home] : at home, [unfilled] , at the time of the visit. [Other Location: e.g. Home (Enter Location, City,State)___] : at [unfilled] [Verbal consent obtained from patient] : the patient, [unfilled] [FreeTextEntry1] : F/u post hospital discharge [facility] patient [de-identified] : Ms. Cabrera is a 63 year old female with PMH of HTN, HLD, CHF, asthma, COPD, SC, EtOH abuse, neuropathy, MDAHURI (no CPAP), tremors, BPD, right breast CA s/p chemo/XRT/mastectomy c/b RUE lymphedema, presented to [facility] for scheduled orthopedic surgery. Patient is s/p L TKA with Dr. Givens on 3/18/2024, discharged home with ASA 325mg for DVT prophylaxis x 6 weeks and Duricef 500mg twice a day x 14 days. Patient returned to ED on 3/23 for wound drainage, new dressing was placed, however on 3/24, she experienced a recurrence of drainage, spoke with ortho who recommended to return to ED for wound vac. She returned to the ED on 3/25, incision cleaned and prevena wound vac applied, discharged home for resumption of PT.  Patient with no access to smart phone/laptop and does not have the ability to do the video telehealth, telephonic visit completed. Ms. Denia Cabrera is seen today for complaint of not feeling well. Pt A&O x4, pleasant and conversing with no distress noted. Since surgery, pt states she is feeling a little defeated due to her ED visits and wanting to do more. She had a rough night yesterday due to frequent urination attributed to drinking lots of caffeine/tea. Leg swelling improved and pain is managed with Tylenol and Tramadol, Oxycodone at hs, pain 5/10 at rest and goes to 8/10 with PT. Appetite is okay, and moving BM regularly.  States Physical Therapist was in today, BPs were slightly elevated but denying any Headache or blurry vision. Wound vac in place, no leakage, drainage or oozing. Taking medications as prescribed.   Pt endorsed feeling down. Emotional support provided throughout. Patient has supportive family, spouse and daughter. Denies any SI/HI or depression. Denies any fever/chills, SOB, CP, abnormal bruising/bleeding, calf pain/tenderness or constipation.

## 2024-03-26 NOTE — PHYSICAL EXAM
[de-identified] : Physical exam limited d/t telephonic encounter, pt A&O x3 and conversing with no distress noted

## 2024-03-26 NOTE — REVIEW OF SYSTEMS
[Frequency] : frequency [Joint Pain] : joint pain [Negative] : Gastrointestinal [Fever] : no fever [Chills] : no chills [Dysuria] : no dysuria [Hematuria] : no hematuria [FreeTextEntry2] : see HPI [FreeTextEntry9] : s/p L TKA 3/18

## 2024-03-26 NOTE — PLAN
[FreeTextEntry1] : -Referral sent to Behavioral Health  -Follow up with ortho surgeon Dr. Givens 3/29 -Follow up with PCP Dr. Rios 4/9 or sooner if needed -Follow up with Rheum, hematologist, neuro and pulm -Continue physical therapy and exercises to strengthen muscle/joint  Educated patient extensively on fall and safety precautions, optimal pain control, good skin care, and importance to notify MD/return to ED for any change in mental status or worsening s/s as reviewed. Reinforced provider follow up and medication compliance. 24/7 TCM contact provided and encouraged to call with any questions or concerns.

## 2024-03-29 ENCOUNTER — TRANSCRIPTION ENCOUNTER (OUTPATIENT)
Age: 64
End: 2024-03-29

## 2024-03-29 ENCOUNTER — APPOINTMENT (OUTPATIENT)
Dept: ORTHOPEDIC SURGERY | Facility: CLINIC | Age: 64
End: 2024-03-29
Payer: MEDICARE

## 2024-03-29 VITALS
DIASTOLIC BLOOD PRESSURE: 66 MMHG | WEIGHT: 215 LBS | SYSTOLIC BLOOD PRESSURE: 151 MMHG | HEIGHT: 60 IN | BODY MASS INDEX: 42.21 KG/M2 | HEART RATE: 99 BPM

## 2024-03-29 PROCEDURE — 99024 POSTOP FOLLOW-UP VISIT: CPT

## 2024-03-29 PROCEDURE — 73562 X-RAY EXAM OF KNEE 3: CPT | Mod: LT

## 2024-03-30 NOTE — PHYSICAL EXAM
[de-identified] : Constitutional:  63 year old female, alert and oriented, cooperative, in no acute distress.  HEENT  NC/AT.  Appearance: symmetric  Neck/Back Straight without deformity or instability.  Good ROM.  Chest/Respiratory  Respiratory effort: no intercostal retractions or use of accessory muscles. Nonlabored Breathing  Skin  On inspection, warm and dry without rashes or lesions.  Mental Status:  Judgment, insight: intact Orientation: oriented to time, place, and person  Neurological: Sensory and Motor are grossly intact throughout  Left Knee  Inspection:     Prevena Vac in place     No Effusion     Non-tender to palpation over tibial tubercle, patella, medial and lateral joint line, and pes insertion.  Range of Motion: 	Extension - 0 degrees 	Flexion - 110 degrees 	Extensor lag: None  Stability:      Demonstrates no Varus or Valgus instability      Negative Anterior or Posterior drawer.      No mid flexion instability  Patella: stable, tracks well.   Neurologic Exam     Motor intact including 5/5 Extensor Hallucis Longus, 5/5 Flexor Hallucis Longus, 5/5 Tibialis Anterior and 5/5 Gastrocnemius     Sensation Intact to Light Touch including Saphenous, Sural, Superficial Peroneal, Deep Peroneal, Tibial nerve distributions  Vascular Exam     Foot is warm and well perfused with 2+ Dorsalis Pedis Pulse   No pain with range of motion of the bilateral hips or right knee. No lumbar paraspinal muscle tenderness. [de-identified] : XRay:  XRays of the Left Knee (3 Views) taken in the office today and reviewed with the patient. XRays demonstrate a Left Total Knee Arthroplasty in good position and alignment. There is no obvious evidence of fracture, dislocation, osteolysis or loosening. (my personal interpretation) Components: Smith and Nephew JII BCS, Tibial Stem

## 2024-03-30 NOTE — DISCUSSION/SUMMARY
[de-identified] : Denia Daley is a 63-year-old female presents to the office for follow-up of her left total knee arthroplasty.  Patient underwent left TKA on 3/18/2024.  She had some wound drainage postoperatively and a Prevena VAC was placed.  X-rays showed left total knee arthroplasty in good position and alignment.  Examination showed good left knee range of motion.  Discussed with the patient the examination and imaging findings.  Discussed with the patient the recovery from total knee arthroplasty, including physical therapy, DVT prophylaxis, and wound care.  Patient will continue her Prevena VAC.  She will continue to take her aspirin for her DVT prophylaxis.  Patient will continue her home physical therapy.  Patient will follow-up in 1 week for reevaluation and management.  Patient understanding and in agreement with the plan.  All questions answered.   Plan: -Prevena Vac -Continue Home Physical Therapy -DVT prophylaxis: Aspirin 325 mg twice per day for total of 6 weeks -Follow-up in 1 week for reevaluation and management

## 2024-03-30 NOTE — HISTORY OF PRESENT ILLNESS
[de-identified] : 3/28/2024  Denia Daley presented to the office for follow-up of her left total knee arthroplasty.  Patient underwent left TKA on 3/18/2024.  She had some wound drainage and a Prevena VAC was placed in the ER.  She is otherwise doing well.  Patient has been working with physical therapy.  She has been taking her Aspirin for her DVT prophylaxis.  No drainage around the wound VAC.  No fevers or chills.  No falls or injuries.  She has been working with home physical therapy.  3/5/2024  Denia Daley presents to the office for follow-up of her right total knee arthroplasty and management of her left knee osteoarthritis.  Her right knee has been in physical therapy and is doing well.  She continues to have left knee pain.  Left knee pain continues to affect her quality of life.  Her weight is 216 pounds and she is working on weight loss.  No falls.  No fevers or chills.  2/1/2024  Denia Daley presents to the office for follow-up of her right total knee arthroplasty and management of her left knee osteoarthritis.  Patient has been in physical therapy and is doing well.  Right knee is improving, but she has some lateral pain.  She continues to have left knee pain.  Left knee pain is not significantly changed.  Her left knee continues to affect her quality of life.  Patient had a recent right-sided RFA on Tuesday and is planned for a left-sided RFA.  No lightheadedness or fatigue.  No falls or injuries.  Patient states that she is 217 lbs. at this time.  1/4/2024  Denia Daley presented to the office for follow-up of her right total knee arthroplasty and management of her left knee osteoarthritis.  Patient's right knee is doing well overall.  She did have a fibromyalgia flare and had some pain.  She would like to restart physical therapy.  Patient continues to have left knee pain.  Pain is worse with going up stairs and in bed.  No falls.  No fevers or chills.  Patient does have back pain.  She states that her last weight was 218 pounds and that her last hemoglobin was 10.0.  She is currently on iron and has somewhat improved.  11/7/2023  Denia Daley presented to the office for follow-up of her right total knee arthroplasty and left knee osteoarthritis.  Patient was increasingly active about 10 days ago on Saturday.  She had knee pain afterwards and had pain last week.  Pain was worse with leaning forward.  No falls.  No fevers or chills.  No significant swelling.  Patient states that her pain is improving.  She continues to have left knee pain and had pain after the increased activity, which has been improving.  Pain is located over the medial and lateral knees.  8/29/2023  Denia Daley presents to the office for follow-up of her right total knee arthroplasty and left knee osteoarthritis.  Patient's right knee is currently doing well.  She does get some occasional shock type pains.  She is able to walk without a cane.  Patient continues to have left knee pain.  She states that the left knee is still affecting her quality of life.  She has tried physical therapy, anti-inflammatories, and injections.  Patient was recently seen by neurology and diagnosed with an essential tremor.  She is going to see cardiology and her primary care physician for her clearances.  She had a recent sleep study for her MADHURI.  No falls or injuries.  7/27/2023  Denia Daley presents to the office for follow-up of her right total knee arthroplasty and left knee osteoarthritis.  Patient's right knee is currently doing well.  She has no significant issues.  Patient continues to have significant left knee pain.  She states that the left knee pain is affecting her quality of life.  She has been walking better due to her right TKA.  Patient uses a cane in the community, but is able to walk without the cane.  She has tried pain medications, physical therapy, and injections for the left knee.  Patient states that her back pain has improved after undergoing RFA and is going for an EMG tomorrow for her carpal tunnel.  6/13/2023  Stephanie Daley is a 63-year-old female who presents to the office for follow-up of her right total knee arthroplasty.  Patient underwent right TKA on 4/24/2023.  She is currently doing well.  Right knee pain is significantly improved compared to prior to surgery.  She is currently in physical therapy. Patient continues to take her Aspirin 325 mg twice per day for DVT prophylaxis.  Patient has been doing home exercises on a bicycle. She is currently walking with a cane when outside, but is not using any assistive devices in the home.  Patient currently reports left lower back and left knee pain.  She continues to have left knee pain.  She had a recent RFA on her right lower back.  She is currently taking tramadol, gabapentin and Tylenol.  She states for the majority of her pain is currently in her left knee.  Left knee pain continues to affect her quality of life.  Patient has tried injections, physical therapy, and pain medications for her left knee.  Her last left knee injection was in August 2022.  She would like to discuss left total knee arthroplasty.  5/9/2023  Ms. Daley presents to the office for follow-up of her right total knee arthroplasty.  Patient underwent right TKA on 4/24/2023.  She is currently doing well.  Patient did go to the emergency department on 5/5/2023 with pain and swelling of her right lower extremity.  US Duplex was negative.  Her pain and swelling have improved over the last few days.  Her pain is well controlled at this time.  She is taking occasional tramadol at night for her pain.  Patient is walking with a cane.  She has finished her home physical therapy.  No fevers or chills.  Patient remains on Aspirin 325mg twice per day for DVT prophylaxis.   4/7/2023  Ms. Daley presented to the office for follow-up of her bilateral knee pain.  Patient continues to experience bilateral (right greater than left) knee pain.  She also reports some right knee stiffness and occasional lower back pain.  Patient has some neuropathy in her hands and feet.  Patient continues to experience pain is affecting her quality of life and daily activities.  She has tried multiple knee injections, last in August 2022.  She has also tried physical therapy and has been in pain management.  Patient is planned for a right total knee arthroplasty.  She has a stress test scheduled for Wednesday.  Patient is currently taking meloxicam for her pain.  2/10/2023 Ms. Daley is a 62-year-old female presents to the office for follow up of her bilateral knee pain.  Patient has been experiencing bilateral (right greater than left) knee pain for about 30 years.  She states that it has been worse over the last 6 to 8 years. Pain is now affecting her quality of life and daily activities.  Pain is worse when lying down and patient tries to keep her feet elevated.  Right knee pain is worse than her left at this time.  Patient has tried meloxicam, gabapentin, and Tylenol for the pain.  She has a history of chronic pain and has been in pain management for about 20 years at [facility].  Patient has stopped taking methadone and is currently only taking tramadol for her pain.  She did have some withdrawal from her opioid medications.  Patient has tried multiple knee injections, including cortisone injections for many years.  She has tried about 4 series of viscosupplementation injections.  Her last knee joint injections were in August 2022, which did not help.  She states that she received 3 gel injections in the right knee and 2 in the left, but pain worsened and she did not finish the left knee series.  She had an injection in her right IT band in November 2022.  Patient is also tried physical therapy for her knees, last in October 2022. She has tried physical therapy for her back pain, but her knee pain limited her back PT. Patient has continued her weight loss and her BMI: 39.26.  No recent trauma.  No fevers or chills.  Patient was recently started on Symbicort by her pulmonologist.  She would like to move her surgical date from March into April.   History: HTN, Diverticulitis, GERD, DEREK, History of Breast Cancer, Alcoholism (19 years sober), Lymphedema in Right Arm, Fibromyalgia, Neuropathy, Bipolar, Diabetes (Last A1C: 6.1)

## 2024-04-02 ENCOUNTER — APPOINTMENT (OUTPATIENT)
Dept: ORTHOPEDIC SURGERY | Facility: CLINIC | Age: 64
End: 2024-04-02
Payer: MEDICARE

## 2024-04-02 PROCEDURE — 99024 POSTOP FOLLOW-UP VISIT: CPT

## 2024-04-02 RX ORDER — TRAMADOL HYDROCHLORIDE 50 MG/1
50 TABLET, COATED ORAL TWICE DAILY
Qty: 14 | Refills: 0 | Status: ACTIVE | COMMUNITY
Start: 2024-04-02 | End: 1900-01-01

## 2024-04-02 NOTE — PHYSICAL EXAM
[de-identified] : Constitutional:  63 year old female, alert and oriented, cooperative, in no acute distress.  HEENT  NC/AT.  Appearance: symmetric  Neck/Back Straight without deformity or instability.  Good ROM.  Chest/Respiratory  Respiratory effort: no intercostal retractions or use of accessory muscles. Nonlabored Breathing  Skin  On inspection, warm and dry without rashes or lesions.  Mental Status:  Judgment, insight: intact Orientation: oriented to time, place, and person  Neurological: Sensory and Motor are grossly intact throughout  Left Knee  Inspection:     Prevena Wound Vac removed. Incision well healing. No erythema or drainage.     No Effusion     Non-tender to palpation over tibial tubercle, patella, medial and lateral joint line, and pes insertion.  Range of Motion: 	Extension - 0 degrees 	Flexion - 110 degrees 	Extensor lag: None  Stability:      Demonstrates no Varus or Valgus instability      Negative Anterior or Posterior drawer.      No mid flexion instability  Patella: stable, tracks well.   Neurologic Exam     Motor intact including 5/5 Extensor Hallucis Longus, 5/5 Flexor Hallucis Longus, 5/5 Tibialis Anterior and 5/5 Gastrocnemius     Sensation Intact to Light Touch including Saphenous, Sural, Superficial Peroneal, Deep Peroneal, Tibial nerve distributions  Vascular Exam     Foot is warm and well perfused with 2+ Dorsalis Pedis Pulse   No pain with range of motion of the bilateral hips or right knee. No lumbar paraspinal muscle tenderness. [de-identified] : XRay:  XRays of the Left Knee (3 Views) taken on 3/28/2024. XRays demonstrate a Left Total Knee Arthroplasty in good position and alignment. There is no obvious evidence of fracture, dislocation, osteolysis or loosening. (my personal interpretation) Components: Smith and Nephew JII BCS, Tibial Stem

## 2024-04-02 NOTE — HISTORY OF PRESENT ILLNESS
[de-identified] : 4/2/2024  Denia Daley presents to the office for follow-up of her left total knee arthroplasty.  Patient underwent left TKA on 3/18/2024.  A Prevena wound VAC was placed in the ER due to wound drainage.  There has been no further drainage around the wound VAC.  No fevers or chills.  No falls or injuries.  She does not have significant pain at this time.  3/28/2024  Denia Daley presented to the office for follow-up of her left total knee arthroplasty.  Patient underwent left TKA on 3/18/2024.  She had some wound drainage and a Prevena VAC was placed in the ER.  She is otherwise doing well.  Patient has been working with physical therapy.  She has been taking her Aspirin for her DVT prophylaxis.  No drainage around the wound VAC.  No fevers or chills.  No falls or injuries.  She has been working with home physical therapy.  3/5/2024  Denia Daley presents to the office for follow-up of her right total knee arthroplasty and management of her left knee osteoarthritis.  Her right knee has been in physical therapy and is doing well.  She continues to have left knee pain.  Left knee pain continues to affect her quality of life.  Her weight is 216 pounds and she is working on weight loss.  No falls.  No fevers or chills.  2/1/2024  Denia Daley presents to the office for follow-up of her right total knee arthroplasty and management of her left knee osteoarthritis.  Patient has been in physical therapy and is doing well.  Right knee is improving, but she has some lateral pain.  She continues to have left knee pain.  Left knee pain is not significantly changed.  Her left knee continues to affect her quality of life.  Patient had a recent right-sided RFA on Tuesday and is planned for a left-sided RFA.  No lightheadedness or fatigue.  No falls or injuries.  Patient states that she is 217 lbs. at this time.  1/4/2024  Denia Daley presented to the office for follow-up of her right total knee arthroplasty and management of her left knee osteoarthritis.  Patient's right knee is doing well overall.  She did have a fibromyalgia flare and had some pain.  She would like to restart physical therapy.  Patient continues to have left knee pain.  Pain is worse with going up stairs and in bed.  No falls.  No fevers or chills.  Patient does have back pain.  She states that her last weight was 218 pounds and that her last hemoglobin was 10.0.  She is currently on iron and has somewhat improved.  11/7/2023  Denia Daley presented to the office for follow-up of her right total knee arthroplasty and left knee osteoarthritis.  Patient was increasingly active about 10 days ago on Saturday.  She had knee pain afterwards and had pain last week.  Pain was worse with leaning forward.  No falls.  No fevers or chills.  No significant swelling.  Patient states that her pain is improving.  She continues to have left knee pain and had pain after the increased activity, which has been improving.  Pain is located over the medial and lateral knees.  8/29/2023  Denia Daley presents to the office for follow-up of her right total knee arthroplasty and left knee osteoarthritis.  Patient's right knee is currently doing well.  She does get some occasional shock type pains.  She is able to walk without a cane.  Patient continues to have left knee pain.  She states that the left knee is still affecting her quality of life.  She has tried physical therapy, anti-inflammatories, and injections.  Patient was recently seen by neurology and diagnosed with an essential tremor.  She is going to see cardiology and her primary care physician for her clearances.  She had a recent sleep study for her MADHURI.  No falls or injuries.  7/27/2023  Denia Daley presents to the office for follow-up of her right total knee arthroplasty and left knee osteoarthritis.  Patient's right knee is currently doing well.  She has no significant issues.  Patient continues to have significant left knee pain.  She states that the left knee pain is affecting her quality of life.  She has been walking better due to her right TKA.  Patient uses a cane in the community, but is able to walk without the cane.  She has tried pain medications, physical therapy, and injections for the left knee.  Patient states that her back pain has improved after undergoing RFA and is going for an EMG tomorrow for her carpal tunnel.  6/13/2023  Stephanie Daley is a 63-year-old female who presents to the office for follow-up of her right total knee arthroplasty.  Patient underwent right TKA on 4/24/2023.  She is currently doing well.  Right knee pain is significantly improved compared to prior to surgery.  She is currently in physical therapy. Patient continues to take her Aspirin 325 mg twice per day for DVT prophylaxis.  Patient has been doing home exercises on a bicycle. She is currently walking with a cane when outside, but is not using any assistive devices in the home.  Patient currently reports left lower back and left knee pain.  She continues to have left knee pain.  She had a recent RFA on her right lower back.  She is currently taking tramadol, gabapentin and Tylenol.  She states for the majority of her pain is currently in her left knee.  Left knee pain continues to affect her quality of life.  Patient has tried injections, physical therapy, and pain medications for her left knee.  Her last left knee injection was in August 2022.  She would like to discuss left total knee arthroplasty.  5/9/2023  Ms. Daley presents to the office for follow-up of her right total knee arthroplasty.  Patient underwent right TKA on 4/24/2023.  She is currently doing well.  Patient did go to the emergency department on 5/5/2023 with pain and swelling of her right lower extremity.  US Duplex was negative.  Her pain and swelling have improved over the last few days.  Her pain is well controlled at this time.  She is taking occasional tramadol at night for her pain.  Patient is walking with a cane.  She has finished her home physical therapy.  No fevers or chills.  Patient remains on Aspirin 325mg twice per day for DVT prophylaxis.   4/7/2023  Ms. Daley presented to the office for follow-up of her bilateral knee pain.  Patient continues to experience bilateral (right greater than left) knee pain.  She also reports some right knee stiffness and occasional lower back pain.  Patient has some neuropathy in her hands and feet.  Patient continues to experience pain is affecting her quality of life and daily activities.  She has tried multiple knee injections, last in August 2022.  She has also tried physical therapy and has been in pain management.  Patient is planned for a right total knee arthroplasty.  She has a stress test scheduled for Wednesday.  Patient is currently taking meloxicam for her pain.  2/10/2023 Ms. Daley is a 62-year-old female presents to the office for follow up of her bilateral knee pain.  Patient has been experiencing bilateral (right greater than left) knee pain for about 30 years.  She states that it has been worse over the last 6 to 8 years. Pain is now affecting her quality of life and daily activities.  Pain is worse when lying down and patient tries to keep her feet elevated.  Right knee pain is worse than her left at this time.  Patient has tried meloxicam, gabapentin, and Tylenol for the pain.  She has a history of chronic pain and has been in pain management for about 20 years at [facility].  Patient has stopped taking methadone and is currently only taking tramadol for her pain.  She did have some withdrawal from her opioid medications.  Patient has tried multiple knee injections, including cortisone injections for many years.  She has tried about 4 series of viscosupplementation injections.  Her last knee joint injections were in August 2022, which did not help.  She states that she received 3 gel injections in the right knee and 2 in the left, but pain worsened and she did not finish the left knee series.  She had an injection in her right IT band in November 2022.  Patient is also tried physical therapy for her knees, last in October 2022. She has tried physical therapy for her back pain, but her knee pain limited her back PT. Patient has continued her weight loss and her BMI: 39.26.  No recent trauma.  No fevers or chills.  Patient was recently started on Symbicort by her pulmonologist.  She would like to move her surgical date from March into April.   History: HTN, Diverticulitis, GERD, DEREK, History of Breast Cancer, Alcoholism (19 years sober), Lymphedema in Right Arm, Fibromyalgia, Neuropathy, Bipolar, Diabetes (Last A1C: 6.1)

## 2024-04-02 NOTE — DISCUSSION/SUMMARY
[de-identified] : Denia Daley is a 63-year-old female presents to the office for follow-up of her left total knee arthroplasty.  Patient underwent left TKA on 3/18/2024.  Prior X-rays showed left total knee arthroplasty in good position and alignment.  Examination showed good left knee range of motion.  Discussed with the patient the examination and imaging findings.  Discussed with the patient the recovery from total knee arthroplasty, including physical therapy, DVT prophylaxis, and wound care.  Prevena wound VAC dressing was removed.  Incision appeared to be well-healing.  Incision was dressed with Dermabond and an Aquacel dressing.  Patient was given a referral to physical therapy.  She will continue her aspirin 325 mg twice per day for a total of 6 weeks.  Patient would like some tramadol for her pain control.  Tramadol 50 mg twice per day as needed for 7 days was prescribed.  Patient will follow-up in 1 week for reevaluation and management.  Patient understanding and in agreement with the plan.  All questions answered.   Plan: -Physical Therapy -DVT prophylaxis: Aspirin 325 mg twice per day for total of 6 weeks -Tramadol 50 mg twice per day as needed for 7 days -Follow-up in 1 week for reevaluation and management

## 2024-04-08 ENCOUNTER — TRANSCRIPTION ENCOUNTER (OUTPATIENT)
Age: 64
End: 2024-04-08

## 2024-04-09 ENCOUNTER — APPOINTMENT (OUTPATIENT)
Dept: ORTHOPEDIC SURGERY | Facility: CLINIC | Age: 64
End: 2024-04-09
Payer: MEDICARE

## 2024-04-09 ENCOUNTER — APPOINTMENT (OUTPATIENT)
Dept: INTERNAL MEDICINE | Facility: CLINIC | Age: 64
End: 2024-04-09
Payer: MEDICARE

## 2024-04-09 VITALS
WEIGHT: 215 LBS | SYSTOLIC BLOOD PRESSURE: 110 MMHG | HEART RATE: 75 BPM | BODY MASS INDEX: 42.21 KG/M2 | HEIGHT: 60 IN | DIASTOLIC BLOOD PRESSURE: 76 MMHG

## 2024-04-09 VITALS
HEART RATE: 82 BPM | HEIGHT: 60 IN | SYSTOLIC BLOOD PRESSURE: 118 MMHG | DIASTOLIC BLOOD PRESSURE: 70 MMHG | RESPIRATION RATE: 15 BRPM | TEMPERATURE: 97.1 F | OXYGEN SATURATION: 98 %

## 2024-04-09 DIAGNOSIS — I10 ESSENTIAL (PRIMARY) HYPERTENSION: ICD-10-CM

## 2024-04-09 DIAGNOSIS — M25.561 PAIN IN RIGHT KNEE: ICD-10-CM

## 2024-04-09 DIAGNOSIS — M25.562 PAIN IN RIGHT KNEE: ICD-10-CM

## 2024-04-09 DIAGNOSIS — M25.569 PAIN IN UNSPECIFIED KNEE: ICD-10-CM

## 2024-04-09 DIAGNOSIS — M12.9 ARTHROPATHY, UNSPECIFIED: ICD-10-CM

## 2024-04-09 PROCEDURE — 99213 OFFICE O/P EST LOW 20 MIN: CPT | Mod: 25

## 2024-04-09 PROCEDURE — 99024 POSTOP FOLLOW-UP VISIT: CPT

## 2024-04-11 ENCOUNTER — TRANSCRIPTION ENCOUNTER (OUTPATIENT)
Age: 64
End: 2024-04-11

## 2024-04-11 ENCOUNTER — APPOINTMENT (OUTPATIENT)
Dept: ORTHOPEDIC SURGERY | Facility: CLINIC | Age: 64
End: 2024-04-11
Payer: MEDICARE

## 2024-04-11 PROCEDURE — 99024 POSTOP FOLLOW-UP VISIT: CPT

## 2024-04-11 RX ORDER — TRAMADOL HYDROCHLORIDE 50 MG/1
50 TABLET, COATED ORAL TWICE DAILY
Qty: 10 | Refills: 0 | Status: ACTIVE | COMMUNITY
Start: 2024-04-11 | End: 1900-01-01

## 2024-04-11 NOTE — ED ADULT NURSE NOTE - NSFALLRSKHARMRISK_ED_ALL_ED
p (1480)     HPI:  77F on Plavix/Xarelto Hx T2DM, CKD/ESRD, CAD w/ prior stents, A-fib, breast/lung Ca (s/p Right lung rsxn). Presents s/p fall this AM. After CT scans, pt was returned to ED room at which point she became unresponsive and pulseless. RRT initiated, and ROSC was obtained after 14 mins, and pt was transferred to SICU. 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 Unable to obtain exam 2/2 code, heavy sedation.    PAST MEDICAL & SURGICAL HISTORY:  CAD (coronary artery disease)      MI (myocardial infarction)  1999, then had cardiac stent inserted      Hypertension      Hypercholesterolemia      Atrial fibrillation      Reflux      Osteoporosis      Elevated WBC count  h/o having elevated WBCs -- as high as 13,000      Breast cancer  left breast cancer diagnose din 1986 -- had lumpectomy with post op chemo and RT      Recurrent breast cancer, left  diagnosed in 1991 -- had surgery and no post op chemo or RT      Anxiety      Lumbar spinal stenosis  November 2014      Diabetes  type II diagnosed in 2003 -- doesn't do finger sticks      Obesity      Sciatica neuralgia, left  in 2014 due to spinal stenosis      Breast cancer  s/p left lumpectomy and mastectomy      Myocardial infarct      CAD (coronary artery disease)  s/p 1 stent (1999)      HTN (hypertension)      HLD (hyperlipidemia)      Diabetes mellitus  type 2      Atrial fibrillation      CAD (coronary artery disease)      Breast cancer  left lumpectomy with post op chemo and RT      Recurrent breast cancer, left  1991 left mastectomy with reconstruction ("from the back") and the nipple -- no post op chemo or RT      CAD (coronary artery disease)  one cardiac stent placed in 1991 -- had surgery at [facility]      Inguinal hernia  right inguinal hernia repair      Hernia  diaphramatic hernia age 7      Umbilical hernia      S/P mastectomy, left      S/P lumpectomy, left breast      S/P spinal fusion      S/P hernia repair   (11 Apr 2024 10:53)      Imaging:    Exam:    --Anticoagulation:    =====================  PAST MEDICAL HISTORY   CAD (coronary artery disease)    MI (myocardial infarction)    Hypertension    Hypercholesterolemia    Atrial fibrillation    Reflux    Osteoporosis    Elevated WBC count    Breast cancer    Recurrent breast cancer, left    Anxiety    Lumbar spinal stenosis    Diabetes    Obesity    Sciatica neuralgia, left    Breast cancer    Myocardial infarct    CAD (coronary artery disease)    HTN (hypertension)    HLD (hyperlipidemia)    Diabetes mellitus    Atrial fibrillation    CAD (coronary artery disease)      PAST SURGICAL HISTORY   Breast cancer    Recurrent breast cancer, left    CAD (coronary artery disease)    Inguinal hernia    Hernia    Umbilical hernia    S/P mastectomy, left    S/P lumpectomy, left breast    S/P spinal fusion    S/P hernia repair      IV Contrast (Anaphylaxis)  Percocet 10/325 (Other)  codeine (Other)      MEDICATIONS:  Antibiotics:    Neuro:  acetaminophen   IVPB .. 1000 milliGRAM(s) IV Intermittent every 6 hours PRN    Other:  insulin lispro (ADMELOG) corrective regimen sliding scale   SubCutaneous every 4 hours  norepinephrine Infusion 1.5 MICROgram(s)/kG/Min IV Continuous <Continuous>  pantoprazole  Injectable 40 milliGRAM(s) IV Push daily  sodium bicarbonate  Infusion 0.041 mEq/kG/Hr IV Continuous <Continuous>  vasopressin Infusion 0.03 Unit(s)/Min IV Continuous <Continuous>      SOCIAL HISTORY:   Occupation:   Marital Status:     FAMILY HISTORY:  No pertinent family history in first degree relatives    Family history of breast cancer (Mother)    Family history of emphysema (Father)        ROS: Negative except per HPI    LABS:  PT/INR - ( 11 Apr 2024 09:16 )   PT: 13.5 sec;   INR: 1.30 ratio         PTT - ( 11 Apr 2024 09:16 )  PTT:31.6 sec                        12.1   20.23 )-----------( 178      ( 11 Apr 2024 11:09 )             40.1     04-11    143  |  110<H>  |  56<H>  ----------------------------<  286<H>  5.3   |  13<L>  |  3.77<H>    Ca    10.0      11 Apr 2024 11:09  Phos  8.0     04-11  Mg     2.1     04-11    TPro  6.5  /  Alb  3.3  /  TBili  0.3  /  DBili  x   /  AST  43<H>  /  ALT  29  /  AlkPhos  109  04-11       p (1480)     HPI:  77F on Plavix/Xarelto Hx T2DM, CKD/ESRD, CAD w/ prior stents, A-fib, breast/lung Ca (s/p Right lung rsxn). Presents s/p fall this AM. After CT scans, pt was returned to ED room at which point she became unresponsive and pulseless. RRT initiated, and ROSC was obtained after 14 mins, and pt was transferred to SICU. 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 Unable to obtain exam 2/2 code    PAST MEDICAL & SURGICAL HISTORY:  CAD (coronary artery disease)      MI (myocardial infarction)  1999, then had cardiac stent inserted      Hypertension      Hypercholesterolemia      Atrial fibrillation      Reflux      Osteoporosis      Elevated WBC count  h/o having elevated WBCs -- as high as 13,000      Breast cancer  left breast cancer diagnose din 1986 -- had lumpectomy with post op chemo and RT      Recurrent breast cancer, left  diagnosed in 1991 -- had surgery and no post op chemo or RT      Anxiety      Lumbar spinal stenosis  November 2014      Diabetes  type II diagnosed in 2003 -- doesn't do finger sticks      Obesity      Sciatica neuralgia, left  in 2014 due to spinal stenosis      Breast cancer  s/p left lumpectomy and mastectomy      Myocardial infarct      CAD (coronary artery disease)  s/p 1 stent (1999)      HTN (hypertension)      HLD (hyperlipidemia)      Diabetes mellitus  type 2      Atrial fibrillation      CAD (coronary artery disease)      Breast cancer  left lumpectomy with post op chemo and RT      Recurrent breast cancer, left  1991 left mastectomy with reconstruction ("from the back") and the nipple -- no post op chemo or RT      CAD (coronary artery disease)  one cardiac stent placed in 1991 -- had surgery at [facility]      Inguinal hernia  right inguinal hernia repair      Hernia  diaphramatic hernia age 7      Umbilical hernia      S/P mastectomy, left      S/P lumpectomy, left breast      S/P spinal fusion      S/P hernia repair   (11 Apr 2024 10:53)      Imaging:    Exam:    --Anticoagulation:    =====================  PAST MEDICAL HISTORY   CAD (coronary artery disease)    MI (myocardial infarction)    Hypertension    Hypercholesterolemia    Atrial fibrillation    Reflux    Osteoporosis    Elevated WBC count    Breast cancer    Recurrent breast cancer, left    Anxiety    Lumbar spinal stenosis    Diabetes    Obesity    Sciatica neuralgia, left    Breast cancer    Myocardial infarct    CAD (coronary artery disease)    HTN (hypertension)    HLD (hyperlipidemia)    Diabetes mellitus    Atrial fibrillation    CAD (coronary artery disease)      PAST SURGICAL HISTORY   Breast cancer    Recurrent breast cancer, left    CAD (coronary artery disease)    Inguinal hernia    Hernia    Umbilical hernia    S/P mastectomy, left    S/P lumpectomy, left breast    S/P spinal fusion    S/P hernia repair      IV Contrast (Anaphylaxis)  Percocet 10/325 (Other)  codeine (Other)      MEDICATIONS:  Antibiotics:    Neuro:  acetaminophen   IVPB .. 1000 milliGRAM(s) IV Intermittent every 6 hours PRN    Other:  insulin lispro (ADMELOG) corrective regimen sliding scale   SubCutaneous every 4 hours  norepinephrine Infusion 1.5 MICROgram(s)/kG/Min IV Continuous <Continuous>  pantoprazole  Injectable 40 milliGRAM(s) IV Push daily  sodium bicarbonate  Infusion 0.041 mEq/kG/Hr IV Continuous <Continuous>  vasopressin Infusion 0.03 Unit(s)/Min IV Continuous <Continuous>      SOCIAL HISTORY:   Occupation:   Marital Status:     FAMILY HISTORY:  No pertinent family history in first degree relatives    Family history of breast cancer (Mother)    Family history of emphysema (Father)        ROS: Negative except per HPI    LABS:  PT/INR - ( 11 Apr 2024 09:16 )   PT: 13.5 sec;   INR: 1.30 ratio         PTT - ( 11 Apr 2024 09:16 )  PTT:31.6 sec                        12.1   20.23 )-----------( 178      ( 11 Apr 2024 11:09 )             40.1     04-11    143  |  110<H>  |  56<H>  ----------------------------<  286<H>  5.3   |  13<L>  |  3.77<H>    Ca    10.0      11 Apr 2024 11:09  Phos  8.0     04-11  Mg     2.1     04-11    TPro  6.5  /  Alb  3.3  /  TBili  0.3  /  DBili  x   /  AST  43<H>  /  ALT  29  /  AlkPhos  109  04-11       no

## 2024-04-15 ENCOUNTER — TRANSCRIPTION ENCOUNTER (OUTPATIENT)
Age: 64
End: 2024-04-15

## 2024-04-15 ENCOUNTER — APPOINTMENT (OUTPATIENT)
Dept: NEUROLOGY | Facility: CLINIC | Age: 64
End: 2024-04-15

## 2024-04-19 ENCOUNTER — NON-APPOINTMENT (OUTPATIENT)
Age: 64
End: 2024-04-19

## 2024-04-20 NOTE — HISTORY OF PRESENT ILLNESS
[de-identified] : 4/9/2024  Denia Daley presents to the office for follow-up of her left total knee arthroplasty.  Patient underwent left TKA on 3/18/2024.  There has been no further wound drainage.  Patient is currently doing well overall.  No falls or injuries. No fevers or chills. Patient did experience some knee pain while lying in bed.  4/2/2024  Denia Daley presents to the office for follow-up of her left total knee arthroplasty.  Patient underwent left TKA on 3/18/2024.  A Prevena wound VAC was placed in the ER due to wound drainage.  There has been no further drainage around the wound VAC.  No fevers or chills.  No falls or injuries.  She does not have significant pain at this time.  3/28/2024  Denia Daley presented to the office for follow-up of her left total knee arthroplasty.  Patient underwent left TKA on 3/18/2024.  She had some wound drainage and a Prevena VAC was placed in the ER.  She is otherwise doing well.  Patient has been working with physical therapy.  She has been taking her Aspirin for her DVT prophylaxis.  No drainage around the wound VAC.  No fevers or chills.  No falls or injuries.  She has been working with home physical therapy.  3/5/2024  Denia Daley presents to the office for follow-up of her right total knee arthroplasty and management of her left knee osteoarthritis.  Her right knee has been in physical therapy and is doing well.  She continues to have left knee pain.  Left knee pain continues to affect her quality of life.  Her weight is 216 pounds and she is working on weight loss.  No falls.  No fevers or chills.  2/1/2024  Denia Daley presents to the office for follow-up of her right total knee arthroplasty and management of her left knee osteoarthritis.  Patient has been in physical therapy and is doing well.  Right knee is improving, but she has some lateral pain.  She continues to have left knee pain.  Left knee pain is not significantly changed.  Her left knee continues to affect her quality of life.  Patient had a recent right-sided RFA on Tuesday and is planned for a left-sided RFA.  No lightheadedness or fatigue.  No falls or injuries.  Patient states that she is 217 lbs. at this time.  1/4/2024  Denia Daley presented to the office for follow-up of her right total knee arthroplasty and management of her left knee osteoarthritis.  Patient's right knee is doing well overall.  She did have a fibromyalgia flare and had some pain.  She would like to restart physical therapy.  Patient continues to have left knee pain.  Pain is worse with going up stairs and in bed.  No falls.  No fevers or chills.  Patient does have back pain.  She states that her last weight was 218 pounds and that her last hemoglobin was 10.0.  She is currently on iron and has somewhat improved.  11/7/2023  Denia Daley presented to the office for follow-up of her right total knee arthroplasty and left knee osteoarthritis.  Patient was increasingly active about 10 days ago on Saturday.  She had knee pain afterwards and had pain last week.  Pain was worse with leaning forward.  No falls.  No fevers or chills.  No significant swelling.  Patient states that her pain is improving.  She continues to have left knee pain and had pain after the increased activity, which has been improving.  Pain is located over the medial and lateral knees.  8/29/2023  Denia Daley presents to the office for follow-up of her right total knee arthroplasty and left knee osteoarthritis.  Patient's right knee is currently doing well.  She does get some occasional shock type pains.  She is able to walk without a cane.  Patient continues to have left knee pain.  She states that the left knee is still affecting her quality of life.  She has tried physical therapy, anti-inflammatories, and injections.  Patient was recently seen by neurology and diagnosed with an essential tremor.  She is going to see cardiology and her primary care physician for her clearances.  She had a recent sleep study for her MADHURI.  No falls or injuries.  7/27/2023  Denia Daley presents to the office for follow-up of her right total knee arthroplasty and left knee osteoarthritis.  Patient's right knee is currently doing well.  She has no significant issues.  Patient continues to have significant left knee pain.  She states that the left knee pain is affecting her quality of life.  She has been walking better due to her right TKA.  Patient uses a cane in the community, but is able to walk without the cane.  She has tried pain medications, physical therapy, and injections for the left knee.  Patient states that her back pain has improved after undergoing RFA and is going for an EMG tomorrow for her carpal tunnel.  6/13/2023  Stephanie Daley is a 63-year-old female who presents to the office for follow-up of her right total knee arthroplasty.  Patient underwent right TKA on 4/24/2023.  She is currently doing well.  Right knee pain is significantly improved compared to prior to surgery.  She is currently in physical therapy. Patient continues to take her Aspirin 325 mg twice per day for DVT prophylaxis.  Patient has been doing home exercises on a bicycle. She is currently walking with a cane when outside, but is not using any assistive devices in the home.  Patient currently reports left lower back and left knee pain.  She continues to have left knee pain.  She had a recent RFA on her right lower back.  She is currently taking tramadol, gabapentin and Tylenol.  She states for the majority of her pain is currently in her left knee.  Left knee pain continues to affect her quality of life.  Patient has tried injections, physical therapy, and pain medications for her left knee.  Her last left knee injection was in August 2022.  She would like to discuss left total knee arthroplasty.  5/9/2023  Ms. Daley presents to the office for follow-up of her right total knee arthroplasty.  Patient underwent right TKA on 4/24/2023.  She is currently doing well.  Patient did go to the emergency department on 5/5/2023 with pain and swelling of her right lower extremity.  US Duplex was negative.  Her pain and swelling have improved over the last few days.  Her pain is well controlled at this time.  She is taking occasional tramadol at night for her pain.  Patient is walking with a cane.  She has finished her home physical therapy.  No fevers or chills.  Patient remains on Aspirin 325mg twice per day for DVT prophylaxis.   4/7/2023  Ms. Daley presented to the office for follow-up of her bilateral knee pain.  Patient continues to experience bilateral (right greater than left) knee pain.  She also reports some right knee stiffness and occasional lower back pain.  Patient has some neuropathy in her hands and feet.  Patient continues to experience pain is affecting her quality of life and daily activities.  She has tried multiple knee injections, last in August 2022.  She has also tried physical therapy and has been in pain management.  Patient is planned for a right total knee arthroplasty.  She has a stress test scheduled for Wednesday.  Patient is currently taking meloxicam for her pain.  2/10/2023 Ms. Daley is a 62-year-old female presents to the office for follow up of her bilateral knee pain.  Patient has been experiencing bilateral (right greater than left) knee pain for about 30 years.  She states that it has been worse over the last 6 to 8 years. Pain is now affecting her quality of life and daily activities.  Pain is worse when lying down and patient tries to keep her feet elevated.  Right knee pain is worse than her left at this time.  Patient has tried meloxicam, gabapentin, and Tylenol for the pain.  She has a history of chronic pain and has been in pain management for about 20 years at [facility].  Patient has stopped taking methadone and is currently only taking tramadol for her pain.  She did have some withdrawal from her opioid medications.  Patient has tried multiple knee injections, including cortisone injections for many years.  She has tried about 4 series of viscosupplementation injections.  Her last knee joint injections were in August 2022, which did not help.  She states that she received 3 gel injections in the right knee and 2 in the left, but pain worsened and she did not finish the left knee series.  She had an injection in her right IT band in November 2022.  Patient is also tried physical therapy for her knees, last in October 2022. She has tried physical therapy for her back pain, but her knee pain limited her back PT. Patient has continued her weight loss and her BMI: 39.26.  No recent trauma.  No fevers or chills.  Patient was recently started on Symbicort by her pulmonologist.  She would like to move her surgical date from March into April.   History: HTN, Diverticulitis, GERD, DEREK, History of Breast Cancer, Alcoholism (19 years sober), Lymphedema in Right Arm, Fibromyalgia, Neuropathy, Bipolar, Diabetes (Last A1C: 6.1)

## 2024-04-20 NOTE — PHYSICAL EXAM
[de-identified] : Constitutional:  63 year old female, alert and oriented, cooperative, in no acute distress.  HEENT  NC/AT.  Appearance: symmetric  Neck/Back Straight without deformity or instability.  Good ROM.  Chest/Respiratory  Respiratory effort: no intercostal retractions or use of accessory muscles. Nonlabored Breathing  Skin  On inspection, warm and dry without rashes or lesions.  Mental Status:  Judgment, insight: intact Orientation: oriented to time, place, and person  Neurological: Sensory and Motor are grossly intact throughout  Left Knee  Inspection:     Incision well healing. No erythema or drainage.     No Effusion     Non-tender to palpation over tibial tubercle, patella, medial and lateral joint line, and pes insertion.  Range of Motion: 	Extension - 0 degrees 	Flexion - 110 degrees 	Extensor lag: None  Stability:      Demonstrates no Varus or Valgus instability      Negative Anterior or Posterior drawer.      No mid flexion instability  Patella: stable, tracks well.   Neurologic Exam     Motor intact including 5/5 Extensor Hallucis Longus, 5/5 Flexor Hallucis Longus, 5/5 Tibialis Anterior and 5/5 Gastrocnemius     Sensation Intact to Light Touch including Saphenous, Sural, Superficial Peroneal, Deep Peroneal, Tibial nerve distributions  Vascular Exam     Foot is warm and well perfused with 2+ Dorsalis Pedis Pulse   No pain with range of motion of the bilateral hips or right knee. No lumbar paraspinal muscle tenderness. [de-identified] : XRay:  XRays of the Left Knee (3 Views) taken today in the office and discussed with patient. XRays demonstrate a Left Total Knee Arthroplasty in good position and alignment. There is no obvious evidence of fracture, dislocation, osteolysis or loosening. (my personal interpretation) Components: Smith and Nephew JII BCS, Tibial Stem  XRay: XRays of the Pelvis (1 View) and Left Hip (2 Views) taken in the office today and discussed with the patient. XRays demonstrate no obvious fractures or dislocations. There is no significant evidence of osteoarthritis, Tonnis Grade: 0. (my personal interpretation)

## 2024-04-20 NOTE — DISCUSSION/SUMMARY
[de-identified] : Denia Daley is a 63-year-old female presents to the office for follow-up of her left total knee arthroplasty.  Patient underwent left TKA on 3/18/2024.  Prior X-rays showed left total knee arthroplasty in good position and alignment.  Examination showed good left knee range of motion.  Discussed with the patient the examination and imaging findings.  Discussed with the patient the recovery from total knee arthroplasty, including physical therapy, DVT prophylaxis, and wound care. Incision appeared to be well-healing.  Patient was previously given a referral to physical therapy.  She will continue her aspirin 325 mg twice per day for a total of 6 weeks.  Patient will follow-up in 4 weeks for reevaluation and management.  Patient understanding and in agreement with the plan.  All questions answered.   Plan: -Physical Therapy -DVT prophylaxis: Aspirin 325 mg twice per day for total of 6 weeks -Follow-up in 4 weeks for reevaluation and management

## 2024-04-21 NOTE — HISTORY OF PRESENT ILLNESS
[de-identified] : 4/11/2024  Denia Daley presents to the office for follow-up of her left total knee arthroplasty.  Patient underwent left TKA on 3/18/2024.  Patient had some pain when in bed.  Pain is located over the knee.  No falls.  No fevers or chills.  4/9/2024  Denia Daley presents to the office for follow-up of her left total knee arthroplasty.  Patient underwent left TKA on 3/18/2024.  There has been no further wound drainage.  Patient is currently doing well overall.  No falls or injuries. No fevers or chills.  4/2/2024  Denia Daley presents to the office for follow-up of her left total knee arthroplasty.  Patient underwent left TKA on 3/18/2024.  A Prevena wound VAC was placed in the ER due to wound drainage.  There has been no further drainage around the wound VAC.  No fevers or chills.  No falls or injuries.  She does not have significant pain at this time.  3/28/2024  Denia Daley presented to the office for follow-up of her left total knee arthroplasty.  Patient underwent left TKA on 3/18/2024.  She had some wound drainage and a Prevena VAC was placed in the ER.  She is otherwise doing well.  Patient has been working with physical therapy.  She has been taking her Aspirin for her DVT prophylaxis.  No drainage around the wound VAC.  No fevers or chills.  No falls or injuries.  She has been working with home physical therapy.  3/5/2024  Denia Daley presents to the office for follow-up of her right total knee arthroplasty and management of her left knee osteoarthritis.  Her right knee has been in physical therapy and is doing well.  She continues to have left knee pain.  Left knee pain continues to affect her quality of life.  Her weight is 216 pounds and she is working on weight loss.  No falls.  No fevers or chills.  2/1/2024  Denia Daley presents to the office for follow-up of her right total knee arthroplasty and management of her left knee osteoarthritis.  Patient has been in physical therapy and is doing well.  Right knee is improving, but she has some lateral pain.  She continues to have left knee pain.  Left knee pain is not significantly changed.  Her left knee continues to affect her quality of life.  Patient had a recent right-sided RFA on Tuesday and is planned for a left-sided RFA.  No lightheadedness or fatigue.  No falls or injuries.  Patient states that she is 217 lbs. at this time.  1/4/2024  Denia Daley presented to the office for follow-up of her right total knee arthroplasty and management of her left knee osteoarthritis.  Patient's right knee is doing well overall.  She did have a fibromyalgia flare and had some pain.  She would like to restart physical therapy.  Patient continues to have left knee pain.  Pain is worse with going up stairs and in bed.  No falls.  No fevers or chills.  Patient does have back pain.  She states that her last weight was 218 pounds and that her last hemoglobin was 10.0.  She is currently on iron and has somewhat improved.  11/7/2023  Denia Daley presented to the office for follow-up of her right total knee arthroplasty and left knee osteoarthritis.  Patient was increasingly active about 10 days ago on Saturday.  She had knee pain afterwards and had pain last week.  Pain was worse with leaning forward.  No falls.  No fevers or chills.  No significant swelling.  Patient states that her pain is improving.  She continues to have left knee pain and had pain after the increased activity, which has been improving.  Pain is located over the medial and lateral knees.  8/29/2023  Denia Daley presents to the office for follow-up of her right total knee arthroplasty and left knee osteoarthritis.  Patient's right knee is currently doing well.  She does get some occasional shock type pains.  She is able to walk without a cane.  Patient continues to have left knee pain.  She states that the left knee is still affecting her quality of life.  She has tried physical therapy, anti-inflammatories, and injections.  Patient was recently seen by neurology and diagnosed with an essential tremor.  She is going to see cardiology and her primary care physician for her clearances.  She had a recent sleep study for her MADHURI.  No falls or injuries.  7/27/2023  Denia Daley presents to the office for follow-up of her right total knee arthroplasty and left knee osteoarthritis.  Patient's right knee is currently doing well.  She has no significant issues.  Patient continues to have significant left knee pain.  She states that the left knee pain is affecting her quality of life.  She has been walking better due to her right TKA.  Patient uses a cane in the community, but is able to walk without the cane.  She has tried pain medications, physical therapy, and injections for the left knee.  Patient states that her back pain has improved after undergoing RFA and is going for an EMG tomorrow for her carpal tunnel.  6/13/2023  Stephanie Daley is a 63-year-old female who presents to the office for follow-up of her right total knee arthroplasty.  Patient underwent right TKA on 4/24/2023.  She is currently doing well.  Right knee pain is significantly improved compared to prior to surgery.  She is currently in physical therapy. Patient continues to take her Aspirin 325 mg twice per day for DVT prophylaxis.  Patient has been doing home exercises on a bicycle. She is currently walking with a cane when outside, but is not using any assistive devices in the home.  Patient currently reports left lower back and left knee pain.  She continues to have left knee pain.  She had a recent RFA on her right lower back.  She is currently taking tramadol, gabapentin and Tylenol.  She states for the majority of her pain is currently in her left knee.  Left knee pain continues to affect her quality of life.  Patient has tried injections, physical therapy, and pain medications for her left knee.  Her last left knee injection was in August 2022.  She would like to discuss left total knee arthroplasty.  5/9/2023  Ms. Daley presents to the office for follow-up of her right total knee arthroplasty.  Patient underwent right TKA on 4/24/2023.  She is currently doing well.  Patient did go to the emergency department on 5/5/2023 with pain and swelling of her right lower extremity.  US Duplex was negative.  Her pain and swelling have improved over the last few days.  Her pain is well controlled at this time.  She is taking occasional tramadol at night for her pain.  Patient is walking with a cane.  She has finished her home physical therapy.  No fevers or chills.  Patient remains on Aspirin 325mg twice per day for DVT prophylaxis.   4/7/2023  Ms. Daley presented to the office for follow-up of her bilateral knee pain.  Patient continues to experience bilateral (right greater than left) knee pain.  She also reports some right knee stiffness and occasional lower back pain.  Patient has some neuropathy in her hands and feet.  Patient continues to experience pain is affecting her quality of life and daily activities.  She has tried multiple knee injections, last in August 2022.  She has also tried physical therapy and has been in pain management.  Patient is planned for a right total knee arthroplasty.  She has a stress test scheduled for Wednesday.  Patient is currently taking meloxicam for her pain.  2/10/2023 Ms. Daley is a 62-year-old female presents to the office for follow up of her bilateral knee pain.  Patient has been experiencing bilateral (right greater than left) knee pain for about 30 years.  She states that it has been worse over the last 6 to 8 years. Pain is now affecting her quality of life and daily activities.  Pain is worse when lying down and patient tries to keep her feet elevated.  Right knee pain is worse than her left at this time.  Patient has tried meloxicam, gabapentin, and Tylenol for the pain.  She has a history of chronic pain and has been in pain management for about 20 years at [facility].  Patient has stopped taking methadone and is currently only taking tramadol for her pain.  She did have some withdrawal from her opioid medications.  Patient has tried multiple knee injections, including cortisone injections for many years.  She has tried about 4 series of viscosupplementation injections.  Her last knee joint injections were in August 2022, which did not help.  She states that she received 3 gel injections in the right knee and 2 in the left, but pain worsened and she did not finish the left knee series.  She had an injection in her right IT band in November 2022.  Patient is also tried physical therapy for her knees, last in October 2022. She has tried physical therapy for her back pain, but her knee pain limited her back PT. Patient has continued her weight loss and her BMI: 39.26.  No recent trauma.  No fevers or chills.  Patient was recently started on Symbicort by her pulmonologist.  She would like to move her surgical date from March into April.   History: HTN, Diverticulitis, GERD, DEREK, History of Breast Cancer, Alcoholism (19 years sober), Lymphedema in Right Arm, Fibromyalgia, Neuropathy, Bipolar, Diabetes (Last A1C: 6.1)

## 2024-04-21 NOTE — PHYSICAL EXAM
[de-identified] : Constitutional:  63 year old female, alert and oriented, cooperative, in no acute distress.  HEENT  NC/AT.  Appearance: symmetric  Neck/Back Straight without deformity or instability.  Good ROM.  Chest/Respiratory  Respiratory effort: no intercostal retractions or use of accessory muscles. Nonlabored Breathing  Skin  On inspection, warm and dry without rashes or lesions.  Mental Status:  Judgment, insight: intact Orientation: oriented to time, place, and person  Neurological: Sensory and Motor are grossly intact throughout  Left Knee  Inspection:     Incision well healing. No erythema or drainage.     No Effusion     Non-tender to palpation over tibial tubercle, patella, medial and lateral joint line, and pes insertion.  Range of Motion: 	Extension - 0 degrees 	Flexion - 110 degrees 	Extensor lag: None  Stability:      Demonstrates no Varus or Valgus instability      Negative Anterior or Posterior drawer.      No mid flexion instability  Patella: stable, tracks well.   Neurologic Exam     Motor intact including 5/5 Extensor Hallucis Longus, 5/5 Flexor Hallucis Longus, 5/5 Tibialis Anterior and 5/5 Gastrocnemius     Sensation Intact to Light Touch including Saphenous, Sural, Superficial Peroneal, Deep Peroneal, Tibial nerve distributions  Vascular Exam     Foot is warm and well perfused with 2+ Dorsalis Pedis Pulse   No pain with range of motion of the bilateral hips or right knee. No lumbar paraspinal muscle tenderness. [de-identified] : XRay:  XRays of the Left Knee (3 Views) taken today in the office and discussed with the patient. XRays demonstrate a Left Total Knee Arthroplasty in good position and alignment. There is no obvious evidence of fracture, dislocation, osteolysis or loosening. (my personal interpretation) Components: Smith and Nephjhoana JII BCS, Tibial Stem

## 2024-04-21 NOTE — DISCUSSION/SUMMARY
[de-identified] : Denia Daley is a 63-year-old female presents to the office for follow-up of her left total knee arthroplasty.  Patient underwent left TKA on 3/18/2024.  Prior X-rays showed left total knee arthroplasty in good position and alignment.  Examination showed good left knee range of motion.  Discussed with the patient the examination and imaging findings.  Discussed with the patient the recovery from total knee arthroplasty, including physical therapy, DVT prophylaxis, and wound care. Incision appeared to be well-healing.  Patient was previously given a referral to physical therapy.  She will continue her aspirin 325 mg twice per day for a total of 6 weeks.  Patient will follow-up in 4 weeks for reevaluation and management.  Patient understanding and in agreement with the plan.  All questions answered.   Plan: -Physical Therapy -DVT prophylaxis: Aspirin 325 mg twice per day for total of 6 weeks -Follow-up in 4 weeks for reevaluation and management

## 2024-04-23 ENCOUNTER — OUTPATIENT (OUTPATIENT)
Dept: OUTPATIENT SERVICES | Facility: HOSPITAL | Age: 64
LOS: 1 days | End: 2024-04-23
Payer: MEDICARE

## 2024-04-23 ENCOUNTER — APPOINTMENT (OUTPATIENT)
Dept: INTERNAL MEDICINE | Facility: CLINIC | Age: 64
End: 2024-04-23
Payer: MEDICARE

## 2024-04-23 ENCOUNTER — APPOINTMENT (OUTPATIENT)
Dept: RHEUMATOLOGY | Facility: CLINIC | Age: 64
End: 2024-04-23
Payer: MEDICARE

## 2024-04-23 VITALS
HEIGHT: 60 IN | SYSTOLIC BLOOD PRESSURE: 127 MMHG | DIASTOLIC BLOOD PRESSURE: 74 MMHG | TEMPERATURE: 97.3 F | OXYGEN SATURATION: 95 % | RESPIRATION RATE: 16 BRPM | HEART RATE: 76 BPM | WEIGHT: 220 LBS | BODY MASS INDEX: 43.19 KG/M2

## 2024-04-23 DIAGNOSIS — R76.0 RAISED ANTIBODY TITER: ICD-10-CM

## 2024-04-23 DIAGNOSIS — H26.9 UNSPECIFIED CATARACT: Chronic | ICD-10-CM

## 2024-04-23 DIAGNOSIS — M79.7 FIBROMYALGIA: ICD-10-CM

## 2024-04-23 DIAGNOSIS — Z96.651 PRESENCE OF RIGHT ARTIFICIAL KNEE JOINT: Chronic | ICD-10-CM

## 2024-04-23 DIAGNOSIS — Z33.2 ENCOUNTER FOR ELECTIVE TERMINATION OF PREGNANCY: Chronic | ICD-10-CM

## 2024-04-23 DIAGNOSIS — I10 ESSENTIAL (PRIMARY) HYPERTENSION: ICD-10-CM

## 2024-04-23 DIAGNOSIS — Z98.890 OTHER SPECIFIED POSTPROCEDURAL STATES: Chronic | ICD-10-CM

## 2024-04-23 PROCEDURE — 90791 PSYCH DIAGNOSTIC EVALUATION: CPT

## 2024-04-23 PROCEDURE — 99214 OFFICE O/P EST MOD 30 MIN: CPT

## 2024-04-23 PROCEDURE — G2211 COMPLEX E/M VISIT ADD ON: CPT

## 2024-04-23 PROCEDURE — 90791 PSYCH DIAGNOSTIC EVALUATION: CPT | Mod: 93

## 2024-04-23 NOTE — REVIEW OF SYSTEMS
[Joint Stiffness] : joint stiffness [Difficulty Walking] : difficulty walking [Fever] : no fever [Chills] : no chills [Sore Throat] : no sore throat [Hoarseness] : no hoarseness [Chest Pain] : no chest pain [Palpitations] : no palpitations [Cough] : no cough [SOB on Exertion] : no shortness of breath during exertion [Joint Swelling] : no joint swelling [Feelings Of Weakness] : no feelings of weakness [Easy Bleeding] : no tendency for easy bleeding [Easy Bruising] : no tendency for easy bruising [de-identified] : Low mood

## 2024-04-23 NOTE — HISTORY OF PRESENT ILLNESS
[FreeTextEntry1] : Patient returns for follow-up after left knee arthroscopy March 2024; she finds the pain is slowly removing on low-dose opioid provided by primary care team.  She continues with physical therapy for improved mobility.  She explains locking sensation experienced in the left hand has resolved after tendon sheath injection was given during last visit.  She continues with hydroxychloroquine and finds improvement in hand arthralgias as well as fatigue. She had explained diffuse arthralgias since COVID-19 infection x two over the last few years.  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 Antiphospholipid antibodies were also noted the summer 2022 after COVID infection and repeated before the end of the year with repeat silica and mixing studies positive. She denies history of thrombotic events. Patient under the care of heme-onc colleagues and continues on baby aspirin daily. Patient with history of HER2 positive breast CA status post right mastectomy with chemotx and radiation.  Patient with longstanding history of fibromyalgia patient on multiple neuropathic pain therapies along with mood stabilizers. She denies accompanied swelling of the joints. She explains long camarena of alcoholism and has been free of alcohol for the last 18 years as well as free of drug use from cocaine. She explains she has had shakiness and her tremors that are currently under evaluation and has initiated propranolol.   She otherwise denies visual disturbances, oral ulcers, dyspnea, chest pain, motor disturbances, Raynaud's, rash or systemic symptoms.

## 2024-04-23 NOTE — PHYSICAL EXAM
[General Appearance - Alert] : alert [General Appearance - In No Acute Distress] : in no acute distress [Auscultation Breath Sounds / Voice Sounds] : lungs were clear to auscultation bilaterally [Heart Sounds] : normal S1 and S2 [Edema] : there was no peripheral edema [No Spinal Tenderness] : no spinal tenderness [] : no rash [Skin Lesions] : no skin lesions [Motor Exam] : the motor exam was normal [Oriented To Time, Place, And Person] : oriented to person, place, and time [Impaired Insight] : insight and judgment were intact [Nail Clubbing] : no clubbing  or cyanosis of the fingernails [Musculoskeletal - Swelling] : no joint swelling seen [Motor Tone] : muscle strength and tone were normal [FreeTextEntry1] : no active hand synovitis; knees with warmth left greater than right with midline scar intact without discharge with mild swelling appreciated;

## 2024-04-23 NOTE — ASSESSMENT
[FreeTextEntry1] : Patient with a history +DORCAS+SS-A soon after covid-19 infection; +LA; FM :  Patient recommended to continue with hydroxychloroquine to help with arthralgias in the setting of DORCAS positivity as well as for its renal protection and teaming of APL antibodies. Patient understands the importance of regular ophthalmology follow-up in the setting of retinal toxicity risk in the setting of hydroxychloroquine use. Spoke with patient's ophthalmologist, Dr. Stallworth who will monitor patient regularly for visual screening testing.  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 In the setting of repeat mixing study positivity, would recommend the use of baby aspirin.  Patient understands ischemic changes may be related to prior social history. White matter lesions are seen in connective tissue disease cases including lupus and therefore we will continue to monitor and assess the utility of additional immunosuppressive therapy.  Heme would like to c/w current regimen.   Patient will benefit from physical therapy to help with joint mobility and muscle strengthening.  Paraffin wax and Glucosamine supplements recommended for the pain stemming from hand OA.   Patient understands the importance of weight loss reduction in the setting of HTN, DM and its association of cardiovascular risk. Whole food plant-based diet encouraged. The importance of dietary and lifestyle modifications was emphasized along with discussions on relaxation, stress-reducing techniques and importance of good sleep hygiene. She is in agreement with the above plan and will return in three months' time.

## 2024-04-24 LAB
ALBUMIN SERPL ELPH-MCNC: 3.7 G/DL
ALP BLD-CCNC: 149 U/L
ALT SERPL-CCNC: 7 U/L
ANION GAP SERPL CALC-SCNC: 14 MMOL/L
APPEARANCE: CLEAR
AST SERPL-CCNC: 13 U/L
BILIRUB SERPL-MCNC: 0.2 MG/DL
BILIRUBIN URINE: NEGATIVE
BLOOD URINE: NEGATIVE
BUN SERPL-MCNC: 12 MG/DL
C3 SERPL-MCNC: 207 MG/DL
CALCIUM SERPL-MCNC: 9.8 MG/DL
CHLORIDE SERPL-SCNC: 98 MMOL/L
CHOLEST SERPL-MCNC: 151 MG/DL
CO2 SERPL-SCNC: 25 MMOL/L
COLOR: NORMAL
CREAT SERPL-MCNC: 1.03 MG/DL
CREAT SPEC-SCNC: 76 MG/DL
CREAT/PROT UR: 0.1 RATIO
CRP SERPL-MCNC: 29 MG/L
EGFR: 60 ML/MIN/1.73M2
ERYTHROCYTE [SEDIMENTATION RATE] IN BLOOD BY WESTERGREN METHOD: 111 MM/HR
ESTIMATED AVERAGE GLUCOSE: 128 MG/DL
GLUCOSE QUALITATIVE U: NEGATIVE MG/DL
GLUCOSE SERPL-MCNC: 108 MG/DL
HBA1C MFR BLD HPLC: 6.1 %
HCT VFR BLD CALC: 27.1 %
HDLC SERPL-MCNC: 45 MG/DL
HGB BLD-MCNC: 8 G/DL
KETONES URINE: NEGATIVE MG/DL
LDLC SERPL CALC-MCNC: 84 MG/DL
LEUKOCYTE ESTERASE URINE: NEGATIVE
MCHC RBC-ENTMCNC: 25.5 PG
MCHC RBC-ENTMCNC: 29.5 GM/DL
MCV RBC AUTO: 86.3 FL
NITRITE URINE: NEGATIVE
NONHDLC SERPL-MCNC: 105 MG/DL
PH URINE: 6
PLATELET # BLD AUTO: 418 K/UL
POTASSIUM SERPL-SCNC: 4.1 MMOL/L
PROT SERPL-MCNC: 8 G/DL
PROT UR-MCNC: 8 MG/DL
PROTEIN URINE: NEGATIVE MG/DL
RBC # BLD: 3.14 M/UL
RBC # FLD: 18.2 %
SODIUM SERPL-SCNC: 137 MMOL/L
SPECIFIC GRAVITY URINE: 1.02
TRIGL SERPL-MCNC: 116 MG/DL
TSH SERPL-ACNC: 2.66 UIU/ML
URATE SERPL-MCNC: 6.4 MG/DL
UROBILINOGEN URINE: 0.2 MG/DL
WBC # FLD AUTO: 5.17 K/UL

## 2024-04-24 RX ORDER — PREDNISONE 20 MG/1
20 TABLET ORAL DAILY
Qty: 30 | Refills: 1 | Status: ACTIVE | COMMUNITY
Start: 2024-04-24 | End: 1900-01-01

## 2024-04-25 ENCOUNTER — NON-APPOINTMENT (OUTPATIENT)
Age: 64
End: 2024-04-25

## 2024-04-25 LAB — DSDNA AB SER-ACNC: 1 IU/ML

## 2024-04-26 RX ORDER — METFORMIN HYDROCHLORIDE 500 MG/1
500 TABLET, COATED ORAL
Qty: 180 | Refills: 3 | Status: ACTIVE | COMMUNITY
Start: 2017-03-03 | End: 1900-01-01

## 2024-04-30 ENCOUNTER — APPOINTMENT (OUTPATIENT)
Dept: INTERNAL MEDICINE | Facility: CLINIC | Age: 64
End: 2024-04-30
Payer: MEDICARE

## 2024-04-30 ENCOUNTER — OUTPATIENT (OUTPATIENT)
Dept: OUTPATIENT SERVICES | Facility: HOSPITAL | Age: 64
LOS: 1 days | End: 2024-04-30
Payer: MEDICARE

## 2024-04-30 DIAGNOSIS — Z96.651 PRESENCE OF RIGHT ARTIFICIAL KNEE JOINT: Chronic | ICD-10-CM

## 2024-04-30 DIAGNOSIS — Z33.2 ENCOUNTER FOR ELECTIVE TERMINATION OF PREGNANCY: Chronic | ICD-10-CM

## 2024-04-30 DIAGNOSIS — Z98.890 OTHER SPECIFIED POSTPROCEDURAL STATES: Chronic | ICD-10-CM

## 2024-04-30 DIAGNOSIS — H26.9 UNSPECIFIED CATARACT: Chronic | ICD-10-CM

## 2024-04-30 PROCEDURE — 90834 PSYTX W PT 45 MINUTES: CPT | Mod: 93

## 2024-04-30 PROCEDURE — 90834 PSYTX W PT 45 MINUTES: CPT

## 2024-05-01 DIAGNOSIS — I10 ESSENTIAL (PRIMARY) HYPERTENSION: ICD-10-CM

## 2024-05-05 PROBLEM — M25.561 BILATERAL KNEE PAIN: Status: ACTIVE | Noted: 2018-12-28

## 2024-05-05 PROBLEM — M25.569 KNEE PAIN: Status: ACTIVE | Noted: 2022-11-02

## 2024-05-05 NOTE — ASSESSMENT
[FreeTextEntry1] : 64 y/o F with obesity, arthritis s/p L knee replacement, HTN, DM, Stage 3 breast cancer s/p R mastectomy/chemo/xrt with chronic R arm lymphedema, chronic pain, fibromyalgia here for follow up s/p L knee replacement 3/18/24 c/b oozing surgical scar s/p ED visit. Doing well.  - F/U with Ortho as scheduled  RTC in 3 months or sooner prn

## 2024-05-05 NOTE — HISTORY OF PRESENT ILLNESS
[FreeTextEntry1] : Follow up [de-identified] : Underwent L TKA 3/18 and admitted 3/18-3/19 Went back to ED on 3/24 and 3/25 due to oozing around surgical site. Seen by Ortho, area cleaned and woundvac applied. Hgb on 3/19 was 8.0  Has been walking, using cane Seeing Ortho this afternoon. Started going back to her normal activities. Taking tylenol and tramadol as needed for pain. Has a few oxycodone but hasn't used it. Also having some shoulder pain.  Having some numbness and tingling in the L lateral calf and anterior leg.  Was having a lot of issues getting to the bathroom in time due to her knee/mobility limitations. Was having some incontinence and wearing a diaper.  Pt upset because worried she may have dementia.  Has appointment with Neuro 4/15. Previously seen by them.  Woundvac removed last week by Ortho.  Got a toilet seat with arm rails to help her get up/improve her mobility.  Has been doing home PT - will end tomorrow then starting this week will be going to outpatient PT  Has been off of bupropion x a few months.

## 2024-05-05 NOTE — PHYSICAL EXAM
[Normal] : normal rate, regular rhythm, normal S1 and S2 and no murmur heard [de-identified] : Bilateral knee swelling (L>R); well healed surgical scar.

## 2024-05-07 ENCOUNTER — NON-APPOINTMENT (OUTPATIENT)
Age: 64
End: 2024-05-07

## 2024-05-07 ENCOUNTER — APPOINTMENT (OUTPATIENT)
Dept: ORTHOPEDIC SURGERY | Facility: CLINIC | Age: 64
End: 2024-05-07
Payer: MEDICARE

## 2024-05-07 DIAGNOSIS — F31.70 BIPOLAR DISORDER, CURRENTLY IN REMISSION, MOST RECENT EPISODE UNSPECIFIED: ICD-10-CM

## 2024-05-07 PROCEDURE — 73562 X-RAY EXAM OF KNEE 3: CPT | Mod: LT

## 2024-05-07 PROCEDURE — 99024 POSTOP FOLLOW-UP VISIT: CPT

## 2024-05-07 NOTE — HISTORY OF PRESENT ILLNESS
[de-identified] : 5/7/2024  Denia Daley presents to the office for follow-up of her left total knee arthroplasty.  Patient went left TKA on 3/18/2024.  She is currently doing well overall.  Patient does not have significant knee pain at this time.  She is participating in physical therapy.  No falls.  No fevers or chills.  4/11/2024  Denia Daley presents to the office for follow-up of her left total knee arthroplasty.  Patient underwent left TKA on 3/18/2024.  Patient had some pain when in bed.  Pain is located over the knee.  No falls.  No fevers or chills.  4/9/2024  Denia Daley presents to the office for follow-up of her left total knee arthroplasty.  Patient underwent left TKA on 3/18/2024.  There has been no further wound drainage.  Patient is currently doing well overall.  No falls or injuries. No fevers or chills.  4/2/2024  Denia Daley presents to the office for follow-up of her left total knee arthroplasty.  Patient underwent left TKA on 3/18/2024.  A Prevena wound VAC was placed in the ER due to wound drainage.  There has been no further drainage around the wound VAC.  No fevers or chills.  No falls or injuries.  She does not have significant pain at this time.  3/28/2024  Denia Daley presented to the office for follow-up of her left total knee arthroplasty.  Patient underwent left TKA on 3/18/2024.  She had some wound drainage and a Prevena VAC was placed in the ER.  She is otherwise doing well.  Patient has been working with physical therapy.  She has been taking her Aspirin for her DVT prophylaxis.  No drainage around the wound VAC.  No fevers or chills.  No falls or injuries.  She has been working with home physical therapy.  3/5/2024  Denia Daley presents to the office for follow-up of her right total knee arthroplasty and management of her left knee osteoarthritis.  Her right knee has been in physical therapy and is doing well.  She continues to have left knee pain.  Left knee pain continues to affect her quality of life.  Her weight is 216 pounds and she is working on weight loss.  No falls.  No fevers or chills.  2/1/2024  Denia Daley presents to the office for follow-up of her right total knee arthroplasty and management of her left knee osteoarthritis.  Patient has been in physical therapy and is doing well.  Right knee is improving, but she has some lateral pain.  She continues to have left knee pain.  Left knee pain is not significantly changed.  Her left knee continues to affect her quality of life.  Patient had a recent right-sided RFA on Tuesday and is planned for a left-sided RFA.  No lightheadedness or fatigue.  No falls or injuries.  Patient states that she is 217 lbs. at this time.  1/4/2024  Denia Daley presented to the office for follow-up of her right total knee arthroplasty and management of her left knee osteoarthritis.  Patient's right knee is doing well overall.  She did have a fibromyalgia flare and had some pain.  She would like to restart physical therapy.  Patient continues to have left knee pain.  Pain is worse with going up stairs and in bed.  No falls.  No fevers or chills.  Patient does have back pain.  She states that her last weight was 218 pounds and that her last hemoglobin was 10.0.  She is currently on iron and has somewhat improved.  11/7/2023  Denia Daley presented to the office for follow-up of her right total knee arthroplasty and left knee osteoarthritis.  Patient was increasingly active about 10 days ago on Saturday.  She had knee pain afterwards and had pain last week.  Pain was worse with leaning forward.  No falls.  No fevers or chills.  No significant swelling.  Patient states that her pain is improving.  She continues to have left knee pain and had pain after the increased activity, which has been improving.  Pain is located over the medial and lateral knees.  8/29/2023  Denia Daley presents to the office for follow-up of her right total knee arthroplasty and left knee osteoarthritis.  Patient's right knee is currently doing well.  She does get some occasional shock type pains.  She is able to walk without a cane.  Patient continues to have left knee pain.  She states that the left knee is still affecting her quality of life.  She has tried physical therapy, anti-inflammatories, and injections.  Patient was recently seen by neurology and diagnosed with an essential tremor.  She is going to see cardiology and her primary care physician for her clearances.  She had a recent sleep study for her MADHURI.  No falls or injuries.  7/27/2023  Dneia Daley presents to the office for follow-up of her right total knee arthroplasty and left knee osteoarthritis.  Patient's right knee is currently doing well.  She has no significant issues.  Patient continues to have significant left knee pain.  She states that the left knee pain is affecting her quality of life.  She has been walking better due to her right TKA.  Patient uses a cane in the community, but is able to walk without the cane.  She has tried pain medications, physical therapy, and injections for the left knee.  Patient states that her back pain has improved after undergoing RFA and is going for an EMG tomorrow for her carpal tunnel.  6/13/2023  Stephanie Daley is a 63-year-old female who presents to the office for follow-up of her right total knee arthroplasty.  Patient underwent right TKA on 4/24/2023.  She is currently doing well.  Right knee pain is significantly improved compared to prior to surgery.  She is currently in physical therapy. Patient continues to take her Aspirin 325 mg twice per day for DVT prophylaxis.  Patient has been doing home exercises on a bicycle. She is currently walking with a cane when outside, but is not using any assistive devices in the home.  Patient currently reports left lower back and left knee pain.  She continues to have left knee pain.  She had a recent RFA on her right lower back.  She is currently taking tramadol, gabapentin and Tylenol.  She states for the majority of her pain is currently in her left knee.  Left knee pain continues to affect her quality of life.  Patient has tried injections, physical therapy, and pain medications for her left knee.  Her last left knee injection was in August 2022.  She would like to discuss left total knee arthroplasty.  5/9/2023  Ms. Daley presents to the office for follow-up of her right total knee arthroplasty.  Patient underwent right TKA on 4/24/2023.  She is currently doing well.  Patient did go to the emergency department on 5/5/2023 with pain and swelling of her right lower extremity.  US Duplex was negative.  Her pain and swelling have improved over the last few days.  Her pain is well controlled at this time.  She is taking occasional tramadol at night for her pain.  Patient is walking with a cane.  She has finished her home physical therapy.  No fevers or chills.  Patient remains on Aspirin 325mg twice per day for DVT prophylaxis.   4/7/2023  Ms. Daley presented to the office for follow-up of her bilateral knee pain.  Patient continues to experience bilateral (right greater than left) knee pain.  She also reports some right knee stiffness and occasional lower back pain.  Patient has some neuropathy in her hands and feet.  Patient continues to experience pain is affecting her quality of life and daily activities.  She has tried multiple knee injections, last in August 2022.  She has also tried physical therapy and has been in pain management.  Patient is planned for a right total knee arthroplasty.  She has a stress test scheduled for Wednesday.  Patient is currently taking meloxicam for her pain.  2/10/2023 Ms. Daley is a 62-year-old female presents to the office for follow up of her bilateral knee pain.  Patient has been experiencing bilateral (right greater than left) knee pain for about 30 years.  She states that it has been worse over the last 6 to 8 years. Pain is now affecting her quality of life and daily activities.  Pain is worse when lying down and patient tries to keep her feet elevated.  Right knee pain is worse than her left at this time.  Patient has tried meloxicam, gabapentin, and Tylenol for the pain.  She has a history of chronic pain and has been in pain management for about 20 years at [facility].  Patient has stopped taking methadone and is currently only taking tramadol for her pain.  She did have some withdrawal from her opioid medications.  Patient has tried multiple knee injections, including cortisone injections for many years.  She has tried about 4 series of viscosupplementation injections.  Her last knee joint injections were in August 2022, which did not help.  She states that she received 3 gel injections in the right knee and 2 in the left, but pain worsened and she did not finish the left knee series.  She had an injection in her right IT band in November 2022.  Patient is also tried physical therapy for her knees, last in October 2022. She has tried physical therapy for her back pain, but her knee pain limited her back PT. Patient has continued her weight loss and her BMI: 39.26.  No recent trauma.  No fevers or chills.  Patient was recently started on Symbicort by her pulmonologist.  She would like to move her surgical date from March into April.   History: HTN, Diverticulitis, GERD, DEREK, History of Breast Cancer, Alcoholism (19 years sober), Lymphedema in Right Arm, Fibromyalgia, Neuropathy, Bipolar, Diabetes (Last A1C: 6.1)

## 2024-05-07 NOTE — DISCUSSION/SUMMARY
[de-identified] : Denia Daley is a 63-year-old female presents to the office for follow-up of her left total knee arthroplasty.  Patient underwent left TKA on 3/18/2024.  XRays showed left total knee arthroplasty in good position and alignment. Examination showed range of motion 0 to 120. Discussed with the patient the examination and imaging findings. Discussed the management of total knee arthroplasty at this time, including physical therapy. Patient will continue physical therapy. Patient has completed DVT prophylaxis. Patient will follow up in 6 weeks for reevaluation and management. Patient understanding and in agreement with the plan. All questions answered.     Plan: -Physical Therapy -DVT Prophylaxis: Completed -Follow up in 6 weeks for reevaluation and management

## 2024-05-07 NOTE — PHYSICAL EXAM
[de-identified] : Constitutional:  63 year old female, alert and oriented, cooperative, in no acute distress.  HEENT  NC/AT.  Appearance: symmetric  Neck/Back Straight without deformity or instability.  Good ROM.  Chest/Respiratory  Respiratory effort: no intercostal retractions or use of accessory muscles. Nonlabored Breathing  Skin  On inspection, warm and dry without rashes or lesions.  Mental Status:  Judgment, insight: intact Orientation: oriented to time, place, and person  Neurological: Sensory and Motor are grossly intact throughout  Left Knee  Inspection:     Incision well healing. No erythema or drainage.     No Effusion     Non-tender to palpation over tibial tubercle, patella, medial and lateral joint line, and pes insertion.  Range of Motion: 	Extension - 0 degrees 	Flexion - 120 degrees 	Extensor lag: None  Stability:      Demonstrates no Varus or Valgus instability      Negative Anterior or Posterior drawer.      No mid flexion instability  Patella: stable, tracks well.   Neurologic Exam     Motor intact including 5/5 Extensor Hallucis Longus, 5/5 Flexor Hallucis Longus, 5/5 Tibialis Anterior and 5/5 Gastrocnemius     Sensation Intact to Light Touch including Saphenous, Sural, Superficial Peroneal, Deep Peroneal, Tibial nerve distributions  Vascular Exam     Foot is warm and well perfused with 2+ Dorsalis Pedis Pulse   No pain with range of motion of the bilateral hips or right knee. No lumbar paraspinal muscle tenderness. [de-identified] : XRay:  XRays of the Left Knee (3 Views) taken today in the office and discussed with the patient. XRays demonstrate a Left Total Knee Arthroplasty in good position and alignment. There is no obvious evidence of fracture, dislocation, osteolysis or loosening. (my personal interpretation) Components: Smith and Nephjhoana JII BCS, Tibial Stem

## 2024-05-10 ENCOUNTER — RX RENEWAL (OUTPATIENT)
Age: 64
End: 2024-05-10

## 2024-05-14 ENCOUNTER — OUTPATIENT (OUTPATIENT)
Dept: OUTPATIENT SERVICES | Facility: HOSPITAL | Age: 64
LOS: 1 days | End: 2024-05-14
Payer: MEDICARE

## 2024-05-14 ENCOUNTER — OUTPATIENT (OUTPATIENT)
Dept: OUTPATIENT SERVICES | Facility: HOSPITAL | Age: 64
LOS: 1 days | Discharge: HOME | End: 2024-05-14

## 2024-05-14 ENCOUNTER — APPOINTMENT (OUTPATIENT)
Dept: INTERNAL MEDICINE | Facility: CLINIC | Age: 64
End: 2024-05-14
Payer: MEDICARE

## 2024-05-14 DIAGNOSIS — Z33.2 ENCOUNTER FOR ELECTIVE TERMINATION OF PREGNANCY: Chronic | ICD-10-CM

## 2024-05-14 DIAGNOSIS — Z96.651 PRESENCE OF RIGHT ARTIFICIAL KNEE JOINT: Chronic | ICD-10-CM

## 2024-05-14 DIAGNOSIS — Z98.890 OTHER SPECIFIED POSTPROCEDURAL STATES: Chronic | ICD-10-CM

## 2024-05-14 DIAGNOSIS — H26.9 UNSPECIFIED CATARACT: Chronic | ICD-10-CM

## 2024-05-14 DIAGNOSIS — C50.919 MALIGNANT NEOPLASM OF UNSPECIFIED SITE OF UNSPECIFIED FEMALE BREAST: ICD-10-CM

## 2024-05-14 PROCEDURE — 90834 PSYTX W PT 45 MINUTES: CPT | Mod: 93

## 2024-05-14 PROCEDURE — 90834 PSYTX W PT 45 MINUTES: CPT

## 2024-05-15 DIAGNOSIS — I10 ESSENTIAL (PRIMARY) HYPERTENSION: ICD-10-CM

## 2024-05-20 DIAGNOSIS — F31.70 BIPOLAR DISORDER, CURRENTLY IN REMISSION, MOST RECENT EPISODE UNSPECIFIED: ICD-10-CM

## 2024-05-21 ENCOUNTER — RESULT REVIEW (OUTPATIENT)
Age: 64
End: 2024-05-21

## 2024-05-21 ENCOUNTER — APPOINTMENT (OUTPATIENT)
Dept: HEMATOLOGY ONCOLOGY | Facility: CLINIC | Age: 64
End: 2024-05-21

## 2024-05-21 LAB
BASOPHILS # BLD AUTO: 0.02 K/UL — SIGNIFICANT CHANGE UP (ref 0–0.2)
BASOPHILS NFR BLD AUTO: 0.2 % — SIGNIFICANT CHANGE UP (ref 0–2)
EOSINOPHIL # BLD AUTO: 0.06 K/UL — SIGNIFICANT CHANGE UP (ref 0–0.5)
EOSINOPHIL NFR BLD AUTO: 0.6 % — SIGNIFICANT CHANGE UP (ref 0–6)
HCT VFR BLD CALC: 32 % — LOW (ref 34.5–45)
HGB BLD-MCNC: 10.2 G/DL — LOW (ref 11.5–15.5)
IMM GRANULOCYTES NFR BLD AUTO: 0.4 % — SIGNIFICANT CHANGE UP (ref 0–0.9)
LYMPHOCYTES # BLD AUTO: 1.28 K/UL — SIGNIFICANT CHANGE UP (ref 1–3.3)
LYMPHOCYTES # BLD AUTO: 12.6 % — LOW (ref 13–44)
MCHC RBC-ENTMCNC: 26.3 PG — LOW (ref 27–34)
MCHC RBC-ENTMCNC: 31.9 G/DL — LOW (ref 32–36)
MCV RBC AUTO: 82.5 FL — SIGNIFICANT CHANGE UP (ref 80–100)
MONOCYTES # BLD AUTO: 0.45 K/UL — SIGNIFICANT CHANGE UP (ref 0–0.9)
MONOCYTES NFR BLD AUTO: 4.4 % — SIGNIFICANT CHANGE UP (ref 2–14)
NEUTROPHILS # BLD AUTO: 8.28 K/UL — HIGH (ref 1.8–7.4)
NEUTROPHILS NFR BLD AUTO: 81.8 % — HIGH (ref 43–77)
NRBC # BLD: 0 /100 WBCS — SIGNIFICANT CHANGE UP (ref 0–0)
PLATELET # BLD AUTO: 357 K/UL — SIGNIFICANT CHANGE UP (ref 150–400)
RBC # BLD: 3.88 M/UL — SIGNIFICANT CHANGE UP (ref 3.8–5.2)
RBC # FLD: 17.8 % — HIGH (ref 10.3–14.5)
WBC # BLD: 10.13 K/UL — SIGNIFICANT CHANGE UP (ref 3.8–10.5)
WBC # FLD AUTO: 10.13 K/UL — SIGNIFICANT CHANGE UP (ref 3.8–10.5)

## 2024-05-22 ENCOUNTER — APPOINTMENT (OUTPATIENT)
Dept: NEUROLOGY | Facility: CLINIC | Age: 64
End: 2024-05-22
Payer: MEDICARE

## 2024-05-22 VITALS
BODY MASS INDEX: 43.19 KG/M2 | HEIGHT: 60 IN | DIASTOLIC BLOOD PRESSURE: 72 MMHG | WEIGHT: 220 LBS | HEART RATE: 62 BPM | SYSTOLIC BLOOD PRESSURE: 128 MMHG

## 2024-05-22 DIAGNOSIS — M54.9 DORSALGIA, UNSPECIFIED: ICD-10-CM

## 2024-05-22 DIAGNOSIS — F41.9 ANXIETY DISORDER, UNSPECIFIED: ICD-10-CM

## 2024-05-22 DIAGNOSIS — G62.9 POLYNEUROPATHY, UNSPECIFIED: ICD-10-CM

## 2024-05-22 DIAGNOSIS — E66.9 OBESITY, UNSPECIFIED: ICD-10-CM

## 2024-05-22 DIAGNOSIS — E11.9 TYPE 2 DIABETES MELLITUS W/OUT COMPLICATIONS: ICD-10-CM

## 2024-05-22 DIAGNOSIS — F32.A ANXIETY DISORDER, UNSPECIFIED: ICD-10-CM

## 2024-05-22 DIAGNOSIS — G47.33 OBSTRUCTIVE SLEEP APNEA (ADULT) (PEDIATRIC): ICD-10-CM

## 2024-05-22 PROCEDURE — G2211 COMPLEX E/M VISIT ADD ON: CPT

## 2024-05-22 PROCEDURE — 99205 OFFICE O/P NEW HI 60 MIN: CPT

## 2024-05-22 PROCEDURE — 99215 OFFICE O/P EST HI 40 MIN: CPT

## 2024-05-22 RX ORDER — MELOXICAM 7.5 MG/1
7.5 TABLET ORAL
Refills: 0 | Status: ACTIVE | COMMUNITY
Start: 2024-05-22

## 2024-05-22 RX ORDER — PREDNISONE 20 MG/1
20 TABLET ORAL
Refills: 0 | Status: ACTIVE | COMMUNITY
Start: 2024-05-22

## 2024-05-22 RX ORDER — FERROUS SULFATE 325(65) MG
325 (65 FE) TABLET ORAL TWICE DAILY
Qty: 60 | Refills: 0 | Status: DISCONTINUED | COMMUNITY
Start: 2023-07-28 | End: 2024-05-22

## 2024-05-22 RX ORDER — CEFADROXIL 500 MG/1
500 CAPSULE ORAL TWICE DAILY
Refills: 0 | Status: DISCONTINUED | COMMUNITY
Start: 2024-03-26 | End: 2024-05-22

## 2024-05-22 RX ORDER — ASPIRIN ENTERIC COATED TABLETS 81 MG 81 MG/1
81 TABLET, DELAYED RELEASE ORAL DAILY
Refills: 0 | Status: ACTIVE | COMMUNITY
Start: 2024-05-22

## 2024-05-22 NOTE — HISTORY OF PRESENT ILLNESS
[FreeTextEntry1] : COVID VACCINATION 2x.   HPI interval 20240522: 64-year-old female presents today for follow up visit. She had a left knee replacement March 2024 (right knee done April 2023) She woke up in middle of night and fell asleep in bathroom and didn't know how to get up off toilet in January. Memory loss is stable. Adl and iadl intact. She lives with , mother, sister and nephew. No hallucinations or delusions. No family history of Dementia. She said she stopped wellbutrin her ET has been better. She denies any depression or anxiety. She remains socially engaged. appetite: worse with prednisone.  sleep: ok, knee pain  mood: good  HPI-Interval hx [id number]: Since last seen, tried Propranolol 10mg BID, now increased to 20mg BID. Still some shaking, mostly head tremor and voice, hands seem to be quite steady. At times there is some effort in producing speech, very mild. Mostly subjective. BP ok. TKR R side in 9/2023, good after PT. Will likely go for L side as well.  Following with Pulm for MADHURI. Appetite ok, snacking a bit more, for stress. She is well on top of her medical issues and takes the lead on IADLs and financial issues @ home, helping . Rest is stable.  HPI-Interval hx 20221114: COVID 7/2022. MRI brain 5/2022 stable to 2018, to my eyes no significant changes.  States her gait is a bit more unsteady.  A1C 6.1% and BP under control now. COVID in July, with inflammatory indexes higher up. ET getting a bit worse in the head and hands when eating.  HPI-Interval hx 20220405: 2 weeks pt started to have neck pain, tight, for a few days; she then developed holocephalic pain, radiating to the front; she then developed nausea and vomiting with significant shaking of her hands. The HA then became throbbing in nature, with PPP, on and off for a few days. She too Tylenol with some benefit. She was seen at an urgent care, she received probably Benadryl and Reglan, with partial benefit.  She was also prescribed another medication ($2000) which she never got approved and never took. This was the first time she had a HA so severe. She still has pain in knees. She has developed a bit of ET type shaking in hands and head. She is trying to lose weight, c.a. 5-10lb in the last 6 months, but very gradually. Rest is stable.  HPI-Interval hx 20211012: Pt here for follow-up.. NPT done in 8/2021 are absolutely benign.   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 She is very positive and looking to the future. Sleep and appetite ok. She has decided to go on a diet and lost 30+ lbs, looking to lose more. She feels much better, lighter and has more endurance.     HPI-Interval hx [id number]:  recent SOB, possible CHF (like she had before in the past), will see PCP soon, she felt she gained weight and then lost it over a few weeks, as in retention. This also happened after COVID vaccination, knee shots and changing med to BB from [facility].  Fall 8/2020, tripped on steps carrying things, reports a lesion in the R knee.  Overall she feels ok, managing everything well.   Sleep and appetite are stable.  Mood is ok. Of note, she did not tolerate higher dose of Wellbutrin (150mg Daily) due to shakes/twitches.    HPI-Interval Hx 20200728: Pt here for follow-up. She feels her STM is a bit worse now, she tends to forget dates, names and recent events. She has to write a lot of notes and set alarms to remind herself to follow through with things.  she is still taking care of multiple things though, and trying to run her home. Rest is stable.  Ca in remission. Chemo last 8y ago. Appetite: stable Sleep: stable.  HPI-Interval Hx [id number]: Pt has been stable, just a lot of medical issues, including hip pain and L shoulder sx for rotator cuff lesion. She still has some issues walking with some unbalance, but no recent falls. Planning sx for knees. Anxiety is still persistent, she has not seen a therapist, has no time, but she talks to her  and sponsor a lot. Sleep and appetite are stable.  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   HPI-Interval Hx [id number]: Wellbutrin reduced to 100mg for tremor in her hand, remitted.  Had a fall with head trauma in Feb 2019, CT head negative ([facility]). She is very busy, and has a hard time managing her busy schedule.  She has a very positive outlook overall.   HPI-Interval Hx [id number]: MRI, MRA were negative, ESR 71, but vascular sx evaluation with Echo was negative for TA.  Still a lot of stress in her life. Feels better with Wellbutrin, her face pain and HA have stopped. She sleeps very well, and appetite is good. She keeps very busy, now preparing for her daughter's wedding.  HPI-Interval Hx [id number]: Pt here for follow-up. Over the last year, she has been pretty much stable. recent Bone Scan and CT have shown no secondary lesions from breast Ca.  She gets seldom sharp pain behind L eye for a few seconds, with slight HA, lasting 5-10 min. She also noticed her face sweats a lot, at times her arms too. This apparently is dating 10-20years. In all, she feels very irritable and she still c/o STM and attention issues.  PMH: 57yo RH AAW, with hx of fibromyalgia, bipolar depression, breast CA s/p resection and chemo in 2012, currently followed up and disease free, colon polyps, arthritis. She reports that she sometimes looses attentions and would forget things, e.g. that she is cooking something and would leave things on the stove. She has issues remembering names of people, but always had.  She does not report HA.  Mood: she often gets sad, cries a lot, but has had bipolar depression for a long time, used to be on Lexapro, VPA and Neurontin, stopped a few years ago. She used to be in a program at [facility], terminated when she had started using a plant product ([place]?). She has a hx of SI/SA in 2004, now denies any SI.   She has a hx of EtOH abuse, sober and in AA for many years.  She has a complex living situation, having to take care of her mother, daughter, grandchildren.  She does not drive, she never did, has fear of it.  Sleep: falls asleep readily, but has to urinate very frequently. Sleeps 10h daily.  Disabled, used to do clerical work in several different settings.  ADl and IADL intact.  Of note, she has a small nodule in L thyroid, under surveillance.

## 2024-05-22 NOTE — REVIEW OF SYSTEMS
[As Noted in HPI] : as noted in HPI [Anxiety] : anxiety [Depression] : depression [Negative] : Heme/Lymph [Confused or Disoriented] : confusion [Memory Lapses or Loss] : memory loss [Arm Weakness] : arm weakness [Hand Weakness] :  hand weakness [Leg Weakness] : leg weakness [Numbness] : numbness [Tingling] : tingling [Decr. Concentrating Ability] : no decrease in concentrating ability [Difficulty with Language] : no ~M difficulty with language [Changed Thought Patterns] : no change in thought patterns [Repeating Questions] : no repeated questioning about recent events [Facial Weakness] : no facial weakness [Poor Coordination] : good coordination [Difficulty Writing] : no difficulty writing [Difficulties in Speech] : no speech difficulties [Abnormal Sensation] : no abnormal sensation [Hypersensitivity] : no hypersensitivity [Seizures] : no convulsions [Dizziness] : no dizziness [Fainting] : no fainting [Lightheadedness] : no lightheadedness [Vertigo] : no vertigo [Cluster Headache] : no cluster headache [Migraine Headache] : no migraine headache [Tension Headache] : no tension-type headache [Difficulty Walking] : no difficulty walking [Inability to Walk] : able to walk [Ataxia] : no ataxia [Frequent Falls] : not falling [Limping] : not limping [FreeTextEntry3] : recent gradual blurring of vision, possible cataract.

## 2024-05-22 NOTE — HISTORY OF PRESENT ILLNESS
[FreeTextEntry1] : COVID VACCINATION 2x.   HPI interval 20240522: 64-year-old female presents today for follow up visit. She had a left knee replacement March 2024 (right knee done April 2023) She woke up in middle of night and fell asleep in bathroom and didn't know how to get up off toilet in January. Memory loss is stable. Adl and iadl intact. She lives with , mother, sister and nephew. No hallucinations or delusions. No family history of Dementia. She said she stopped wellbutrin her ET has been better. She denies any depression or anxiety. She remains socially engaged. appetite: worse with prednisone.  sleep: ok, knee pain  mood: good  HPI-Interval hx [id number]: Since last seen, tried Propranolol 10mg BID, now increased to 20mg BID. Still some shaking, mostly head tremor and voice, hands seem to be quite steady. At times there is some effort in producing speech, very mild. Mostly subjective. BP ok. TKR R side in 9/2023, good after PT. Will likely go for L side as well.  Following with Pulm for MADHURI. Appetite ok, snacking a bit more, for stress. She is well on top of her medical issues and takes the lead on IADLs and financial issues @ home, helping . Rest is stable.  HPI-Interval hx 20221114: COVID 7/2022. MRI brain 5/2022 stable to 2018, to my eyes no significant changes.  States her gait is a bit more unsteady.  A1C 6.1% and BP under control now. COVID in July, with inflammatory indexes higher up. ET getting a bit worse in the head and hands when eating.  HPI-Interval hx 20220405: 2 weeks pt started to have neck pain, tight, for a few days; she then developed holocephalic pain, radiating to the front; she then developed nausea and vomiting with significant shaking of her hands. The HA then became throbbing in nature, with PPP, on and off for a few days. She too Tylenol with some benefit. She was seen at an urgent care, she received probably Benadryl and Reglan, with partial benefit.  She was also prescribed another medication ($2000) which she never got approved and never took. This was the first time she had a HA so severe. She still has pain in knees. She has developed a bit of ET type shaking in hands and head. She is trying to lose weight, c.a. 5-10lb in the last 6 months, but very gradually. Rest is stable.  HPI-Interval hx 20211012: Pt here for follow-up.. NPT done in 8/2021 are absolutely benign.   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 She is very positive and looking to the future. Sleep and appetite ok. She has decided to go on a diet and lost 30+ lbs, looking to lose more. She feels much better, lighter and has more endurance.     HPI-Interval hx [id number]:  recent SOB, possible CHF (like she had before in the past), will see PCP soon, she felt she gained weight and then lost it over a few weeks, as in retention. This also happened after COVID vaccination, knee shots and changing med to BB from [facility].  Fall 8/2020, tripped on steps carrying things, reports a lesion in the R knee.  Overall she feels ok, managing everything well.   Sleep and appetite are stable.  Mood is ok. Of note, she did not tolerate higher dose of Wellbutrin (150mg Daily) due to shakes/twitches.    HPI-Interval Hx 20200728: Pt here for follow-up. She feels her STM is a bit worse now, she tends to forget dates, names and recent events. She has to write a lot of notes and set alarms to remind herself to follow through with things.  she is still taking care of multiple things though, and trying to run her home. Rest is stable.  Ca in remission. Chemo last 8y ago. Appetite: stable Sleep: stable.  HPI-Interval Hx 20191015: Pt has been stable, just a lot of medical issues, including hip pain and L shoulder sx for rotator cuff lesion. She still has some issues walking with some unbalance, but no recent falls. Planning sx for knees. Anxiety is still persistent, she has not seen a therapist, has no time, but she talks to her  and sponsor a lot. Sleep and appetite are stable.  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   HPI-Interval Hx [id number]: Wellbutrin reduced to 100mg for tremor in her hand, remitted.  Had a fall with head trauma in Feb 2019, CT head negative ([facility]). She is very busy, and has a hard time managing her busy schedule.  She has a very positive outlook overall.   HPI-Interval Hx 20181009: MRI, MRA were negative, ESR 71, but vascular sx evaluation with Echo was negative for TA.  Still a lot of stress in her life. Feels better with Wellbutrin, her face pain and HA have stopped. She sleeps very well, and appetite is good. She keeps very busy, now preparing for her daughter's wedding.  HPI-Interval Hx [id number]: Pt here for follow-up. Over the last year, she has been pretty much stable. recent Bone Scan and CT have shown no secondary lesions from breast Ca.  She gets seldom sharp pain behind L eye for a few seconds, with slight HA, lasting 5-10 min. She also noticed her face sweats a lot, at times her arms too. This apparently is dating 10-20years. In all, she feels very irritable and she still c/o STM and attention issues.  PMH: 57yo RH AAW, with hx of fibromyalgia, bipolar depression, breast CA s/p resection and chemo in 2012, currently followed up and disease free, colon polyps, arthritis. She reports that she sometimes looses attentions and would forget things, e.g. that she is cooking something and would leave things on the stove. She has issues remembering names of people, but always had.  She does not report HA.  Mood: she often gets sad, cries a lot, but has had bipolar depression for a long time, used to be on Lexapro, VPA and Neurontin, stopped a few years ago. She used to be in a program at [facility], terminated when she had started using a plant product ([place]?). She has a hx of SI/SA in 2004, now denies any SI.   She has a hx of EtOH abuse, sober and in AA for many years.  She has a complex living situation, having to take care of her mother, daughter, grandchildren.  She does not drive, she never did, has fear of it.  Sleep: falls asleep readily, but has to urinate very frequently. Sleeps 10h daily.  Disabled, used to do clerical work in several different settings.  ADl and IADL intact.  Of note, she has a small nodule in L thyroid, under surveillance.  Will get CT, labs, and treat pain

## 2024-05-22 NOTE — PHYSICAL EXAM
[General Appearance - Alert] : alert [General Appearance - In No Acute Distress] : in no acute distress [General Appearance - Well Nourished] : well nourished [General Appearance - Well Developed] : well developed [Oriented To Time, Place, And Person] : oriented to person, place, and time [Impaired Insight] : insight and judgment were intact [Affect] : the affect was normal [Over the Past 2 Weeks, Have You Felt Little Interest or Pleasure Doing Things?] : 2.) Over the past 2 weeks, have you felt little interest or pleasure doing things? Yes [Person] : oriented to person [Place] : oriented to place [Time] : oriented to time [Short Term Intact] : short term memory intact [Remote Intact] : remote memory intact [Registration Intact] : recent registration memory intact [Span Intact] : the attention span was normal [Concentration Intact] : normal concentrating ability [Visual Intact] : visual attention was ~T not ~L decreased [Naming Objects] : no difficulty naming common objects [Repeating Phrases] : no difficulty repeating a phrase [Writing A Sentence] : no difficulty writing a sentence [Fluency] : fluency intact [Comprehension] : comprehension intact [Reading] : reading intact [Current Events] : adequate knowledge of current events [Past History] : adequate knowledge of personal past history [Vocabulary] : adequate range of vocabulary [Total Score ___ / 30] : the patient achieved a score of [unfilled] /30 [Date / Time ___ / 5] : date / time [unfilled] / 5 [Place ___ / 5] : place [unfilled] / 5 [Registration ___ / 3] : registration [unfilled] / 3 [Serial Sevens ___/5] : serial sevens [unfilled] / 5 [Naming 2 Objects ___ / 2] : naming two objects [unfilled] / 2 [Repeating a Sentence ___ / 1] : repeating a sentence [unfilled] / 1 [Writing a Sentence ___ / 1] : write sentence [unfilled] / 1 [3-stage Verbal Command ___ / 3] : three-stage verbal command [unfilled] / 3 [Written Command ___ / 1] : written command [unfilled] / 1 [Copy a Design ___ / 1] : copy a design [unfilled] / 1 [Recall ___ / 3] : recall [unfilled] / 3 [Cranial Nerves Optic (II)] : visual acuity intact bilaterally,  visual fields full to confrontation, pupils equal round and reactive to light [Cranial Nerves Oculomotor (III)] : extraocular motion intact [Cranial Nerves Trigeminal (V)] : facial sensation intact symmetrically [Cranial Nerves Facial (VII)] : face symmetrical [Cranial Nerves Vestibulocochlear (VIII)] : hearing was intact bilaterally [Cranial Nerves Glossopharyngeal (IX)] : tongue and palate midline [Cranial Nerves Accessory (XI - Cranial And Spinal)] : head turning and shoulder shrug symmetric [Cranial Nerves Hypoglossal (XII)] : there was no tongue deviation with protrusion [Motor Strength] : muscle strength was normal in all four extremities [No Muscle Atrophy] : normal bulk in all four extremities [Motor Handedness Right-Handed] : the patient is right hand dominant [Sensation Tactile Decrease] : light touch was intact [Sensation Pain / Temperature Decrease] : pain and temperature was intact [Proprioception] : proprioception was intact [Balance] : balance was intact [2+] : Brachioradialis left 2+ [1+] : Patella left 1+ [0] : Ankle jerk left 0 [Sclera] : the sclera and conjunctiva were normal [PERRL With Normal Accommodation] : pupils were equal in size, round, reactive to light, with normal accommodation [Extraocular Movements] : extraocular movements were intact [No APD] : no afferent pupillary defect [No ANDERSON] : no internuclear ophthalmoplegia [Full Visual Field] : full visual field [Outer Ear] : the ears and nose were normal in appearance [Oropharynx] : the oropharynx was normal [Neck Appearance] : the appearance of the neck was normal [Neck Cervical Mass (___cm)] : no neck mass was observed [Jugular Venous Distention Increased] : there was no jugular-venous distention [Thyroid Diffuse Enlargement] : the thyroid was not enlarged [Thyroid Nodule] : there were no palpable thyroid nodules [Auscultation Breath Sounds / Voice Sounds] : lungs were clear to auscultation bilaterally [Heart Rate And Rhythm] : heart rate was normal and rhythm regular [Heart Sounds] : normal S1 and S2 [Heart Sounds Gallop] : no gallops [Murmurs] : no murmurs [Heart Sounds Pericardial Friction Rub] : no pericardial rub [Arterial Pulses Carotid] : carotid pulses were normal with no bruits [Full Pulse] : the pedal pulses are present [Edema] : there was no peripheral edema [Bowel Sounds] : normal bowel sounds [Abdomen Soft] : soft [Abdomen Tenderness] : non-tender [Abdomen Mass (___ Cm)] : no abdominal mass palpated [No CVA Tenderness] : no ~M costovertebral angle tenderness [No Spinal Tenderness] : no spinal tenderness [Abnormal Walk] : normal gait [Nail Clubbing] : no clubbing  or cyanosis of the fingernails [Musculoskeletal - Swelling] : no joint swelling seen [Motor Tone] : muscle strength and tone were normal [Skin Color & Pigmentation] : normal skin color and pigmentation [Skin Turgor] : normal skin turgor [] : no rash [Over the Past 2 Weeks, Have You Felt Down, Depressed, or Hopeless?] : 1.) Over the past 2 weeks, have you felt down, depressed, or hopeless? No [Motor Strength Upper Extremities Bilaterally] : strength was normal in both upper extremities [Motor Strength Lower Extremities Bilaterally] : strength was normal in both lower extremities [Romberg's Sign] : Romberg's sign was negtive [Allodynia] : no ~T allodynia present [Dysesthesia] : no dysesthesia [Hyperesthesia] : no hyperesthesia [Limited Balance] : balance was intact [Past-pointing] : there was no past-pointing [Tremor] : no tremor present [Dysdiadochokinesia Bilaterally] : not present [Coordination - Dysmetria Impaired Finger-to-Nose Bilateral] : not present [Coordination - Dysmetria Impaired Heel-to-Shin Bilateral] : not present [Plantar Reflex Right Only] : normal on the right [Plantar Reflex Left Only] : normal on the left [FreeTextEntry4] : Alt pattern: intact Clock: 3/3 Luria: Intact. Trail A: 20"; B: 50" Fluency: intact. [FreeTextEntry5] : small pupils, surgical, reactive; no pain on OO compression.  [FreeTextEntry6] : strength  limited by bilateral knee pain. Mild ET type tremor in head and hands. [FreeTextEntry7] : decreased vibration sens distal LE. [FreeTextEntry8] : balance limited by bilateral knee pain [FreeTextEntry1] : L chest scar from old port.

## 2024-05-22 NOTE — ASSESSMENT
[FreeTextEntry1] : Assessment:  65 yo RH AAW, with subjective memory issues.  Peripheral neuropathy in LE and antalgic limitation of RLE>LLE due to knees pain, s/p TKR R side, doing well. Mental and neuro exam continue to be ok. Last NPT are wnl. MRIs stable. Mild ET, marginal improvement with propranolol. Overall stable and doing well.  Diagnostic Impression: -anxiety/depression (controlled) -subjective memory impairment -ET -neuropathy-DM.  Plan:  -Continue with current meds, consider Primidone if ET gets worse, if BP does not allow to increase BB further. -Will go for sleep study for new CPAP -Educated on importance of lifestyle modifications such as daily exercise, good sleep habits and healthy balanced diet

## 2024-05-30 DIAGNOSIS — M32.9 SYSTEMIC LUPUS ERYTHEMATOSUS, UNSPECIFIED: ICD-10-CM

## 2024-05-30 RX ORDER — PREDNISONE 5 MG/1
5 TABLET ORAL DAILY
Qty: 30 | Refills: 1 | Status: ACTIVE | COMMUNITY
Start: 2024-05-30 | End: 1900-01-01

## 2024-06-10 NOTE — PHYSICAL EXAM
[de-identified] : Constitutional:  63 year old female, alert and oriented, cooperative, in no acute distress.  HEENT  NC/AT.  Appearance: symmetric  Neck/Back Straight without deformity or instability.  Good ROM.  Chest/Respiratory  Respiratory effort: no intercostal retractions or use of accessory muscles. Nonlabored Breathing  Skin  On inspection, warm and dry without rashes or lesions.  Mental Status:  Judgment, insight: intact Orientation: oriented to time, place, and person  Neurological: Sensory and Motor are grossly intact throughout  Left Knee  Inspection:     Incision well healing. No erythema or drainage.     No Effusion     Non-tender to palpation over tibial tubercle, patella, medial and lateral joint line, and pes insertion.  Range of Motion: 	Extension - 0 degrees 	Flexion - 120 degrees 	Extensor lag: None  Stability:      Demonstrates no Varus or Valgus instability      Negative Anterior or Posterior drawer.      No mid flexion instability  Patella: stable, tracks well.   Neurologic Exam     Motor intact including 5/5 Extensor Hallucis Longus, 5/5 Flexor Hallucis Longus, 5/5 Tibialis Anterior and 5/5 Gastrocnemius     Sensation Intact to Light Touch including Saphenous, Sural, Superficial Peroneal, Deep Peroneal, Tibial nerve distributions  Vascular Exam     Foot is warm and well perfused with 2+ Dorsalis Pedis Pulse   No pain with range of motion of the bilateral hips or right knee. No lumbar paraspinal muscle tenderness. [de-identified] : XRay:  XRays of the Left Knee (3 Views) taken today in the office and discussed with the patient. XRays demonstrate a Left Total Knee Arthroplasty in good position and alignment. There is no obvious evidence of fracture, dislocation, osteolysis or loosening. (my personal interpretation) Components: Smith and Nephjhoana JII BCS, Tibial Stem

## 2024-06-10 NOTE — HISTORY OF PRESENT ILLNESS
[de-identified] : 6/11/2024 5/7/2024  Denia Daley presents to the office for follow-up of her left total knee arthroplasty.  Patient went left TKA on 3/18/2024.  She is currently doing well overall.  Patient does not have significant knee pain at this time.  She is participating in physical therapy.  No falls.  No fevers or chills.  4/11/2024  Denia Daley presents to the office for follow-up of her left total knee arthroplasty.  Patient underwent left TKA on 3/18/2024.  Patient had some pain when in bed.  Pain is located over the knee.  No falls.  No fevers or chills.  4/9/2024  Denia Daley presents to the office for follow-up of her left total knee arthroplasty.  Patient underwent left TKA on 3/18/2024.  There has been no further wound drainage.  Patient is currently doing well overall.  No falls or injuries. No fevers or chills.  4/2/2024  Denia Daley presents to the office for follow-up of her left total knee arthroplasty.  Patient underwent left TKA on 3/18/2024.  A Prevena wound VAC was placed in the ER due to wound drainage.  There has been no further drainage around the wound VAC.  No fevers or chills.  No falls or injuries.  She does not have significant pain at this time.  3/28/2024  Denia Daley presented to the office for follow-up of her left total knee arthroplasty.  Patient underwent left TKA on 3/18/2024.  She had some wound drainage and a Prevena VAC was placed in the ER.  She is otherwise doing well.  Patient has been working with physical therapy.  She has been taking her Aspirin for her DVT prophylaxis.  No drainage around the wound VAC.  No fevers or chills.  No falls or injuries.  She has been working with home physical therapy.  3/5/2024  Denia Daley presents to the office for follow-up of her right total knee arthroplasty and management of her left knee osteoarthritis.  Her right knee has been in physical therapy and is doing well.  She continues to have left knee pain.  Left knee pain continues to affect her quality of life.  Her weight is 216 pounds and she is working on weight loss.  No falls.  No fevers or chills.  2/1/2024  Denia Daley presents to the office for follow-up of her right total knee arthroplasty and management of her left knee osteoarthritis.  Patient has been in physical therapy and is doing well.  Right knee is improving, but she has some lateral pain.  She continues to have left knee pain.  Left knee pain is not significantly changed.  Her left knee continues to affect her quality of life.  Patient had a recent right-sided RFA on Tuesday and is planned for a left-sided RFA.  No lightheadedness or fatigue.  No falls or injuries.  Patient states that she is 217 lbs. at this time.  1/4/2024  Denia Daley presented to the office for follow-up of her right total knee arthroplasty and management of her left knee osteoarthritis.  Patient's right knee is doing well overall.  She did have a fibromyalgia flare and had some pain.  She would like to restart physical therapy.  Patient continues to have left knee pain.  Pain is worse with going up stairs and in bed.  No falls.  No fevers or chills.  Patient does have back pain.  She states that her last weight was 218 pounds and that her last hemoglobin was 10.0.  She is currently on iron and has somewhat improved.  11/7/2023  Denia Daley presented to the office for follow-up of her right total knee arthroplasty and left knee osteoarthritis.  Patient was increasingly active about 10 days ago on Saturday.  She had knee pain afterwards and had pain last week.  Pain was worse with leaning forward.  No falls.  No fevers or chills.  No significant swelling.  Patient states that her pain is improving.  She continues to have left knee pain and had pain after the increased activity, which has been improving.  Pain is located over the medial and lateral knees.  8/29/2023  Denia Daley presents to the office for follow-up of her right total knee arthroplasty and left knee osteoarthritis.  Patient's right knee is currently doing well.  She does get some occasional shock type pains.  She is able to walk without a cane.  Patient continues to have left knee pain.  She states that the left knee is still affecting her quality of life.  She has tried physical therapy, anti-inflammatories, and injections.  Patient was recently seen by neurology and diagnosed with an essential tremor.  She is going to see cardiology and her primary care physician for her clearances.  She had a recent sleep study for her MADHURI.  No falls or injuries.  7/27/2023  Denia Daley presents to the office for follow-up of her right total knee arthroplasty and left knee osteoarthritis.  Patient's right knee is currently doing well.  She has no significant issues.  Patient continues to have significant left knee pain.  She states that the left knee pain is affecting her quality of life.  She has been walking better due to her right TKA.  Patient uses a cane in the community, but is able to walk without the cane.  She has tried pain medications, physical therapy, and injections for the left knee.  Patient states that her back pain has improved after undergoing RFA and is going for an EMG tomorrow for her carpal tunnel.  6/13/2023  Stephanie Daley is a 63-year-old female who presents to the office for follow-up of her right total knee arthroplasty.  Patient underwent right TKA on 4/24/2023.  She is currently doing well.  Right knee pain is significantly improved compared to prior to surgery.  She is currently in physical therapy. Patient continues to take her Aspirin 325 mg twice per day for DVT prophylaxis.  Patient has been doing home exercises on a bicycle. She is currently walking with a cane when outside, but is not using any assistive devices in the home.  Patient currently reports left lower back and left knee pain.  She continues to have left knee pain.  She had a recent RFA on her right lower back.  She is currently taking tramadol, gabapentin and Tylenol.  She states for the majority of her pain is currently in her left knee.  Left knee pain continues to affect her quality of life.  Patient has tried injections, physical therapy, and pain medications for her left knee.  Her last left knee injection was in August 2022.  She would like to discuss left total knee arthroplasty.  5/9/2023  Ms. Daley presents to the office for follow-up of her right total knee arthroplasty.  Patient underwent right TKA on 4/24/2023.  She is currently doing well.  Patient did go to the emergency department on 5/5/2023 with pain and swelling of her right lower extremity.  US Duplex was negative.  Her pain and swelling have improved over the last few days.  Her pain is well controlled at this time.  She is taking occasional tramadol at night for her pain.  Patient is walking with a cane.  She has finished her home physical therapy.  No fevers or chills.  Patient remains on Aspirin 325mg twice per day for DVT prophylaxis.   4/7/2023  Ms. Daley presented to the office for follow-up of her bilateral knee pain.  Patient continues to experience bilateral (right greater than left) knee pain.  She also reports some right knee stiffness and occasional lower back pain.  Patient has some neuropathy in her hands and feet.  Patient continues to experience pain is affecting her quality of life and daily activities.  She has tried multiple knee injections, last in August 2022.  She has also tried physical therapy and has been in pain management.  Patient is planned for a right total knee arthroplasty.  She has a stress test scheduled for Wednesday.  Patient is currently taking meloxicam for her pain.  2/10/2023 Ms. Daley is a 62-year-old female presents to the office for follow up of her bilateral knee pain.  Patient has been experiencing bilateral (right greater than left) knee pain for about 30 years.  She states that it has been worse over the last 6 to 8 years. Pain is now affecting her quality of life and daily activities.  Pain is worse when lying down and patient tries to keep her feet elevated.  Right knee pain is worse than her left at this time.  Patient has tried meloxicam, gabapentin, and Tylenol for the pain.  She has a history of chronic pain and has been in pain management for about 20 years at [facility].  Patient has stopped taking methadone and is currently only taking tramadol for her pain.  She did have some withdrawal from her opioid medications.  Patient has tried multiple knee injections, including cortisone injections for many years.  She has tried about 4 series of viscosupplementation injections.  Her last knee joint injections were in August 2022, which did not help.  She states that she received 3 gel injections in the right knee and 2 in the left, but pain worsened and she did not finish the left knee series.  She had an injection in her right IT band in November 2022.  Patient is also tried physical therapy for her knees, last in October 2022. She has tried physical therapy for her back pain, but her knee pain limited her back PT. Patient has continued her weight loss and her BMI: 39.26.  No recent trauma.  No fevers or chills.  Patient was recently started on Symbicort by her pulmonologist.  She would like to move her surgical date from March into April.   History: HTN, Diverticulitis, GERD, DEREK, History of Breast Cancer, Alcoholism (19 years sober), Lymphedema in Right Arm, Fibromyalgia, Neuropathy, Bipolar, Diabetes (Last A1C: 6.1)

## 2024-06-10 NOTE — DISCUSSION/SUMMARY
[de-identified] : Denia Daley is a 63-year-old female presents to the office for follow-up of her left total knee arthroplasty.  Patient underwent left TKA on 3/18/2024.  XRays showed left total knee arthroplasty in good position and alignment. Examination showed range of motion 0 to 120. Discussed with the patient the examination and imaging findings. Discussed the management of total knee arthroplasty at this time, including physical therapy. Patient will continue physical therapy. Patient has completed DVT prophylaxis. Patient will follow up in 6 weeks for reevaluation and management. Patient understanding and in agreement with the plan. All questions answered.     Plan: -Physical Therapy -DVT Prophylaxis: Completed -Follow up in 6 weeks for reevaluation and management

## 2024-06-11 ENCOUNTER — APPOINTMENT (OUTPATIENT)
Dept: ORTHOPEDIC SURGERY | Facility: CLINIC | Age: 64
End: 2024-06-11

## 2024-06-20 ENCOUNTER — APPOINTMENT (OUTPATIENT)
Dept: ORTHOPEDIC SURGERY | Facility: CLINIC | Age: 64
End: 2024-06-20

## 2024-06-20 DIAGNOSIS — Z96.659 PRESENCE OF UNSPECIFIED ARTIFICIAL KNEE JOINT: ICD-10-CM

## 2024-06-20 PROCEDURE — 73562 X-RAY EXAM OF KNEE 3: CPT | Mod: 50

## 2024-06-20 PROCEDURE — 99214 OFFICE O/P EST MOD 30 MIN: CPT | Mod: 25

## 2024-06-20 PROCEDURE — 20610 DRAIN/INJ JOINT/BURSA W/O US: CPT | Mod: RT

## 2024-06-21 ENCOUNTER — NON-APPOINTMENT (OUTPATIENT)
Age: 64
End: 2024-06-21

## 2024-06-21 LAB
B PERT IGG+IGM PNL SER: ABNORMAL
COLOR FLD: YELLOW
EOSINOPHIL # FLD MANUAL: 0 %
FLUID INTAKE SUBSTANCE CLASS: NORMAL
JOINT PCR PANEL: DETECTED
JOINT PCR SOURCE: NORMAL
LYMPHOCYTES # FLD MANUAL: 2 %
MESOTHL CELL NFR FLD: 0 %
MONOS+MACROS NFR FLD MANUAL: 17 %
NEUTS SEG # FLD MANUAL: 81 %
NRBC # FLD: 0 %
RBC # FLD MANUAL: ABNORMAL /UL
STAPHYLOCOCCUS LUGDUNENSIS: DETECTED
TOTAL CELLS COUNTED FLD: NORMAL /UL
TUBE TYPE: NORMAL
UNIDENT CELLS NFR FLD MANUAL: 0 %
VARIANT LYMPHS # FLD MANUAL: 0 %

## 2024-06-22 PROBLEM — Z96.659 S/P TOTAL KNEE ARTHROPLASTY: Status: ACTIVE | Noted: 2023-05-08

## 2024-06-22 NOTE — PHYSICAL EXAM
[de-identified] : Constitutional:  63 year old female, alert and oriented, cooperative, in no acute distress.  HEENT  NC/AT.  Appearance: symmetric  Neck/Back Straight without deformity or instability.  Good ROM.  Chest/Respiratory  Respiratory effort: no intercostal retractions or use of accessory muscles. Nonlabored Breathing  Skin  On inspection, warm and dry without rashes or lesions.  Mental Status:  Judgment, insight: intact Orientation: oriented to time, place, and person  Neurological: Sensory and Motor are grossly intact throughout  Left Knee  Inspection:     Incision well healing. No erythema or drainage.     No Effusion     Non-tender to palpation over tibial tubercle, patella, medial and lateral joint line, and pes insertion.  Range of Motion: 	Extension - 0 degrees 	Flexion - 120 degrees 	Extensor lag: None  Stability:      Demonstrates no Varus or Valgus instability      Negative Anterior or Posterior drawer.      No mid flexion instability  Patella: stable, tracks well.   Right Knee  Inspection:     Incision well healed. No erythema or drainage.     Mild Effusion     Non-tender to palpation over tibial tubercle, patella, medial and lateral joint line, and pes insertion.  Range of Motion: 	Extension - 0 degrees 	Flexion - 120 degrees 	Extensor lag: None  Stability:      Demonstrates no Varus or Valgus instability      Negative Anterior or Posterior drawer.      No mid flexion instability  Patella: stable, tracks well.   Neurologic Exam     Motor intact including 5/5 Extensor Hallucis Longus, 5/5 Flexor Hallucis Longus, 5/5 Tibialis Anterior and 5/5 Gastrocnemius     Sensation Intact to Light Touch including Saphenous, Sural, Superficial Peroneal, Deep Peroneal, Tibial nerve distributions  Vascular Exam     Foot is warm and well perfused with 2+ Dorsalis Pedis Pulse   No pain with range of motion of the bilateral hips or right knee. No lumbar paraspinal muscle tenderness. [de-identified] : XRay:  XRays of the Left Knee (3 Views) taken today in the office and discussed with the patient. XRays demonstrate a Left Total Knee Arthroplasty in good position and alignment. There is no obvious evidence of fracture, dislocation, osteolysis or loosening. (my personal interpretation) Components: Smith and Nephew JII BCS, Tibial Stem  XRay:  XRays of the Right Knee (3 Views) taken in the office today and reviewed with the patient. XRays demonstrate a Right Total Knee Arthroplasty. There has been loosening of the tibial and femoral components. (my personal interpretation) Components: Malik and Nephew JII BCS

## 2024-06-22 NOTE — DISCUSSION/SUMMARY
[de-identified] : Denia Daley is a 63-year-old female presents to the office for follow-up of her Bilateral total knee arthroplasties.  Patient's left knee is doing well.  Her right knee has been experiencing pain.  X-rays showed loosening of the right tibia and femoral components.  Examination showed good right knee range of motion.  Discussed with patient the examination and imaging findings.  Discussed with the patient that patient's right knee shows loosening and significant osteolysis.  Discussed potential etiologies of her right knee pain including, but not limited to, loosening and prosthetic joint infection.  Discussed the operative and nonoperative management of patient's right knee at this time, including revision total knee arthroplasty.  Discussed revision total knee arthroplasty at length, including surgical procedure, implants, recovery, physical therapy, and risks.  Discussed the management and treatment course of prosthetic joint infection.  Discussed with patient the workup of her right knee at this time, including laboratory workup and imaging.  ESR and CRP were ordered.  Discussed right knee aspiration.  Patient amenable to the right knee aspiration today.  Right knee aspiration was performed using aseptic technique.  25 cc of cloudy fluid was aspirated.  Aspiration was sent for cell count, Gram stain/culture, and PCR.  Discussed that patient would require cardiac, pulmonary, and medical clearance.  Patient understanding and in agreement with the plan.  All questions answered.     Plan: -CT Scan Right Knee -Follow Up ESR/CRP -Follow up Aspiration: Cell Count, Gram Stain/Culture, Joint PCR -Right Revision Total Knee Arthroplasty, pending results of workup     Surgical Plan: Diagnosis: Right TKA Loosening versus PJI Laterality: Right Operative procedure: Right Revision Total Knee Arthroplasty Location: [facility]  DVT prophylaxis: Aspirin 325mg twice per day TXA: IV Plan for discharge: Home Pre- & Post Operative Antibiotics: Cefazolin 3g  Clearances:  Medical: Pending  Cardiac: Pending  Hematology: Pending  Pulmonology: Pending  Comorbidities:  Metal Allergy: Positive (rashes to jewelry) (Angioedema to Ramipril)  Chronic Pain: Positive  Diabetes: Positive, Last A1C: 6.8  Use of Anticoagulation: Negative  Atrial Fibrillation: Negative  History of VTE: Negative  History of Cardiac Stents: Negative  Skin Infections/Open Wounds: Negative  MRSA Infection/Colonization: Negative  Current Urinary Symptoms: Negative  Immunocompromise: Negative  Inflammatory Arthritis: History of Lupus Antibodies (Possibly positive due to COVID)  Smoking: Negative  Drug Use: Negative  Alcohol Use: Sober for 19 years  Obstructive Sleep Apnea: Positive  Neurologic Disease: History of neuropathy in hands/feet, Has tremor (treated by Neurology), History of Fibromyalgia.

## 2024-06-22 NOTE — HISTORY OF PRESENT ILLNESS
[de-identified] : 6/19/2024  Denia Daley presents to the office for follow-up of her bilateral TKAs.  Patient's left knee is doing well.  Her right knee now has pain.  Pain is worse with standing on the knee.  She has difficulty with stairs.  No falls.  Patient reports a fibromyalgia flare in October and had some issues with the knee since.  She felt some knee stiffness.  No fevers or chills.  5/7/2024  Denia Daley presents to the office for follow-up of her left total knee arthroplasty.  Patient went left TKA on 3/18/2024.  She is currently doing well overall.  Patient does not have significant knee pain at this time.  She is participating in physical therapy.  No falls.  No fevers or chills.  4/11/2024  Denia Daley presents to the office for follow-up of her left total knee arthroplasty.  Patient underwent left TKA on 3/18/2024.  Patient had some pain when in bed.  Pain is located over the knee.  No falls.  No fevers or chills.  4/9/2024  Denia Daley presents to the office for follow-up of her left total knee arthroplasty.  Patient underwent left TKA on 3/18/2024.  There has been no further wound drainage.  Patient is currently doing well overall.  No falls or injuries. No fevers or chills.  4/2/2024  Denia Daley presents to the office for follow-up of her left total knee arthroplasty.  Patient underwent left TKA on 3/18/2024.  A Prevena wound VAC was placed in the ER due to wound drainage.  There has been no further drainage around the wound VAC.  No fevers or chills.  No falls or injuries.  She does not have significant pain at this time.  3/28/2024  Denia Daley presented to the office for follow-up of her left total knee arthroplasty.  Patient underwent left TKA on 3/18/2024.  She had some wound drainage and a Prevena VAC was placed in the ER.  She is otherwise doing well.  Patient has been working with physical therapy.  She has been taking her Aspirin for her DVT prophylaxis.  No drainage around the wound VAC.  No fevers or chills.  No falls or injuries.  She has been working with home physical therapy.  3/5/2024  Denia Daley presents to the office for follow-up of her right total knee arthroplasty and management of her left knee osteoarthritis.  Her right knee has been in physical therapy and is doing well.  She continues to have left knee pain.  Left knee pain continues to affect her quality of life.  Her weight is 216 pounds and she is working on weight loss.  No falls.  No fevers or chills.  2/1/2024  Denia Daley presents to the office for follow-up of her right total knee arthroplasty and management of her left knee osteoarthritis.  Patient has been in physical therapy and is doing well.  Right knee is improving, but she has some lateral pain.  She continues to have left knee pain.  Left knee pain is not significantly changed.  Her left knee continues to affect her quality of life.  Patient had a recent right-sided RFA on Tuesday and is planned for a left-sided RFA.  No lightheadedness or fatigue.  No falls or injuries.  Patient states that she is 217 lbs. at this time.  1/4/2024  Denia Daley presented to the office for follow-up of her right total knee arthroplasty and management of her left knee osteoarthritis.  Patient's right knee is doing well overall.  She did have a fibromyalgia flare and had some pain.  She would like to restart physical therapy.  Patient continues to have left knee pain.  Pain is worse with going up stairs and in bed.  No falls.  No fevers or chills.  Patient does have back pain.  She states that her last weight was 218 pounds and that her last hemoglobin was 10.0.  She is currently on iron and has somewhat improved.  11/7/2023  Denia Daley presented to the office for follow-up of her right total knee arthroplasty and left knee osteoarthritis.  Patient was increasingly active about 10 days ago on Saturday.  She had knee pain afterwards and had pain last week.  Pain was worse with leaning forward.  No falls.  No fevers or chills.  No significant swelling.  Patient states that her pain is improving.  She continues to have left knee pain and had pain after the increased activity, which has been improving.  Pain is located over the medial and lateral knees.  8/29/2023  Denia Daley presents to the office for follow-up of her right total knee arthroplasty and left knee osteoarthritis.  Patient's right knee is currently doing well.  She does get some occasional shock type pains.  She is able to walk without a cane.  Patient continues to have left knee pain.  She states that the left knee is still affecting her quality of life.  She has tried physical therapy, anti-inflammatories, and injections.  Patient was recently seen by neurology and diagnosed with an essential tremor.  She is going to see cardiology and her primary care physician for her clearances.  She had a recent sleep study for her MADHURI.  No falls or injuries.  7/27/2023  Denia Daley presents to the office for follow-up of her right total knee arthroplasty and left knee osteoarthritis.  Patient's right knee is currently doing well.  She has no significant issues.  Patient continues to have significant left knee pain.  She states that the left knee pain is affecting her quality of life.  She has been walking better due to her right TKA.  Patient uses a cane in the community, but is able to walk without the cane.  She has tried pain medications, physical therapy, and injections for the left knee.  Patient states that her back pain has improved after undergoing RFA and is going for an EMG tomorrow for her carpal tunnel.  6/13/2023  Stephanie Daley is a 63-year-old female who presents to the office for follow-up of her right total knee arthroplasty.  Patient underwent right TKA on 4/24/2023.  She is currently doing well.  Right knee pain is significantly improved compared to prior to surgery.  She is currently in physical therapy. Patient continues to take her Aspirin 325 mg twice per day for DVT prophylaxis.  Patient has been doing home exercises on a bicycle. She is currently walking with a cane when outside, but is not using any assistive devices in the home.  Patient currently reports left lower back and left knee pain.  She continues to have left knee pain.  She had a recent RFA on her right lower back.  She is currently taking tramadol, gabapentin and Tylenol.  She states for the majority of her pain is currently in her left knee.  Left knee pain continues to affect her quality of life.  Patient has tried injections, physical therapy, and pain medications for her left knee.  Her last left knee injection was in August 2022.  She would like to discuss left total knee arthroplasty.  5/9/2023  Ms. Daley presents to the office for follow-up of her right total knee arthroplasty.  Patient underwent right TKA on 4/24/2023.  She is currently doing well.  Patient did go to the emergency department on 5/5/2023 with pain and swelling of her right lower extremity.  US Duplex was negative.  Her pain and swelling have improved over the last few days.  Her pain is well controlled at this time.  She is taking occasional tramadol at night for her pain.  Patient is walking with a cane.  She has finished her home physical therapy.  No fevers or chills.  Patient remains on Aspirin 325mg twice per day for DVT prophylaxis.   4/7/2023  Ms. Daley presented to the office for follow-up of her bilateral knee pain.  Patient continues to experience bilateral (right greater than left) knee pain.  She also reports some right knee stiffness and occasional lower back pain.  Patient has some neuropathy in her hands and feet.  Patient continues to experience pain is affecting her quality of life and daily activities.  She has tried multiple knee injections, last in August 2022.  She has also tried physical therapy and has been in pain management.  Patient is planned for a right total knee arthroplasty.  She has a stress test scheduled for Wednesday.  Patient is currently taking meloxicam for her pain.  2/10/2023 Ms. Daley is a 62-year-old female presents to the office for follow up of her bilateral knee pain.  Patient has been experiencing bilateral (right greater than left) knee pain for about 30 years.  She states that it has been worse over the last 6 to 8 years. Pain is now affecting her quality of life and daily activities.  Pain is worse when lying down and patient tries to keep her feet elevated.  Right knee pain is worse than her left at this time.  Patient has tried meloxicam, gabapentin, and Tylenol for the pain.  She has a history of chronic pain and has been in pain management for about 20 years at [facility].  Patient has stopped taking methadone and is currently only taking tramadol for her pain.  She did have some withdrawal from her opioid medications.  Patient has tried multiple knee injections, including cortisone injections for many years.  She has tried about 4 series of viscosupplementation injections.  Her last knee joint injections were in August 2022, which did not help.  She states that she received 3 gel injections in the right knee and 2 in the left, but pain worsened and she did not finish the left knee series.  She had an injection in her right IT band in November 2022.  Patient is also tried physical therapy for her knees, last in October 2022. She has tried physical therapy for her back pain, but her knee pain limited her back PT. Patient has continued her weight loss and her BMI: 39.26.  No recent trauma.  No fevers or chills.  Patient was recently started on Symbicort by her pulmonologist.  She would like to move her surgical date from March into April.   History: HTN, Diverticulitis, GERD, DEREK, History of Breast Cancer, Alcoholism (19 years sober), Lymphedema in Right Arm, Fibromyalgia, Neuropathy, Bipolar, Diabetes (Last A1C: 6.1)

## 2024-06-24 LAB — JOINT CULTURE: ABNORMAL

## 2024-06-26 ENCOUNTER — APPOINTMENT (OUTPATIENT)
Dept: CT IMAGING | Facility: IMAGING CENTER | Age: 64
End: 2024-06-26

## 2024-06-26 ENCOUNTER — OUTPATIENT (OUTPATIENT)
Dept: OUTPATIENT SERVICES | Facility: HOSPITAL | Age: 64
LOS: 1 days | End: 2024-06-26
Payer: MEDICARE

## 2024-06-26 DIAGNOSIS — H26.9 UNSPECIFIED CATARACT: Chronic | ICD-10-CM

## 2024-06-26 DIAGNOSIS — Z96.659 PRESENCE OF UNSPECIFIED ARTIFICIAL KNEE JOINT: ICD-10-CM

## 2024-06-26 DIAGNOSIS — Z33.2 ENCOUNTER FOR ELECTIVE TERMINATION OF PREGNANCY: Chronic | ICD-10-CM

## 2024-06-26 DIAGNOSIS — Z96.651 PRESENCE OF RIGHT ARTIFICIAL KNEE JOINT: Chronic | ICD-10-CM

## 2024-06-26 PROCEDURE — 73700 CT LOWER EXTREMITY W/O DYE: CPT | Mod: MH

## 2024-06-26 PROCEDURE — 73700 CT LOWER EXTREMITY W/O DYE: CPT | Mod: 26,RT,MH

## 2024-06-27 ENCOUNTER — APPOINTMENT (OUTPATIENT)
Dept: ORTHOPEDIC SURGERY | Facility: CLINIC | Age: 64
End: 2024-06-27
Payer: MEDICARE

## 2024-06-27 LAB
CRP SERPL-MCNC: 14 MG/L
ERYTHROCYTE [SEDIMENTATION RATE] IN BLOOD BY WESTERGREN METHOD: 105 MM/HR

## 2024-06-27 PROCEDURE — 99214 OFFICE O/P EST MOD 30 MIN: CPT

## 2024-07-11 ENCOUNTER — APPOINTMENT (OUTPATIENT)
Dept: PULMONOLOGY | Facility: CLINIC | Age: 64
End: 2024-07-11

## 2024-07-11 ENCOUNTER — APPOINTMENT (OUTPATIENT)
Dept: INFECTIOUS DISEASE | Facility: CLINIC | Age: 64
End: 2024-07-11
Payer: MEDICARE

## 2024-07-11 VITALS
TEMPERATURE: 97.1 F | HEIGHT: 60 IN | SYSTOLIC BLOOD PRESSURE: 102 MMHG | HEART RATE: 68 BPM | BODY MASS INDEX: 44.17 KG/M2 | DIASTOLIC BLOOD PRESSURE: 67 MMHG | WEIGHT: 225 LBS | OXYGEN SATURATION: 97 %

## 2024-07-11 VITALS
DIASTOLIC BLOOD PRESSURE: 62 MMHG | HEART RATE: 75 BPM | SYSTOLIC BLOOD PRESSURE: 158 MMHG | OXYGEN SATURATION: 98 % | BODY MASS INDEX: 43.75 KG/M2 | WEIGHT: 224 LBS | TEMPERATURE: 96.6 F

## 2024-07-11 PROCEDURE — 99205 OFFICE O/P NEW HI 60 MIN: CPT

## 2024-07-11 PROCEDURE — 99214 OFFICE O/P EST MOD 30 MIN: CPT | Mod: 25

## 2024-07-11 PROCEDURE — 94060 EVALUATION OF WHEEZING: CPT

## 2024-07-12 NOTE — PATIENT PROFILE ADULT - FUNCTIONAL ASSESSMENT - DAILY ACTIVITY 4.
Please review; protocol failed/No Protocol    Requested Prescriptions   Pending Prescriptions Disp Refills    IRBESARTAN 150 MG Oral Tab [Pharmacy Med Name: Irbesartan 150 MG Oral Tablet] 90 tablet 3     Sig: TAKE 1 TABLET BY MOUTH DAILY       Hypertension Medications Protocol Failed - 7/9/2024  9:33 PM        Failed - Last BP reading less than 140/90     BP Readings from Last 1 Encounters:   06/19/24 140/60               Passed - CMP or BMP in past 12 months        Passed - In person appointment or virtual visit in the past 12 mos or appointment in next 3 mos     Recent Outpatient Visits              3 weeks ago Senile osteoporosis    [facility] [place]    Office Visit    2 months ago Seronegative rheumatoid arthritis (HCC)    [facility] Isaias Olmedo MD    Office Visit    2 months ago CHEK2-related breast cancer (HCC)    [facility] Kayla Guerrero APRN    Office Visit    3 months ago Encounter for annual health examination    [facility], [address] [place] [address] Anay Santana PA-C    Office Visit    5 months ago Seronegative rheumatoid arthritis (HCC)    [facility] Isaias Olmedo MD    Office Visit          Future Appointments         Provider Department Appt Notes    In 1 week WDR MRI RM1 (1.5T WIDE) [facility] [place]     In 1 month EH MR RM2 (1.5T WIDE) [facility]     In 1 month Isaias Olmedo MD [facility] 4mo    In 5 months Dandre Rdz MD [facility] Cystoscopy                    Passed - EGFRCR or GFRNAA > 50     GFR Evaluation  EGFRCR: 94 , resulted on 6/12/2024             Future Appointments         Provider Department Appt Notes    In 1 week WDR MRI RM1 (1.5T WIDE) [facility] [place]     In 1 month    RM2 (1.5T WIDE) [facility] MRI     In 1 month Isaias Olmedo MD [facility] 4mo    In 5 months Dandre Rdz MD [facility] Cystoscopy          Recent Outpatient Visits              3 weeks ago Senile osteoporosis    [facility] [place]    Office Visit    2 months ago Seronegative rheumatoid arthritis (HCC)    [facility] Isaias Olmedo MD    Office Visit    2 months ago CHEK2-related breast cancer (HCC)    [facility] [employer] [facility] Kayla Guerreor APRN    Office Visit    3 months ago Encounter for annual health examination    [facility], [address] [place] [address] Anya Santana PA-C    Office Visit    5 months ago Seronegative rheumatoid arthritis (HCC)    [facility] Isaias Olmedo MD    Office Visit               3 = A little assistance

## 2024-07-16 LAB
BACTERIA BLD CULT: NORMAL
BACTERIA BLD CULT: NORMAL

## 2024-07-18 ENCOUNTER — APPOINTMENT (OUTPATIENT)
Dept: INTERNAL MEDICINE | Facility: CLINIC | Age: 64
End: 2024-07-18
Payer: MEDICARE

## 2024-07-18 ENCOUNTER — APPOINTMENT (OUTPATIENT)
Dept: ORTHOPEDIC SURGERY | Facility: CLINIC | Age: 64
End: 2024-07-18

## 2024-07-18 VITALS
TEMPERATURE: 97.8 F | BODY MASS INDEX: 43.39 KG/M2 | HEART RATE: 80 BPM | DIASTOLIC BLOOD PRESSURE: 69 MMHG | OXYGEN SATURATION: 97 % | SYSTOLIC BLOOD PRESSURE: 122 MMHG | HEIGHT: 60 IN | WEIGHT: 221 LBS

## 2024-07-18 DIAGNOSIS — T84.50XA INFECTION AND INFLAMMATORY REACTION DUE TO UNSPECIFIED INTERNAL JOINT PROSTHESIS, INITIAL ENCOUNTER: ICD-10-CM

## 2024-07-18 DIAGNOSIS — Z01.818 ENCOUNTER FOR OTHER PREPROCEDURAL EXAMINATION: ICD-10-CM

## 2024-07-18 PROCEDURE — 99214 OFFICE O/P EST MOD 30 MIN: CPT

## 2024-07-18 PROCEDURE — 99213 OFFICE O/P EST LOW 20 MIN: CPT

## 2024-07-22 ENCOUNTER — APPOINTMENT (OUTPATIENT)
Dept: INTERNAL MEDICINE | Facility: CLINIC | Age: 64
End: 2024-07-22

## 2024-07-22 RX ORDER — PANTOPRAZOLE SODIUM 20 MG/1
40 TABLET, DELAYED RELEASE ORAL ONCE
Refills: 0 | Status: COMPLETED | OUTPATIENT
Start: 2024-07-24 | End: 2024-07-24

## 2024-07-22 RX ORDER — DEXTROSE MONOHYDRATE, SODIUM CHLORIDE, SODIUM LACTATE, CALCIUM CHLORIDE, MAGNESIUM CHLORIDE 1.5; 538; 448; 18.4; 5.08 G/100ML; MG/100ML; MG/100ML; MG/100ML; MG/100ML
1000 SOLUTION INTRAPERITONEAL
Refills: 0 | Status: DISCONTINUED | OUTPATIENT
Start: 2024-07-24 | End: 2024-07-24

## 2024-07-22 RX ORDER — CHLORHEXIDINE GLUCONATE 500 MG/1
1 CLOTH TOPICAL DAILY
Refills: 0 | Status: DISCONTINUED | OUTPATIENT
Start: 2024-07-24 | End: 2024-07-31

## 2024-07-22 RX ORDER — TRAMADOL HCL 50 MG
50 TABLET ORAL ONCE
Refills: 0 | Status: DISCONTINUED | OUTPATIENT
Start: 2024-07-24 | End: 2024-07-24

## 2024-07-22 RX ORDER — BACTERIOSTATIC SODIUM CHLORIDE 0.9 %
3 VIAL (ML) INJECTION EVERY 8 HOURS
Refills: 0 | Status: DISCONTINUED | OUTPATIENT
Start: 2024-07-24 | End: 2024-07-24

## 2024-07-23 ENCOUNTER — NON-APPOINTMENT (OUTPATIENT)
Age: 64
End: 2024-07-23

## 2024-07-23 ENCOUNTER — TRANSCRIPTION ENCOUNTER (OUTPATIENT)
Age: 64
End: 2024-07-23

## 2024-07-23 ENCOUNTER — APPOINTMENT (OUTPATIENT)
Dept: CARDIOLOGY | Facility: CLINIC | Age: 64
End: 2024-07-23
Payer: MEDICARE

## 2024-07-23 VITALS
SYSTOLIC BLOOD PRESSURE: 100 MMHG | HEART RATE: 72 BPM | HEIGHT: 60 IN | WEIGHT: 225 LBS | DIASTOLIC BLOOD PRESSURE: 60 MMHG | OXYGEN SATURATION: 97 % | BODY MASS INDEX: 44.17 KG/M2

## 2024-07-23 DIAGNOSIS — I10 ESSENTIAL (PRIMARY) HYPERTENSION: ICD-10-CM

## 2024-07-23 PROBLEM — T84.50XA PROSTHETIC JOINT INFECTION: Status: ACTIVE | Noted: 2024-07-03

## 2024-07-23 LAB
ALBUMIN SERPL ELPH-MCNC: 3.7 G/DL
ALP BLD-CCNC: 112 U/L
ALT SERPL-CCNC: 7 U/L
ANION GAP SERPL CALC-SCNC: 18 MMOL/L
AST SERPL-CCNC: 12 U/L
BASOPHILS # BLD AUTO: 0.04 K/UL
BASOPHILS NFR BLD AUTO: 0.7 %
BILIRUB SERPL-MCNC: 0.2 MG/DL
BUN SERPL-MCNC: 11 MG/DL
CALCIUM SERPL-MCNC: 9.8 MG/DL
CHLORIDE SERPL-SCNC: 97 MMOL/L
CO2 SERPL-SCNC: 23 MMOL/L
CREAT SERPL-MCNC: 0.98 MG/DL
EGFR: 64 ML/MIN/1.73M2
EOSINOPHIL # BLD AUTO: 0.08 K/UL
EOSINOPHIL NFR BLD AUTO: 1.4 %
GLUCOSE SERPL-MCNC: 197 MG/DL
HCT VFR BLD CALC: 31 %
HGB BLD-MCNC: 9.4 G/DL
IMM GRANULOCYTES NFR BLD AUTO: 0.2 %
LYMPHOCYTES # BLD AUTO: 1.17 K/UL
LYMPHOCYTES NFR BLD AUTO: 20.1 %
MAN DIFF?: NORMAL
MCHC RBC-ENTMCNC: 24.7 PG
MCHC RBC-ENTMCNC: 30.3 GM/DL
MCV RBC AUTO: 81.6 FL
MONOCYTES # BLD AUTO: 0.33 K/UL
MONOCYTES NFR BLD AUTO: 5.7 %
NEUTROPHILS # BLD AUTO: 4.19 K/UL
NEUTROPHILS NFR BLD AUTO: 71.9 %
PLATELET # BLD AUTO: 357 K/UL
POTASSIUM SERPL-SCNC: 3.3 MMOL/L
PROT SERPL-MCNC: 7.8 G/DL
RBC # BLD: 3.8 M/UL
RBC # FLD: 17.3 %
SODIUM SERPL-SCNC: 137 MMOL/L
WBC # FLD AUTO: 5.82 K/UL

## 2024-07-23 PROCEDURE — 93000 ELECTROCARDIOGRAM COMPLETE: CPT | Mod: PD

## 2024-07-23 PROCEDURE — G2211 COMPLEX E/M VISIT ADD ON: CPT | Mod: PD

## 2024-07-23 PROCEDURE — 99214 OFFICE O/P EST MOD 30 MIN: CPT

## 2024-07-23 NOTE — DISCUSSION/SUMMARY
[EKG obtained to assist in diagnosis and management of assessed problem(s)] : EKG obtained to assist in diagnosis and management of assessed problem(s) [FreeTextEntry1] : 64 year old woman with HTN here for followup #HTN- On  HCTZ and Valsartan, on Norvasc 10 mg  #Preop eval- TTE Jan 2023 was normal, Nuclear stress test April 2023 normal. Medically optimized for surgery. from cardiac perspective No further cardiac workup needed, patient optimized for TKR

## 2024-07-23 NOTE — HISTORY OF PRESENT ILLNESS
[FreeTextEntry1] : Here for followup. And for eval prior to kneww surgery scheduled for tomorrow 7/24/24. Feels well. BP well controlled. Off Coreg.  No new complaints EKG reviewed and unchanged  Echo 1/8/23: CONCLUSIONS: 1. Eccentric left ventricular hypertrophy (dilated left ventricle with normal relative wall thickness). 2. Normal left ventricular systolic function. No segmental wall motion abnormalities. 3. Normal right ventricular size and function. 4. Normal tricuspid valve.  Moderate tricuspid regurgitation.  #HTN- On  HCTZ and Valsartan, on Norvasc 10 mg  #Preop eval- TTE Jan 2023 was normal, Nuclear stress test April 2023 normal No further cardiac workup needed, patient optimized for TKR

## 2024-07-24 ENCOUNTER — RESULT REVIEW (OUTPATIENT)
Age: 64
End: 2024-07-24

## 2024-07-24 ENCOUNTER — NON-APPOINTMENT (OUTPATIENT)
Age: 64
End: 2024-07-24

## 2024-07-24 ENCOUNTER — APPOINTMENT (OUTPATIENT)
Dept: ORTHOPEDIC SURGERY | Facility: HOSPITAL | Age: 64
End: 2024-07-24

## 2024-07-24 ENCOUNTER — INPATIENT (INPATIENT)
Facility: HOSPITAL | Age: 64
LOS: 6 days | Discharge: NURSING, REHAB OR LONG-TERM CARE | End: 2024-07-31
Attending: STUDENT IN AN ORGANIZED HEALTH CARE EDUCATION/TRAINING PROGRAM | Admitting: STUDENT IN AN ORGANIZED HEALTH CARE EDUCATION/TRAINING PROGRAM
Payer: MEDICARE

## 2024-07-24 VITALS
DIASTOLIC BLOOD PRESSURE: 61 MMHG | TEMPERATURE: 98 F | RESPIRATION RATE: 18 BRPM | HEIGHT: 61 IN | WEIGHT: 227.08 LBS | OXYGEN SATURATION: 97 % | SYSTOLIC BLOOD PRESSURE: 128 MMHG | HEART RATE: 76 BPM

## 2024-07-24 DIAGNOSIS — T84.53XA INFECTION AND INFLAMMATORY REACTION DUE TO INTERNAL RIGHT KNEE PROSTHESIS, INITIAL ENCOUNTER: ICD-10-CM

## 2024-07-24 DIAGNOSIS — H26.9 UNSPECIFIED CATARACT: Chronic | ICD-10-CM

## 2024-07-24 DIAGNOSIS — Z33.2 ENCOUNTER FOR ELECTIVE TERMINATION OF PREGNANCY: Chronic | ICD-10-CM

## 2024-07-24 DIAGNOSIS — Z98.890 OTHER SPECIFIED POSTPROCEDURAL STATES: Chronic | ICD-10-CM

## 2024-07-24 DIAGNOSIS — Z96.652 PRESENCE OF LEFT ARTIFICIAL KNEE JOINT: Chronic | ICD-10-CM

## 2024-07-24 DIAGNOSIS — Z96.651 PRESENCE OF RIGHT ARTIFICIAL KNEE JOINT: Chronic | ICD-10-CM

## 2024-07-24 LAB
BLOOD GAS ARTERIAL - LYTES,HGB,ICA,LACT RESULT: SIGNIFICANT CHANGE UP
GLUCOSE BLDC GLUCOMTR-MCNC: 142 MG/DL — HIGH (ref 70–99)

## 2024-07-24 PROCEDURE — 88300 SURGICAL PATH GROSS: CPT | Mod: 26

## 2024-07-24 PROCEDURE — 73560 X-RAY EXAM OF KNEE 1 OR 2: CPT | Mod: 26,RT

## 2024-07-24 PROCEDURE — 27487 REVISE/REPLACE KNEE JOINT: CPT | Mod: RT

## 2024-07-24 DEVICE — IMPLANTABLE DEVICE: Type: IMPLANTABLE DEVICE | Status: FUNCTIONAL

## 2024-07-24 DEVICE — CEMENT SIMPLEX P 40GM: Type: IMPLANTABLE DEVICE | Status: FUNCTIONAL

## 2024-07-24 DEVICE — PIN TROCAR GEN 1/8 X 3IN: Type: IMPLANTABLE DEVICE | Status: FUNCTIONAL

## 2024-07-24 DEVICE — CEMENT SIMPLEX WITH TOBRAMYCIN: Type: IMPLANTABLE DEVICE | Status: FUNCTIONAL

## 2024-07-24 DEVICE — BONE FILLER BIOCOMPOSITE STIMULAN RAPID CURE 10CC: Type: IMPLANTABLE DEVICE | Status: FUNCTIONAL

## 2024-07-24 RX ORDER — INSULIN LISPRO 100/ML
VIAL (ML) SUBCUTANEOUS EVERY 6 HOURS
Refills: 0 | Status: DISCONTINUED | OUTPATIENT
Start: 2024-07-24 | End: 2024-07-25

## 2024-07-24 RX ORDER — DEXTROSE MONOHYDRATE, SODIUM CHLORIDE, SODIUM LACTATE, CALCIUM CHLORIDE, MAGNESIUM CHLORIDE 1.5; 538; 448; 18.4; 5.08 G/100ML; MG/100ML; MG/100ML; MG/100ML; MG/100ML
1000 SOLUTION INTRAPERITONEAL
Refills: 0 | Status: DISCONTINUED | OUTPATIENT
Start: 2024-07-24 | End: 2024-07-31

## 2024-07-24 RX ORDER — HYDROMORPHONE HCL IN 0.9% NACL 0.2 MG/ML
0.2 PLASTIC BAG, INJECTION (ML) INTRAVENOUS EVERY 4 HOURS
Refills: 0 | Status: DISCONTINUED | OUTPATIENT
Start: 2024-07-24 | End: 2024-07-27

## 2024-07-24 RX ORDER — CEFAZOLIN SODIUM 10 G
VIAL (EA) INJECTION
Refills: 0 | Status: DISCONTINUED | OUTPATIENT
Start: 2024-07-25 | End: 2024-07-25

## 2024-07-24 RX ORDER — DEXTROSE 4 G
25 TABLET,CHEWABLE ORAL ONCE
Refills: 0 | Status: DISCONTINUED | OUTPATIENT
Start: 2024-07-24 | End: 2024-07-31

## 2024-07-24 RX ORDER — DEXTROSE 4 G
12.5 TABLET,CHEWABLE ORAL ONCE
Refills: 0 | Status: DISCONTINUED | OUTPATIENT
Start: 2024-07-24 | End: 2024-07-31

## 2024-07-24 RX ORDER — POVIDONE-IODINE 0.1 G/ML
1 SOLUTION TOPICAL ONCE
Refills: 0 | Status: COMPLETED | OUTPATIENT
Start: 2024-07-24 | End: 2024-07-24

## 2024-07-24 RX ORDER — GLUCAGON INJECTION, SOLUTION 0.5 MG/.1ML
1 INJECTION, SOLUTION SUBCUTANEOUS ONCE
Refills: 0 | Status: DISCONTINUED | OUTPATIENT
Start: 2024-07-24 | End: 2024-07-31

## 2024-07-24 RX ORDER — HYDROMORPHONE HCL IN 0.9% NACL 0.2 MG/ML
0.5 PLASTIC BAG, INJECTION (ML) INTRAVENOUS EVERY 4 HOURS
Refills: 0 | Status: DISCONTINUED | OUTPATIENT
Start: 2024-07-24 | End: 2024-07-27

## 2024-07-24 RX ORDER — ONDANSETRON HCL/PF 4 MG/2 ML
4 VIAL (ML) INJECTION ONCE
Refills: 0 | Status: DISCONTINUED | OUTPATIENT
Start: 2024-07-24 | End: 2024-07-24

## 2024-07-24 RX ORDER — PIPERACILLIN SODIUM, TAZOBACTAM SODIUM 3; .375 G/15ML; G/15ML
3.38 INJECTION, POWDER, LYOPHILIZED, FOR SOLUTION INTRAVENOUS ONCE
Refills: 0 | Status: COMPLETED | OUTPATIENT
Start: 2024-07-24 | End: 2024-07-24

## 2024-07-24 RX ORDER — OXYCODONE HYDROCHLORIDE 30 MG/1
5 TABLET ORAL ONCE
Refills: 0 | Status: DISCONTINUED | OUTPATIENT
Start: 2024-07-24 | End: 2024-07-24

## 2024-07-24 RX ORDER — DEXTROSE 4 G
15 TABLET,CHEWABLE ORAL ONCE
Refills: 0 | Status: DISCONTINUED | OUTPATIENT
Start: 2024-07-24 | End: 2024-07-31

## 2024-07-24 RX ORDER — VANCOMYCIN HYDROCHLORIDE 5 G/100ML
1250 INJECTION, POWDER, LYOPHILIZED, FOR SOLUTION INTRAVENOUS ONCE
Refills: 0 | Status: COMPLETED | OUTPATIENT
Start: 2024-07-24 | End: 2024-07-24

## 2024-07-24 RX ORDER — VANCOMYCIN HYDROCHLORIDE 5 G/100ML
1000 INJECTION, POWDER, LYOPHILIZED, FOR SOLUTION INTRAVENOUS ONCE
Refills: 0 | Status: COMPLETED | OUTPATIENT
Start: 2024-07-24 | End: 2024-07-25

## 2024-07-24 RX ORDER — HEPARIN SODIUM 1000 [USP'U]/ML
5000 INJECTION, SOLUTION INTRAVENOUS; SUBCUTANEOUS EVERY 8 HOURS
Refills: 0 | Status: DISCONTINUED | OUTPATIENT
Start: 2024-07-24 | End: 2024-07-25

## 2024-07-24 RX ORDER — ACETAMINOPHEN 500 MG
1000 TABLET ORAL EVERY 6 HOURS
Refills: 0 | Status: COMPLETED | OUTPATIENT
Start: 2024-07-24 | End: 2024-07-25

## 2024-07-24 RX ORDER — HYDROMORPHONE HCL IN 0.9% NACL 0.2 MG/ML
0.5 PLASTIC BAG, INJECTION (ML) INTRAVENOUS
Refills: 0 | Status: DISCONTINUED | OUTPATIENT
Start: 2024-07-24 | End: 2024-07-24

## 2024-07-24 RX ORDER — VANCOMYCIN HYDROCHLORIDE 5 G/100ML
1500 INJECTION, POWDER, LYOPHILIZED, FOR SOLUTION INTRAVENOUS ONCE
Refills: 0 | Status: DISCONTINUED | OUTPATIENT
Start: 2024-07-24 | End: 2024-07-24

## 2024-07-24 RX ADMIN — PANTOPRAZOLE SODIUM 40 MILLIGRAM(S): 20 TABLET, DELAYED RELEASE ORAL at 15:11

## 2024-07-24 RX ADMIN — VANCOMYCIN HYDROCHLORIDE 166.67 MILLIGRAM(S): 5 INJECTION, POWDER, LYOPHILIZED, FOR SOLUTION INTRAVENOUS at 16:57

## 2024-07-24 RX ADMIN — Medication 50 MILLIGRAM(S): at 15:17

## 2024-07-24 RX ADMIN — Medication 50 MILLIGRAM(S): at 15:11

## 2024-07-24 RX ADMIN — PIPERACILLIN SODIUM, TAZOBACTAM SODIUM 200 GRAM(S): 3; .375 INJECTION, POWDER, LYOPHILIZED, FOR SOLUTION INTRAVENOUS at 23:43

## 2024-07-24 RX ADMIN — POVIDONE-IODINE 1 APPLICATION(S): 0.1 SOLUTION TOPICAL at 15:18

## 2024-07-24 RX ADMIN — DEXTROSE MONOHYDRATE, SODIUM CHLORIDE, SODIUM LACTATE, CALCIUM CHLORIDE, MAGNESIUM CHLORIDE 30 MILLILITER(S): 1.5; 538; 448; 18.4; 5.08 SOLUTION INTRAPERITONEAL at 15:11

## 2024-07-24 RX ADMIN — Medication 0.5 MILLIGRAM(S): at 23:45

## 2024-07-24 RX ADMIN — CHLORHEXIDINE GLUCONATE 1 APPLICATION(S): 500 CLOTH TOPICAL at 15:12

## 2024-07-24 NOTE — BRIEF OPERATIVE NOTE - OPERATION/FINDINGS
Right revision total knee arthroplasty with functional spacer Right total knee arthroplasty explant, I&D, revision total knee arthroplasty with functional spacer

## 2024-07-24 NOTE — ASU PREOP CHECKLIST - SURGICAL CONSENT
I have personally seen and examined this patient with the resident/fellow.  I have fully participated in the care of this patient. I have reviewed all pertinent clinical information, including history, physical exam, plan and the Resident/Fellow’s note and agree except as noted. See MDM
done

## 2024-07-25 DIAGNOSIS — I50.32 CHRONIC DIASTOLIC (CONGESTIVE) HEART FAILURE: ICD-10-CM

## 2024-07-25 DIAGNOSIS — Z87.39 PERSONAL HISTORY OF OTHER DISEASES OF THE MUSCULOSKELETAL SYSTEM AND CONNECTIVE TISSUE: ICD-10-CM

## 2024-07-25 LAB
ANION GAP SERPL CALC-SCNC: 11 MMOL/L — SIGNIFICANT CHANGE UP (ref 7–14)
ANION GAP SERPL CALC-SCNC: 12 MMOL/L — SIGNIFICANT CHANGE UP (ref 7–14)
APTT BLD: 30 SEC — SIGNIFICANT CHANGE UP (ref 24.5–35.6)
BLD GP AB SCN SERPL QL: POSITIVE — SIGNIFICANT CHANGE UP
BUN SERPL-MCNC: 11 MG/DL — SIGNIFICANT CHANGE UP (ref 7–23)
BUN SERPL-MCNC: 11 MG/DL — SIGNIFICANT CHANGE UP (ref 7–23)
CALCIUM SERPL-MCNC: 8.6 MG/DL — SIGNIFICANT CHANGE UP (ref 8.4–10.5)
CALCIUM SERPL-MCNC: 8.9 MG/DL — SIGNIFICANT CHANGE UP (ref 8.4–10.5)
CHLORIDE SERPL-SCNC: 103 MMOL/L — SIGNIFICANT CHANGE UP (ref 98–107)
CHLORIDE SERPL-SCNC: 98 MMOL/L — SIGNIFICANT CHANGE UP (ref 98–107)
CO2 SERPL-SCNC: 24 MMOL/L — SIGNIFICANT CHANGE UP (ref 22–31)
CO2 SERPL-SCNC: 24 MMOL/L — SIGNIFICANT CHANGE UP (ref 22–31)
CREAT SERPL-MCNC: 1.02 MG/DL — SIGNIFICANT CHANGE UP (ref 0.5–1.3)
CREAT SERPL-MCNC: 1.05 MG/DL — SIGNIFICANT CHANGE UP (ref 0.5–1.3)
EGFR: 59 ML/MIN/1.73M2 — LOW
EGFR: 61 ML/MIN/1.73M2 — SIGNIFICANT CHANGE UP
GLUCOSE BLDC GLUCOMTR-MCNC: 141 MG/DL — HIGH (ref 70–99)
GLUCOSE BLDC GLUCOMTR-MCNC: 160 MG/DL — HIGH (ref 70–99)
GLUCOSE BLDC GLUCOMTR-MCNC: 172 MG/DL — HIGH (ref 70–99)
GLUCOSE BLDC GLUCOMTR-MCNC: 173 MG/DL — HIGH (ref 70–99)
GLUCOSE BLDC GLUCOMTR-MCNC: 186 MG/DL — HIGH (ref 70–99)
GLUCOSE BLDC GLUCOMTR-MCNC: 90 MG/DL — SIGNIFICANT CHANGE UP (ref 70–99)
GLUCOSE SERPL-MCNC: 164 MG/DL — HIGH (ref 70–99)
GLUCOSE SERPL-MCNC: 175 MG/DL — HIGH (ref 70–99)
GRAM STN FLD: SIGNIFICANT CHANGE UP
HCT VFR BLD CALC: 24.5 % — LOW (ref 34.5–45)
HCT VFR BLD CALC: 27 % — LOW (ref 34.5–45)
HGB BLD-MCNC: 8.6 G/DL — LOW (ref 11.5–15.5)
HGB BLD-MCNC: 9.3 G/DL — LOW (ref 11.5–15.5)
INR BLD: 1.28 RATIO — HIGH (ref 0.85–1.18)
MAGNESIUM SERPL-MCNC: 1.2 MG/DL — LOW (ref 1.6–2.6)
MAGNESIUM SERPL-MCNC: 1.7 MG/DL — SIGNIFICANT CHANGE UP (ref 1.6–2.6)
MCHC RBC-ENTMCNC: 27 PG — SIGNIFICANT CHANGE UP (ref 27–34)
MCHC RBC-ENTMCNC: 27.6 PG — SIGNIFICANT CHANGE UP (ref 27–34)
MCHC RBC-ENTMCNC: 34.4 GM/DL — SIGNIFICANT CHANGE UP (ref 32–36)
MCHC RBC-ENTMCNC: 35.1 GM/DL — SIGNIFICANT CHANGE UP (ref 32–36)
MCV RBC AUTO: 78.5 FL — LOW (ref 80–100)
MCV RBC AUTO: 78.5 FL — LOW (ref 80–100)
NIGHT BLUE STAIN TISS: SIGNIFICANT CHANGE UP
NRBC # BLD: 0 /100 WBCS — SIGNIFICANT CHANGE UP (ref 0–0)
NRBC # BLD: 0 /100 WBCS — SIGNIFICANT CHANGE UP (ref 0–0)
NRBC # FLD: 0 K/UL — SIGNIFICANT CHANGE UP (ref 0–0)
NRBC # FLD: 0.02 K/UL — HIGH (ref 0–0)
PHOSPHATE SERPL-MCNC: 4.3 MG/DL — SIGNIFICANT CHANGE UP (ref 2.5–4.5)
PHOSPHATE SERPL-MCNC: 4.5 MG/DL — SIGNIFICANT CHANGE UP (ref 2.5–4.5)
PLATELET # BLD AUTO: 165 K/UL — SIGNIFICANT CHANGE UP (ref 150–400)
PLATELET # BLD AUTO: 174 K/UL — SIGNIFICANT CHANGE UP (ref 150–400)
POTASSIUM SERPL-MCNC: 4 MMOL/L — SIGNIFICANT CHANGE UP (ref 3.5–5.3)
POTASSIUM SERPL-MCNC: 4.2 MMOL/L — SIGNIFICANT CHANGE UP (ref 3.5–5.3)
POTASSIUM SERPL-SCNC: 4 MMOL/L — SIGNIFICANT CHANGE UP (ref 3.5–5.3)
POTASSIUM SERPL-SCNC: 4.2 MMOL/L — SIGNIFICANT CHANGE UP (ref 3.5–5.3)
PROTHROM AB SERPL-ACNC: 14.2 SEC — HIGH (ref 9.5–13)
RBC # BLD: 3.12 M/UL — LOW (ref 3.8–5.2)
RBC # BLD: 3.44 M/UL — LOW (ref 3.8–5.2)
RBC # FLD: 14.6 % — HIGH (ref 10.3–14.5)
RBC # FLD: 15 % — HIGH (ref 10.3–14.5)
RH IG SCN BLD-IMP: NEGATIVE — SIGNIFICANT CHANGE UP
SODIUM SERPL-SCNC: 134 MMOL/L — LOW (ref 135–145)
SODIUM SERPL-SCNC: 138 MMOL/L — SIGNIFICANT CHANGE UP (ref 135–145)
SPECIMEN SOURCE: SIGNIFICANT CHANGE UP
WBC # BLD: 8.26 K/UL — SIGNIFICANT CHANGE UP (ref 3.8–10.5)
WBC # BLD: 8.35 K/UL — SIGNIFICANT CHANGE UP (ref 3.8–10.5)
WBC # FLD AUTO: 8.26 K/UL — SIGNIFICANT CHANGE UP (ref 3.8–10.5)
WBC # FLD AUTO: 8.35 K/UL — SIGNIFICANT CHANGE UP (ref 3.8–10.5)

## 2024-07-25 PROCEDURE — 71045 X-RAY EXAM CHEST 1 VIEW: CPT | Mod: 26

## 2024-07-25 PROCEDURE — 99223 1ST HOSP IP/OBS HIGH 75: CPT | Mod: GC

## 2024-07-25 PROCEDURE — 99223 1ST HOSP IP/OBS HIGH 75: CPT

## 2024-07-25 RX ORDER — MAGNESIUM SULFATE 500 MG/ML
2 VIAL (ML) INJECTION ONCE
Refills: 0 | Status: COMPLETED | OUTPATIENT
Start: 2024-07-25 | End: 2024-07-25

## 2024-07-25 RX ORDER — PANTOPRAZOLE SODIUM 20 MG/1
40 TABLET, DELAYED RELEASE ORAL
Refills: 0 | Status: DISCONTINUED | OUTPATIENT
Start: 2024-07-25 | End: 2024-07-31

## 2024-07-25 RX ORDER — BACTERIOSTATIC SODIUM CHLORIDE 0.9 %
10 VIAL (ML) INJECTION
Refills: 0 | Status: DISCONTINUED | OUTPATIENT
Start: 2024-07-25 | End: 2024-07-31

## 2024-07-25 RX ORDER — HYDROXYCHLOROQUINE SULFATE 200 MG/1
200 TABLET ORAL
Refills: 0 | Status: DISCONTINUED | OUTPATIENT
Start: 2024-07-25 | End: 2024-07-31

## 2024-07-25 RX ORDER — CEFAZOLIN SODIUM 10 G
1000 VIAL (EA) INJECTION EVERY 8 HOURS
Refills: 0 | Status: DISCONTINUED | OUTPATIENT
Start: 2024-07-25 | End: 2024-07-25

## 2024-07-25 RX ORDER — OXYCODONE HYDROCHLORIDE 30 MG/1
5 TABLET ORAL EVERY 4 HOURS
Refills: 0 | Status: DISCONTINUED | OUTPATIENT
Start: 2024-07-25 | End: 2024-07-31

## 2024-07-25 RX ORDER — CEFAZOLIN SODIUM 10 G
2000 VIAL (EA) INJECTION EVERY 8 HOURS
Refills: 0 | Status: DISCONTINUED | OUTPATIENT
Start: 2024-07-25 | End: 2024-07-31

## 2024-07-25 RX ORDER — DEXTROSE MONOHYDRATE, SODIUM CHLORIDE, SODIUM LACTATE, CALCIUM CHLORIDE, MAGNESIUM CHLORIDE 1.5; 538; 448; 18.4; 5.08 G/100ML; MG/100ML; MG/100ML; MG/100ML; MG/100ML
1000 SOLUTION INTRAPERITONEAL
Refills: 0 | Status: DISCONTINUED | OUTPATIENT
Start: 2024-07-25 | End: 2024-07-26

## 2024-07-25 RX ORDER — CEFAZOLIN SODIUM 10 G
1000 VIAL (EA) INJECTION ONCE
Refills: 0 | Status: COMPLETED | OUTPATIENT
Start: 2024-07-25 | End: 2024-07-25

## 2024-07-25 RX ORDER — ASPIRIN 500 MG
325 TABLET ORAL
Refills: 0 | Status: DISCONTINUED | OUTPATIENT
Start: 2024-07-25 | End: 2024-07-31

## 2024-07-25 RX ORDER — OXYCODONE HYDROCHLORIDE 30 MG/1
10 TABLET ORAL EVERY 4 HOURS
Refills: 0 | Status: DISCONTINUED | OUTPATIENT
Start: 2024-07-25 | End: 2024-07-31

## 2024-07-25 RX ORDER — INSULIN LISPRO 100/ML
VIAL (ML) SUBCUTANEOUS AT BEDTIME
Refills: 0 | Status: DISCONTINUED | OUTPATIENT
Start: 2024-07-25 | End: 2024-07-31

## 2024-07-25 RX ORDER — AMLODIPINE BESYLATE 2.5 MG/1
5 TABLET ORAL DAILY
Refills: 0 | Status: DISCONTINUED | OUTPATIENT
Start: 2024-07-25 | End: 2024-07-31

## 2024-07-25 RX ORDER — INSULIN LISPRO 100/ML
VIAL (ML) SUBCUTANEOUS
Refills: 0 | Status: DISCONTINUED | OUTPATIENT
Start: 2024-07-25 | End: 2024-07-31

## 2024-07-25 RX ADMIN — HEPARIN SODIUM 5000 UNIT(S): 1000 INJECTION, SOLUTION INTRAVENOUS; SUBCUTANEOUS at 13:43

## 2024-07-25 RX ADMIN — Medication 0.5 MILLIGRAM(S): at 08:00

## 2024-07-25 RX ADMIN — Medication 0.5 MILLIGRAM(S): at 00:00

## 2024-07-25 RX ADMIN — OXYCODONE HYDROCHLORIDE 10 MILLIGRAM(S): 30 TABLET ORAL at 19:18

## 2024-07-25 RX ADMIN — Medication 400 MILLIGRAM(S): at 17:15

## 2024-07-25 RX ADMIN — Medication 1: at 08:48

## 2024-07-25 RX ADMIN — Medication 1000 MILLIGRAM(S): at 18:42

## 2024-07-25 RX ADMIN — Medication 25 GRAM(S): at 01:11

## 2024-07-25 RX ADMIN — AMLODIPINE BESYLATE 5 MILLIGRAM(S): 2.5 TABLET ORAL at 12:47

## 2024-07-25 RX ADMIN — OXYCODONE HYDROCHLORIDE 10 MILLIGRAM(S): 30 TABLET ORAL at 23:37

## 2024-07-25 RX ADMIN — Medication 1000 MILLIGRAM(S): at 05:35

## 2024-07-25 RX ADMIN — DEXTROSE MONOHYDRATE, SODIUM CHLORIDE, SODIUM LACTATE, CALCIUM CHLORIDE, MAGNESIUM CHLORIDE 100 MILLILITER(S): 1.5; 538; 448; 18.4; 5.08 SOLUTION INTRAPERITONEAL at 06:41

## 2024-07-25 RX ADMIN — OXYCODONE HYDROCHLORIDE 10 MILLIGRAM(S): 30 TABLET ORAL at 18:48

## 2024-07-25 RX ADMIN — DEXTROSE MONOHYDRATE, SODIUM CHLORIDE, SODIUM LACTATE, CALCIUM CHLORIDE, MAGNESIUM CHLORIDE 60 MILLILITER(S): 1.5; 538; 448; 18.4; 5.08 SOLUTION INTRAPERITONEAL at 17:40

## 2024-07-25 RX ADMIN — VANCOMYCIN HYDROCHLORIDE 250 MILLIGRAM(S): 5 INJECTION, POWDER, LYOPHILIZED, FOR SOLUTION INTRAVENOUS at 05:26

## 2024-07-25 RX ADMIN — Medication 325 MILLIGRAM(S): at 18:42

## 2024-07-25 RX ADMIN — Medication 100 MILLIGRAM(S): at 13:42

## 2024-07-25 RX ADMIN — Medication 0.5 MILLIGRAM(S): at 03:45

## 2024-07-25 RX ADMIN — Medication 400 MILLIGRAM(S): at 10:45

## 2024-07-25 RX ADMIN — HEPARIN SODIUM 5000 UNIT(S): 1000 INJECTION, SOLUTION INTRAVENOUS; SUBCUTANEOUS at 06:41

## 2024-07-25 RX ADMIN — Medication 100 MILLIGRAM(S): at 21:42

## 2024-07-25 RX ADMIN — HYDROXYCHLOROQUINE SULFATE 200 MILLIGRAM(S): 200 TABLET ORAL at 17:20

## 2024-07-25 RX ADMIN — OXYCODONE HYDROCHLORIDE 10 MILLIGRAM(S): 30 TABLET ORAL at 13:42

## 2024-07-25 RX ADMIN — Medication 100 MILLIGRAM(S): at 04:30

## 2024-07-25 RX ADMIN — Medication 0.5 MILLIGRAM(S): at 04:00

## 2024-07-25 RX ADMIN — OXYCODONE HYDROCHLORIDE 10 MILLIGRAM(S): 30 TABLET ORAL at 14:27

## 2024-07-25 RX ADMIN — OXYCODONE HYDROCHLORIDE 10 MILLIGRAM(S): 30 TABLET ORAL at 23:07

## 2024-07-25 RX ADMIN — Medication 400 MILLIGRAM(S): at 21:42

## 2024-07-25 RX ADMIN — Medication 400 MILLIGRAM(S): at 04:22

## 2024-07-25 RX ADMIN — Medication 1000 MILLIGRAM(S): at 11:00

## 2024-07-25 RX ADMIN — Medication 0.5 MILLIGRAM(S): at 08:15

## 2024-07-25 RX ADMIN — Medication 1: at 17:20

## 2024-07-25 NOTE — CONSULT NOTE ADULT - PROBLEM SELECTOR RECOMMENDATION 7
c/w hydroxychloroquine 200mg bid per outpt rheum  denies SLE but reports she met some criteria  reports she weaned off prednisone recently (was on prednisone 20mg, weaned down to 2.5mg and now off), if develops shock can consider adrenal insufficiency and may need stress steroid

## 2024-07-25 NOTE — PROGRESS NOTE ADULT - ATTENDING COMMENTS
Patient seen and examined. Denia Daley is a 64 year old female now status post Right Knee I&D, Right Revision Total Knee Arthroplasty. No acute events overnight.       Physical Exam:  Prevena Vac in Plact  Motor: Intact EHL/FHL/Tibialis Anterior/Gastrocnemius  Sensory: Intact Superficial Peroneal/Deep Peroneal/Saphenous/Sural/Tibial Nerves  Vascular: 2+ DP Pulse      Assessment/Plan:  Denia Daley is a 64 year old female now status post Right Knee I&D, Right Revision Total Knee Arthroplasty.    Plan:  -Right Lower Extremity: Weight Bearing as Tolerated in Knee immobilizer (wound rest)  -PT/OT, Out of Bed  -Pain Control  -DVT Prophylaxis: ASA  -Antibiotics: per ID  -PICC Planning  -Disposition: Pending

## 2024-07-25 NOTE — CHART NOTE - NSCHARTNOTEFT_GEN_A_CORE
patient transferred from sicu to floors  signed out to resident  all questions addressed and answered.    [id number]

## 2024-07-25 NOTE — CONSULT NOTE ADULT - PROBLEM SELECTOR RECOMMENDATION 5
former smoker  not on bronchodilator as outpt  if develops dyspnea/wheeze then start duoneb q6  monitor sats

## 2024-07-25 NOTE — PATIENT PROFILE ADULT - BILL PAYMENT
13 y/o with long standing h.o multiple inpt admissions, multiple SA, SI, NSSI behaviors. Pt with improving mood sx and SI.  Patient will benefit from further management and stabilization for mood sx and SI. Patient with some side effects with higher dose of latuda, tolerates better lower dose of latuda. Patient becoming more engaged, and is responding well with inpatient milieu. Patient has met maximal benefit for inpatient care, she is ready for discharge.    Plan  Continue Latuda  20 mg PO qHS- for mood, tolerated better on lower dose   Continue Vyvanse 50 mg PO daily - for ADHD, patient on this on prior admission, tolerated well  Continue Clonidine 0.1 mg PO qHS - for ADHD   13 y/o with long standing h.o multiple inpt admissions, multiple SA, SI, NSSI behaviors. Pt with improving mood sx and SI.  Patient will benefit from further management and stabilization for mood sx and SI. Patient with some side effects with higher dose of latuda, tolerates better lower dose of latuda. Patient becoming more engaged, and is responding well with inpatient milieu. Patient has met maximal benefit for inpatient care, she is ready for discharge.    Plan  Continue Latuda  20 mg PO qHS- for mood, tolerated better on lower dose   Continue Vyvanse 20 mg PO daily - for ADHD, patient on this on prior admission, tolerated well  Continue Clonidine 0.1 mg PO qHS - for ADHD   no

## 2024-07-25 NOTE — CONSULT NOTE ADULT - SUBJECTIVE AND OBJECTIVE BOX
Patient is a 64y old  Female who presents with a chief complaint of "Infected hardware - right knee" (2024 15:07)    HPI:  Patient is a 63 y/o female with PMH HTN, HLD CHF, type 2 DM, asthma/COPD, MADHURI, bipolar disorder,breast cancer s/p (right mastectomy/lymphadectomy/chemo/radiation in  c/b RUE lymphedema c/b RUE cellulitis and strep viridans bacteremia in , ETOH abuse,  fibromyalgia, b/l knee osteoarthritis s/p right knee arthroplasty in 2023 and  left knee arthroplasty on 3/18/2024, who presented for right revision total knee arthroplasty after progressively worsening knee pain. Outpatient knee aspiration performed with:   Cell Count: [id number], Neutrophils: 81%, Cultures/PCR: Staphylococcus lugdunensis. Patient was diagnosied with a Right Knee PJI.  Patient was seen by Dr. Antonia LUCIANO on ?  Patient taken to the OR on  during with op note documented as follows: " Right total knee arthroplasty explant, I&D, revision total knee arthroplasty with functional spacer." BELA drain placed as well as wound vac.     ID consulted for abx recommendations.     Vitals on arrival stable and stable during admission thus far. No documented fevers.   Labs on day of procedure notable for hemoglobin 9.3. No leukocytosis. INR 1.28. PT 14.2. PTT 30. Chemistry notable for creatinine 1.20, GFR 59.   xray of right knee post op (): Status post interval partial TKA removal, with femoral component remaining in place, with interval placement of a joint spacer and multiple antibiotic-impregnated beads in the distal femur and proximal tibia. There is an overlying drain. There is expected postsurgical gas within soft tissues and knee joint.    Abx:   Cefazolin 1 g once on                       prior hospital charts reviewed [  ]  primary team notes reviewed [  ]  other consultant notes reviewed [  ]    PAST MEDICAL & SURGICAL HISTORY:  Fibromyalgia      b/l  Carpal tunnel syndrome  tx PT/OT      Abdominal hernia in 20 ; pt states hernia repair ,       History  Uterine Fibroid  s/p Hysterectomy       Sleep apnea diagnosed ---supposed to use device but doesn't      Acid Reflux      hiatal hernia  last endoscopy 1.5 years ago      h/o b/l fungal foot infection   pt denies      Alcohol abuse--quit 8 years ago--goes to AA  ADDENDUM:  at PAST appointment  patient states she quit alcohol approximately       Drug abuse--quit 8 years ago--goes to AA  ADDENDUM: at PAST appointment, patient states she quit alcohol in approximately       Asthma  last rescue inhaler use "maybe 3 years ago when I had the flu or a cold"      h/o   Fatty liver      Diverticulosis      Arthritis      Hypertension      Hypercholesterolemia  19 ; pt reports improvement ; pt denies rx      Morbid obesity      herniated lumbar disc      Depression      Anxiety      sickel cell anemia trait      ETOH abuse  states she quit alcohol in       Lymphedema, limb  right side s/p right mastectomy      Neuropathy  b/l feet      Substance abuse  quit in  -- cocaine and marijuana      Miscarriage  x 2      Breast cancer    right breast -- had mastectomy and then post op chemo and RT, followed with Herceptin for 1 year   last chemo   2013 may last Herceptin   last RT      Thyroid nodule  had negative biopsy      Insomnia      Sickle cell trait      Former smoker      Lymphedema of right arm      Primary osteoarthritis of right knee      History of COPD      Diabetes mellitus      H/O: osteoarthritis      H/O CHF      Anemia         h/o hysterectomy due to Fibroid--post op vaginal bleeding requiring a transfusion        repair of ventral hernia with mesh  Revision 2018      Radical mastectomy of right breast  3/30/12      Termination of pregnancy (fetus)  x 3      Cataract  Bilateral 2017      S/P arthroscopy of left shoulder      H/O total knee replacement, right      History of arthroplasty of left knee          Allergies  ramipril (Angioedema)  environment--ragweed, dust, cats--sneezing and watery eyes, nasal congestion (Other)    ANTIMICROBIALS (past 90 days)  MEDICATIONS  (STANDING):    ceFAZolin   IVPB   100 mL/Hr IV Intermittent (24 @ 04:30)    piperacillin/tazobactam IVPB.   200 mL/Hr IV Intermittent (24 @ 23:43)    vancomycin  IVPB   166.67 mL/Hr IV Intermittent (24 @ 16:57)    vancomycin  IVPB   250 mL/Hr IV Intermittent (24 @ 05:26)        ceFAZolin   IVPB 1000 every 8 hours  ceFAZolin   IVPB      MEDICATIONS  (STANDING):  acetaminophen   IVPB .. 1000 every 6 hours  dextrose 50% Injectable 25 once  dextrose 50% Injectable 25 once  dextrose 50% Injectable 12.5 once  dextrose Oral Gel 15 once PRN  glucagon  Injectable 1 once  heparin   Injectable 5000 every 8 hours  HYDROmorphone  Injectable 0.2 every 4 hours PRN  HYDROmorphone  Injectable 0.5 every 4 hours PRN  insulin lispro (ADMELOG) corrective regimen sliding scale  every 6 hours    SOCIAL HISTORY:       FAMILY HISTORY:  Family history of leukemia  father  age 56 from leukemia    Family history of rheumatoid arthritis (Sibling)  sister age 54 from RA      ROS:  Pending full examination    Vital Signs Last 24 Hrs  T(F): 98.4 (24 @ 07:00), Max: 98.7 (24 @ 23:10)  Vital Signs Last 24 Hrs  HR: 78 (24 @ 09:00) (66 - 89)  BP: 105/63 (24 @ 09:00) (105/63 - 150/58)  RR: 17 (24 @ 09:00)  SpO2: 100% (24 @ 09:00) (93% - 100%)  Wt(kg): --    PHYSICAL EXAM:  Pending full examination                          8.6    8.26  )-----------( 165      ( 2024 04:45 )             24.5   07-25    134<L>  |  98  |  11  ----------------------------<  164<H>  4.2   |  24  |  1.02    Ca    8.6      2024 04:45  Phos  4.5     07-25  Mg     1.70     07-25      Urinalysis Basic - ( 2024 04:45 )    Color: x / Appearance: x / SG: x / pH: x  Gluc: 164 mg/dL / Ketone: x  / Bili: x / Urobili: x   Blood: x / Protein: x / Nitrite: x   Leuk Esterase: x / RBC: x / WBC x   Sq Epi: x / Non Sq Epi: x / Bacteria: x    MICROBIOLOGY:              RADIOLOGY:  imaging below personally reviewed and agree with findings Patient is a 64y old  Female who presents with a chief complaint of "Infected hardware - right knee" (2024 15:07)    HPI:  Patient is a 65 y/o female with PMH HTN, HLD CHF, type 2 DM, asthma/COPD, MADHURI, bipolar disorder,breast cancer s/p (right mastectomy/lymphadectomy/chemo/radiation in  c/b RUE lymphedema c/b RUE cellulitis and strep viridans bacteremia in , ETOH abuse,  fibromyalgia, b/l knee osteoarthritis s/p right knee arthroplasty in 2023 and  left knee arthroplasty on 3/18/2024, who presented for right revision total knee arthroplasty after progressively worsening knee pain s/p cortisone injection to right knee on? Outpatient knee aspiration performed on  with culture growing staphylococcus lugudunensis. Cell Count: [id number], Neutrophils: 81%.  Patient was seen by Dr. Knight on  and recommended ortho follow up with OR as patirnt wound likey need source control with removal of joint and monitoring off abx. P  Patient taken to the OR on  during with op note documented as follows: " Right total knee arthroplasty explant, I&D, revision total knee arthroplasty with functional spacer." BELA drain placed as well as wound vac.     ID consulted for abx recommendations.     Vitals on arrival stable and stable during admission thus far. No documented fevers.   Labs on day of procedure notable for hemoglobin 9.3. No leukocytosis. INR 1.28. PT 14.2. PTT 30. Chemistry notable for creatinine 1.20, GFR 59.   xray of right knee post op (): Status post interval partial TKA removal, with femoral component remaining in place, with interval placement of a joint spacer and multiple antibiotic-impregnated beads in the distal femur and proximal tibia. There is an overlying drain. There is expected postsurgical gas within soft tissues and knee joint.    Abx:   Cefazolin 1 g x q8h -   Vancomycin 7/24 x1    Zosyn x 1 on      Other ID data:    OR cultures including acid fast smears, tissue with gram stain collected and pending    MRSA PCR positive     Other notable cultures from prior admission:   -24 urine culture: e.fecalis   -20 blood culture: strep viridans         prior hospital charts reviewed [  ]  primary team notes reviewed [  ]  other consultant notes reviewed [  ]    PAST MEDICAL & SURGICAL HISTORY:  Fibromyalgia      b/l  Carpal tunnel syndrome  tx PT/OT      Abdominal hernia in 20 ; pt states hernia repair ,       History  Uterine Fibroid  s/p Hysterectomy       Sleep apnea diagnosed ---supposed to use device but doesn't      Acid Reflux      hiatal hernia  last endoscopy 1.5 years ago      h/o b/l fungal foot infection   pt denies      Alcohol abuse--quit 8 years ago--goes to AA  ADDENDUM:  at PAST appointment  patient states she quit alcohol approximately       Drug abuse--quit 8 years ago--goes to AA  ADDENDUM: at PAST appointment, patient states she quit alcohol in approximately       Asthma  last rescue inhaler use "maybe 3 years ago when I had the flu or a cold"      h/o   Fatty liver      Diverticulosis      Arthritis      Hypertension      Hypercholesterolemia  19 ; pt reports improvement ; pt denies rx      Morbid obesity      herniated lumbar disc      Depression      Anxiety      sickel cell anemia trait      ETOH abuse  states she quit alcohol in       Lymphedema, limb  right side s/p right mastectomy      Neuropathy  b/l feet      Substance abuse  quit in  -- cocaine and marijuana      Miscarriage  x 2      Breast cancer    right breast -- had mastectomy and then post op chemo and RT, followed with Herceptin for 1 year   last chemo   2013 may last Herceptin   last RT      Thyroid nodule  had negative biopsy      Insomnia      Sickle cell trait      Former smoker      Lymphedema of right arm      Primary osteoarthritis of right knee      History of COPD      Diabetes mellitus      H/O: osteoarthritis      H/O CHF      Anemia         h/o hysterectomy due to Fibroid--post op vaginal bleeding requiring a transfusion        repair of ventral hernia with mesh  Revision       Radical mastectomy of right breast  3/30/12      Termination of pregnancy (fetus)  x 3      Cataract  Bilateral 2017      S/P arthroscopy of left shoulder      H/O total knee replacement, right      History of arthroplasty of left knee          Allergies  ramipril (Angioedema)  environment--ragweed, dust, cats--sneezing and watery eyes, nasal congestion (Other)    ANTIMICROBIALS (past 90 days)  MEDICATIONS  (STANDING):    ceFAZolin   IVPB   100 mL/Hr IV Intermittent (24 @ 04:30)    piperacillin/tazobactam IVPB.   200 mL/Hr IV Intermittent (24 @ 23:43)    vancomycin  IVPB   166.67 mL/Hr IV Intermittent (24 @ 16:57)    vancomycin  IVPB   250 mL/Hr IV Intermittent (24 @ 05:26)        ceFAZolin   IVPB 1000 every 8 hours  ceFAZolin   IVPB      MEDICATIONS  (STANDING):  acetaminophen   IVPB .. 1000 every 6 hours  dextrose 50% Injectable 25 once  dextrose 50% Injectable 25 once  dextrose 50% Injectable 12.5 once  dextrose Oral Gel 15 once PRN  glucagon  Injectable 1 once  heparin   Injectable 5000 every 8 hours  HYDROmorphone  Injectable 0.2 every 4 hours PRN  HYDROmorphone  Injectable 0.5 every 4 hours PRN  insulin lispro (ADMELOG) corrective regimen sliding scale  every 6 hours    SOCIAL HISTORY:       FAMILY HISTORY:  Family history of leukemia  father  age 56 from leukemia    Family history of rheumatoid arthritis (Sibling)  sister age 54 from RA      ROS:  Pending full examination    Vital Signs Last 24 Hrs  T(F): 98.4 (24 @ 07:00), Max: 98.7 (24 @ 23:10)  Vital Signs Last 24 Hrs  HR: 78 (24 @ 09:00) (66 - 89)  BP: 105/63 (24 @ 09:00) (105/63 - 150/58)  RR: 17 (24 @ 09:00)  SpO2: 100% (24 @ 09:00) (93% - 100%)  Wt(kg): --    PHYSICAL EXAM:  Pending full examination                          8.6    8.26  )-----------( 165      ( 2024 04:45 )             24.5   -    134<L>  |  98  |  11  ----------------------------<  164<H>  4.2   |  24  |  1.02    Ca    8.6      2024 04:45  Phos  4.5     07-  Mg     1.70     07-25      Urinalysis Basic - ( 2024 04:45 )    Color: x / Appearance: x / SG: x / pH: x  Gluc: 164 mg/dL / Ketone: x  / Bili: x / Urobili: x   Blood: x / Protein: x / Nitrite: x   Leuk Esterase: x / RBC: x / WBC x   Sq Epi: x / Non Sq Epi: x / Bacteria: x    MICROBIOLOGY:              RADIOLOGY:  imaging below personally reviewed and agree with findings Patient is a 64y old  Female who presents with a chief complaint of "Infected hardware - right knee" (2024 15:07)    HPI:  Patient is a 63 y/o female with PMH HTN, HLD CHF, type 2 DM, asthma/COPD, MADHURI, bipolar disorder,breast cancer s/p (right mastectomy/lymphadectomy/chemo/radiation in  c/b RUE lymphedema c/b RUE cellulitis and strep viridans bacteremia in , ETOH abuse,  fibromyalgia, b/l knee osteoarthritis s/p right knee arthroplasty in 2023 and  left knee arthroplasty on 3/18/2024, who presented for right revision total knee arthroplasty after progressively worsening knee pain s/p cortisone injection to right knee on? Outpatient knee aspiration performed on  with culture growing staphylococcus lugudunensis. Cell Count: [id number], Neutrophils: 81%.  Patient was seen by Dr. Knight on  and recommended ortho follow up with OR as patirnt wound likey need source control with removal of joint and monitoring off abx. P  Patient taken to the OR on  during with op note documented as follows: " Right total knee arthroplasty explant, I&D, revision total knee arthroplasty with functional spacer." BELA drain placed as well as wound vac.     ID consulted for abx recommendations.     Vitals on arrival stable and stable during admission thus far. No documented fevers.   Labs on day of procedure notable for hemoglobin 9.3. No leukocytosis. INR 1.28. PT 14.2. PTT 30. Chemistry notable for creatinine 1.20, GFR 59.   xray of right knee post op (): Status post interval partial TKA removal, with femoral component remaining in place, with interval placement of a joint spacer and multiple antibiotic-impregnated beads in the distal femur and proximal tibia. There is an overlying drain. There is expected postsurgical gas within soft tissues and knee joint.    Abx:   Cefazolin 1 g x q8h -   Vancomycin 7/24 x1    Zosyn x 1 on      Other ID data:    OR cultures including acid fast smears, tissue with gram stain collected and pending    MRSA PCR positive     Other notable cultures from prior admission:   -24 urine culture: e.faecalis   -20 blood culture: strep viridans     prior hospital charts reviewed [ x ]  primary team notes reviewed [x  ]  other consultant notes reviewed [ x ]    PAST MEDICAL & SURGICAL HISTORY:  Fibromyalgia  b/l  Carpal tunnel syndrome tx PT/OT  Abdominal hernia in 2012  Hernia repair , 2018  History  Uterine Fibroid s/p Hysterectomy   Sleep apnea diagnosed ---supposed to use device but doesn't  Acid Reflux  hiatal hernia  h/o b/l fungal foot infection  Alcohol abuse--quit 8 years ago--goes to AA  ADDENDUM:  at PAST appointment  patient states she quit alcohol approximately   Drug abuse--quit 8 years ago--goes to AA  ADDENDUM: at PAST appointment, patient states she quit alcohol in approximately   Asthma  h/o Fatty liver  Diverticulosis  Arthritis  Hypertension  Hypercholesterolemia  Morbid obesity  herniated lumbar disc  Depression  Anxiety  Lymphedema, limb; right side s/p right mastectomy  Neuropathy b/l feet  Substance abuse quit in  -- cocaine and marijuana  Miscarriage x 2  Right Breast cancer 2012 s/p mastectomy, chemo/RT, followed with Herceptin for 1 year, 2013 last RT  Thyroid nodule (negative biopsy)  Insomnia  Sickle cell trait  Primary osteoarthritis of right knee  History of COPD  Diabetes mellitus  H/O CHF  Anemia     h/o hysterectomy due to Fibroid--post op vaginal bleeding requiring a transfusion    repair of ventral hernia with mesh, Revision 2018  Radical mastectomy of right breast 3/30/12  Termination of pregnancy (fetus) x 3  Cataract Bilateral 2017  S/P arthroscopy of left shoulder  H/O total knee replacement, right   History of arthroplasty of left knee    Allergies  ramipril (Angioedema)  environment--ragweed, dust, cats--sneezing and watery eyes, nasal congestion (Other)    ANTIMICROBIALS:  piperacillin/tazobactam IVPB (7/24 x1)  vancomycin  IVPB (, )    active:  ceFAZolin   IVPB 1000 every 8 hours (-)    MEDICATIONS  (STANDING):  acetaminophen   IVPB .. 1000 every 6 hours  amLODIPine   Tablet 5 daily  heparin   Injectable 5000 every 8 hours  hydrochlorothiazide 25 daily  insulin lispro (ADMELOG) corrective regimen sliding scale  every 6 hours    SOCIAL HISTORY:   former smoker    FAMILY HISTORY:  Family history of leukemia, father  age 56 from leukemia  Family history of rheumatoid arthritis (Sibling), sister age 54 from RA    ROS:  Pending full examination    Vital Signs Last 24 Hrs  T(F): 98.4 (24 @ 07:00), Max: 98.7 (24 @ 23:10)  Vital Signs Last 24 Hrs  HR: 78 (24 @ 09:00) (66 - 89)  BP: 105/63 (24 @ 09:00) (105/63 - 150/58)  RR: 17 (24 @ 09:00)  SpO2: 100% (24 @ 09:00) (93% - 100%)  Wt(kg): --    PHYSICAL EXAM:  Pending full examination                                             8.6    8.26  )-----------( 165      ( 2024 04:45 )             24.5     134  |  98  |  11  ----------------------------<  164  4.2   |  24  |  1.02  Ca    8.6      2024 04:45Phos  4.5     Mg     1.70         Urinalysis (24 @ 16:18)   Glucose Qualitative, Urine: Negative mg/dL  Blood, Urine: Negative  pH Urine: 5.5  Color: Yellow  Urine Appearance: Clear  Bilirubin: Negative  Ketone - Urine: Negative mg/dL  Specific Gravity: 1.017  Protein, Urine: Negative mg/dL  Urobilinogen: 1.0 mg/dL  Nitrite: Negative  Leukocyte Esterase Concentration: Negative    MICROBIOLOGY:  2024 outpatient knee aspiration  culture (+) Staphylococcus lugdunensis  Clindamycin  S (<=0.25)   Erythromycin  S (<=0.25)   Gentamicin  S (<=1)   Oxacillin  S (0.5)   Rifampin  S (<=1)   Tetra/Doxy  S (<=1)   Trimethoprim/Sulfamethoxazole  S (<=0.5/9.5)   Vancomycin  S (0.5)     RADIOLOGY:  imaging below personally reviewed and agree with findings    Xray Chest 1 View- PORTABLE-Urgent (Xray Chest 1 View- PORTABLE-Urgent .) (24 @ 01:12) >  IMPRESSION:  Right IJ central venous catheter, with tip in the SVC/right atrium.  No pneumothorax.    Xray Knee 1 or 2 Views, Right (24 @ 22:55) >  IMPRESSION:  Status post interval partial TKA removal, with femoral component remaining in place, with interval placement of a joint spacer and multiple antibiotic-impregnated beads in the distal femur and proximal tibia. There is an overlying drain. There is expected postsurgical gas within soft tissues and knee joint.    CT Knee No Cont, Right (24 @ 19:00) >  IMPRESSION:  Status post right total knee arthroplasty. Osteolysis/loosening about the tibial component involving both the medial and lateral tibial trays. Findings are more prominent laterally. Evidence of osteolysis/loosening along the patellar backing, more prominent laterally.  Moderate to large knee joint effusion with synovial thickening consistent with synovitis.    Xray Knee 1 or 2 Views, Left (24 @ 18:16) >  IMPRESSION:  Unconstrained left total knee prosthesis with longer tibial stem component implanted.  Intact and aligned hardware and no periprosthetic fractures.  Postoperative softtissue changes.  Correlate with intraoperative findings.   Patient is a 64y old  Female who presents with a chief complaint of "Infected hardware - right knee" (2024 15:07)    HPI:  Patient is a 63 y/o female with PMH HTN, HLD CHF, type 2 DM, asthma/COPD, MADHURI, bipolar disorder,breast cancer s/p (right mastectomy/lymphadectomy/chemo/radiation in  c/b RUE lymphedema c/b RUE cellulitis and strep viridans bacteremia in , ETOH abuse,  fibromyalgia, b/l knee osteoarthritis s/p right knee arthroplasty in 2023 and  left knee arthroplasty on 3/18/2024, who presented for right revision total knee arthroplasty. Patient states since having a fibromyalgia flare up in 2023, she developped progressively worsening knee pain and swelling. She has a  cortisone injection to right knee sometime in November or December without relief.  Denies fevers, chills, drainage from the knee.  Patient had been following with ortho outpatient and had a knee aspiration performed on  with culture growing staphylococcus lugdunensis. Arthrocentesis fluid studies as follows: Cell Count: [id number], Neutrophils: 81%.  Of note, CT of the right knee performed on  showing loosening about the tibial component and along patellar backing.  Patient was seen by ID outpatient on  and recommended OR for source control. Patient was already scheduled for OR at the time.  Patient taken to the OR on  during with op note documented as follows: " Right total knee arthroplasty explant, I&D, revision total knee arthroplasty with functional spacer." BELA drain placed as well as wound vac.     ID consulted for abx recommendations.     Vitals on arrival stable and stable during admission thus far. No documented fevers.   Labs on day of procedure notable for hemoglobin 9.3. No leukocytosis. INR 1.28. PT 14.2. PTT 30. Chemistry notable for creatinine 1.20, GFR 59.   xray of right knee post op (): Status post interval partial TKA removal, with femoral component remaining in place, with interval placement of a joint spacer and multiple antibiotic-impregnated beads in the distal femur and proximal tibia. There is an overlying drain. There is expected postsurgical gas within soft tissues and knee joint.    Patient reports right knee pain at this time secondary to post op status.     Abx:   Cefazolin 1 g x q8h -   Vancomycin 7/24 x1    Zosyn x 1 on      Other ID data:    OR cultures including acid fast smears, tissue with gram stain collected and pending    MRSA PCR positive     Other notable cultures from prior admission:   -24 urine culture: e.faecalis   -20 blood culture: strep viridans     prior hospital charts reviewed [ x ]  primary team notes reviewed [x  ]  other consultant notes reviewed [ x ]    PAST MEDICAL & SURGICAL HISTORY:  Fibromyalgia  b/l  Carpal tunnel syndrome tx PT/OT  Abdominal hernia in   Hernia repair , 2018  History  Uterine Fibroid s/p Hysterectomy   Sleep apnea diagnosed ---supposed to use device but doesn't  Acid Reflux  hiatal hernia  h/o b/l fungal foot infection  Alcohol abuse--quit 8 years ago--goes to   ADDENDUM:  at PAST appointment  patient states she quit alcohol approximately   Drug abuse--quit 8 years ago--goes to   ADDENDUM: at PAST appointment, patient states she quit alcohol in approximately   Asthma  h/o Fatty liver  Diverticulosis  Arthritis  Hypertension  Hypercholesterolemia  Morbid obesity  herniated lumbar disc  Depression  Anxiety  Lymphedema, limb; right side s/p right mastectomy  Neuropathy b/l feet  Substance abuse quit in  -- cocaine and marijuana  Miscarriage x 2  Right Breast cancer 2012 s/p mastectomy, chemo/RT, followed with Herceptin for 1 year, 2013 last RT  Thyroid nodule (negative biopsy)  Insomnia  Sickle cell trait  Primary osteoarthritis of right knee  History of COPD  Diabetes mellitus  H/O CHF  Anemia     h/o hysterectomy due to Fibroid--post op vaginal bleeding requiring a transfusion    repair of ventral hernia with mesh, Revision 2018  Radical mastectomy of right breast 3/30/12  Termination of pregnancy (fetus) x 3  Cataract Bilateral 2017  S/P arthroscopy of left shoulder  H/O total knee replacement, right   History of arthroplasty of left knee    Allergies  ramipril (Angioedema)  environment--ragweed, dust, cats--sneezing and watery eyes, nasal congestion (Other)    ANTIMICROBIALS:  piperacillin/tazobactam IVPB (7/24 x1)  vancomycin  IVPB (, )    active:  ceFAZolin   IVPB 1000 every 8 hours (-)    MEDICATIONS  (STANDING):  acetaminophen   IVPB .. 1000 every 6 hours  amLODIPine   Tablet 5 daily  heparin   Injectable 5000 every 8 hours  hydrochlorothiazide 25 daily  insulin lispro (ADMELOG) corrective regimen sliding scale  every 6 hours    SOCIAL HISTORY:   former smoker    FAMILY HISTORY:  Family history of leukemia, father  age 56 from leukemia  Family history of rheumatoid arthritis (Sibling), sister age 54 from RA    ROS:  Constitutional: No fevers, No chills  Skin: No rash, no phlebitis	  Eyes:  No change in vision	  ENMT: No sore throat  Respiratory: No cough, no SOB  Cardiovascular:  No chest pain, No palpitations   Gastrointestinal: No pain, No nausea, No vomiting, No diarrhea, No constipation	  Genitourinary: No dysuria, No frequency, No hesitancy, No flank pain  MSK: + Joint pain, No back pain, No edema  Neurological: No HA, no weakness, no seizures, no AMS     Vital Signs Last 24 Hrs  T(F): 98.4 (24 @ 07:00), Max: 98.7 (24 @ 23:10)  Vital Signs Last 24 Hrs  HR: 78 (24 @ 09:00) (66 - 89)  BP: 105/63 (24 @ 09:00) (105/63 - 150/58)  RR: 17 (24 @ 09:00)  SpO2: 100% (24 @ 09:00) (93% - 100%)  Wt(kg): --    PHYSICAL EXAM:  General: Patient appears comfortable, mild acute distress secondary to pain   HEENT: NCAT, PERRL, anicteric sclera, mucous membranes moist and intact  Neck: Supple, No lymphadenopathy  CV: +S1/S2, RRR, no M/R/G  Lungs: No respiratory distress, CTA b/l, no wheezing, rales or rhonchi  Abd:  +well healed abdominal surgical scars from prior surgeries. BS4+, Soft, NTND, no guarding  : No suprapubic tenderness. + pineda in place.   Neuro: AAOx3. No focal deficits noted.   Ext: RLE: +ace bandage with knee immobilizer in place, +BELA drain in place with minimal amount of serosanguinous fluid. LLE: +well healing surgical wound to left knee without tenderness to palpation. RUE: +chronic lymphedema c/w patient's baseline   Skin: no rashes or phlebitis noted.                                                8.6    8.26  )-----------( 165      ( 2024 04:45 )             24.5 07-    134  |  98  |  11  ----------------------------<  164  4.2   |  24  |  1.02  Ca    8.6      2024 04:45Phos  4.5     07-Mg     1.70         Urinalysis (24 @ 16:18)   Glucose Qualitative, Urine: Negative mg/dL  Blood, Urine: Negative  pH Urine: 5.5  Color: Yellow  Urine Appearance: Clear  Bilirubin: Negative  Ketone - Urine: Negative mg/dL  Specific Gravity: 1.017  Protein, Urine: Negative mg/dL  Urobilinogen: 1.0 mg/dL  Nitrite: Negative  Leukocyte Esterase Concentration: Negative    MICROBIOLOGY:  2024 outpatient knee aspiration  culture (+) Staphylococcus lugdunensis  Clindamycin  S (<=0.25)   Erythromycin  S (<=0.25)   Gentamicin  S (<=1)   Oxacillin  S (0.5)   Rifampin  S (<=1)   Tetra/Doxy  S (<=1)   Trimethoprim/Sulfamethoxazole  S (<=0.5/9.5)   Vancomycin  S (0.5)     RADIOLOGY:  imaging below personally reviewed and agree with findings    Xray Chest 1 View- PORTABLE-Urgent (Xray Chest 1 View- PORTABLE-Urgent .) (24 @ 01:12) >  IMPRESSION:  Right IJ central venous catheter, with tip in the SVC/right atrium.  No pneumothorax.    Xray Knee 1 or 2 Views, Right (24 @ 22:55) >  IMPRESSION:  Status post interval partial TKA removal, with femoral component remaining in place, with interval placement of a joint spacer and multiple antibiotic-impregnated beads in the distal femur and proximal tibia. There is an overlying drain. There is expected postsurgical gas within soft tissues and knee joint.    CT Knee No Cont, Right (24 @ 19:00) >  IMPRESSION:  Status post right total knee arthroplasty. Osteolysis/loosening about the tibial component involving both the medial and lateral tibial trays. Findings are more prominent laterally. Evidence of osteolysis/loosening along the patellar backing, more prominent laterally.  Moderate to large knee joint effusion with synovial thickening consistent with synovitis.    Xray Knee 1 or 2 Views, Left (24 @ 18:16) >  IMPRESSION:  Unconstrained left total knee prosthesis with longer tibial stem component implanted.  Intact and aligned hardware and no periprosthetic fractures.  Postoperative softtissue changes.  Correlate with intraoperative findings.

## 2024-07-25 NOTE — CONSULT NOTE ADULT - ASSESSMENT
63 y/o female PMH HTN HLD CHF- last exacerbation 1/2023, asthma/COPD, ETOH abuse, neuropathy, fibromyalgia, osteoarthritis, MADHURI ( denies use of CPAP), tremors, bipolar disorder, right breast cancer (right mastectomy) -  right arm lymphedema presents preop eval for scheduled right revision total knee arthroplasty.   H/o b/l knee replacement left 3/18/2024 & right 4/24/2023.  Pt with c/o Pain with ambulation  and cortisone injection to knee 5 months ago.  Pt in right knee progressively worsened. Denies fever or chills. Pt evaluated by ID - due to infected R knee - xochitl burch. Patient is s/p right knee revision on 7/24. SICU consulted for hemodynamic monitoring.    PLAN  NEUROLOGIC   - AAOx 3  - Pain control with tylenol and dilaudid    RESPIRATORY   - Currently on RA  - Monitor SpO2 goal >92%    CARDIOVASCULAR   - Monitor hemodynamics   - MAP goal > 65    GASTROINTESTINAL   - Diet: NPO    /RENAL   - IV fluids: LR @ 100 cc/hr  - Monitor BMP  - Strict I/Os  - Monitor electrolytes, replete PRN  - Maintain pineda catheter    HEMATOLOGIC  - Monitor H/H   - DVT prophylaxis: SCDs & subq heparin    INFECTIOUS DISEASE  - Monitor fever/WC  - Vanc and ancef    ENDOCRINE  - Monitor gluc  - ISS    LINES  - brachial a line by anesthesia  - LAURYN CVC  - PIV  - Pineda    Disposition:   SICU  --------------------------------------------------------------------------------------    Critical Care Diagnoses:
 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 Patient also s/p arthrocentesis outpatient on 6/21 with cell count 21,295 (neutrophil predominant) and culture growing staphylococcus lugdunensis. Patient taken to the OR on 7/24 for Right total knee arthroplasty explant, I&D, revision total knee arthroplasty with functional spacer." OR cultures sent. Of note, only partial TKA removal was noted on follow up imaging with femoral component remaining in plan.     ID consulted for abx recommendations.     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 Fuller who agrees with assessment and plan. Note not final until attending addendum. 
65 y/o female PMH HTN HLD, former smoker/alcohol abuse, CHF- last exacerbation 1/2023, asthma/COPD, ETOH abuse, neuropathy, fibromyalgia, osteoarthritis, MADHURI ( denies use of CPAP), tremors, bipolar disorder, right breast cancer ( s/p right mastectomy/chemo/RT c/b right arm lymphedema , b/l knee replacement left 3/18/2024 & right 4/24/2023, reports right knee pain with ambulation and swelling for months, had steroid injection 5 months ago, evaluated by ID, had Rt knee aspiration culture growing staph lugdunensis, c/f septic joint, s/p right knee revision on 7/24. Intraop EBL 3L, , IVF 2.2L crystalloid and 500ml albumin, received 4 units pRBC and 1U FFP.

## 2024-07-25 NOTE — CONSULT NOTE ADULT - PROBLEM SELECTOR RECOMMENDATION 2
at home takes valsartan 320mg , norvasc 5mg, Hctz 25mg qd  -hemodynamically stable  - resume norvasc and Hctz, hold diovan for now, resume when BP allows   -ctm

## 2024-07-25 NOTE — CONSULT NOTE ADULT - SUBJECTIVE AND OBJECTIVE BOX
SICU Consultation Note  =====================================================  HPI:  63 y/o female PMH HTN HLD CHF- last exacerbation 1/2023, asthma/COPD, ETOH abuse, neuropathy, fibromyalgia, osteoarthritis, MADHURI ( denies use of CPAP), tremors, bipolar disorder, right breast cancer (right mastectomy) -  right arm lymphedema presents preop eval for scheduled right revision total knee arthroplasty.   H/o b/l knee replacement left 3/18/2024 & right 4/24/2023.  Pt with c/o Pain with ambulation  and cortisone injection to knee 5 months ago.  Pt in right knee progressively worsened. Denies fever or chills. Pt evaluated by ID - due to infected R knee - staph lugdunensis. Patient is s/p right knee revision on 7/24. SICU consulted for hemodynamic monitoring.      Surgery Information  EBL: 3L         IV Fluids: 2200 crystalloid and 500 albumin      Blood Products: 4 PRBC and 1 FFP  UOP: 200          PAST MEDICAL & SURGICAL HISTORY:  Fibromyalgia      b/l  Carpal tunnel syndrome  tx PT/OT      Abdominal hernia in 2012 7/23/19 ; pt states hernia repair 2008, 2018      History  Uterine Fibroid  s/p Hysterectomy 7/02      Sleep apnea diagnosed 2007---supposed to use device but doesn't      Acid Reflux      hiatal hernia  last endoscopy 1.5 years ago      h/o b/l fungal foot infection  7/23./19 pt denies      Alcohol abuse--quit 8 years ago--goes to AA  ADDENDUM:  at PAST appointment  patient states she quit alcohol approximately 2003      Drug abuse--quit 8 years ago--goes to AA  ADDENDUM: at PAST appointment, patient states she quit alcohol in approximately 2003      Asthma  last rescue inhaler use "maybe 3 years ago when I had the flu or a cold"      h/o   Fatty liver      Diverticulosis      Arthritis      Hypertension      Hypercholesterolemia  7/23/19 ; pt reports improvement ; pt denies rx      Morbid obesity      herniated lumbar disc      Depression      Anxiety      sickel cell anemia trait      ETOH abuse  states she quit alcohol in 2003      Lymphedema, limb  right side s/p right mastectomy      Neuropathy  b/l feet      Substance abuse  quit in 2003 -- cocaine and marijuana      Miscarriage  x 2      Breast cancer  2012  right breast -- had mastectomy and then post op chemo and RT, followed with Herceptin for 1 year  2012 last chemo   2013 may last Herceptin  2013 last RT      Thyroid nodule  had negative biopsy      Insomnia      Sickle cell trait      Former smoker      Lymphedema of right arm      Primary osteoarthritis of right knee      History of COPD      Diabetes mellitus      H/O: osteoarthritis      H/O CHF      Anemia      2002   h/o hysterectomy due to Fibroid--post op vaginal bleeding requiring a transfusion      2008  repair of ventral hernia with mesh  Revision 2018      Radical mastectomy of right breast  3/30/12      Termination of pregnancy (fetus)  x 3      Cataract  Bilateral 2017      S/P arthroscopy of left shoulder      H/O total knee replacement, right      History of arthroplasty of left knee        Home Meds: Home Medications:  amLODIPine 5 mg oral tablet: 1 tab(s) orally once a day (24 Jul 2024 14:49)  gabapentin 300 mg oral capsule: 1 cap(s) orally 2 times a day (24 Jul 2024 14:49)  hydroCHLOROthiazide 25 mg oral tablet: 1 tab(s) orally once a day (24 Jul 2024 14:49)  hydroxychloroquine 200 mg oral tablet: 1 tab(s) orally 2 times a day (24 Jul 2024 14:49)  meloxicam 7.5 mg oral tablet: 1 tab(s) orally once a day last dose 1 week ago (24 Jul 2024 14:49)  metFORMIN 500 mg oral tablet: 1 tab(s) orally 2 times a day (24 Jul 2024 14:49)  omeprazole 40 mg oral delayed release capsule: 1 cap(s) orally 2 times a day (24 Jul 2024 14:49)  predniSONE 2.5 mg oral tablet: 1 tab(s) orally once a day completed 1 week ago (24 Jul 2024 14:49)  traMADol 50 mg oral tablet: 1 tab(s) orally 2 times a day (24 Jul 2024 14:49)  valsartan 320 mg oral tablet: 1 tab(s) orally once a day (24 Jul 2024 14:49)    Allergies: Allergies    ramipril (Angioedema)  environment--ragweed, dust, cats--sneezing and watery eyes, nasal congestion (Other)    Intolerances      Soc:   Advanced Directives: Presumed Full Code     ROS:    REVIEW OF SYSTEMS    [ ] A ten-point review of systems was otherwise negative except as noted.  [ ] Due to altered mental status/intubation, subjective information were not able to be obtained from the patient. History was obtained, to the extent possible, from review of the chart and collateral sources of information.      CURRENT MEDICATIONS:   --------------------------------------------------------------------------------------  Neurologic Medications  acetaminophen   IVPB .. 1000 milliGRAM(s) IV Intermittent every 6 hours  HYDROmorphone  Injectable 0.2 milliGRAM(s) IV Push every 4 hours PRN Moderate Pain (4 - 6)  HYDROmorphone  Injectable 0.5 milliGRAM(s) IV Push every 4 hours PRN Severe Pain (7 - 10)    Respiratory Medications    Cardiovascular Medications    Gastrointestinal Medications  dextrose 5%. 1000 milliLiter(s) IV Continuous <Continuous>  dextrose 5%. 1000 milliLiter(s) IV Continuous <Continuous>    Genitourinary Medications    Hematologic/Oncologic Medications  heparin   Injectable 5000 Unit(s) SubCutaneous every 8 hours    Antimicrobial/Immunologic Medications  ceFAZolin   IVPB      vancomycin  IVPB 1000 milliGRAM(s) IV Intermittent once    Endocrine/Metabolic Medications  dextrose 50% Injectable 25 Gram(s) IV Push once  dextrose 50% Injectable 25 Gram(s) IV Push once  dextrose 50% Injectable 12.5 Gram(s) IV Push once  dextrose Oral Gel 15 Gram(s) Oral once PRN Blood Glucose LESS THAN 70 milliGRAM(s)/deciliter  glucagon  Injectable 1 milliGRAM(s) IntraMuscular once  insulin lispro (ADMELOG) corrective regimen sliding scale   SubCutaneous every 6 hours    Topical/Other Medications  chlorhexidine 2% Cloths 1 Application(s) Topical daily    --------------------------------------------------------------------------------------    VITAL SIGNS, INS/OUTS (last 24 hours):  --------------------------------------------------------------------------------------  ICU Vital Signs Last 24 Hrs  T(C): 37.1 (24 Jul 2024 23:10), Max: 37.1 (24 Jul 2024 23:10)  T(F): 98.7 (24 Jul 2024 23:10), Max: 98.7 (24 Jul 2024 23:10)  HR: 73 (25 Jul 2024 00:00) (66 - 76)  BP: 136/59 (25 Jul 2024 00:00) (128/61 - 147/85)  BP(mean): 75 (25 Jul 2024 00:00) (71 - 99)  ABP: --  ABP(mean): --  RR: 20 (25 Jul 2024 00:00) (14 - 24)  SpO2: 96% (25 Jul 2024 00:00) (96% - 100%)    O2 Parameters below as of 24 Jul 2024 23:10  Patient On (Oxygen Delivery Method): room air          I&O's Summary    --------------------------------------------------------------------------------------    EXAM:  General/Neuro  Exam: Normal, NAD, alert, oriented x 3, no focal deficits.     Respiratory  Exam: Lungs clear to auscultation, Normal expansion/effort. On RA     Cardiovascular  Exam: S1, S2.  Regular rate and rhythm.   Cardiac Rhythm: Normal Sinus Rhythm    GI  Exam: Abdomen soft, Non-tender, Non-distended.   Current Diet:  NPO    Extremities  Exam: Extremities warm, pink, well-perfused.  Right knee wrapped in ace bandage w/ BELA drain x1 w/ sanguinous output and vac      Derm:  Exam: Good skin turgor, no skin breakdown.      :   Exam: Beasley catheter in place.     Tubes/Lines/Drains  ***  [x] Peripheral IV  [] Central Venous Line     	[] R	[] L	[] IJ	[] Fem	[] SC        Type:	    Date Placed:   [] Arterial Line		[] R	[] L	[] Fem	[] Rad	[] Ax	Date Placed:   [] PICC:         	[] Midline		[] Mediport           [] Urinary Catheter		Date Placed:         LABS  --------------------------------------------------------------------------------------  Labs:  CAPILLARY BLOOD GLUCOSE      POCT Blood Glucose.: 142 mg/dL (24 Jul 2024 23:11)                          9.3    8.35  )-----------( 174      ( 24 Jul 2024 23:40 )             27.0           LFTs:     Blood Gas Arterial, Lactate: 1.8 mmol/L (07-24-24 @ 21:40)  Blood Gas Arterial, Lactate: 2.2 mmol/L (07-24-24 @ 20:16)  Blood Gas Arterial, Lactate: 1.3 mmol/L (07-24-24 @ 19:09)    ABG - ( 24 Jul 2024 21:40 )  pH: 7.31  /  pCO2: 48    /  pO2: 258   / HCO3: 24    / Base Excess: -2.1  /  SaO2: 100.0       ABG - ( 24 Jul 2024 20:16 )  pH: 7.43  /  pCO2: 36    /  pO2: 212   / HCO3: 24    / Base Excess: -0.3  /  SaO2: 100.0       ABG - ( 24 Jul 2024 19:09 )  pH: 7.52  /  pCO2: 33    /  pO2: 192   / HCO3: 27    / Base Excess: 3.8   /  SaO2: 100.0       Coags:     x      ----< x       ( 24 Jul 2024 23:40 )     30.0          --------------------------------------------------------------------------------------    OTHER LABS    IMAGING RESULTS

## 2024-07-25 NOTE — PROGRESS NOTE ADULT - ASSESSMENT
64F s/p revision R TKA 7/25    Plan:  - WBAT in KI, will replace w marya  - f/u BELA output  - c/w prevena  - c/w pineda  - f/u OR cx  -  BID for dvt ppx  - c/w vanc/ancef, appreciate ID consult  - PT/OT    Malorie Hodgson, PGY-2  Orthopedic Surgery  [id number]

## 2024-07-25 NOTE — CONSULT NOTE ADULT - SUBJECTIVE AND OBJECTIVE BOX
Najma Morris MD  Pager [phone]    HPI:  63 y/o female PMH HTN HLD, alcohol abuse (quit >20 yrs ago), CHF- last exacerbation 2023, asthma/COPD, ETOH abuse, neuropathy, fibromyalgia, osteoarthritis, MADHURI ( denies use of CPAP), tremors, bipolar disorder, right breast cancer ( s/p right mastectomy/chemo/RT c/b right arm lymphedema , b/l knee replacement left 3/18/2024 & right 2023, reports right knee pain with ambulation and swelling for few months, had steroid injection 5 months ago, evaluated by ID, had Rt knee aspiration culture growing staph lugdunensis, c/f septic joint, admitted for right knee revision on . Intraop EBL 3L, , IVF 2.2L crystalloid and 500ml albumin, received 4 units pRBC and 1U FFP. Patient was evaluated by SICU for hemodynamic monitoring.     Patient seen on POD#1 in PACU, currently she's hemodynamically stable, afebrile, denies chest pain/sob/dizziness. Reports right knee postop pain.     PAST MEDICAL & SURGICAL HISTORY:  Fibromyalgia      b/l  Carpal tunnel syndrome  tx PT/OT      Abdominal hernia in 20 ; pt states hernia repair ,       History  Uterine Fibroid  s/p Hysterectomy       Sleep apnea diagnosed ---supposed to use device but doesn't      Acid Reflux      hiatal hernia  last endoscopy 1.5 years ago      h/o b/l fungal foot infection   pt denies      Alcohol abuse--quit 8 years ago--goes to AA  ADDENDUM:  at PAST appointment  patient states she quit alcohol approximately       Drug abuse--quit 8 years ago--goes to AA  ADDENDUM: at PAST appointment, patient states she quit alcohol in approximately       Asthma  last rescue inhaler use "maybe 3 years ago when I had the flu or a cold"      h/o   Fatty liver      Diverticulosis      Arthritis      Hypertension      Hypercholesterolemia  19 ; pt reports improvement ; pt denies rx      Morbid obesity      herniated lumbar disc      Depression      Anxiety      sickel cell anemia trait      ETOH abuse  states she quit alcohol in       Lymphedema, limb  right side s/p right mastectomy      Neuropathy  b/l feet      Substance abuse  quit in  -- cocaine and marijuana      Miscarriage  x 2      Breast cancer  2012  right breast -- had mastectomy and then post op chemo and RT, followed with Herceptin for 1 year   last chemo   2013 may last Herceptin   last RT      Thyroid nodule  had negative biopsy      Insomnia      Sickle cell trait      Former smoker      Lymphedema of right arm      Primary osteoarthritis of right knee      History of COPD      Diabetes mellitus      H/O: osteoarthritis      H/O CHF      Anemia         h/o hysterectomy due to Fibroid--post op vaginal bleeding requiring a transfusion        repair of ventral hernia with mesh  Revision 2018      Radical mastectomy of right breast  3/30/12      Termination of pregnancy (fetus)  x 3      Cataract  Bilateral 2017      S/P arthroscopy of left shoulder      H/O total knee replacement, right      History of arthroplasty of left knee          Review of Systems:   CONSTITUTIONAL: No fever, weight loss, or fatigue  EYES: No eye pain, visual disturbances, or discharge  ENMT:  No difficulty hearing, tinnitus, vertigo; No sinus or throat pain  NECK: No pain or stiffness  BREASTS: No pain, masses, or nipple discharge  RESPIRATORY: No cough, wheezing, chills or hemoptysis; No shortness of breath  CARDIOVASCULAR: No chest pain, palpitations, dizziness, or leg swelling  GASTROINTESTINAL: No abdominal or epigastric pain. No nausea, vomiting, or hematemesis; No diarrhea or constipation. No melena or hematochezia.  GENITOURINARY: No dysuria, frequency, hematuria, or incontinence  NEUROLOGICAL: No headaches, memory loss, loss of strength, numbness, or tremors  SKIN: No itching, burning, rashes, or lesions   LYMPH NODES: No enlarged glands  ENDOCRINE: No heat or cold intolerance; No hair loss  MUSCULOSKELETAL: right knee pain and puffiness   PSYCHIATRIC: No depression, anxiety, mood swings, or difficulty sleeping  HEME/LYMPH: No easy bruising, or bleeding gums  ALLERY AND IMMUNOLOGIC: No hives or eczema    Allergies    ramipril (Angioedema)  environment--ragweed, dust, cats--sneezing and watery eyes, nasal congestion (Other)    Intolerances        Social History: lives at home, former alcohol abuse quit , ,former smoker quit , 1/2 ppd x25 yrs,     FAMILY HISTORY:  Family history of leukemia  father  age 56 from leukemia    Family history of rheumatoid arthritis (Sibling)  sister age 54 from RA        Home Medications:  amLODIPine 5 mg oral tablet: 1 tab(s) orally once a day (2024 14:49)  gabapentin 300 mg oral capsule: 1 cap(s) orally 2 times a day (2024 14:49)  hydroCHLOROthiazide 25 mg oral tablet: 1 tab(s) orally once a day (2024 14:49)  hydroxychloroquine 200 mg oral tablet: 1 tab(s) orally 2 times a day (2024 14:49)  meloxicam 7.5 mg oral tablet: 1 tab(s) orally once a day last dose 1 week ago (2024 14:49)  metFORMIN 500 mg oral tablet: 1 tab(s) orally 2 times a day (2024 14:49)  omeprazole 40 mg oral delayed release capsule: 1 cap(s) orally 2 times a day (2024 14:49)  predniSONE 2.5 mg oral tablet: 1 tab(s) orally once a day completed 1 week ago (2024 14:49)  traMADol 50 mg oral tablet: 1 tab(s) orally 2 times a day (2024 14:49)  valsartan 320 mg oral tablet: 1 tab(s) orally once a day (2024 14:49)      MEDICATIONS  (STANDING):  acetaminophen   IVPB .. 1000 milliGRAM(s) IV Intermittent every 6 hours  amLODIPine   Tablet 5 milliGRAM(s) Oral daily  ceFAZolin   IVPB      ceFAZolin   IVPB 1000 milliGRAM(s) IV Intermittent every 8 hours  chlorhexidine 2% Cloths 1 Application(s) Topical daily  dextrose 5%. 1000 milliLiter(s) (50 mL/Hr) IV Continuous <Continuous>  dextrose 5%. 1000 milliLiter(s) (100 mL/Hr) IV Continuous <Continuous>  dextrose 50% Injectable 25 Gram(s) IV Push once  dextrose 50% Injectable 25 Gram(s) IV Push once  dextrose 50% Injectable 12.5 Gram(s) IV Push once  glucagon  Injectable 1 milliGRAM(s) IntraMuscular once  heparin   Injectable 5000 Unit(s) SubCutaneous every 8 hours  hydrochlorothiazide 25 milliGRAM(s) Oral daily  hydroxychloroquine 200 milliGRAM(s) Oral two times a day  insulin lispro (ADMELOG) corrective regimen sliding scale   SubCutaneous every 6 hours  lactated ringers. 1000 milliLiter(s) (100 mL/Hr) IV Continuous <Continuous>    MEDICATIONS  (PRN):  dextrose Oral Gel 15 Gram(s) Oral once PRN Blood Glucose LESS THAN 70 milliGRAM(s)/deciliter  HYDROmorphone  Injectable 0.2 milliGRAM(s) IV Push every 4 hours PRN Moderate Pain (4 - 6)  HYDROmorphone  Injectable 0.5 milliGRAM(s) IV Push every 4 hours PRN Severe Pain (7 - 10)  sodium chloride 0.9% lock flush 10 milliLiter(s) IV Push every 1 hour PRN Pre/post blood products, medications, blood draw, and to maintain line patency      Vital Signs Last 24 Hrs  T(C): 36.9 (2024 11:00), Max: 37.1 (2024 23:10)  T(F): 98.5 (2024 11:00), Max: 98.7 (2024 23:10)  HR: 79 (:00) (66 - 89)  BP: 152/57 (2024 11:00) (105/63 - 152/57)  BP(mean): 77 (:00) (68 - 99)  RR: 21 (2024 11:00) (14 - 24)  SpO2: 100% (2024 11:00) (93% - 100%)    Parameters below as of 2024 08:00  Patient On (Oxygen Delivery Method): room air      CAPILLARY BLOOD GLUCOSE      POCT Blood Glucose.: 172 mg/dL (2024 08:45)  POCT Blood Glucose.: 186 mg/dL (2024 06:47)  POCT Blood Glucose.: 142 mg/dL (2024 23:11)  POCT Blood Glucose.: 90 mg/dL (2024 14:40)    I&O's Summary    2024 07:01  -  2024 07:00  --------------------------------------------------------  IN: 2340 mL / OUT: 505 mL / NET: 1835 mL    2024 07:01  -  2024 11:43  --------------------------------------------------------  IN: 980 mL / OUT: 1075 mL / NET: -95 mL        PHYSICAL EXAM:  GENERAL: nad  HEAD:  Atraumatic, Normocephalic  EYES: EOMI, PERRLA, conjunctiva and sclera clear  NECK: Supple, No JVD  CHEST/LUNG: Clear to auscultation bilaterally; No wheeze  HEART: Regular rate and rhythm; No murmurs, rubs, or gallops  ABDOMEN: Soft, Nontender, Nondistended; Bowel sounds present  : pineda in place  EXTREMITIES:  right knee in wound dressing/ACE wrap, prevena, and Sidra drain  PSYCH: AAOx3  NEUROLOGY: non-focal  SKIN: No rashes or lesions    LABS:                        8.6    8.26  )-----------( 165      ( 2024 04:45 )             24.5     07-25    134<L>  |  98  |  11  ----------------------------<  164<H>  4.2   |  24  |  1.02    Ca    8.6      2024 04:45  Phos  4.5     07-25  Mg     1.70     07-25      PT/INR - ( 2024 23:40 )   PT: 14.2 sec;   INR: 1.28 ratio         PTT - ( 2024 23:40 )  PTT:30.0 sec      Urinalysis Basic - ( 2024 04:45 )    Color: x / Appearance: x / SG: x / pH: x  Gluc: 164 mg/dL / Ketone: x  / Bili: x / Urobili: x   Blood: x / Protein: x / Nitrite: x   Leuk Esterase: x / RBC: x / WBC x   Sq Epi: x / Non Sq Epi: x / Bacteria: x      Microbiology     RADIOLOGY & ADDITIONAL TESTS:    Imaging Personally Reviewed:  < from: Xray Chest 1 View- PORTABLE-Urgent (Xray Chest 1 View- PORTABLE-Urgent .) (24 @ 01:12) >  IMPRESSION:  Right IJ central venous catheter, with tip in the SVC/right atrium.  No pneumothorax.      < from: Xray Knee 1 or 2 Views, Right (24 @ 22:55) >  Status post interval partial TKA removal, with femoral component   remaining in place, with interval placement of a joint spacer and   multiple antibiotic-impregnated beads in the distal femur and proximal   tibia. There is an overlying drain. There is expected postsurgical gas   within soft tissues and knee joint.    < from: CT Knee No Cont, Right (24 @ 19:00) >  Status post right total knee arthroplasty. Osteolysis/loosening about the   tibial component involving both the medial and lateral tibial trays.   Findings are more prominent laterally. Evidence of osteolysis/loosening   along the patellar backing, more prominent laterally.    Moderate to large knee joint effusion with synovial thickening consistent   with synovitis.    < from: Transthoracic Echocardiogram (23 @ 10:25) >  1. Eccentric left ventricular hypertrophy (dilated left  ventricle with normal relative wall thickness).  2. Normal left ventricular systolic function. No segmental  wall motion abnormalities.  3. Normal right ventricular size and function.  4. Normal tricuspid valve.  Moderate tricuspid  regurgitation.        Consultant(s) Notes Reviewed:      Care Discussed with Consultants/Other Providers:   Najma Morris MD  Pager [phone]    HPI:  63 y/o female PMH HTN HLD, alcohol abuse (quit >20 yrs ago), CHF- last exacerbation 2023, asthma/COPD, ETOH abuse, neuropathy, fibromyalgia, osteoarthritis, MADHURI ( denies use of CPAP), tremors, bipolar disorder, right breast cancer ( s/p right mastectomy/chemo/RT c/b right arm lymphedema , b/l knee replacement left 3/18/2024 & right 2023, reports right knee pain with ambulation and swelling for few months, had steroid injection 5 months ago, evaluated by ID, had Rt knee aspiration culture growing staph lugdunensis, c/f septic joint, admitted for right knee revision on . Intraop EBL 3L, , IVF 2.2L crystalloid and 500ml albumin, received 4 units pRBC and 1U FFP. Patient was evaluated by SICU for hemodynamic monitoring.     Patient seen on POD#1 in PACU, currently she's hemodynamically stable, afebrile, denies chest pain/sob/dizziness. Reports right knee postop pain.     PAST MEDICAL & SURGICAL HISTORY:  Fibromyalgia      b/l  Carpal tunnel syndrome  tx PT/OT      Abdominal hernia in 20 ; pt states hernia repair ,       History  Uterine Fibroid  s/p Hysterectomy       Sleep apnea diagnosed ---supposed to use device but doesn't      Acid Reflux      hiatal hernia  last endoscopy 1.5 years ago      h/o b/l fungal foot infection   pt denies      Alcohol abuse--quit 8 years ago--goes to AA  ADDENDUM:  at PAST appointment  patient states she quit alcohol approximately       Drug abuse--quit 8 years ago--goes to AA  ADDENDUM: at PAST appointment, patient states she quit alcohol in approximately       Asthma  last rescue inhaler use "maybe 3 years ago when I had the flu or a cold"      h/o   Fatty liver      Diverticulosis      Arthritis      Hypertension      Hypercholesterolemia  19 ; pt reports improvement ; pt denies rx      Morbid obesity      herniated lumbar disc      Depression      Anxiety      sickel cell anemia trait      ETOH abuse  states she quit alcohol in       Lymphedema, limb  right side s/p right mastectomy      Neuropathy  b/l feet      Substance abuse  quit in  -- cocaine and marijuana      Miscarriage  x 2      Breast cancer  2012  right breast -- had mastectomy and then post op chemo and RT, followed with Herceptin for 1 year   last chemo   2013 may last Herceptin   last RT      Thyroid nodule  had negative biopsy      Insomnia      Sickle cell trait      Former smoker      Lymphedema of right arm      Primary osteoarthritis of right knee      History of COPD      Diabetes mellitus      H/O: osteoarthritis      H/O CHF      Anemia         h/o hysterectomy due to Fibroid--post op vaginal bleeding requiring a transfusion        repair of ventral hernia with mesh  Revision 2018      Radical mastectomy of right breast  3/30/12      Termination of pregnancy (fetus)  x 3      Cataract  Bilateral 2017      S/P arthroscopy of left shoulder      H/O total knee replacement, right      History of arthroplasty of left knee          Review of Systems:   CONSTITUTIONAL: No fever, weight loss, or fatigue  EYES: No eye pain, visual disturbances, or discharge  ENMT:  No difficulty hearing, tinnitus, vertigo; No sinus or throat pain  NECK: No pain or stiffness  BREASTS: No pain, masses, or nipple discharge  RESPIRATORY: No cough, wheezing, chills or hemoptysis; No shortness of breath  CARDIOVASCULAR: No chest pain, palpitations, dizziness, or leg swelling  GASTROINTESTINAL: No abdominal or epigastric pain. No nausea, vomiting, or hematemesis; No diarrhea or constipation. No melena or hematochezia.  GENITOURINARY: No dysuria, frequency, hematuria, or incontinence  NEUROLOGICAL: No headaches, memory loss, loss of strength, numbness, or tremors  SKIN: No itching, burning, rashes, or lesions   LYMPH NODES: No enlarged glands  ENDOCRINE: No heat or cold intolerance; No hair loss  MUSCULOSKELETAL: right knee pain and puffiness   PSYCHIATRIC: No depression, anxiety, mood swings, or difficulty sleeping  HEME/LYMPH: No easy bruising, or bleeding gums  ALLERY AND IMMUNOLOGIC: No hives or eczema    Allergies    ramipril (Angioedema)  environment--ragweed, dust, cats--sneezing and watery eyes, nasal congestion (Other)    Intolerances        Social History: lives at home, former alcohol abuse quit , ,former smoker quit , 1/2 ppd x25 yrs,     FAMILY HISTORY:  Family history of leukemia  father  age 56 from leukemia    Family history of rheumatoid arthritis (Sibling)  sister age 54 from RA        Home Medications:  amLODIPine 5 mg oral tablet: 1 tab(s) orally once a day (2024 14:49)  gabapentin 300 mg oral capsule: 1 cap(s) orally 2 times a day (2024 14:49)  hydroCHLOROthiazide 25 mg oral tablet: 1 tab(s) orally once a day (2024 14:49)  hydroxychloroquine 200 mg oral tablet: 1 tab(s) orally 2 times a day (2024 14:49)  meloxicam 7.5 mg oral tablet: 1 tab(s) orally once a day last dose 1 week ago (2024 14:49)  metFORMIN 500 mg oral tablet: 1 tab(s) orally 2 times a day (2024 14:49)  omeprazole 40 mg oral delayed release capsule: 1 cap(s) orally 2 times a day (2024 14:49)  predniSONE 2.5 mg oral tablet: 1 tab(s) orally once a day completed 1 week ago (2024 14:49)  traMADol 50 mg oral tablet: 1 tab(s) orally 2 times a day (2024 14:49)  valsartan 320 mg oral tablet: 1 tab(s) orally once a day (2024 14:49)      MEDICATIONS  (STANDING):  acetaminophen   IVPB .. 1000 milliGRAM(s) IV Intermittent every 6 hours  amLODIPine   Tablet 5 milliGRAM(s) Oral daily  ceFAZolin   IVPB      ceFAZolin   IVPB 1000 milliGRAM(s) IV Intermittent every 8 hours  chlorhexidine 2% Cloths 1 Application(s) Topical daily  dextrose 5%. 1000 milliLiter(s) (50 mL/Hr) IV Continuous <Continuous>  dextrose 5%. 1000 milliLiter(s) (100 mL/Hr) IV Continuous <Continuous>  dextrose 50% Injectable 25 Gram(s) IV Push once  dextrose 50% Injectable 25 Gram(s) IV Push once  dextrose 50% Injectable 12.5 Gram(s) IV Push once  glucagon  Injectable 1 milliGRAM(s) IntraMuscular once  heparin   Injectable 5000 Unit(s) SubCutaneous every 8 hours  hydrochlorothiazide 25 milliGRAM(s) Oral daily  hydroxychloroquine 200 milliGRAM(s) Oral two times a day  insulin lispro (ADMELOG) corrective regimen sliding scale   SubCutaneous every 6 hours  lactated ringers. 1000 milliLiter(s) (100 mL/Hr) IV Continuous <Continuous>    MEDICATIONS  (PRN):  dextrose Oral Gel 15 Gram(s) Oral once PRN Blood Glucose LESS THAN 70 milliGRAM(s)/deciliter  HYDROmorphone  Injectable 0.2 milliGRAM(s) IV Push every 4 hours PRN Moderate Pain (4 - 6)  HYDROmorphone  Injectable 0.5 milliGRAM(s) IV Push every 4 hours PRN Severe Pain (7 - 10)  sodium chloride 0.9% lock flush 10 milliLiter(s) IV Push every 1 hour PRN Pre/post blood products, medications, blood draw, and to maintain line patency      Vital Signs Last 24 Hrs  T(C): 36.9 (2024 11:00), Max: 37.1 (2024 23:10)  T(F): 98.5 (2024 11:00), Max: 98.7 (2024 23:10)  HR: 79 (:00) (66 - 89)  BP: 152/57 (2024 11:00) (105/63 - 152/57)  BP(mean): 77 (:00) (68 - 99)  RR: 21 (2024 11:00) (14 - 24)  SpO2: 100% (2024 11:00) (93% - 100%)    Parameters below as of 2024 08:00  Patient On (Oxygen Delivery Method): room air      CAPILLARY BLOOD GLUCOSE      POCT Blood Glucose.: 172 mg/dL (2024 08:45)  POCT Blood Glucose.: 186 mg/dL (2024 06:47)  POCT Blood Glucose.: 142 mg/dL (2024 23:11)  POCT Blood Glucose.: 90 mg/dL (2024 14:40)    I&O's Summary    2024 07:01  -  2024 07:00  --------------------------------------------------------  IN: 2340 mL / OUT: 505 mL / NET: 1835 mL    2024 07:01  -  2024 11:43  --------------------------------------------------------  IN: 980 mL / OUT: 1075 mL / NET: -95 mL        PHYSICAL EXAM:  GENERAL: nad  HEAD:  Atraumatic, Normocephalic  EYES: EOMI, PERRLA, conjunctiva and sclera clear  NECK: Supple, No JVD  CHEST/LUNG: Clear to auscultation bilaterally; No wheeze  HEART: Regular rate and rhythm; No murmurs, rubs, or gallops  ABDOMEN: Soft, Nontender, Nondistended; Bowel sounds present  : pineda in place  EXTREMITIES:  right knee in wound dressing/ACE wrap, prevena, and Sidra drain, RUE chronic lymphedema  PSYCH: AAOx3  NEUROLOGY: non-focal  SKIN: No rashes or lesions    LABS:                        8.6    8.26  )-----------( 165      ( 2024 04:45 )             24.5     07-25    134<L>  |  98  |  11  ----------------------------<  164<H>  4.2   |  24  |  1.02    Ca    8.6      2024 04:45  Phos  4.5     07-25  Mg     1.70     07-25      PT/INR - ( 2024 23:40 )   PT: 14.2 sec;   INR: 1.28 ratio         PTT - ( 2024 23:40 )  PTT:30.0 sec      Urinalysis Basic - ( 2024 04:45 )    Color: x / Appearance: x / SG: x / pH: x  Gluc: 164 mg/dL / Ketone: x  / Bili: x / Urobili: x   Blood: x / Protein: x / Nitrite: x   Leuk Esterase: x / RBC: x / WBC x   Sq Epi: x / Non Sq Epi: x / Bacteria: x      Microbiology     RADIOLOGY & ADDITIONAL TESTS:    Imaging Personally Reviewed:  < from: Xray Chest 1 View- PORTABLE-Urgent (Xray Chest 1 View- PORTABLE-Urgent .) (24 @ 01:12) >  IMPRESSION:  Right IJ central venous catheter, with tip in the SVC/right atrium.  No pneumothorax.      < from: Xray Knee 1 or 2 Views, Right (07.24.24 @ 22:55) >  Status post interval partial TKA removal, with femoral component   remaining in place, with interval placement of a joint spacer and   multiple antibiotic-impregnated beads in the distal femur and proximal   tibia. There is an overlying drain. There is expected postsurgical gas   within soft tissues and knee joint.    < from: CT Knee No Cont, Right (24 @ 19:00) >  Status post right total knee arthroplasty. Osteolysis/loosening about the   tibial component involving both the medial and lateral tibial trays.   Findings are more prominent laterally. Evidence of osteolysis/loosening   along the patellar backing, more prominent laterally.    Moderate to large knee joint effusion with synovial thickening consistent   with synovitis.    < from: Transthoracic Echocardiogram (23 @ 10:25) >  1. Eccentric left ventricular hypertrophy (dilated left  ventricle with normal relative wall thickness).  2. Normal left ventricular systolic function. No segmental  wall motion abnormalities.  3. Normal right ventricular size and function.  4. Normal tricuspid valve.  Moderate tricuspid  regurgitation.        Consultant(s) Notes Reviewed:      Care Discussed with Consultants/Other Providers:

## 2024-07-25 NOTE — CONSULT NOTE ADULT - PROBLEM SELECTOR RECOMMENDATION 4
not compliant with CPAP  monitor sats, supplemental O2 prn  - if desats at night, then start CPAP at 10 hs  - outpt f/up pulm

## 2024-07-25 NOTE — CONSULT NOTE ADULT - PROBLEM SELECTOR RECOMMENDATION 9
- s/p right knee revision with R TKA explant, I/D, revision TKA with functional spacer 7/24, received  2200mL crystalloid and 500ml albumin, 4 units pRBC and 1U FFP.  -preop joint fluid cx staph lugdunensis,   - postop management per Ortho, pain control, bowel regimen, IS, DVT ppx  - f/u OR culture  - on ancef, ID consulted, abx per ID - s/p right knee revision with R TKA explant, I/D, revision TKA with functional spacer 7/24, received  2200mL crystalloid and 500ml albumin, 4 units pRBC and 1U FFP.  -preop joint fluid cx staph lugdunensis,   - postop management per Ortho, pain control, bowel regimen, IS, DVT ppx  - f/u OR culture  - on ancef, ID consulted, abx per ID  - labs reviewed, WBC 8.2, acute blood loss anemia d/t surgery H/H downtrending to 8.6/24.5, trend CBC, transfuse prn, Lytes/Cr acceptable (Na 134, Cr 1.0)

## 2024-07-25 NOTE — CONSULT NOTE ADULT - ATTENDING COMMENTS
64F with depression / anxiety, obesity, hx OA, prior TKR R 4/2023, L TKR 3 2/2024.  Had cortisone injection R knee 5 months ago for R knee pain.  6/21/2024 had knee aspirated.  69482 WBC (mostly poly) and culture (+) staph reubenunensis.  Was seen by Dr. Knight outpatient with R PJI. Patient admitted and today underwent right total knee arthroplasty explantation, I+D and placement of spacer.  Post-op knee Xray noted interval partial TKA removal, with femoral component remaining in place, with interval placement of a joint spacer and multiple antibiotic-impregnated beads in the distal femur and proximal tibia; overlying drain. ID asked to help management. 64F with depression / anxiety, obesity, hx OA, prior TKR R 4/2023, L TKR 3 2/2024.  Had cortisone injection R knee 5 months ago for R knee pain.  6/21/2024 had knee aspirated.  05926 WBC (mostly poly) and culture (+) staph lugdunensis.  Was seen by Dr. Knight outpatient with R PJI. Patient admitted and today underwent right total knee arthroplasty explantation, I+D and placement of spacer.  Post-op knee Xray noted interval partial TKA removal, with femoral component remaining in place, with interval placement of a joint spacer and multiple antibiotic-impregnated beads in the distal femur and proximal tibia; overlying drain. ID asked to help management.    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  Had cortisone injection R knee 5 months ago for R knee pain.  6/21/2024 had knee aspirated.  66475 WBC (mostly poly) and culture (+) staph lugdunensis.  Was seen by Dr. Knight outpatient with R PJI. Patient admitted and today underwent right total knee arthroplasty explantation, I+D and placement of spacer.  Post-op knee Xray noted interval partial TKA removal, with femoral component remaining in place, with interval placement of a joint spacer and multiple antibiotic-impregnated beads in the distal femur and proximal tibia; overlying drain. ID asked to help management.    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

## 2024-07-25 NOTE — CONSULT NOTE ADULT - PROBLEM SELECTOR RECOMMENDATION 6
resume hctz as above  risk for fluid overload/acute on chronic diastolic chf, given intraop fluids/prbc/FFP  cautious IVF if needed  if desats/sob, check CXR and pro-BNP, give IV lasix 40mg PRN    Echo from 1/2023 reviewed: dilated left ventricle with normal LV systolic function. EF 62%

## 2024-07-26 ENCOUNTER — TRANSCRIPTION ENCOUNTER (OUTPATIENT)
Age: 64
End: 2024-07-26

## 2024-07-26 LAB
GLUCOSE BLDC GLUCOMTR-MCNC: 144 MG/DL — HIGH (ref 70–99)
GLUCOSE BLDC GLUCOMTR-MCNC: 159 MG/DL — HIGH (ref 70–99)
GLUCOSE BLDC GLUCOMTR-MCNC: 170 MG/DL — HIGH (ref 70–99)
GLUCOSE BLDC GLUCOMTR-MCNC: 182 MG/DL — HIGH (ref 70–99)

## 2024-07-26 PROCEDURE — 99232 SBSQ HOSP IP/OBS MODERATE 35: CPT

## 2024-07-26 PROCEDURE — 99233 SBSQ HOSP IP/OBS HIGH 50: CPT

## 2024-07-26 RX ORDER — MUPIROCIN CALCIUM 20 MG/G
1 CREAM TOPICAL
Refills: 0 | Status: COMPLETED | OUTPATIENT
Start: 2024-07-26 | End: 2024-07-31

## 2024-07-26 RX ORDER — LIDOCAINE 5% 5 G/100G
1 CREAM TOPICAL DAILY
Refills: 0 | Status: DISCONTINUED | OUTPATIENT
Start: 2024-07-26 | End: 2024-07-31

## 2024-07-26 RX ADMIN — Medication 1: at 16:36

## 2024-07-26 RX ADMIN — Medication 325 MILLIGRAM(S): at 17:40

## 2024-07-26 RX ADMIN — Medication 100 MILLIGRAM(S): at 19:12

## 2024-07-26 RX ADMIN — LIDOCAINE 5% 1 PATCH: 5 CREAM TOPICAL at 23:28

## 2024-07-26 RX ADMIN — OXYCODONE HYDROCHLORIDE 10 MILLIGRAM(S): 30 TABLET ORAL at 21:57

## 2024-07-26 RX ADMIN — LIDOCAINE 5% 1 PATCH: 5 CREAM TOPICAL at 18:15

## 2024-07-26 RX ADMIN — OXYCODONE HYDROCHLORIDE 10 MILLIGRAM(S): 30 TABLET ORAL at 22:52

## 2024-07-26 RX ADMIN — HYDROXYCHLOROQUINE SULFATE 200 MILLIGRAM(S): 200 TABLET ORAL at 05:30

## 2024-07-26 RX ADMIN — LIDOCAINE 5% 1 PATCH: 5 CREAM TOPICAL at 11:22

## 2024-07-26 RX ADMIN — OXYCODONE HYDROCHLORIDE 10 MILLIGRAM(S): 30 TABLET ORAL at 14:06

## 2024-07-26 RX ADMIN — OXYCODONE HYDROCHLORIDE 10 MILLIGRAM(S): 30 TABLET ORAL at 05:32

## 2024-07-26 RX ADMIN — Medication 325 MILLIGRAM(S): at 05:30

## 2024-07-26 RX ADMIN — HYDROXYCHLOROQUINE SULFATE 200 MILLIGRAM(S): 200 TABLET ORAL at 17:40

## 2024-07-26 RX ADMIN — OXYCODONE HYDROCHLORIDE 10 MILLIGRAM(S): 30 TABLET ORAL at 09:56

## 2024-07-26 RX ADMIN — OXYCODONE HYDROCHLORIDE 10 MILLIGRAM(S): 30 TABLET ORAL at 06:02

## 2024-07-26 RX ADMIN — Medication 1: at 11:23

## 2024-07-26 RX ADMIN — PANTOPRAZOLE SODIUM 40 MILLIGRAM(S): 20 TABLET, DELAYED RELEASE ORAL at 05:30

## 2024-07-26 RX ADMIN — MUPIROCIN CALCIUM 1 APPLICATION(S): 20 CREAM TOPICAL at 17:41

## 2024-07-26 RX ADMIN — OXYCODONE HYDROCHLORIDE 10 MILLIGRAM(S): 30 TABLET ORAL at 15:06

## 2024-07-26 RX ADMIN — Medication 100 MILLIGRAM(S): at 11:22

## 2024-07-26 RX ADMIN — OXYCODONE HYDROCHLORIDE 10 MILLIGRAM(S): 30 TABLET ORAL at 10:56

## 2024-07-26 RX ADMIN — AMLODIPINE BESYLATE 5 MILLIGRAM(S): 2.5 TABLET ORAL at 05:30

## 2024-07-26 NOTE — OCCUPATIONAL THERAPY INITIAL EVALUATION ADULT - DIAGNOSIS, OT EVAL
s/p Right total knee arthroplasty explant, I&D, revision total knee arthroplasty with functional spacer; Decreased sitting balance; Decreased functional mobility; Decreased ADL management

## 2024-07-26 NOTE — OCCUPATIONAL THERAPY INITIAL EVALUATION ADULT - GENERAL OBSERVATIONS, REHAB EVAL
Pt. received semisupine in bed. No acute distress. Pt agreed to evaluation from Occupational Therapist. +Heplock, +Right [place] Brace, +Wound VAC, +BELA Bulb Drain, +Urethral Catheter, +Left LE Venodyne.

## 2024-07-26 NOTE — OCCUPATIONAL THERAPY INITIAL EVALUATION ADULT - PERTINENT HX OF CURRENT PROBLEM, REHAB EVAL
Pt is a 64 year old female with hx of HTN, HLD, CHF- last exacerbation 1/2023, asthma/COPD, ETOH abuse, neuropathy, fibromyalgia, osteoarthritis, MADHURI (denies use of CPAP), tremors, bipolar disorder, right breast cancer (right mastectomy) - right arm lymphedema, and Bilateral knee replacements- Left Knee 3/18/2024 & Right Knee 4/24/2023. Pt with c/o Right Knee pain worsening. Pt with dx of Infection of prosthetic knee joint. Pt is now s/p Right total knee arthroplasty explant, I&D, revision total knee arthroplasty with functional spacer on 7/24/24.

## 2024-07-26 NOTE — OCCUPATIONAL THERAPY INITIAL EVALUATION ADULT - MD ORDER
Occupational Therapy (OT) to evaluate and treat. Per CLINTON Kumar, pt is okay to participate in OT evaluation and perform activity as tolerated.

## 2024-07-26 NOTE — PHYSICAL THERAPY INITIAL EVALUATION ADULT - ADDITIONAL COMMENTS
Pt states she lives with her mother, sister and  in a house and has a full flight of steps to the basement where she stays. Prior to admission, pt was ambulating with a cane and rolling walker.   Post PT evaluation, pt left with head of bed elevated to 30 degrees, alarm on, call bell and remote within reach, all precautions maintained, NAD. RN aware.

## 2024-07-26 NOTE — PROGRESS NOTE ADULT - ASSESSMENT
64F with depression / anxiety, obesity, hx OA, prior TKR R 4/2023, L TKR 3 2/2024.  Had cortisone injection R knee 5 months ago for R knee pain.  6/21/2024 had knee aspirated.  66420 WBC (mostly poly) and culture (+) staph lugdunensis.  Was seen by Dr. Knight outpatient with R PJI. Patient admitted and today underwent right total knee arthroplasty explantation, I+D and placement of spacer.  Post-op knee Xray noted interval partial TKA removal, with femoral component remaining in place, with interval placement of a joint spacer and multiple antibiotic-impregnated beads in the distal femur and proximal tibia; overlying drain. ID asked to help management.    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 Knight)    I will be transitioning to [facility].  ID colleague to cover.  Please call [phone].

## 2024-07-26 NOTE — PHYSICAL THERAPY INITIAL EVALUATION ADULT - LEVEL OF INDEPENDENCE: SUPINE/SIT, REHAB EVAL
Pt unable to fully come into sitting due to pain. Pt leans backwards/maximum assist (25% patients effort)

## 2024-07-26 NOTE — OCCUPATIONAL THERAPY INITIAL EVALUATION ADULT - LIVES WITH, PROFILE
Pt. reports she lives with her , mother, & sister in a house with steps to enter. Once inside, pt. reports she has full flight of steps to negotiate to basement where main bedroom and bathroom are located. Per pt., she has a shower stall in her bathroom.

## 2024-07-26 NOTE — CHART NOTE - NSCHARTNOTEFT_GEN_A_CORE
Patient seen and examined at bedside. L forearm IV infiltrated overnight, normal saline was running. Nurse stopped IVF when arm and hand got swollen. On exam: Patient seen and examined at bedside. L forearm IV infiltrated overnight, normal saline was running. Nurse stopped IVF when arm and hand got swollen. On exam:    LUE:  Skin intact, dorsal hand ecchymotic  hand, fingers, forearm edematous and compartments full but compressible  SILT axillary/med/rad/ulnar  +Motor AIN/PIN/Ulnar/Radial/Musc/Median,   2+radial pulse, fingers wwp    Will continue to monitor clinical exam. We removed IV from L forearm. Please elevate LUE, warm packs for comfort. Pain control.      Malorie Hodgson, PGY-2  Orthopedic Surgery  [id number]

## 2024-07-26 NOTE — PHYSICAL THERAPY INITIAL EVALUATION ADULT - GENERAL OBSERVATIONS, REHAB EVAL
Pt received semi-supine in bed, with all lines intact, NAD, all precautions maintained. Pt received semi-supine in bed, with all lines intact, NAD, all precautions maintained. Pt with marya brace in 0 degrees ext

## 2024-07-26 NOTE — PHYSICAL THERAPY INITIAL EVALUATION ADULT - PERTINENT HX OF CURRENT PROBLEM, REHAB EVAL
65 y/o female PMH HTN HLD CHF- last exacerbation 1/2023, asthma/COPD, ETOH abuse, neuropathy, fibromyalgia, osteoarthritis, MADHURI ( denies use of CPAP), tremors, bipolar disorder, right breast cancer (right mastectomy) -  right arm lymphedema presents preop eval for scheduled right revision total knee arthroplasty.   H/o b/l knee replacement left 3/18/2024 & right 4/24/2023.  Pt with c/o Pain with ambulation  and cortisone injection to knee 5 months ago.  Pt in right knee progressively worsened. Denies fever or chills. Pt evaluated by ID - due to infected R knee - xochitl burch. Patient is s/p right knee revision on 7/24. SICU consulted for hemodynamic monitoring.

## 2024-07-26 NOTE — DISCHARGE NOTE PROVIDER - CARE PROVIDERS DIRECT ADDRESSES
,DirectAddress_Unknown ,DirectAddress_Unknown,[email or web address]

## 2024-07-26 NOTE — DISCHARGE NOTE PROVIDER - CARE PROVIDER_API CALL
Wesly Givens  Orthopaedic Surgery  [address] [place] [address], [address]  [place] [address]  Phone: [phone]  Fax: [phone]  Follow Up Time:    Wesly Givens  Orthopaedic Surgery  [address] [facility], [address]  [place] [address]  Phone: [phone]  Fax: [phone]  Follow Up Time:     Magnus Knight  Infectious Disease  [address] [place] [address]  Phone: [phone]  Fax: [phone]  Follow Up Time: 2 weeks

## 2024-07-26 NOTE — DISCHARGE NOTE PROVIDER - PROVIDER TOKENS
PROVIDER:[TOKEN:[[id number]]] PROVIDER:[TOKEN:[[id number]]],PROVIDER:[TOKEN:[[id number]],FOLLOWUP:[2 weeks]]

## 2024-07-26 NOTE — OCCUPATIONAL THERAPY INITIAL EVALUATION ADULT - LEVEL OF INDEPENDENCE: SIT/STAND, REHAB EVAL
Not appropriate to assess secondary to pt with decreased sitting balance and c/o pain (CLINTON Kumar made aware and acknowledged).

## 2024-07-26 NOTE — OCCUPATIONAL THERAPY INITIAL EVALUATION ADULT - BATHING, PREVIOUS LEVEL OF FUNCTION, OT EVAL
Physical Medicine and Rehabilitation         Date of initial consultation: 4/5/2024  LOS: 4 Day(s)    Chief complaint: Neck pain    HPI: The patient is a 38 y.o. right hand dominant female with a past medical history of severe progressive neck pain, numbness and tingling, lumbar stenosis, neurogenic claudication, PCOS, hypertension, anxiety, diabetes, epilepsy;  who presented on 4/3/2024  9:57 AM for planned procedure with Dr. James Fierro MD who performed C3-T4 decompression and fusion.  Current labs reflect slight anemia with hemoglobin 10.4, hyperglycemia.  Patient is on gabapentin for neuropathic pain.    The patient currently reports neck pain, back pain, incisional pain.  Patient is crippled by this pain and unable to complete mobility and ADL tasks.  She reports she has taken tramadol in the past and had a pain contract for that.  Patient took 303 morphine equivalents on 4/3, 105 on 4/4.  She is not using PCA.    4/6/2024  Patient appears more tired today.  She reports no improvement in her pain.  Patient does not appear to be in distress.  I am concerned that I am not getting accurate feedback about her pain.  Plan to hold here and monitor morphine equivalents for a day.    4/7/2024  Used 175 morphine Eq yesterday. Continues to report severe pain with movement. Unable to describe pain character, only location. Discussed use of multimodals and DC to SNF for additional healing time if she is unable to make progress with therapies here.     ROS  Pertinent positives are mentioned in the HPI, all others reviewed and are negative.    Social Hx:  1 SH  3 ROMELIA  With: Spouse, children    THERAPY:  Restrictions: Fall risk, spinal/back precautions  PT: Functional mobility   4/4: Unable to participate in gait, min assist sit to stand    OT: ADLs  4/4: Total assist lower body dressing, mod assist upper body dressing grooming    SLP:   None    IMAGING:  No advanced diagnostic imaging done on this  admission    PROCEDURES:  James Fierro MD 4/4/2024  1.  Cervical 3, 4, 5, 6, 7 laminectomy foraminotomies  2.  Lateral mass screw placement bilateral cervical 3, 4, 5, 6; bilateral pedicle screw placement cervical 7, thoracic 1, 2, 3, 4 (Infinity, Medtronic)  3.  Bone autograft obtained from same incision  4.  Bone allograft placement (BMP/graft on DBM, Medtronic, I factor, [employer])  5.  Stealth intraoperative navigation/O-arm for lateral mass, pedicle screw placement  6.  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       PSH:  Past Surgical History:   Procedure Laterality Date    POSTERIOR CERVICAL FUSION O-ARM  4/3/2024    Procedure: POSTERIOR C3-7 LAMINECTOMY, WITH C3-T4 LATERAL MASS PEDICLE SCREW FIXATION FUSION, CADAVERIC BONE GRAFT;  Surgeon: James Fierro M.D.;  Location: SURGERY [facility];  Service: Neurosurgery    CERVICAL LAMINECTOMY POSTERIOR  4/3/2024    Procedure: LAMINECTOMY, SPINE, CERVICAL, POSTERIOR APPROACH;  Surgeon: James Fierro M.D.;  Location: SURGERY [facility];  Service: Neurosurgery    BONE GRAFT  4/3/2024    Procedure: BONE GRAFT;  Surgeon: James Fierro M.D.;  Location: [facility];  Service: Neurosurgery    HYSTERECTOMY LAPAROSCOPY N/A 07/25/2023    PRIMARY C SECTION  2006, 2011    TUBAL COAGULATION LAPAROSCOPIC BILATERAL         FHX:  Family History   Problem Relation Age of Onset    Diabetes Mother     Hyperlipidemia Maternal Uncle     Hypertension Maternal Uncle     Diabetes Maternal Uncle     Cancer Maternal Grandmother         breast    Cancer Paternal Grandfather        Medications:  Current Facility-Administered Medications   Medication Dose     cyclobenzaprine (Flexeril) tablet 10 mg  10 mg    oxyCODONE CR (OxyCONTIN) tablet 20 mg  20 mg    metFORMIN ER (Glucophage XR) tablet 500 mg  500 mg    oxyCODONE immediate-release (Roxicodone) tablet 5 mg  5 mg    Or    oxyCODONE immediate release (Roxicodone) tablet 10 mg  10 mg    HYDROmorphone (Dilaudid) injection 0.5 mg  0.5 mg    cetirizine (ZyrTEC) tablet 10 mg  10 mg    famotidine (Pepcid) tablet 40 mg  40 mg    gabapentin (Neurontin) capsule 300 mg  300 mg    lamoTRIgine (LaMICtal) tablet 300 mg  300 mg    losartan (Cozaar) tablet 25 mg  25 mg    omeprazole (PriLOSEC) capsule 20 mg  20 mg    PARoxetine (Paxil) tablet 20 mg  20 mg    Pharmacy Consult Request ...Pain Management Review 1 Each  1 Each    MD ALERT...DO NOT ADMINISTER NSAIDS or ASPIRIN unless ORDERED By Neurosurgery 1 Each  1 Each    docusate sodium (Colace) capsule 100 mg  100 mg    senna-docusate (Pericolace Or Senokot S) 8.6-50 MG per tablet 1 Tablet  1 Tablet    senna-docusate (Pericolace Or Senokot S) 8.6-50 MG per tablet 1 Tablet  1 Tablet    polyethylene glycol/lytes (Miralax) Packet 1 Packet  1 Packet    magnesium hydroxide (Milk Of Magnesia) suspension 30 mL  30 mL    bisacodyl (Dulcolax) suppository 10 mg  10 mg    sodium phosphate (Fleet) enema 133 mL  1 Each    dextrose 5 % and 0.9 % NaCl with KCl 20 mEq infusion      acetaminophen (Tylenol) tablet 1,000 mg  1,000 mg    Followed by    [START ON 4/8/2024] acetaminophen (Tylenol) tablet 1,000 mg  1,000 mg    diphenhydrAMINE (Benadryl) tablet/capsule 25 mg  25 mg    Or    diphenhydrAMINE (Benadryl) injection 25 mg  25 mg    ondansetron (Zofran) syringe/vial injection 4 mg  4 mg    ondansetron (Zofran ODT) dispertab 4 mg  4 mg    promethazine (Phenergan) tablet 12.5-25 mg  12.5-25 mg    promethazine (Phenergan) suppository 12.5-25 mg  12.5-25 mg    prochlorperazine (Compazine) injection 5-10 mg  5-10 mg    labetalol (Normodyne/Trandate) injection 10 mg  10 mg    benzocaine-menthol  "(Cepacol) lozenge 1 Lozenge  1 Lozenge    calcium carbonate (Tums) chewable tab 500 mg  500 mg       Allergies:  Allergies   Allergen Reactions    Codeine Shortness of Breath, Itching, Nausea and Unspecified     Rash, itching    Hydrocodone Itching    Nsaids Hives     Physical Exam:  Vitals: /67   Pulse 87   Temp 36.5 °C (97.7 °F) (Temporal)   Resp 16   Ht 1.626 m (5' 4\")   Wt 88.5 kg (195 lb 1.7 oz)   SpO2 96%   Gen: NAD  Head: NC/AT  Eyes/ Nose/ Mouth: PERRLA, moist mucous membranes  Cardio: RRR, good distal perfusion, warm extremities  Pulm: normal respiratory effort, no cyanosis   Abd: Soft NTND, negative borborygmi   Ext: No peripheral edema. No calf tenderness. No clubbing.    Mental status:  A&Ox4 (person, place, date, situation) answers questions appropriately follows commands  Speech: fluent, no aphasia or dysarthria      Labs: Reviewed and significant for   Recent Labs     04/05/24 0412   HEMOGLOBIN 10.4*     Recent Labs     04/04/24 2010   SODIUM 140   POTASSIUM 3.9   CHLORIDE 107   CO2 21   GLUCOSE 164*   BUN 5*   CREATININE 0.60   CALCIUM 8.6     No results found for this or any previous visit (from the past 24 hour(s)).        ASSESSMENT:  Patient is a 38 y.o. female admitted with neurogenic claudication now s/p C3-T4 decompression and fusion    Rehabilitation: Impaired ADLs and mobility  Barriers to transfer include: Insurance authorization, TCCs to verify disposition, medical clearance and bed availability. All cases are subject to administrative review.     [facility] Code / Diagnosis to Support: 0008.9 - Orthopaedic Disorders: Other Orthopaedic    Disposition recommendations:  -PMR consult for medical management, pain control.   - Patient is not making progress with therapies and there is concern for drug seeking behavior. Recommend no further increases in opioids, and working towards an SNF placement for further healing.     Medical Complexity:    Neurogenic claudication  -Status post C3-T4 " decompression and fusion by commercial moisés FOWLER on 4/3/2024  -Neurologically intact  -Continue PT OT while in-house    Pain control    Scheduled  Oxycontin 20 mg BID  Tylenol 1000 mg every 6 hours  Cyclobenzaprine 10 mg 3 times scheduled  Gabapentin 300 mg 3 times daily  lidocaine patch     PRN   Dilaudid injection 0.5 mg every 3 hours as needed (Using 4x/day)  Roxicodone 5-10 mg every 4 hours as needed (using 10mg exclusively 5x/day)    Morphine Eq  4/3: 305, not functional   4/4: 105, not functional  4/5: 150, reports no improvement   4/6: 175, reports no improvement        - Patient will need to follow up with her PCP to continue pain medication as an outpatient with a pain contract which she had in place as of last month, per patient.   - High risk medications in use with gabapentin and opioids. Monitoring labs closely.   - Recommend no further increases in opioids due to lack of reported response to substantial increases in opioid therapy, concerning for drug seeking behavior.   - Patient will likely need SNF placement to due lack of progress with therapy   - PMR will continue to follow for pain management        DVT PPX: SCDs      Thank you for allowing us to participate in the care of this patient.     Patient was seen for >35 minutes on unit/floor of which > 50% of time was spent on counseling and coordination of care regarding the above, including prognosis, risk reduction, benefits of treatment, and options for next stage of care.    Arnold Zuñiga, DO   Physical Medicine and Rehabilitation     Please note that this dictation was created using voice recognition software. I have made every reasonable attempt to correct obvious errors, but there may be errors of grammar and possibly content that I did not discover before finalizing the note.         independent

## 2024-07-26 NOTE — CHART NOTE - NSCHARTNOTEFT_GEN_A_CORE
Was made aware by RN staff/ortho team that patient lost peripheral access overnight due to IV infiltration. Patient is POD#2 s/p revision of right TKA.   Infectious disease team recommending standing Ancef for at least 6 weeks due to infected hardware from in office knee tap.     Called IV team to assist with IV placement to ensure patient does not miss a dose of IV antibiotics.   Blood cultures x 2 ordered for eventual PICC placement.   Once resulted x 48 hours will order PICC placement.     LAURA Sargent Pager: [phone]

## 2024-07-26 NOTE — OCCUPATIONAL THERAPY INITIAL EVALUATION ADULT - RANGE OF MOTION EXAMINATION, UPPER EXTREMITY
except Bilateral Shoulder Flexion 0-80 degrees/bilateral UE Active ROM was WFL  (within functional limits)

## 2024-07-26 NOTE — DISCHARGE NOTE PROVIDER - NSDCCPTREATMENT_GEN_ALL_CORE_FT
PRINCIPAL PROCEDURE  Procedure: Revision total knee arthroplasty  Findings and Treatment: Diet: Continue regular diet upon discharge.   Activity: No heavy lifting > 25 lbs for 4 weeks. Avoid straining or excessive activity x 6 weeks.   -Continue to use your walker when ambulating until your postoperative follow up appointment.   Dressings: Keep dressing clean, dry, and intact. Your doctor will remove your bandage at your post-operative follow up appointment.   Other Care:   -You may shower when you get home but DO NOT soak dressing and/or incision. The water may run over your dressing/incision but DO NOT let the water directly hit your dressing/incision (take a shower with your wound away from the direct stream of water). NO hot tubes, NO bath tubs, NO swimming pools.   -Elevate your operative leg 2 feet above heart level for 2 hours in the morning, 2 hours in the afternoon, and 2 hours in the evening.   -Apply ice for 20min every time you elevate.   -Sit for 90 min/day: 45mins x2 or 30min x3  -DO NOT sit for more than the 90min/day. Walk or lay down when not elevating your leg.   -DO NOT place the elevation pillow behind your knees. Only place it under your calf and heel.   -DO NOT bend more than 45 degrees at the waist

## 2024-07-26 NOTE — DISCHARGE NOTE PROVIDER - HOSPITAL COURSE
This is a 65yo Female with PMH of HTN, HLD, CHF, asthma/COPD, SC, h/o EtOH abuse, neuropathy, MADHURI (no CPAP), tremors, BPD, R Breast Ca s/p chemo/XRT/mastectomy c/b RUE lymphedema, obesity who presents to [facility] for orthopedic surgery. Patient s/p right revision TKA with Dr. Givens on 7/24/24. Patient tolerated the procedure well without any intraoperative complications. Patient tolerated physical therapy, weight bearing as tolerated and pain was controlled. Will require 6 weeks of IV antibiotics per infectious disease recommendations. Seen by medical attending for continuity of care and management and cleared for safe discharge. As per surgeon, the patient is stable and ready for discharge. DO NOT take any NSAIDS (motrin, advil, ibuprofen, aleve), Aspirin, Anti-inflammatory medications unless instructed by your orthopedic surgeon to continue. Avoid any heavy lifting, bending, squatting, or twisting motions. Keep dressing/incision clean, dry and intact, may remove dressing two days after discharge and leave incision open to air. Any sutures/staples to be removed on post-op day #14 at your office visit. Please follow up with Dr. Givens in 2 weeks, call the office to make an appointment, [phone]. Please follow up with your PMD for continuity of care and management as medications may have changed.   This is a 63yo Female with PMH of HTN, HLD, CHF, asthma/COPD, SC, h/o EtOH abuse, neuropathy, MADHURI (no CPAP), tremors, BPD, R Breast Ca s/p chemo/XRT/mastectomy c/b RUE lymphedema, obesity who presents to [facility] for orthopedic surgery. Patient s/p right revision TKA with Dr. Givens on 7/24/24. Patient tolerated the procedure well without any intraoperative complications. Patient tolerated physical therapy well, and the pain was controlled. Patient is weight bearing as tolerated with cane/walker as needed in marya brace. OR cultures were positive for staph lugdunensis and patient will require 6 weeks of IV antibiotics per ID. Patient received a PICC on 7/30/2024 for IV abx and will be discharged on Ancef 2g q8hr until 9/4/2024. Patient will need a weekly CBC, BMP, ESR/CRP and email results to [email or web address]. Patient has been instructed to follow up with ID Dr. Knight after discharge from rehab. Seen by medical attending for continuity of care and management and cleared for safe discharge. Keep dressing/incision clean, dry and intact. Any suture/staples to be removed on post-op day #14 at your office visit. Patient is on 325mg of ASA for DVT prophylaxis, please take for 6 weeks unless otherwise instructed by your surgeon. Please follow up with Dr. Givens in 2 weeks. Please follow up with your PMD for continuity of care and management as medications may have changed.     This is a 65yo Female with PMH of HTN, HLD, CHF, asthma/COPD, SC, h/o EtOH abuse, neuropathy, MADHURI (no CPAP), tremors, BPD, R Breast Ca s/p chemo/XRT/mastectomy c/b RUE lymphedema, obesity who presents to [facility] for orthopedic surgery. Patient s/p right revision TKA with Dr. Givens on 7/24/24. Patient tolerated the procedure well without any intraoperative complications. Patient tolerated physical therapy well, and the pain was controlled. Patient is weight bearing as tolerated with cane/walker as needed in marya brace. OR cultures were positive for staph lugdunensis and patient will require 6 weeks of IV antibiotics per ID. Patient received a PICC on 7/30/2024 for IV abx and will be discharged on Vancomycin 1250g q12hr until 9/4/2024. Patient will need a weekly CBC, CMP, ESR/CRP, Vanc Troph and email results to [email or web address]. Patient has been instructed to follow up with ID Dr. Knight after discharge from rehab. Seen by medical attending for continuity of care and management and cleared for safe discharge. Keep dressing/incision clean, dry and intact. Any suture/staples to be removed on post-op day #14 at your office visit. Patient is on 325mg of ASA for DVT prophylaxis, please take for 6 weeks unless otherwise instructed by your surgeon. Please follow up with Dr. Givens in 2 weeks. Please follow up with your PMD for continuity of care and management as medications may have changed.

## 2024-07-26 NOTE — DISCHARGE NOTE PROVIDER - NSDCMRMEDTOKEN_GEN_ALL_CORE_FT
amLODIPine 5 mg oral tablet: 1 tab(s) orally once a day  gabapentin 300 mg oral capsule: 1 cap(s) orally 2 times a day  hydroCHLOROthiazide 25 mg oral tablet: 1 tab(s) orally once a day  hydroxychloroquine 200 mg oral tablet: 1 tab(s) orally 2 times a day  meloxicam 7.5 mg oral tablet: 1 tab(s) orally once a day last dose 1 week ago  metFORMIN 500 mg oral tablet: 1 tab(s) orally 2 times a day  omeprazole 40 mg oral delayed release capsule: 1 cap(s) orally 2 times a day  predniSONE 2.5 mg oral tablet: 1 tab(s) orally once a day completed 1 week ago  traMADol 50 mg oral tablet: 1 tab(s) orally 2 times a day  valsartan 320 mg oral tablet: 1 tab(s) orally once a day   acetaminophen 325 mg oral tablet: 3 tab(s) orally every 8 hours  amLODIPine 5 mg oral tablet: 1 tab(s) orally once a day  aspirin 325 mg oral delayed release tablet: 1 tab(s) orally 2 times a day  ceFAZolin 2 g intravenous injection: 2 gram(s) intravenous every 8 hours  gabapentin 300 mg oral capsule: 1 cap(s) orally 2 times a day  hydroCHLOROthiazide 25 mg oral tablet: 1 tab(s) orally once a day  hydroxychloroquine 200 mg oral tablet: 1 tab(s) orally 2 times a day  metFORMIN 500 mg oral tablet: 1 tab(s) orally 2 times a day  omeprazole 40 mg oral delayed release capsule: 1 cap(s) orally 2 times a day  oxyCODONE 5 mg oral tablet: 1 tab(s) orally every 4 hours As needed Moderate Pain (4 - 6)  traMADol 50 mg oral tablet: 1 tab(s) orally 2 times a day  valsartan 320 mg oral tablet: 1 tab(s) orally once a day   acetaminophen 325 mg oral tablet: 3 tab(s) orally every 8 hours  amLODIPine 5 mg oral tablet: 1 tab(s) orally once a day  aspirin 325 mg oral delayed release tablet: 1 tab(s) orally 2 times a day  gabapentin 300 mg oral capsule: 1 cap(s) orally 2 times a day  hydroCHLOROthiazide 25 mg oral tablet: 1 tab(s) orally once a day  hydroxychloroquine 200 mg oral tablet: 1 tab(s) orally 2 times a day  metFORMIN 500 mg oral tablet: 1 tab(s) orally 2 times a day  omeprazole 40 mg oral delayed release capsule: 1 cap(s) orally 2 times a day  oxyCODONE 5 mg oral tablet: 1 tab(s) orally every 4 hours As needed Moderate Pain (4 - 6)  traMADol 50 mg oral tablet: 1 tab(s) orally 2 times a day  valsartan 320 mg oral tablet: 1 tab(s) orally once a day  vancomycin 1.25 g intravenous injection: 1.25 gram(s) intravenous every 12 hours

## 2024-07-26 NOTE — DISCHARGE NOTE PROVIDER - NSDCCPCAREPLAN_GEN_ALL_CORE_FT
PRINCIPAL DISCHARGE DIAGNOSIS  Diagnosis: S/P revision of total knee, right  Assessment and Plan of Treatment: Pain control:   Standing:         -Acetaminophen 500mg - 2 tabs every 8 hours  As needed:        -Tramadol 50mg - 1 tab every 6 hours - Take only if needed for MODERATE pain       -oxycodone 5mg - 1 tab every 4-6 hours - Take only if needed for SEVERE or BREAKTHROUGH pain  Oxycodone and Tramadol have been sent to your pharmacy. Please do not drive, operate machinery, or make important decisions while taking these medications.   Other Medications: (Standing)  -IV antibiotics x 6 weeks  -Aspirin (Enteric Coated) 325mg every 12 hours - to prevent blood clots (for 6 weeks post operatively.)  -Protonix 40mg - 1 tab every 24 hours - to prevent stomach irritation/ulcers  -Senna 8.6mg - 2 pills every 24 hours - stool softener  -Miralax 17g - daily - constipation   Follow up: Please follow up at your prescheduled post-operative follow up appointment with Dr. Givens for 2 weeks after hospital discharge. Please call with any questions or concerns including fevers, worsening pain, pus from the wounds, redness of the skin and difficulty breathing or heaviness in the chest at [phone].   Please also follow up with your PCP within 1 week to approve resuming home medications.     PRINCIPAL DISCHARGE DIAGNOSIS  Diagnosis: S/P revision of total knee, right  Assessment and Plan of Treatment: This is a 63yo Female with PMH of HTN, HLD, CHF, asthma/COPD, SC, h/o EtOH abuse, neuropathy, MADHURI (no CPAP), tremors, BPD, R Breast Ca s/p chemo/XRT/mastectomy c/b RUE lymphedema, obesity who presents to [facility] for orthopedic surgery. Patient s/p right revision TKA with Dr. Givens on 7/24/24. Patient tolerated the procedure well without any intraoperative complications. Patient tolerated physical therapy well, and the pain was controlled. Patient is weight bearing as tolerated with cane/walker as needed in marya brace. OR cultures were positive for staph lugdunensis and patient will require 6 weeks of IV antibiotics per ID. Patient received a PICC on 7/30/2024 for IV abx and will be discharged on Ancef 2g q8hr until 9/4/2024. Patient will need a weekly CBC, BMP, ESR/CRP and email results to [email or web address]. Patient has been instructed to follow up with ID Dr. Knight after discharge from rehab. Seen by medical attending for continuity of care and management and cleared for safe discharge. Keep dressing/incision clean, dry and intact. Any suture/staples to be removed on post-op day #14 at your office visit. Patient is on 325mg of ASA for DVT prophylaxis, please take for 6 weeks unless otherwise instructed by your surgeon. Please follow up with Dr. Givens in 2 weeks. Please follow up with your PMD for continuity of care and management as medications may have changed.       PRINCIPAL DISCHARGE DIAGNOSIS  Diagnosis: S/P revision of total knee, right  Assessment and Plan of Treatment: This is a 65yo Female with PMH of HTN, HLD, CHF, asthma/COPD, SC, h/o EtOH abuse, neuropathy, MADHURI (no CPAP), tremors, BPD, R Breast Ca s/p chemo/XRT/mastectomy c/b RUE lymphedema, obesity who presents to [facility] for orthopedic surgery. Patient s/p right revision TKA with Dr. Givens on 7/24/24. Patient tolerated the procedure well without any intraoperative complications. Patient tolerated physical therapy well, and the pain was controlled. Patient is weight bearing as tolerated with cane/walker as needed in marya brace. OR cultures were positive for staph lugdunensis and patient will require 6 weeks of IV antibiotics per ID. Patient received a PICC on 7/30/2024 for IV abx and will be discharged on Vancomycin 1250g q12hr until 9/4/2024. Patient will need a weekly CBC, CMP, ESR/CRP, Vanc Troph and email results to [email or web address]. Patient has been instructed to follow up with ID Dr. Knight after discharge from rehab. Seen by medical attending for continuity of care and management and cleared for safe discharge. Keep dressing/incision clean, dry and intact. Any suture/staples to be removed on post-op day #14 at your office visit. Patient is on 325mg of ASA for DVT prophylaxis, please take for 6 weeks unless otherwise instructed by your surgeon. Please follow up with Dr. Givens in 2 weeks. Please follow up with your PMD for continuity of care and management as medications may have changed.

## 2024-07-26 NOTE — DISCHARGE NOTE PROVIDER - NSDCQMSTROKE_NEU_ALL_CORE
You can start taking 500mg Naproxen twice a day AS NEEDED for your hip pain  Take the Naproxen BEFORE you do long activities  Drink a lot of water as well  New blood work has been ordered for you  Please schedule an eye exam when you find a provider  Wellness Visit for Adults   AMBULATORY CARE:   A wellness visit  is when you see your healthcare provider to get screened for health problems  Your healthcare provider will also give you advice on how to stay healthy  Write down your questions so you remember to ask them  Ask your healthcare provider how often you should have a wellness visit  What happens at a wellness visit:  Your healthcare provider will ask about your health, and your family history of health problems  This includes high blood pressure, heart disease, and cancer  He or she will ask if you have symptoms that concern you, if you smoke, and about your mood  You may also be asked about your intake of medicines, supplements, food, and alcohol  Any of the following may be done:  · Your weight  will be checked  Your height may also be checked so your body mass index (BMI) can be calculated  Your BMI shows if you are at a healthy weight  · Your blood pressure  and heart rate will be checked  Your temperature may also be checked  · Blood and urine tests  may be done  Blood tests may be done to check your cholesterol levels  Abnormal cholesterol levels increase your risk for heart disease and stroke  You may also need a blood or urine test to check for diabetes if you are at increased risk  Urine tests may be done to look for signs of an infection or kidney disease  · A physical exam  includes checking your heartbeat and lungs with a stethoscope  Your healthcare provider may also check your skin to look for sun damage  · Screening tests  may be recommended  A screening test is done to check for diseases that may not cause symptoms   The screening tests you may need depend on your age, gender, family history, and lifestyle habits  For example, colorectal screening may be recommended if you are 48years old or older  Screening tests you need if you are a woman:   · A Pap smear  is used to screen for cervical cancer  Pap smears are usually done every 3 to 5 years depending on your age  You may need them more often if you have had abnormal Pap smear test results in the past  Ask your healthcare provider how often you should have a Pap smear  · A mammogram  is an x-ray of your breasts to screen for breast cancer  Experts recommend mammograms every 2 years starting at age 48 years  You may need a mammogram at age 52 years or younger if you have an increased risk for breast cancer  Talk to your healthcare provider about when you should start having mammograms and how often you need them  Vaccines you may need:   · Get an influenza vaccine  every year  The influenza vaccine protects you from the flu  Several types of viruses cause the flu  The viruses change over time, so new vaccines are made each year  · Get a tetanus-diphtheria (Td) booster vaccine  every 10 years  This vaccine protects you against tetanus and diphtheria  Tetanus is a severe infection that may cause painful muscle spasms and lockjaw  Diphtheria is a severe bacterial infection that causes a thick covering in the back of your mouth and throat  · Get a human papillomavirus (HPV) vaccine  if you are female and aged 23 to 32 or male 23 to 24 and never received it  This vaccine protects you from HPV infection  HPV is the most common infection spread by sexual contact  HPV may also cause vaginal, penile, and anal cancers  · Get a pneumococcal vaccine  if you are aged 72 years or older  The pneumococcal vaccine is an injection given to protect you from pneumococcal disease  Pneumococcal disease is an infection caused by pneumococcal bacteria  The infection may cause pneumonia, meningitis, or an ear infection      · Get a shingles vaccine  if you are 61 or older, even if you have had shingles before  The shingles vaccine is an injection to protect you from the varicella-zoster virus  This is the same virus that causes chickenpox  Shingles is a painful rash that develops in people who had chickenpox or have been exposed to the virus  How to eat healthy:  My Plate is a model for planning healthy meals  It shows the types and amounts of foods that should go on your plate  Fruits and vegetables make up about half of your plate, and grains and protein make up the other half  A serving of dairy is included on the side of your plate  The amount of calories and serving sizes you need depends on your age, gender, weight, and height  Examples of healthy foods are listed below:  · Eat a variety of vegetables  such as dark green, red, and orange vegetables  You can also include canned vegetables low in sodium (salt) and frozen vegetables without added butter or sauces  · Eat a variety of fresh fruits , canned fruit in 100% juice, frozen fruit, and dried fruit  · Include whole grains  At least half of the grains you eat should be whole grains  Examples include whole-wheat bread, wheat pasta, brown rice, and whole-grain cereals such as oatmeal     · Eat a variety of protein foods such as seafood (fish and shellfish), lean meat, and poultry without skin (turkey and chicken)  Examples of lean meats include pork leg, shoulder, or tenderloin, and beef round, sirloin, tenderloin, and extra lean ground beef  Other protein foods include eggs and egg substitutes, beans, peas, soy products, nuts, and seeds  · Choose low-fat dairy products such as skim or 1% milk or low-fat yogurt, cheese, and cottage cheese  · Limit unhealthy fats  such as butter, hard margarine, and shortening  Exercise:  Exercise at least 30 minutes per day on most days of the week  Some examples of exercise include walking, biking, dancing, and swimming   You can also fit in more physical activity by taking the stairs instead of the elevator or parking farther away from stores  Include muscle strengthening activities 2 days each week  Regular exercise provides many health benefits  It helps you manage your weight, and decreases your risk for type 2 diabetes, heart disease, stroke, and high blood pressure  Exercise can also help improve your mood  Ask your healthcare provider about the best exercise plan for you  General health and safety guidelines:   · Do not smoke  Nicotine and other chemicals in cigarettes and cigars can cause lung damage  Ask your healthcare provider for information if you currently smoke and need help to quit  E-cigarettes or smokeless tobacco still contain nicotine  Talk to your healthcare provider before you use these products  · Limit alcohol  A drink of alcohol is 12 ounces of beer, 5 ounces of wine, or 1½ ounces of liquor  · Lose weight, if needed  Being overweight increases your risk of certain health conditions  These include heart disease, high blood pressure, type 2 diabetes, and certain types of cancer  · Protect your skin  Do not sunbathe or use tanning beds  Use sunscreen with a SPF 15 or higher  Apply sunscreen at least 15 minutes before you go outside  Reapply sunscreen every 2 hours  Wear protective clothing, hats, and sunglasses when you are outside  · Drive safely  Always wear your seatbelt  Make sure everyone in your car wears a seatbelt  A seatbelt can save your life if you are in an accident  Do not use your cell phone when you are driving  This could distract you and cause an accident  Pull over if you need to make a call or send a text message  · Practice safe sex  Use latex condoms if are sexually active and have more than one partner  Your healthcare provider may recommend screening tests for sexually transmitted infections (STIs)  · Wear helmets, lifejackets, and protective gear    Always wear a helmet when you ride a bike or motorcycle, go skiing, or play sports that could cause a head injury  Wear protective equipment when you play sports  Wear a lifejacket when you are on a boat or doing water sports  © Copyright [address] 2022 Information is for End User's use only and may not be sold, redistributed or otherwise used for commercial purposes  All illustrations and images included in CareNotes® are the copyrighted property of A D A M , Inc  or [facility] [place][note 2] Sena Reyes   The above information is an  only  It is not intended as medical advice for individual conditions or treatments  Talk to your doctor, nurse or pharmacist before following any medical regimen to see if it is safe and effective for you  No

## 2024-07-26 NOTE — PROGRESS NOTE ADULT - ASSESSMENT
63 y/o female PMH HTN HLD, former smoker/alcohol abuse, CHF- last exacerbation 1/2023, asthma/COPD, ETOH abuse, neuropathy, fibromyalgia, osteoarthritis, MADHURI ( denies use of CPAP), tremors, bipolar disorder, right breast cancer ( s/p right mastectomy/chemo/RT c/b right arm lymphedema , b/l knee replacement left 3/18/2024 & right 4/24/2023, reports right knee pain with ambulation and swelling for months, had steroid injection 5 months ago, evaluated by ID, had Rt knee aspiration culture growing staph lugdunensis, c/f septic joint, s/p right knee revision on 7/24. Intraop EBL 3L, , IVF 2.2L crystalloid and 500ml albumin, received 4 units pRBC and 1U FFP.

## 2024-07-26 NOTE — DISCHARGE NOTE PROVIDER - NSDCFUSCHEDAPPT_GEN_ALL_CORE_FT
Jb Sosa  [facility]  [facility]  Scheduled Appointment: 08/09/2024    Danna Torres  [facility]  CARDIOLOGY [phone]  Scheduled Appointment: 09/03/2024

## 2024-07-27 LAB
ANION GAP SERPL CALC-SCNC: 13 MMOL/L — SIGNIFICANT CHANGE UP (ref 7–14)
BUN SERPL-MCNC: 9 MG/DL — SIGNIFICANT CHANGE UP (ref 7–23)
CALCIUM SERPL-MCNC: 8.7 MG/DL — SIGNIFICANT CHANGE UP (ref 8.4–10.5)
CHLORIDE SERPL-SCNC: 96 MMOL/L — LOW (ref 98–107)
CO2 SERPL-SCNC: 26 MMOL/L — SIGNIFICANT CHANGE UP (ref 22–31)
CREAT SERPL-MCNC: 0.88 MG/DL — SIGNIFICANT CHANGE UP (ref 0.5–1.3)
EGFR: 72 ML/MIN/1.73M2 — SIGNIFICANT CHANGE UP
GLUCOSE BLDC GLUCOMTR-MCNC: 140 MG/DL — HIGH (ref 70–99)
GLUCOSE BLDC GLUCOMTR-MCNC: 142 MG/DL — HIGH (ref 70–99)
GLUCOSE BLDC GLUCOMTR-MCNC: 148 MG/DL — HIGH (ref 70–99)
GLUCOSE BLDC GLUCOMTR-MCNC: 163 MG/DL — HIGH (ref 70–99)
GLUCOSE SERPL-MCNC: 134 MG/DL — HIGH (ref 70–99)
HCT VFR BLD CALC: 23.1 % — LOW (ref 34.5–45)
HGB BLD-MCNC: 7.7 G/DL — LOW (ref 11.5–15.5)
MCHC RBC-ENTMCNC: 27.3 PG — SIGNIFICANT CHANGE UP (ref 27–34)
MCHC RBC-ENTMCNC: 33.3 GM/DL — SIGNIFICANT CHANGE UP (ref 32–36)
MCV RBC AUTO: 81.9 FL — SIGNIFICANT CHANGE UP (ref 80–100)
NRBC # BLD: 0 /100 WBCS — SIGNIFICANT CHANGE UP (ref 0–0)
NRBC # FLD: 0.03 K/UL — HIGH (ref 0–0)
PLATELET # BLD AUTO: 203 K/UL — SIGNIFICANT CHANGE UP (ref 150–400)
POTASSIUM SERPL-MCNC: 3.7 MMOL/L — SIGNIFICANT CHANGE UP (ref 3.5–5.3)
POTASSIUM SERPL-SCNC: 3.7 MMOL/L — SIGNIFICANT CHANGE UP (ref 3.5–5.3)
RBC # BLD: 2.82 M/UL — LOW (ref 3.8–5.2)
RBC # FLD: 16.1 % — HIGH (ref 10.3–14.5)
SODIUM SERPL-SCNC: 135 MMOL/L — SIGNIFICANT CHANGE UP (ref 135–145)
WBC # BLD: 6.96 K/UL — SIGNIFICANT CHANGE UP (ref 3.8–10.5)
WBC # FLD AUTO: 6.96 K/UL — SIGNIFICANT CHANGE UP (ref 3.8–10.5)

## 2024-07-27 PROCEDURE — 99232 SBSQ HOSP IP/OBS MODERATE 35: CPT

## 2024-07-27 RX ORDER — ACETAMINOPHEN 500 MG
975 TABLET ORAL EVERY 8 HOURS
Refills: 0 | Status: DISCONTINUED | OUTPATIENT
Start: 2024-07-27 | End: 2024-07-31

## 2024-07-27 RX ADMIN — Medication 100 MILLIGRAM(S): at 09:42

## 2024-07-27 RX ADMIN — OXYCODONE HYDROCHLORIDE 10 MILLIGRAM(S): 30 TABLET ORAL at 12:58

## 2024-07-27 RX ADMIN — HYDROXYCHLOROQUINE SULFATE 200 MILLIGRAM(S): 200 TABLET ORAL at 05:38

## 2024-07-27 RX ADMIN — PANTOPRAZOLE SODIUM 40 MILLIGRAM(S): 20 TABLET, DELAYED RELEASE ORAL at 05:37

## 2024-07-27 RX ADMIN — LIDOCAINE 5% 1 PATCH: 5 CREAM TOPICAL at 18:13

## 2024-07-27 RX ADMIN — OXYCODONE HYDROCHLORIDE 10 MILLIGRAM(S): 30 TABLET ORAL at 06:40

## 2024-07-27 RX ADMIN — Medication 325 MILLIGRAM(S): at 17:25

## 2024-07-27 RX ADMIN — OXYCODONE HYDROCHLORIDE 10 MILLIGRAM(S): 30 TABLET ORAL at 13:28

## 2024-07-27 RX ADMIN — Medication 100 MILLIGRAM(S): at 16:20

## 2024-07-27 RX ADMIN — Medication 100 MILLIGRAM(S): at 00:28

## 2024-07-27 RX ADMIN — MUPIROCIN CALCIUM 1 APPLICATION(S): 20 CREAM TOPICAL at 17:26

## 2024-07-27 RX ADMIN — LIDOCAINE 5% 1 PATCH: 5 CREAM TOPICAL at 12:58

## 2024-07-27 RX ADMIN — Medication 1: at 08:17

## 2024-07-27 RX ADMIN — Medication 975 MILLIGRAM(S): at 16:51

## 2024-07-27 RX ADMIN — MUPIROCIN CALCIUM 1 APPLICATION(S): 20 CREAM TOPICAL at 05:55

## 2024-07-27 RX ADMIN — HYDROXYCHLOROQUINE SULFATE 200 MILLIGRAM(S): 200 TABLET ORAL at 17:25

## 2024-07-27 RX ADMIN — OXYCODONE HYDROCHLORIDE 10 MILLIGRAM(S): 30 TABLET ORAL at 05:40

## 2024-07-27 RX ADMIN — Medication 975 MILLIGRAM(S): at 16:21

## 2024-07-27 RX ADMIN — Medication 325 MILLIGRAM(S): at 05:37

## 2024-07-27 NOTE — PROVIDER CONTACT NOTE (OTHER) - ACTION/TREATMENT ORDERED:
Ortho provider, Valdez Plasencia made aware.
Provider notified. Provider at bedside to assess patient. Ok to push IV medication until PICC line insertion.
Provider notified. Provider at bedside to assess patient and applied ACE wrap. Attempt IV when swelling decreases.

## 2024-07-27 NOTE — PROGRESS NOTE ADULT - ASSESSMENT
65 y/o female PMH HTN HLD, former smoker/alcohol abuse, CHF- last exacerbation 1/2023, asthma/COPD, ETOH abuse, neuropathy, fibromyalgia, osteoarthritis, MADHURI ( denies use of CPAP), tremors, bipolar disorder, right breast cancer ( s/p right mastectomy/chemo/RT c/b right arm lymphedema , b/l knee replacement left 3/18/2024 & right 4/24/2023, reports right knee pain with ambulation and swelling for months, had steroid injection 5 months ago, evaluated by ID, had Rt knee aspiration culture growing staph lugdunensis, c/f septic joint, s/p right knee revision on 7/24. Intraop EBL 3L, , IVF 2.2L crystalloid and 500ml albumin, received 4 units pRBC and 1U FFP.

## 2024-07-27 NOTE — PROVIDER CONTACT NOTE (OTHER) - ASSESSMENT
AOx4. VSS. Right arm precaution for lymphedema. Left arm and hand swollen 3+ edema. Ace wrap and elevation to the left arm.
pt states everyone has hard time getting her blood draw. pt a/o x 4; pt wants a break and refusing stick again at this time.
AOx4. VSS. Right arm precaution for lymphedema. Left arm and hand swollen 3+ edema. Ice and elevation to the left arm.

## 2024-07-27 NOTE — PROVIDER CONTACT NOTE (OTHER) - BACKGROUND
s/p revision of right knee arthroplasty.

## 2024-07-27 NOTE — PROGRESS NOTE ADULT - ASSESSMENT
64F s/p R revision TKA    Plan:  - WBAT in [place], will replace w marya  - f/u BELA output  - c/w prevena  - c/w pineda  - f/u OR cx  -  BID for dvt ppx  - c/w vanc/ancef, appreciate ID consult  - f/u Bcx for possible PICC v midline  - PT/OT    Xander Cleveland MD  Orthopaedic Surgery Resident    For all questions, please reach out via the following numbers for the on-call resident; do not reach out via Teams.  [facility] [phone]  [facility]        [id number]  [facility]  [phone]/ [phone]

## 2024-07-27 NOTE — PROVIDER CONTACT NOTE (OTHER) - SITUATION
Pt left arm still swollen, unable to gain IV access. Pt states that she does not want any IV attempts at this time as a PICC line is scheduled to be placed later today.
pt is hard stick; able to draw labs with vein finder and multiple attempts. lab called stating CBC is clotted.
Pt left forearm IV infiltrated and removed. Left forearm and hand are swollen.

## 2024-07-28 LAB
ANION GAP SERPL CALC-SCNC: 11 MMOL/L — SIGNIFICANT CHANGE UP (ref 7–14)
BUN SERPL-MCNC: 10 MG/DL — SIGNIFICANT CHANGE UP (ref 7–23)
CALCIUM SERPL-MCNC: 8.9 MG/DL — SIGNIFICANT CHANGE UP (ref 8.4–10.5)
CHLORIDE SERPL-SCNC: 97 MMOL/L — LOW (ref 98–107)
CO2 SERPL-SCNC: 27 MMOL/L — SIGNIFICANT CHANGE UP (ref 22–31)
CREAT SERPL-MCNC: 0.91 MG/DL — SIGNIFICANT CHANGE UP (ref 0.5–1.3)
EGFR: 70 ML/MIN/1.73M2 — SIGNIFICANT CHANGE UP
GLUCOSE BLDC GLUCOMTR-MCNC: 151 MG/DL — HIGH (ref 70–99)
GLUCOSE BLDC GLUCOMTR-MCNC: 160 MG/DL — HIGH (ref 70–99)
GLUCOSE BLDC GLUCOMTR-MCNC: 168 MG/DL — HIGH (ref 70–99)
GLUCOSE BLDC GLUCOMTR-MCNC: 198 MG/DL — HIGH (ref 70–99)
GLUCOSE SERPL-MCNC: 142 MG/DL — HIGH (ref 70–99)
HCT VFR BLD CALC: 23 % — LOW (ref 34.5–45)
HGB BLD-MCNC: 7.5 G/DL — LOW (ref 11.5–15.5)
MCHC RBC-ENTMCNC: 26.8 PG — LOW (ref 27–34)
MCHC RBC-ENTMCNC: 32.6 GM/DL — SIGNIFICANT CHANGE UP (ref 32–36)
MCV RBC AUTO: 82.1 FL — SIGNIFICANT CHANGE UP (ref 80–100)
NRBC # BLD: 0 /100 WBCS — SIGNIFICANT CHANGE UP (ref 0–0)
NRBC # FLD: 0.02 K/UL — HIGH (ref 0–0)
PLATELET # BLD AUTO: 208 K/UL — SIGNIFICANT CHANGE UP (ref 150–400)
POTASSIUM SERPL-MCNC: 3.8 MMOL/L — SIGNIFICANT CHANGE UP (ref 3.5–5.3)
POTASSIUM SERPL-SCNC: 3.8 MMOL/L — SIGNIFICANT CHANGE UP (ref 3.5–5.3)
RBC # BLD: 2.8 M/UL — LOW (ref 3.8–5.2)
RBC # FLD: 16.5 % — HIGH (ref 10.3–14.5)
SODIUM SERPL-SCNC: 135 MMOL/L — SIGNIFICANT CHANGE UP (ref 135–145)
WBC # BLD: 6.48 K/UL — SIGNIFICANT CHANGE UP (ref 3.8–10.5)
WBC # FLD AUTO: 6.48 K/UL — SIGNIFICANT CHANGE UP (ref 3.8–10.5)

## 2024-07-28 PROCEDURE — 99232 SBSQ HOSP IP/OBS MODERATE 35: CPT

## 2024-07-28 RX ADMIN — Medication 975 MILLIGRAM(S): at 16:02

## 2024-07-28 RX ADMIN — Medication 1: at 16:41

## 2024-07-28 RX ADMIN — LIDOCAINE 5% 1 PATCH: 5 CREAM TOPICAL at 23:40

## 2024-07-28 RX ADMIN — Medication 100 MILLIGRAM(S): at 09:12

## 2024-07-28 RX ADMIN — LIDOCAINE 5% 1 PATCH: 5 CREAM TOPICAL at 11:36

## 2024-07-28 RX ADMIN — Medication 325 MILLIGRAM(S): at 19:02

## 2024-07-28 RX ADMIN — MUPIROCIN CALCIUM 1 APPLICATION(S): 20 CREAM TOPICAL at 05:37

## 2024-07-28 RX ADMIN — OXYCODONE HYDROCHLORIDE 10 MILLIGRAM(S): 30 TABLET ORAL at 22:43

## 2024-07-28 RX ADMIN — Medication 975 MILLIGRAM(S): at 07:33

## 2024-07-28 RX ADMIN — OXYCODONE HYDROCHLORIDE 10 MILLIGRAM(S): 30 TABLET ORAL at 05:43

## 2024-07-28 RX ADMIN — HYDROXYCHLOROQUINE SULFATE 200 MILLIGRAM(S): 200 TABLET ORAL at 05:37

## 2024-07-28 RX ADMIN — HYDROXYCHLOROQUINE SULFATE 200 MILLIGRAM(S): 200 TABLET ORAL at 19:02

## 2024-07-28 RX ADMIN — AMLODIPINE BESYLATE 5 MILLIGRAM(S): 2.5 TABLET ORAL at 05:36

## 2024-07-28 RX ADMIN — Medication 325 MILLIGRAM(S): at 05:36

## 2024-07-28 RX ADMIN — Medication 975 MILLIGRAM(S): at 02:05

## 2024-07-28 RX ADMIN — Medication 975 MILLIGRAM(S): at 23:40

## 2024-07-28 RX ADMIN — Medication 1: at 11:35

## 2024-07-28 RX ADMIN — PANTOPRAZOLE SODIUM 40 MILLIGRAM(S): 20 TABLET, DELAYED RELEASE ORAL at 07:32

## 2024-07-28 RX ADMIN — Medication 975 MILLIGRAM(S): at 16:53

## 2024-07-28 RX ADMIN — LIDOCAINE 5% 1 PATCH: 5 CREAM TOPICAL at 19:27

## 2024-07-28 RX ADMIN — LIDOCAINE 5% 1 PATCH: 5 CREAM TOPICAL at 00:06

## 2024-07-28 RX ADMIN — Medication 100 MILLIGRAM(S): at 01:00

## 2024-07-28 RX ADMIN — OXYCODONE HYDROCHLORIDE 10 MILLIGRAM(S): 30 TABLET ORAL at 21:43

## 2024-07-28 RX ADMIN — Medication 975 MILLIGRAM(S): at 01:00

## 2024-07-28 RX ADMIN — Medication 1: at 07:31

## 2024-07-28 RX ADMIN — Medication 100 MILLIGRAM(S): at 17:19

## 2024-07-28 RX ADMIN — Medication 975 MILLIGRAM(S): at 23:03

## 2024-07-28 RX ADMIN — MUPIROCIN CALCIUM 1 APPLICATION(S): 20 CREAM TOPICAL at 19:02

## 2024-07-29 ENCOUNTER — TRANSCRIPTION ENCOUNTER (OUTPATIENT)
Age: 64
End: 2024-07-29

## 2024-07-29 DIAGNOSIS — D50.0 IRON DEFICIENCY ANEMIA SECONDARY TO BLOOD LOSS (CHRONIC): ICD-10-CM

## 2024-07-29 DIAGNOSIS — L50.9 URTICARIA, UNSPECIFIED: ICD-10-CM

## 2024-07-29 DIAGNOSIS — I50.32 CHRONIC DIASTOLIC (CONGESTIVE) HEART FAILURE: ICD-10-CM

## 2024-07-29 LAB
ANION GAP SERPL CALC-SCNC: 13 MMOL/L — SIGNIFICANT CHANGE UP (ref 7–14)
BLD GP AB SCN SERPL QL: POSITIVE — SIGNIFICANT CHANGE UP
BUN SERPL-MCNC: 10 MG/DL — SIGNIFICANT CHANGE UP (ref 7–23)
CALCIUM SERPL-MCNC: 9.1 MG/DL — SIGNIFICANT CHANGE UP (ref 8.4–10.5)
CHLORIDE SERPL-SCNC: 100 MMOL/L — SIGNIFICANT CHANGE UP (ref 98–107)
CO2 SERPL-SCNC: 25 MMOL/L — SIGNIFICANT CHANGE UP (ref 22–31)
CREAT SERPL-MCNC: 0.88 MG/DL — SIGNIFICANT CHANGE UP (ref 0.5–1.3)
EGFR: 72 ML/MIN/1.73M2 — SIGNIFICANT CHANGE UP
GLUCOSE BLDC GLUCOMTR-MCNC: 108 MG/DL — HIGH (ref 70–99)
GLUCOSE BLDC GLUCOMTR-MCNC: 158 MG/DL — HIGH (ref 70–99)
GLUCOSE BLDC GLUCOMTR-MCNC: 159 MG/DL — HIGH (ref 70–99)
GLUCOSE BLDC GLUCOMTR-MCNC: 169 MG/DL — HIGH (ref 70–99)
GLUCOSE SERPL-MCNC: 138 MG/DL — HIGH (ref 70–99)
HCT VFR BLD CALC: 23.2 % — LOW (ref 34.5–45)
HGB BLD-MCNC: 7.4 G/DL — LOW (ref 11.5–15.5)
MCHC RBC-ENTMCNC: 26.3 PG — LOW (ref 27–34)
MCHC RBC-ENTMCNC: 31.9 GM/DL — LOW (ref 32–36)
MCV RBC AUTO: 82.6 FL — SIGNIFICANT CHANGE UP (ref 80–100)
NRBC # BLD: 0 /100 WBCS — SIGNIFICANT CHANGE UP (ref 0–0)
NRBC # FLD: 0.02 K/UL — HIGH (ref 0–0)
PLATELET # BLD AUTO: 253 K/UL — SIGNIFICANT CHANGE UP (ref 150–400)
POTASSIUM SERPL-MCNC: 4 MMOL/L — SIGNIFICANT CHANGE UP (ref 3.5–5.3)
POTASSIUM SERPL-SCNC: 4 MMOL/L — SIGNIFICANT CHANGE UP (ref 3.5–5.3)
RBC # BLD: 2.81 M/UL — LOW (ref 3.8–5.2)
RBC # FLD: 16.7 % — HIGH (ref 10.3–14.5)
RH IG SCN BLD-IMP: NEGATIVE — SIGNIFICANT CHANGE UP
SODIUM SERPL-SCNC: 138 MMOL/L — SIGNIFICANT CHANGE UP (ref 135–145)
WBC # BLD: 4.85 K/UL — SIGNIFICANT CHANGE UP (ref 3.8–10.5)
WBC # FLD AUTO: 4.85 K/UL — SIGNIFICANT CHANGE UP (ref 3.8–10.5)

## 2024-07-29 PROCEDURE — 99233 SBSQ HOSP IP/OBS HIGH 50: CPT

## 2024-07-29 PROCEDURE — 99232 SBSQ HOSP IP/OBS MODERATE 35: CPT | Mod: GC

## 2024-07-29 RX ORDER — PETROLATUM 42 G/100G
1 OINTMENT TOPICAL THREE TIMES A DAY
Refills: 0 | Status: DISCONTINUED | OUTPATIENT
Start: 2024-07-29 | End: 2024-07-31

## 2024-07-29 RX ORDER — NYSTATIN 100000 [USP'U]/G
1 POWDER TOPICAL
Refills: 0 | Status: DISCONTINUED | OUTPATIENT
Start: 2024-07-29 | End: 2024-07-31

## 2024-07-29 RX ADMIN — Medication 975 MILLIGRAM(S): at 09:22

## 2024-07-29 RX ADMIN — NYSTATIN 1 APPLICATION(S): 100000 POWDER TOPICAL at 17:54

## 2024-07-29 RX ADMIN — Medication 975 MILLIGRAM(S): at 17:45

## 2024-07-29 RX ADMIN — Medication 975 MILLIGRAM(S): at 16:45

## 2024-07-29 RX ADMIN — Medication 325 MILLIGRAM(S): at 17:50

## 2024-07-29 RX ADMIN — LIDOCAINE 5% 1 PATCH: 5 CREAM TOPICAL at 18:17

## 2024-07-29 RX ADMIN — Medication 1: at 11:27

## 2024-07-29 RX ADMIN — OXYCODONE HYDROCHLORIDE 10 MILLIGRAM(S): 30 TABLET ORAL at 21:22

## 2024-07-29 RX ADMIN — OXYCODONE HYDROCHLORIDE 10 MILLIGRAM(S): 30 TABLET ORAL at 20:31

## 2024-07-29 RX ADMIN — HYDROXYCHLOROQUINE SULFATE 200 MILLIGRAM(S): 200 TABLET ORAL at 17:51

## 2024-07-29 RX ADMIN — OXYCODONE HYDROCHLORIDE 10 MILLIGRAM(S): 30 TABLET ORAL at 09:13

## 2024-07-29 RX ADMIN — Medication 975 MILLIGRAM(S): at 23:39

## 2024-07-29 RX ADMIN — PANTOPRAZOLE SODIUM 40 MILLIGRAM(S): 20 TABLET, DELAYED RELEASE ORAL at 05:18

## 2024-07-29 RX ADMIN — OXYCODONE HYDROCHLORIDE 10 MILLIGRAM(S): 30 TABLET ORAL at 09:45

## 2024-07-29 RX ADMIN — Medication 975 MILLIGRAM(S): at 08:22

## 2024-07-29 RX ADMIN — Medication 100 MILLIGRAM(S): at 09:12

## 2024-07-29 RX ADMIN — Medication 325 MILLIGRAM(S): at 05:18

## 2024-07-29 RX ADMIN — LIDOCAINE 5% 1 PATCH: 5 CREAM TOPICAL at 15:10

## 2024-07-29 RX ADMIN — Medication 100 MILLIGRAM(S): at 16:45

## 2024-07-29 RX ADMIN — Medication 1: at 07:31

## 2024-07-29 RX ADMIN — AMLODIPINE BESYLATE 5 MILLIGRAM(S): 2.5 TABLET ORAL at 05:17

## 2024-07-29 RX ADMIN — Medication 100 MILLIGRAM(S): at 00:17

## 2024-07-29 RX ADMIN — PETROLATUM 1 APPLICATION(S): 42 OINTMENT TOPICAL at 21:14

## 2024-07-29 RX ADMIN — HYDROXYCHLOROQUINE SULFATE 200 MILLIGRAM(S): 200 TABLET ORAL at 05:18

## 2024-07-29 RX ADMIN — MUPIROCIN CALCIUM 1 APPLICATION(S): 20 CREAM TOPICAL at 17:51

## 2024-07-29 NOTE — PROGRESS NOTE ADULT - ASSESSMENT
64F HTN, DM2, HFpEF, MADHURI - non-compliant w/ CPAP, former smoker, EtOH abuse, COPD, Fibromyalgia, inflammatory arthritis, Bipolar D/O, Breast CA s/p Rt mastectomy, chemo/RT, OA B/L TKR,  p/w Rt septic arthritis from device infection w/ staph lugdunensis s/p Rt TKR revision on 7/24 c/b post-op anemia, urticaria.

## 2024-07-29 NOTE — PROGRESS NOTE ADULT - PROBLEM SELECTOR PLAN 8
suspect inguinal fungal skin infection given sweat and long term IV abx.  - nystatin cream to affected areas

## 2024-07-29 NOTE — PROGRESS NOTE ADULT - PROBLEM SELECTOR PROBLEM 1
Infection and inflammatory reaction due to internal right knee prosthesis, initial encounter

## 2024-07-29 NOTE — PROGRESS NOTE ADULT - PROBLEM SELECTOR PLAN 7
.monitor fluid status  - no exacerbation noted.
c/w hydroxychloroquine 200mg bid per outpt rheum  denies SLE but reports she met some criteria  reports she weaned off prednisone recently (was on prednisone 20mg, weaned down to 2.5mg and now off), if develops shock can consider adrenal insufficiency and may need stress steroid.

## 2024-07-29 NOTE — PROGRESS NOTE ADULT - PROBLEM SELECTOR PROBLEM 6
Chronic diastolic congestive heart failure
Chronic obstructive pulmonary disease

## 2024-07-29 NOTE — PROGRESS NOTE ADULT - PROBLEM SELECTOR PLAN 3
ISS, monitor FS  FS acceptable  A1c 7.1%.
COntinue Norvasc, HCTZ
ISS, monitor FS  FS acceptable  A1c 7.1%.
ISS, monitor FS  FS acceptable  A1c 7.1%.

## 2024-07-29 NOTE — PROGRESS NOTE ADULT - PROBLEM SELECTOR PLAN 1
- s/p right knee revision with R TKA explant, I/D, revision TKA with functional spacer 7/24, received  2200mL crystalloid and 500ml albumin, 4 units pRBC and 1U FFP.  -preop joint fluid cx staph lugdunensis,   - postop management per Ortho, pain control, bowel regimen, IS, DVT ppx  - f/u OR culture  - joint fluid culture (+) staph lugdunensis as outpatient from 6/21/2024  - ID recs ancef 2g q8h through 9/4  - will need midline or PICC line prior to discharge  - labs from 7/25 reviewed, WBC 8.2, acute blood loss anemia d/t surgery H/H downtrending to 8.6/24.5, trend CBC, transfuse prn, Lytes/Cr acceptable (Na 134, Cr 1.0).
- s/p right knee revision with R TKA explant, I/D, revision TKA with functional spacer 7/24, received  2200mL crystalloid and 500ml albumin, 4 units pRBC and 1U FFP.  -preop joint fluid cx staph lugdunensis,   - postop management per Ortho, pain control, bowel regimen, IS, DVT ppx  - f/u OR culture  - joint fluid culture (+) staph lugdunensis as outpatient from 6/21/2024  - ID recs ancef 2g q8h through 9/4 - f.u bcx   - will need midline or PICC line prior to discharge  - labs from 7/25 reviewed, WBC 8.2, acute blood loss anemia d/t surgery H/H downtrending to 8.6/24.5, trend CBC, transfuse prn, Lytes/Cr acceptable (Na 134, Cr 1.0).
- s/p  Rt TKR revision on 7/24  - Pain control with oxyIR PRN  - Bowel regiment - Senna/Miralax  - Surgical site management as per ortho  - PT/OT eval for safe dispo - [facility]  - VTE ppx - ASA BID  - continue Ancef IV x 6 wks - PICC to be placed
- s/p right knee revision with R TKA explant, I/D, revision TKA with functional spacer 7/24, received  2200mL crystalloid and 500ml albumin, 4 units pRBC and 1U FFP.  -preop joint fluid cx staph lugdunensis,   - postop management per Ortho, pain control, bowel regimen, IS, DVT ppx  - joint fluid culture (+) staph reubenunensis as outpatient from 6/21/2024  - ID recs ancef 2g q8h through 9/4 - bcx neg  - will need midline or PICC line prior to discharge  - acute blood loss anemia d/t surgery H/H downtrending to trend CBC, transfuse prn, Lytes/Cr acceptable

## 2024-07-29 NOTE — PROGRESS NOTE ADULT - PROBLEM SELECTOR PLAN 5
former smoker  not on bronchodilator as outpt  if develops dyspnea/wheeze then start duoneb q6  monitor sats.
former smoker  not on bronchodilator as outpt  if develops dyspnea/wheeze then start duoneb q6  monitor sats.
non-compliant with CPAP while inpatient.
former smoker  not on bronchodilator as outpt  if develops dyspnea/wheeze then start duoneb q6  monitor sats.

## 2024-07-29 NOTE — DISCHARGE NOTE NURSING/CASE MANAGEMENT/SOCIAL WORK - NSDCPNINST_GEN_ALL_CORE
Notify Dr Givens if you experience any increase in pain not relieved with medication, any redness, drainage or swelling around incision  or any fever >100.5.  drink plenty of fluids.  No heavy lifting or straining pushing or pulling.  Continue to elevate your leg, apply cold therapy as instructed .  PICC line care, complete course of antibiotics as prescribed.  Continue to follow a consistent carbohydrate diet and follow up with PMD for continued management of your diabetes.

## 2024-07-29 NOTE — PROGRESS NOTE ADULT - PROBLEM SELECTOR PLAN 6
resume hctz as above  risk for fluid overload/acute on chronic diastolic chf, given intraop fluids/prbc/FFP  cautious IVF if needed  if desats/sob, check CXR and pro-BNP, give IV lasix 40mg PRN    Echo from 1/2023 reviewed: preserved LVEF 62%
Monitor for exacerbation

## 2024-07-29 NOTE — PROGRESS NOTE ADULT - PROBLEM SELECTOR PROBLEM 5
Chronic obstructive pulmonary disease
Obstructive sleep apnea

## 2024-07-29 NOTE — DISCHARGE NOTE NURSING/CASE MANAGEMENT/SOCIAL WORK - NSDCPEFALRISK_GEN_ALL_CORE
For information on Fall & Injury Prevention, visit: [email or web address] OR  [email or web address] OR  https://www.cdc.gov/steadi/patient.html

## 2024-07-29 NOTE — PROGRESS NOTE ADULT - ASSESSMENT
64F with depression / anxiety, obesity, hx OA, prior TKR R 4/2023, L TKR 3 2/2024.  Had cortisone injection R knee 5 months ago for R knee pain.  6/21/2024 had knee aspirated.  50332 WBC (mostly poly) and culture (+) staph lugdunensis.  Was seen by Dr. Knight outpatient with R PJI. Patient admitted and today underwent right total knee arthroplasty explantation, I+D and placement of spacer.  Post-op knee Xray noted interval partial TKA removal, with femoral component remaining in place, with interval placement of a joint spacer and multiple antibiotic-impregnated beads in the distal femur and proximal tibia; overlying drain.     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 Knight)

## 2024-07-29 NOTE — DISCHARGE NOTE NURSING/CASE MANAGEMENT/SOCIAL WORK - PATIENT PORTAL LINK FT
You can access the FollowMyHealth Patient Portal offered by [facility] by registering at the following website: [email or web address]. By joining [employer]’s FollowMyHealth portal, you will also be able to view your health information using other applications (apps) compatible with our system.

## 2024-07-29 NOTE — PROGRESS NOTE ADULT - PROBLEM SELECTOR PLAN 2
at home takes valsartan 320mg , norvasc 5mg, Hctz 25mg qd  -hemodynamically stable  - c/w norvasc and Hctz, hold diovan for now, resume when BP allows   -ctm.
Acute anemia likely post-op blood loss related.  No clinical indication for PRBC transfusion.  Monitor H/H.
at home takes valsartan 320mg , norvasc 5mg, Hctz 25mg qd  -hemodynamically stable  - c/w norvasc and Hctz, hold diovan for now, resume when BP allows   -ctm.
at home takes valsartan 320mg , norvasc 5mg, Hctz 25mg qd  -hemodynamically stable  - c/w norvasc and Hctz, hold diovan for now, resume when BP allows   -ctm.

## 2024-07-29 NOTE — PROGRESS NOTE ADULT - PROBLEM SELECTOR PLAN 4
not compliant with CPAP, pt declined CPAP while inpt  monitor sats, supplemental O2 prn  - if desats at night, then start CPAP at 10 hs  - outpt f/up pulm.
- FS QID, AISS  - Goal -180

## 2024-07-30 LAB
CULTURE RESULTS: SIGNIFICANT CHANGE UP
CULTURE RESULTS: SIGNIFICANT CHANGE UP
GLUCOSE BLDC GLUCOMTR-MCNC: 147 MG/DL — HIGH (ref 70–99)
GLUCOSE BLDC GLUCOMTR-MCNC: 154 MG/DL — HIGH (ref 70–99)
GLUCOSE BLDC GLUCOMTR-MCNC: 169 MG/DL — HIGH (ref 70–99)
GLUCOSE BLDC GLUCOMTR-MCNC: 183 MG/DL — HIGH (ref 70–99)
GLUCOSE BLDC GLUCOMTR-MCNC: 198 MG/DL — HIGH (ref 70–99)
GLUCOSE BLDC GLUCOMTR-MCNC: 203 MG/DL — HIGH (ref 70–99)
GRAM STN FLD: ABNORMAL
SPECIMEN SOURCE: SIGNIFICANT CHANGE UP
SPECIMEN SOURCE: SIGNIFICANT CHANGE UP

## 2024-07-30 PROCEDURE — 36573 INSJ PICC RS&I 5 YR+: CPT

## 2024-07-30 PROCEDURE — 99232 SBSQ HOSP IP/OBS MODERATE 35: CPT

## 2024-07-30 RX ORDER — CHLORHEXIDINE GLUCONATE 500 MG/1
1 CLOTH TOPICAL
Refills: 0 | Status: DISCONTINUED | OUTPATIENT
Start: 2024-07-30 | End: 2024-07-31

## 2024-07-30 RX ADMIN — OXYCODONE HYDROCHLORIDE 10 MILLIGRAM(S): 30 TABLET ORAL at 09:45

## 2024-07-30 RX ADMIN — HYDROXYCHLOROQUINE SULFATE 200 MILLIGRAM(S): 200 TABLET ORAL at 17:25

## 2024-07-30 RX ADMIN — Medication 100 MILLIGRAM(S): at 19:10

## 2024-07-30 RX ADMIN — MUPIROCIN CALCIUM 1 APPLICATION(S): 20 CREAM TOPICAL at 17:25

## 2024-07-30 RX ADMIN — LIDOCAINE 5% 1 PATCH: 5 CREAM TOPICAL at 03:00

## 2024-07-30 RX ADMIN — OXYCODONE HYDROCHLORIDE 10 MILLIGRAM(S): 30 TABLET ORAL at 15:30

## 2024-07-30 RX ADMIN — HYDROXYCHLOROQUINE SULFATE 200 MILLIGRAM(S): 200 TABLET ORAL at 05:26

## 2024-07-30 RX ADMIN — Medication 975 MILLIGRAM(S): at 07:40

## 2024-07-30 RX ADMIN — Medication 975 MILLIGRAM(S): at 00:24

## 2024-07-30 RX ADMIN — Medication 1: at 11:49

## 2024-07-30 RX ADMIN — AMLODIPINE BESYLATE 5 MILLIGRAM(S): 2.5 TABLET ORAL at 05:26

## 2024-07-30 RX ADMIN — OXYCODONE HYDROCHLORIDE 10 MILLIGRAM(S): 30 TABLET ORAL at 08:45

## 2024-07-30 RX ADMIN — Medication 1: at 16:50

## 2024-07-30 RX ADMIN — Medication 975 MILLIGRAM(S): at 16:49

## 2024-07-30 RX ADMIN — OXYCODONE HYDROCHLORIDE 10 MILLIGRAM(S): 30 TABLET ORAL at 23:40

## 2024-07-30 RX ADMIN — Medication 325 MILLIGRAM(S): at 17:25

## 2024-07-30 RX ADMIN — LIDOCAINE 5% 1 PATCH: 5 CREAM TOPICAL at 11:48

## 2024-07-30 RX ADMIN — Medication 325 MILLIGRAM(S): at 05:26

## 2024-07-30 RX ADMIN — OXYCODONE HYDROCHLORIDE 10 MILLIGRAM(S): 30 TABLET ORAL at 16:30

## 2024-07-30 RX ADMIN — Medication 100 MILLIGRAM(S): at 00:19

## 2024-07-30 RX ADMIN — Medication 100 MILLIGRAM(S): at 12:43

## 2024-07-30 RX ADMIN — Medication 975 MILLIGRAM(S): at 17:49

## 2024-07-30 RX ADMIN — OXYCODONE HYDROCHLORIDE 10 MILLIGRAM(S): 30 TABLET ORAL at 22:13

## 2024-07-30 RX ADMIN — LIDOCAINE 5% 1 PATCH: 5 CREAM TOPICAL at 18:10

## 2024-07-30 RX ADMIN — PANTOPRAZOLE SODIUM 40 MILLIGRAM(S): 20 TABLET, DELAYED RELEASE ORAL at 05:26

## 2024-07-30 NOTE — PROGRESS NOTE ADULT - ASSESSMENT
64F with depression / anxiety, obesity, hx OA, prior TKR R 4/2023, L TKR 3 2/2024.  Had cortisone injection R knee 5 months ago for R knee pain.  6/21/2024 had knee aspirated.  03107 WBC (mostly poly) and culture (+) staph lugdunensis.  Was seen by Dr. Knight outpatient with R PJI. Patient admitted and today underwent right total knee arthroplasty explantation, I+D and placement of spacer.  Post-op knee Xray noted interval partial TKA removal, with femoral component remaining in place, with interval placement of a joint spacer and multiple antibiotic-impregnated beads in the distal femur and proximal tibia; overlying drain.     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  - if patient is planned to go to rehab, clinician at rehab to follow weekly labs  - she can f/u in the office after discharge (Dr. Knight)

## 2024-07-30 NOTE — PROGRESS NOTE ADULT - NS ATTEND AMEND GEN_ALL_CORE FT
Patient seen and examined. Denia Daley is a 64 year old female now status post Right Knee I&D, Right Revision Total Knee Arthroplasty. No acute events overnight. Dressing changed to Aquacel today. PICC Line Placed      Physical Exam:  Dressing: Clean, Dry, INtact  Motor: Intact EHL/FHL/Tibialis Anterior/Gastrocnemius  Sensory: Intact Superficial Peroneal/Deep Peroneal/Saphenous/Sural/Tibial Nerves  Vascular: 2+ DP Pulse      Assessment/Plan:  Denia Daley is a 64 year old female now status post Right Knee I&D, Right Revision Total Knee Arthroplasty.    Plan:  -Right Lower Extremity: Weight Bearing as Tolerated in Knee immobilizer (wound rest)  -PT/OT, Out of Bed  -Pain Control  -DVT Prophylaxis: ASA  -Antibiotics: per ID  -PICC Placed  -Disposition: [facility]

## 2024-07-30 NOTE — PROCEDURE NOTE - PROCEDURE FINDINGS AND DETAILS
Successful insertion of a 4Fr single lumen PICC via the left basilic vein.  Tip of PICC in SVC. PICC length: 44cm

## 2024-07-30 NOTE — PROGRESS NOTE ADULT - ASSESSMENT
Assessment/Plan:   - WBAT in [place]  - c/w prevena  - f/u OR cx  -  BID for dvt ppx  - c/w ancef x 6 weeks, appreciate ID consult  - Bcx neg x 48h, FU IR for PICC  - PT/OT

## 2024-07-30 NOTE — PROCEDURE NOTE - CONTRAST
Internal Medicine Progress note       Patient: Bing Zee Date:2020     : 1941 Attending: Michelle Diggs MD   79 year old female 2020          CC:        Recent Events/Subjective:     Stable.  Seen in HBO  No new changes  Asymptomatic pyuria           Vital Last Value 24 Hour Range   Temperature 97.8 °F (36.6 °C) Temp  Min: 97.7 °F (36.5 °C)  Max: 98.4 °F (36.9 °C)   Pulse 81 Pulse  Min: 70  Max: 90   Respiratory 16 Resp  Min: 16  Max: 18   Blood Pressure 123/81 BP  Min: 100/60  Max: 123/81   Arterial BP   No data recorded   O2 Sat 0 L/min NA   Pulse Oximetry 100 % SpO2  Min: 99 %  Max: 100 %     Vital Today Admitted   Weight 79.2 kg Weight: 87.4 kg   BMI N/A BMI (Calculated): 36.41          Last I/O 3 shifts  No intake/output data recorded.    Physical Exam:         Labs  No results found      Medications/Infusions:       Scheduled:   • [START ON 2020] LORazepam  0.25 mg Oral On Call to Procedure   • linaclotide  145 mcg Oral QAM AC   • dilTIAZem  180 mg Oral Daily   • rivaroxaban  15 mg Oral Daily with dinner   • morphine SR  30 mg Oral 2 times per day   • pregabalin  75 mg Oral TID   • metoPROLOL succinate  25 mg Oral QHS   • polyethylene glycol  17 g Oral Daily   • docusate sodium-sennosides  1 tablet Oral Nightly   • dorzolamide-timolol  1 drop Both Eyes BID   • brimonidine  1 drop Both Eyes BID   • heparin (porcine)  5 mL Intracatheter Daily   • vitamin - therapeutic multivitamins w/minerals  1 tablet Oral Daily   • docusate sodium  100 mg Oral BID   • fluticasone  2 spray Each Nare Daily   • sodium chloride (PF)  2 mL Intracatheter 2 times per day   • atorvastatin  20 mg Oral Daily   • levothyroxine  125 mcg Oral QAM AC       Continuous Infusions:   • sodium chloride 0.9% infusion       .                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  Atrial fibrillation on anticoagulation.  Renal cell carcinoma status post cryoablation the right side  None in the past.  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  Morbid obesity.  CKD stage 3  Chest pain: noncardiac      Plan & Recommendations:      Xarelto   UA shows pyuria-monitor for Sx  Off antibiotics   HR controlled  Bowel regimen  HBO as tolerated  Supportive cares.

## 2024-07-31 VITALS — WEIGHT: 233.69 LBS

## 2024-07-31 LAB
-  CLINDAMYCIN: SIGNIFICANT CHANGE UP
-  ERYTHROMYCIN: SIGNIFICANT CHANGE UP
-  GENTAMICIN: SIGNIFICANT CHANGE UP
-  OXACILLIN: SIGNIFICANT CHANGE UP
-  PENICILLIN: SIGNIFICANT CHANGE UP
-  RIFAMPIN: SIGNIFICANT CHANGE UP
-  TETRACYCLINE: SIGNIFICANT CHANGE UP
-  TRIMETHOPRIM/SULFAMETHOXAZOLE: SIGNIFICANT CHANGE UP
-  VANCOMYCIN: SIGNIFICANT CHANGE UP
CULTURE RESULTS: ABNORMAL
CULTURE RESULTS: SIGNIFICANT CHANGE UP
CULTURE RESULTS: SIGNIFICANT CHANGE UP
GLUCOSE BLDC GLUCOMTR-MCNC: 148 MG/DL — HIGH (ref 70–99)
GLUCOSE BLDC GLUCOMTR-MCNC: 161 MG/DL — HIGH (ref 70–99)
METHOD TYPE: SIGNIFICANT CHANGE UP
ORGANISM # SPEC MICROSCOPIC CNT: ABNORMAL
ORGANISM # SPEC MICROSCOPIC CNT: ABNORMAL
SPECIMEN SOURCE: SIGNIFICANT CHANGE UP
SURGICAL PATHOLOGY STUDY: SIGNIFICANT CHANGE UP

## 2024-07-31 PROCEDURE — 99232 SBSQ HOSP IP/OBS MODERATE 35: CPT

## 2024-07-31 RX ORDER — VANCOMYCIN HYDROCHLORIDE 5 G/100ML
1250 INJECTION, POWDER, LYOPHILIZED, FOR SOLUTION INTRAVENOUS EVERY 12 HOURS
Refills: 0 | Status: DISCONTINUED | OUTPATIENT
Start: 2024-07-31 | End: 2024-07-31

## 2024-07-31 RX ORDER — ACETAMINOPHEN 500 MG
3 TABLET ORAL
Qty: 0 | Refills: 0 | DISCHARGE
Start: 2024-07-31

## 2024-07-31 RX ORDER — CEFAZOLIN SODIUM 10 G
2 VIAL (EA) INJECTION
Qty: 0 | Refills: 0 | DISCHARGE
Start: 2024-07-31

## 2024-07-31 RX ORDER — OXYCODONE HYDROCHLORIDE 30 MG/1
1 TABLET ORAL
Qty: 0 | Refills: 0 | DISCHARGE
Start: 2024-07-31

## 2024-07-31 RX ORDER — ASPIRIN 500 MG
1 TABLET ORAL
Qty: 0 | Refills: 0 | DISCHARGE
Start: 2024-07-31

## 2024-07-31 RX ORDER — VANCOMYCIN HYDROCHLORIDE 5 G/100ML
1.25 INJECTION, POWDER, LYOPHILIZED, FOR SOLUTION INTRAVENOUS
Qty: 0 | Refills: 0 | DISCHARGE
Start: 2024-07-31

## 2024-07-31 RX ADMIN — LIDOCAINE 5% 1 PATCH: 5 CREAM TOPICAL at 11:31

## 2024-07-31 RX ADMIN — Medication 975 MILLIGRAM(S): at 03:41

## 2024-07-31 RX ADMIN — OXYCODONE HYDROCHLORIDE 10 MILLIGRAM(S): 30 TABLET ORAL at 10:07

## 2024-07-31 RX ADMIN — PANTOPRAZOLE SODIUM 40 MILLIGRAM(S): 20 TABLET, DELAYED RELEASE ORAL at 05:42

## 2024-07-31 RX ADMIN — Medication 975 MILLIGRAM(S): at 04:26

## 2024-07-31 RX ADMIN — CHLORHEXIDINE GLUCONATE 1 APPLICATION(S): 500 CLOTH TOPICAL at 06:11

## 2024-07-31 RX ADMIN — OXYCODONE HYDROCHLORIDE 10 MILLIGRAM(S): 30 TABLET ORAL at 09:07

## 2024-07-31 RX ADMIN — Medication 100 MILLIGRAM(S): at 03:41

## 2024-07-31 RX ADMIN — Medication 975 MILLIGRAM(S): at 10:19

## 2024-07-31 RX ADMIN — Medication 975 MILLIGRAM(S): at 11:19

## 2024-07-31 RX ADMIN — VANCOMYCIN HYDROCHLORIDE 166.67 MILLIGRAM(S): 5 INJECTION, POWDER, LYOPHILIZED, FOR SOLUTION INTRAVENOUS at 11:31

## 2024-07-31 RX ADMIN — MUPIROCIN CALCIUM 1 APPLICATION(S): 20 CREAM TOPICAL at 05:42

## 2024-07-31 RX ADMIN — Medication 100 MILLIGRAM(S): at 10:19

## 2024-07-31 RX ADMIN — HYDROXYCHLOROQUINE SULFATE 200 MILLIGRAM(S): 200 TABLET ORAL at 05:43

## 2024-07-31 RX ADMIN — Medication 325 MILLIGRAM(S): at 05:42

## 2024-07-31 RX ADMIN — Medication 1: at 11:30

## 2024-07-31 RX ADMIN — AMLODIPINE BESYLATE 5 MILLIGRAM(S): 2.5 TABLET ORAL at 05:43

## 2024-07-31 NOTE — DIETITIAN INITIAL EVALUATION ADULT - FACTORS AFF FOOD INTAKE
Bedside and Verbal shift change report given to Ami Sin RN (oncoming nurse) by Misha Ortega RN (offgoing nurse). Report included the following information SBAR, OR Summary, Procedure Summary, MAR and Recent Results. none

## 2024-07-31 NOTE — DIETITIAN INITIAL EVALUATION ADULT - NSICDXPASTSURGICALHX_GEN_ALL_CORE_FT
PAST SURGICAL HISTORY:  2002   h/o hysterectomy due to Fibroid--post op vaginal bleeding requiring a transfusion     2008  repair of ventral hernia with mesh Revision 2018    Cataract Bilateral 2017    H/O total knee replacement, right     History of arthroplasty of left knee     Radical mastectomy of right breast 3/30/12    S/P arthroscopy of left shoulder     Termination of pregnancy (fetus) x 3

## 2024-07-31 NOTE — PROGRESS NOTE ADULT - ASSESSMENT
64F with depression / anxiety, obesity, hx OA, prior TKR R 4/2023, L TKR 3 2/2024.  Had cortisone injection R knee 5 months ago for R knee pain.  6/21/2024 had knee aspirated.  43455 WBC (mostly poly) and culture (+) staph lugdunensis.  Was seen by Dr. Knight outpatient with R PJI. Patient admitted and today underwent right total knee arthroplasty explantation, I+D and placement of spacer.  Post-op knee Xray noted interval partial TKA removal, with femoral component remaining in place, with interval placement of a joint spacer and multiple antibiotic-impregnated beads in the distal femur and proximal tibia; overlying drain.     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 Knight)    The above assessment and plan was discussed with stone Das MD  contact on teams  After 5pm and on weekends call [phone]     64F with depression / anxiety, obesity, hx OA, prior TKR R 4/2023, L TKR 3 2/2024.  Had cortisone injection R knee 5 months ago for R knee pain.  6/21/2024 had knee aspirated.  08460 WBC (mostly poly) and culture (+) staph lugdunensis.  Was seen by Dr. Knight outpatient with R PJI. Patient admitted and today underwent right total knee arthroplasty explantation, I+D and placement of spacer.  Post-op knee Xray noted interval partial TKA removal, with femoral component remaining in place, with interval placement of a joint spacer and multiple antibiotic-impregnated beads in the distal femur and proximal tibia; overlying drain.     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 Knight)    The above assessment and plan was discussed with stone Das MD  contact on teams  After 5pm and on weekends call [phone]

## 2024-07-31 NOTE — DIETITIAN INITIAL EVALUATION ADULT - PERTINENT LABORATORY DATA
CAPILLARY BLOOD GLUCOSE  POCT Blood Glucose.: 161 mg/dL (31 Jul 2024 11:21)  POCT Blood Glucose.: 148 mg/dL (31 Jul 2024 07:15)  POCT Blood Glucose.: 198 mg/dL (30 Jul 2024 21:55)  POCT Blood Glucose.: 169 mg/dL (30 Jul 2024 16:32)    A1C with Estimated Average Glucose Result: 7.1 % (07-22-24 @ 16:18)  A1C with Estimated Average Glucose Result: 5.9 % (03-13-24 @ 10:40)

## 2024-07-31 NOTE — PROGRESS NOTE ADULT - PROVIDER SPECIALTY LIST ADULT
Infectious Disease
Orthopedics
Hospitalist
Infectious Disease
Infectious Disease
Orthopedics
Infectious Disease
Orthopedics
Hospitalist

## 2024-07-31 NOTE — DIETITIAN INITIAL EVALUATION ADULT - PERTINENT MEDS FT
MEDICATIONS  (STANDING):  acetaminophen     Tablet .. 975 milliGRAM(s) Oral every 8 hours  amLODIPine   Tablet 5 milliGRAM(s) Oral daily  aspirin enteric coated 325 milliGRAM(s) Oral two times a day  chlorhexidine 2% Cloths 1 Application(s) Topical daily  chlorhexidine 4% Liquid 1 Application(s) Topical <User Schedule>  dextrose 5%. 1000 milliLiter(s) (50 mL/Hr) IV Continuous <Continuous>  dextrose 5%. 1000 milliLiter(s) (100 mL/Hr) IV Continuous <Continuous>  dextrose 50% Injectable 25 Gram(s) IV Push once  dextrose 50% Injectable 25 Gram(s) IV Push once  dextrose 50% Injectable 12.5 Gram(s) IV Push once  glucagon  Injectable 1 milliGRAM(s) IntraMuscular once  hydrochlorothiazide 25 milliGRAM(s) Oral daily  hydroxychloroquine 200 milliGRAM(s) Oral two times a day  insulin lispro (ADMELOG) corrective regimen sliding scale   SubCutaneous three times a day before meals  insulin lispro (ADMELOG) corrective regimen sliding scale   SubCutaneous at bedtime  lidocaine   4% Patch 1 Patch Transdermal daily  nystatin Cream 1 Application(s) Topical two times a day  pantoprazole    Tablet 40 milliGRAM(s) Oral before breakfast  vancomycin  IVPB 1250 milliGRAM(s) IV Intermittent every 12 hours    MEDICATIONS  (PRN):  dextrose Oral Gel 15 Gram(s) Oral once PRN Blood Glucose LESS THAN 70 milliGRAM(s)/deciliter  oxyCODONE    IR 5 milliGRAM(s) Oral every 4 hours PRN Moderate Pain (4 - 6)  oxyCODONE    IR 10 milliGRAM(s) Oral every 4 hours PRN Severe Pain (7 - 10)  petrolatum white Ointment 1 Application(s) Topical three times a day PRN pruritus  sodium chloride 0.9% lock flush 10 milliLiter(s) IV Push every 1 hour PRN Pre/post blood products, medications, blood draw, and to maintain line patency

## 2024-07-31 NOTE — DIETITIAN INITIAL EVALUATION ADULT - OTHER INFO
Nutrition assessment for length of stay.    64F HTN, DM2, HFpEF, MADHURI - non-compliant w/ CPAP, former smoker, EtOH abuse, COPD, Fibromyalgia, inflammatory arthritis, Bipolar D/O, Breast CA s/p Rt mastectomy, chemo/RT, OA B/L TKR,  p/w Rt septic arthritis from device infection w/ staph lugdunensis s/p Rt TKR revision on 7/24 c/b post-op anemia, urticaria (7/29 Hospitalist Attending).    Patient with good PO intake / appetite, consuming >75% of meals.  No GI distress reported i.e. nausea, vomiting, diarrhea. No chewing or swallowing difficulties reported. No food allergies noted or reported. Weight stable 7/27 ---> 7/30 ~106-108kgs.  Adm weight 7/24 - 103kg.  Patient noted with hx lymphedema (rt arm, hx mastectomy), no LE edema currently recorded in RN flowsheets.  No weight hx available in HIE section of chart.  Patient anticipating discharge to [facility] this afternoon.

## 2024-07-31 NOTE — DIETITIAN INITIAL EVALUATION ADULT - NSFNSPHYEXAMSKINFT_GEN_A_CORE
AZALIA ( CPAP/BIPAP) DAILY USAGE SUMMARY    TOTAL HOURS USED  8  HOURS AND    MINUTES. No pressure ulcers/DTI noted in flowsheets.

## 2024-07-31 NOTE — PROGRESS NOTE ADULT - ASSESSMENT
64F s/p revision R TKA 7/24    Plan:  - WBAT in [place]  - f/u OR cx: Cleopatra burch  -  BID for dvt ppx  - c/w ancef x 6 weeks, appreciate ID recs  - PICC placed 7/30  - PT/OT  - dispo: [facility] Hodgson, PGY-2  Orthopedic Surgery  [id number]

## 2024-07-31 NOTE — PROGRESS NOTE ADULT - SUBJECTIVE AND OBJECTIVE BOX
Follow Up:  prosthetic knee infection    Interval History/ROS: pt afebrile, no cough, vomiting, pain controlled          Allergies  ramipril (Angioedema)  environment--ragweed, dust, cats--sneezing and watery eyes, nasal congestion (Other)        ANTIMICROBIALS:  ceFAZolin   IVPB 2000 every 8 hours  hydroxychloroquine 200 two times a day      OTHER MEDS:  acetaminophen     Tablet .. 975 milliGRAM(s) Oral every 8 hours  amLODIPine   Tablet 5 milliGRAM(s) Oral daily  aspirin enteric coated 325 milliGRAM(s) Oral two times a day  chlorhexidine 2% Cloths 1 Application(s) Topical daily  dextrose 5%. 1000 milliLiter(s) IV Continuous <Continuous>  dextrose 5%. 1000 milliLiter(s) IV Continuous <Continuous>  dextrose 50% Injectable 25 Gram(s) IV Push once  dextrose 50% Injectable 12.5 Gram(s) IV Push once  dextrose 50% Injectable 25 Gram(s) IV Push once  dextrose Oral Gel 15 Gram(s) Oral once PRN  glucagon  Injectable 1 milliGRAM(s) IntraMuscular once  hydrochlorothiazide 25 milliGRAM(s) Oral daily  insulin lispro (ADMELOG) corrective regimen sliding scale   SubCutaneous three times a day before meals  insulin lispro (ADMELOG) corrective regimen sliding scale   SubCutaneous at bedtime  lidocaine   4% Patch 1 Patch Transdermal daily  mupirocin 2% Ointment 1 Application(s) Both Nostrils two times a day  nystatin Cream 1 Application(s) Topical two times a day  oxyCODONE    IR 10 milliGRAM(s) Oral every 4 hours PRN  oxyCODONE    IR 5 milliGRAM(s) Oral every 4 hours PRN  pantoprazole    Tablet 40 milliGRAM(s) Oral before breakfast  sodium chloride 0.9% lock flush 10 milliLiter(s) IV Push every 1 hour PRN      Vital Signs Last 24 Hrs  T(C): 36.8 (29 Jul 2024 09:55), Max: 37.2 (28 Jul 2024 17:29)  T(F): 98.2 (29 Jul 2024 09:55), Max: 98.9 (28 Jul 2024 17:29)  HR: 81 (29 Jul 2024 09:55) (81 - 95)  BP: 123/59 (29 Jul 2024 09:55) (113/44 - 127/58)  BP(mean): --  RR: 18 (29 Jul 2024 09:55) (17 - 18)  SpO2: 99% (29 Jul 2024 09:55) (98% - 100%)    Parameters below as of 29 Jul 2024 09:55  Patient On (Oxygen Delivery Method): room air        Physical Exam:  General:    NAD,  non toxic  Respiratory:    comfortable on RA  abd:     soft,   BS +,   no tenderness  :    no  pineda  Musculoskeletal: R knee with dressing  vascular: no phlebitis  Skin:    no rash                          7.4    4.85  )-----------( 253      ( 29 Jul 2024 06:25 )             23.2       07-29    138  |  100  |  10  ----------------------------<  138<H>  4.0   |  25  |  0.88    Ca    9.1      29 Jul 2024 06:25        Urinalysis Basic - ( 29 Jul 2024 06:25 )    Color: x / Appearance: x / SG: x / pH: x  Gluc: 138 mg/dL / Ketone: x  / Bili: x / Urobili: x   Blood: x / Protein: x / Nitrite: x   Leuk Esterase: x / RBC: x / WBC x   Sq Epi: x / Non Sq Epi: x / Bacteria: x        MICROBIOLOGY:  v  .Blood Blood-Peripheral  07-26-24   No growth at 48 Hours  --  --      .Blood Blood-Peripheral  07-26-24   No growth at 48 Hours  --  --      .Tissue  07-24-24   No growth to date.  --    No polymorphonuclear cells seen per low power field  No organisms seen per oil power field      .Tissue  07-24-24   No growth  --    No polymorphonuclear cells seen per low power field  No organisms seen      .Tissue  07-24-24   No growth to date.  --    No polymorphonuclear cells seen per low power field  No organisms seen per oil power field      .Tissue  07-24-24   No growth to date.  --    No polymorphonuclear cells seen per low power field  No organisms seen per oil power field      .Tissue  07-24-24   No growth to date.  --    No polymorphonuclear cells seen per low power field  No organisms seen per oil power field                RADIOLOGY:  Images independently visualized and reviewed personally, findings as below  < from: CT Knee No Cont, Right (06.26.24 @ 19:00) >  IMPRESSION:    Status post right total knee arthroplasty. Osteolysis/loosening about the   tibial component involving both the medial and lateral tibial trays.   Findings are more prominent laterally. Evidence of osteolysis/loosening   along the patellar backing, more prominent laterally.    Moderate to large knee joint effusion with synovial thickening consistent   with synovitis.    < end of copied text >  
ORTHO PROGRESS NOTE     Pt seen and examined at bedside, denies SOB, CP, Dizziness. N/V/D /HA.  No significant overnight events. Pain well controlled.    Vital Signs Last 24 Hrs  T(C): 36.9 (29 Jul 2024 04:50), Max: 37.2 (28 Jul 2024 17:29)  T(F): 98.4 (29 Jul 2024 04:50), Max: 98.9 (28 Jul 2024 17:29)  HR: 82 (29 Jul 2024 04:50) (80 - 95)  BP: 122/46 (29 Jul 2024 04:50) (113/44 - 127/58)  BP(mean): --  RR: 17 (29 Jul 2024 04:50) (16 - 18)  SpO2: 98% (29 Jul 2024 04:50) (98% - 100%)    Parameters below as of 29 Jul 2024 04:50  Patient On (Oxygen Delivery Method): room air        Gen: NAD, alert and oriented  Resp: Unlabored breathing  RLE: Dressing c/d/i       SILT DP/SP/ Jennifer/Saph/tib       5/5 EHL 5/5 FHL 5/5 TA 5/5 Gastroc 5/5 IP        DP+,        soft compartments, no calf ttp,       Labs:  CBC Full  -  ( 28 Jul 2024 05:51 )  WBC Count : 6.48 K/uL  RBC Count : 2.80 M/uL  Hemoglobin : 7.5 g/dL  Hematocrit : 23.0 %  Platelet Count - Automated : 208 K/uL  Mean Cell Volume : 82.1 fL  Mean Cell Hemoglobin : 26.8 pg  Mean Cell Hemoglobin Concentration : 32.6 gm/dL  Auto Neutrophil # : x  Auto Lymphocyte # : x  Auto Monocyte # : x  Auto Eosinophil # : x  Auto Basophil # : x  Auto Neutrophil % : x  Auto Lymphocyte % : x  Auto Monocyte % : x  Auto Eosinophil % : x  Auto Basophil % : x      07-28    135  |  97<L>  |  10  ----------------------------<  142<H>  3.8   |  27  |  0.91    Ca    8.9      28 Jul 2024 05:51        Assessment/Plan:   - WBAT in [place]  - c/w prevena  - f/u OR cx  -  BID for dvt ppx  - c/w ancef x 6 weeks, appreciate ID consult  - f/u Bcx for possible PICC v midline  - PT/OT    
Orthopedic Progress Note     S:  No acute events overnight, pain is well controlled.  Patient denies any chest pain, SOB, N/V, fevers/chills.    T(C): 37 (07-30-24 @ 04:59), Max: 37.1 (07-29-24 @ 22:00)  HR: 76 (07-30-24 @ 04:59) (76 - 88)  BP: 116/33 (07-30-24 @ 04:59) (115/41 - 126/61)  RR: 17 (07-30-24 @ 04:59) (17 - 18)  SpO2: 96% (07-30-24 @ 04:59) (96% - 100%)  Wt(kg): --I&O's Summary    28 Jul 2024 07:01  -  29 Jul 2024 07:00  --------------------------------------------------------  IN: 720 mL / OUT: 2600 mL / NET: -1880 mL    29 Jul 2024 07:01  -  30 Jul 2024 06:24  --------------------------------------------------------  IN: 240 mL / OUT: 1250 mL / NET: -1010 mL        O:  Physical exam:  Gen: Alert and Oriented x3, No Acute Distress  Resp: Unlabored breathing  RLE: Dressing c/d/i       SILT DP/SP/ Jennifer/Saph/tib       5/5 EHL 5/5 FHL 5/5 TA 5/5 Gastroc 5/5 IP        DP+,        soft compartments, no calf ttp           Labs:                        7.4    4.85  )-----------( 253      ( 29 Jul 2024 06:25 )             23.2    07-29    138  |  100  |  10  ----------------------------<  138<H>  4.0   |  25  |  0.88    Ca    9.1      29 Jul 2024 06:25        
Orthopedic Surgery      Pt seen and examined. Pt denies any overnight events. Pt reports pain is well controlled. Pt denies any fever/chills/chest pain/nausea/vomiting. Pt reports ambulating with assistance.     ICU Vital Signs Last 24 Hrs  T(C): 36.7 (28 Jul 2024 05:36), Max: 37.4 (27 Jul 2024 17:17)  T(F): 98 (28 Jul 2024 05:36), Max: 99.4 (27 Jul 2024 17:17)  HR: 81 (28 Jul 2024 05:36) (81 - 94)  BP: 123/50 (28 Jul 2024 05:36) (96/51 - 138/38)  BP(mean): --  ABP: --  ABP(mean): --  RR: 16 (28 Jul 2024 05:36) (16 - 18)  SpO2: 98% (28 Jul 2024 05:36) (96% - 100%)          Physical exam:   Gen: NAD, alert and oriented  Resp: Unlabored breathing  RLE: Skin intact, no ecchymosis,   Preveena in place holding suction       SILT DP/SP/ Jennifer/Saph/tib       +IP/Quad/EHL/FHL/TA/Gastroc,        DP+,        soft compartments, no calf ttp     LABS:                        7.5    6.48  )-----------( 208      ( 28 Jul 2024 05:51 )             23.0     07-28    135  |  97<L>  |  10  ----------------------------<  142<H>  3.8   |  27  |  0.91    Ca    8.9      28 Jul 2024 05:51        Urinalysis Basic - ( 28 Jul 2024 05:51 )    Color: x / Appearance: x / SG: x / pH: x  Gluc: 142 mg/dL / Ketone: x  / Bili: x / Urobili: x   Blood: x / Protein: x / Nitrite: x   Leuk Esterase: x / RBC: x / WBC x   Sq Epi: x / Non Sq Epi: x / Bacteria: x        Culture - Blood (collected 26 Jul 2024 11:15)  Source: .Blood Blood-Peripheral  Preliminary Report (27 Jul 2024 16:01):    No growth at 24 hours    Culture - Blood (collected 26 Jul 2024 10:28)  Source: .Blood Blood-Peripheral  Preliminary Report (27 Jul 2024 16:01):    No growth at 24 hours            Assessment/Plan:   - WBAT in [place]  - c/w prevena  - f/u OR cx  -  BID for dvt ppx  - c/w ancef x 6 weeks, appreciate ID consult  - f/u Bcx for possible PICC v midline  - PT/OT      Valdez Plasencia PGY-2    For any questions please contact the on call orthopedic surgery team, please do not reach out via teams.  [facility] [place] pager: [phone]  [facility] pager: [phone]  [facility] pager: [phone]
Orthopedic Surgery Progress Note     S: Patient seen and examined today. No acute events overnight. Pain is well controlled. Denies f/c, chest pain, shortness of breath, dizziness.    MEDICATIONS  (STANDING):  amLODIPine   Tablet 5 milliGRAM(s) Oral daily  aspirin enteric coated 325 milliGRAM(s) Oral two times a day  ceFAZolin   IVPB 2000 milliGRAM(s) IV Intermittent every 8 hours  chlorhexidine 2% Cloths 1 Application(s) Topical daily  dextrose 5%. 1000 milliLiter(s) (50 mL/Hr) IV Continuous <Continuous>  dextrose 5%. 1000 milliLiter(s) (100 mL/Hr) IV Continuous <Continuous>  dextrose 50% Injectable 25 Gram(s) IV Push once  dextrose 50% Injectable 25 Gram(s) IV Push once  dextrose 50% Injectable 12.5 Gram(s) IV Push once  glucagon  Injectable 1 milliGRAM(s) IntraMuscular once  hydrochlorothiazide 25 milliGRAM(s) Oral daily  hydroxychloroquine 200 milliGRAM(s) Oral two times a day  insulin lispro (ADMELOG) corrective regimen sliding scale   SubCutaneous three times a day before meals  insulin lispro (ADMELOG) corrective regimen sliding scale   SubCutaneous at bedtime  lidocaine   4% Patch 1 Patch Transdermal daily  mupirocin 2% Ointment 1 Application(s) Both Nostrils two times a day  pantoprazole    Tablet 40 milliGRAM(s) Oral before breakfast    MEDICATIONS  (PRN):  dextrose Oral Gel 15 Gram(s) Oral once PRN Blood Glucose LESS THAN 70 milliGRAM(s)/deciliter  HYDROmorphone  Injectable 0.2 milliGRAM(s) IV Push every 4 hours PRN Moderate Pain (4 - 6)  HYDROmorphone  Injectable 0.5 milliGRAM(s) IV Push every 4 hours PRN Severe Pain (7 - 10)  oxyCODONE    IR 10 milliGRAM(s) Oral every 4 hours PRN Severe Pain (7 - 10)  oxyCODONE    IR 5 milliGRAM(s) Oral every 4 hours PRN Moderate Pain (4 - 6)  sodium chloride 0.9% lock flush 10 milliLiter(s) IV Push every 1 hour PRN Pre/post blood products, medications, blood draw, and to maintain line patency      Physical Exam:  Gen: NAD, alert and oriented  Resp: Unlabored breathing  RLE: Dressing c/d/i       SILT DP/SP/ Jennifer/Saph/tib       5/5 EHL 5/5 FHL 5/5 TA 5/5 Gastroc 5/5 IP        DP+,        soft compartments, no calf ttp,       Vital Signs Last 24 Hrs  T(C): 37.2 (27 Jul 2024 05:37), Max: 37.4 (27 Jul 2024 02:00)  T(F): 99 (27 Jul 2024 05:37), Max: 99.3 (27 Jul 2024 02:00)  HR: 90 (27 Jul 2024 05:37) (51 - 94)  BP: 108/42 (27 Jul 2024 05:37) (108/42 - 126/50)  BP(mean): --  RR: 18 (27 Jul 2024 05:37) (18 - 18)  SpO2: 99% (27 Jul 2024 05:37) (95% - 100%)    Parameters below as of 27 Jul 2024 05:37  Patient On (Oxygen Delivery Method): room air        07-26-24 @ 07:01  -  07-27-24 @ 07:00  --------------------------------------------------------  IN: 50 mL / OUT: 1402.5 mL / NET: -1352.5 mL                    LABS:        
ORTHO PROGRESS NOTE     Pt seen and examined at bedside, denies SOB, CP, Dizziness. N/V/D /HA.  No significant overnight events. Pain well controlled.    Vital Signs Last 24 Hrs  T(C): 36.8 (26 Jul 2024 05:46), Max: 37.5 (25 Jul 2024 21:16)  T(F): 98.2 (26 Jul 2024 05:46), Max: 99.5 (25 Jul 2024 21:16)  HR: 83 (26 Jul 2024 05:46) (72 - 90)  BP: 128/47 (26 Jul 2024 05:46) (105/63 - 162/58)  BP(mean): 609 (25 Jul 2024 13:00) (68 - 609)  RR: 16 (26 Jul 2024 05:46) (15 - 21)  SpO2: 100% (26 Jul 2024 05:46) (95% - 100%)    Parameters below as of 26 Jul 2024 05:46  Patient On (Oxygen Delivery Method): room air        Gen: NAD, alert and oriented  Resp: Unlabored breathing  RLE: Dressing c/d/i       SILT DP/SP/ Jennifer/Saph/tib       5/5 EHL 5/5 FHL 5/5 TA 5/5 Gastroc 5/5 IP        DP+,        soft compartments, no calf ttp,       Labs:  CBC Full  -  ( 25 Jul 2024 04:45 )  WBC Count : 8.26 K/uL  RBC Count : 3.12 M/uL  Hemoglobin : 8.6 g/dL  Hematocrit : 24.5 %  Platelet Count - Automated : 165 K/uL  Mean Cell Volume : 78.5 fL  Mean Cell Hemoglobin : 27.6 pg  Mean Cell Hemoglobin Concentration : 35.1 gm/dL  Auto Neutrophil # : x  Auto Lymphocyte # : x  Auto Monocyte # : x  Auto Eosinophil # : x  Auto Basophil # : x  Auto Neutrophil % : x  Auto Lymphocyte % : x  Auto Monocyte % : x  Auto Eosinophil % : x  Auto Basophil % : x      07-25    134<L>  |  98  |  11  ----------------------------<  164<H>  4.2   |  24  |  1.02    Ca    8.6      25 Jul 2024 04:45  Phos  4.5     07-25  Mg     1.70     07-25        Plan:  - WBAT in [place], will replace w [place]  - f/u BELA output  - c/w prevena  - c/w pineda  - f/u OR cx  -  BID for dvt ppx  - c/w vanc/ancef, appreciate ID consult  - PT/OT  
Orthopedic Surgery Progress Note     S: Patient seen and examined today. No acute events overnight. Pain is well controlled. Denies f/c, chest pain, shortness of breath, dizziness.    MEDICATIONS  (STANDING):  acetaminophen     Tablet .. 975 milliGRAM(s) Oral every 8 hours  amLODIPine   Tablet 5 milliGRAM(s) Oral daily  aspirin enteric coated 325 milliGRAM(s) Oral two times a day  ceFAZolin   IVPB 2000 milliGRAM(s) IV Intermittent every 8 hours  chlorhexidine 2% Cloths 1 Application(s) Topical daily  chlorhexidine 4% Liquid 1 Application(s) Topical <User Schedule>  dextrose 5%. 1000 milliLiter(s) (50 mL/Hr) IV Continuous <Continuous>  dextrose 5%. 1000 milliLiter(s) (100 mL/Hr) IV Continuous <Continuous>  dextrose 50% Injectable 25 Gram(s) IV Push once  dextrose 50% Injectable 25 Gram(s) IV Push once  dextrose 50% Injectable 12.5 Gram(s) IV Push once  glucagon  Injectable 1 milliGRAM(s) IntraMuscular once  hydrochlorothiazide 25 milliGRAM(s) Oral daily  hydroxychloroquine 200 milliGRAM(s) Oral two times a day  insulin lispro (ADMELOG) corrective regimen sliding scale   SubCutaneous three times a day before meals  insulin lispro (ADMELOG) corrective regimen sliding scale   SubCutaneous at bedtime  lidocaine   4% Patch 1 Patch Transdermal daily  nystatin Cream 1 Application(s) Topical two times a day  pantoprazole    Tablet 40 milliGRAM(s) Oral before breakfast    MEDICATIONS  (PRN):  dextrose Oral Gel 15 Gram(s) Oral once PRN Blood Glucose LESS THAN 70 milliGRAM(s)/deciliter  oxyCODONE    IR 5 milliGRAM(s) Oral every 4 hours PRN Moderate Pain (4 - 6)  oxyCODONE    IR 10 milliGRAM(s) Oral every 4 hours PRN Severe Pain (7 - 10)  petrolatum white Ointment 1 Application(s) Topical three times a day PRN pruritus  sodium chloride 0.9% lock flush 10 milliLiter(s) IV Push every 1 hour PRN Pre/post blood products, medications, blood draw, and to maintain line patency      Vital Signs Last 24 Hrs  T(C): 37 (31 Jul 2024 06:08), Max: 37.1 (30 Jul 2024 09:42)  T(F): 98.6 (31 Jul 2024 06:08), Max: 98.7 (30 Jul 2024 09:42)  HR: 91 (31 Jul 2024 06:08) (76 - 91)  BP: 144/51 (31 Jul 2024 06:08) (120/46 - 151/54)  BP(mean): --  RR: 18 (31 Jul 2024 06:08) (18 - 20)  SpO2: 100% (31 Jul 2024 06:08) (98% - 100%)    Parameters below as of 31 Jul 2024 06:08  Patient On (Oxygen Delivery Method): room air        07-29-24 @ 07:01  -  07-30-24 @ 07:00  --------------------------------------------------------  IN: 240 mL / OUT: 1250 mL / NET: -1010 mL    07-30-24 @ 07:01  -  07-31-24 @ 06:58  --------------------------------------------------------  IN: 0 mL / OUT: 1100 mL / NET: -1100 mL        Physical Exam:  Gen: Alert and Oriented x3, No Acute Distress  Resp: Unlabored breathing on RA  RLE:   Dressing CDI  SILT S/S/DP/SP/T  +EHL/FHL/TA/GSC  Compartments soft/non-tender  Toes warm, Cap refill brisk/warm and perfused, +DP/PT pulse         LABS:        
PRE-INTERVENTIONAL RADIOLOGY PROCEDURE NOTE:    Patient Name: CHARLES CHO  Patient Age: 64y  Patient Gender: Female      Procedure: PICC line placement  Diagnosis/Indication: R knee PJI s/p revision      Interventional Radiology Attending Physician: On-call/TBD    Ordering Attending Physician: Dr. Wesly Givens    Pertinent Medical History: S/p TKA prosthetic joint infection underwent revision, seen by infectious disease who recommended Ancef 2g q8h until 9/4/24. Ancef dose confirmed with Dr. Peter Rockwell    Pertinent labs:                      7.4    4.85  )-----------( 253      ( 29 Jul 2024 06:25 )             23.2       07-29    138  |  100  |  10  ----------------------------<  138<H>  4.0   |  25  |  0.88    Ca    9.1      29 Jul 2024 06:25        Patient and Family Aware?: Yes      PICC approved by Valdez Navarro
  Follow Up:  prosthetic knee infection    Interval History/ROS: pt afebrile, no cough, vomiting, one of the OR cultures grew staph lugdunensis as well          Allergies  ramipril (Angioedema)  environment--ragweed, dust, cats--sneezing and watery eyes, nasal congestion (Other)        ANTIMICROBIALS:  ceFAZolin   IVPB 2000 every 8 hours  hydroxychloroquine 200 two times a day      OTHER MEDS:  acetaminophen     Tablet .. 975 milliGRAM(s) Oral every 8 hours  amLODIPine   Tablet 5 milliGRAM(s) Oral daily  aspirin enteric coated 325 milliGRAM(s) Oral two times a day  chlorhexidine 2% Cloths 1 Application(s) Topical daily  chlorhexidine 4% Liquid 1 Application(s) Topical <User Schedule>  dextrose 5%. 1000 milliLiter(s) IV Continuous <Continuous>  dextrose 5%. 1000 milliLiter(s) IV Continuous <Continuous>  dextrose 50% Injectable 25 Gram(s) IV Push once  dextrose 50% Injectable 12.5 Gram(s) IV Push once  dextrose 50% Injectable 25 Gram(s) IV Push once  dextrose Oral Gel 15 Gram(s) Oral once PRN  glucagon  Injectable 1 milliGRAM(s) IntraMuscular once  hydrochlorothiazide 25 milliGRAM(s) Oral daily  insulin lispro (ADMELOG) corrective regimen sliding scale   SubCutaneous three times a day before meals  insulin lispro (ADMELOG) corrective regimen sliding scale   SubCutaneous at bedtime  lidocaine   4% Patch 1 Patch Transdermal daily  mupirocin 2% Ointment 1 Application(s) Both Nostrils two times a day  nystatin Cream 1 Application(s) Topical two times a day  oxyCODONE    IR 10 milliGRAM(s) Oral every 4 hours PRN  oxyCODONE    IR 5 milliGRAM(s) Oral every 4 hours PRN  pantoprazole    Tablet 40 milliGRAM(s) Oral before breakfast  petrolatum white Ointment 1 Application(s) Topical three times a day PRN  sodium chloride 0.9% lock flush 10 milliLiter(s) IV Push every 1 hour PRN      Vital Signs Last 24 Hrs  T(C): 36.5 (30 Jul 2024 13:21), Max: 37.1 (29 Jul 2024 22:00)  T(F): 97.7 (30 Jul 2024 13:21), Max: 98.8 (29 Jul 2024 22:00)  HR: 79 (30 Jul 2024 13:21) (76 - 89)  BP: 120/46 (30 Jul 2024 13:21) (115/41 - 138/59)  BP(mean): --  RR: 18 (30 Jul 2024 13:21) (17 - 20)  SpO2: 100% (30 Jul 2024 13:21) (96% - 100%)    Parameters below as of 30 Jul 2024 13:21  Patient On (Oxygen Delivery Method): room air        Physical Exam:  General:    NAD,  non toxic  Respiratory:    comfortable on RA  abd:     soft,     no tenderness  :    no  pineda  Musculoskeletal: R knee with dressing  vascular: no phlebitis  Skin:    no rash                          7.4    4.85  )-----------( 253      ( 29 Jul 2024 06:25 )             23.2       07-29    138  |  100  |  10  ----------------------------<  138<H>  4.0   |  25  |  0.88    Ca    9.1      29 Jul 2024 06:25        Urinalysis Basic - ( 29 Jul 2024 06:25 )    Color: x / Appearance: x / SG: x / pH: x  Gluc: 138 mg/dL / Ketone: x  / Bili: x / Urobili: x   Blood: x / Protein: x / Nitrite: x   Leuk Esterase: x / RBC: x / WBC x   Sq Epi: x / Non Sq Epi: x / Bacteria: x        MICROBIOLOGY:  v  .Blood Blood-Peripheral  07-26-24   No growth at 4 days  --  --      .Blood Blood-Peripheral  07-26-24   No growth at 4 days  --  --      .Tissue  07-24-24   No growth to date.  --    No polymorphonuclear cells seen per low power field  No organisms seen per oil power field      .Tissue  07-24-24   No growth  --    No polymorphonuclear cells seen per low power field  No organisms seen      .Tissue  07-24-24   Growth in fluid media only Staphylococcus lugdunensis  --    No polymorphonuclear cells seen per low power field  No organisms seen per oil power field      .Tissue  07-24-24   No growth at 5 days  --    No polymorphonuclear cells seen per low power field  No organisms seen per oil power field      .Tissue  07-24-24   No growth to date.  --    No polymorphonuclear cells seen per low power field  No organisms seen per oil power field                RADIOLOGY:  Images independently visualized and reviewed personally, findings as below  < from: Xray Knee 1 or 2 Views, Right (07.24.24 @ 22:55) >  IMPRESSION:    Status post interval partial TKA removal, with femoral component   remaining in place, with interval placement of a joint spacer and   multiple antibiotic-impregnated beads in the distal femur and proximal   tibia. There is an overlying drain. There is expected postsurgical gas   within soft tissues and knee joint.    < end of copied text >  
  Follow Up:  prosthetic knee infection    Interval History/ROS: pt afebrile, no cough, vomiting, one of the OR cultures grew staph lugdunensis which is now oxacillin resistant           Allergies  ramipril (Angioedema)  environment--ragweed, dust, cats--sneezing and watery eyes, nasal congestion (Other)        ANTIMICROBIALS:  hydroxychloroquine 200 two times a day  vancomycin  IVPB 1250 every 12 hours      OTHER MEDS:  acetaminophen     Tablet .. 975 milliGRAM(s) Oral every 8 hours  amLODIPine   Tablet 5 milliGRAM(s) Oral daily  aspirin enteric coated 325 milliGRAM(s) Oral two times a day  chlorhexidine 2% Cloths 1 Application(s) Topical daily  chlorhexidine 4% Liquid 1 Application(s) Topical <User Schedule>  dextrose 5%. 1000 milliLiter(s) IV Continuous <Continuous>  dextrose 5%. 1000 milliLiter(s) IV Continuous <Continuous>  dextrose 50% Injectable 25 Gram(s) IV Push once  dextrose 50% Injectable 25 Gram(s) IV Push once  dextrose 50% Injectable 12.5 Gram(s) IV Push once  dextrose Oral Gel 15 Gram(s) Oral once PRN  glucagon  Injectable 1 milliGRAM(s) IntraMuscular once  hydrochlorothiazide 25 milliGRAM(s) Oral daily  insulin lispro (ADMELOG) corrective regimen sliding scale   SubCutaneous three times a day before meals  insulin lispro (ADMELOG) corrective regimen sliding scale   SubCutaneous at bedtime  lidocaine   4% Patch 1 Patch Transdermal daily  nystatin Cream 1 Application(s) Topical two times a day  oxyCODONE    IR 5 milliGRAM(s) Oral every 4 hours PRN  oxyCODONE    IR 10 milliGRAM(s) Oral every 4 hours PRN  pantoprazole    Tablet 40 milliGRAM(s) Oral before breakfast  petrolatum white Ointment 1 Application(s) Topical three times a day PRN  sodium chloride 0.9% lock flush 10 milliLiter(s) IV Push every 1 hour PRN      Vital Signs Last 24 Hrs  T(C): 36.9 (31 Jul 2024 09:32), Max: 37 (31 Jul 2024 06:08)  T(F): 98.4 (31 Jul 2024 09:32), Max: 98.6 (31 Jul 2024 06:08)  HR: 91 (31 Jul 2024 09:32) (76 - 91)  BP: 142/57 (31 Jul 2024 09:32) (120/46 - 151/54)  BP(mean): --  RR: 18 (31 Jul 2024 09:32) (18 - 18)  SpO2: 100% (31 Jul 2024 09:32) (98% - 100%)    Parameters below as of 31 Jul 2024 09:32  Patient On (Oxygen Delivery Method): room air        Physical Exam:  General:    NAD,  non toxic  Respiratory:    comfortable on RA  abd:     soft,     no tenderness  :    no  pineda  Musculoskeletal: R knee with dressing  vascular: no phlebitis  Skin:    no rash                  MICROBIOLOGY:  v  .Blood Blood-Peripheral  07-26-24   No growth at 4 days  --  --      .Blood Blood-Peripheral  07-26-24   No growth at 4 days  --  --      .Tissue  07-24-24   No growth to date.  --    No polymorphonuclear cells seen per low power field  No organisms seen per oil power field      .Tissue  07-24-24   No growth at 5 days  --    No polymorphonuclear cells seen per low power field  No organisms seen      .Tissue  07-24-24   Growth in fluid media only Staphylococcus lugdunensis  --  Staphylococcus lugdunensis      .Tissue  07-24-24   No growth at 5 days  --    No polymorphonuclear cells seen per low power field  No organisms seen per oil power field      .Tissue  07-24-24   No growth to date.  --    No polymorphonuclear cells seen per low power field  No organisms seen per oil power field                RADIOLOGY:  Images independently visualized and reviewed personally, findings as below  Successful insertion of a 4Fr single lumen PICC via the left basilic vein.< from: Xray Knee 1 or 2 Views, Right (07.24.24 @ 22:55) >  Status post interval partial TKA removal, with femoral component   remaining in place, with interval placement of a joint spacer and   multiple antibiotic-impregnated beads in the distal femur and proximal   tibia. There is an overlying drain. There is expected postsurgical gas   within soft tissues and knee joint.    < end of copied text >  
Orthopedic Surgery Progress Note     S: Patient seen and examined today. s/p revision R TKA last night. Pain is well controlled. Denies f/c, chest pain, shortness of breath, dizziness.    MEDICATIONS  (STANDING):  acetaminophen   IVPB .. 1000 milliGRAM(s) IV Intermittent every 6 hours  ceFAZolin   IVPB      ceFAZolin   IVPB 1000 milliGRAM(s) IV Intermittent every 8 hours  chlorhexidine 2% Cloths 1 Application(s) Topical daily  dextrose 5%. 1000 milliLiter(s) (100 mL/Hr) IV Continuous <Continuous>  dextrose 5%. 1000 milliLiter(s) (50 mL/Hr) IV Continuous <Continuous>  dextrose 50% Injectable 25 Gram(s) IV Push once  dextrose 50% Injectable 25 Gram(s) IV Push once  dextrose 50% Injectable 12.5 Gram(s) IV Push once  glucagon  Injectable 1 milliGRAM(s) IntraMuscular once  heparin   Injectable 5000 Unit(s) SubCutaneous every 8 hours  insulin lispro (ADMELOG) corrective regimen sliding scale   SubCutaneous every 6 hours  lactated ringers. 1000 milliLiter(s) (100 mL/Hr) IV Continuous <Continuous>    MEDICATIONS  (PRN):  dextrose Oral Gel 15 Gram(s) Oral once PRN Blood Glucose LESS THAN 70 milliGRAM(s)/deciliter  HYDROmorphone  Injectable 0.2 milliGRAM(s) IV Push every 4 hours PRN Moderate Pain (4 - 6)  HYDROmorphone  Injectable 0.5 milliGRAM(s) IV Push every 4 hours PRN Severe Pain (7 - 10)  sodium chloride 0.9% lock flush 10 milliLiter(s) IV Push every 1 hour PRN Pre/post blood products, medications, blood draw, and to maintain line patency      Vital Signs Last 24 Hrs  T(C): 36.9 (25 Jul 2024 07:00), Max: 37.1 (24 Jul 2024 23:10)  T(F): 98.4 (25 Jul 2024 07:00), Max: 98.7 (24 Jul 2024 23:10)  HR: 75 (25 Jul 2024 07:00) (66 - 89)  BP: 132/53 (25 Jul 2024 07:00) (128/61 - 150/58)  BP(mean): 72 (25 Jul 2024 07:00) (71 - 99)  RR: 20 (25 Jul 2024 07:00) (14 - 24)  SpO2: 99% (25 Jul 2024 07:00) (93% - 100%)    Parameters below as of 25 Jul 2024 07:00  Patient On (Oxygen Delivery Method): room air        07-24-24 @ 07:01  -  07-25-24 @ 07:00  --------------------------------------------------------  IN: 2340 mL / OUT: 505 mL / NET: 1835 mL    07-25-24 @ 07:01  -  07-25-24 @ 08:11  --------------------------------------------------------  IN: 340 mL / OUT: 325 mL / NET: 15 mL        Physical Exam:  Gen: NAD  RLE:  KI in place, prevena and BELA in place  SILT distally  +EHL/FHL/TA/GSC  Compartments soft/non-tender  Toes warm, Cap refill brisk/warm and perfused, +DP/PT pulse         LABS:                        8.6    8.26  )-----------( 165      ( 25 Jul 2024 04:45 )             24.5     07-25    134<L>  |  98  |  11  ----------------------------<  164<H>  4.2   |  24  |  1.02    Ca    8.6      25 Jul 2024 04:45  Phos  4.5     07-25  Mg     1.70     07-25    
Patient is a 64y old  Female who presents with a chief complaint of "Infected hardware - right knee" (22 Jul 2024 15:07)    f/u [facility]    Interval History/ROS:  no fever    PAST MEDICAL & SURGICAL HISTORY:  Fibromyalgia  b/l  Carpal tunnel syndrome tx PT/OT  Abdominal hernia in 2012  Hernia repair 2008, 2018  History  Uterine Fibroid s/p Hysterectomy 7/02  Sleep apnea diagnosed 2007---supposed to use device but doesn't  Acid Reflux  hiatal hernia  h/o b/l fungal foot infection  Alcohol abuse--quit 8 years ago--goes to AA  ADDENDUM:  at PAST appointment  patient states she quit alcohol approximately 2003  Drug abuse--quit 8 years ago--goes to AA  ADDENDUM: at PAST appointment, patient states she quit alcohol in approximately 2003  Asthma  h/o Fatty liver  Diverticulosis  Arthritis  Hypertension  Hypercholesterolemia  Morbid obesity  herniated lumbar disc  Depression  Anxiety  Lymphedema, limb; right side s/p right mastectomy  Neuropathy b/l feet  Substance abuse quit in 2003 -- cocaine and marijuana  Miscarriage x 2  Right Breast cancer 2012 s/p mastectomy, chemo/RT, followed with Herceptin for 1 year, 2013 last RT  Thyroid nodule (negative biopsy)  Insomnia  Sickle cell trait  Primary osteoarthritis of right knee  History of COPD  Diabetes mellitus  H/O CHF  Anemia  2002   h/o hysterectomy due to Fibroid--post op vaginal bleeding requiring a transfusion  2008  repair of ventral hernia with mesh, Revision 2018  Radical mastectomy of right breast 3/30/12  Termination of pregnancy (fetus) x 3  Cataract Bilateral 2017  S/P arthroscopy of left shoulder  H/O total knee replacement, right 2023  History of arthroplasty of left knee    Allergies  ramipril (Angioedema)  environment--ragweed, dust, cats--sneezing and watery eyes, nasal congestion (Other)    ANTIMICROBIALS:  piperacillin/tazobactam IVPB (7/24 x1)  vancomycin  IVPB (7/24, 7/25)    active:  ceFAZolin   IVPB 2000 every 8 hours (7/24-)    MEDICATIONS  (STANDING):  amLODIPine   Tablet 5 daily  aspirin enteric coated 325 two times a day  hydrochlorothiazide 25 daily  insulin lispro (ADMELOG) corrective regimen sliding scale  three times a day before meals  insulin lispro (ADMELOG) corrective regimen sliding scale  at bedtime  pantoprazole    Tablet 40 before breakfast    Vital Signs Last 24 Hrs  T(F): 98.5 (07-26-24 @ 10:15), Max: 99.5 (07-25-24 @ 21:16)  HR: 86 (07-26-24 @ 10:15)  BP: 124/54 (07-26-24 @ 10:15)  RR: 18 (07-26-24 @ 10:15)  SpO2: 98% (07-26-24 @ 10:15) (95% - 100%)  Wt(kg): --    PHYSICAL EXAM:  Constitutional: non-toxic  HEAD/EYES: anicteric  ENT:  supple  Cardiovascular:   normal S1, S2, edema  Respiratory:  clear BS bilaterally  GI:  soft, non-tender, normal bowel sounds  :  no pineda  Musculoskeletal:  R knee post-op  Neurologic: awake and alert, normal strength, no focal findings  Skin:  no rash  Psychiatric:  awake, alert, appropriate mood                        8.6    8.26  )-----------( 165      ( 25 Jul 2024 04:45 )             24.5 07-25    134  |  98  |  11  ----------------------------<  164  4.2   |  24  |  1.02  Ca    8.6      25 Jul 2024 04:45Phos  4.5     07-25Mg     1.70     07-25    MICROBIOLOGY:  Culture - Acid Fast - Tissue w/Smear (collected 07-24-24 @ 19:18)  Source: .Tissue    Culture - Tissue with Gram Stain (collected 07-24-24 @ 19:18)  Source: .Tissue  Gram Stain (07-25-24 @ 16:58):    No polymorphonuclear cells seen per low power field    No organisms seen per oil power field  Preliminary Report (07-26-24 @ 08:02):    No growth to date.    Culture - Acid Fast - Tissue w/Smear (collected 07-24-24 @ 19:15)  Source: .Tissue    Culture - Tissue with Gram Stain (collected 07-24-24 @ 19:15)  Source: .Tissue  Gram Stain (07-25-24 @ 23:22):    No polymorphonuclear cells seen per low power field    No organisms seen    Culture - Acid Fast - Tissue w/Smear (collected 07-24-24 @ 19:05)  Source: .Tissue    Culture - Tissue with Gram Stain (collected 07-24-24 @ 19:05)  Source: .Tissue  Gram Stain (07-25-24 @ 16:58):    No polymorphonuclear cells seen per low power field    No organisms seen per oil power field  Preliminary Report (07-26-24 @ 08:04):    No growth to date.    Culture - Acid Fast - Tissue w/Smear (collected 07-24-24 @ 19:00)  Source: .Tissue    Culture - Tissue with Gram Stain (collected 07-24-24 @ 19:00)  Source: .Tissue  Gram Stain (07-25-24 @ 16:58):    No polymorphonuclear cells seen per low power field    No organisms seen per oil power field  Preliminary Report (07-26-24 @ 08:03):    No growth to date.    Culture - Acid Fast - Tissue w/Smear (collected 07-24-24 @ 18:55)  Source: .Tissue    Culture - Tissue with Gram Stain (collected 07-24-24 @ 18:55)  Source: .Tissue  Gram Stain (07-25-24 @ 16:57):    No polymorphonuclear cells seen per low power field    No organisms seen per oil power field  Preliminary Report (07-26-24 @ 08:01):    No growth to date.    6/21/2024 outpatient knee aspiration  culture (+) Staphylococcus lugdunensis  Clindamycin  S (<=0.25)   Erythromycin  S (<=0.25)   Gentamicin  S (<=1)   Oxacillin  S (0.5)   Rifampin  S (<=1)   Tetra/Doxy  S (<=1)   Trimethoprim/Sulfamethoxazole  S (<=0.5/9.5)   Vancomycin  S (0.5)     RADIOLOGY:  imaging below personally reviewed and agree with findings    Xray Chest 1 View- PORTABLE-Urgent (Xray Chest 1 View- PORTABLE-Urgent .) (07.25.24 @ 01:12) >  IMPRESSION:  Right IJ central venous catheter, with tip in the SVC/right atrium.  No pneumothorax.    Xray Knee 1 or 2 Views, Right (07.24.24 @ 22:55) >  IMPRESSION:  Status post interval partial TKA removal, with femoral component remaining in place, with interval placement of a joint spacer and multiple antibiotic-impregnated beads in the distal femur and proximal tibia. There is an overlying drain. There is expected postsurgical gas within soft tissues and knee joint.    CT Knee No Cont, Right (06.26.24 @ 19:00) >  IMPRESSION:  Status post right total knee arthroplasty. Osteolysis/loosening about the tibial component involving both the medial and lateral tibial trays. Findings are more prominent laterally. Evidence of osteolysis/loosening along the patellar backing, more prominent laterally.  Moderate to large knee joint effusion with synovial thickening consistent with synovitis.    Xray Knee 1 or 2 Views, Left (03.18.24 @ 18:16) >  IMPRESSION:  Unconstrained left total knee prosthesis with longer tibial stem component implanted.  Intact and aligned hardware and no periprosthetic fractures.  Postoperative softtissue changes.  Correlate with intraoperative findings.  
Patient is a 64y old  Female who presents with a chief complaint of s/p surgery (26 Jul 2024 14:25)      SUBJECTIVE / OVERNIGHT EVENTS: no events - plan for 1 unit PRBC today - denies complaints     ROS:  all neg unless mentioned in hpi     MEDICATIONS  (STANDING):  acetaminophen     Tablet .. 975 milliGRAM(s) Oral every 8 hours  amLODIPine   Tablet 5 milliGRAM(s) Oral daily  aspirin enteric coated 325 milliGRAM(s) Oral two times a day  ceFAZolin   IVPB 2000 milliGRAM(s) IV Intermittent every 8 hours  chlorhexidine 2% Cloths 1 Application(s) Topical daily  dextrose 5%. 1000 milliLiter(s) (50 mL/Hr) IV Continuous <Continuous>  dextrose 5%. 1000 milliLiter(s) (100 mL/Hr) IV Continuous <Continuous>  dextrose 50% Injectable 25 Gram(s) IV Push once  dextrose 50% Injectable 25 Gram(s) IV Push once  dextrose 50% Injectable 12.5 Gram(s) IV Push once  glucagon  Injectable 1 milliGRAM(s) IntraMuscular once  hydrochlorothiazide 25 milliGRAM(s) Oral daily  hydroxychloroquine 200 milliGRAM(s) Oral two times a day  insulin lispro (ADMELOG) corrective regimen sliding scale   SubCutaneous three times a day before meals  insulin lispro (ADMELOG) corrective regimen sliding scale   SubCutaneous at bedtime  lidocaine   4% Patch 1 Patch Transdermal daily  mupirocin 2% Ointment 1 Application(s) Both Nostrils two times a day  pantoprazole    Tablet 40 milliGRAM(s) Oral before breakfast    MEDICATIONS  (PRN):  dextrose Oral Gel 15 Gram(s) Oral once PRN Blood Glucose LESS THAN 70 milliGRAM(s)/deciliter  oxyCODONE    IR 5 milliGRAM(s) Oral every 4 hours PRN Moderate Pain (4 - 6)  oxyCODONE    IR 10 milliGRAM(s) Oral every 4 hours PRN Severe Pain (7 - 10)  sodium chloride 0.9% lock flush 10 milliLiter(s) IV Push every 1 hour PRN Pre/post blood products, medications, blood draw, and to maintain line patency      T(C): 36.7 (07-28-24 @ 05:36)  HR: 81 (07-28-24 @ 05:36)  BP: 123/50 (07-28-24 @ 05:36)  RR: 16 (07-28-24 @ 05:36)  SpO2: 98% (07-28-24 @ 05:36)  CAPILLARY BLOOD GLUCOSE      POCT Blood Glucose.: 151 mg/dL (28 Jul 2024 07:07)  POCT Blood Glucose.: 140 mg/dL (27 Jul 2024 21:28)  POCT Blood Glucose.: 142 mg/dL (27 Jul 2024 16:34)  POCT Blood Glucose.: 148 mg/dL (27 Jul 2024 11:29)    I&O's Summary    27 Jul 2024 07:01  -  28 Jul 2024 07:00  --------------------------------------------------------  IN: 240 mL / OUT: 2700 mL / NET: -2460 mL        PHYSICAL EXAM:  GENERAL: NAD  CHEST/LUNG: Clear to auscultation bilaterally  HEART: Regular rate and rhythm; No murmurs, rubs, or gallops, No Edema  ABDOMEN: Soft, Nontender, Nondistended; Bowel sounds present  EXTREMITIES:  2+ Peripheral Pulses, No clubbing, cyanosis      LABS:                        7.5    6.48  )-----------( 208      ( 28 Jul 2024 05:51 )             23.0     07-28    135  |  97<L>  |  10  ----------------------------<  142<H>  3.8   |  27  |  0.91    Ca    8.9      28 Jul 2024 05:51            Urinalysis Basic - ( 28 Jul 2024 05:51 )    Color: x / Appearance: x / SG: x / pH: x  Gluc: 142 mg/dL / Ketone: x  / Bili: x / Urobili: x   Blood: x / Protein: x / Nitrite: x   Leuk Esterase: x / RBC: x / WBC x   Sq Epi: x / Non Sq Epi: x / Bacteria: x            RADIOLOGY & ADDITIONAL TESTS:    Imaging Personally Reviewed:    Consultant(s) Notes Reviewed:      Care Discussed with Consultants/Other Providers:  
[facility] Division of Hospital Medicine  Shad Cowan MD  Contact via [employer] Teams (Mon-Fri 8AM-4PM)  Otherwise: contact Hospitalist in Charge at [phone]    Patient is a 64y old  Female who presents with a chief complaint of s/p surgery (26 Jul 2024 14:25)    SUBJECTIVE / OVERNIGHT EVENTS:  Patient complaining of redness and itchiness on skin of her groin and itchiness under the brace - attributes to sweat and immobility. No F/C, N/V, CP, SOB, Cough, lightheadedness, dizziness, abdominal pain, diarrhea, dysuria.    MEDICATIONS  (STANDING):  acetaminophen     Tablet .. 975 milliGRAM(s) Oral every 8 hours  amLODIPine   Tablet 5 milliGRAM(s) Oral daily  aspirin enteric coated 325 milliGRAM(s) Oral two times a day  ceFAZolin   IVPB 2000 milliGRAM(s) IV Intermittent every 8 hours  chlorhexidine 2% Cloths 1 Application(s) Topical daily  dextrose 5%. 1000 milliLiter(s) (100 mL/Hr) IV Continuous <Continuous>  dextrose 5%. 1000 milliLiter(s) (50 mL/Hr) IV Continuous <Continuous>  dextrose 50% Injectable 25 Gram(s) IV Push once  dextrose 50% Injectable 25 Gram(s) IV Push once  dextrose 50% Injectable 12.5 Gram(s) IV Push once  glucagon  Injectable 1 milliGRAM(s) IntraMuscular once  hydrochlorothiazide 25 milliGRAM(s) Oral daily  hydroxychloroquine 200 milliGRAM(s) Oral two times a day  insulin lispro (ADMELOG) corrective regimen sliding scale   SubCutaneous three times a day before meals  insulin lispro (ADMELOG) corrective regimen sliding scale   SubCutaneous at bedtime  lidocaine   4% Patch 1 Patch Transdermal daily  mupirocin 2% Ointment 1 Application(s) Both Nostrils two times a day  pantoprazole    Tablet 40 milliGRAM(s) Oral before breakfast    MEDICATIONS  (PRN):  dextrose Oral Gel 15 Gram(s) Oral once PRN Blood Glucose LESS THAN 70 milliGRAM(s)/deciliter  oxyCODONE    IR 5 milliGRAM(s) Oral every 4 hours PRN Moderate Pain (4 - 6)  oxyCODONE    IR 10 milliGRAM(s) Oral every 4 hours PRN Severe Pain (7 - 10)  sodium chloride 0.9% lock flush 10 milliLiter(s) IV Push every 1 hour PRN Pre/post blood products, medications, blood draw, and to maintain line patency      Vital Signs Last 24 Hrs  T(C): 36.8 (29 Jul 2024 09:55), Max: 37.2 (28 Jul 2024 17:29)  T(F): 98.2 (29 Jul 2024 09:55), Max: 98.9 (28 Jul 2024 17:29)  HR: 81 (29 Jul 2024 09:55) (81 - 95)  BP: 123/59 (29 Jul 2024 09:55) (113/44 - 127/58)  BP(mean): --  RR: 18 (29 Jul 2024 09:55) (17 - 18)  SpO2: 99% (29 Jul 2024 09:55) (98% - 100%)    Parameters below as of 29 Jul 2024 09:55  Patient On (Oxygen Delivery Method): room air      CAPILLARY BLOOD GLUCOSE      POCT Blood Glucose.: 169 mg/dL (29 Jul 2024 07:25)  POCT Blood Glucose.: 168 mg/dL (28 Jul 2024 22:13)  POCT Blood Glucose.: 198 mg/dL (28 Jul 2024 16:35)  POCT Blood Glucose.: 160 mg/dL (28 Jul 2024 11:20)    I&O's Summary    28 Jul 2024 07:01  -  29 Jul 2024 07:00  --------------------------------------------------------  IN: 720 mL / OUT: 2600 mL / NET: -1880 mL    29 Jul 2024 07:01  -  29 Jul 2024 10:48  --------------------------------------------------------  IN: 240 mL / OUT: 250 mL / NET: -10 mL        PHYSICAL EXAM:  CONSTITUTIONAL: NAD, well-developed, well-groomed  EYES: PERRLA; conjunctiva and sclera clear  ENMT: Moist oral mucosa, no pharyngeal injection or exudates; normal dentition  NECK: Supple, no palpable masses; no thyromegaly  RESPIRATORY: Normal respiratory effort; lungs are clear to auscultation bilaterally  CARDIOVASCULAR: Regular rate and rhythm, normal S1 and S2, no murmur/rub/gallop; No lower extremity edema; Peripheral pulses are 2+ bilaterally  ABDOMEN: Nontender to palpation, normoactive bowel sounds, no rebound/guarding; No hepatosplenomegaly  MUSCULOSKELETAL:  Did not assess gait; no clubbing or cyanosis of digits; no joint swelling or tenderness to palpation; Rt knee in brace  PSYCH: A+O to person, place, and time; affect appropriate  NEUROLOGY: CN 2-12 are intact and symmetric; no gross sensory deficits   SKIN: No rashes; no palpable lesions, surgical dressing C/D/I    LABS:                        7.4    4.85  )-----------( 253      ( 29 Jul 2024 06:25 )             23.2     07-29    138  |  100  |  10  ----------------------------<  138<H>  4.0   |  25  |  0.88    Ca    9.1      29 Jul 2024 06:25            Urinalysis Basic - ( 29 Jul 2024 06:25 )    Color: x / Appearance: x / SG: x / pH: x  Gluc: 138 mg/dL / Ketone: x  / Bili: x / Urobili: x   Blood: x / Protein: x / Nitrite: x   Leuk Esterase: x / RBC: x / WBC x   Sq Epi: x / Non Sq Epi: x / Bacteria: x        RADIOLOGY & ADDITIONAL TESTS:    Imaging Personally Reviewed:    Care Discussed with Consultants/Other Providers:    Care Discussed with Orthopedic PA about:   
Dr. Najma Morris  Pager [phone]    PROGRESS NOTE:     Patient is a 64y old  Female who presents with a chief complaint of Failed hardware (26 Jul 2024 12:32)      SUBJECTIVE / OVERNIGHT EVENTS: denies chest pain or sob  ADDITIONAL REVIEW OF SYSTEMS: afebrile, RUE iv infiltrated , removed this am    MEDICATIONS  (STANDING):  amLODIPine   Tablet 5 milliGRAM(s) Oral daily  aspirin enteric coated 325 milliGRAM(s) Oral two times a day  ceFAZolin   IVPB 2000 milliGRAM(s) IV Intermittent every 8 hours  chlorhexidine 2% Cloths 1 Application(s) Topical daily  dextrose 5%. 1000 milliLiter(s) (100 mL/Hr) IV Continuous <Continuous>  dextrose 5%. 1000 milliLiter(s) (50 mL/Hr) IV Continuous <Continuous>  dextrose 50% Injectable 12.5 Gram(s) IV Push once  dextrose 50% Injectable 25 Gram(s) IV Push once  dextrose 50% Injectable 25 Gram(s) IV Push once  glucagon  Injectable 1 milliGRAM(s) IntraMuscular once  hydrochlorothiazide 25 milliGRAM(s) Oral daily  hydroxychloroquine 200 milliGRAM(s) Oral two times a day  insulin lispro (ADMELOG) corrective regimen sliding scale   SubCutaneous three times a day before meals  insulin lispro (ADMELOG) corrective regimen sliding scale   SubCutaneous at bedtime  lidocaine   4% Patch 1 Patch Transdermal daily  mupirocin 2% Ointment 1 Application(s) Both Nostrils two times a day  pantoprazole    Tablet 40 milliGRAM(s) Oral before breakfast    MEDICATIONS  (PRN):  dextrose Oral Gel 15 Gram(s) Oral once PRN Blood Glucose LESS THAN 70 milliGRAM(s)/deciliter  HYDROmorphone  Injectable 0.2 milliGRAM(s) IV Push every 4 hours PRN Moderate Pain (4 - 6)  HYDROmorphone  Injectable 0.5 milliGRAM(s) IV Push every 4 hours PRN Severe Pain (7 - 10)  oxyCODONE    IR 10 milliGRAM(s) Oral every 4 hours PRN Severe Pain (7 - 10)  oxyCODONE    IR 5 milliGRAM(s) Oral every 4 hours PRN Moderate Pain (4 - 6)  sodium chloride 0.9% lock flush 10 milliLiter(s) IV Push every 1 hour PRN Pre/post blood products, medications, blood draw, and to maintain line patency      CAPILLARY BLOOD GLUCOSE      POCT Blood Glucose.: 159 mg/dL (26 Jul 2024 11:18)  POCT Blood Glucose.: 144 mg/dL (26 Jul 2024 07:10)  POCT Blood Glucose.: 173 mg/dL (25 Jul 2024 21:37)  POCT Blood Glucose.: 160 mg/dL (25 Jul 2024 16:38)    I&O's Summary    25 Jul 2024 07:01  -  26 Jul 2024 07:00  --------------------------------------------------------  IN: 2260 mL / OUT: 3407 mL / NET: -1147 mL    26 Jul 2024 07:01  -  26 Jul 2024 14:25  --------------------------------------------------------  IN: 0 mL / OUT: 400 mL / NET: -400 mL        PHYSICAL EXAM:  Vital Signs Last 24 Hrs  T(C): 36.9 (26 Jul 2024 13:40), Max: 37.5 (25 Jul 2024 21:16)  T(F): 98.4 (26 Jul 2024 13:40), Max: 99.5 (25 Jul 2024 21:16)  HR: 94 (26 Jul 2024 13:40) (82 - 94)  BP: 108/42 (26 Jul 2024 13:40) (108/42 - 152/42)  BP(mean): --  RR: 18 (26 Jul 2024 13:40) (16 - 18)  SpO2: 100% (26 Jul 2024 13:40) (95% - 100%)    Parameters below as of 26 Jul 2024 13:40  Patient On (Oxygen Delivery Method): room air      GENERAL: nad  HEAD:  Atraumatic, Normocephalic  EYES: EOMI, PERRLA, conjunctiva and sclera clear  NECK: Supple, No JVD  CHEST/LUNG: Clear to auscultation bilaterally; No wheeze  HEART: Regular rate and rhythm; No murmurs, rubs, or gallops  ABDOMEN: Soft, Nontender, Nondistended; Bowel sounds present  EXTREMITIES: RUE lymphedema,  right knee in wound dressing/ACE wrap, prevena, and Sidra drain  gU: pineda in place   PSYCH: AAOx3  NEUROLOGY: non-focal  SKIN: No rashes or lesions      LABS:                        8.6    8.26  )-----------( 165      ( 25 Jul 2024 04:45 )             24.5     07-25    134<L>  |  98  |  11  ----------------------------<  164<H>  4.2   |  24  |  1.02    Ca    8.6      25 Jul 2024 04:45  Phos  4.5     07-25  Mg     1.70     07-25      PT/INR - ( 24 Jul 2024 23:40 )   PT: 14.2 sec;   INR: 1.28 ratio         PTT - ( 24 Jul 2024 23:40 )  PTT:30.0 sec      Urinalysis Basic - ( 25 Jul 2024 04:45 )    Color: x / Appearance: x / SG: x / pH: x  Gluc: 164 mg/dL / Ketone: x  / Bili: x / Urobili: x   Blood: x / Protein: x / Nitrite: x   Leuk Esterase: x / RBC: x / WBC x   Sq Epi: x / Non Sq Epi: x / Bacteria: x        Culture - Acid Fast - Tissue w/Smear (collected 24 Jul 2024 19:18)  Source: .Tissue    Culture - Tissue with Gram Stain (collected 24 Jul 2024 19:18)  Source: .Tissue  Gram Stain (25 Jul 2024 16:58):    No polymorphonuclear cells seen per low power field    No organisms seen per oil power field  Preliminary Report (26 Jul 2024 08:02):    No growth to date.    Culture - Acid Fast - Tissue w/Smear (collected 24 Jul 2024 19:15)  Source: .Tissue    Culture - Tissue with Gram Stain (collected 24 Jul 2024 19:15)  Source: .Tissue  Gram Stain (25 Jul 2024 23:22):    No polymorphonuclear cells seen per low power field    No organisms seen    Culture - Acid Fast - Tissue w/Smear (collected 24 Jul 2024 19:05)  Source: .Tissue    Culture - Tissue with Gram Stain (collected 24 Jul 2024 19:05)  Source: .Tissue  Gram Stain (25 Jul 2024 16:58):    No polymorphonuclear cells seen per low power field    No organisms seen per oil power field  Preliminary Report (26 Jul 2024 08:04):    No growth to date.    Culture - Acid Fast - Tissue w/Smear (collected 24 Jul 2024 19:00)  Source: .Tissue    Culture - Tissue with Gram Stain (collected 24 Jul 2024 19:00)  Source: .Tissue  Gram Stain (25 Jul 2024 16:58):    No polymorphonuclear cells seen per low power field    No organisms seen per oil power field  Preliminary Report (26 Jul 2024 08:03):    No growth to date.    Culture - Acid Fast - Tissue w/Smear (collected 24 Jul 2024 18:55)  Source: .Tissue    Culture - Tissue with Gram Stain (collected 24 Jul 2024 18:55)  Source: .Tissue  Gram Stain (25 Jul 2024 16:57):    No polymorphonuclear cells seen per low power field    No organisms seen per oil power field  Preliminary Report (26 Jul 2024 08:01):    No growth to date.        RADIOLOGY & ADDITIONAL TESTS:  Results Reviewed:   Imaging Personally Reviewed:  Electrocardiogram Personally Reviewed:    COORDINATION OF CARE:  Care Discussed with Consultants/Other Providers [Y/N]:  Prior or Outpatient Records Reviewed [Y/N]:  
Patient is a 64y old  Female who presents with a chief complaint of s/p surgery (26 Jul 2024 14:25)      SUBJECTIVE / OVERNIGHT EVENTS: no events - denies complaints     ROS:  all neg unless mentioned in hpi     MEDICATIONS  (STANDING):  amLODIPine   Tablet 5 milliGRAM(s) Oral daily  aspirin enteric coated 325 milliGRAM(s) Oral two times a day  ceFAZolin   IVPB 2000 milliGRAM(s) IV Intermittent every 8 hours  chlorhexidine 2% Cloths 1 Application(s) Topical daily  dextrose 5%. 1000 milliLiter(s) (50 mL/Hr) IV Continuous <Continuous>  dextrose 5%. 1000 milliLiter(s) (100 mL/Hr) IV Continuous <Continuous>  dextrose 50% Injectable 25 Gram(s) IV Push once  dextrose 50% Injectable 12.5 Gram(s) IV Push once  dextrose 50% Injectable 25 Gram(s) IV Push once  glucagon  Injectable 1 milliGRAM(s) IntraMuscular once  hydrochlorothiazide 25 milliGRAM(s) Oral daily  hydroxychloroquine 200 milliGRAM(s) Oral two times a day  insulin lispro (ADMELOG) corrective regimen sliding scale   SubCutaneous three times a day before meals  insulin lispro (ADMELOG) corrective regimen sliding scale   SubCutaneous at bedtime  lidocaine   4% Patch 1 Patch Transdermal daily  mupirocin 2% Ointment 1 Application(s) Both Nostrils two times a day  pantoprazole    Tablet 40 milliGRAM(s) Oral before breakfast    MEDICATIONS  (PRN):  dextrose Oral Gel 15 Gram(s) Oral once PRN Blood Glucose LESS THAN 70 milliGRAM(s)/deciliter  HYDROmorphone  Injectable 0.2 milliGRAM(s) IV Push every 4 hours PRN Moderate Pain (4 - 6)  HYDROmorphone  Injectable 0.5 milliGRAM(s) IV Push every 4 hours PRN Severe Pain (7 - 10)  oxyCODONE    IR 10 milliGRAM(s) Oral every 4 hours PRN Severe Pain (7 - 10)  oxyCODONE    IR 5 milliGRAM(s) Oral every 4 hours PRN Moderate Pain (4 - 6)  sodium chloride 0.9% lock flush 10 milliLiter(s) IV Push every 1 hour PRN Pre/post blood products, medications, blood draw, and to maintain line patency      T(C): 37.2 (07-27-24 @ 05:37)  HR: 90 (07-27-24 @ 05:37)  BP: 108/42 (07-27-24 @ 05:37)  RR: 18 (07-27-24 @ 05:37)  SpO2: 99% (07-27-24 @ 05:37)  CAPILLARY BLOOD GLUCOSE      POCT Blood Glucose.: 163 mg/dL (27 Jul 2024 07:30)  POCT Blood Glucose.: 182 mg/dL (26 Jul 2024 22:06)  POCT Blood Glucose.: 170 mg/dL (26 Jul 2024 16:26)  POCT Blood Glucose.: 159 mg/dL (26 Jul 2024 11:18)    I&O's Summary    26 Jul 2024 07:01  -  27 Jul 2024 07:00  --------------------------------------------------------  IN: 50 mL / OUT: 1402.5 mL / NET: -1352.5 mL        PHYSICAL EXAM:  GENERAL: NAD  CHEST/LUNG: Clear to auscultation bilaterally  HEART: Regular rate and rhythm; No murmurs, rubs, or gallops, No Edema  ABDOMEN: Soft, Nontender, Nondistended; Bowel sounds present  EXTREMITIES:  2+ Peripheral Pulses, No clubbing, cyanosis      LABS:                        RADIOLOGY & ADDITIONAL TESTS:    Imaging Personally Reviewed:    Consultant(s) Notes Reviewed:      Care Discussed with Consultants/Other Providers:

## 2024-08-01 ENCOUNTER — APPOINTMENT (OUTPATIENT)
Dept: RHEUMATOLOGY | Facility: CLINIC | Age: 64
End: 2024-08-01

## 2024-08-06 ENCOUNTER — OUTPATIENT (OUTPATIENT)
Dept: OUTPATIENT SERVICES | Facility: HOSPITAL | Age: 64
LOS: 1 days | Discharge: HOME | End: 2024-08-06

## 2024-08-06 ENCOUNTER — NON-APPOINTMENT (OUTPATIENT)
Age: 64
End: 2024-08-06

## 2024-08-06 DIAGNOSIS — Z98.890 OTHER SPECIFIED POSTPROCEDURAL STATES: Chronic | ICD-10-CM

## 2024-08-06 DIAGNOSIS — Z33.2 ENCOUNTER FOR ELECTIVE TERMINATION OF PREGNANCY: Chronic | ICD-10-CM

## 2024-08-06 DIAGNOSIS — C50.919 MALIGNANT NEOPLASM OF UNSPECIFIED SITE OF UNSPECIFIED FEMALE BREAST: ICD-10-CM

## 2024-08-06 DIAGNOSIS — H26.9 UNSPECIFIED CATARACT: Chronic | ICD-10-CM

## 2024-08-06 DIAGNOSIS — Z96.652 PRESENCE OF LEFT ARTIFICIAL KNEE JOINT: Chronic | ICD-10-CM

## 2024-08-06 DIAGNOSIS — Z96.651 PRESENCE OF RIGHT ARTIFICIAL KNEE JOINT: Chronic | ICD-10-CM

## 2024-08-08 ENCOUNTER — OUTPATIENT (OUTPATIENT)
Dept: OUTPATIENT SERVICES | Facility: HOSPITAL | Age: 64
LOS: 1 days | Discharge: HOME | End: 2024-08-08

## 2024-08-08 DIAGNOSIS — D64.9 ANEMIA, UNSPECIFIED: ICD-10-CM

## 2024-08-08 DIAGNOSIS — Z33.2 ENCOUNTER FOR ELECTIVE TERMINATION OF PREGNANCY: Chronic | ICD-10-CM

## 2024-08-08 DIAGNOSIS — Z96.652 PRESENCE OF LEFT ARTIFICIAL KNEE JOINT: Chronic | ICD-10-CM

## 2024-08-08 DIAGNOSIS — Z98.890 OTHER SPECIFIED POSTPROCEDURAL STATES: Chronic | ICD-10-CM

## 2024-08-08 DIAGNOSIS — Z96.651 PRESENCE OF RIGHT ARTIFICIAL KNEE JOINT: Chronic | ICD-10-CM

## 2024-08-08 DIAGNOSIS — H26.9 UNSPECIFIED CATARACT: Chronic | ICD-10-CM

## 2024-08-09 ENCOUNTER — OUTPATIENT (OUTPATIENT)
Dept: OUTPATIENT SERVICES | Facility: HOSPITAL | Age: 64
LOS: 1 days | Discharge: HOME | End: 2024-08-09
Payer: MEDICARE

## 2024-08-09 ENCOUNTER — APPOINTMENT (OUTPATIENT)
Dept: HEMATOLOGY ONCOLOGY | Facility: CLINIC | Age: 64
End: 2024-08-09

## 2024-08-09 DIAGNOSIS — D64.9 ANEMIA, UNSPECIFIED: ICD-10-CM

## 2024-08-13 ENCOUNTER — APPOINTMENT (OUTPATIENT)
Dept: ORTHOPEDIC SURGERY | Facility: CLINIC | Age: 64
End: 2024-08-13
Payer: MEDICARE

## 2024-08-13 DIAGNOSIS — T84.50XA INFECTION AND INFLAMMATORY REACTION DUE TO UNSPECIFIED INTERNAL JOINT PROSTHESIS, INITIAL ENCOUNTER: ICD-10-CM

## 2024-08-13 PROCEDURE — 99024 POSTOP FOLLOW-UP VISIT: CPT

## 2024-08-13 PROCEDURE — 73562 X-RAY EXAM OF KNEE 3: CPT | Mod: TC,RT

## 2024-08-13 NOTE — PHYSICAL EXAM
[de-identified] : Constitutional:  64 year old female, alert and oriented, cooperative, in no acute distress.  HEENT  NC/AT.  Appearance: symmetric  Neck/Back Straight without deformity or instability.  Good ROM.  Chest/Respiratory  Respiratory effort: no intercostal retractions or use of accessory muscles. Nonlabored Breathing  Skin  On inspection, warm and dry without rashes or lesions.  Mental Status:  Judgment, insight: intact Orientation: oriented to time, place, and person  Neurological: Sensory and Motor are grossly intact throughout  Right Knee  Inspection:     Incision well healing. No erythema or drainage     No Effusion     Non-tender to palpation over tibial tubercle, patella, medial and lateral joint line, and pes insertion.  Range of Motion: 	Extension - 0 degrees 	Flexion - 115 degrees 	Extensor lag: None  Stability:      Demonstrates no Varus or Valgus instability      Negative Anterior or Posterior drawer.      No mid flexion instability  Patella: stable, tracks well.   Neurologic Exam     Motor intact including 5/5 Extensor Hallucis Longus, 5/5 Flexor Hallucis Longus, 5/5 Tibialis Anterior and 5/5 Gastrocnemius     Sensation Intact to Light Touch including Saphenous, Sural, Superficial Peroneal, Deep Peroneal, Tibial nerve distributions  Vascular Exam     Foot is warm and well perfused with 2+ Dorsalis Pedis Pulse   No pain with range of motion of the bilateral hips or left knee. No lumbar paraspinal muscle tenderness. [de-identified] : XRay:  XRays of the Right Knee (3 Views) taken in the office today and reviewed with the patient. XRays demonstrate a Right Total Knee Arthroplasty in good position and alignment. (my personal interpretation) Components: Smith [employer] Nephew Legion, All Poly Tibia

## 2024-08-13 NOTE — DISCUSSION/SUMMARY
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  Patient underwent surgery on 7/24/2024.  X-rays showed right revision TKA in good position and alignment.  Examination showed good right knee range of motion.  Discussed with patient the examination and imaging findings.  Discussed with the patient the recovery from her right revision TKA, including physical therapy, antibiotics, and DVT prophylaxis.  Patient will continue her physical therapy.  Discussed that patient may participate in range of motion as tolerated.  Patient will continue her IV antibiotics as per ID.  She will continue to take her aspirin for a total of 6 weeks for her DVT prophylaxis.  Discussed plan for second stage surgery following completion of antibiotics and workup for resolution of infection.  Patient will follow-up in 4 weeks for reevaluation and management.  Patient understanding and in agreement with the plan.  All questions answered.  Plan: -Right lower extremity: Weightbearing as tolerated -Range of motion as tolerated -Physical therapy -DVT prophylaxis: Aspirin for a total of 6 weeks -Antibiotics per infectious disease -Follow-up in 4 weeks for reevaluation and management

## 2024-08-13 NOTE — HISTORY OF PRESENT ILLNESS
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  Patient underwent surgery on 7/24/2024.  She is currently doing well overall.  Patient is currently in rehab and is receiving her antibiotics.  No fevers or chills.  No falls.  7/17/2024  Denia Daley presents to the office for follow-up of her right knee PJI.  Patient continues to have knee pain.  No falls.  No fevers or chills.  She was seen by Dr. Knight and pulmonology.  She is planned for medicine and cardiology clearances.  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  She underwent right knee aspiration last week, which showed right knee PJI.  No falls.  No fevers or chills.  6/19/2024  Denia Charlesalonzodawna presents to the office for follow-up of her bilateral TKAs.  Patient's left knee is doing well.  Her right knee now has pain.  Pain is worse with standing on the knee.  She has difficulty with stairs.  No falls.  Patient reports a fibromyalgia flare in October and had some issues with the knee since.  She felt some knee stiffness.  No fevers or chills.  5/7/2024  Denia Charleselsyevan presents to the office for follow-up of her left total knee arthroplasty.  Patient went left TKA on 3/18/2024.  She is currently doing well overall.  Patient does not have significant knee pain at this time.  She is participating in physical therapy.  No falls.  No fevers or chills.  4/11/2024  Denia Daley presents to the office for follow-up of her left total knee arthroplasty.  Patient underwent left TKA on 3/18/2024.  Patient had some pain when in bed.  Pain is located over the knee.  No falls.  No fevers or chills.  4/9/2024  Denia Charlesalonzodawna presents to the office for follow-up of her left total knee arthroplasty.  Patient underwent left TKA on 3/18/2024.  There has been no further wound drainage.  Patient is currently doing well overall.  No falls or injuries. No fevers or chills.  4/2/2024  Denia Daley presents to the office for follow-up of her left total knee arthroplasty.  Patient underwent left TKA on 3/18/2024.  A Prevena wound VAC was placed in the ER due to wound drainage.  There has been no further drainage around the wound VAC.  No fevers or chills.  No falls or injuries.  She does not have significant pain at this time.  3/28/2024  Denia Daley presented to the office for follow-up of her left total knee arthroplasty.  Patient underwent left TKA on 3/18/2024.  She had some wound drainage and a Prevena VAC was placed in the ER.  She is otherwise doing well.  Patient has been working with physical therapy.  She has been taking her Aspirin for her DVT prophylaxis.  No drainage around the wound VAC.  No fevers or chills.  No falls or injuries.  She has been working with home physical therapy.  3/5/2024  Denia Daley presents to the office for follow-up of her right total knee arthroplasty and management of her left knee osteoarthritis.  Her right knee has been in physical therapy and is doing well.  She continues to have left knee pain.  Left knee pain continues to affect her quality of life.  Her weight is 216 pounds and she is working on weight loss.  No falls.  No fevers or chills.  2/1/2024  Denia Daley presents to the office for follow-up of her right total knee arthroplasty and management of her left knee osteoarthritis.  Patient has been in physical therapy and is doing well.  Right knee is improving, but she has some lateral pain.  She continues to have left knee pain.  Left knee pain is not significantly changed.  Her left knee continues to affect her quality of life.  Patient had a recent right-sided RFA on Tuesday and is planned for a left-sided RFA.  No lightheadedness or fatigue.  No falls or injuries.  Patient states that she is 217 lbs. at this time.  1/4/2024  Denia Daley presented to the office for follow-up of her right total knee arthroplasty and management of her left knee osteoarthritis.  Patient's right knee is doing well overall.  She did have a fibromyalgia flare and had some pain.  She would like to restart physical therapy.  Patient continues to have left knee pain.  Pain is worse with going up stairs and in bed.  No falls.  No fevers or chills.  Patient does have back pain.  She states that her last weight was 218 pounds and that her last hemoglobin was 10.0.  She is currently on iron and has somewhat improved.  11/7/2023  Denia Daley presented to the office for follow-up of her right total knee arthroplasty and left knee osteoarthritis.  Patient was increasingly active about 10 days ago on Saturday.  She had knee pain afterwards and had pain last week.  Pain was worse with leaning forward.  No falls.  No fevers or chills.  No significant swelling.  Patient states that her pain is improving.  She continues to have left knee pain and had pain after the increased activity, which has been improving.  Pain is located over the medial and lateral knees.  8/29/2023  Denia Daley presents to the office for follow-up of her right total knee arthroplasty and left knee osteoarthritis.  Patient's right knee is currently doing well.  She does get some occasional shock type pains.  She is able to walk without a cane.  Patient continues to have left knee pain.  She states that the left knee is still affecting her quality of life.  She has tried physical therapy, anti-inflammatories, and injections.  Patient was recently seen by neurology and diagnosed with an essential tremor.  She is going to see cardiology and her primary care physician for her clearances.  She had a recent sleep study for her MADHURI.  No falls or injuries.  7/27/2023  Denia Daley presents to the office for follow-up of her right total knee arthroplasty and left knee osteoarthritis.  Patient's right knee is currently doing well.  She has no significant issues.  Patient continues to have significant left knee pain.  She states that the left knee pain is affecting her quality of life.  She has been walking better due to her right TKA.  Patient uses a cane in the community, but is able to walk without the cane.  She has tried pain medications, physical therapy, and injections for the left knee.  Patient states that her back pain has improved after undergoing RFA and is going for an EMG tomorrow for her carpal tunnel.  6/13/2023  Stephanie Daley is a 63-year-old female who presents to the office for follow-up of her right total knee arthroplasty.  Patient underwent right TKA on 4/24/2023.  She is currently doing well.  Right knee pain is significantly improved compared to prior to surgery.  She is currently in physical therapy. Patient continues to take her Aspirin 325 mg twice per day for DVT prophylaxis.  Patient has been doing home exercises on a bicycle. She is currently walking with a cane when outside, but is not using any assistive devices in the home.  Patient currently reports left lower back and left knee pain.  She continues to have left knee pain.  She had a recent RFA on her right lower back.  She is currently taking tramadol, gabapentin and Tylenol.  She states for the majority of her pain is currently in her left knee.  Left knee pain continues to affect her quality of life.  Patient has tried injections, physical therapy, and pain medications for her left knee.  Her last left knee injection was in August 2022.  She would like to discuss left total knee arthroplasty.  5/9/2023  Ms. Daley presents to the office for follow-up of her right total knee arthroplasty.  Patient underwent right TKA on 4/24/2023.  She is currently doing well.  Patient did go to the emergency department on 5/5/2023 with pain and swelling of her right lower extremity.  US Duplex was negative.  Her pain and swelling have improved over the last few days.  Her pain is well controlled at this time.  She is taking occasional tramadol at night for her pain.  Patient is walking with a cane.  She has finished her home physical therapy.  No fevers or chills.  Patient remains on Aspirin 325mg twice per day for DVT prophylaxis.   4/7/2023  Ms. Daley presented to the office for follow-up of her bilateral knee pain.  Patient continues to experience bilateral (right greater than left) knee pain.  She also reports some right knee stiffness and occasional lower back pain.  Patient has some neuropathy in her hands and feet.  Patient continues to experience pain is affecting her quality of life and daily activities.  She has tried multiple knee injections, last in August 2022.  She has also tried physical therapy and has been in pain management.  Patient is planned for a right total knee arthroplasty.  She has a stress test scheduled for Wednesday.  Patient is currently taking meloxicam for her pain.  2/10/2023 Ms. Daley is a 62-year-old female presents to the office for follow up of her bilateral knee pain.  Patient has been experiencing bilateral (right greater than left) knee pain for about 30 years.  She states that it has been worse over the last 6 to 8 years. Pain is now affecting her quality of life and daily activities.  Pain is worse when lying down and patient tries to keep her feet elevated.  Right knee pain is worse than her left at this time.  Patient has tried meloxicam, gabapentin, and Tylenol for the pain.  She has a history of chronic pain and has been in pain management for about 20 years at [facility].  Patient has stopped taking methadone and is currently only taking tramadol for her pain.  She did have some withdrawal from her opioid medications.  Patient has tried multiple knee injections, including cortisone injections for many years.  She has tried about 4 series of viscosupplementation injections.  Her last knee joint injections were in August 2022, which did not help.  She states that she received 3 gel injections in the right knee and 2 in the left, but pain worsened and she did not finish the left knee series.  She had an injection in her right IT band in November 2022.  Patient is also tried physical therapy for her knees, last in October 2022. She has tried physical therapy for her back pain, but her knee pain limited her back PT. Patient has continued her weight loss and her BMI: 39.26.  No recent trauma.  No fevers or chills.  Patient was recently started on Symbicort by her pulmonologist.  She would like to move her surgical date from March into April.   History: HTN, Diverticulitis, GERD, DEREK, History of Breast Cancer, Alcoholism (19 years sober), Lymphedema in Right Arm, Fibromyalgia, Neuropathy, Bipolar, Diabetes (Last A1C: 6.1) [Improving] : improving

## 2024-08-19 ENCOUNTER — APPOINTMENT (OUTPATIENT)
Dept: INFECTIOUS DISEASE | Facility: CLINIC | Age: 64
End: 2024-08-19

## 2024-09-03 ENCOUNTER — OUTPATIENT (OUTPATIENT)
Dept: OUTPATIENT SERVICES | Facility: HOSPITAL | Age: 64
LOS: 1 days | Discharge: HOME | End: 2024-09-03
Payer: MEDICARE

## 2024-09-03 ENCOUNTER — APPOINTMENT (OUTPATIENT)
Dept: CARDIOLOGY | Facility: CLINIC | Age: 64
End: 2024-09-03

## 2024-09-03 DIAGNOSIS — H26.9 UNSPECIFIED CATARACT: Chronic | ICD-10-CM

## 2024-09-03 DIAGNOSIS — Z96.652 PRESENCE OF LEFT ARTIFICIAL KNEE JOINT: Chronic | ICD-10-CM

## 2024-09-03 DIAGNOSIS — Z33.2 ENCOUNTER FOR ELECTIVE TERMINATION OF PREGNANCY: Chronic | ICD-10-CM

## 2024-09-03 DIAGNOSIS — Z96.651 PRESENCE OF RIGHT ARTIFICIAL KNEE JOINT: Chronic | ICD-10-CM

## 2024-09-03 DIAGNOSIS — Z98.890 OTHER SPECIFIED POSTPROCEDURAL STATES: Chronic | ICD-10-CM

## 2024-09-03 DIAGNOSIS — M25.512 PAIN IN LEFT SHOULDER: ICD-10-CM

## 2024-09-03 PROCEDURE — 73020 X-RAY EXAM OF SHOULDER: CPT | Mod: 26,LT,XE

## 2024-09-03 PROCEDURE — 73030 X-RAY EXAM OF SHOULDER: CPT | Mod: 26,LT

## 2024-09-03 NOTE — DISCUSSION/SUMMARY
[FreeTextEntry1] : 64 year old woman with HTN here for followup #HTN- On  HCTZ and Valsartan, on Norvasc 10 mg  #Preop eval- TTE Jan 2023 was normal, Nuclear stress test April 2023 normal. Medically optimized for surgery. from cardiac perspective No further cardiac workup needed, patient optimized for TKR

## 2024-09-10 ENCOUNTER — APPOINTMENT (OUTPATIENT)
Dept: ORTHOPEDIC SURGERY | Facility: CLINIC | Age: 64
End: 2024-09-10
Payer: MEDICARE

## 2024-09-10 DIAGNOSIS — T84.50XA INFECTION AND INFLAMMATORY REACTION DUE TO UNSPECIFIED INTERNAL JOINT PROSTHESIS, INITIAL ENCOUNTER: ICD-10-CM

## 2024-09-10 PROCEDURE — 99024 POSTOP FOLLOW-UP VISIT: CPT

## 2024-09-10 NOTE — PHYSICAL EXAM
[de-identified] : Constitutional:  64 year old female, alert and oriented, cooperative, in no acute distress.  HEENT  NC/AT.  Appearance: symmetric  Neck/Back Straight without deformity or instability.  Good ROM.  Chest/Respiratory  Respiratory effort: no intercostal retractions or use of accessory muscles. Nonlabored Breathing  Skin  On inspection, warm and dry without rashes or lesions.  Mental Status:  Judgment, insight: intact Orientation: oriented to time, place, and person  Neurological: Sensory and Motor are grossly intact throughout  Right Knee  Inspection:     Incision well healing. No erythema or drainage     No Effusion     Non-tender to palpation over tibial tubercle, patella, medial and lateral joint line, and pes insertion.  Range of Motion: 	Extension - 0 degrees 	Flexion - 115 degrees 	Extensor lag: None  Stability:      Demonstrates no Varus or Valgus instability      Negative Anterior or Posterior drawer.      No mid flexion instability  Patella: stable, tracks well.   Neurologic Exam     Motor intact including 5/5 Extensor Hallucis Longus, 5/5 Flexor Hallucis Longus, 5/5 Tibialis Anterior and 5/5 Gastrocnemius     Sensation Intact to Light Touch including Saphenous, Sural, Superficial Peroneal, Deep Peroneal, Tibial nerve distributions  Vascular Exam     Foot is warm and well perfused with 2+ Dorsalis Pedis Pulse   No pain with range of motion of the bilateral hips or left knee. No lumbar paraspinal muscle tenderness. [de-identified] : XRay:  XRays of the Right Knee (3 Views) taken on 8/13/2024. XRays demonstrate a Right Total Knee Arthroplasty in good position and alignment. (my personal interpretation) Components: Smith [employer] Nephew Legion, All Poly Tibia

## 2024-09-10 NOTE — HISTORY OF PRESENT ILLNESS
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  Patient underwent surgery on 7/24/2024.  Patient is currently doing well overall.  She has completed her IV antibiotics.  Her PICC line was removed last Thursday.  No fevers or chills.  No falls.  She does not have significant right knee pain at this time.  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  Patient underwent surgery on 7/24/2024.  She is currently doing well overall.  Patient is currently in rehab and is receiving her antibiotics.  No fevers or chills.  No falls.  7/17/2024  Denia Daley presents to the office for follow-up of her right knee PJI.  Patient continues to have knee pain.  No falls.  No fevers or chills.  She was seen by Dr. Knight and pulmonology.  She is planned for medicine and cardiology clearances.  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  She underwent right knee aspiration last week, which showed right knee PJI.  No falls.  No fevers or chills.  6/19/2024  Denia Bynumernestinaevan presents to the office for follow-up of her bilateral TKAs.  Patient's left knee is doing well.  Her right knee now has pain.  Pain is worse with standing on the knee.  She has difficulty with stairs.  No falls.  Patient reports a fibromyalgia flare in October and had some issues with the knee since.  She felt some knee stiffness.  No fevers or chills.  5/7/2024  Denia Charleselsyevan presents to the office for follow-up of her left total knee arthroplasty.  Patient went left TKA on 3/18/2024.  She is currently doing well overall.  Patient does not have significant knee pain at this time.  She is participating in physical therapy.  No falls.  No fevers or chills.  4/11/2024  Denia Daley presents to the office for follow-up of her left total knee arthroplasty.  Patient underwent left TKA on 3/18/2024.  Patient had some pain when in bed.  Pain is located over the knee.  No falls.  No fevers or chills.  4/9/2024  Denia Daley presents to the office for follow-up of her left total knee arthroplasty.  Patient underwent left TKA on 3/18/2024.  There has been no further wound drainage.  Patient is currently doing well overall.  No falls or injuries. No fevers or chills.  4/2/2024  Denia Daley presents to the office for follow-up of her left total knee arthroplasty.  Patient underwent left TKA on 3/18/2024.  A Prevena wound VAC was placed in the ER due to wound drainage.  There has been no further drainage around the wound VAC.  No fevers or chills.  No falls or injuries.  She does not have significant pain at this time.  3/28/2024  Denia Daley presented to the office for follow-up of her left total knee arthroplasty.  Patient underwent left TKA on 3/18/2024.  She had some wound drainage and a Prevena VAC was placed in the ER.  She is otherwise doing well.  Patient has been working with physical therapy.  She has been taking her Aspirin for her DVT prophylaxis.  No drainage around the wound VAC.  No fevers or chills.  No falls or injuries.  She has been working with home physical therapy.  3/5/2024  Denia Daley presents to the office for follow-up of her right total knee arthroplasty and management of her left knee osteoarthritis.  Her right knee has been in physical therapy and is doing well.  She continues to have left knee pain.  Left knee pain continues to affect her quality of life.  Her weight is 216 pounds and she is working on weight loss.  No falls.  No fevers or chills.  2/1/2024  Denia Daley presents to the office for follow-up of her right total knee arthroplasty and management of her left knee osteoarthritis.  Patient has been in physical therapy and is doing well.  Right knee is improving, but she has some lateral pain.  She continues to have left knee pain.  Left knee pain is not significantly changed.  Her left knee continues to affect her quality of life.  Patient had a recent right-sided RFA on Tuesday and is planned for a left-sided RFA.  No lightheadedness or fatigue.  No falls or injuries.  Patient states that she is 217 lbs. at this time.  1/4/2024  Denia Daley presented to the office for follow-up of her right total knee arthroplasty and management of her left knee osteoarthritis.  Patient's right knee is doing well overall.  She did have a fibromyalgia flare and had some pain.  She would like to restart physical therapy.  Patient continues to have left knee pain.  Pain is worse with going up stairs and in bed.  No falls.  No fevers or chills.  Patient does have back pain.  She states that her last weight was 218 pounds and that her last hemoglobin was 10.0.  She is currently on iron and has somewhat improved.  11/7/2023  Denia Daley presented to the office for follow-up of her right total knee arthroplasty and left knee osteoarthritis.  Patient was increasingly active about 10 days ago on Saturday.  She had knee pain afterwards and had pain last week.  Pain was worse with leaning forward.  No falls.  No fevers or chills.  No significant swelling.  Patient states that her pain is improving.  She continues to have left knee pain and had pain after the increased activity, which has been improving.  Pain is located over the medial and lateral knees.  8/29/2023  Denia Daley presents to the office for follow-up of her right total knee arthroplasty and left knee osteoarthritis.  Patient's right knee is currently doing well.  She does get some occasional shock type pains.  She is able to walk without a cane.  Patient continues to have left knee pain.  She states that the left knee is still affecting her quality of life.  She has tried physical therapy, anti-inflammatories, and injections.  Patient was recently seen by neurology and diagnosed with an essential tremor.  She is going to see cardiology and her primary care physician for her clearances.  She had a recent sleep study for her MADHURI.  No falls or injuries.  7/27/2023  Denia Daley presents to the office for follow-up of her right total knee arthroplasty and left knee osteoarthritis.  Patient's right knee is currently doing well.  She has no significant issues.  Patient continues to have significant left knee pain.  She states that the left knee pain is affecting her quality of life.  She has been walking better due to her right TKA.  Patient uses a cane in the community, but is able to walk without the cane.  She has tried pain medications, physical therapy, and injections for the left knee.  Patient states that her back pain has improved after undergoing RFA and is going for an EMG tomorrow for her carpal tunnel.  6/13/2023  Stephanie Daley is a 63-year-old female who presents to the office for follow-up of her right total knee arthroplasty.  Patient underwent right TKA on 4/24/2023.  She is currently doing well.  Right knee pain is significantly improved compared to prior to surgery.  She is currently in physical therapy. Patient continues to take her Aspirin 325 mg twice per day for DVT prophylaxis.  Patient has been doing home exercises on a bicycle. She is currently walking with a cane when outside, but is not using any assistive devices in the home.  Patient currently reports left lower back and left knee pain.  She continues to have left knee pain.  She had a recent RFA on her right lower back.  She is currently taking tramadol, gabapentin and Tylenol.  She states for the majority of her pain is currently in her left knee.  Left knee pain continues to affect her quality of life.  Patient has tried injections, physical therapy, and pain medications for her left knee.  Her last left knee injection was in August 2022.  She would like to discuss left total knee arthroplasty.  5/9/2023  Ms. Daley presents to the office for follow-up of her right total knee arthroplasty.  Patient underwent right TKA on 4/24/2023.  She is currently doing well.  Patient did go to the emergency department on 5/5/2023 with pain and swelling of her right lower extremity.  US Duplex was negative.  Her pain and swelling have improved over the last few days.  Her pain is well controlled at this time.  She is taking occasional tramadol at night for her pain.  Patient is walking with a cane.  She has finished her home physical therapy.  No fevers or chills.  Patient remains on Aspirin 325mg twice per day for DVT prophylaxis.   4/7/2023  Ms. Daley presented to the office for follow-up of her bilateral knee pain.  Patient continues to experience bilateral (right greater than left) knee pain.  She also reports some right knee stiffness and occasional lower back pain.  Patient has some neuropathy in her hands and feet.  Patient continues to experience pain is affecting her quality of life and daily activities.  She has tried multiple knee injections, last in August 2022.  She has also tried physical therapy and has been in pain management.  Patient is planned for a right total knee arthroplasty.  She has a stress test scheduled for Wednesday.  Patient is currently taking meloxicam for her pain.  2/10/2023 Ms. Daley is a 62-year-old female presents to the office for follow up of her bilateral knee pain.  Patient has been experiencing bilateral (right greater than left) knee pain for about 30 years.  She states that it has been worse over the last 6 to 8 years. Pain is now affecting her quality of life and daily activities.  Pain is worse when lying down and patient tries to keep her feet elevated.  Right knee pain is worse than her left at this time.  Patient has tried meloxicam, gabapentin, and Tylenol for the pain.  She has a history of chronic pain and has been in pain management for about 20 years at [facility].  Patient has stopped taking methadone and is currently only taking tramadol for her pain.  She did have some withdrawal from her opioid medications.  Patient has tried multiple knee injections, including cortisone injections for many years.  She has tried about 4 series of viscosupplementation injections.  Her last knee joint injections were in August 2022, which did not help.  She states that she received 3 gel injections in the right knee and 2 in the left, but pain worsened and she did not finish the left knee series.  She had an injection in her right IT band in November 2022.  Patient is also tried physical therapy for her knees, last in October 2022. She has tried physical therapy for her back pain, but her knee pain limited her back PT. Patient has continued her weight loss and her BMI: 39.26.  No recent trauma.  No fevers or chills.  Patient was recently started on Symbicort by her pulmonologist.  She would like to move her surgical date from March into April.   History: HTN, Diverticulitis, GERD, DEREK, History of Breast Cancer, Alcoholism (19 years sober), Lymphedema in Right Arm, Fibromyalgia, Neuropathy, Bipolar, Diabetes (Last A1C: 6.1)

## 2024-09-10 NOTE — DISCUSSION/SUMMARY
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  Patient underwent surgery on 7/24/2024. Prior X-rays showed right revision TKA in good position and alignment.  Examination showed good right knee range of motion.  Discussed with patient the examination and imaging findings.  Discussed with the patient the recovery from her right revision TKA, including physical therapy, antibiotics, and DVT prophylaxis.  Patient will continue her physical therapy.  Patient has completed her IV antibiotics.  She will be on a antibiotic holiday for the next 2 weeks.  Discussed workup of right knee at this time, including laboratory workup.  Discussed obtaining ESR and CRP following antibiotic holiday.  Discussed aspiration of the knee following antibiotic holiday.  Discussed potential revision total knee arthroplasty (second stage surgery) versus maintenance of 1.5 stage spacer.  Patient will follow-up in 2 weeks for reevaluation and management.  Patient understanding and in agreement with the plan.  All questions answered.  Plan: -Right lower extremity: Weightbearing as tolerated -Range of motion as tolerated -Physical therapy -Follow-up in 2 weeks for reevaluation and management
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
## 2024-09-18 NOTE — PHYSICAL EXAM
[General Appearance - Alert] : alert [General Appearance - In No Acute Distress] : in no acute distress [General Appearance - Well Nourished] : well nourished [General Appearance - Well Developed] : well developed [Oriented To Time, Place, And Person] : oriented to person, place, and time [Impaired Insight] : insight and judgment were intact [Affect] : the affect was normal [Over the Past 2 Weeks, Have You Felt Little Interest or Pleasure Doing Things?] : 2.) Over the past 2 weeks, have you felt little interest or pleasure doing things? Yes [Person] : oriented to person [Place] : oriented to place [Time] : oriented to time [Short Term Intact] : short term memory intact [Remote Intact] : remote memory intact [Registration Intact] : recent registration memory intact [Span Intact] : the attention span was normal [Concentration Intact] : normal concentrating ability [Visual Intact] : visual attention was ~T not ~L decreased [Naming Objects] : no difficulty naming common objects [Repeating Phrases] : no difficulty repeating a phrase [Writing A Sentence] : no difficulty writing a sentence [Fluency] : fluency intact [Comprehension] : comprehension intact [Reading] : reading intact [Current Events] : adequate knowledge of current events [Past History] : adequate knowledge of personal past history [Vocabulary] : adequate range of vocabulary [Total Score ___ / 30] : the patient achieved a score of [unfilled] /30 [Date / Time ___ / 5] : date / time [unfilled] / 5 [Place ___ / 5] : place [unfilled] / 5 [Registration ___ / 3] : registration [unfilled] / 3 [Serial Sevens ___/5] : serial sevens [unfilled] / 5 [Naming 2 Objects ___ / 2] : naming two objects [unfilled] / 2 [Repeating a Sentence ___ / 1] : repeating a sentence [unfilled] / 1 Home [Writing a Sentence ___ / 1] : write sentence [unfilled] / 1 [3-stage Verbal Command ___ / 3] : three-stage verbal command [unfilled] / 3 [Written Command ___ / 1] : written command [unfilled] / 1 [Copy a Design ___ / 1] : copy a design [unfilled] / 1 [Recall ___ / 3] : recall [unfilled] / 3 [Cranial Nerves Optic (II)] : visual acuity intact bilaterally,  visual fields full to confrontation, pupils equal round and reactive to light [Cranial Nerves Oculomotor (III)] : extraocular motion intact [Cranial Nerves Trigeminal (V)] : facial sensation intact symmetrically [Cranial Nerves Facial (VII)] : face symmetrical [Cranial Nerves Vestibulocochlear (VIII)] : hearing was intact bilaterally [Cranial Nerves Glossopharyngeal (IX)] : tongue and palate midline [Cranial Nerves Accessory (XI - Cranial And Spinal)] : head turning and shoulder shrug symmetric [Cranial Nerves Hypoglossal (XII)] : there was no tongue deviation with protrusion [Motor Strength] : muscle strength was normal in all four extremities [No Muscle Atrophy] : normal bulk in all four extremities [Motor Handedness Right-Handed] : the patient is right hand dominant [Sensation Tactile Decrease] : light touch was intact [Sensation Pain / Temperature Decrease] : pain and temperature was intact [Proprioception] : proprioception was intact [Balance] : balance was intact [2+] : Brachioradialis left 2+ [1+] : Patella left 1+ [0] : Ankle jerk left 0 [Sclera] : the sclera and conjunctiva were normal [PERRL With Normal Accommodation] : pupils were equal in size, round, reactive to light, with normal accommodation [Extraocular Movements] : extraocular movements were intact [No APD] : no afferent pupillary defect [No ANDERSON] : no internuclear ophthalmoplegia [Full Visual Field] : full visual field [Outer Ear] : the ears and nose were normal in appearance [Oropharynx] : the oropharynx was normal [Neck Appearance] : the appearance of the neck was normal [Neck Cervical Mass (___cm)] : no neck mass was observed [Jugular Venous Distention Increased] : there was no jugular-venous distention [Thyroid Diffuse Enlargement] : the thyroid was not enlarged [Thyroid Nodule] : there were no palpable thyroid nodules [Auscultation Breath Sounds / Voice Sounds] : lungs were clear to auscultation bilaterally [Heart Rate And Rhythm] : heart rate was normal and rhythm regular [Heart Sounds] : normal S1 and S2 [Heart Sounds Gallop] : no gallops [Murmurs] : no murmurs [Heart Sounds Pericardial Friction Rub] : no pericardial rub [Arterial Pulses Carotid] : carotid pulses were normal with no bruits [Full Pulse] : the pedal pulses are present [Edema] : there was no peripheral edema [Bowel Sounds] : normal bowel sounds [Abdomen Soft] : soft [Abdomen Tenderness] : non-tender [Abdomen Mass (___ Cm)] : no abdominal mass palpated [No CVA Tenderness] : no ~M costovertebral angle tenderness [No Spinal Tenderness] : no spinal tenderness [Abnormal Walk] : normal gait [Nail Clubbing] : no clubbing  or cyanosis of the fingernails [Musculoskeletal - Swelling] : no joint swelling seen [Motor Tone] : muscle strength and tone were normal [Skin Color & Pigmentation] : normal skin color and pigmentation [Skin Turgor] : normal skin turgor [] : no rash [Over the Past 2 Weeks, Have You Felt Down, Depressed, or Hopeless?] : 1.) Over the past 2 weeks, have you felt down, depressed, or hopeless? No [Motor Strength Upper Extremities Bilaterally] : strength was normal in both upper extremities [Motor Strength Lower Extremities Bilaterally] : strength was normal in both lower extremities [Romberg's Sign] : Romberg's sign was negtive [Allodynia] : no ~T allodynia present [Dysesthesia] : no dysesthesia [Hyperesthesia] : no hyperesthesia [Limited Balance] : balance was intact [Past-pointing] : there was no past-pointing [Tremor] : no tremor present [Dysdiadochokinesia Bilaterally] : not present [Coordination - Dysmetria Impaired Finger-to-Nose Bilateral] : not present [Coordination - Dysmetria Impaired Heel-to-Shin Bilateral] : not present [Plantar Reflex Right Only] : normal on the right [Plantar Reflex Left Only] : normal on the left [FreeTextEntry4] : Alt pattern: intact\par Clock: 3/3\par Luria: Intact.\par Trail A: 20"; B: 50"\par Fluency: intact. [FreeTextEntry5] : small pupils, surgical, reactive; no pain on OO compression.  [FreeTextEntry6] : strength  limited by bilateral knee pain. Mild ET type tremor in head and hands. [FreeTextEntry7] : decreased vibration sens distal LE. [FreeTextEntry8] : balance limited by bilateral knee pain [FreeTextEntry1] : L chest scar from old port.

## 2024-09-19 ENCOUNTER — APPOINTMENT (OUTPATIENT)
Dept: INTERNAL MEDICINE | Facility: CLINIC | Age: 64
End: 2024-09-19

## 2024-09-19 VITALS
SYSTOLIC BLOOD PRESSURE: 136 MMHG | TEMPERATURE: 98 F | HEART RATE: 70 BPM | DIASTOLIC BLOOD PRESSURE: 85 MMHG | HEIGHT: 60 IN | RESPIRATION RATE: 15 BRPM | OXYGEN SATURATION: 98 %

## 2024-09-19 DIAGNOSIS — R33.9 RETENTION OF URINE, UNSPECIFIED: ICD-10-CM

## 2024-09-19 DIAGNOSIS — D64.9 ANEMIA, UNSPECIFIED: ICD-10-CM

## 2024-09-19 PROCEDURE — 99495 TRANSJ CARE MGMT MOD F2F 14D: CPT

## 2024-09-19 PROCEDURE — 36415 COLL VENOUS BLD VENIPUNCTURE: CPT

## 2024-09-19 RX ORDER — OMEPRAZOLE 40 MG/1
40 CAPSULE, DELAYED RELEASE ORAL
Refills: 0 | Status: ACTIVE | COMMUNITY

## 2024-09-19 RX ORDER — TRAMADOL HYDROCHLORIDE 50 MG/1
50 TABLET, COATED ORAL
Refills: 0 | Status: ACTIVE | COMMUNITY

## 2024-09-19 RX ORDER — FUROSEMIDE 20 MG/1
20 TABLET ORAL
Qty: 90 | Refills: 3 | Status: ACTIVE | COMMUNITY

## 2024-09-19 RX ORDER — OXYCODONE 5 MG/1
5 TABLET ORAL
Refills: 0 | Status: ACTIVE | COMMUNITY

## 2024-09-19 RX ORDER — HYDRALAZINE HYDROCHLORIDE 50 MG/1
50 TABLET ORAL 3 TIMES DAILY
Refills: 0 | Status: ACTIVE | COMMUNITY

## 2024-09-19 RX ORDER — CHLORHEXIDINE GLUCONATE 4 %
325 (65 FE) LIQUID (ML) TOPICAL DAILY
Qty: 90 | Refills: 3 | Status: ACTIVE | COMMUNITY

## 2024-09-19 NOTE — HISTORY OF PRESENT ILLNESS
[Post-hospitalization from ___ Hospital] : Post-hospitalization from [unfilled] Hospital [Admitted on: ___] : The patient was admitted on [unfilled] [Discharged on ___] : discharged on [unfilled] [Discharge Summary] : discharge summary [Patient Contacted By: ____] : and contacted by [unfilled] [FreeTextEntry2] : 65yo Female with PMH of HTN, HLD, CHF, asthma/COPD, h/o EtOH abuse, neuropathy, MADHURI (no CPAP), tremors, BPD, R Breast Ca s/p chemo/XRT/mastectomy c/b RUE lymphedema, obesity admitted to [facility] s/p right revision TKA with Dr. Givens on 7/24/24. D/C'ed to Rehab and then d/c'ed home on 9/5.  After d/c from hospital, required 6 weeks of IV vancomycin via PICC. OR cultures + for Staph lugdunensis  Needs f/u with Dr. Knight s/p  for DVT prophylaxis x 6 weeks  Had chronic anemia - last PRBC was end of August 2024. Last Hgb was 7.9 on 9/5/24.  Since discharge has been moving around. Started outpatient PT today.  At rehab BPs fluctuating dramatically.  This AM when woke up felt dizzy and sat for a minute. Did 8 minutes on bicycle. Today feels more wobbly than usual. BPs at home have been low - the other day was 102/44. Patient lowered amlodipine from 10mg back to 5mg because legs were swollen and now better.  Regular BMs - every 1-2 days.  Taking oxycodone only as needed - hasn't taken it in 3-4 days.

## 2024-09-23 LAB
ALBUMIN SERPL ELPH-MCNC: 4.5 G/DL
ALP BLD-CCNC: 150 U/L
ALT SERPL-CCNC: 11 U/L
ANION GAP SERPL CALC-SCNC: 15 MMOL/L
APPEARANCE: CLEAR
AST SERPL-CCNC: 22 U/L
BASOPHILS # BLD AUTO: 0.03 K/UL
BASOPHILS NFR BLD AUTO: 0.7 %
BILIRUB SERPL-MCNC: 0.2 MG/DL
BILIRUBIN URINE: NEGATIVE
BLOOD URINE: NEGATIVE
BUN SERPL-MCNC: 10 MG/DL
CALCIUM SERPL-MCNC: 10 MG/DL
CHLORIDE SERPL-SCNC: 102 MMOL/L
CO2 SERPL-SCNC: 26 MMOL/L
COLOR: NORMAL
CREAT SERPL-MCNC: 1.07 MG/DL
EGFR: 57 ML/MIN/1.73M2
EOSINOPHIL # BLD AUTO: 0.13 K/UL
EOSINOPHIL NFR BLD AUTO: 2.8 %
FERRITIN SERPL-MCNC: 38 NG/ML
FOLATE SERPL-MCNC: >20 NG/ML
GLUCOSE QUALITATIVE U: NEGATIVE MG/DL
GLUCOSE SERPL-MCNC: 133 MG/DL
HCT VFR BLD CALC: 32.7 %
HGB BLD-MCNC: 9.9 G/DL
IMM GRANULOCYTES NFR BLD AUTO: 0.2 %
IRON SATN MFR SERPL: 33 %
IRON SERPL-MCNC: 117 UG/DL
KETONES URINE: NEGATIVE MG/DL
LEUKOCYTE ESTERASE URINE: NEGATIVE
LYMPHOCYTES # BLD AUTO: 1.09 K/UL
LYMPHOCYTES NFR BLD AUTO: 23.9 %
MAN DIFF?: NORMAL
MCHC RBC-ENTMCNC: 26.5 PG
MCHC RBC-ENTMCNC: 30.3 GM/DL
MCV RBC AUTO: 87.4 FL
MONOCYTES # BLD AUTO: 0.34 K/UL
MONOCYTES NFR BLD AUTO: 7.4 %
NEUTROPHILS # BLD AUTO: 2.97 K/UL
NEUTROPHILS NFR BLD AUTO: 65 %
NITRITE URINE: NEGATIVE
PH URINE: 5.5
PLATELET # BLD AUTO: 359 K/UL
POTASSIUM SERPL-SCNC: 4.3 MMOL/L
PROT SERPL-MCNC: 8.2 G/DL
PROTEIN URINE: NEGATIVE MG/DL
RBC # BLD: 3.74 M/UL
RBC # FLD: 18.2 %
SODIUM SERPL-SCNC: 142 MMOL/L
SPECIFIC GRAVITY URINE: 1.02
TIBC SERPL-MCNC: 358 UG/DL
UIBC SERPL-MCNC: 241 UG/DL
UROBILINOGEN URINE: 1 MG/DL
VIT B12 SERPL-MCNC: 671 PG/ML
WBC # FLD AUTO: 4.57 K/UL

## 2024-09-23 NOTE — DISCHARGE NOTE PROVIDER - ATTENDING ATTESTATION STATEMENT
I have personally seen and examined the patient. I have collaborated with and supervised the
[Negative] : Heme/Lymph
[Negative] : Heme/Lymph

## 2024-09-24 ENCOUNTER — APPOINTMENT (OUTPATIENT)
Dept: ORTHOPEDIC SURGERY | Facility: CLINIC | Age: 64
End: 2024-09-24
Payer: MEDICARE

## 2024-09-24 DIAGNOSIS — T84.50XA INFECTION AND INFLAMMATORY REACTION DUE TO UNSPECIFIED INTERNAL JOINT PROSTHESIS, INITIAL ENCOUNTER: ICD-10-CM

## 2024-09-24 PROCEDURE — 99024 POSTOP FOLLOW-UP VISIT: CPT

## 2024-09-24 NOTE — DISCUSSION/SUMMARY
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  Patient underwent surgery on 7/24/2024. Prior X-rays showed right revision TKA in good position and alignment.  Examination showed good right knee range of motion.  Discussed with patient the examination and imaging findings.  Discussed with the patient the recovery from her right revision TKA, including physical therapy, Patient will continue her physical therapy.  Patient has completed her IV antibiotics.  Discussed workup with patient's knee at this time.  Discussed right knee aspiration and laboratory workup.  ESR and CRP were ordered.  Patient would like a right knee aspiration today.  Discussed the risks of knee aspiration.  Right knee aspiration was performed using aseptic technique.  5 cc of bloody fluid was aspirated.  Aspiration was sent for cell count, Gram stain/culture, and joint PCR.  Patient would like to consider revision surgery next year.  Patient will follow-up in 6 weeks for reevaluation and management.  Patient understanding and in agreement with the plan.  All questions answered.  Plan: -Right Knee Aspiration: Follow up Cell Count, Gram Stain/Culture, Joint PCR -Follow up ESR/CRP -Right lower extremity: Weightbearing as tolerated -Range of motion as tolerated -Physical therapy -Follow-up in 6 weeks for reevaluation and management  Procedure Note: Right Knee Aspiration  A Right Knee Aspiration was performed. Risk and benefits of the injection were discussed, including but not limited to infection and pain. The area was prepped in the usual sterile fashion. Right knee Aspiration was performed with an 18 Gauge Needle, using aseptic technique. 10cc of bloody fluid was aspirated. Aspiration was sent for Gram Stain/Culture, Cell Count and Joint PCR . Patient tolerated the procedure well.  Patient was instructed to call or return for any signs or symptoms of infection after an injection including increased pain, swelling, redness or fevers.

## 2024-09-24 NOTE — PHYSICAL EXAM
[de-identified] : Constitutional:  64 year old female, alert and oriented, cooperative, in no acute distress.  HEENT  NC/AT.  Appearance: symmetric  Neck/Back Straight without deformity or instability.  Good ROM.  Chest/Respiratory  Respiratory effort: no intercostal retractions or use of accessory muscles. Nonlabored Breathing  Skin  On inspection, warm and dry without rashes or lesions.  Mental Status:  Judgment, insight: intact Orientation: oriented to time, place, and person  Neurological: Sensory and Motor are grossly intact throughout  Right Knee  Inspection:     Incision well healing. No erythema or drainage     No Effusion     Non-tender to palpation over tibial tubercle, patella, medial and lateral joint line, and pes insertion.  Range of Motion: 	Extension - 0 degrees 	Flexion - 115 degrees 	Extensor lag: None  Stability:      Demonstrates no Varus or Valgus instability      Negative Anterior or Posterior drawer.      No mid flexion instability  Patella: stable, tracks well.   Neurologic Exam     Motor intact including 5/5 Extensor Hallucis Longus, 5/5 Flexor Hallucis Longus, 5/5 Tibialis Anterior and 5/5 Gastrocnemius     Sensation Intact to Light Touch including Saphenous, Sural, Superficial Peroneal, Deep Peroneal, Tibial nerve distributions  Vascular Exam     Foot is warm and well perfused with 2+ Dorsalis Pedis Pulse   No pain with range of motion of the bilateral hips or left knee. No lumbar paraspinal muscle tenderness. [de-identified] : XRay:  XRays of the Right Knee (3 Views) taken on 8/13/2024. XRays demonstrate a Right Total Knee Arthroplasty in good position and alignment. (my personal interpretation) Components: Smith [employer] Nephew Legion, All Poly Tibia

## 2024-09-24 NOTE — PHYSICAL EXAM
[de-identified] : Constitutional:  64 year old female, alert and oriented, cooperative, in no acute distress.  HEENT  NC/AT.  Appearance: symmetric  Neck/Back Straight without deformity or instability.  Good ROM.  Chest/Respiratory  Respiratory effort: no intercostal retractions or use of accessory muscles. Nonlabored Breathing  Skin  On inspection, warm and dry without rashes or lesions.  Mental Status:  Judgment, insight: intact Orientation: oriented to time, place, and person  Neurological: Sensory and Motor are grossly intact throughout  Right Knee  Inspection:     Incision well healing. No erythema or drainage     No Effusion     Non-tender to palpation over tibial tubercle, patella, medial and lateral joint line, and pes insertion.  Range of Motion: 	Extension - 0 degrees 	Flexion - 115 degrees 	Extensor lag: None  Stability:      Demonstrates no Varus or Valgus instability      Negative Anterior or Posterior drawer.      No mid flexion instability  Patella: stable, tracks well.   Neurologic Exam     Motor intact including 5/5 Extensor Hallucis Longus, 5/5 Flexor Hallucis Longus, 5/5 Tibialis Anterior and 5/5 Gastrocnemius     Sensation Intact to Light Touch including Saphenous, Sural, Superficial Peroneal, Deep Peroneal, Tibial nerve distributions  Vascular Exam     Foot is warm and well perfused with 2+ Dorsalis Pedis Pulse   No pain with range of motion of the bilateral hips or left knee. No lumbar paraspinal muscle tenderness. [de-identified] : XRay:  XRays of the Right Knee (3 Views) taken on 8/13/2024. XRays demonstrate a Right Total Knee Arthroplasty in good position and alignment. (my personal interpretation) Components: Smith [employer] Nephew Legion, All Poly Tibia

## 2024-09-24 NOTE — DISCUSSION/SUMMARY
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  Patient underwent surgery on 7/24/2024. Prior X-rays showed right revision TKA in good position and alignment.  Examination showed good right knee range of motion.  Discussed with patient the examination and imaging findings.  Discussed with the patient the recovery from her right revision TKA, including physical therapy, Patient will continue her physical therapy.  Patient has completed her IV antibiotics.  Discussed workup with patient's knee at this time.  Discussed right knee aspiration and laboratory workup.  ESR and CRP were ordered.  Patient would like a right knee aspiration today.  Discussed the risks of knee aspiration.  Right knee aspiration was performed using aseptic technique.  5 cc of bloody fluid was aspirated.  Aspiration was sent for cell count, Gram stain/culture, and joint PCR.  Patient would like to consider revision surgery next year.  Patient will follow-up in 6 weeks for reevaluation and management.  Patient understanding and in agreement with the plan.  All questions answered.  Plan: -Right Knee Aspiration: Follow up Cell Count, Gram Stain/Culture, Joint PCR -Follow up ESR/CRP -Right lower extremity: Weightbearing as tolerated -Range of motion as tolerated -Physical therapy -Follow-up in 6 weeks for reevaluation and management  Procedure Note: Right Knee Aspiration  A Right Knee Aspiration was performed. Risk and benefits of the injection were discussed, including but not limited to infection and pain. The area was prepped in the usual sterile fashion. Right knee Aspiration was performed with an 18 Gauge Needle, using aseptic technique. 10cc of bloody fluid was aspirated. Aspiration was sent for Gram Stain/Culture, Cell Count and Joint PCR . Patient tolerated the procedure well.  Patient was instructed to call or return for any signs or symptoms of infection after an injection including increased pain, swelling, redness or fevers.

## 2024-09-24 NOTE — DISCUSSION/SUMMARY
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  Patient underwent surgery on 7/24/2024. Prior X-rays showed right revision TKA in good position and alignment.  Examination showed good right knee range of motion.  Discussed with patient the examination and imaging findings.  Discussed with the patient the recovery from her right revision TKA, including physical therapy, Patient will continue her physical therapy.  Patient has completed her IV antibiotics.  Discussed workup with patient's knee at this time.  Discussed right knee aspiration and laboratory workup.  ESR and CRP were ordered.  Patient would like a right knee aspiration today.  Discussed the risks of knee aspiration.  Right knee aspiration was performed using aseptic technique.  5 cc of bloody fluid was aspirated.  Aspiration was sent for cell count, Gram stain/culture, and joint PCR.  Patient would like to consider revision surgery next year.  Patient will follow-up in 6 weeks for reevaluation and management.  Patient understanding and in agreement with the plan.  All questions answered.  Plan: -Right Knee Aspiration: Follow up Cell Count, Gram Stain/Culture, Joint PCR -Follow up ESR/CRP -Right lower extremity: Weightbearing as tolerated -Range of motion as tolerated -Physical therapy -Follow-up in 6 weeks for reevaluation and management  Procedure Note: Right Knee Aspiration  A Right Knee Aspiration was performed. Risk and benefits of the injection were discussed, including but not limited to infection and pain. The area was prepped in the usual sterile fashion. Right knee Aspiration was performed with an 18 Gauge Needle, using aseptic technique. 10cc of bloody fluid was aspirated. Aspiration was sent for Gram Stain/Culture, Cell Count and Joint PCR . Patient tolerated the procedure well.  Patient was instructed to call or return for any signs or symptoms of infection after an injection including increased pain, swelling, redness or fevers.

## 2024-09-24 NOTE — PHYSICAL EXAM
[de-identified] : Constitutional:  64 year old female, alert and oriented, cooperative, in no acute distress.  HEENT  NC/AT.  Appearance: symmetric  Neck/Back Straight without deformity or instability.  Good ROM.  Chest/Respiratory  Respiratory effort: no intercostal retractions or use of accessory muscles. Nonlabored Breathing  Skin  On inspection, warm and dry without rashes or lesions.  Mental Status:  Judgment, insight: intact Orientation: oriented to time, place, and person  Neurological: Sensory and Motor are grossly intact throughout  Right Knee  Inspection:     Incision well healing. No erythema or drainage     No Effusion     Non-tender to palpation over tibial tubercle, patella, medial and lateral joint line, and pes insertion.  Range of Motion: 	Extension - 0 degrees 	Flexion - 115 degrees 	Extensor lag: None  Stability:      Demonstrates no Varus or Valgus instability      Negative Anterior or Posterior drawer.      No mid flexion instability  Patella: stable, tracks well.   Neurologic Exam     Motor intact including 5/5 Extensor Hallucis Longus, 5/5 Flexor Hallucis Longus, 5/5 Tibialis Anterior and 5/5 Gastrocnemius     Sensation Intact to Light Touch including Saphenous, Sural, Superficial Peroneal, Deep Peroneal, Tibial nerve distributions  Vascular Exam     Foot is warm and well perfused with 2+ Dorsalis Pedis Pulse   No pain with range of motion of the bilateral hips or left knee. No lumbar paraspinal muscle tenderness. [de-identified] : XRay:  XRays of the Right Knee (3 Views) taken on 8/13/2024. XRays demonstrate a Right Total Knee Arthroplasty in good position and alignment. (my personal interpretation) Components: Smith [employer] Nephew Legion, All Poly Tibia

## 2024-09-24 NOTE — HISTORY OF PRESENT ILLNESS
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  Patient underwent surgery on 7/24/2024.  She is currently doing well overall.  Patient does not have knee pain.  No falls.  No fevers or chills.  She completed her antibiotics on 9/4/24.  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  Patient underwent surgery on 7/24/2024.  Patient is currently doing well overall.  She has completed her IV antibiotics.  Her PICC line was removed last Thursday.  No fevers or chills.  No falls.  She does not have significant right knee pain at this time.  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  Patient underwent surgery on 7/24/2024.  She is currently doing well overall.  Patient is currently in rehab and is receiving her antibiotics.  No fevers or chills.  No falls.  7/17/2024  Denia Daley presents to the office for follow-up of her right knee PJI.  Patient continues to have knee pain.  No falls.  No fevers or chills.  She was seen by Dr. Knight and pulmonology.  She is planned for medicine and cardiology clearances.  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  She underwent right knee aspiration last week, which showed right knee PJI.  No falls.  No fevers or chills.  6/19/2024  Denia Daley presents to the office for follow-up of her bilateral TKAs.  Patient's left knee is doing well.  Her right knee now has pain.  Pain is worse with standing on the knee.  She has difficulty with stairs.  No falls.  Patient reports a fibromyalgia flare in October and had some issues with the knee since.  She felt some knee stiffness.  No fevers or chills.  5/7/2024  Denia Daley presents to the office for follow-up of her left total knee arthroplasty.  Patient went left TKA on 3/18/2024.  She is currently doing well overall.  Patient does not have significant knee pain at this time.  She is participating in physical therapy.  No falls.  No fevers or chills.  4/11/2024  Denia Daley presents to the office for follow-up of her left total knee arthroplasty.  Patient underwent left TKA on 3/18/2024.  Patient had some pain when in bed.  Pain is located over the knee.  No falls.  No fevers or chills.  4/9/2024  Denia Daley presents to the office for follow-up of her left total knee arthroplasty.  Patient underwent left TKA on 3/18/2024.  There has been no further wound drainage.  Patient is currently doing well overall.  No falls or injuries. No fevers or chills.  4/2/2024  Denia Daley presents to the office for follow-up of her left total knee arthroplasty.  Patient underwent left TKA on 3/18/2024.  A Prevena wound VAC was placed in the ER due to wound drainage.  There has been no further drainage around the wound VAC.  No fevers or chills.  No falls or injuries.  She does not have significant pain at this time.  3/28/2024  Denia Daley presented to the office for follow-up of her left total knee arthroplasty.  Patient underwent left TKA on 3/18/2024.  She had some wound drainage and a Prevena VAC was placed in the ER.  She is otherwise doing well.  Patient has been working with physical therapy.  She has been taking her Aspirin for her DVT prophylaxis.  No drainage around the wound VAC.  No fevers or chills.  No falls or injuries.  She has been working with home physical therapy.  3/5/2024  Denia Daley presents to the office for follow-up of her right total knee arthroplasty and management of her left knee osteoarthritis.  Her right knee has been in physical therapy and is doing well.  She continues to have left knee pain.  Left knee pain continues to affect her quality of life.  Her weight is 216 pounds and she is working on weight loss.  No falls.  No fevers or chills.  2/1/2024  Denia Daley presents to the office for follow-up of her right total knee arthroplasty and management of her left knee osteoarthritis.  Patient has been in physical therapy and is doing well.  Right knee is improving, but she has some lateral pain.  She continues to have left knee pain.  Left knee pain is not significantly changed.  Her left knee continues to affect her quality of life.  Patient had a recent right-sided RFA on Tuesday and is planned for a left-sided RFA.  No lightheadedness or fatigue.  No falls or injuries.  Patient states that she is 217 lbs. at this time.  1/4/2024  Denia Daley presented to the office for follow-up of her right total knee arthroplasty and management of her left knee osteoarthritis.  Patient's right knee is doing well overall.  She did have a fibromyalgia flare and had some pain.  She would like to restart physical therapy.  Patient continues to have left knee pain.  Pain is worse with going up stairs and in bed.  No falls.  No fevers or chills.  Patient does have back pain.  She states that her last weight was 218 pounds and that her last hemoglobin was 10.0.  She is currently on iron and has somewhat improved.  11/7/2023  Denia Daley presented to the office for follow-up of her right total knee arthroplasty and left knee osteoarthritis.  Patient was increasingly active about 10 days ago on Saturday.  She had knee pain afterwards and had pain last week.  Pain was worse with leaning forward.  No falls.  No fevers or chills.  No significant swelling.  Patient states that her pain is improving.  She continues to have left knee pain and had pain after the increased activity, which has been improving.  Pain is located over the medial and lateral knees.  8/29/2023  Denia Daley presents to the office for follow-up of her right total knee arthroplasty and left knee osteoarthritis.  Patient's right knee is currently doing well.  She does get some occasional shock type pains.  She is able to walk without a cane.  Patient continues to have left knee pain.  She states that the left knee is still affecting her quality of life.  She has tried physical therapy, anti-inflammatories, and injections.  Patient was recently seen by neurology and diagnosed with an essential tremor.  She is going to see cardiology and her primary care physician for her clearances.  She had a recent sleep study for her MADHURI.  No falls or injuries.  7/27/2023  Denia Daley presents to the office for follow-up of her right total knee arthroplasty and left knee osteoarthritis.  Patient's right knee is currently doing well.  She has no significant issues.  Patient continues to have significant left knee pain.  She states that the left knee pain is affecting her quality of life.  She has been walking better due to her right TKA.  Patient uses a cane in the community, but is able to walk without the cane.  She has tried pain medications, physical therapy, and injections for the left knee.  Patient states that her back pain has improved after undergoing RFA and is going for an EMG tomorrow for her carpal tunnel.  6/13/2023  Stephanie Daley is a 63-year-old female who presents to the office for follow-up of her right total knee arthroplasty.  Patient underwent right TKA on 4/24/2023.  She is currently doing well.  Right knee pain is significantly improved compared to prior to surgery.  She is currently in physical therapy. Patient continues to take her Aspirin 325 mg twice per day for DVT prophylaxis.  Patient has been doing home exercises on a bicycle. She is currently walking with a cane when outside, but is not using any assistive devices in the home.  Patient currently reports left lower back and left knee pain.  She continues to have left knee pain.  She had a recent RFA on her right lower back.  She is currently taking tramadol, gabapentin and Tylenol.  She states for the majority of her pain is currently in her left knee.  Left knee pain continues to affect her quality of life.  Patient has tried injections, physical therapy, and pain medications for her left knee.  Her last left knee injection was in August 2022.  She would like to discuss left total knee arthroplasty.  5/9/2023  Ms. Daley presents to the office for follow-up of her right total knee arthroplasty.  Patient underwent right TKA on 4/24/2023.  She is currently doing well.  Patient did go to the emergency department on 5/5/2023 with pain and swelling of her right lower extremity.  US Duplex was negative.  Her pain and swelling have improved over the last few days.  Her pain is well controlled at this time.  She is taking occasional tramadol at night for her pain.  Patient is walking with a cane.  She has finished her home physical therapy.  No fevers or chills.  Patient remains on Aspirin 325mg twice per day for DVT prophylaxis.   4/7/2023  Ms. Daley presented to the office for follow-up of her bilateral knee pain.  Patient continues to experience bilateral (right greater than left) knee pain.  She also reports some right knee stiffness and occasional lower back pain.  Patient has some neuropathy in her hands and feet.  Patient continues to experience pain is affecting her quality of life and daily activities.  She has tried multiple knee injections, last in August 2022.  She has also tried physical therapy and has been in pain management.  Patient is planned for a right total knee arthroplasty.  She has a stress test scheduled for Wednesday.  Patient is currently taking meloxicam for her pain.  2/10/2023 Ms. Daley is a 62-year-old female presents to the office for follow up of her bilateral knee pain.  Patient has been experiencing bilateral (right greater than left) knee pain for about 30 years.  She states that it has been worse over the last 6 to 8 years. Pain is now affecting her quality of life and daily activities.  Pain is worse when lying down and patient tries to keep her feet elevated.  Right knee pain is worse than her left at this time.  Patient has tried meloxicam, gabapentin, and Tylenol for the pain.  She has a history of chronic pain and has been in pain management for about 20 years at [facility].  Patient has stopped taking methadone and is currently only taking tramadol for her pain.  She did have some withdrawal from her opioid medications.  Patient has tried multiple knee injections, including cortisone injections for many years.  She has tried about 4 series of viscosupplementation injections.  Her last knee joint injections were in August 2022, which did not help.  She states that she received 3 gel injections in the right knee and 2 in the left, but pain worsened and she did not finish the left knee series.  She had an injection in her right IT band in November 2022.  Patient is also tried physical therapy for her knees, last in October 2022. She has tried physical therapy for her back pain, but her knee pain limited her back PT. Patient has continued her weight loss and her BMI: 39.26.  No recent trauma.  No fevers or chills.  Patient was recently started on Symbicort by her pulmonologist.  She would like to move her surgical date from March into April.   History: HTN, Diverticulitis, GERD, DEREK, History of Breast Cancer, Alcoholism (19 years sober), Lymphedema in Right Arm, Fibromyalgia, Neuropathy, Bipolar, Diabetes (Last A1C: 6.1)

## 2024-09-24 NOTE — HISTORY OF PRESENT ILLNESS
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  Patient underwent surgery on 7/24/2024.  She is currently doing well overall.  Patient does not have knee pain.  No falls.  No fevers or chills.  She completed her antibiotics on 9/4/24.  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  Patient underwent surgery on 7/24/2024.  Patient is currently doing well overall.  She has completed her IV antibiotics.  Her PICC line was removed last Thursday.  No fevers or chills.  No falls.  She does not have significant right knee pain at this time.  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  Patient underwent surgery on 7/24/2024.  She is currently doing well overall.  Patient is currently in rehab and is receiving her antibiotics.  No fevers or chills.  No falls.  7/17/2024  Denia Daley presents to the office for follow-up of her right knee PJI.  Patient continues to have knee pain.  No falls.  No fevers or chills.  She was seen by Dr. Knight and pulmonology.  She is planned for medicine and cardiology clearances.  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  She underwent right knee aspiration last week, which showed right knee PJI.  No falls.  No fevers or chills.  6/19/2024  Denia Daley presents to the office for follow-up of her bilateral TKAs.  Patient's left knee is doing well.  Her right knee now has pain.  Pain is worse with standing on the knee.  She has difficulty with stairs.  No falls.  Patient reports a fibromyalgia flare in October and had some issues with the knee since.  She felt some knee stiffness.  No fevers or chills.  5/7/2024  Denia Daley presents to the office for follow-up of her left total knee arthroplasty.  Patient went left TKA on 3/18/2024.  She is currently doing well overall.  Patient does not have significant knee pain at this time.  She is participating in physical therapy.  No falls.  No fevers or chills.  4/11/2024  Denia Daley presents to the office for follow-up of her left total knee arthroplasty.  Patient underwent left TKA on 3/18/2024.  Patient had some pain when in bed.  Pain is located over the knee.  No falls.  No fevers or chills.  4/9/2024  Denia Daley presents to the office for follow-up of her left total knee arthroplasty.  Patient underwent left TKA on 3/18/2024.  There has been no further wound drainage.  Patient is currently doing well overall.  No falls or injuries. No fevers or chills.  4/2/2024  Denia Daley presents to the office for follow-up of her left total knee arthroplasty.  Patient underwent left TKA on 3/18/2024.  A Prevena wound VAC was placed in the ER due to wound drainage.  There has been no further drainage around the wound VAC.  No fevers or chills.  No falls or injuries.  She does not have significant pain at this time.  3/28/2024  Denia Daley presented to the office for follow-up of her left total knee arthroplasty.  Patient underwent left TKA on 3/18/2024.  She had some wound drainage and a Prevena VAC was placed in the ER.  She is otherwise doing well.  Patient has been working with physical therapy.  She has been taking her Aspirin for her DVT prophylaxis.  No drainage around the wound VAC.  No fevers or chills.  No falls or injuries.  She has been working with home physical therapy.  3/5/2024  Denia Daley presents to the office for follow-up of her right total knee arthroplasty and management of her left knee osteoarthritis.  Her right knee has been in physical therapy and is doing well.  She continues to have left knee pain.  Left knee pain continues to affect her quality of life.  Her weight is 216 pounds and she is working on weight loss.  No falls.  No fevers or chills.  2/1/2024  Denia Daley presents to the office for follow-up of her right total knee arthroplasty and management of her left knee osteoarthritis.  Patient has been in physical therapy and is doing well.  Right knee is improving, but she has some lateral pain.  She continues to have left knee pain.  Left knee pain is not significantly changed.  Her left knee continues to affect her quality of life.  Patient had a recent right-sided RFA on Tuesday and is planned for a left-sided RFA.  No lightheadedness or fatigue.  No falls or injuries.  Patient states that she is 217 lbs. at this time.  1/4/2024  Denia Daley presented to the office for follow-up of her right total knee arthroplasty and management of her left knee osteoarthritis.  Patient's right knee is doing well overall.  She did have a fibromyalgia flare and had some pain.  She would like to restart physical therapy.  Patient continues to have left knee pain.  Pain is worse with going up stairs and in bed.  No falls.  No fevers or chills.  Patient does have back pain.  She states that her last weight was 218 pounds and that her last hemoglobin was 10.0.  She is currently on iron and has somewhat improved.  11/7/2023  Denia Daley presented to the office for follow-up of her right total knee arthroplasty and left knee osteoarthritis.  Patient was increasingly active about 10 days ago on Saturday.  She had knee pain afterwards and had pain last week.  Pain was worse with leaning forward.  No falls.  No fevers or chills.  No significant swelling.  Patient states that her pain is improving.  She continues to have left knee pain and had pain after the increased activity, which has been improving.  Pain is located over the medial and lateral knees.  8/29/2023  Denia Daley presents to the office for follow-up of her right total knee arthroplasty and left knee osteoarthritis.  Patient's right knee is currently doing well.  She does get some occasional shock type pains.  She is able to walk without a cane.  Patient continues to have left knee pain.  She states that the left knee is still affecting her quality of life.  She has tried physical therapy, anti-inflammatories, and injections.  Patient was recently seen by neurology and diagnosed with an essential tremor.  She is going to see cardiology and her primary care physician for her clearances.  She had a recent sleep study for her MADHURI.  No falls or injuries.  7/27/2023  Denia Daley presents to the office for follow-up of her right total knee arthroplasty and left knee osteoarthritis.  Patient's right knee is currently doing well.  She has no significant issues.  Patient continues to have significant left knee pain.  She states that the left knee pain is affecting her quality of life.  She has been walking better due to her right TKA.  Patient uses a cane in the community, but is able to walk without the cane.  She has tried pain medications, physical therapy, and injections for the left knee.  Patient states that her back pain has improved after undergoing RFA and is going for an EMG tomorrow for her carpal tunnel.  6/13/2023  Stephanie Daley is a 63-year-old female who presents to the office for follow-up of her right total knee arthroplasty.  Patient underwent right TKA on 4/24/2023.  She is currently doing well.  Right knee pain is significantly improved compared to prior to surgery.  She is currently in physical therapy. Patient continues to take her Aspirin 325 mg twice per day for DVT prophylaxis.  Patient has been doing home exercises on a bicycle. She is currently walking with a cane when outside, but is not using any assistive devices in the home.  Patient currently reports left lower back and left knee pain.  She continues to have left knee pain.  She had a recent RFA on her right lower back.  She is currently taking tramadol, gabapentin and Tylenol.  She states for the majority of her pain is currently in her left knee.  Left knee pain continues to affect her quality of life.  Patient has tried injections, physical therapy, and pain medications for her left knee.  Her last left knee injection was in August 2022.  She would like to discuss left total knee arthroplasty.  5/9/2023  Ms. Daley presents to the office for follow-up of her right total knee arthroplasty.  Patient underwent right TKA on 4/24/2023.  She is currently doing well.  Patient did go to the emergency department on 5/5/2023 with pain and swelling of her right lower extremity.  US Duplex was negative.  Her pain and swelling have improved over the last few days.  Her pain is well controlled at this time.  She is taking occasional tramadol at night for her pain.  Patient is walking with a cane.  She has finished her home physical therapy.  No fevers or chills.  Patient remains on Aspirin 325mg twice per day for DVT prophylaxis.   4/7/2023  Ms. Daley presented to the office for follow-up of her bilateral knee pain.  Patient continues to experience bilateral (right greater than left) knee pain.  She also reports some right knee stiffness and occasional lower back pain.  Patient has some neuropathy in her hands and feet.  Patient continues to experience pain is affecting her quality of life and daily activities.  She has tried multiple knee injections, last in August 2022.  She has also tried physical therapy and has been in pain management.  Patient is planned for a right total knee arthroplasty.  She has a stress test scheduled for Wednesday.  Patient is currently taking meloxicam for her pain.  2/10/2023 Ms. Daley is a 62-year-old female presents to the office for follow up of her bilateral knee pain.  Patient has been experiencing bilateral (right greater than left) knee pain for about 30 years.  She states that it has been worse over the last 6 to 8 years. Pain is now affecting her quality of life and daily activities.  Pain is worse when lying down and patient tries to keep her feet elevated.  Right knee pain is worse than her left at this time.  Patient has tried meloxicam, gabapentin, and Tylenol for the pain.  She has a history of chronic pain and has been in pain management for about 20 years at [facility].  Patient has stopped taking methadone and is currently only taking tramadol for her pain.  She did have some withdrawal from her opioid medications.  Patient has tried multiple knee injections, including cortisone injections for many years.  She has tried about 4 series of viscosupplementation injections.  Her last knee joint injections were in August 2022, which did not help.  She states that she received 3 gel injections in the right knee and 2 in the left, but pain worsened and she did not finish the left knee series.  She had an injection in her right IT band in November 2022.  Patient is also tried physical therapy for her knees, last in October 2022. She has tried physical therapy for her back pain, but her knee pain limited her back PT. Patient has continued her weight loss and her BMI: 39.26.  No recent trauma.  No fevers or chills.  Patient was recently started on Symbicort by her pulmonologist.  She would like to move her surgical date from March into April.   History: HTN, Diverticulitis, GERD, DEREK, History of Breast Cancer, Alcoholism (19 years sober), Lymphedema in Right Arm, Fibromyalgia, Neuropathy, Bipolar, Diabetes (Last A1C: 6.1)

## 2024-09-24 NOTE — HISTORY OF PRESENT ILLNESS
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  Patient underwent surgery on 7/24/2024.  She is currently doing well overall.  Patient does not have knee pain.  No falls.  No fevers or chills.  She completed her antibiotics on 9/4/24.  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  Patient underwent surgery on 7/24/2024.  Patient is currently doing well overall.  She has completed her IV antibiotics.  Her PICC line was removed last Thursday.  No fevers or chills.  No falls.  She does not have significant right knee pain at this time.  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  Patient underwent surgery on 7/24/2024.  She is currently doing well overall.  Patient is currently in rehab and is receiving her antibiotics.  No fevers or chills.  No falls.  7/17/2024  Denia Daley presents to the office for follow-up of her right knee PJI.  Patient continues to have knee pain.  No falls.  No fevers or chills.  She was seen by Dr. Knight and pulmonology.  She is planned for medicine and cardiology clearances.  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  She underwent right knee aspiration last week, which showed right knee PJI.  No falls.  No fevers or chills.  6/19/2024  Denia Daley presents to the office for follow-up of her bilateral TKAs.  Patient's left knee is doing well.  Her right knee now has pain.  Pain is worse with standing on the knee.  She has difficulty with stairs.  No falls.  Patient reports a fibromyalgia flare in October and had some issues with the knee since.  She felt some knee stiffness.  No fevers or chills.  5/7/2024  Denia Daley presents to the office for follow-up of her left total knee arthroplasty.  Patient went left TKA on 3/18/2024.  She is currently doing well overall.  Patient does not have significant knee pain at this time.  She is participating in physical therapy.  No falls.  No fevers or chills.  4/11/2024  Denia Daley presents to the office for follow-up of her left total knee arthroplasty.  Patient underwent left TKA on 3/18/2024.  Patient had some pain when in bed.  Pain is located over the knee.  No falls.  No fevers or chills.  4/9/2024  Denia Daley presents to the office for follow-up of her left total knee arthroplasty.  Patient underwent left TKA on 3/18/2024.  There has been no further wound drainage.  Patient is currently doing well overall.  No falls or injuries. No fevers or chills.  4/2/2024  Denia Daley presents to the office for follow-up of her left total knee arthroplasty.  Patient underwent left TKA on 3/18/2024.  A Prevena wound VAC was placed in the ER due to wound drainage.  There has been no further drainage around the wound VAC.  No fevers or chills.  No falls or injuries.  She does not have significant pain at this time.  3/28/2024  Denia Daley presented to the office for follow-up of her left total knee arthroplasty.  Patient underwent left TKA on 3/18/2024.  She had some wound drainage and a Prevena VAC was placed in the ER.  She is otherwise doing well.  Patient has been working with physical therapy.  She has been taking her Aspirin for her DVT prophylaxis.  No drainage around the wound VAC.  No fevers or chills.  No falls or injuries.  She has been working with home physical therapy.  3/5/2024  Denia Daley presents to the office for follow-up of her right total knee arthroplasty and management of her left knee osteoarthritis.  Her right knee has been in physical therapy and is doing well.  She continues to have left knee pain.  Left knee pain continues to affect her quality of life.  Her weight is 216 pounds and she is working on weight loss.  No falls.  No fevers or chills.  2/1/2024  Denia Daley presents to the office for follow-up of her right total knee arthroplasty and management of her left knee osteoarthritis.  Patient has been in physical therapy and is doing well.  Right knee is improving, but she has some lateral pain.  She continues to have left knee pain.  Left knee pain is not significantly changed.  Her left knee continues to affect her quality of life.  Patient had a recent right-sided RFA on Tuesday and is planned for a left-sided RFA.  No lightheadedness or fatigue.  No falls or injuries.  Patient states that she is 217 lbs. at this time.  1/4/2024  Denia Daley presented to the office for follow-up of her right total knee arthroplasty and management of her left knee osteoarthritis.  Patient's right knee is doing well overall.  She did have a fibromyalgia flare and had some pain.  She would like to restart physical therapy.  Patient continues to have left knee pain.  Pain is worse with going up stairs and in bed.  No falls.  No fevers or chills.  Patient does have back pain.  She states that her last weight was 218 pounds and that her last hemoglobin was 10.0.  She is currently on iron and has somewhat improved.  11/7/2023  Denia Daley presented to the office for follow-up of her right total knee arthroplasty and left knee osteoarthritis.  Patient was increasingly active about 10 days ago on Saturday.  She had knee pain afterwards and had pain last week.  Pain was worse with leaning forward.  No falls.  No fevers or chills.  No significant swelling.  Patient states that her pain is improving.  She continues to have left knee pain and had pain after the increased activity, which has been improving.  Pain is located over the medial and lateral knees.  8/29/2023  Denia Daley presents to the office for follow-up of her right total knee arthroplasty and left knee osteoarthritis.  Patient's right knee is currently doing well.  She does get some occasional shock type pains.  She is able to walk without a cane.  Patient continues to have left knee pain.  She states that the left knee is still affecting her quality of life.  She has tried physical therapy, anti-inflammatories, and injections.  Patient was recently seen by neurology and diagnosed with an essential tremor.  She is going to see cardiology and her primary care physician for her clearances.  She had a recent sleep study for her MADHURI.  No falls or injuries.  7/27/2023  Denia Daley presents to the office for follow-up of her right total knee arthroplasty and left knee osteoarthritis.  Patient's right knee is currently doing well.  She has no significant issues.  Patient continues to have significant left knee pain.  She states that the left knee pain is affecting her quality of life.  She has been walking better due to her right TKA.  Patient uses a cane in the community, but is able to walk without the cane.  She has tried pain medications, physical therapy, and injections for the left knee.  Patient states that her back pain has improved after undergoing RFA and is going for an EMG tomorrow for her carpal tunnel.  6/13/2023  Stephanie Daley is a 63-year-old female who presents to the office for follow-up of her right total knee arthroplasty.  Patient underwent right TKA on 4/24/2023.  She is currently doing well.  Right knee pain is significantly improved compared to prior to surgery.  She is currently in physical therapy. Patient continues to take her Aspirin 325 mg twice per day for DVT prophylaxis.  Patient has been doing home exercises on a bicycle. She is currently walking with a cane when outside, but is not using any assistive devices in the home.  Patient currently reports left lower back and left knee pain.  She continues to have left knee pain.  She had a recent RFA on her right lower back.  She is currently taking tramadol, gabapentin and Tylenol.  She states for the majority of her pain is currently in her left knee.  Left knee pain continues to affect her quality of life.  Patient has tried injections, physical therapy, and pain medications for her left knee.  Her last left knee injection was in August 2022.  She would like to discuss left total knee arthroplasty.  5/9/2023  Ms. Daley presents to the office for follow-up of her right total knee arthroplasty.  Patient underwent right TKA on 4/24/2023.  She is currently doing well.  Patient did go to the emergency department on 5/5/2023 with pain and swelling of her right lower extremity.  US Duplex was negative.  Her pain and swelling have improved over the last few days.  Her pain is well controlled at this time.  She is taking occasional tramadol at night for her pain.  Patient is walking with a cane.  She has finished her home physical therapy.  No fevers or chills.  Patient remains on Aspirin 325mg twice per day for DVT prophylaxis.   4/7/2023  Ms. Daley presented to the office for follow-up of her bilateral knee pain.  Patient continues to experience bilateral (right greater than left) knee pain.  She also reports some right knee stiffness and occasional lower back pain.  Patient has some neuropathy in her hands and feet.  Patient continues to experience pain is affecting her quality of life and daily activities.  She has tried multiple knee injections, last in August 2022.  She has also tried physical therapy and has been in pain management.  Patient is planned for a right total knee arthroplasty.  She has a stress test scheduled for Wednesday.  Patient is currently taking meloxicam for her pain.  2/10/2023 Ms. Daley is a 62-year-old female presents to the office for follow up of her bilateral knee pain.  Patient has been experiencing bilateral (right greater than left) knee pain for about 30 years.  She states that it has been worse over the last 6 to 8 years. Pain is now affecting her quality of life and daily activities.  Pain is worse when lying down and patient tries to keep her feet elevated.  Right knee pain is worse than her left at this time.  Patient has tried meloxicam, gabapentin, and Tylenol for the pain.  She has a history of chronic pain and has been in pain management for about 20 years at [facility].  Patient has stopped taking methadone and is currently only taking tramadol for her pain.  She did have some withdrawal from her opioid medications.  Patient has tried multiple knee injections, including cortisone injections for many years.  She has tried about 4 series of viscosupplementation injections.  Her last knee joint injections were in August 2022, which did not help.  She states that she received 3 gel injections in the right knee and 2 in the left, but pain worsened and she did not finish the left knee series.  She had an injection in her right IT band in November 2022.  Patient is also tried physical therapy for her knees, last in October 2022. She has tried physical therapy for her back pain, but her knee pain limited her back PT. Patient has continued her weight loss and her BMI: 39.26.  No recent trauma.  No fevers or chills.  Patient was recently started on Symbicort by her pulmonologist.  She would like to move her surgical date from March into April.   History: HTN, Diverticulitis, GERD, DEREK, History of Breast Cancer, Alcoholism (19 years sober), Lymphedema in Right Arm, Fibromyalgia, Neuropathy, Bipolar, Diabetes (Last A1C: 6.1)

## 2024-09-26 ENCOUNTER — NON-APPOINTMENT (OUTPATIENT)
Age: 64
End: 2024-09-26

## 2024-09-26 LAB
B PERT IGG+IGM PNL SER: ABNORMAL
COLOR FLD: NORMAL
EOSINOPHIL # FLD MANUAL: 2 %
FLUID INTAKE SUBSTANCE CLASS: NORMAL
JOINT PCR PANEL: NOT DETECTED
JOINT PCR SOURCE: NORMAL
LYMPHOCYTES # FLD MANUAL: 26 %
MESOTHL CELL NFR FLD: 0 %
MONOS+MACROS NFR FLD MANUAL: 19 %
NEUTS SEG # FLD MANUAL: 53 %
NRBC # FLD: 0 %
RBC # FLD MANUAL: ABNORMAL /UL
TOTAL CELLS COUNTED FLD: 215 /UL
TUBE TYPE: NORMAL
UNIDENT CELLS NFR FLD MANUAL: 0 %
VARIANT LYMPHS # FLD MANUAL: 0 %

## 2024-10-03 LAB
CRP SERPL-MCNC: 4 MG/L
ERYTHROCYTE [SEDIMENTATION RATE] IN BLOOD BY WESTERGREN METHOD: 76 MM/HR

## 2024-10-06 LAB — JOINT CULTURE: NORMAL

## 2024-11-05 ENCOUNTER — APPOINTMENT (OUTPATIENT)
Dept: ORTHOPEDIC SURGERY | Facility: CLINIC | Age: 64
End: 2024-11-05

## 2024-11-12 ENCOUNTER — APPOINTMENT (OUTPATIENT)
Dept: ORTHOPEDIC SURGERY | Facility: CLINIC | Age: 64
End: 2024-11-12

## 2024-11-12 ENCOUNTER — APPOINTMENT (OUTPATIENT)
Dept: INTERNAL MEDICINE | Facility: CLINIC | Age: 64
End: 2024-11-12
Payer: MEDICARE

## 2024-11-12 ENCOUNTER — APPOINTMENT (OUTPATIENT)
Dept: ORTHOPEDIC SURGERY | Facility: CLINIC | Age: 64
End: 2024-11-12
Payer: MEDICARE

## 2024-11-12 VITALS
OXYGEN SATURATION: 96 % | HEART RATE: 88 BPM | TEMPERATURE: 98.6 F | BODY MASS INDEX: 43.59 KG/M2 | HEIGHT: 60 IN | DIASTOLIC BLOOD PRESSURE: 76 MMHG | WEIGHT: 222 LBS | SYSTOLIC BLOOD PRESSURE: 129 MMHG

## 2024-11-12 DIAGNOSIS — D64.9 ANEMIA, UNSPECIFIED: ICD-10-CM

## 2024-11-12 DIAGNOSIS — T84.50XA INFECTION AND INFLAMMATORY REACTION DUE TO UNSPECIFIED INTERNAL JOINT PROSTHESIS, INITIAL ENCOUNTER: ICD-10-CM

## 2024-11-12 DIAGNOSIS — R33.9 RETENTION OF URINE, UNSPECIFIED: ICD-10-CM

## 2024-11-12 PROCEDURE — 99214 OFFICE O/P EST MOD 30 MIN: CPT

## 2024-11-12 PROCEDURE — 90662 IIV NO PRSV INCREASED AG IM: CPT

## 2024-11-12 PROCEDURE — G0008: CPT

## 2024-11-12 PROCEDURE — 36415 COLL VENOUS BLD VENIPUNCTURE: CPT

## 2024-11-12 PROCEDURE — G2211 COMPLEX E/M VISIT ADD ON: CPT

## 2024-11-14 ENCOUNTER — APPOINTMENT (OUTPATIENT)
Dept: ORTHOPEDIC SURGERY | Facility: CLINIC | Age: 64
End: 2024-11-14

## 2024-11-21 LAB
ANION GAP SERPL CALC-SCNC: 15 MMOL/L
APPEARANCE: CLEAR
BASOPHILS # BLD AUTO: 0.01 K/UL
BASOPHILS NFR BLD AUTO: 0.2 %
BILIRUBIN URINE: NEGATIVE
BLOOD URINE: NEGATIVE
BUN SERPL-MCNC: 14 MG/DL
CALCIUM SERPL-MCNC: 10.1 MG/DL
CHLORIDE SERPL-SCNC: 100 MMOL/L
CO2 SERPL-SCNC: 24 MMOL/L
COLOR: YELLOW
CREAT SERPL-MCNC: 1.14 MG/DL
EGFR: 53 ML/MIN/1.73M2
EOSINOPHIL # BLD AUTO: 0.05 K/UL
EOSINOPHIL NFR BLD AUTO: 1 %
GLUCOSE QUALITATIVE U: NEGATIVE MG/DL
GLUCOSE SERPL-MCNC: 139 MG/DL
HCT VFR BLD CALC: 33.9 %
HGB BLD-MCNC: 10.7 G/DL
IMM GRANULOCYTES NFR BLD AUTO: 0.2 %
KETONES URINE: NEGATIVE MG/DL
LEUKOCYTE ESTERASE URINE: NEGATIVE
LYMPHOCYTES # BLD AUTO: 0.72 K/UL
LYMPHOCYTES NFR BLD AUTO: 14.2 %
MAN DIFF?: NORMAL
MCHC RBC-ENTMCNC: 26.9 PG
MCHC RBC-ENTMCNC: 31.6 G/DL
MCV RBC AUTO: 85.2 FL
MONOCYTES # BLD AUTO: 0.39 K/UL
MONOCYTES NFR BLD AUTO: 7.7 %
NEUTROPHILS # BLD AUTO: 3.89 K/UL
NEUTROPHILS NFR BLD AUTO: 76.7 %
NITRITE URINE: NEGATIVE
PH URINE: 5.5
PLATELET # BLD AUTO: 300 K/UL
POTASSIUM SERPL-SCNC: 4 MMOL/L
PROTEIN URINE: NEGATIVE MG/DL
RBC # BLD: 3.98 M/UL
RBC # FLD: 17.9 %
SODIUM SERPL-SCNC: 139 MMOL/L
SPECIFIC GRAVITY URINE: 1.01
UROBILINOGEN URINE: 0.2 MG/DL
WBC # FLD AUTO: 5.07 K/UL

## 2024-12-04 ENCOUNTER — APPOINTMENT (OUTPATIENT)
Dept: NEUROLOGY | Facility: CLINIC | Age: 64
End: 2024-12-04
Payer: MEDICARE

## 2024-12-04 VITALS
BODY MASS INDEX: 43.39 KG/M2 | DIASTOLIC BLOOD PRESSURE: 68 MMHG | HEIGHT: 60 IN | HEART RATE: 86 BPM | SYSTOLIC BLOOD PRESSURE: 132 MMHG | WEIGHT: 221 LBS

## 2024-12-04 DIAGNOSIS — M25.562 PAIN IN RIGHT KNEE: ICD-10-CM

## 2024-12-04 DIAGNOSIS — R20.9 UNSPECIFIED DISTURBANCES OF SKIN SENSATION: ICD-10-CM

## 2024-12-04 DIAGNOSIS — M54.9 DORSALGIA, UNSPECIFIED: ICD-10-CM

## 2024-12-04 DIAGNOSIS — M25.561 PAIN IN RIGHT KNEE: ICD-10-CM

## 2024-12-04 DIAGNOSIS — R41.3 OTHER AMNESIA: ICD-10-CM

## 2024-12-04 DIAGNOSIS — F32.A ANXIETY DISORDER, UNSPECIFIED: ICD-10-CM

## 2024-12-04 DIAGNOSIS — F41.9 ANXIETY DISORDER, UNSPECIFIED: ICD-10-CM

## 2024-12-04 PROCEDURE — 99214 OFFICE O/P EST MOD 30 MIN: CPT

## 2024-12-04 PROCEDURE — G2211 COMPLEX E/M VISIT ADD ON: CPT

## 2024-12-09 ENCOUNTER — OUTPATIENT (OUTPATIENT)
Dept: OUTPATIENT SERVICES | Facility: HOSPITAL | Age: 64
LOS: 1 days | End: 2024-12-09
Payer: MEDICARE

## 2024-12-09 ENCOUNTER — APPOINTMENT (OUTPATIENT)
Dept: ULTRASOUND IMAGING | Facility: IMAGING CENTER | Age: 64
End: 2024-12-09
Payer: MEDICARE

## 2024-12-09 DIAGNOSIS — Z33.2 ENCOUNTER FOR ELECTIVE TERMINATION OF PREGNANCY: Chronic | ICD-10-CM

## 2024-12-09 DIAGNOSIS — H26.9 UNSPECIFIED CATARACT: Chronic | ICD-10-CM

## 2024-12-09 DIAGNOSIS — R33.9 RETENTION OF URINE, UNSPECIFIED: ICD-10-CM

## 2024-12-09 DIAGNOSIS — Z96.652 PRESENCE OF LEFT ARTIFICIAL KNEE JOINT: Chronic | ICD-10-CM

## 2024-12-09 DIAGNOSIS — Z98.890 OTHER SPECIFIED POSTPROCEDURAL STATES: Chronic | ICD-10-CM

## 2024-12-09 DIAGNOSIS — Z96.651 PRESENCE OF RIGHT ARTIFICIAL KNEE JOINT: Chronic | ICD-10-CM

## 2024-12-09 PROCEDURE — 76770 US EXAM ABDO BACK WALL COMP: CPT | Mod: 26

## 2024-12-09 PROCEDURE — 76770 US EXAM ABDO BACK WALL COMP: CPT

## 2024-12-18 ENCOUNTER — RX RENEWAL (OUTPATIENT)
Age: 64
End: 2024-12-18

## 2025-01-09 ENCOUNTER — APPOINTMENT (OUTPATIENT)
Dept: PULMONOLOGY | Facility: CLINIC | Age: 65
End: 2025-01-09

## 2025-01-16 ENCOUNTER — APPOINTMENT (OUTPATIENT)
Dept: PULMONOLOGY | Facility: CLINIC | Age: 65
End: 2025-01-16
Payer: MEDICARE

## 2025-01-16 VITALS
BODY MASS INDEX: 41.6 KG/M2 | TEMPERATURE: 98.6 F | OXYGEN SATURATION: 96 % | DIASTOLIC BLOOD PRESSURE: 60 MMHG | HEART RATE: 91 BPM | WEIGHT: 213 LBS | SYSTOLIC BLOOD PRESSURE: 132 MMHG

## 2025-01-16 PROCEDURE — 99214 OFFICE O/P EST MOD 30 MIN: CPT | Mod: 25

## 2025-01-16 PROCEDURE — 94060 EVALUATION OF WHEEZING: CPT

## 2025-01-16 PROCEDURE — 94729 DIFFUSING CAPACITY: CPT

## 2025-01-16 PROCEDURE — 94727 GAS DIL/WSHOT DETER LNG VOL: CPT

## 2025-01-17 ENCOUNTER — APPOINTMENT (OUTPATIENT)
Dept: ORTHOPEDIC SURGERY | Facility: CLINIC | Age: 65
End: 2025-01-17

## 2025-01-21 ENCOUNTER — APPOINTMENT (OUTPATIENT)
Dept: INTERNAL MEDICINE | Facility: CLINIC | Age: 65
End: 2025-01-21

## 2025-01-21 VITALS
DIASTOLIC BLOOD PRESSURE: 71 MMHG | HEART RATE: 88 BPM | SYSTOLIC BLOOD PRESSURE: 126 MMHG | OXYGEN SATURATION: 96 % | TEMPERATURE: 98.4 F

## 2025-01-21 DIAGNOSIS — N28.89 OTHER SPECIFIED DISORDERS OF KIDNEY AND URETER: ICD-10-CM

## 2025-01-21 DIAGNOSIS — D64.9 ANEMIA, UNSPECIFIED: ICD-10-CM

## 2025-01-21 PROCEDURE — 36415 COLL VENOUS BLD VENIPUNCTURE: CPT

## 2025-01-21 PROCEDURE — G2211 COMPLEX E/M VISIT ADD ON: CPT

## 2025-01-21 PROCEDURE — 99214 OFFICE O/P EST MOD 30 MIN: CPT

## 2025-01-21 RX ORDER — VITAMIN B COMPLEX
CAPSULE ORAL
Refills: 0 | Status: ACTIVE | COMMUNITY

## 2025-01-21 NOTE — ED ADULT TRIAGE NOTE - PAIN: PRESENCE, MLM
Initiate Treatment: hydroquinone 4 % topical cream : Apply to the affected areas of the arms once daily X 12 weeks, then discontinue Render In Strict Bullet Format?: No Detail Level: Zone Discontinue Regimen: All otc vitamins and supplements Continue Regimen: Triamcinolone- bid X 2 weeks, then as needed Continue Regimen: Clobetasol solution: Apply to the affected areas on the scalp nightly M, W, and F Continue Regimen: Ketoconazole shampoo: Lather to the affected areas of the body. Let sit 3-5 mins then rinse. May follow with normal body wash. Use once daily while flared then as needed complains of pain/discomfort

## 2025-01-28 ENCOUNTER — APPOINTMENT (OUTPATIENT)
Dept: ORTHOPEDIC SURGERY | Facility: CLINIC | Age: 65
End: 2025-01-28

## 2025-01-28 ENCOUNTER — APPOINTMENT (OUTPATIENT)
Dept: CARDIOLOGY | Facility: CLINIC | Age: 65
End: 2025-01-28
Payer: MEDICARE

## 2025-01-28 ENCOUNTER — NON-APPOINTMENT (OUTPATIENT)
Age: 65
End: 2025-01-28

## 2025-01-28 ENCOUNTER — APPOINTMENT (OUTPATIENT)
Dept: ORTHOPEDIC SURGERY | Facility: CLINIC | Age: 65
End: 2025-01-28
Payer: MEDICARE

## 2025-01-28 VITALS
BODY MASS INDEX: 42.41 KG/M2 | HEIGHT: 60 IN | WEIGHT: 216 LBS | DIASTOLIC BLOOD PRESSURE: 82 MMHG | SYSTOLIC BLOOD PRESSURE: 132 MMHG

## 2025-01-28 VITALS
HEIGHT: 60 IN | DIASTOLIC BLOOD PRESSURE: 76 MMHG | WEIGHT: 216 LBS | HEART RATE: 77 BPM | BODY MASS INDEX: 42.41 KG/M2 | SYSTOLIC BLOOD PRESSURE: 138 MMHG | OXYGEN SATURATION: 96 %

## 2025-01-28 DIAGNOSIS — Z96.659 PRESENCE OF UNSPECIFIED ARTIFICIAL KNEE JOINT: ICD-10-CM

## 2025-01-28 DIAGNOSIS — R06.09 OTHER FORMS OF DYSPNEA: ICD-10-CM

## 2025-01-28 DIAGNOSIS — I10 ESSENTIAL (PRIMARY) HYPERTENSION: ICD-10-CM

## 2025-01-28 PROBLEM — R79.1 PROLONGED PTT: Status: ACTIVE | Noted: 2025-01-28

## 2025-01-28 LAB
ALBUMIN SERPL ELPH-MCNC: 4.2 G/DL
ALP BLD-CCNC: 108 U/L
ALT SERPL-CCNC: 9 U/L
ANION GAP SERPL CALC-SCNC: 12 MMOL/L
APTT BLD: 48.4 SEC
AST SERPL-CCNC: 17 U/L
BASOPHILS # BLD AUTO: 0.01 K/UL
BASOPHILS NFR BLD AUTO: 0.3 %
BILIRUB SERPL-MCNC: 0.3 MG/DL
BUN SERPL-MCNC: 14 MG/DL
CALCIUM SERPL-MCNC: 10 MG/DL
CHLORIDE SERPL-SCNC: 101 MMOL/L
CHOLEST SERPL-MCNC: 163 MG/DL
CO2 SERPL-SCNC: 25 MMOL/L
CREAT SERPL-MCNC: 1 MG/DL
EGFR: 62 ML/MIN/1.73M2
EOSINOPHIL # BLD AUTO: 0.06 K/UL
EOSINOPHIL NFR BLD AUTO: 1.6 %
ESTIMATED AVERAGE GLUCOSE: 126 MG/DL
FERRITIN SERPL-MCNC: 40 NG/ML
FOLATE SERPL-MCNC: >20 NG/ML
GLUCOSE SERPL-MCNC: 118 MG/DL
HBA1C MFR BLD HPLC: 6 %
HCT VFR BLD CALC: 31.4 %
HDLC SERPL-MCNC: 48 MG/DL
HGB BLD-MCNC: 10.4 G/DL
IMM GRANULOCYTES NFR BLD AUTO: 0.3 %
INR PPP: 0.97 RATIO
IRON SATN MFR SERPL: 16 %
IRON SERPL-MCNC: 43 UG/DL
LDLC SERPL CALC-MCNC: 95 MG/DL
LYMPHOCYTES # BLD AUTO: 1.13 K/UL
LYMPHOCYTES NFR BLD AUTO: 29.5 %
MAN DIFF?: NORMAL
MCHC RBC-ENTMCNC: 28.8 PG
MCHC RBC-ENTMCNC: 33.1 G/DL
MCV RBC AUTO: 87 FL
MONOCYTES # BLD AUTO: 0.35 K/UL
MONOCYTES NFR BLD AUTO: 9.1 %
NEUTROPHILS # BLD AUTO: 2.27 K/UL
NEUTROPHILS NFR BLD AUTO: 59.2 %
NONHDLC SERPL-MCNC: 115 MG/DL
PLATELET # BLD AUTO: 270 K/UL
POTASSIUM SERPL-SCNC: 4 MMOL/L
PROT SERPL-MCNC: 7.7 G/DL
PT BLD: 11.5 SEC
RBC # BLD: 3.61 M/UL
RBC # BLD: 3.61 M/UL
RBC # FLD: 17.8 %
RETICS # AUTO: 1.5 %
RETICS AGGREG/RBC NFR: 54.5 K/UL
SODIUM SERPL-SCNC: 139 MMOL/L
TIBC SERPL-MCNC: 272 UG/DL
TRIGL SERPL-MCNC: 108 MG/DL
UIBC SERPL-MCNC: 229 UG/DL
VIT B12 SERPL-MCNC: 1004 PG/ML
WBC # FLD AUTO: 3.83 K/UL

## 2025-01-28 PROCEDURE — 93000 ELECTROCARDIOGRAM COMPLETE: CPT

## 2025-01-28 PROCEDURE — 73562 X-RAY EXAM OF KNEE 3: CPT | Mod: RT

## 2025-01-28 PROCEDURE — 99214 OFFICE O/P EST MOD 30 MIN: CPT

## 2025-01-28 PROCEDURE — 99215 OFFICE O/P EST HI 40 MIN: CPT

## 2025-01-28 PROCEDURE — G2211 COMPLEX E/M VISIT ADD ON: CPT

## 2025-01-29 LAB
B PERT IGG+IGM PNL SER: ABNORMAL
COLOR FLD: NORMAL
EOSINOPHIL # FLD MANUAL: 2 %
FLUID INTAKE SUBSTANCE CLASS: NORMAL
JOINT PCR PANEL: NOT DETECTED
JOINT PCR SOURCE: NORMAL
LYMPHOCYTES # FLD MANUAL: 47 %
MESOTHL CELL NFR FLD: 0 %
MONOS+MACROS NFR FLD MANUAL: 23 %
NEUTS SEG # FLD MANUAL: 28 %
NRBC # FLD: 0 %
RBC # FLD MANUAL: NORMAL CELLS/UL
TOTAL CELLS COUNTED FLD: 77 CELLS/UL
TUBE TYPE: NORMAL
UNIDENT CELLS NFR FLD MANUAL: 0 %
VARIANT LYMPHS # FLD MANUAL: 0 %
WBC COUNT: 76 CELLS/UL

## 2025-02-06 ENCOUNTER — NON-APPOINTMENT (OUTPATIENT)
Age: 65
End: 2025-02-06

## 2025-02-06 LAB
APTT 2H P 1:4 NP PPP: 41.1 SEC
APTT 2H P INC PPP: 41.5 SEC
APTT BLD: 51.3 SEC
APTT IMM NP/PRE NP PPP: 42.4 SEC
APTT INV RATIO PPP: 51.3 SEC
NPP NORMAL POOLED PLASMA: 31.7 SECS

## 2025-02-07 ENCOUNTER — APPOINTMENT (OUTPATIENT)
Dept: HEMATOLOGY ONCOLOGY | Facility: CLINIC | Age: 65
End: 2025-02-07
Payer: MEDICARE

## 2025-02-07 ENCOUNTER — LABORATORY RESULT (OUTPATIENT)
Age: 65
End: 2025-02-07

## 2025-02-07 ENCOUNTER — OUTPATIENT (OUTPATIENT)
Dept: OUTPATIENT SERVICES | Facility: HOSPITAL | Age: 65
LOS: 1 days | Discharge: HOME | End: 2025-02-07

## 2025-02-07 ENCOUNTER — OUTPATIENT (OUTPATIENT)
Dept: OUTPATIENT SERVICES | Facility: HOSPITAL | Age: 65
LOS: 1 days | End: 2025-02-07
Payer: MEDICARE

## 2025-02-07 VITALS
DIASTOLIC BLOOD PRESSURE: 72 MMHG | HEIGHT: 61 IN | OXYGEN SATURATION: 97 % | RESPIRATION RATE: 16 BRPM | HEART RATE: 71 BPM | TEMPERATURE: 97 F | SYSTOLIC BLOOD PRESSURE: 134 MMHG | WEIGHT: 216.05 LBS

## 2025-02-07 VITALS
OXYGEN SATURATION: 97 % | BODY MASS INDEX: 42.19 KG/M2 | DIASTOLIC BLOOD PRESSURE: 56 MMHG | SYSTOLIC BLOOD PRESSURE: 109 MMHG | WEIGHT: 216 LBS | HEART RATE: 80 BPM | RESPIRATION RATE: 16 BRPM | TEMPERATURE: 97.3 F

## 2025-02-07 DIAGNOSIS — T84.53XA INFECTION AND INFLAMMATORY REACTION DUE TO INTERNAL RIGHT KNEE PROSTHESIS, INITIAL ENCOUNTER: ICD-10-CM

## 2025-02-07 DIAGNOSIS — D64.9 ANEMIA, UNSPECIFIED: ICD-10-CM

## 2025-02-07 DIAGNOSIS — Z86.79 PERSONAL HISTORY OF OTHER DISEASES OF THE CIRCULATORY SYSTEM: ICD-10-CM

## 2025-02-07 DIAGNOSIS — C50.919 MALIGNANT NEOPLASM OF UNSPECIFIED SITE OF UNSPECIFIED FEMALE BREAST: ICD-10-CM

## 2025-02-07 DIAGNOSIS — Z96.651 PRESENCE OF RIGHT ARTIFICIAL KNEE JOINT: Chronic | ICD-10-CM

## 2025-02-07 DIAGNOSIS — H26.9 UNSPECIFIED CATARACT: Chronic | ICD-10-CM

## 2025-02-07 DIAGNOSIS — R79.1 ABNORMAL COAGULATION PROFILE: ICD-10-CM

## 2025-02-07 DIAGNOSIS — E11.9 TYPE 2 DIABETES MELLITUS WITHOUT COMPLICATIONS: ICD-10-CM

## 2025-02-07 DIAGNOSIS — Z96.652 PRESENCE OF LEFT ARTIFICIAL KNEE JOINT: Chronic | ICD-10-CM

## 2025-02-07 DIAGNOSIS — Z33.2 ENCOUNTER FOR ELECTIVE TERMINATION OF PREGNANCY: Chronic | ICD-10-CM

## 2025-02-07 DIAGNOSIS — Z98.890 OTHER SPECIFIED POSTPROCEDURAL STATES: Chronic | ICD-10-CM

## 2025-02-07 DIAGNOSIS — I10 ESSENTIAL (PRIMARY) HYPERTENSION: ICD-10-CM

## 2025-02-07 DIAGNOSIS — G47.33 OBSTRUCTIVE SLEEP APNEA (ADULT) (PEDIATRIC): ICD-10-CM

## 2025-02-07 DIAGNOSIS — J44.9 CHRONIC OBSTRUCTIVE PULMONARY DISEASE, UNSPECIFIED: ICD-10-CM

## 2025-02-07 DIAGNOSIS — I89.0 LYMPHEDEMA, NOT ELSEWHERE CLASSIFIED: ICD-10-CM

## 2025-02-07 LAB
A1C WITH ESTIMATED AVERAGE GLUCOSE RESULT: 5.8 % — HIGH (ref 4–5.6)
ANION GAP SERPL CALC-SCNC: 15 MMOL/L — HIGH (ref 7–14)
APPEARANCE UR: ABNORMAL
BASOPHILS # BLD AUTO: 0.01 K/UL — SIGNIFICANT CHANGE UP (ref 0–0.2)
BASOPHILS NFR BLD AUTO: 0.3 % — SIGNIFICANT CHANGE UP (ref 0–2)
BILIRUB UR-MCNC: NEGATIVE — SIGNIFICANT CHANGE UP
BLD GP AB SCN SERPL QL: POSITIVE — SIGNIFICANT CHANGE UP
BUN SERPL-MCNC: 10 MG/DL — SIGNIFICANT CHANGE UP (ref 7–23)
CALCIUM SERPL-MCNC: 10 MG/DL — SIGNIFICANT CHANGE UP (ref 8.4–10.5)
CHLORIDE SERPL-SCNC: 101 MMOL/L — SIGNIFICANT CHANGE UP (ref 98–107)
CO2 SERPL-SCNC: 22 MMOL/L — SIGNIFICANT CHANGE UP (ref 22–31)
COLOR SPEC: YELLOW — SIGNIFICANT CHANGE UP
CREAT SERPL-MCNC: 0.89 MG/DL — SIGNIFICANT CHANGE UP (ref 0.5–1.3)
DIFF PNL FLD: NEGATIVE — SIGNIFICANT CHANGE UP
EGFR: 71 ML/MIN/1.73M2 — SIGNIFICANT CHANGE UP
EOSINOPHIL # BLD AUTO: 0.05 K/UL — SIGNIFICANT CHANGE UP (ref 0–0.5)
EOSINOPHIL NFR BLD AUTO: 1.3 % — SIGNIFICANT CHANGE UP (ref 0–6)
ESTIMATED AVERAGE GLUCOSE: 120 — SIGNIFICANT CHANGE UP
GLUCOSE SERPL-MCNC: 134 MG/DL — HIGH (ref 70–99)
GLUCOSE UR QL: NEGATIVE MG/DL — SIGNIFICANT CHANGE UP
HCT VFR BLD CALC: 31.7 % — LOW (ref 34.5–45)
HGB BLD-MCNC: 10.5 G/DL — LOW (ref 11.5–15.5)
IMM GRANULOCYTES NFR BLD AUTO: 0.3 % — SIGNIFICANT CHANGE UP (ref 0–0.9)
KETONES UR-MCNC: NEGATIVE MG/DL — SIGNIFICANT CHANGE UP
LEUKOCYTE ESTERASE UR-ACNC: NEGATIVE — SIGNIFICANT CHANGE UP
LUPUS ANTICOAGULANT CASCADE REFLEX: NORMAL
LYMPHOCYTES # BLD AUTO: 0.9 K/UL — LOW (ref 1–3.3)
LYMPHOCYTES # BLD AUTO: 22.6 % — SIGNIFICANT CHANGE UP (ref 13–44)
MCHC RBC-ENTMCNC: 28.8 PG — SIGNIFICANT CHANGE UP (ref 27–34)
MCHC RBC-ENTMCNC: 33.1 G/DL — SIGNIFICANT CHANGE UP (ref 32–36)
MCV RBC AUTO: 86.8 FL — SIGNIFICANT CHANGE UP (ref 80–100)
MONOCYTES # BLD AUTO: 0.31 K/UL — SIGNIFICANT CHANGE UP (ref 0–0.9)
MONOCYTES NFR BLD AUTO: 7.8 % — SIGNIFICANT CHANGE UP (ref 2–14)
MRSA PCR RESULT.: DETECTED
NEUTROPHILS # BLD AUTO: 2.7 K/UL — SIGNIFICANT CHANGE UP (ref 1.8–7.4)
NEUTROPHILS NFR BLD AUTO: 67.7 % — SIGNIFICANT CHANGE UP (ref 43–77)
NITRITE UR-MCNC: NEGATIVE — SIGNIFICANT CHANGE UP
PH UR: 6 — SIGNIFICANT CHANGE UP (ref 5–8)
PLATELET # BLD AUTO: 288 K/UL — SIGNIFICANT CHANGE UP (ref 150–400)
POTASSIUM SERPL-MCNC: 3.5 MMOL/L — SIGNIFICANT CHANGE UP (ref 3.5–5.3)
POTASSIUM SERPL-SCNC: 3.5 MMOL/L — SIGNIFICANT CHANGE UP (ref 3.5–5.3)
PROT UR-MCNC: 30 MG/DL
RBC # BLD: 3.65 M/UL — LOW (ref 3.8–5.2)
RBC # FLD: 17.5 % — HIGH (ref 10.3–14.5)
RH IG SCN BLD-IMP: NEGATIVE — SIGNIFICANT CHANGE UP
S AUREUS DNA NOSE QL NAA+PROBE: DETECTED
SODIUM SERPL-SCNC: 138 MMOL/L — SIGNIFICANT CHANGE UP (ref 135–145)
SP GR SPEC: 1.02 — SIGNIFICANT CHANGE UP (ref 1–1.03)
UROBILINOGEN FLD QL: 0.2 MG/DL — SIGNIFICANT CHANGE UP (ref 0.2–1)
WBC # BLD: 3.98 K/UL — SIGNIFICANT CHANGE UP (ref 3.8–10.5)
WBC # FLD AUTO: 3.98 K/UL — SIGNIFICANT CHANGE UP (ref 3.8–10.5)

## 2025-02-07 PROCEDURE — 99214 OFFICE O/P EST MOD 30 MIN: CPT

## 2025-02-07 PROCEDURE — 86077 PHYS BLOOD BANK SERV XMATCH: CPT

## 2025-02-07 RX ORDER — SODIUM CHLORIDE 9 G/1000ML
1000 INJECTION, SOLUTION INTRAVENOUS
Refills: 0 | Status: DISCONTINUED | OUTPATIENT
Start: 2025-02-12 | End: 2025-02-12

## 2025-02-07 NOTE — H&P PST ADULT - PROBLEM SELECTOR PLAN 1
Patient tentatively scheduled for right revision total knee arthroplasty for 2/12/25. Pre-op instructions provided as per ERAS protocol. Pt given verbal and written instructions with teach back on chlorhexidine shampoo (x3 days). Pt will take own omperazole on the morning of procedure for GI prophylaxis. Pt verbalized understanding with return demonstration.     PST CBC T&S BMP A1C UA Urine culture MRSA done.

## 2025-02-07 NOTE — H&P PST ADULT - PROBLEM SELECTOR PLAN 4
pulmonary evaluation in [facility] (1/16/25)    "PFT results: Essentially normal study. Mild bronchodilator response.  ?She is to continue use of albuterol on as-needed basis alone for now. Continue diet and weight loss recommended. At present there is no pulmonary contraindication for planned anesthesia and surgery. " pulmonary evaluation in [facility] (1/16/25)    "PFT results: Essentially normal study. Mild bronchodilator response.  She is to continue use of albuterol on as-needed basis alone for now. Continue diet and weight loss recommended. At present there is no pulmonary contraindication for planned anesthesia and surgery. "

## 2025-02-07 NOTE — H&P PST ADULT - NEGATIVE HEMATOLOGY SYMPTOMS
anemia, on iron supplements, recent labs with PCP showing increasing coags - referred to hematologist/no gum bleeding/no nose bleeding anemia, on iron supplements, recent labs with PCP showing abnormal coags - referred to hematologist/no gum bleeding/no nose bleeding anemia, on iron supplements, recent labs with PCP -prolonged PTT- referred to hematologist/no gum bleeding/no nose bleeding

## 2025-02-07 NOTE — H&P PST ADULT - PROBLEM SELECTOR PLAN 2
Patient instructed to take amlodipine valsartan and hydralazine with a sip of water on the morning of procedure.

## 2025-02-07 NOTE — H&P PST ADULT - MUSCULOSKELETAL
details… right knee/decreased ROM due to pain/joint swelling/strength 5/5 bilateral upper extremities Right knee/decreased ROM due to pain/strength 5/5 bilateral upper extremities/back exam/decreased strength/abnormal gait

## 2025-02-07 NOTE — H&P PST ADULT - OTHER CARE PROVIDERS
Dr Figueroa endocrinologist ;   Dr Torres cardiologist [phone], Dr Sosa, heme/onc [phone] Dr Figueroa endocrinologist ;                                                                                   Dr Torres cardiologist [phone], Dr Sosa, heme/onc [phone]

## 2025-02-07 NOTE — H&P PST ADULT - NSICDXPASTSURGICALHX_GEN_ALL_CORE_FT
PAST SURGICAL HISTORY:  2002   h/o hysterectomy due to Fibroid--post op vaginal bleeding requiring a transfusion     2008  repair of ventral hernia with mesh Revision 2018    Cataract Bilateral 2017    H/O total knee replacement, right     History of arthroplasty of left knee     Radical mastectomy of right breast 3/30/12    S/P arthroscopy of left shoulder     S/P revision of total knee, right     Termination of pregnancy (fetus) x 3

## 2025-02-07 NOTE — H&P PST ADULT - SOURCE OF INFORMATION, PROFILE
URETERAL STENT INSERTION performed by Jeana Orlando MD at [facility] Medication list obtained from patient and confirmed./patient/chart(s)

## 2025-02-07 NOTE — H&P PST ADULT - MUSCULOSKELETAL COMMENTS
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

## 2025-02-07 NOTE — H&P PST ADULT - ASSESSMENT
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

## 2025-02-07 NOTE — H&P PST ADULT - PROBLEM SELECTOR PLAN 9
Preop labs by PCP revealing elevated COAGS,    PCP referred to heme/onc Dr Sosa, Appt 2/7/25 ([facility])  Pending evaluation. Preop labs by PCP revealing elevated COAGS PTT 51.3 (results in [facility])     PCP referred to heme/onc Dr Sosa, Appt 2/7/25 ([facility])  Pending evaluation. Preop labs by PCP revealing prolonged PTT - 51.3 (results in [facility])     PCP referred to heme/onc Dr Sosa,  Appt 2/7/25 ([facility]) --    "She had recent blood work with PCP which showed prolonged PTT: likely lupus anticoagulant or cardiolipin interfering with PTT and would not prevent surgery - will have labs today to follow up. She has no easy bleeding or bruising and has no bruising on exam. There are currently no hematological contraindications for planned knee surgery this month"

## 2025-02-07 NOTE — H&P PST ADULT - RESPIRATORY
clear to auscultation bilaterally/no respiratory distress/breath sounds equal clear to auscultation bilaterally/breath sounds equal

## 2025-02-07 NOTE — H&P PST ADULT - ATTENDING COMMENTS
Denia Daley is a 64 year old female, past medical history of HTN, Diverticulitis, GERD, DEREK, History of Breast Cancer, Alcoholism (19 years sober), Lymphedema in Right Arm, Fibromyalgia, Neuropathy, Bipolar, Diabetes (Last A1C: 6.1), who presented to the office for follow up of her right total knee arthroplasty. Patient underwent right TKA on 4/24/2023. 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 Patient has been experiencing right knee pain. Aspiration showed no recurrence of infection. Patient was indicated for a Right Revision Total Knee Arthroplasty. She was cleared by Medicine, Cardiology, and Hematology.      Discussed the nature of prosthetic joint infections at length. Discussed the operative and nonoperative management of her right knee at this time.. Discussed that nonoperative management would consist of pain control and physical therpay. Discussed the risks of nonoperative management including, but not limited to, continued pain and loosening. Discussed that operative management would consist of Right Revision Total Knee Arthroplasty. Discussed the risks of operative management including, but not limited to, risks of anesthesia, heart attack, stroke, death, injuries to nerves and vessels, continued pain, recurrent infection, knee stiffness, hardware loosening or failure, need for further surgery, fractures, instability, or dislocations.     Following discussion, patient elected to proceed with Right Revision Total Knee Arthroplasty. Informed consent was obtained. Patient's leg was then marked with verbal confirmation of the patient. Patient was understanding and in agreement with the operative plan. All questions were answered.

## 2025-02-07 NOTE — H&P PST ADULT - PROBLEM SELECTOR PLAN 3
Patient instructed to take furosemide with a sip of water on the morning of procedure.    cardiology evaluation on 1/28/25 in [facility]  "Preop eval- TTE Jan 2023 was normal, Nuclear stress test April 2023 normal. Medically optimized for surgery. from cardiac perspective  No further cardiac workup needed, patient optimized for TKR"

## 2025-02-07 NOTE — H&P PST ADULT - NSICDXPASTMEDICALHX_GEN_ALL_CORE_FT
PAST MEDICAL HISTORY:  Abdominal hernia in 2012 7/23/19 ; pt states hernia repair 2008, 2018    Acid Reflux     Alcohol abuse--quit 8 years ago--goes to AA ADDENDUM:  at PAST appointment  patient states she quit alcohol approximately 2003    Anemia     Anxiety     Arthritis     Asthma last rescue inhaler use "maybe 3 years ago when I had the flu or a cold"    b/l  Carpal tunnel syndrome tx PT/OT    Breast cancer 2012  right breast -- had mastectomy and then post op chemo and RT, followed with Herceptin for 1 year  2012 last chemo   2013 may last Herceptin  2013 last RT    Depression     Diabetes mellitus     Diverticulosis     Drug abuse--quit 8 years ago--goes to AA ADDENDUM: at PAST appointment, patient states she quit alcohol in approximately 2003    ETOH abuse states she quit alcohol in 2003    Fibromyalgia     Former smoker     h/o   Fatty liver     h/o b/l fungal foot infection 7/23./19 pt denies    H/O CHF     H/O: osteoarthritis     herniated lumbar disc     hiatal hernia last endoscopy 1.5 years ago    History  Uterine Fibroid s/p Hysterectomy 7/02    History of COPD     Hypercholesterolemia 7/23/19 ; pt reports improvement ; pt denies rx    Hypertension     Insomnia     Lymphedema of right arm     Lymphedema, limb right side s/p right mastectomy    Miscarriage x 2    Morbid obesity     Neuropathy b/l feet    OAD (obstructive airway disease)     Primary osteoarthritis of right knee     sickel cell anemia trait     Sickle cell trait     Sleep apnea diagnosed 2007---supposed to use device but doesn't     Substance abuse quit in 2003 -- cocaine and marijuana    Thyroid nodule had negative biopsy

## 2025-02-07 NOTE — H&P PST ADULT - HISTORY OF PRESENT ILLNESS
63 y/o female PMH HTN, HLD, CHF (last exacerbation 1/2023), AOD, severe MADHURI, EtOH abuse (21 sober in AA), neuropathy, fibromyalgia, essential tremors, R Breast Ca s/p chemo/XRT/mastectomy c/b RUE lymphedema, obesity, s/p right TKA complicated by infection scheduled for right revision total knee arthroplasty.        63 y/o female PMH HTN, HLD, CHF (last exacerbation 1/2023), OAD, severe MADHURI, EtOH abuse (21 sober in AA), neuropathy, fibromyalgia, essential tremors, R Breast Ca s/p chemo/XRT/mastectomy c/b RUE lymphedema, obesity, s/p right TKA complicated by infection scheduled for right revision total knee arthroplasty.

## 2025-02-07 NOTE — H&P PST ADULT - CARDIOVASCULAR
regular rate and rhythm/S1 S2 present/normal PMI/murmur details… regular rate and rhythm/S1 S2 present/normal PMI/no pedal edema/murmur/vascular

## 2025-02-08 LAB
CULTURE RESULTS: SIGNIFICANT CHANGE UP
SPECIMEN SOURCE: SIGNIFICANT CHANGE UP

## 2025-02-09 PROBLEM — J44.9 CHRONIC OBSTRUCTIVE PULMONARY DISEASE, UNSPECIFIED: Chronic | Status: ACTIVE | Noted: 2025-02-07

## 2025-02-09 LAB — JOINT CULTURE: NORMAL

## 2025-02-10 LAB
FACT IX ACT/NOR PPP: 110 %
FACT VIII ACT/NOR PPP: 227 %
FACT XI ACT/NOR PPP: 72 %

## 2025-02-10 RX ORDER — POVIDONE-IODINE 7.5 %
1 SOLUTION, NON-ORAL TOPICAL ONCE
Refills: 0 | Status: COMPLETED | OUTPATIENT
Start: 2025-02-12 | End: 2025-02-12

## 2025-02-11 LAB — FIBRINOGEN AG PPP IA-MCNC: 483 MG/DL

## 2025-02-12 ENCOUNTER — INPATIENT (INPATIENT)
Facility: HOSPITAL | Age: 65
LOS: 2 days | Discharge: NURSING, REHAB OR LONG-TERM CARE | End: 2025-02-15
Attending: STUDENT IN AN ORGANIZED HEALTH CARE EDUCATION/TRAINING PROGRAM | Admitting: STUDENT IN AN ORGANIZED HEALTH CARE EDUCATION/TRAINING PROGRAM
Payer: MEDICARE

## 2025-02-12 ENCOUNTER — TRANSCRIPTION ENCOUNTER (OUTPATIENT)
Age: 65
End: 2025-02-12

## 2025-02-12 ENCOUNTER — APPOINTMENT (OUTPATIENT)
Dept: ORTHOPEDIC SURGERY | Facility: HOSPITAL | Age: 65
End: 2025-02-12

## 2025-02-12 VITALS
RESPIRATION RATE: 18 BRPM | TEMPERATURE: 98 F | WEIGHT: 216.05 LBS | OXYGEN SATURATION: 96 % | HEIGHT: 61 IN | SYSTOLIC BLOOD PRESSURE: 136 MMHG | HEART RATE: 85 BPM | DIASTOLIC BLOOD PRESSURE: 53 MMHG

## 2025-02-12 DIAGNOSIS — Z96.651 PRESENCE OF RIGHT ARTIFICIAL KNEE JOINT: Chronic | ICD-10-CM

## 2025-02-12 DIAGNOSIS — T84.53XA INFECTION AND INFLAMMATORY REACTION DUE TO INTERNAL RIGHT KNEE PROSTHESIS, INITIAL ENCOUNTER: ICD-10-CM

## 2025-02-12 DIAGNOSIS — Z33.2 ENCOUNTER FOR ELECTIVE TERMINATION OF PREGNANCY: Chronic | ICD-10-CM

## 2025-02-12 DIAGNOSIS — Z96.652 PRESENCE OF LEFT ARTIFICIAL KNEE JOINT: Chronic | ICD-10-CM

## 2025-02-12 DIAGNOSIS — H26.9 UNSPECIFIED CATARACT: Chronic | ICD-10-CM

## 2025-02-12 DIAGNOSIS — Z98.890 OTHER SPECIFIED POSTPROCEDURAL STATES: Chronic | ICD-10-CM

## 2025-02-12 LAB
GLUCOSE BLDC GLUCOMTR-MCNC: 118 MG/DL — HIGH (ref 70–99)
GLUCOSE BLDC GLUCOMTR-MCNC: 118 MG/DL — HIGH (ref 70–99)
GLUCOSE BLDC GLUCOMTR-MCNC: 139 MG/DL — HIGH (ref 70–99)
GLUCOSE BLDC GLUCOMTR-MCNC: 167 MG/DL — HIGH (ref 70–99)
GRAM STN FLD: SIGNIFICANT CHANGE UP
NIGHT BLUE STAIN TISS: SIGNIFICANT CHANGE UP
SPECIMEN SOURCE: SIGNIFICANT CHANGE UP

## 2025-02-12 PROCEDURE — 88300 SURGICAL PATH GROSS: CPT | Mod: 26

## 2025-02-12 PROCEDURE — 73560 X-RAY EXAM OF KNEE 1 OR 2: CPT | Mod: 26,RT

## 2025-02-12 PROCEDURE — 27487 REVISE/REPLACE KNEE JOINT: CPT | Mod: RT

## 2025-02-12 DEVICE — IMPLANTABLE DEVICE: Type: IMPLANTABLE DEVICE | Status: FUNCTIONAL

## 2025-02-12 DEVICE — PIN TROCAR GEN 1/8 X 3IN: Type: IMPLANTABLE DEVICE | Status: FUNCTIONAL

## 2025-02-12 DEVICE — CEMENT SIMPLEX WITH TOBRAMYCIN: Type: IMPLANTABLE DEVICE | Status: FUNCTIONAL

## 2025-02-12 DEVICE — PIN SPD NON-RIMMED 65MM STRL: Type: IMPLANTABLE DEVICE | Status: FUNCTIONAL

## 2025-02-12 RX ORDER — ACETAMINOPHEN 500 MG/5ML
975 LIQUID (ML) ORAL EVERY 8 HOURS
Refills: 0 | Status: DISCONTINUED | OUTPATIENT
Start: 2025-02-12 | End: 2025-02-15

## 2025-02-12 RX ORDER — AMLODIPINE BESYLATE 10 MG/1
5 TABLET ORAL DAILY
Refills: 0 | Status: DISCONTINUED | OUTPATIENT
Start: 2025-02-12 | End: 2025-02-15

## 2025-02-12 RX ORDER — SODIUM CHLORIDE 9 G/1000ML
500 INJECTION, SOLUTION INTRAVENOUS ONCE
Refills: 0 | Status: COMPLETED | OUTPATIENT
Start: 2025-02-12 | End: 2025-02-13

## 2025-02-12 RX ORDER — HYDROMORPHONE/SOD CHLOR,ISO/PF 2 MG/10 ML
0.5 SYRINGE (ML) INJECTION
Refills: 0 | Status: DISCONTINUED | OUTPATIENT
Start: 2025-02-12 | End: 2025-02-12

## 2025-02-12 RX ORDER — HYDRALAZINE HCL 100 MG
1 TABLET ORAL
Refills: 0 | DISCHARGE

## 2025-02-12 RX ORDER — DEXTROSE 50 % IN WATER 50 %
15 SYRINGE (ML) INTRAVENOUS ONCE
Refills: 0 | Status: DISCONTINUED | OUTPATIENT
Start: 2025-02-12 | End: 2025-02-15

## 2025-02-12 RX ORDER — ASPIRIN 325 MG
325 TABLET ORAL EVERY 12 HOURS
Refills: 0 | Status: DISCONTINUED | OUTPATIENT
Start: 2025-02-12 | End: 2025-02-12

## 2025-02-12 RX ORDER — SODIUM CHLORIDE 9 G/1000ML
1000 INJECTION, SOLUTION INTRAVENOUS
Refills: 0 | Status: DISCONTINUED | OUTPATIENT
Start: 2025-02-12 | End: 2025-02-15

## 2025-02-12 RX ORDER — HYDROMORPHONE/SOD CHLOR,ISO/PF 2 MG/10 ML
0.5 SYRINGE (ML) INJECTION ONCE
Refills: 0 | Status: DISCONTINUED | OUTPATIENT
Start: 2025-02-12 | End: 2025-02-15

## 2025-02-12 RX ORDER — KETOROLAC TROMETHAMINE 30 MG/ML
15 INJECTION, SOLUTION INTRAMUSCULAR; INTRAVENOUS EVERY 6 HOURS
Refills: 0 | Status: DISCONTINUED | OUTPATIENT
Start: 2025-02-12 | End: 2025-02-13

## 2025-02-12 RX ORDER — CEFAZOLIN SODIUM IN 0.9 % NACL 3 G/100 ML
2000 INTRAVENOUS SOLUTION, PIGGYBACK (ML) INTRAVENOUS EVERY 8 HOURS
Refills: 0 | Status: DISCONTINUED | OUTPATIENT
Start: 2025-02-12 | End: 2025-02-13

## 2025-02-12 RX ORDER — DEXTROSE 50 % IN WATER 50 %
12.5 SYRINGE (ML) INTRAVENOUS ONCE
Refills: 0 | Status: DISCONTINUED | OUTPATIENT
Start: 2025-02-12 | End: 2025-02-15

## 2025-02-12 RX ORDER — SODIUM CHLORIDE 9 G/1000ML
500 INJECTION, SOLUTION INTRAVENOUS ONCE
Refills: 0 | Status: COMPLETED | OUTPATIENT
Start: 2025-02-12 | End: 2025-02-12

## 2025-02-12 RX ORDER — OXYCODONE HYDROCHLORIDE 30 MG/1
2.5 TABLET ORAL
Refills: 0 | Status: DISCONTINUED | OUTPATIENT
Start: 2025-02-12 | End: 2025-02-15

## 2025-02-12 RX ORDER — METFORMIN HYDROCHLORIDE 1000 MG/1
1 TABLET, COATED ORAL
Refills: 0 | DISCHARGE

## 2025-02-12 RX ORDER — MAGNESIUM HYDROXIDE 400 MG/5ML
30 SUSPENSION ORAL DAILY
Refills: 0 | Status: DISCONTINUED | OUTPATIENT
Start: 2025-02-12 | End: 2025-02-15

## 2025-02-12 RX ORDER — OXYCODONE HYDROCHLORIDE 30 MG/1
5 TABLET ORAL
Refills: 0 | Status: DISCONTINUED | OUTPATIENT
Start: 2025-02-12 | End: 2025-02-15

## 2025-02-12 RX ORDER — FERROUS SULFATE 325(65) MG
1 TABLET ORAL
Refills: 0 | DISCHARGE

## 2025-02-12 RX ORDER — ONDANSETRON HCL/PF 4 MG/2 ML
4 VIAL (ML) INJECTION EVERY 6 HOURS
Refills: 0 | Status: DISCONTINUED | OUTPATIENT
Start: 2025-02-12 | End: 2025-02-15

## 2025-02-12 RX ORDER — INSULIN LISPRO 100 U/ML
INJECTION, SOLUTION INTRAVENOUS; SUBCUTANEOUS
Refills: 0 | Status: DISCONTINUED | OUTPATIENT
Start: 2025-02-12 | End: 2025-02-15

## 2025-02-12 RX ORDER — CELECOXIB 50 MG/1
200 CAPSULE ORAL EVERY 12 HOURS
Refills: 0 | Status: DISCONTINUED | OUTPATIENT
Start: 2025-02-13 | End: 2025-02-15

## 2025-02-12 RX ORDER — INSULIN LISPRO 100 U/ML
INJECTION, SOLUTION INTRAVENOUS; SUBCUTANEOUS AT BEDTIME
Refills: 0 | Status: DISCONTINUED | OUTPATIENT
Start: 2025-02-12 | End: 2025-02-15

## 2025-02-12 RX ORDER — ALBUTEROL 90 MCG
2 AEROSOL REFILL (GRAM) INHALATION
Refills: 0 | DISCHARGE

## 2025-02-12 RX ORDER — DEXTROSE 50 % IN WATER 50 %
25 SYRINGE (ML) INTRAVENOUS ONCE
Refills: 0 | Status: DISCONTINUED | OUTPATIENT
Start: 2025-02-12 | End: 2025-02-15

## 2025-02-12 RX ORDER — TRAMADOL HYDROCHLORIDE 50 MG/1
50 TABLET, FILM COATED ORAL ONCE
Refills: 0 | Status: DISCONTINUED | OUTPATIENT
Start: 2025-02-12 | End: 2025-02-12

## 2025-02-12 RX ORDER — FENTANYL CITRATE-0.9 % NACL/PF 100MCG/2ML
25 SYRINGE (ML) INTRAVENOUS
Refills: 0 | Status: DISCONTINUED | OUTPATIENT
Start: 2025-02-12 | End: 2025-02-12

## 2025-02-12 RX ORDER — OXYCODONE HYDROCHLORIDE 30 MG/1
5 TABLET ORAL ONCE
Refills: 0 | Status: DISCONTINUED | OUTPATIENT
Start: 2025-02-12 | End: 2025-02-12

## 2025-02-12 RX ORDER — APIXABAN 2.5 MG/1
2.5 TABLET, FILM COATED ORAL EVERY 12 HOURS
Refills: 0 | Status: DISCONTINUED | OUTPATIENT
Start: 2025-02-13 | End: 2025-02-15

## 2025-02-12 RX ORDER — GABAPENTIN 400 MG/1
300 CAPSULE ORAL
Refills: 0 | Status: DISCONTINUED | OUTPATIENT
Start: 2025-02-12 | End: 2025-02-15

## 2025-02-12 RX ORDER — VALSARTAN 80 MG
1 TABLET ORAL
Refills: 0 | DISCHARGE

## 2025-02-12 RX ORDER — GLUCAGON 3 MG/1
1 POWDER NASAL ONCE
Refills: 0 | Status: DISCONTINUED | OUTPATIENT
Start: 2025-02-12 | End: 2025-02-15

## 2025-02-12 RX ORDER — DEXAMETHASONE 0.5 MG/1
8 TABLET ORAL ONCE
Refills: 0 | Status: COMPLETED | OUTPATIENT
Start: 2025-02-13 | End: 2025-02-13

## 2025-02-12 RX ORDER — POLYETHYLENE GLYCOL 3350 17 G/17G
17 POWDER, FOR SOLUTION ORAL AT BEDTIME
Refills: 0 | Status: DISCONTINUED | OUTPATIENT
Start: 2025-02-12 | End: 2025-02-15

## 2025-02-12 RX ORDER — SENNA 187 MG
2 TABLET ORAL AT BEDTIME
Refills: 0 | Status: DISCONTINUED | OUTPATIENT
Start: 2025-02-12 | End: 2025-02-15

## 2025-02-12 RX ORDER — TRAMADOL HYDROCHLORIDE 50 MG/1
50 TABLET, FILM COATED ORAL EVERY 6 HOURS
Refills: 0 | Status: DISCONTINUED | OUTPATIENT
Start: 2025-02-12 | End: 2025-02-15

## 2025-02-12 RX ORDER — ACETAMINOPHEN 500 MG/5ML
1000 LIQUID (ML) ORAL ONCE
Refills: 0 | Status: COMPLETED | OUTPATIENT
Start: 2025-02-12 | End: 2025-02-12

## 2025-02-12 RX ADMIN — Medication 0.5 MILLIGRAM(S): at 19:00

## 2025-02-12 RX ADMIN — OXYCODONE HYDROCHLORIDE 5 MILLIGRAM(S): 30 TABLET ORAL at 23:30

## 2025-02-12 RX ADMIN — Medication 0.5 MILLIGRAM(S): at 18:15

## 2025-02-12 RX ADMIN — OXYCODONE HYDROCHLORIDE 5 MILLIGRAM(S): 30 TABLET ORAL at 22:35

## 2025-02-12 RX ADMIN — Medication 0.5 MILLIGRAM(S): at 17:45

## 2025-02-12 RX ADMIN — POLYETHYLENE GLYCOL 3350 17 GRAM(S): 17 POWDER, FOR SOLUTION ORAL at 22:32

## 2025-02-12 RX ADMIN — Medication 100 MILLIGRAM(S): at 22:32

## 2025-02-12 RX ADMIN — Medication 975 MILLIGRAM(S): at 22:31

## 2025-02-12 RX ADMIN — Medication 2 TABLET(S): at 22:31

## 2025-02-12 RX ADMIN — SODIUM CHLORIDE 500 MILLILITER(S): 9 INJECTION, SOLUTION INTRAVENOUS at 22:36

## 2025-02-12 RX ADMIN — Medication 975 MILLIGRAM(S): at 23:33

## 2025-02-12 RX ADMIN — Medication 1 APPLICATION(S): at 10:40

## 2025-02-12 RX ADMIN — OXYCODONE HYDROCHLORIDE 5 MILLIGRAM(S): 30 TABLET ORAL at 20:00

## 2025-02-12 RX ADMIN — SODIUM CHLORIDE 500 MILLILITER(S): 9 INJECTION, SOLUTION INTRAVENOUS at 17:48

## 2025-02-12 RX ADMIN — OXYCODONE HYDROCHLORIDE 5 MILLIGRAM(S): 30 TABLET ORAL at 19:03

## 2025-02-12 RX ADMIN — Medication 0.5 MILLIGRAM(S): at 18:19

## 2025-02-12 RX ADMIN — TRAMADOL HYDROCHLORIDE 50 MILLIGRAM(S): 50 TABLET, FILM COATED ORAL at 11:43

## 2025-02-12 RX ADMIN — KETOROLAC TROMETHAMINE 15 MILLIGRAM(S): 30 INJECTION, SOLUTION INTRAMUSCULAR; INTRAVENOUS at 19:03

## 2025-02-12 RX ADMIN — Medication 1 APPLICATION(S): at 11:40

## 2025-02-12 RX ADMIN — KETOROLAC TROMETHAMINE 15 MILLIGRAM(S): 30 INJECTION, SOLUTION INTRAMUSCULAR; INTRAVENOUS at 20:00

## 2025-02-13 ENCOUNTER — TRANSCRIPTION ENCOUNTER (OUTPATIENT)
Age: 65
End: 2025-02-13

## 2025-02-13 LAB
ANION GAP SERPL CALC-SCNC: 15 MMOL/L — HIGH (ref 7–14)
BUN SERPL-MCNC: 13 MG/DL — SIGNIFICANT CHANGE UP (ref 7–23)
CALCIUM SERPL-MCNC: 8.4 MG/DL — SIGNIFICANT CHANGE UP (ref 8.4–10.5)
CHLORIDE SERPL-SCNC: 100 MMOL/L — SIGNIFICANT CHANGE UP (ref 98–107)
CO2 SERPL-SCNC: 22 MMOL/L — SIGNIFICANT CHANGE UP (ref 22–31)
CREAT SERPL-MCNC: 1.09 MG/DL — SIGNIFICANT CHANGE UP (ref 0.5–1.3)
EGFR: 56 ML/MIN/1.73M2 — LOW
EGFR: 56 ML/MIN/1.73M2 — LOW
GLUCOSE BLDC GLUCOMTR-MCNC: 155 MG/DL — HIGH (ref 70–99)
GLUCOSE BLDC GLUCOMTR-MCNC: 158 MG/DL — HIGH (ref 70–99)
GLUCOSE BLDC GLUCOMTR-MCNC: 179 MG/DL — HIGH (ref 70–99)
GLUCOSE BLDC GLUCOMTR-MCNC: 196 MG/DL — HIGH (ref 70–99)
GLUCOSE SERPL-MCNC: 140 MG/DL — HIGH (ref 70–99)
GRAM STN FLD: SIGNIFICANT CHANGE UP
GRAM STN FLD: SIGNIFICANT CHANGE UP
HCT VFR BLD CALC: 25.4 % — LOW (ref 34.5–45)
HGB BLD-MCNC: 8.1 G/DL — LOW (ref 11.5–15.5)
MCHC RBC-ENTMCNC: 28.3 PG — SIGNIFICANT CHANGE UP (ref 27–34)
MCHC RBC-ENTMCNC: 31.9 G/DL — LOW (ref 32–36)
MCV RBC AUTO: 88.8 FL — SIGNIFICANT CHANGE UP (ref 80–100)
NIGHT BLUE STAIN TISS: SIGNIFICANT CHANGE UP
NRBC # BLD AUTO: 0 K/UL — SIGNIFICANT CHANGE UP (ref 0–0)
NRBC # FLD: 0 K/UL — SIGNIFICANT CHANGE UP (ref 0–0)
NRBC BLD AUTO-RTO: 0 /100 WBCS — SIGNIFICANT CHANGE UP (ref 0–0)
PLATELET # BLD AUTO: 221 K/UL — SIGNIFICANT CHANGE UP (ref 150–400)
POTASSIUM SERPL-MCNC: 3.7 MMOL/L — SIGNIFICANT CHANGE UP (ref 3.5–5.3)
POTASSIUM SERPL-SCNC: 3.7 MMOL/L — SIGNIFICANT CHANGE UP (ref 3.5–5.3)
RBC # BLD: 2.86 M/UL — LOW (ref 3.8–5.2)
RBC # FLD: 17.2 % — HIGH (ref 10.3–14.5)
SODIUM SERPL-SCNC: 137 MMOL/L — SIGNIFICANT CHANGE UP (ref 135–145)
SPECIMEN SOURCE: SIGNIFICANT CHANGE UP
WBC # BLD: 5.23 K/UL — SIGNIFICANT CHANGE UP (ref 3.8–10.5)
WBC # FLD AUTO: 5.23 K/UL — SIGNIFICANT CHANGE UP (ref 3.8–10.5)

## 2025-02-13 PROCEDURE — 99223 1ST HOSP IP/OBS HIGH 75: CPT | Mod: FS

## 2025-02-13 RX ORDER — CEFAZOLIN SODIUM IN 0.9 % NACL 3 G/100 ML
2000 INTRAVENOUS SOLUTION, PIGGYBACK (ML) INTRAVENOUS EVERY 8 HOURS
Refills: 0 | Status: DISCONTINUED | OUTPATIENT
Start: 2025-02-13 | End: 2025-02-15

## 2025-02-13 RX ORDER — SIMETHICONE 80 MG
80 TABLET,CHEWABLE ORAL DAILY
Refills: 0 | Status: DISCONTINUED | OUTPATIENT
Start: 2025-02-13 | End: 2025-02-15

## 2025-02-13 RX ADMIN — Medication 100 MILLIGRAM(S): at 05:34

## 2025-02-13 RX ADMIN — Medication 40 MILLIGRAM(S): at 05:36

## 2025-02-13 RX ADMIN — GABAPENTIN 300 MILLIGRAM(S): 400 CAPSULE ORAL at 17:25

## 2025-02-13 RX ADMIN — APIXABAN 2.5 MILLIGRAM(S): 2.5 TABLET, FILM COATED ORAL at 17:26

## 2025-02-13 RX ADMIN — OXYCODONE HYDROCHLORIDE 5 MILLIGRAM(S): 30 TABLET ORAL at 04:28

## 2025-02-13 RX ADMIN — KETOROLAC TROMETHAMINE 15 MILLIGRAM(S): 30 INJECTION, SOLUTION INTRAMUSCULAR; INTRAVENOUS at 11:59

## 2025-02-13 RX ADMIN — Medication 100 MILLIGRAM(S): at 13:25

## 2025-02-13 RX ADMIN — Medication 975 MILLIGRAM(S): at 21:13

## 2025-02-13 RX ADMIN — GABAPENTIN 300 MILLIGRAM(S): 400 CAPSULE ORAL at 05:36

## 2025-02-13 RX ADMIN — OXYCODONE HYDROCHLORIDE 5 MILLIGRAM(S): 30 TABLET ORAL at 05:36

## 2025-02-13 RX ADMIN — OXYCODONE HYDROCHLORIDE 5 MILLIGRAM(S): 30 TABLET ORAL at 22:13

## 2025-02-13 RX ADMIN — CELECOXIB 200 MILLIGRAM(S): 50 CAPSULE ORAL at 17:25

## 2025-02-13 RX ADMIN — Medication 2 TABLET(S): at 21:13

## 2025-02-13 RX ADMIN — Medication 80 MILLIGRAM(S): at 19:21

## 2025-02-13 RX ADMIN — Medication 100 MILLIGRAM(S): at 06:36

## 2025-02-13 RX ADMIN — Medication 975 MILLIGRAM(S): at 22:13

## 2025-02-13 RX ADMIN — INSULIN LISPRO 1: 100 INJECTION, SOLUTION INTRAVENOUS; SUBCUTANEOUS at 07:35

## 2025-02-13 RX ADMIN — OXYCODONE HYDROCHLORIDE 5 MILLIGRAM(S): 30 TABLET ORAL at 14:52

## 2025-02-13 RX ADMIN — CELECOXIB 200 MILLIGRAM(S): 50 CAPSULE ORAL at 18:25

## 2025-02-13 RX ADMIN — Medication 100 MILLIGRAM(S): at 21:13

## 2025-02-13 RX ADMIN — KETOROLAC TROMETHAMINE 15 MILLIGRAM(S): 30 INJECTION, SOLUTION INTRAMUSCULAR; INTRAVENOUS at 05:35

## 2025-02-13 RX ADMIN — Medication 975 MILLIGRAM(S): at 05:36

## 2025-02-13 RX ADMIN — INSULIN LISPRO 1: 100 INJECTION, SOLUTION INTRAVENOUS; SUBCUTANEOUS at 11:44

## 2025-02-13 RX ADMIN — OXYCODONE HYDROCHLORIDE 5 MILLIGRAM(S): 30 TABLET ORAL at 21:13

## 2025-02-13 RX ADMIN — Medication 975 MILLIGRAM(S): at 14:25

## 2025-02-13 RX ADMIN — SODIUM CHLORIDE 500 MILLILITER(S): 9 INJECTION, SOLUTION INTRAVENOUS at 05:42

## 2025-02-13 RX ADMIN — DEXAMETHASONE 101.6 MILLIGRAM(S): 0.5 TABLET ORAL at 05:34

## 2025-02-13 RX ADMIN — APIXABAN 2.5 MILLIGRAM(S): 2.5 TABLET, FILM COATED ORAL at 05:42

## 2025-02-13 RX ADMIN — INSULIN LISPRO 1: 100 INJECTION, SOLUTION INTRAVENOUS; SUBCUTANEOUS at 16:27

## 2025-02-13 RX ADMIN — KETOROLAC TROMETHAMINE 15 MILLIGRAM(S): 30 INJECTION, SOLUTION INTRAMUSCULAR; INTRAVENOUS at 11:44

## 2025-02-13 RX ADMIN — OXYCODONE HYDROCHLORIDE 5 MILLIGRAM(S): 30 TABLET ORAL at 13:52

## 2025-02-13 RX ADMIN — OXYCODONE HYDROCHLORIDE 5 MILLIGRAM(S): 30 TABLET ORAL at 09:56

## 2025-02-13 RX ADMIN — OXYCODONE HYDROCHLORIDE 5 MILLIGRAM(S): 30 TABLET ORAL at 08:56

## 2025-02-13 RX ADMIN — AMLODIPINE BESYLATE 5 MILLIGRAM(S): 10 TABLET ORAL at 05:36

## 2025-02-13 RX ADMIN — Medication 975 MILLIGRAM(S): at 13:25

## 2025-02-14 LAB
ANION GAP SERPL CALC-SCNC: 12 MMOL/L — SIGNIFICANT CHANGE UP (ref 7–14)
BLD GP AB SCN SERPL QL: POSITIVE — SIGNIFICANT CHANGE UP
BUN SERPL-MCNC: 10 MG/DL — SIGNIFICANT CHANGE UP (ref 7–23)
CALCIUM SERPL-MCNC: 8.4 MG/DL — SIGNIFICANT CHANGE UP (ref 8.4–10.5)
CHLORIDE SERPL-SCNC: 105 MMOL/L — SIGNIFICANT CHANGE UP (ref 98–107)
CO2 SERPL-SCNC: 22 MMOL/L — SIGNIFICANT CHANGE UP (ref 22–31)
CREAT SERPL-MCNC: 0.84 MG/DL — SIGNIFICANT CHANGE UP (ref 0.5–1.3)
EGFR: 77 ML/MIN/1.73M2 — SIGNIFICANT CHANGE UP
EGFR: 77 ML/MIN/1.73M2 — SIGNIFICANT CHANGE UP
GLUCOSE BLDC GLUCOMTR-MCNC: 124 MG/DL — HIGH (ref 70–99)
GLUCOSE BLDC GLUCOMTR-MCNC: 126 MG/DL — HIGH (ref 70–99)
GLUCOSE BLDC GLUCOMTR-MCNC: 128 MG/DL — HIGH (ref 70–99)
GLUCOSE BLDC GLUCOMTR-MCNC: 159 MG/DL — HIGH (ref 70–99)
GLUCOSE SERPL-MCNC: 112 MG/DL — HIGH (ref 70–99)
HCT VFR BLD CALC: 22.9 % — LOW (ref 34.5–45)
HGB BLD-MCNC: 7.4 G/DL — LOW (ref 11.5–15.5)
MCHC RBC-ENTMCNC: 28.5 PG — SIGNIFICANT CHANGE UP (ref 27–34)
MCHC RBC-ENTMCNC: 32.3 G/DL — SIGNIFICANT CHANGE UP (ref 32–36)
MCV RBC AUTO: 88.1 FL — SIGNIFICANT CHANGE UP (ref 80–100)
NRBC # BLD AUTO: 0 K/UL — SIGNIFICANT CHANGE UP (ref 0–0)
NRBC # FLD: 0 K/UL — SIGNIFICANT CHANGE UP (ref 0–0)
NRBC BLD AUTO-RTO: 0 /100 WBCS — SIGNIFICANT CHANGE UP (ref 0–0)
PLATELET # BLD AUTO: 196 K/UL — SIGNIFICANT CHANGE UP (ref 150–400)
POTASSIUM SERPL-MCNC: 3.6 MMOL/L — SIGNIFICANT CHANGE UP (ref 3.5–5.3)
POTASSIUM SERPL-SCNC: 3.6 MMOL/L — SIGNIFICANT CHANGE UP (ref 3.5–5.3)
RBC # BLD: 2.6 M/UL — LOW (ref 3.8–5.2)
RBC # FLD: 17 % — HIGH (ref 10.3–14.5)
RH IG SCN BLD-IMP: NEGATIVE — SIGNIFICANT CHANGE UP
SODIUM SERPL-SCNC: 139 MMOL/L — SIGNIFICANT CHANGE UP (ref 135–145)
WBC # BLD: 5.65 K/UL — SIGNIFICANT CHANGE UP (ref 3.8–10.5)
WBC # FLD AUTO: 5.65 K/UL — SIGNIFICANT CHANGE UP (ref 3.8–10.5)

## 2025-02-14 PROCEDURE — 99232 SBSQ HOSP IP/OBS MODERATE 35: CPT | Mod: FS

## 2025-02-14 RX ORDER — TRAMADOL HYDROCHLORIDE 50 MG/1
1 TABLET, FILM COATED ORAL
Qty: 0 | Refills: 0 | DISCHARGE
Start: 2025-02-14

## 2025-02-14 RX ORDER — OMEPRAZOLE 20 MG/1
1 CAPSULE, DELAYED RELEASE ORAL
Refills: 0 | DISCHARGE

## 2025-02-14 RX ORDER — AMLODIPINE BESYLATE 10 MG/1
1 TABLET ORAL
Qty: 0 | Refills: 0 | DISCHARGE
Start: 2025-02-14

## 2025-02-14 RX ORDER — OXYCODONE HYDROCHLORIDE 30 MG/1
1 TABLET ORAL
Qty: 0 | Refills: 0 | DISCHARGE
Start: 2025-02-14

## 2025-02-14 RX ORDER — ASPIRIN 81 MG/1
1 TABLET, COATED ORAL
Refills: 0 | DISCHARGE

## 2025-02-14 RX ORDER — MAGNESIUM HYDROXIDE 400 MG/5ML
30 SUSPENSION ORAL
Qty: 0 | Refills: 0 | DISCHARGE
Start: 2025-02-14

## 2025-02-14 RX ORDER — GABAPENTIN 800 MG/1
1 TABLET ORAL
Refills: 0 | DISCHARGE

## 2025-02-14 RX ORDER — GABAPENTIN 400 MG/1
1 CAPSULE ORAL
Qty: 0 | Refills: 0 | DISCHARGE
Start: 2025-02-14

## 2025-02-14 RX ORDER — SIMETHICONE 80 MG
1 TABLET,CHEWABLE ORAL
Qty: 0 | Refills: 0 | DISCHARGE
Start: 2025-02-14

## 2025-02-14 RX ORDER — CEFAZOLIN SODIUM IN 0.9 % NACL 3 G/100 ML
2 INTRAVENOUS SOLUTION, PIGGYBACK (ML) INTRAVENOUS
Qty: 0 | Refills: 0 | DISCHARGE
Start: 2025-02-14

## 2025-02-14 RX ORDER — TRAMADOL HYDROCHLORIDE 100 MG/1
1 TABLET, EXTENDED RELEASE ORAL
Refills: 0 | DISCHARGE

## 2025-02-14 RX ORDER — APIXABAN 2.5 MG/1
1 TABLET, FILM COATED ORAL
Qty: 0 | Refills: 0 | DISCHARGE
Start: 2025-02-14

## 2025-02-14 RX ORDER — CELECOXIB 50 MG/1
1 CAPSULE ORAL
Qty: 0 | Refills: 0 | DISCHARGE
Start: 2025-02-14

## 2025-02-14 RX ORDER — ACETAMINOPHEN 500 MG/5ML
3 LIQUID (ML) ORAL
Qty: 0 | Refills: 0 | DISCHARGE
Start: 2025-02-14

## 2025-02-14 RX ORDER — POLYETHYLENE GLYCOL 3350 17 G/17G
17 POWDER, FOR SOLUTION ORAL
Qty: 0 | Refills: 0 | DISCHARGE
Start: 2025-02-14

## 2025-02-14 RX ORDER — LIDOCAINE HYDROCHLORIDE 20 MG/ML
1 JELLY TOPICAL ONCE
Refills: 0 | Status: COMPLETED | OUTPATIENT
Start: 2025-02-14 | End: 2025-02-14

## 2025-02-14 RX ORDER — AMLODIPINE BESYLATE 5 MG
1 TABLET ORAL
Refills: 0 | DISCHARGE

## 2025-02-14 RX ORDER — SENNA 187 MG
2 TABLET ORAL
Qty: 0 | Refills: 0 | DISCHARGE
Start: 2025-02-14

## 2025-02-14 RX ADMIN — OXYCODONE HYDROCHLORIDE 5 MILLIGRAM(S): 30 TABLET ORAL at 05:01

## 2025-02-14 RX ADMIN — Medication 100 MILLIGRAM(S): at 15:05

## 2025-02-14 RX ADMIN — OXYCODONE HYDROCHLORIDE 5 MILLIGRAM(S): 30 TABLET ORAL at 15:22

## 2025-02-14 RX ADMIN — Medication 80 MILLIGRAM(S): at 01:10

## 2025-02-14 RX ADMIN — APIXABAN 2.5 MILLIGRAM(S): 2.5 TABLET, FILM COATED ORAL at 17:26

## 2025-02-14 RX ADMIN — APIXABAN 2.5 MILLIGRAM(S): 2.5 TABLET, FILM COATED ORAL at 06:54

## 2025-02-14 RX ADMIN — CELECOXIB 200 MILLIGRAM(S): 50 CAPSULE ORAL at 17:26

## 2025-02-14 RX ADMIN — OXYCODONE HYDROCHLORIDE 5 MILLIGRAM(S): 30 TABLET ORAL at 08:48

## 2025-02-14 RX ADMIN — Medication 100 MILLIGRAM(S): at 22:34

## 2025-02-14 RX ADMIN — CELECOXIB 200 MILLIGRAM(S): 50 CAPSULE ORAL at 18:16

## 2025-02-14 RX ADMIN — Medication 975 MILLIGRAM(S): at 06:55

## 2025-02-14 RX ADMIN — Medication 975 MILLIGRAM(S): at 22:34

## 2025-02-14 RX ADMIN — Medication 975 MILLIGRAM(S): at 23:34

## 2025-02-14 RX ADMIN — GABAPENTIN 300 MILLIGRAM(S): 400 CAPSULE ORAL at 17:26

## 2025-02-14 RX ADMIN — CELECOXIB 200 MILLIGRAM(S): 50 CAPSULE ORAL at 06:55

## 2025-02-14 RX ADMIN — Medication 2 TABLET(S): at 22:33

## 2025-02-14 RX ADMIN — Medication 40 MILLIGRAM(S): at 06:54

## 2025-02-14 RX ADMIN — LIDOCAINE HYDROCHLORIDE 1 PATCH: 20 JELLY TOPICAL at 13:36

## 2025-02-14 RX ADMIN — GABAPENTIN 300 MILLIGRAM(S): 400 CAPSULE ORAL at 06:54

## 2025-02-14 RX ADMIN — Medication 975 MILLIGRAM(S): at 15:05

## 2025-02-14 RX ADMIN — OXYCODONE HYDROCHLORIDE 5 MILLIGRAM(S): 30 TABLET ORAL at 21:31

## 2025-02-14 RX ADMIN — OXYCODONE HYDROCHLORIDE 5 MILLIGRAM(S): 30 TABLET ORAL at 04:01

## 2025-02-14 RX ADMIN — OXYCODONE HYDROCHLORIDE 5 MILLIGRAM(S): 30 TABLET ORAL at 09:50

## 2025-02-14 RX ADMIN — Medication 975 MILLIGRAM(S): at 16:21

## 2025-02-14 RX ADMIN — OXYCODONE HYDROCHLORIDE 5 MILLIGRAM(S): 30 TABLET ORAL at 20:31

## 2025-02-14 RX ADMIN — LIDOCAINE HYDROCHLORIDE 1 PATCH: 20 JELLY TOPICAL at 17:22

## 2025-02-14 RX ADMIN — CELECOXIB 200 MILLIGRAM(S): 50 CAPSULE ORAL at 07:55

## 2025-02-14 RX ADMIN — Medication 975 MILLIGRAM(S): at 07:55

## 2025-02-14 RX ADMIN — OXYCODONE HYDROCHLORIDE 5 MILLIGRAM(S): 30 TABLET ORAL at 13:36

## 2025-02-14 RX ADMIN — Medication 100 MILLIGRAM(S): at 06:54

## 2025-02-15 VITALS
SYSTOLIC BLOOD PRESSURE: 101 MMHG | DIASTOLIC BLOOD PRESSURE: 79 MMHG | HEART RATE: 81 BPM | OXYGEN SATURATION: 97 % | TEMPERATURE: 98 F | RESPIRATION RATE: 18 BRPM

## 2025-02-15 LAB
GLUCOSE BLDC GLUCOMTR-MCNC: 114 MG/DL — HIGH (ref 70–99)
GLUCOSE BLDC GLUCOMTR-MCNC: 117 MG/DL — HIGH (ref 70–99)
GLUCOSE BLDC GLUCOMTR-MCNC: 119 MG/DL — HIGH (ref 70–99)

## 2025-02-15 PROCEDURE — 99232 SBSQ HOSP IP/OBS MODERATE 35: CPT

## 2025-02-15 RX ORDER — FUROSEMIDE 10 MG/ML
20 INJECTION INTRAMUSCULAR; INTRAVENOUS DAILY
Refills: 0 | Status: DISCONTINUED | OUTPATIENT
Start: 2025-02-15 | End: 2025-02-15

## 2025-02-15 RX ADMIN — OXYCODONE HYDROCHLORIDE 5 MILLIGRAM(S): 30 TABLET ORAL at 14:22

## 2025-02-15 RX ADMIN — OXYCODONE HYDROCHLORIDE 5 MILLIGRAM(S): 30 TABLET ORAL at 02:12

## 2025-02-15 RX ADMIN — Medication 100 MILLIGRAM(S): at 06:02

## 2025-02-15 RX ADMIN — CELECOXIB 200 MILLIGRAM(S): 50 CAPSULE ORAL at 07:03

## 2025-02-15 RX ADMIN — Medication 975 MILLIGRAM(S): at 14:22

## 2025-02-15 RX ADMIN — Medication 975 MILLIGRAM(S): at 13:22

## 2025-02-15 RX ADMIN — OXYCODONE HYDROCHLORIDE 5 MILLIGRAM(S): 30 TABLET ORAL at 03:12

## 2025-02-15 RX ADMIN — Medication 975 MILLIGRAM(S): at 07:03

## 2025-02-15 RX ADMIN — Medication 40 MILLIGRAM(S): at 06:03

## 2025-02-15 RX ADMIN — Medication 100 MILLIGRAM(S): at 13:22

## 2025-02-15 RX ADMIN — GABAPENTIN 300 MILLIGRAM(S): 400 CAPSULE ORAL at 17:29

## 2025-02-15 RX ADMIN — GABAPENTIN 300 MILLIGRAM(S): 400 CAPSULE ORAL at 06:03

## 2025-02-15 RX ADMIN — FUROSEMIDE 20 MILLIGRAM(S): 10 INJECTION INTRAMUSCULAR; INTRAVENOUS at 10:06

## 2025-02-15 RX ADMIN — OXYCODONE HYDROCHLORIDE 5 MILLIGRAM(S): 30 TABLET ORAL at 13:22

## 2025-02-15 RX ADMIN — CELECOXIB 200 MILLIGRAM(S): 50 CAPSULE ORAL at 06:03

## 2025-02-15 RX ADMIN — APIXABAN 2.5 MILLIGRAM(S): 2.5 TABLET, FILM COATED ORAL at 17:32

## 2025-02-15 RX ADMIN — CELECOXIB 200 MILLIGRAM(S): 50 CAPSULE ORAL at 17:30

## 2025-02-15 RX ADMIN — APIXABAN 2.5 MILLIGRAM(S): 2.5 TABLET, FILM COATED ORAL at 06:03

## 2025-02-15 RX ADMIN — AMLODIPINE BESYLATE 5 MILLIGRAM(S): 10 TABLET ORAL at 06:03

## 2025-02-15 RX ADMIN — Medication 975 MILLIGRAM(S): at 06:03

## 2025-02-15 RX ADMIN — LIDOCAINE HYDROCHLORIDE 1 PATCH: 20 JELLY TOPICAL at 01:30

## 2025-02-17 LAB
CULTURE RESULTS: NO GROWTH — SIGNIFICANT CHANGE UP
CULTURE RESULTS: SIGNIFICANT CHANGE UP
SPECIMEN SOURCE: SIGNIFICANT CHANGE UP

## 2025-02-19 NOTE — H&P PST ADULT - BP NONINVASIVE DIASTOLIC (MM HG)
2/12/25:  Rt knee replacement.  Oxycodone has been causing itching.   (Not terrible).  Pt to stick w/ Tramadol which was also dispensed.  She has had 2 hH PT visits thusfar and is doing well.            67

## 2025-02-24 LAB — SURGICAL PATHOLOGY STUDY: SIGNIFICANT CHANGE UP

## 2025-02-25 ENCOUNTER — APPOINTMENT (OUTPATIENT)
Dept: ORTHOPEDIC SURGERY | Facility: CLINIC | Age: 65
End: 2025-02-25
Payer: MEDICARE

## 2025-02-25 VITALS
HEIGHT: 60 IN | SYSTOLIC BLOOD PRESSURE: 119 MMHG | BODY MASS INDEX: 42.41 KG/M2 | DIASTOLIC BLOOD PRESSURE: 77 MMHG | WEIGHT: 216 LBS

## 2025-02-25 DIAGNOSIS — Z96.659 PRESENCE OF UNSPECIFIED ARTIFICIAL KNEE JOINT: ICD-10-CM

## 2025-02-25 PROCEDURE — 73562 X-RAY EXAM OF KNEE 3: CPT | Mod: RT

## 2025-02-25 PROCEDURE — 99024 POSTOP FOLLOW-UP VISIT: CPT

## 2025-02-26 LAB
CULTURE RESULTS: SIGNIFICANT CHANGE UP
SPECIMEN SOURCE: SIGNIFICANT CHANGE UP

## 2025-03-12 LAB
CULTURE RESULTS: SIGNIFICANT CHANGE UP
SPECIMEN SOURCE: SIGNIFICANT CHANGE UP

## 2025-03-20 ENCOUNTER — APPOINTMENT (OUTPATIENT)
Dept: INTERNAL MEDICINE | Facility: CLINIC | Age: 65
End: 2025-03-20
Payer: MEDICARE

## 2025-03-20 VITALS
HEIGHT: 60 IN | DIASTOLIC BLOOD PRESSURE: 69 MMHG | WEIGHT: 216 LBS | HEART RATE: 80 BPM | TEMPERATURE: 97.6 F | BODY MASS INDEX: 42.41 KG/M2 | SYSTOLIC BLOOD PRESSURE: 121 MMHG

## 2025-03-20 DIAGNOSIS — I10 ESSENTIAL (PRIMARY) HYPERTENSION: ICD-10-CM

## 2025-03-20 DIAGNOSIS — E11.9 TYPE 2 DIABETES MELLITUS W/OUT COMPLICATIONS: ICD-10-CM

## 2025-03-20 PROCEDURE — 99214 OFFICE O/P EST MOD 30 MIN: CPT

## 2025-03-20 RX ORDER — VALACYCLOVIR 1 G/1
1 TABLET, FILM COATED ORAL 3 TIMES DAILY
Qty: 21 | Refills: 0 | Status: ACTIVE | COMMUNITY
Start: 2025-03-20 | End: 1900-01-01

## 2025-03-25 ENCOUNTER — APPOINTMENT (OUTPATIENT)
Dept: ORTHOPEDIC SURGERY | Facility: CLINIC | Age: 65
End: 2025-03-25

## 2025-03-26 DIAGNOSIS — B02.9 ZOSTER W/OUT COMPLICATIONS: ICD-10-CM

## 2025-03-29 LAB
CULTURE RESULTS: SIGNIFICANT CHANGE UP
SPECIMEN SOURCE: SIGNIFICANT CHANGE UP

## 2025-04-08 ENCOUNTER — APPOINTMENT (OUTPATIENT)
Dept: INTERNAL MEDICINE | Facility: CLINIC | Age: 65
End: 2025-04-08
Payer: MEDICARE

## 2025-04-08 VITALS
HEIGHT: 60 IN | TEMPERATURE: 98.7 F | WEIGHT: 216 LBS | SYSTOLIC BLOOD PRESSURE: 130 MMHG | HEART RATE: 83 BPM | OXYGEN SATURATION: 97 % | DIASTOLIC BLOOD PRESSURE: 68 MMHG | BODY MASS INDEX: 42.41 KG/M2

## 2025-04-08 DIAGNOSIS — M25.562 PAIN IN RIGHT KNEE: ICD-10-CM

## 2025-04-08 DIAGNOSIS — T84.50XA INFECTION AND INFLAMMATORY REACTION DUE TO UNSPECIFIED INTERNAL JOINT PROSTHESIS, INITIAL ENCOUNTER: ICD-10-CM

## 2025-04-08 DIAGNOSIS — D64.9 ANEMIA, UNSPECIFIED: ICD-10-CM

## 2025-04-08 DIAGNOSIS — B02.29 OTHER POSTHERPETIC NERVOUS SYSTEM INVOLVEMENT: ICD-10-CM

## 2025-04-08 DIAGNOSIS — M25.561 PAIN IN RIGHT KNEE: ICD-10-CM

## 2025-04-08 PROCEDURE — 36415 COLL VENOUS BLD VENIPUNCTURE: CPT

## 2025-04-08 PROCEDURE — G2211 COMPLEX E/M VISIT ADD ON: CPT

## 2025-04-08 PROCEDURE — 99214 OFFICE O/P EST MOD 30 MIN: CPT

## 2025-04-08 RX ORDER — GABAPENTIN 300 MG/1
300 CAPSULE ORAL TWICE DAILY
Qty: 1 | Refills: 1 | Status: ACTIVE | COMMUNITY
Start: 2025-04-08 | End: 1900-01-01

## 2025-04-13 LAB
ALBUMIN SERPL ELPH-MCNC: 4.3 G/DL
ALP BLD-CCNC: 131 U/L
ALT SERPL-CCNC: 9 U/L
ANION GAP SERPL CALC-SCNC: 16 MMOL/L
AST SERPL-CCNC: 20 U/L
BASOPHILS # BLD AUTO: 0.02 K/UL
BASOPHILS NFR BLD AUTO: 0.5 %
BILIRUB SERPL-MCNC: 0.2 MG/DL
BUN SERPL-MCNC: 19 MG/DL
CALCIUM SERPL-MCNC: 9.7 MG/DL
CHLORIDE SERPL-SCNC: 101 MMOL/L
CO2 SERPL-SCNC: 22 MMOL/L
CREAT SERPL-MCNC: 1.25 MG/DL
EGFRCR SERPLBLD CKD-EPI 2021: 48 ML/MIN/1.73M2
EOSINOPHIL # BLD AUTO: 0.16 K/UL
EOSINOPHIL NFR BLD AUTO: 4.4 %
GLUCOSE SERPL-MCNC: 169 MG/DL
HCT VFR BLD CALC: 30.1 %
HGB BLD-MCNC: 9.6 G/DL
IMM GRANULOCYTES NFR BLD AUTO: 0.3 %
LYMPHOCYTES # BLD AUTO: 1.14 K/UL
LYMPHOCYTES NFR BLD AUTO: 31.1 %
MAN DIFF?: NORMAL
MCHC RBC-ENTMCNC: 28.2 PG
MCHC RBC-ENTMCNC: 31.9 G/DL
MCV RBC AUTO: 88.3 FL
MONOCYTES # BLD AUTO: 0.4 K/UL
MONOCYTES NFR BLD AUTO: 10.9 %
NEUTROPHILS # BLD AUTO: 1.93 K/UL
NEUTROPHILS NFR BLD AUTO: 52.8 %
PLATELET # BLD AUTO: 306 K/UL
POTASSIUM SERPL-SCNC: 4.1 MMOL/L
PROT SERPL-MCNC: 8 G/DL
RBC # BLD: 3.41 M/UL
RBC # FLD: 17 %
SODIUM SERPL-SCNC: 139 MMOL/L
WBC # FLD AUTO: 3.66 K/UL

## 2025-04-22 ENCOUNTER — APPOINTMENT (OUTPATIENT)
Dept: ORTHOPEDIC SURGERY | Facility: CLINIC | Age: 65
End: 2025-04-22
Payer: MEDICARE

## 2025-04-22 DIAGNOSIS — Z96.659 PRESENCE OF UNSPECIFIED ARTIFICIAL KNEE JOINT: ICD-10-CM

## 2025-04-22 PROCEDURE — 99024 POSTOP FOLLOW-UP VISIT: CPT

## 2025-04-22 NOTE — ED PROVIDER NOTE - NS ED ROS FT
Endocrine Progress Note Outpatient     Patient Care Team:  Clark Gallagher MD as PCP - General (Internal Medicine)  Karolina Saldaña MD as Consulting Physician (Cardiology)    Chief Complaint: Follow-up type 2 diabetes    HPI: This is a 75-year-old male history of type 2 diabetes, hypertension, hyperlipidemia, CAD and obesity is here for follow-up.    For type 2 diabetes: He is now on Omni pod 5 insulin pump along with Dexcom G-7 monitoring system.  He has been using insulin pump since February 2022.  He is using Aspart insulin the pump.      His current pump settings are as follows basal rate midnight is 1.0 units/h.  At 3 PM is 0.85 units/h.  His insulin carb ratio is 1 unit for each 4 g of carbohydrate.  Correction scale is 1 unit for each 30 mg blood sugar with a target blood sugar of 140 and active insulin is 4 hours.  Also on metformin 1000 mg twice a day.    Hypertension: Fair control    Hyperlipidemia: Currently on atorvastatin.    Past Medical History:   Diagnosis Date    Anemia Runs in family    Arthritis     Asymptomatic stenosis of right carotid artery     Atrial fibrillation     Bradycardia     Brain concussion     Buttock pain     rt cheek( lower)    Cataract     Clotting disorder Take blood thinner    Congenital heart disease     Coronary artery disease     Deep vein thrombosis     Diabetes mellitus 08/25/2011    Diabetes mellitus, type 2     Erectile dysfunction 2005    Heart attack     x2   with stent placement     2015/2018    Hyperlipidemia     Hypertension     Hypoglycemia     Leg pain     aches    Low back pain 2014    Lumbar radiculopathy, chronic 10/20/2023    Myocardial infarction     Nonintractable headache 02/24/2022    Obesity 45 years    Other cervical disc degeneration at C6-C7 level 08/29/2017    Pacemaker     Pulmonary arterial hypertension 1967    Scoliosis 20 years    Sleep apnea     Visual impairment Last 15 years       Social History     Socioeconomic History    Marital  status:      Spouse name: Maude    Number of children: 3    Years of education: 14   Tobacco Use    Smoking status: Former     Current packs/day: 0.00     Average packs/day: 2.0 packs/day for 8.3 years (16.7 ttl pk-yrs)     Types: Cigarettes     Start date: 1965     Quit date: 12/3/1973     Years since quittin.4     Passive exposure: Past    Smokeless tobacco: Never    Tobacco comments:     Haven’t smoked in almost 50 years   Vaping Use    Vaping status: Never Used   Substance and Sexual Activity    Alcohol use: Not Currently     Comment: Maybe 1 drink this year    Drug use: No    Sexual activity: Not Currently     Partners: Female     Comment: 70 years old past time       Family History   Problem Relation Age of Onset    Heart disease Mother          of heart failure age of 84    Hypertension Mother     Hyperlipidemia Mother     Arthritis Mother     Anemia Mother     Obesity Mother     Heart disease Father          of heart attack age of 68    Hypertension Father     Hyperlipidemia Father     Alcohol abuse Father     Anemia Father     Cancer Sister     Heart disease Sister         Heart problems    Hypertension Sister     Hyperlipidemia Sister     Diabetes Sister     Anemia Sister     Obesity Sister     Heart disease Brother         Heart problems two different times with heart problems    Hypertension Brother     Hyperlipidemia Brother     Alcohol abuse Brother     Diabetes Brother     Anemia Brother     Obesity Brother     Diabetes Maternal Grandmother     Obesity Maternal Grandmother     Stroke Maternal Grandfather     Obesity Maternal Grandfather        Allergies   Allergen Reactions    Vancomycin Rash       ROS:   Constitutional:  Denies fatigue, tiredness.    Eyes:  Denies change in visual acuity   HENT:  Denies nasal congestion or sore throat   Respiratory: denies cough, shortness of breath.   Cardiovascular:  denies chest pain, edema   GI:  Denies abdominal pain, nausea, vomiting.    Musculoskeletal:  Denies back pain or joint pain   Integument:  Denies dry skin and rash   Neurologic:  Denies headache, focal weakness or sensory changes   Endocrine:  Denies polyuria or polydipsia   Psychiatric:  Denies depression or anxiety      Vitals:    04/22/25 0920   BP: 135/75   Pulse: 70   SpO2: 97%     Body mass index is 37.07 kg/m².     Physical Exam:  GEN: NAD, conversant  EYES: EOMI,   NECK: no thyromegaly  CV: RRR,  LUNG: CTA  PSYCH: AOX3, appropriate mood, affect normal      Results Review:     I reviewed the patient's new clinical results.    Lab Results   Component Value Date    HGBA1C 6.8 (H) 04/15/2025    HGBA1C 6.75 (H) 07/02/2024    HGBA1C 6.60 (H) 09/11/2023      Lab Results   Component Value Date    GLUCOSE 121 (H) 04/15/2025    BUN 35 (H) 04/15/2025    CREATININE 1.21 04/15/2025    EGFRIFNONA 74 02/24/2022    BCR 29 (H) 04/15/2025    K 4.6 04/15/2025    CO2 24 04/15/2025    CALCIUM 9.3 04/15/2025    ALBUMIN 4.2 04/15/2025    AST 25 04/15/2025    ALT 28 04/15/2025    CHOL 111 07/02/2024    TRIG 74 04/15/2025    LDL 63 04/15/2025    HDL 36 (L) 04/15/2025     Lab Results   Component Value Date    TSH 1.640 11/25/2020     Dexcom download interpretation: Dates covered was between April 9, 2025 to April 22, 2025.  Average blood sugar was 139.  Spending about 90% in the range and 10% above range and 0% below range and glucose graph does not show significant fluctuations.      Medication Review: Reviewed.       Current Outpatient Medications:     Alcohol Swabs 70 % pads, USE WHEN TESTING BLOOD GLUCOSE FOUR TIMES DAILY, Disp: , Rfl:     amLODIPine (NORVASC) 5 MG tablet, Take 1 tablet by mouth Daily., Disp: 90 tablet, Rfl: 3    atorvastatin (LIPITOR) 40 MG tablet, 1 tablet p.o. daily., Disp: 90 tablet, Rfl: 3    Cholecalciferol (VITAMIN D) 1000 units tablet, Take 0.5 tablets by mouth Daily., Disp: , Rfl:     clopidogrel (PLAVIX) 75 MG tablet, Take 1 tablet by mouth Daily. Can restart 7 days post  surgery., Disp: , Rfl:     Coenzyme Q10 (CO Q 10 PO), Take 1 capsule by mouth Daily., Disp: , Rfl:     furosemide (LASIX) 40 MG tablet, Take 1 tablet by mouth Daily., Disp: 90 tablet, Rfl: 3    Insulin Aspart (novoLOG) 100 UNIT/ML injection, For using insulin pump.  Max daily dose is 100 units/day., Disp: 90 mL, Rfl: 3    Insulin Disposable Pump (Omnipod 5 G6 Pods, Gen 5,) misc, USE DAILY, Disp: 30 each, Rfl: 3    Krill Oil 1000 MG capsule, Take 1 capsule by mouth Daily., Disp: , Rfl:     lisinopril (PRINIVIL,ZESTRIL) 40 MG tablet, 1 tablet p.o. daily, Disp: 90 tablet, Rfl: 3    metFORMIN (GLUCOPHAGE) 1000 MG tablet, Take 1 tablet by mouth 2 (Two) Times a Day With Meals., Disp: 180 tablet, Rfl: 3    methocarbamol (ROBAXIN) 750 MG tablet, Take 1 tablet by mouth 3 (Three) Times a Day As Needed for Muscle Spasms., Disp: 60 tablet, Rfl: 0    metoprolol succinate XL (TOPROL-XL) 100 MG 24 hr tablet, , Disp: , Rfl:     Multiple Vitamins-Minerals (MULTI VITAMIN/MINERALS) tablet, Take 1 tablet by mouth Daily., Disp: , Rfl:     naloxone (NARCAN) 4 MG/0.1ML nasal spray, Call 911. Don't prime. [place] in 1 nostril for overdose. Repeat in 2-3 minutes in other nostril if no or minimal breathing/responsiveness., Disp: 2 each, Rfl: 0    vitamin C (ASCORBIC ACID) 500 MG tablet, Take 1 tablet by mouth Daily., Disp: , Rfl:     warfarin (COUMADIN) 5 MG tablet, 1 tablet daily, Disp: , Rfl:       Assessment and plan:  Diabetes mellitus type 2 with Hyperglycemia: Well-controlled with A1c at 6.8% and no low blood sugars.  At this time we will continue his current insulin pump settings along with metformin.  Recommend to always keep glucose source in case of low blood sugars and continue to work on diet and activity.      Hypertension: Well-controlled, continue current medications.    Hyperlipidemia: Currently on atorvastatin 40 mg p.o. daily.      Assessment and plan from January 21, 2025 reviewed and updated.        John Tolentino MD  FACE.                     GENERAL: no fever  EYES: no eye pain  HEENT: no neck pain  CARDIAC: no chest pain  PULMONARY: no SOB  GI: no abdominal pain  : no dysuria  SKIN: no rashes  NEURO: no headache  MSK:   Positive for right knee pain and swelling

## 2025-06-11 ENCOUNTER — APPOINTMENT (OUTPATIENT)
Dept: NEUROLOGY | Facility: CLINIC | Age: 65
End: 2025-06-11

## 2025-06-15 ENCOUNTER — OUTPATIENT (OUTPATIENT)
Dept: OUTPATIENT SERVICES | Facility: HOSPITAL | Age: 65
LOS: 1 days | End: 2025-06-15
Payer: MEDICARE

## 2025-06-15 DIAGNOSIS — Z96.651 PRESENCE OF RIGHT ARTIFICIAL KNEE JOINT: Chronic | ICD-10-CM

## 2025-06-15 DIAGNOSIS — N28.89 OTHER SPECIFIED DISORDERS OF KIDNEY AND URETER: ICD-10-CM

## 2025-06-15 DIAGNOSIS — Z33.2 ENCOUNTER FOR ELECTIVE TERMINATION OF PREGNANCY: Chronic | ICD-10-CM

## 2025-06-15 DIAGNOSIS — Z96.652 PRESENCE OF LEFT ARTIFICIAL KNEE JOINT: Chronic | ICD-10-CM

## 2025-06-15 DIAGNOSIS — Z98.890 OTHER SPECIFIED POSTPROCEDURAL STATES: Chronic | ICD-10-CM

## 2025-06-15 DIAGNOSIS — H26.9 UNSPECIFIED CATARACT: Chronic | ICD-10-CM

## 2025-06-15 PROCEDURE — A9585: CPT

## 2025-06-15 PROCEDURE — 74183 MRI ABD W/O CNTR FLWD CNTR: CPT

## 2025-06-15 PROCEDURE — 72148 MRI LUMBAR SPINE W/O DYE: CPT | Mod: MH

## 2025-07-03 ENCOUNTER — APPOINTMENT (OUTPATIENT)
Dept: ORTHOPEDIC SURGERY | Facility: CLINIC | Age: 65
End: 2025-07-03
Payer: MEDICARE

## 2025-07-03 PROCEDURE — 99213 OFFICE O/P EST LOW 20 MIN: CPT

## 2025-07-08 ENCOUNTER — APPOINTMENT (OUTPATIENT)
Dept: NEUROLOGY | Facility: CLINIC | Age: 65
End: 2025-07-08
Payer: MEDICARE

## 2025-07-08 ENCOUNTER — RX RENEWAL (OUTPATIENT)
Age: 65
End: 2025-07-08

## 2025-07-08 VITALS
HEIGHT: 60 IN | BODY MASS INDEX: 42.41 KG/M2 | WEIGHT: 216 LBS | DIASTOLIC BLOOD PRESSURE: 75 MMHG | HEART RATE: 78 BPM | SYSTOLIC BLOOD PRESSURE: 154 MMHG

## 2025-07-08 PROBLEM — G60.8 SENSORY POLYNEUROPATHY: Status: ACTIVE | Noted: 2025-07-08

## 2025-07-08 PROBLEM — G56.02 CARPAL TUNNEL SYNDROME OF LEFT WRIST: Status: ACTIVE | Noted: 2025-07-08

## 2025-07-08 PROCEDURE — 99214 OFFICE O/P EST MOD 30 MIN: CPT

## 2025-07-10 ENCOUNTER — APPOINTMENT (OUTPATIENT)
Dept: INTERNAL MEDICINE | Facility: CLINIC | Age: 65
End: 2025-07-10
Payer: MEDICARE

## 2025-07-10 VITALS
WEIGHT: 214 LBS | BODY MASS INDEX: 42.01 KG/M2 | HEART RATE: 75 BPM | HEIGHT: 60 IN | SYSTOLIC BLOOD PRESSURE: 138 MMHG | OXYGEN SATURATION: 95 % | TEMPERATURE: 97.9 F | DIASTOLIC BLOOD PRESSURE: 72 MMHG

## 2025-07-10 PROCEDURE — G0439: CPT

## 2025-07-10 PROCEDURE — 36415 COLL VENOUS BLD VENIPUNCTURE: CPT

## 2025-07-11 ENCOUNTER — NON-APPOINTMENT (OUTPATIENT)
Age: 65
End: 2025-07-11

## 2025-07-14 LAB
ALBUMIN MFR SERPL ELPH: 48.3 %
ALBUMIN SERPL-MCNC: 4 G/DL
ALBUMIN/GLOB SERPL: 0.9 RATIO
ALPHA1 GLOB MFR SERPL ELPH: 3.2 %
ALPHA1 GLOB SERPL ELPH-MCNC: 0.3 G/DL
ALPHA2 GLOB MFR SERPL ELPH: 13 %
ALPHA2 GLOB SERPL ELPH-MCNC: 1.1 G/DL
B-GLOBULIN MFR SERPL ELPH: 13 %
B-GLOBULIN SERPL ELPH-MCNC: 1.1 G/DL
DEPRECATED KAPPA LC FREE/LAMBDA SER: 1.42 RATIO
GAMMA GLOB FLD ELPH-MCNC: 1.9 G/DL
GAMMA GLOB MFR SERPL ELPH: 22.5 %
IGA SERPL-MCNC: 471 MG/DL
IGG SERPL-MCNC: 1890 MG/DL
IGM SERPL-MCNC: 142 MG/DL
INTERPRETATION SERPL IEP-IMP: NORMAL
KAPPA LC CSF-MCNC: 4.52 MG/DL
KAPPA LC SERPL-MCNC: 6.42 MG/DL
M PROTEIN MFR SERPL ELPH: NORMAL
M PROTEIN SPEC IFE-MCNC: NORMAL
MONOCLON BAND OBS SERPL: NORMAL
PROT SERPL-MCNC: 8.3 G/DL
PROT SERPL-MCNC: 8.3 G/DL

## 2025-07-14 NOTE — ASU DISCHARGE PLAN (ADULT/PEDIATRIC) - NS DC INTERPRETER YES NO
[de-identified] : Last Visit:Jun 19 2025 Reason:left knee pain  Pain level: 8/10  Symptoms: throbbing  Physical therapy/Home exercises: n/a Patient states persistent left hip discomfort as well MRI is available for review
No

## 2025-07-15 ENCOUNTER — RESULT REVIEW (OUTPATIENT)
Age: 65
End: 2025-07-15

## 2025-07-15 ENCOUNTER — APPOINTMENT (OUTPATIENT)
Dept: MAMMOGRAPHY | Facility: IMAGING CENTER | Age: 65
End: 2025-07-15

## 2025-07-15 ENCOUNTER — OUTPATIENT (OUTPATIENT)
Dept: OUTPATIENT SERVICES | Facility: HOSPITAL | Age: 65
LOS: 1 days | End: 2025-07-15
Payer: MEDICARE

## 2025-07-15 ENCOUNTER — APPOINTMENT (OUTPATIENT)
Dept: ULTRASOUND IMAGING | Facility: IMAGING CENTER | Age: 65
End: 2025-07-15

## 2025-07-15 DIAGNOSIS — Z96.651 PRESENCE OF RIGHT ARTIFICIAL KNEE JOINT: Chronic | ICD-10-CM

## 2025-07-15 DIAGNOSIS — Z98.890 OTHER SPECIFIED POSTPROCEDURAL STATES: Chronic | ICD-10-CM

## 2025-07-15 DIAGNOSIS — Z33.2 ENCOUNTER FOR ELECTIVE TERMINATION OF PREGNANCY: Chronic | ICD-10-CM

## 2025-07-15 DIAGNOSIS — C50.919 MALIGNANT NEOPLASM OF UNSPECIFIED SITE OF UNSPECIFIED FEMALE BREAST: ICD-10-CM

## 2025-07-15 DIAGNOSIS — Z96.652 PRESENCE OF LEFT ARTIFICIAL KNEE JOINT: Chronic | ICD-10-CM

## 2025-07-15 DIAGNOSIS — H26.9 UNSPECIFIED CATARACT: Chronic | ICD-10-CM

## 2025-07-15 PROCEDURE — 77067 SCR MAMMO BI INCL CAD: CPT | Mod: 26,LT,52

## 2025-07-15 PROCEDURE — 77063 BREAST TOMOSYNTHESIS BI: CPT

## 2025-07-15 PROCEDURE — 76641 ULTRASOUND BREAST COMPLETE: CPT | Mod: 26,LT,GA

## 2025-07-15 PROCEDURE — 77063 BREAST TOMOSYNTHESIS BI: CPT | Mod: 26,52

## 2025-07-15 PROCEDURE — 77067 SCR MAMMO BI INCL CAD: CPT

## 2025-07-15 PROCEDURE — 76641 ULTRASOUND BREAST COMPLETE: CPT

## 2025-07-16 LAB
ALBUMIN SERPL ELPH-MCNC: 4.2 G/DL
ALP BLD-CCNC: 117 U/L
ALT SERPL-CCNC: 13 U/L
ANION GAP SERPL CALC-SCNC: 15 MMOL/L
APPEARANCE: CLEAR
AST SERPL-CCNC: 24 U/L
BASOPHILS # BLD AUTO: 0.02 K/UL
BASOPHILS NFR BLD AUTO: 0.5 %
BILIRUB SERPL-MCNC: 0.2 MG/DL
BILIRUBIN URINE: NEGATIVE
BLOOD URINE: NEGATIVE
BUN SERPL-MCNC: 12 MG/DL
CALCIUM SERPL-MCNC: 10.1 MG/DL
CHLORIDE SERPL-SCNC: 101 MMOL/L
CHOLEST SERPL-MCNC: 174 MG/DL
CO2 SERPL-SCNC: 24 MMOL/L
COLOR: YELLOW
CREAT SERPL-MCNC: 1.04 MG/DL
CREAT SPEC-SCNC: 66 MG/DL
EGFRCR SERPLBLD CKD-EPI 2021: 59 ML/MIN/1.73M2
EOSINOPHIL # BLD AUTO: 0.06 K/UL
EOSINOPHIL NFR BLD AUTO: 1.6 %
ESTIMATED AVERAGE GLUCOSE: 140 MG/DL
FERRITIN SERPL-MCNC: 21 NG/ML
FOLATE SERPL-MCNC: >20 NG/ML
GLUCOSE QUALITATIVE U: NEGATIVE MG/DL
GLUCOSE SERPL-MCNC: 107 MG/DL
HBA1C MFR BLD HPLC: 6.5 %
HCT VFR BLD CALC: 32.2 %
HDLC SERPL-MCNC: 47 MG/DL
HGB BLD-MCNC: 9.9 G/DL
IMM GRANULOCYTES NFR BLD AUTO: 0 %
IRON SATN MFR SERPL: 10 %
IRON SERPL-MCNC: 30 UG/DL
KETONES URINE: NEGATIVE MG/DL
LDLC SERPL-MCNC: 108 MG/DL
LEUKOCYTE ESTERASE URINE: NEGATIVE
LYMPHOCYTES # BLD AUTO: 0.93 K/UL
LYMPHOCYTES NFR BLD AUTO: 25.5 %
MAN DIFF?: NORMAL
MCHC RBC-ENTMCNC: 26.6 PG
MCHC RBC-ENTMCNC: 30.7 G/DL
MCV RBC AUTO: 86.6 FL
MICROALBUMIN 24H UR DL<=1MG/L-MCNC: 6.8 MG/DL
MICROALBUMIN/CREAT 24H UR-RTO: 103 MG/G
MONOCYTES # BLD AUTO: 0.29 K/UL
MONOCYTES NFR BLD AUTO: 7.9 %
NEUTROPHILS # BLD AUTO: 2.35 K/UL
NEUTROPHILS NFR BLD AUTO: 64.5 %
NITRITE URINE: NEGATIVE
NONHDLC SERPL-MCNC: 127 MG/DL
NT-PROBNP SERPL-MCNC: 196 PG/ML
PH URINE: 5.5
PLATELET # BLD AUTO: 295 K/UL
POTASSIUM SERPL-SCNC: 4.4 MMOL/L
PROT SERPL-MCNC: 8.2 G/DL
PROTEIN URINE: NORMAL MG/DL
RBC # BLD: 3.72 M/UL
RBC # BLD: 3.72 M/UL
RBC # FLD: 17.5 %
RETICS # AUTO: 1.3 %
RETICS AGGREG/RBC NFR: 46.9 K/UL
SODIUM SERPL-SCNC: 139 MMOL/L
SPECIFIC GRAVITY URINE: 1.01
TIBC SERPL-MCNC: 308 UG/DL
TRIGL SERPL-MCNC: 110 MG/DL
UIBC SERPL-MCNC: 278 UG/DL
UROBILINOGEN URINE: 0.2 MG/DL
VIT B12 SERPL-MCNC: 803 PG/ML
WBC # FLD AUTO: 3.65 K/UL

## 2025-07-17 ENCOUNTER — APPOINTMENT (OUTPATIENT)
Dept: PULMONOLOGY | Facility: CLINIC | Age: 65
End: 2025-07-17

## 2025-07-21 NOTE — REVIEW OF SYSTEMS
[Negative] : Heme/Lymph

## 2025-08-07 ENCOUNTER — LABORATORY RESULT (OUTPATIENT)
Age: 65
End: 2025-08-07

## 2025-08-07 ENCOUNTER — RESULT REVIEW (OUTPATIENT)
Age: 65
End: 2025-08-07

## 2025-08-07 ENCOUNTER — APPOINTMENT (OUTPATIENT)
Dept: HEMATOLOGY ONCOLOGY | Facility: CLINIC | Age: 65
End: 2025-08-07
Payer: MEDICARE

## 2025-08-07 VITALS
OXYGEN SATURATION: 95 % | BODY MASS INDEX: 42.23 KG/M2 | DIASTOLIC BLOOD PRESSURE: 74 MMHG | SYSTOLIC BLOOD PRESSURE: 156 MMHG | TEMPERATURE: 97.9 F | WEIGHT: 216.25 LBS | RESPIRATION RATE: 16 BRPM | HEART RATE: 72 BPM

## 2025-08-07 DIAGNOSIS — I89.0 LYMPHEDEMA, NOT ELSEWHERE CLASSIFIED: ICD-10-CM

## 2025-08-07 DIAGNOSIS — D64.9 ANEMIA, UNSPECIFIED: ICD-10-CM

## 2025-08-07 DIAGNOSIS — R19.7 DIARRHEA, UNSPECIFIED: ICD-10-CM

## 2025-08-07 DIAGNOSIS — C50.919 MALIGNANT NEOPLASM OF UNSPECIFIED SITE OF UNSPECIFIED FEMALE BREAST: ICD-10-CM

## 2025-08-07 PROCEDURE — G2211 COMPLEX E/M VISIT ADD ON: CPT

## 2025-08-07 PROCEDURE — 99214 OFFICE O/P EST MOD 30 MIN: CPT

## 2025-08-08 ENCOUNTER — RESULT REVIEW (OUTPATIENT)
Age: 65
End: 2025-08-08

## 2025-08-08 ENCOUNTER — APPOINTMENT (OUTPATIENT)
Dept: CV DIAGNOSITCS | Facility: HOSPITAL | Age: 65
End: 2025-08-08

## 2025-08-08 ENCOUNTER — OUTPATIENT (OUTPATIENT)
Dept: OUTPATIENT SERVICES | Facility: HOSPITAL | Age: 65
LOS: 1 days | End: 2025-08-08
Payer: MEDICARE

## 2025-08-08 DIAGNOSIS — Z96.651 PRESENCE OF RIGHT ARTIFICIAL KNEE JOINT: Chronic | ICD-10-CM

## 2025-08-08 DIAGNOSIS — Z96.652 PRESENCE OF LEFT ARTIFICIAL KNEE JOINT: Chronic | ICD-10-CM

## 2025-08-08 DIAGNOSIS — Z98.890 OTHER SPECIFIED POSTPROCEDURAL STATES: Chronic | ICD-10-CM

## 2025-08-08 DIAGNOSIS — H26.9 UNSPECIFIED CATARACT: Chronic | ICD-10-CM

## 2025-08-08 DIAGNOSIS — I10 ESSENTIAL (PRIMARY) HYPERTENSION: ICD-10-CM

## 2025-08-08 DIAGNOSIS — Z33.2 ENCOUNTER FOR ELECTIVE TERMINATION OF PREGNANCY: Chronic | ICD-10-CM

## 2025-08-08 DIAGNOSIS — R60.0 LOCALIZED EDEMA: ICD-10-CM

## 2025-08-08 LAB
DEPRECATED KAPPA LC FREE/LAMBDA SER: 1.58 RATIO
FERRITIN SERPL-MCNC: 20 NG/ML
IRON SERPL-MCNC: 30 UG/DL
KAPPA LC CSF-MCNC: 4.78 MG/DL
KAPPA LC SERPL-MCNC: 7.54 MG/DL

## 2025-08-08 PROCEDURE — 93356 MYOCRD STRAIN IMG SPCKL TRCK: CPT

## 2025-08-08 PROCEDURE — 93306 TTE W/DOPPLER COMPLETE: CPT | Mod: 26

## 2025-08-11 ENCOUNTER — APPOINTMENT (OUTPATIENT)
Dept: CARDIOLOGY | Facility: CLINIC | Age: 65
End: 2025-08-11
Payer: MEDICARE

## 2025-08-11 VITALS
BODY MASS INDEX: 42.15 KG/M2 | HEART RATE: 79 BPM | TEMPERATURE: 98.11 F | WEIGHT: 215.8 LBS | SYSTOLIC BLOOD PRESSURE: 146 MMHG | OXYGEN SATURATION: 97 % | RESPIRATION RATE: 19 BRPM | DIASTOLIC BLOOD PRESSURE: 74 MMHG

## 2025-08-11 DIAGNOSIS — I10 ESSENTIAL (PRIMARY) HYPERTENSION: ICD-10-CM

## 2025-08-11 LAB
ALBUMIN MFR SERPL ELPH: 49.1 %
ALBUMIN SERPL-MCNC: 4.2 G/DL
ALBUMIN/GLOB SERPL: 1 RATIO
ALPHA1 GLOB MFR SERPL ELPH: 3.2 %
ALPHA1 GLOB SERPL ELPH-MCNC: 0.3 G/DL
ALPHA2 GLOB MFR SERPL ELPH: 13.3 %
ALPHA2 GLOB SERPL ELPH-MCNC: 1.1 G/DL
B-GLOBULIN MFR SERPL ELPH: 12.6 %
B-GLOBULIN SERPL ELPH-MCNC: 1.1 G/DL
COPPER SERPL-MCNC: 153 UG/DL
GAMMA GLOB FLD ELPH-MCNC: 1.9 G/DL
GAMMA GLOB MFR SERPL ELPH: 21.8 %
INTERPRETATION SERPL IEP-IMP: NORMAL
M PROTEIN MFR SERPL ELPH: NORMAL
MONOCLON BAND OBS SERPL: NORMAL
PROT SERPL-MCNC: 8.6 G/DL
PROT SERPL-MCNC: 8.6 G/DL

## 2025-08-11 PROCEDURE — 93000 ELECTROCARDIOGRAM COMPLETE: CPT

## 2025-08-11 PROCEDURE — G2211 COMPLEX E/M VISIT ADD ON: CPT

## 2025-08-11 PROCEDURE — 99214 OFFICE O/P EST MOD 30 MIN: CPT

## 2025-08-19 ENCOUNTER — APPOINTMENT (OUTPATIENT)
Facility: CLINIC | Age: 65
End: 2025-08-19
Payer: MEDICARE

## 2025-08-19 DIAGNOSIS — I89.0 LYMPHEDEMA, NOT ELSEWHERE CLASSIFIED: ICD-10-CM

## 2025-08-19 DIAGNOSIS — C50.919 MALIGNANT NEOPLASM OF UNSPECIFIED SITE OF UNSPECIFIED FEMALE BREAST: ICD-10-CM

## 2025-08-19 DIAGNOSIS — M79.2 NEURALGIA AND NEURITIS, UNSPECIFIED: ICD-10-CM

## 2025-08-19 DIAGNOSIS — R60.9 EDEMA, UNSPECIFIED: ICD-10-CM

## 2025-08-19 DIAGNOSIS — L90.5 SCAR CONDITIONS AND FIBROSIS OF SKIN: ICD-10-CM

## 2025-08-19 PROCEDURE — 99205 OFFICE O/P NEW HI 60 MIN: CPT

## 2025-08-28 ENCOUNTER — APPOINTMENT (OUTPATIENT)
Dept: ULTRASOUND IMAGING | Facility: IMAGING CENTER | Age: 65
End: 2025-08-28
Payer: MEDICARE

## 2025-08-28 ENCOUNTER — OUTPATIENT (OUTPATIENT)
Dept: OUTPATIENT SERVICES | Facility: HOSPITAL | Age: 65
LOS: 1 days | End: 2025-08-28
Payer: MEDICARE

## 2025-08-28 DIAGNOSIS — Z33.2 ENCOUNTER FOR ELECTIVE TERMINATION OF PREGNANCY: Chronic | ICD-10-CM

## 2025-08-28 DIAGNOSIS — Z96.651 PRESENCE OF RIGHT ARTIFICIAL KNEE JOINT: Chronic | ICD-10-CM

## 2025-08-28 DIAGNOSIS — Z96.652 PRESENCE OF LEFT ARTIFICIAL KNEE JOINT: Chronic | ICD-10-CM

## 2025-08-28 DIAGNOSIS — Z98.890 OTHER SPECIFIED POSTPROCEDURAL STATES: Chronic | ICD-10-CM

## 2025-08-28 DIAGNOSIS — H26.9 UNSPECIFIED CATARACT: Chronic | ICD-10-CM

## 2025-08-28 DIAGNOSIS — R60.9 EDEMA, UNSPECIFIED: ICD-10-CM

## 2025-08-28 PROCEDURE — 93971 EXTREMITY STUDY: CPT | Mod: 26,RT

## 2025-08-28 PROCEDURE — 93971 EXTREMITY STUDY: CPT

## 2025-08-29 ENCOUNTER — NON-APPOINTMENT (OUTPATIENT)
Age: 65
End: 2025-08-29

## 2025-08-29 DIAGNOSIS — R09.89 OTHER SPECIFIED SYMPTOMS AND SIGNS INVOLVING THE CIRCULATORY AND RESPIRATORY SYSTEMS: ICD-10-CM

## 2025-08-29 DIAGNOSIS — R59.0 LOCALIZED ENLARGED LYMPH NODES: ICD-10-CM

## 2025-09-18 ENCOUNTER — APPOINTMENT (OUTPATIENT)
Facility: CLINIC | Age: 65
End: 2025-09-18

## 2025-09-18 DIAGNOSIS — M79.2 NEURALGIA AND NEURITIS, UNSPECIFIED: ICD-10-CM

## 2025-09-18 DIAGNOSIS — I89.0 LYMPHEDEMA, NOT ELSEWHERE CLASSIFIED: ICD-10-CM

## (undated) DEVICE — Device

## (undated) DEVICE — SOL IRR POUR H2O 1500ML

## (undated) DEVICE — HOOD T5 PEELAWAY

## (undated) DEVICE — SOL IRR POUR NS 0.9% 1500ML

## (undated) DEVICE — GOWN XXL

## (undated) DEVICE — PACK TOTAL JOINT

## (undated) DEVICE — PACK LIJ BASIC ORTHO

## (undated) DEVICE — DRAPE LARGE SHEET 72X85"

## (undated) DEVICE — HANDPIECE INTERPULSE W/ COAXIAL FAN SPRAY TIP

## (undated) DEVICE — ELCTR BOVIE PENCIL SMOKE EVACUATION

## (undated) DEVICE — SUT VICRYL PLUS 0 27" OS-6 UNDYED

## (undated) DEVICE — TAPE SILK 3"

## (undated) DEVICE — POSITIONER STRAP ARMBOARD VELCRO TS-30

## (undated) DEVICE — ZIMMER BACTISURE WOUND LAVAGE 1000ML

## (undated) DEVICE — SYR LUER LOK 20CC

## (undated) DEVICE — PREP CHLORAPREP HI-LITE ORANGE 26ML

## (undated) DEVICE — GLV 7.5 PROTEXIS (BLUE)

## (undated) DEVICE — WARMING BLANKET FULL ADULT

## (undated) DEVICE — ELCTR AQUAMANTYS BIPOLAR SEALER 6.0

## (undated) DEVICE — SOL IRR BAG NS 0.9% 3000ML

## (undated) DEVICE — DRSG DERMABOND 0.7ML

## (undated) DEVICE — DRSG STOCKINETTE IMPERVIOUS XL

## (undated) DEVICE — SUT VICRYL 1 36" CTX UNDYED

## (undated) DEVICE — SUT VICRYL 2-0 27" PS-2 UNDYED

## (undated) DEVICE — DRAIN JACKSON PRATT 7MM FLAT FULL NO TROCAR

## (undated) DEVICE — LIJ/LIA-ESU VALLEYLAB FORCETRIAD T2D28932EX: Type: DURABLE MEDICAL EQUIPMENT

## (undated) DEVICE — SUT PROLENE 0 30" CT-1

## (undated) DEVICE — TOURNIQUET ESMARK 6"

## (undated) DEVICE — SAW BLADE STRYKER SAGITTAL 3 HOLE OSCILLATING

## (undated) DEVICE — DRSG ACE BANDAGE 6"

## (undated) DEVICE — TOURNIQUET CUFF 34" DUAL PORT W PLC

## (undated) DEVICE — DRAPE SPLIT SHEET 77" X 120"

## (undated) DEVICE — SUT MONOCRYL 3-0 18" PS-2 UNDYED

## (undated) DEVICE — DRSG COBAN 6"

## (undated) DEVICE — DRAPE EXTREMITY 87" X 106" X 128"

## (undated) DEVICE — GLV 7 PROTEXIS (WHITE)

## (undated) DEVICE — DRAPE U POLY BLUE 60"X60"

## (undated) DEVICE — SUT QUILL PDO 2 36CM 40MM

## (undated) DEVICE — LIJ/LIA-AQUAMANTYS MACHINE #2 TL200-5370: Type: DURABLE MEDICAL EQUIPMENT

## (undated) DEVICE — POSITIONER CARDIAC BUMP

## (undated) DEVICE — DRSG AQUACEL 3.5 X 12"

## (undated) DEVICE — S&N OSTEOTOME BLADE THIN ROUNDED END 12MM

## (undated) DEVICE — VENODYNE/SCD SLEEVE CALF MEDIUM

## (undated) DEVICE — LIJ/LIA-ESU VALLEYLAB FORCE TRIAD T2D28932EX: Type: DURABLE MEDICAL EQUIPMENT

## (undated) DEVICE — STRYKER PULSE LAVAGE WITH COAXIAL FAN SPRAY TIP

## (undated) DEVICE — DRSG STOCKINETTE IMPERVIOUS XL 12 X 48"

## (undated) DEVICE — WOUND IRR IRRISEPT W 0.5 CHG

## (undated) DEVICE — SUCTION YANKAUER NO CONTROL VENT

## (undated) DEVICE — ELCTR GROUNDING PAD ADULT COVIDIEN

## (undated) DEVICE — NDL HYPO SAFE 21G X 1.5" (GREEN)

## (undated) DEVICE — LABELS BLANK W PEN

## (undated) DEVICE — DRSG MEPILEX 10 X 30CM (4 X 12") WHITE

## (undated) DEVICE — SUT VICRYL 0 27" OS-6 UNDYED

## (undated) DEVICE — SUT PROLENE 1 30" CT-1

## (undated) DEVICE — WOUND IRR SURGIPHOR

## (undated) DEVICE — CANISTER DISPOSABLE THIN WALL 3000CC

## (undated) DEVICE — DRSG AQUACEL 3.5 X 14"

## (undated) DEVICE — DRAPE 3/4 SHEET 52X76"

## (undated) DEVICE — SURGIPHOR STERILE WOUND IRR POVIDONE IODINE

## (undated) DEVICE — STRYKER MIXEVAC 3 BONE CEMENT MIXER

## (undated) DEVICE — SAW BLADE STRYKER SAGITTAL LONG 18MM

## (undated) DEVICE — DRAPE U (BLUE) 60 X 60"

## (undated) DEVICE — SAW BLADE STRYKER SYSTEM 6 SAGITTAL 13X1.19X90MM

## (undated) DEVICE — DRAIN RESERVOIR FOR JACKSON PRATT 100CC CARDINAL

## (undated) DEVICE — DRSG PREVENA PLUS SYSTEM